# Patient Record
Sex: FEMALE | Race: WHITE | NOT HISPANIC OR LATINO | Employment: UNEMPLOYED | URBAN - METROPOLITAN AREA
[De-identification: names, ages, dates, MRNs, and addresses within clinical notes are randomized per-mention and may not be internally consistent; named-entity substitution may affect disease eponyms.]

---

## 2017-01-03 ENCOUNTER — ONCOLOGY VISIT (OUTPATIENT)
Dept: ONCOLOGY | Facility: CLINIC | Age: 52
End: 2017-01-03
Attending: PHYSICIAN ASSISTANT
Payer: COMMERCIAL

## 2017-01-03 VITALS
HEART RATE: 116 BPM | OXYGEN SATURATION: 100 % | RESPIRATION RATE: 16 BRPM | DIASTOLIC BLOOD PRESSURE: 67 MMHG | SYSTOLIC BLOOD PRESSURE: 110 MMHG | HEIGHT: 64 IN | TEMPERATURE: 99 F | BODY MASS INDEX: 22.65 KG/M2 | WEIGHT: 132.7 LBS

## 2017-01-03 DIAGNOSIS — C50.411 MALIGNANT NEOPLASM OF UPPER-OUTER QUADRANT OF RIGHT FEMALE BREAST (H): Primary | ICD-10-CM

## 2017-01-03 DIAGNOSIS — C50.411 MALIGNANT NEOPLASM OF UPPER-OUTER QUADRANT OF RIGHT FEMALE BREAST (H): ICD-10-CM

## 2017-01-03 LAB
ALBUMIN SERPL-MCNC: 3.9 G/DL (ref 3.4–5)
ALP SERPL-CCNC: 67 U/L (ref 40–150)
ALT SERPL W P-5'-P-CCNC: 30 U/L (ref 0–50)
ANION GAP SERPL CALCULATED.3IONS-SCNC: 5 MMOL/L (ref 3–14)
AST SERPL W P-5'-P-CCNC: 17 U/L (ref 0–45)
BASOPHILS # BLD AUTO: 0 10E9/L (ref 0–0.2)
BASOPHILS NFR BLD AUTO: 0.6 %
BILIRUB SERPL-MCNC: 0.5 MG/DL (ref 0.2–1.3)
BUN SERPL-MCNC: 13 MG/DL (ref 7–30)
CALCIUM SERPL-MCNC: 9.6 MG/DL (ref 8.5–10.1)
CHLORIDE SERPL-SCNC: 105 MMOL/L (ref 94–109)
CO2 SERPL-SCNC: 29 MMOL/L (ref 20–32)
CREAT SERPL-MCNC: 0.94 MG/DL (ref 0.52–1.04)
DIFFERENTIAL METHOD BLD: NORMAL
EOSINOPHIL # BLD AUTO: 0.1 10E9/L (ref 0–0.7)
EOSINOPHIL NFR BLD AUTO: 2 %
ERYTHROCYTE [DISTWIDTH] IN BLOOD BY AUTOMATED COUNT: 14.3 % (ref 10–15)
GFR SERPL CREATININE-BSD FRML MDRD: 63 ML/MIN/1.7M2
GLUCOSE SERPL-MCNC: 116 MG/DL (ref 70–99)
HCT VFR BLD AUTO: 42.6 % (ref 35–47)
HGB BLD-MCNC: 14.1 G/DL (ref 11.7–15.7)
IMM GRANULOCYTES # BLD: 0 10E9/L (ref 0–0.4)
IMM GRANULOCYTES NFR BLD: 0.3 %
LYMPHOCYTES # BLD AUTO: 1.8 10E9/L (ref 0.8–5.3)
LYMPHOCYTES NFR BLD AUTO: 25.6 %
MCH RBC QN AUTO: 28.3 PG (ref 26.5–33)
MCHC RBC AUTO-ENTMCNC: 33.1 G/DL (ref 31.5–36.5)
MCV RBC AUTO: 85 FL (ref 78–100)
MONOCYTES # BLD AUTO: 0.5 10E9/L (ref 0–1.3)
MONOCYTES NFR BLD AUTO: 6.4 %
NEUTROPHILS # BLD AUTO: 4.6 10E9/L (ref 1.6–8.3)
NEUTROPHILS NFR BLD AUTO: 65.1 %
NRBC # BLD AUTO: 0 10*3/UL
NRBC BLD AUTO-RTO: 0 /100
PLATELET # BLD AUTO: 239 10E9/L (ref 150–450)
POTASSIUM SERPL-SCNC: 4.1 MMOL/L (ref 3.4–5.3)
PROT SERPL-MCNC: 6.6 G/DL (ref 6.8–8.8)
RBC # BLD AUTO: 4.99 10E12/L (ref 3.8–5.2)
SODIUM SERPL-SCNC: 138 MMOL/L (ref 133–144)
WBC # BLD AUTO: 7.1 10E9/L (ref 4–11)

## 2017-01-03 PROCEDURE — 99214 OFFICE O/P EST MOD 30 MIN: CPT | Mod: ZP | Performed by: PHYSICIAN ASSISTANT

## 2017-01-03 PROCEDURE — 85025 COMPLETE CBC W/AUTO DIFF WBC: CPT | Performed by: INTERNAL MEDICINE

## 2017-01-03 PROCEDURE — 80053 COMPREHEN METABOLIC PANEL: CPT | Performed by: INTERNAL MEDICINE

## 2017-01-03 PROCEDURE — 99212 OFFICE O/P EST SF 10 MIN: CPT | Mod: ZF

## 2017-01-03 PROCEDURE — 36415 COLL VENOUS BLD VENIPUNCTURE: CPT

## 2017-01-03 ASSESSMENT — PAIN SCALES - GENERAL: PAINLEVEL: NO PAIN (0)

## 2017-01-03 NOTE — Clinical Note
"1/3/2017       RE: Josefina Bennett  [address]  [facility] [address]     Dear Colleague,    Thank you for referring your patient, Josefina Bennett, to the [facility]. Please see a copy of my visit note below.    DIAGNOSIS:   Dictation on: 01/03/2017 12:21 PM by: JAGRUTI GASTELUM [[id number]]     INTERVAL HISTORY:   Dictation on: 01/03/2017 12:21 PM by: JAGRUTI GASTELUM [[id number]]     ROS:  Answers for HPI/ROS submitted by the patient on 1/3/2017   General Symptoms: No  Skin Symptoms: No  HENT Symptoms: No  EYE SYMPTOMS: No  HEART SYMPTOMS: No  LUNG SYMPTOMS: No  INTESTINAL SYMPTOMS: No  URINARY SYMPTOMS: No  GYNECOLOGIC SYMPTOMS: No  BREAST SYMPTOMS: No  SKELETAL SYMPTOMS: No  BLOOD SYMPTOMS: No  NERVOUS SYSTEM SYMPTOMS: No  MENTAL HEALTH SYMPTOMS: No    MEDICATIONS:   Current Outpatient Prescriptions   Medication Sig Dispense Refill     venlafaxine (EFFEXOR-ER) 225 MG TB24 24 hr tablet Take 1 tablet (225 mg) by mouth daily 90 tablet 3     letrozole (FEMARA) 2.5 MG tablet Take 1 tablet (2.5 mg) by mouth daily 90 tablet 1     mupirocin (BACTROBAN) 2 % ointment Apply topically daily 22 g 0     cholecalciferol (VITAMIN D3) 1000 UNIT tablet Take 1 tablet (1,000 Units) by mouth daily 30 tablet 0     calcium carbonate (OS- MG [place]. [place]) 500 MG tablet Take 500 mg by mouth At Bedtime            ALLERGIES:    Allergies   Allergen Reactions     Morphine      Rash     Tylenol [Acetaminophen] Hives     Rash       PHYSICAL EXAMINATION:  Vitals: /67 mmHg  Pulse 116  Temp(Src) 99  F (37.2  C) (Oral)  Resp 16  Ht 1.613 m (5' 3.5\")  Wt 60.192 kg (132 lb 11.2 oz)  BMI 23.14 kg/m2  SpO2 100%  LMP 12/18/2014  GENERAL:  A pleasant person in no acute distress.   HEENT:  Sclerae are nonicteric.    NECK:  Supple.   LYMPH NODES:  No peripheral lymphadenopathy noted in the axillary, supraclavicular, or cervical regions.   LUNGS:  Clear to auscultation bilaterally.   HEART:  Regular rate and rhythm, with no " murmur appreciated.   ABDOMEN:  Bowel sounds are active.  Soft and nontender.  No hepatosplenomegaly or other masses appreciated.  LOWER EXTREMITIES:  Without pitting edema to the knees bilaterally.   NEUROLOGICAL:  Alert/orientated/able to answer all questions.  CN grossly intact.  BREAST:  Right chest wall, well healed mastectomy incision, no masses or nodules.  Left breast, normal to inspection, no masses.     LAB:      1/3/2017 11:10   Sodium 138   Potassium 4.1   Chloride 105   Carbon Dioxide 29   Urea Nitrogen 13   Creatinine 0.94   GFR Estimate 63   GFR Estimate If Black 76   Calcium 9.6   Anion Gap 5   Albumin 3.9   Protein Total 6.6 (L)   Bilirubin Total 0.5   Alkaline Phosphatase 67   ALT 30   AST 17   Glucose 116 (H)   WBC 7.1   Hemoglobin 14.1   Hematocrit 42.6   Platelet Count 239   RBC Count 4.99   MCV 85   MCH 28.3   MCHC 33.1   RDW 14.3   Diff Method Automated Method   % Neutrophils 65.1   % Lymphocytes 25.6   % Monocytes 6.4   % Eosinophils 2.0   % Basophils 0.6   % Immature Granulocytes 0.3   Nucleated RBCs 0   Absolute Neutrophil 4.6   Absolute Lymphocytes 1.8   Absolute Monocytes 0.5   Absolute Eosinophils 0.1   Absolute Basophils 0.0   Abs Immature Granulocytes 0.0   Absolute Nucleated RBC 0.0     IMPRESSION/PLAN:    Dictation on: 01/03/2017 12:24 PM by: MERA GASTELUM [[id number]]     Mera Gastelum PA-C    Again, thank you for allowing me to participate in the care of your patient.      Sincerely,    Mera Gastelum PA-C

## 2017-01-03 NOTE — NURSING NOTE
Chief Complaint   Patient presents with     Blood Draw     Vitals done and labs drawn peripherally from left arm    Concetta Quan LPN

## 2017-01-03 NOTE — NURSING NOTE
"Josefina Bennett is a 51 year old female who presents for:  Chief Complaint   Patient presents with     Blood Draw     Oncology Clinic Visit     Breast Ca        Initial Vitals:  /67 mmHg  Pulse 116  Temp(Src) 99  F (37.2  C) (Oral)  Resp 16  Ht 1.613 m (5' 3.5\")  Wt 60.192 kg (132 lb 11.2 oz)  BMI 23.14 kg/m2  SpO2 100%  LMP 12/18/2014 Estimated body mass index is 23.14 kg/(m^2) as calculated from the following:    Height as of this encounter: 1.613 m (5' 3.5\").    Weight as of this encounter: 60.192 kg (132 lb 11.2 oz).. Body surface area is 1.64 meters squared. BP completed using cuff size: regular  No Pain (0) Patient's last menstrual period was 12/18/2014. Allergies and medications reviewed.     Medications: Medication refills not needed today.  Pharmacy name entered into [employer]:    [facility] - [place], [place] - [address] AT [facility]  [facility] [id number] ([place]) - [place], [place] - [address] AT [facility] [address]  [facility] - [place], [place] - [address] [facility]    Comments:     7 minutes for nursing intake (face to face time)   Clarice Blanco LPN          "

## 2017-01-03 NOTE — PROGRESS NOTES
DIAGNOSIS:  Clinical stage II infiltrating lobular carcinoma of the right breast, ER/UT-positive and HER2/jhony-negative.  The patient noted changes in her right breast in 2015.  Screening mammogram on 01/15/2015 showed an architectural distortion in the right breast.  Diagnostic mammogram and ultrasound on 01/28/2015 confirmed the architectural distortion.  Ultrasound showed a 4 x 1.1 x 1.5 cm abnormal hypoechoic area.  There was also an additional 1.4 hypoechoic nodule.  Breast MRI on 02/16/2015 showed a 5.7 cm area of concern.  PET/CT scan on 02/10/2015 showed no distant disease.  There were small pulmonary nodules.  [facility] in [facility] [place], and was randomized to weekly Taxol x12 weeks with ganetespib.  She received this from 03/03/2015 through 05/19/2015.  She received dose-dense Adriamycin and Cytoxan x2 cycles from 05/26/2015 until 06/09/2015.  After the second cycle of AC, she was hospitalized with Pneumocystis pneumonia, which required intubation.  Further chemotherapy was canceled.  She had a right lumpectomy with sentinel lymph node biopsy on 08/12/2015.  This revealed a residual infiltrating lobular carcinoma 63 mm x 36 mm.  There was associated LCIS.  She had a positive margin.  Two out of 2 lymph nodes were positive.  She had RCB class 3.  She went under re-excision on 09/02/2015, which showed an additional infiltrating lobular carcinoma with a positive margin.  She had right mastectomy on 9/21/2015, which showed an additional carcinoma and 2 out of 2 lymph nodes positive.  She was started on tamoxifen in 10/2015.  She completed 6040 cGy to the right chest wall plus lymphatics on 12/03/2015.  She had a laparoscopic hysterectomy and BSO on 10/16/2015. Treatment was changed to letrozole in 01/2016.     INTERVAL HISTORY:  Ms. Bennett comes to the clinic today for followup of her breast carcinoma.  She is feeling well at this time.  She does exercise at least 150 minutes of cardiovascular exercise per week.   "This includes weightbearing exercises at least 2-3 days a week.  The rest of the review of systems is negative.  See below.  She remains on the letrozole, and denies any side effects.     ROS:  Answers for HPI/ROS submitted by the patient on 1/3/2017   General Symptoms: No  Skin Symptoms: No  HENT Symptoms: No  EYE SYMPTOMS: No  HEART SYMPTOMS: No  LUNG SYMPTOMS: No  INTESTINAL SYMPTOMS: No  URINARY SYMPTOMS: No  GYNECOLOGIC SYMPTOMS: No  BREAST SYMPTOMS: No  SKELETAL SYMPTOMS: No  BLOOD SYMPTOMS: No  NERVOUS SYSTEM SYMPTOMS: No  MENTAL HEALTH SYMPTOMS: No    MEDICATIONS:   Current Outpatient Prescriptions   Medication Sig Dispense Refill     venlafaxine (EFFEXOR-ER) 225 MG TB24 24 hr tablet Take 1 tablet (225 mg) by mouth daily 90 tablet 3     letrozole (FEMARA) 2.5 MG tablet Take 1 tablet (2.5 mg) by mouth daily 90 tablet 1     mupirocin (BACTROBAN) 2 % ointment Apply topically daily 22 g 0     cholecalciferol (VITAMIN D3) 1000 UNIT tablet Take 1 tablet (1,000 Units) by mouth daily 30 tablet 0     calcium carbonate (OS- MG [place]. [place]) 500 MG tablet Take 500 mg by mouth At Bedtime            ALLERGIES:    Allergies   Allergen Reactions     Morphine      Rash     Tylenol [Acetaminophen] Hives     Rash       PHYSICAL EXAMINATION:  Vitals: /67 mmHg  Pulse 116  Temp(Src) 99  F (37.2  C) (Oral)  Resp 16  Ht 1.613 m (5' 3.5\")  Wt 60.192 kg (132 lb 11.2 oz)  BMI 23.14 kg/m2  SpO2 100%  LMP 12/18/2014  GENERAL:  A pleasant person in no acute distress.   HEENT:  Sclerae are nonicteric.    NECK:  Supple.   LYMPH NODES:  No peripheral lymphadenopathy noted in the axillary, supraclavicular, or cervical regions.   LUNGS:  Clear to auscultation bilaterally.   HEART:  Regular rate and rhythm, with no murmur appreciated.   ABDOMEN:  Bowel sounds are active.  Soft and nontender.  No hepatosplenomegaly or other masses appreciated.  LOWER EXTREMITIES:  Without pitting edema to the knees bilaterally.   NEUROLOGICAL: "  Alert/orientated/able to answer all questions.  CN grossly intact.  BREAST:  Right chest wall, well healed mastectomy incision, no masses or nodules.  Left breast, normal to inspection, no masses.     LAB:      1/3/2017 11:10   Sodium 138   Potassium 4.1   Chloride 105   Carbon Dioxide 29   Urea Nitrogen 13   Creatinine 0.94   GFR Estimate 63   GFR Estimate If Black 76   Calcium 9.6   Anion Gap 5   Albumin 3.9   Protein Total 6.6 (L)   Bilirubin Total 0.5   Alkaline Phosphatase 67   ALT 30   AST 17   Glucose 116 (H)   WBC 7.1   Hemoglobin 14.1   Hematocrit 42.6   Platelet Count 239   RBC Count 4.99   MCV 85   MCH 28.3   MCHC 33.1   RDW 14.3   Diff Method Automated Method   % Neutrophils 65.1   % Lymphocytes 25.6   % Monocytes 6.4   % Eosinophils 2.0   % Basophils 0.6   % Immature Granulocytes 0.3   Nucleated RBCs 0   Absolute Neutrophil 4.6   Absolute Lymphocytes 1.8   Absolute Monocytes 0.5   Absolute Eosinophils 0.1   Absolute Basophils 0.0   Abs Immature Granulocytes 0.0   Absolute Nucleated RBC 0.0     IMPRESSION/PLAN:   1.  Locally advanced infiltrating lobular carcinoma, right breast, ER/[place]-positive, HER2/jhony-negative.  Ms. Bennett continues to do well at this time.  She is not having signs or symptoms that would suggest recurrence of her breast carcinoma.  We plan to continue the letrozole daily.  She had a breast MRI in 10/2016 with no concerns.  She is already scheduled for her mammogram in April.  She will continue to exercise with at least 150 minutes of cardiovascular exercise per week.    2.  Bone health.  DEXA scan in 06/2016 showed a T-score of -1.7 with low bone density.  We plan to continue the calcium with vitamin D and weightbearing exercises 2-3 days a week.       If there are no interval concerns, the patient will follow up in April.     Mera Marte PA-C

## 2017-01-03 NOTE — Clinical Note
1/3/2017      RE: Josefina Bennett  [address]  [facility] [address]       DIAGNOSIS:  Clinical stage II infiltrating lobular carcinoma of the right breast, ER/ME-positive and HER2/jhony-negative.  The patient noted changes in her right breast in 2015.  Screening mammogram on 01/15/2015 showed an architectural distortion in the right breast.  Diagnostic mammogram and ultrasound on 01/28/2015 confirmed the architectural distortion.  Ultrasound showed a 4 x 1.1 x 1.5 cm abnormal hypoechoic area.  There was also an additional 1.4 hypoechoic nodule.  Breast MRI on 02/16/2015 showed a 5.7 cm area of concern.  PET/CT scan on 02/10/2015 showed no distant disease.  There were small pulmonary nodules.  She enrolled in [facility] [place], and was randomized to weekly Taxol x12 weeks with ganetespib.  She received this from 03/03/2015 through 05/19/2015.  She received dose-dense Adriamycin and Cytoxan x2 cycles from 05/26/2015 until 06/09/2015.  After the second cycle of AC, she was hospitalized with Pneumocystis pneumonia, which required intubation.  Further chemotherapy was canceled.  She had a right lumpectomy with sentinel lymph node biopsy on 08/12/2015.  This revealed a residual infiltrating lobular carcinoma 63 mm x 36 mm.  There was associated LCIS.  She had a positive margin.  Two out of 2 lymph nodes were positive.  She had RCB class 3.  She went under re-excision on 09/02/2015, which showed an additional infiltrating lobular carcinoma with a positive margin.  She had right mastectomy on 9/21/2015, which showed an additional carcinoma and 2 out of 2 lymph nodes positive.  She was started on tamoxifen in 10/2015.  She completed 6040 cGy to the right chest wall plus lymphatics on 12/03/2015.  She had a laparoscopic hysterectomy and BSO on 10/16/2015. Treatment was changed to letrozole in 01/2016.     INTERVAL HISTORY:  Ms. Bennett comes to the clinic today for followup of her breast carcinoma.  She is feeling well at this time.  She  "does exercise at least 150 minutes of cardiovascular exercise per week.  This includes weightbearing exercises at least 2-3 days a week.  The rest of the review of systems is negative.  See below.  She remains on the letrozole, and denies any side effects.     ROS:  Answers for HPI/ROS submitted by the patient on 1/3/2017   General Symptoms: No  Skin Symptoms: No  HENT Symptoms: No  EYE SYMPTOMS: No  HEART SYMPTOMS: No  LUNG SYMPTOMS: No  INTESTINAL SYMPTOMS: No  URINARY SYMPTOMS: No  GYNECOLOGIC SYMPTOMS: No  BREAST SYMPTOMS: No  SKELETAL SYMPTOMS: No  BLOOD SYMPTOMS: No  NERVOUS SYSTEM SYMPTOMS: No  MENTAL HEALTH SYMPTOMS: No    MEDICATIONS:   Current Outpatient Prescriptions   Medication Sig Dispense Refill     venlafaxine (EFFEXOR-ER) 225 MG TB24 24 hr tablet Take 1 tablet (225 mg) by mouth daily 90 tablet 3     letrozole (FEMARA) 2.5 MG tablet Take 1 tablet (2.5 mg) by mouth daily 90 tablet 1     mupirocin (BACTROBAN) 2 % ointment Apply topically daily 22 g 0     cholecalciferol (VITAMIN D3) 1000 UNIT tablet Take 1 tablet (1,000 Units) by mouth daily 30 tablet 0     calcium carbonate (OS- MG [place]. [place]) 500 MG tablet Take 500 mg by mouth At Bedtime            ALLERGIES:    Allergies   Allergen Reactions     Morphine      Rash     Tylenol [Acetaminophen] Hives     Rash       PHYSICAL EXAMINATION:  Vitals: /67 mmHg  Pulse 116  Temp(Src) 99  F (37.2  C) (Oral)  Resp 16  Ht 1.613 m (5' 3.5\")  Wt 60.192 kg (132 lb 11.2 oz)  BMI 23.14 kg/m2  SpO2 100%  LMP 12/18/2014  GENERAL:  A pleasant person in no acute distress.   HEENT:  Sclerae are nonicteric.    NECK:  Supple.   LYMPH NODES:  No peripheral lymphadenopathy noted in the axillary, supraclavicular, or cervical regions.   LUNGS:  Clear to auscultation bilaterally.   HEART:  Regular rate and rhythm, with no murmur appreciated.   ABDOMEN:  Bowel sounds are active.  Soft and nontender.  No hepatosplenomegaly or other masses appreciated.  LOWER " EXTREMITIES:  Without pitting edema to the knees bilaterally.   NEUROLOGICAL:  Alert/orientated/able to answer all questions.  CN grossly intact.  BREAST:  Right chest wall, well healed mastectomy incision, no masses or nodules.  Left breast, normal to inspection, no masses.     LAB:      1/3/2017 11:10   Sodium 138   Potassium 4.1   Chloride 105   Carbon Dioxide 29   Urea Nitrogen 13   Creatinine 0.94   GFR Estimate 63   GFR Estimate If Black 76   Calcium 9.6   Anion Gap 5   Albumin 3.9   Protein Total 6.6 (L)   Bilirubin Total 0.5   Alkaline Phosphatase 67   ALT 30   AST 17   Glucose 116 (H)   WBC 7.1   Hemoglobin 14.1   Hematocrit 42.6   Platelet Count 239   RBC Count 4.99   MCV 85   MCH 28.3   MCHC 33.1   RDW 14.3   Diff Method Automated Method   % Neutrophils 65.1   % Lymphocytes 25.6   % Monocytes 6.4   % Eosinophils 2.0   % Basophils 0.6   % Immature Granulocytes 0.3   Nucleated RBCs 0   Absolute Neutrophil 4.6   Absolute Lymphocytes 1.8   Absolute Monocytes 0.5   Absolute Eosinophils 0.1   Absolute Basophils 0.0   Abs Immature Granulocytes 0.0   Absolute Nucleated RBC 0.0     IMPRESSION/PLAN:   1.  Locally advanced infiltrating lobular carcinoma, right breast, ER/RI-positive, HER2/jhony-negative.  Ms. Bennett continues to do well at this time.  She is not having signs or symptoms that would suggest recurrence of her breast carcinoma.  We plan to continue the letrozole daily.  She had a breast MRI in 10/2016 with no concerns.  She is already scheduled for her mammogram in April.  She will continue to exercise with at least 150 minutes of cardiovascular exercise per week.    2.  Bone health.  DEXA scan in 06/2016 showed a T-score of -1.7 with low bone density.  We plan to continue the calcium with vitamin D and weightbearing exercises 2-3 days a week.       If there are no interval concerns, the patient will follow up in April.     Mera Marte PA-C

## 2017-01-11 ENCOUNTER — OFFICE VISIT (OUTPATIENT)
Dept: RADIATION ONCOLOGY | Facility: CLINIC | Age: 52
End: 2017-01-11
Attending: RADIOLOGY
Payer: COMMERCIAL

## 2017-01-11 VITALS
BODY MASS INDEX: 23.2 KG/M2 | WEIGHT: 133.1 LBS | DIASTOLIC BLOOD PRESSURE: 84 MMHG | SYSTOLIC BLOOD PRESSURE: 138 MMHG | HEART RATE: 105 BPM

## 2017-01-11 DIAGNOSIS — C50.919 MALIGNANT NEOPLASM OF FEMALE BREAST (H): Primary | ICD-10-CM

## 2017-01-11 PROCEDURE — 99213 OFFICE O/P EST LOW 20 MIN: CPT | Performed by: RADIOLOGY

## 2017-01-11 NOTE — PROGRESS NOTES
"RADIATION ONCOLOGY FOLLOW-UP  DATE OF VISIT: 17  NAME: Josefina Bennett  MRN: [id number]  : 1965    DIAGNOSIS: Infiltrating lobular carcinoma of the right breast, grade 2, ER/[place] positive, Her2 non-amplified s/p neoadjuvant chemotherapy followed by modified radical mastectomy and lymph node dissection, stage [id number]      TYPE OF RADIOTHERAPY DELIVERED: 5,040 cGy in 180 cGy/fraction to the chest wall and regional lymphatics followed by 720 cGy axillary lymph node boost and 1000 cGy mastectomy scar boost.    INTERVAL SINCE RADIOTHERAPY COMPLETION: Approximately 1 year  since completion on 12/3/2015.    HISTORY OF PRESENT ILLNESS: Ms. Bennett is a 51-year-old woman with a diagnosis of locally advanced invasive lobular carcinoma of the right breast. In early 2015, she noted a small dimple of the skin near her right axilla. Screening mammogram showed dense breast tissue, as well as architectural distortion in the superiolateral quadrant of the right breast. Ultimately, breast US showed a \"band-like\" area of abnormal echogenecity between 11 - 12 o'clock, measuring 4 cm x 1.1 x 1.5 cm, as well as a separate hypoechoic suspicious nodule slightly  from the primary lesion. Subsequent biopsy ([id number]) showed grade 2 infiltrating lobular carcinoma of the breast, ER/W[place] positive, Her2 negative. MRI breast showed a total tumor size of 4.8 x 1.7 x 3.2 cm. PET/CT was obtained and showed no evidence of metastatic disease.    In 2015, she was enrolled into the I-SPY-2 clinical trial, on the ganetespib arm. She underwent 12 weekly cycles of paclitaxel/ganetespib from 3/3/15 - 5/19/15, and then received 2 cycles dose-dense adriamycin/cyclophosphomide from 5/26/15 - 6/9/15. Unfortunately, after 2 cycles AC, she developed pneumocystic pneumonia requiring intubation, and did not complete the final 2 cycles of AC. Her followup MRI showed slight decrease in the volume of enhancing tumor. She then underwent " right wire localized lumpectomy with sentinel node biopsy on 8/12/15 by Dr. Franco. Pathology showed invasive lobular carcinoma, grade 2, 6.3 x 3.6 cm, as well as microscopic foci of LCIS; no LVSI was seen. Anterior margin was < 1 mm, and initial inferior and lateral margins were positive. Intra-op re-exicision of the inferior margin showed a further 1.8mm cancerous lesion with again positive margin. 2/2 right axillary sentinel lymph nodes sampled were positive, one measuring 10 mm with a 4 mm STEVEN, and the other measuring 8 mm with 6 mm STEVEN. [facility] residual cancer burden was calculated at 3.452 (class III).    Because of the positive margin, she underwent re-excision on 9/2/15, which showed ([id number]) invasive carcinoma, 3.2 x 1.8 cm, unfortunately again present at the new inferior margin. For this, she and Dr. Franco elected to proceed with completion right simple mastectomy, on 9/21/15. Pathology ([id number]) again demonstrated carcinoma, spanning an estimated 7cm, with the nearest margin being the deep margin at 0.2 cm. Intraoperatively, Dr. Franco felt a mildly enlarged axillary LN, and excised this. Pathology showed 2/2 lymph nodes positive with the largest tumor size 2 cm, and once again, both with extracapsular extension. Final pathologic stage [id number]. She was subsequently referred to our clinic for adjuvant radiotherapy. At the time of CT simulation for treatment planning, we noted an enlarged axillary lymph node on imaging that was not palpable on examination. Lymph node received additional dose for boosting as a result. She experienced experienced grade 1 fatigue and grade 1 radiation dermatitis, as well as grade 1 acute pain reaction to radiation over the treatment field in the axilla.    Since completion of therapy, she has continued to follow with Dr. Meza.  She elected to undergo bilateral oophorectomy, and has been on letrozole in the adjuvant setting. She has been tolerating this well.  Since  completion of RT, her energy level has returned to baseline, she has recovered from her acute radiation-related toxicities of fatigue, pain and dermatitis, and she has been coping well in the post-RT setting.  Most recently she has seen Dr. Meza of medical oncology on 10/9/2016 and Mera Marte PA-C in medical oncology on 1/3/2017.  Most recently she has undergone surveillance with MR of the left breast with and without contrast read as BI-RADS 2 and thus without concerning findings.  Subjectively today, she has no new complaints, and denies discomfort, swelling in the breast or arm, cough, dyspnea, or chest pains.  She reports she is doing quite well and has been 'blessed' to be in such good health.     PHYSICAL EXAMINATION:  GEN: Appears well, alert, oriented, and in NAD  HEENT: EOMI, MMM, no thrush  CV: Good distal perfusion.    RESP:  breathing comfortably on room air  CHEST: Right mastectomy with skin healed. No overlying skin changes or nodularity. No seroma, or axillary / supraclavicular lymphadenopathy. No evidence of recurrent disease. Left breast normal with no palpable masses or lymphadenopathy.    SKIN: Normal color and turgor  NEURO: No focal deficits, CN 3-12 grossly intact, normal and symmetric motor and sensory exam in bilateral upper and lower extremities, normal gait  PSYCH: Appropriate mood and affect    LABS/IMAGING: Reviewed.   Most recently her DEXA scan results from 6/2/16 have been reviewed and results as below  Region   BMD   T - score  Z - score   L1-L4   1.017 g/cm   -1.4   -0.7            Neck Left  0.827 g/cm   -1.5   -0.6   Total Left  0.868 g/cm   -1.1   -0.5 ,           Neck Right  0.808 g/cm   -1.7   -0.7   Total Right  0.877 g/cm   -1.0   -0.4 \    Laboratories from 1/3/17 were reviewed.  These results were grossly unremarkable.  Her estimated GFR was 63 which does stratify her into the CKD 2 risk group.    IMPRESSION:  Ms. Bennett is a 51-year-old female with a history of  locally advanced infiltrating lobular carcinoma of the right breast, grade 2, ER/FL positive, Her2 non-amplified s/p neoadjuvant chemotherapy followed by lumpectomy with residual disease who ultimately required modified radical mastectomy and lymph node biopsy, stage [id number], status post adjuvant radiotherapy. She is now approximately 1 year status post adjuvant radiotherapy. She is doing very well; she has completely recovered from acute toxicities of radiotherapy, and she has no signs of late toxicity at this time; we discussed expectant management     RECOMMENDATION:    1. Follow up with Dr. Chauhan Radiation [facility] in Spring 2018  2. Follow up with medical oncology as scheduled.  3. Continue routine breast surveillance in radiology  4. Skin cares with solar radiation protection especially in treatment field.    I saw and examined the patient with the resident.  I have reviewed and agree with the resident's note and plan of care.      Bill Chauhan MD

## 2017-01-11 NOTE — PROGRESS NOTES
FOLLOW-UP VISIT    Patient Name: Josefina Bennett      : 1965     Age: 51 year old        ______________________________________________________________________________     Chief Complaint   Patient presents with     Cancer     1 year fup for rad therapy to R breast     /84 mmHg  Pulse 105  Wt 60.374 kg (133 lb 1.6 oz)  LMP 2014     Date Radiation Completed: 12/3/15    Pain  Denies    Labs  Other Labs: No    Imaging  None          Other Appointments:     MD Name: Dr. Meza Appointment Date:    MD Name: Appointment Date:   MD Name: Appointment Date:   Other Appointment Notes:     Residual Radiation side effect: none     Additional Instructions:

## 2017-01-11 NOTE — Clinical Note
2017       RE: Josefina Bennett  [address]  [facility] [address]     Dear Colleague,    Thank you for referring your patient, Josefina Bennett, to the RADIATION ONCOLOGY CLINIC. Please see a copy of my visit note below.    FOLLOW-UP VISIT    Patient Name: Josefina Bennett      : 1965     Age: 51 year old        ______________________________________________________________________________     Chief Complaint   Patient presents with     Cancer     1 year fup for rad therapy to R breast     /84 mmHg  Pulse 105  Wt 60.374 kg (133 lb 1.6 oz)  LMP 2014     Date Radiation Completed: 12/3/15    Pain  Denies    Labs  Other Labs: No    Imaging  None          Other Appointments:     MD Name: Dr. Meza Appointment Date:    MD Name: Appointment Date:   MD Name: Appointment Date:   Other Appointment Notes:     Residual Radiation side effect: none     Additional Instructions:                 RADIATION ONCOLOGY FOLLOW-UP  DATE OF VISIT: 17  NAME: Josefina Bennett  MRN: [id number]  : 1965    DIAGNOSIS: Infiltrating lobular carcinoma of the right breast, grade 2, ER/VA positive, Her2 non-amplified s/p neoadjuvant chemotherapy followed by modified radical mastectomy and lymph node dissection, stage [id number]      TYPE OF RADIOTHERAPY DELIVERED: 5,040 cGy in 180 cGy/fraction to the chest wall and regional lymphatics followed by 720 cGy axillary lymph node boost and 1000 cGy mastectomy scar boost.    INTERVAL SINCE RADIOTHERAPY COMPLETION: Approximately 1 year  since completion on 12/3/2015.    HISTORY OF PRESENT ILLNESS: Ms. Bennett is a 51-year-old woman with a diagnosis of locally advanced invasive lobular carcinoma of the right breast. In early 2015, she noted a small dimple of the skin near her right axilla. Screening mammogram showed dense breast tissue, as well as architectural distortion in the superiolateral quadrant of the right breast. Ultimately, breast US showed a  "\"band-like\" area of abnormal echogenecity between 11 - 12 o'clock, measuring 4 cm x 1.1 x 1.5 cm, as well as a separate hypoechoic suspicious nodule slightly  from the primary lesion. Subsequent biopsy ([id number]) showed grade 2 infiltrating lobular carcinoma of the breast, ER/IA positive, Her2 negative. MRI breast showed a total tumor size of 4.8 x 1.7 x 3.2 cm. PET/CT was obtained and showed no evidence of metastatic disease.    In 2/2015, she was enrolled into the I-SPY-2 clinical trial, on the ganetespib arm. She underwent 12 weekly cycles of paclitaxel/ganetespib from 3/3/15 - 5/19/15, and then received 2 cycles dose-dense adriamycin/cyclophosphomide from 5/26/15 - 6/9/15. Unfortunately, after 2 cycles AC, she developed pneumocystic pneumonia requiring intubation, and did not complete the final 2 cycles of AC. Her followup MRI showed slight decrease in the volume of enhancing tumor. She then underwent right wire localized lumpectomy with sentinel node biopsy on 8/12/15 by Dr. Franco. Pathology showed invasive lobular carcinoma, grade 2, 6.3 x 3.6 cm, as well as microscopic foci of LCIS; no LVSI was seen. Anterior margin was < 1 mm, and initial inferior and lateral margins were positive. Intra-op re-exicision of the inferior margin showed a further 1.8mm cancerous lesion with again positive margin. 2/2 right axillary sentinel lymph nodes sampled were positive, one measuring 10 mm with a 4 mm STEVEN, and the other measuring 8 mm with 6 mm STEVEN. [facility] residual cancer burden was calculated at 3.452 (class III).    Because of the positive margin, she underwent re-excision on 9/2/15, which showed ([id number]) invasive carcinoma, 3.2 x 1.8 cm, unfortunately again present at the new inferior margin. For this, she and Dr. Franco elected to proceed with completion right simple mastectomy, on 9/21/15. Pathology ([id number]) again demonstrated carcinoma, spanning an estimated 7cm, with the nearest margin being the deep " margin at 0.2 cm. Intraoperatively, Dr. Franco felt a mildly enlarged axillary LN, and excised this. Pathology showed 2/2 lymph nodes positive with the largest tumor size 2 cm, and once again, both with extracapsular extension. Final pathologic stage [id number]. She was subsequently referred to our clinic for adjuvant radiotherapy. At the time of CT simulation for treatment planning, we noted an enlarged axillary lymph node on imaging that was not palpable on examination. Lymph node received additional dose for boosting as a result. She experienced experienced grade 1 fatigue and grade 1 radiation dermatitis, as well as grade 1 acute pain reaction to radiation over the treatment field in the axilla.    Since completion of therapy, she has continued to follow with Dr. Meza.  She elected to undergo bilateral oophorectomy, and has been on letrozole in the adjuvant setting. She has been tolerating this well.  Since completion of RT, her energy level has returned to baseline, she has recovered from her acute radiation-related toxicities of fatigue, pain and dermatitis, and she has been coping well in the post-RT setting.  Most recently she has seen Dr. Meza of medical oncology on 10/9/2016 and Mera Marte PA-C in medical oncology on 1/3/2017.  Most recently she has undergone surveillance with MR of the left breast with and without contrast read as BI-RADS 2 and thus without concerning findings.  Subjectively today, she has no new complaints, and denies discomfort, swelling in the breast or arm, cough, dyspnea, or chest pains.  She reports she is doing quite well and has been 'blessed' to be in such good health.     PHYSICAL EXAMINATION:  GEN: Appears well, alert, oriented, and in NAD  HEENT: EOMI, MMM, no thrush  CV: Good distal perfusion.    RESP:  breathing comfortably on room air  CHEST: Right mastectomy with skin healed. No overlying skin changes or nodularity. No seroma, or axillary / supraclavicular  lymphadenopathy. No evidence of recurrent disease. Left breast normal with no palpable masses or lymphadenopathy.    SKIN: Normal color and turgor  NEURO: No focal deficits, CN 3-12 grossly intact, normal and symmetric motor and sensory exam in bilateral upper and lower extremities, normal gait  PSYCH: Appropriate mood and affect    LABS/IMAGING: Reviewed.   Most recently her DEXA scan results from 6/2/16 have been reviewed and results as below  Region   BMD   T - score  Z - score   L1-L4   1.017 g/cm   -1.4   -0.7            Neck Left  0.827 g/cm   -1.5   -0.6   Total Left  0.868 g/cm   -1.1   -0.5 ,           Neck Right  0.808 g/cm   -1.7   -0.7   Total Right  0.877 g/cm   -1.0   -0.4 \    Laboratories from 1/3/17 were reviewed.  These results were grossly unremarkable.  Her estimated GFR was 63 which does stratify her into the CKD 2 risk group.    IMPRESSION:  Ms. Bennett is a 51-year-old female with a history of locally advanced infiltrating lobular carcinoma of the right breast, grade 2, ER/VT positive, Her2 non-amplified s/p neoadjuvant chemotherapy followed by lumpectomy with residual disease who ultimately required modified radical mastectomy and lymph node biopsy, stage [id number], status post adjuvant radiotherapy. She is now approximately 1 year status post adjuvant radiotherapy. She is doing very well; she has completely recovered from acute toxicities of radiotherapy, and she has no signs of late toxicity at this time; we discussed expectant management     RECOMMENDATION:    1. Follow up with Dr. Chauhan Radiation Oncology in Spring 2018  2. Follow up with medical oncology as scheduled.  3. Continue routine breast surveillance in radiology  4. Skin cares with solar radiation protection especially in treatment field.    I saw and examined the patient with the resident.  I have reviewed and agree with the resident's note and plan of care.      Bill Chauhan MD

## 2017-03-28 DIAGNOSIS — C50.411 MALIGNANT NEOPLASM OF UPPER-OUTER QUADRANT OF RIGHT FEMALE BREAST (H): ICD-10-CM

## 2017-03-28 RX ORDER — MIRTAZAPINE 7.5 MG/1
7.5 TABLET, FILM COATED ORAL AT BEDTIME
Qty: 30 TABLET | Refills: 0 | Status: SHIPPED | OUTPATIENT
Start: 2017-03-28 | End: 2017-04-26

## 2017-04-09 NOTE — PROGRESS NOTES
Josefina Bennett is self referred to our clinic for clinical trials recommendations for her newly diagnosed stage II breast cancer. She had her last mammogram approximately a year ago. She did notice in early January that she was having some distortion of the right breast, and she went to see her gynecologist and had a mammogram performed. She had a screening mammogram performed on 01/15/2015. Breast tissue was heterogeneously dense, which might compromise the mammography. There was architectural distortion in the right breast approximately 11-12 o'clock position, zone 2, posterior depth, and a CC MLO spot compression was performed and an ultrasound was planned. The diagnostic mammogram from 01/28/2015 showed right breast architectural distortion centered at the 12 o'clock position, zone 2, suspicious for neoplasm. Breast tomosynthesis was used in the interpretation. Ultrasound findings included targeted ultrasound of the right upper breast demonstrating a band-like area of abnormal echogenicity between 11 and 12 o'clock position in the right breast at zone 2. This measures 4 cm transversely x 1.1 cm AP, and there was only a 1.5 cm measurement in the radial direction. There was blood flow suspicious for neoplasm at the 11 o'clock position in zone 2. There is a 1.4 cm hypoechoic nodule slightly  from the larger area of altered echogenicity that is also suspicious. A biopsy was performed. The biopsy showed infiltrating lobular carcinoma, grade 2, and a clip was placed at specimen [id number]. There were a few signet cells present. The tumor was ER-positive, OK low positive, and HER2 was negative by immunohistochemistry. She subsequently underwent an MRI showing a tumor that was 4.8 x 1.7 x 3.2 cm in size. Overall, her clinical stage was T2 NX MX.    She is enrolled in the I-SPY-2 clinical trial, and qualified with high-risk MammaPrint score. She was randomized to ganetespib and paclitaxel, and she was treated with 12  cycles of treatment, and then started on AC on 05/26/2015. She developed intractable nausea and vomiting after the first cycle, which required hospitalization on the second cycle. She then developed Pneumocystis pneumonia, which resulted in intubation and a 2-week hospitalization during the course of AC. She was discharged on 07/02/2015, and decided she did not want to pursue any further chemotherapy. She had a right lumpectomy and sentinel lymph node procedure 08/12/2015. She had a positive margin, underwent a re-excision, and had a positive margin. Ultimately, she underwent a right mastectomy with clear margins. She began radiation treatment with Dr. Bill Chauhan, and completed radiation therapy on 12/04/2015. Pathologic stage was III-A, ypT3 N1a MX. Of concern, she had what could be considered an RCB3 tumor.         INTERVAL HISTORY:  Josefina Verma returns to clinic.  She is feeling entirely well.  She has no pain, no fatigue, no depression and no anxiety.      REVIEW OF SYSTEMS:  A 10-point review of systems is entirely negative.  She has a dog and has been running with her dog.  She exercises roughly more than 150 minutes a week.  She also does dance as well.      She has had some nail problems after chemotherapy and would like a referral to Dermatology.      PHYSICAL EXAMINATION:   VITAL SIGNS:  Blood pressure 136/87, temperature 98.4, pulse 99, respirations 16, O2 sat 100% on room air, weight 63.3 kg.   GENERAL:  Josefina Verma appeared generally well.     HEENT:  She has no alopecia.  Examination of the oropharynx is without lesions.   LYMPH:  There is no palpable cervical, supraclavicular, subclavicular or axillary lymphadenopathy.   BREASTS:  Examination of the right anterior chest wall reveals a well-healed mastectomy incision without erythema or masses.  There are no masses in the anterior chest wall bilaterally.  There is an area of slight erythema which appears to be a scratch located 1 cm below the mastectomy  incision, and which is approximately 1 cm in length.  She says that this is due to a bra which was irritating her chest wall.  There are no palpable masses or nodularity in the area of the scratch.  Left breast is without masses.   LUNGS:  Clear to percussion and auscultation.   HEART:  There is a regular rate and rhythm, S1, S2.   ABDOMEN:  Soft and nontender, without hepatosplenomegaly.   EXTREMITIES:  Without edema.   PSYCHIATRIC:  Mood and affect were normal.      LABORATORY DATA:  The CBC and CMP were within normal limits.      IMAGING:  Mammogram performed of the left breast today shows benign findings.      ASSESSMENT AND PLAN:     1.  Josefina Verma is a 51-year-old woman with a history of a clinical stage II right breast cancer, ER positive, TN positive, HER-2 negative by immunohistochemistry.  She had a lobular histology.  She was on the I-SPY-2 clinical trial and randomized to ganetespib and paclitaxel and then went on to AC.  She developed intractable nausea and vomiting on the first cycle of AC and then the second cycle of AC was complicated by Pneumocystis pneumonia requiring intubation and a 2-week hospitalization.  She did not complete the last 2 cycles of Adriamycin and cyclophosphamide treatment.  She did undergo a right lumpectomy and sentinel node procedure on 08/12/2015.  She had a positive margin and then underwent a re-excision, and still had a positive margin.  She then underwent a right mastectomy with clear margins.  She completed radiation therapy in 12/2015.  Pathologic stage was stage IIIA, ypT3 N1a MX.  Of concern, she was reported as having an RCB3 response of her tumor, meaning significant risk of recurrence in a 5-year period.  There were 2 lymph nodes involved with mammary carcinoma with extracapsular extension.  The largest lymph node mass was 2 cm in size.   2.  Josefina Verma was offered the YASMINE-B trial, but she did not want to go on this trial.  She did have bilateral oophorectomy.   She was on tamoxifen and then transitioned to letrozole.  The oophorectomy was performed on 10/16/2015 and she began on letrozole approximately 1 month following that, so she had a total of 2 months of tamoxifen followed by a year and a half of letrozole to date.  Our plan is to continue the letrozole for a total of 10 years.   3.  Discussion of exercise.  Josefina Verma has been exercising at least 150 minutes a week.  I discussed with her that this is associated with reduction of breast cancer recurrence risk from the Pathways and LACE trials published in Cancer Research in 2016.  This was a prospective study of 6000 patients.   4.  Diet.  Josefina Verma continues to have a healthful diet lower in saturated fat and higher in olive oil and lower in red meat.   5.  Discussion of calcium and vitamin D.  She is taking these supplements.   6.  Bone health.  DEXA scan from 06/02/2016 showed a most valid negative T-score of -1.7.  We will repeat the DEXA scan in 2018.  We will continue with calcium and vitamin D.   7.  Followup imaging will be a left breast MRI to be performed in 6 months.  Mammogram today showed benign findings.   8. Dermatology referral for nails.   9.  Followup.  We will see Josefina Verma in followup in our clinic in 3 months with Mera Marte and in 6 months with me. Dermatology referral with Dr. Kojo Salcedo for nails.  Follow up with Mera Marte July 11 and with me October 10 with breast MRI October 9.  CBC, CMP with follow up visits.      Thank you for allowing us to continue to participate in Josefina Verma's care.     Sincerely,    Evangelista Meza M.D.      Division of Hematology, Oncology, Transplant   Department of Medicine   [facility] [employer]   [phone]       I spent 20 minutes with the patient more than 50% of which was in counseling and coordination of care.

## 2017-04-11 ENCOUNTER — ONCOLOGY VISIT (OUTPATIENT)
Dept: ONCOLOGY | Facility: CLINIC | Age: 52
End: 2017-04-11
Attending: INTERNAL MEDICINE
Payer: COMMERCIAL

## 2017-04-11 VITALS
HEART RATE: 99 BPM | OXYGEN SATURATION: 100 % | SYSTOLIC BLOOD PRESSURE: 136 MMHG | BODY MASS INDEX: 24.34 KG/M2 | RESPIRATION RATE: 16 BRPM | TEMPERATURE: 98.4 F | WEIGHT: 139.6 LBS | DIASTOLIC BLOOD PRESSURE: 87 MMHG

## 2017-04-11 DIAGNOSIS — C50.411 MALIGNANT NEOPLASM OF UPPER-OUTER QUADRANT OF RIGHT FEMALE BREAST (H): ICD-10-CM

## 2017-04-11 DIAGNOSIS — C50.411 MALIGNANT NEOPLASM OF UPPER-OUTER QUADRANT OF RIGHT FEMALE BREAST (H): Primary | ICD-10-CM

## 2017-04-11 LAB
ALBUMIN SERPL-MCNC: 3.7 G/DL (ref 3.4–5)
ALP SERPL-CCNC: 84 U/L (ref 40–150)
ALT SERPL W P-5'-P-CCNC: 22 U/L (ref 0–50)
ANION GAP SERPL CALCULATED.3IONS-SCNC: 6 MMOL/L (ref 3–14)
AST SERPL W P-5'-P-CCNC: 19 U/L (ref 0–45)
BASOPHILS # BLD AUTO: 0 10E9/L (ref 0–0.2)
BASOPHILS NFR BLD AUTO: 0.4 %
BILIRUB SERPL-MCNC: 0.7 MG/DL (ref 0.2–1.3)
BUN SERPL-MCNC: 10 MG/DL (ref 7–30)
CALCIUM SERPL-MCNC: 9.3 MG/DL (ref 8.5–10.1)
CHLORIDE SERPL-SCNC: 104 MMOL/L (ref 94–109)
CO2 SERPL-SCNC: 32 MMOL/L (ref 20–32)
CREAT SERPL-MCNC: 0.93 MG/DL (ref 0.52–1.04)
DIFFERENTIAL METHOD BLD: NORMAL
EOSINOPHIL # BLD AUTO: 0.1 10E9/L (ref 0–0.7)
EOSINOPHIL NFR BLD AUTO: 1.9 %
ERYTHROCYTE [DISTWIDTH] IN BLOOD BY AUTOMATED COUNT: 13.7 % (ref 10–15)
GFR SERPL CREATININE-BSD FRML MDRD: 64 ML/MIN/1.7M2
GLUCOSE SERPL-MCNC: 82 MG/DL (ref 70–99)
HCT VFR BLD AUTO: 42.3 % (ref 35–47)
HGB BLD-MCNC: 13.7 G/DL (ref 11.7–15.7)
IMM GRANULOCYTES # BLD: 0 10E9/L (ref 0–0.4)
IMM GRANULOCYTES NFR BLD: 0.1 %
LYMPHOCYTES # BLD AUTO: 2.3 10E9/L (ref 0.8–5.3)
LYMPHOCYTES NFR BLD AUTO: 33.2 %
MCH RBC QN AUTO: 27.5 PG (ref 26.5–33)
MCHC RBC AUTO-ENTMCNC: 32.4 G/DL (ref 31.5–36.5)
MCV RBC AUTO: 85 FL (ref 78–100)
MONOCYTES # BLD AUTO: 0.5 10E9/L (ref 0–1.3)
MONOCYTES NFR BLD AUTO: 6.9 %
NEUTROPHILS # BLD AUTO: 4 10E9/L (ref 1.6–8.3)
NEUTROPHILS NFR BLD AUTO: 57.5 %
NRBC # BLD AUTO: 0 10*3/UL
NRBC BLD AUTO-RTO: 0 /100
PLATELET # BLD AUTO: 263 10E9/L (ref 150–450)
POTASSIUM SERPL-SCNC: 4.2 MMOL/L (ref 3.4–5.3)
PROT SERPL-MCNC: 6.4 G/DL (ref 6.8–8.8)
RBC # BLD AUTO: 4.99 10E12/L (ref 3.8–5.2)
SODIUM SERPL-SCNC: 142 MMOL/L (ref 133–144)
WBC # BLD AUTO: 6.9 10E9/L (ref 4–11)

## 2017-04-11 PROCEDURE — 99212 OFFICE O/P EST SF 10 MIN: CPT | Mod: 25,ZF

## 2017-04-11 PROCEDURE — 99212 OFFICE O/P EST SF 10 MIN: CPT

## 2017-04-11 PROCEDURE — 80053 COMPREHEN METABOLIC PANEL: CPT | Performed by: INTERNAL MEDICINE

## 2017-04-11 PROCEDURE — 99214 OFFICE O/P EST MOD 30 MIN: CPT | Mod: ZP | Performed by: INTERNAL MEDICINE

## 2017-04-11 PROCEDURE — 36415 COLL VENOUS BLD VENIPUNCTURE: CPT | Performed by: INTERNAL MEDICINE

## 2017-04-11 PROCEDURE — 85025 COMPLETE CBC W/AUTO DIFF WBC: CPT | Performed by: INTERNAL MEDICINE

## 2017-04-11 ASSESSMENT — PAIN SCALES - GENERAL: PAINLEVEL: NO PAIN (0)

## 2017-04-11 NOTE — NURSING NOTE
"Josefina Bennett is a 51 year old female who presents for:  Chief Complaint   Patient presents with     Oncology Clinic Visit     Breast Cancer        Initial Vitals:  /87  Pulse 99  Temp 98.4  F (36.9  C) (Oral)  Resp 16  Wt 63.3 kg (139 lb 9.6 oz)  LMP 12/18/2014  SpO2 100%  BMI 24.34 kg/m2 Estimated body mass index is 24.34 kg/(m^2) as calculated from the following:    Height as of 1/3/17: 1.613 m (5' 3.5\").    Weight as of this encounter: 63.3 kg (139 lb 9.6 oz).. Body surface area is 1.68 meters squared. BP completed using cuff size: regular  No Pain (0) Patient's last menstrual period was 12/18/2014. Allergies and medications reviewed.     Medications: Medication refills not needed today.  Pharmacy name entered into [employer]:    [facility] - [place], [place] - [address] AT [facility]  [facility] [id number] ([place]) - [place], [place] - [address] AT [facility] [address]  [facility] - [place], [place] - [address] [facility]    Comments: Patient is not having any pain today.     6 minutes for nursing intake (face to face time)   Kay Robertson CMA       "

## 2017-04-11 NOTE — MR AVS SNAPSHOT
After Visit Summary   4/11/2017    Josefina Bennett    MRN: [id number]           Patient Information     Date Of Birth          1965        Visit Information        Provider Department      4/11/2017 12:00 PM Evangelista Meza MD [facility]        Today's Diagnoses     Malignant neoplasm of upper-outer quadrant of right female breast (H)    -  1       Follow-ups after your visit        Additional Services     DERMATOLOGY REFERRAL       Dr. Kojo Salcedo.  For nail loss from chemotherapy.                  Follow-up notes from your care team     Return in about 6 months (around 10/10/2017).      Who to contact     If you have questions or need follow up information about today's clinic visit or your schedule please contact [facility] directly at [phone].  Normal or non-critical lab and imaging results will be communicated to you by Photobloghart, letter or phone within 4 business days after the clinic has received the results. If you do not hear from us within 7 days, please contact the clinic through Photobloghart or phone. If you have a critical or abnormal lab result, we will notify you by phone as soon as possible.  Submit refill requests through Whyteboard or call your pharmacy and they will forward the refill request to us. Please allow 3 business days for your refill to be completed.          Additional Information About Your Visit        MyChart Information     Whyteboard gives you secure access to your electronic health record. If you see a primary care provider, you can also send messages to your care team and make appointments. If you have questions, please call your primary care clinic.  If you do not have a primary care provider, please call [phone] and they will assist you.        Care EveryWhere ID     This is your Care EveryWhere ID. This could be used by other organizations to access your [facility] medical records  [id number]        Your Vitals  Were     Pulse Temperature Respirations Last Period Pulse Oximetry BMI (Body Mass Index)    99 98.4  F (36.9  C) (Oral) 16 12/18/2014 100% 24.34 kg/m2       Blood Pressure from Last 3 Encounters:   04/11/17 136/87   01/11/17 138/84   01/03/17 110/67    Weight from Last 3 Encounters:   04/11/17 63.3 kg (139 lb 9.6 oz)   01/11/17 60.4 kg (133 lb 1.6 oz)   01/03/17 60.2 kg (132 lb 11.2 oz)              We Performed the Following     DERMATOLOGY REFERRAL        Primary Care Provider Office Phone # Fax #    Camille Araujo [phone]       [facility] [address]  [facility] [address]        Thank you!     Thank you for choosing [facility]  for your care. Our goal is always to provide you with excellent care. Hearing back from our patients is one way we can continue to improve our services. Please take a few minutes to complete the written survey that you may receive in the mail after your visit with us. Thank you!             Your Updated Medication List - Protect others around you: Learn how to safely use, store and throw away your medicines at www.disposemymeds.org.          This list is accurate as of: 4/11/17 11:59 PM.  Always use your most recent med list.                   Brand Name Dispense Instructions for use    calcium carbonate 500 MG tablet    OS- mg [place]. Ca     Take 500 mg by mouth At Bedtime       cholecalciferol 1000 UNIT tablet    vitamin D    30 tablet    Take 1 tablet (1,000 Units) by mouth daily       letrozole 2.5 MG tablet    FEMARA    90 tablet    Take 1 tablet (2.5 mg) by mouth daily       mirtazapine 7.5 MG Tabs tablet    REMERON    30 tablet    Take 1 tablet (7.5 mg) by mouth At Bedtime       mupirocin 2 % ointment    BACTROBAN    22 g    Apply topically daily       venlafaxine 225 MG Tb24 24 hr tablet    EFFEXOR-ER    90 tablet    Take 1 tablet (225 mg) by mouth daily

## 2017-04-26 DIAGNOSIS — C50.411 MALIGNANT NEOPLASM OF UPPER-OUTER QUADRANT OF RIGHT FEMALE BREAST (H): ICD-10-CM

## 2017-04-27 RX ORDER — MIRTAZAPINE 7.5 MG/1
7.5 TABLET, FILM COATED ORAL AT BEDTIME
Qty: 90 TABLET | Refills: 3 | Status: SHIPPED | OUTPATIENT
Start: 2017-04-27 | End: 2018-05-23

## 2017-06-14 DIAGNOSIS — C50.411 MALIGNANT NEOPLASM OF UPPER-OUTER QUADRANT OF RIGHT FEMALE BREAST (H): ICD-10-CM

## 2017-06-15 RX ORDER — LETROZOLE 2.5 MG/1
2.5 TABLET, FILM COATED ORAL DAILY
Qty: 90 TABLET | Refills: 3 | Status: SHIPPED | OUTPATIENT
Start: 2017-06-15 | End: 2018-06-26

## 2017-07-08 ENCOUNTER — HEALTH MAINTENANCE LETTER (OUTPATIENT)
Age: 52
End: 2017-07-08

## 2017-08-13 NOTE — PROGRESS NOTES
Josefina Bennett is self referred to our clinic for clinical trials recommendations for her newly diagnosed stage II breast cancer. She had her last mammogram approximately a year ago. She did notice in early January that she was having some distortion of the right breast, and she went to see her gynecologist and had a mammogram performed. She had a screening mammogram performed on 01/15/2015. Breast tissue was heterogeneously dense, which might compromise the mammography. There was architectural distortion in the right breast approximately 11-12 o'clock position, zone 2, posterior depth, and a CC MLO spot compression was performed and an ultrasound was planned. The diagnostic mammogram from 01/28/2015 showed right breast architectural distortion centered at the 12 o'clock position, zone 2, suspicious for neoplasm. Breast tomosynthesis was used in the interpretation. Ultrasound findings included targeted ultrasound of the right upper breast demonstrating a band-like area of abnormal echogenicity between 11 and 12 o'clock position in the right breast at zone 2. This measures 4 cm transversely x 1.1 cm AP, and there was only a 1.5 cm measurement in the radial direction. There was blood flow suspicious for neoplasm at the 11 o'clock position in zone 2. There is a 1.4 cm hypoechoic nodule slightly  from the larger area of altered echogenicity that is also suspicious. A biopsy was performed. The biopsy showed infiltrating lobular carcinoma, grade 2, and a clip was placed at specimen [id number]. There were a few signet cells present. The tumor was ER-positive, ND low positive, and HER2 was negative by immunohistochemistry. She subsequently underwent an MRI showing a tumor that was 4.8 x 1.7 x 3.2 cm in size. Overall, her clinical stage was T2 NX MX.    She is enrolled in the I-SPY-2 clinical trial, and qualified with high-risk MammaPrint score. She was randomized to ganetespib and paclitaxel, and she was treated with 12  cycles of treatment, and then started on AC on 05/26/2015. She developed intractable nausea and vomiting after the first cycle, which required hospitalization on the second cycle. She then developed Pneumocystis pneumonia, which resulted in intubation and a 2-week hospitalization during the course of AC. She was discharged on 07/02/2015, and decided she did not want to pursue any further chemotherapy. She had a right lumpectomy and sentinel lymph node procedure 08/12/2015. She had a positive margin, underwent a re-excision, and had a positive margin. Ultimately, she underwent a right mastectomy with clear margins. She began radiation treatment with Dr. Bill Chauhan, and completed radiation therapy on 12/04/2015. Pathologic stage was III-A, ypT3 N1a MX. Of concern, she had what could be considered an RCB3 tumor.             FOLLOWUP NOTE      INTERVAL HISTORY:  Josefina Verma returns to the clinic for routine followup.  She has been doing quite well physically; however, she has been grieving the loss of her father, which happened several months ago.  She is spending a lot of time with her mother, and is gradually recovering.  She has no pain, no fatigue, no depression and no anxiety.      REVIEW OF SYSTEMS:  A 10-point review of systems is entirely negative.      She continues on letrozole.  She has hot flashes about once every 2 weeks.  Both of her ovaries were removed in 08/2016.      PHYSICAL EXAMINATION:   VITAL SIGNS:  Blood pressure 137/89, temperature 98.7, pulse 108, respirations 16, O2 sat 98% on room air, height 1.6 meters and weight 65.5 kg.   GENERAL:  Josefina Verma appeared generally well.  She has no alopecia.   HEENT:  Oropharynx is without lesions.   LYMPH:  There is no palpable cervical, supraclavicular, subclavicular or axillary lymphadenopathy.   BREASTS:  Exam was not performed today.   LUNGS:  Clear to percussion and auscultation.   HEART:  There is a regular rate and rhythm, S1, S2.   ABDOMEN:  Soft and  nontender, without hepatosplenomegaly.   EXTREMITIES:  Without edema.   PSYCHIATRIC:  Mood and affect were normal.      ASSESSMENT AND PLAN:     1.  Josefina Verma is a 51-year-old woman with a history of a clinical stage II right breast cancer, ER-positive, CA-positive, and HER2-negative by immunohistochemistry.  She had a lobular histology.  She was on the I-SPY-2 clinical trial, and randomized to ganetespib and paclitaxel, and went on to AC.  She had intractable nausea and vomiting for the first cycle of AC, and then the second cycle of AC was complicated by Pneumocystis pneumonia requiring intubation and a 2-week hospitalization.  She did not complete the last 2 cycles of AC treatment.  She did undergo a right lumpectomy and sentinel node procedure on 08/12/2015.  She had a positive margin, and underwent a resection.  She still had a positive margin.  She underwent a right mastectomy with clear margins.  She completed radiation in 12/2015.  The pathologic stage was IIIA, ypT3 N1a MX, and of concern she had an RCB3 response of her tumor, meaning significant risk of recurrence in a 5-year.  There were 2 lymph nodes involved with mammary carcinoma with extracapsular extension; the largest lymph node was 2 cm in size.   2.  Josefina Verma will continue on letrozole for adjuvant hormonal therapy for 10 years.   3.  Discussion of exercise.  We have recommended 150 minutes of exercise and Josefina Verma is exercising.   4.  Followup.  We will see Josefina Verma in followup in our clinic in 3 months. Follow up with me 10-19 with breast MRI the day before.with CBC and CMP.  EDIL visit with CBC, CMP 2-13-18.       Thank you for allowing us to continue to participate in Josefina Bennett' care.      Evangelista Meza MD          [facility]         I spent 15 minutes with the patient more than 50% of which was in counseling and coordination of care.

## 2017-08-15 ENCOUNTER — ONCOLOGY VISIT (OUTPATIENT)
Dept: ONCOLOGY | Facility: CLINIC | Age: 52
End: 2017-08-15
Attending: INTERNAL MEDICINE
Payer: COMMERCIAL

## 2017-08-15 ENCOUNTER — APPOINTMENT (OUTPATIENT)
Dept: LAB | Facility: CLINIC | Age: 52
End: 2017-08-15
Attending: INTERNAL MEDICINE
Payer: COMMERCIAL

## 2017-08-15 VITALS
HEART RATE: 108 BPM | WEIGHT: 144.4 LBS | SYSTOLIC BLOOD PRESSURE: 137 MMHG | OXYGEN SATURATION: 98 % | TEMPERATURE: 98.7 F | DIASTOLIC BLOOD PRESSURE: 89 MMHG | HEIGHT: 64 IN | BODY MASS INDEX: 24.65 KG/M2 | RESPIRATION RATE: 16 BRPM

## 2017-08-15 DIAGNOSIS — C50.411 MALIGNANT NEOPLASM OF UPPER-OUTER QUADRANT OF RIGHT FEMALE BREAST (H): ICD-10-CM

## 2017-08-15 LAB
ALBUMIN SERPL-MCNC: 4 G/DL (ref 3.4–5)
ALP SERPL-CCNC: 87 U/L (ref 40–150)
ALT SERPL W P-5'-P-CCNC: 28 U/L (ref 0–50)
ANION GAP SERPL CALCULATED.3IONS-SCNC: 8 MMOL/L (ref 3–14)
AST SERPL W P-5'-P-CCNC: 15 U/L (ref 0–45)
BASOPHILS # BLD AUTO: 0.1 10E9/L (ref 0–0.2)
BASOPHILS NFR BLD AUTO: 1 %
BILIRUB SERPL-MCNC: 0.4 MG/DL (ref 0.2–1.3)
BUN SERPL-MCNC: 12 MG/DL (ref 7–30)
CALCIUM SERPL-MCNC: 9.3 MG/DL (ref 8.5–10.1)
CHLORIDE SERPL-SCNC: 106 MMOL/L (ref 94–109)
CO2 SERPL-SCNC: 24 MMOL/L (ref 20–32)
CREAT SERPL-MCNC: 0.84 MG/DL (ref 0.52–1.04)
DIFFERENTIAL METHOD BLD: NORMAL
EOSINOPHIL # BLD AUTO: 0.2 10E9/L (ref 0–0.7)
EOSINOPHIL NFR BLD AUTO: 2.3 %
ERYTHROCYTE [DISTWIDTH] IN BLOOD BY AUTOMATED COUNT: 14.8 % (ref 10–15)
GFR SERPL CREATININE-BSD FRML MDRD: 71 ML/MIN/1.7M2
GLUCOSE SERPL-MCNC: 98 MG/DL (ref 70–99)
HCT VFR BLD AUTO: 42.4 % (ref 35–47)
HGB BLD-MCNC: 13.6 G/DL (ref 11.7–15.7)
IMM GRANULOCYTES # BLD: 0 10E9/L (ref 0–0.4)
IMM GRANULOCYTES NFR BLD: 0.3 %
LYMPHOCYTES # BLD AUTO: 2.7 10E9/L (ref 0.8–5.3)
LYMPHOCYTES NFR BLD AUTO: 38.8 %
MCH RBC QN AUTO: 27.1 PG (ref 26.5–33)
MCHC RBC AUTO-ENTMCNC: 32.1 G/DL (ref 31.5–36.5)
MCV RBC AUTO: 85 FL (ref 78–100)
MONOCYTES # BLD AUTO: 0.6 10E9/L (ref 0–1.3)
MONOCYTES NFR BLD AUTO: 8.4 %
NEUTROPHILS # BLD AUTO: 3.4 10E9/L (ref 1.6–8.3)
NEUTROPHILS NFR BLD AUTO: 49.2 %
NRBC # BLD AUTO: 0 10*3/UL
NRBC BLD AUTO-RTO: 0 /100
PLATELET # BLD AUTO: 284 10E9/L (ref 150–450)
POTASSIUM SERPL-SCNC: 3.8 MMOL/L (ref 3.4–5.3)
PROT SERPL-MCNC: 6.9 G/DL (ref 6.8–8.8)
RBC # BLD AUTO: 5.01 10E12/L (ref 3.8–5.2)
SODIUM SERPL-SCNC: 139 MMOL/L (ref 133–144)
WBC # BLD AUTO: 7 10E9/L (ref 4–11)

## 2017-08-15 PROCEDURE — 85025 COMPLETE CBC W/AUTO DIFF WBC: CPT | Performed by: INTERNAL MEDICINE

## 2017-08-15 PROCEDURE — 99212 OFFICE O/P EST SF 10 MIN: CPT | Mod: ZF

## 2017-08-15 PROCEDURE — 99214 OFFICE O/P EST MOD 30 MIN: CPT | Mod: ZP | Performed by: INTERNAL MEDICINE

## 2017-08-15 PROCEDURE — 36415 COLL VENOUS BLD VENIPUNCTURE: CPT

## 2017-08-15 PROCEDURE — 80053 COMPREHEN METABOLIC PANEL: CPT | Performed by: INTERNAL MEDICINE

## 2017-08-15 ASSESSMENT — PAIN SCALES - GENERAL: PAINLEVEL: NO PAIN (0)

## 2017-08-15 NOTE — NURSING NOTE
"Oncology Rooming Note    August 15, 2017 4:30 PM   Josefina Bennett is a 51 year old female who presents for:    Chief Complaint   Patient presents with     Blood Draw     labs collected from R arm venipuncutre, vitals taken      Oncology Clinic Visit     Breast Ca- F/U     Initial Vitals: /89  Pulse 108  Temp 98.7  F (37.1  C) (Oral)  Resp 16  Ht 1.613 m (5' 3.5\")  Wt 65.5 kg (144 lb 6.4 oz)  LMP 12/18/2014  SpO2 98%  BMI 25.17 kg/m2 Estimated body mass index is 25.17 kg/(m^2) as calculated from the following:    Height as of this encounter: 1.613 m (5' 3.5\").    Weight as of this encounter: 65.5 kg (144 lb 6.4 oz). Body surface area is 1.71 meters squared.  No Pain (0) Comment: Data Unavailable   Patient's last menstrual period was 12/18/2014.  Allergies reviewed: Yes  Medications reviewed: Yes    Medications: Medication refills not needed today.  Pharmacy name entered into TripTouch:    [facility] - [place], [place] - [address] AT [facility]  [facility] ([place]) - [place], [place] - [address] AT [facility] [address]  [facility] - [place], [place] - [address] [facility]    Clinical concerns: no clinical concerns  provider was notified.    7 minutes for nursing intake (face to face time)     Donna Garcia CMA              "

## 2017-08-15 NOTE — MR AVS SNAPSHOT
After Visit Summary   8/15/2017    Josefina Bennett    MRN: [id number]           Patient Information     Date Of Birth          1965        Visit Information        Provider Department      8/15/2017 4:30 PM Evangelista Meza MD [facility]        Today's Diagnoses     Malignant neoplasm of upper-outer quadrant of right female breast (H)           Follow-ups after your visit        Follow-up notes from your care team     Return in about 6 months (around 2/13/2018).      Your next 10 appointments already scheduled     Oct 19, 2017  1:45 PM CDT   (Arrive by 1:30 PM)   MR BREAST LEFT W CONTRAST with UC1   [facility] ([facility])    [address] [id number] [phone]           Take your medicines as usual, unless your doctor tells you not to. Bring a list of your current medicines to your exam (including vitamins, minerals and over-the-counter drugs).  The timing of your exam may depend on the start of your last period. If you re in menopause, you may have the exam anytime.  Please bring any previous mammograms or breast MRIs from other facilities to the MRI dept. Do not mail these items to us.  You will have contrast for this exam. To prepare:   The day before your exam, drink extra fluids at least six 8-ounce glasses (unless your doctor tells you to restrict your fluids).   Have a blood test (creatinine test) within 30 days of your exam. Go to your clinic or Diagnostic Imaging Department for this test.  The MRI machine uses a strong magnet. Please wear clothes without metal (snaps, zippers). A sweatsuit works well, or we may give you a hospital gown.  Please remove any body piercings and hair extensions before you arrive. You will also remove watches, jewelry, hairpins, wallets, dentures, partial dental plates and hearing aids. You may wear contact lenses, and you may be able to wear your  rings. We have a safe place to keep your personal items, but it is safer to leave them at home.   **IMPORTANT** THE INSTRUCTIONS BELOW ARE ONLY FOR THOSE PATIENTS WHO HAVE BEEN TOLD THEY WILL RECEIVE SEDATION OR GENERAL ANESTHESIA DURING THEIR MRI PROCEDURE:  IF YOU WILL RECEIVE SEDATION (take medicine to help you relax during your exam)   You must get the medicine from your doctor before you arrive. Bring the medicine to the exam. Do not take it at home.   Arrive one hour early. Bring someone who can take you home after the test. Your medicine will make you sleepy. After the exam, you may not drive, take a bus or take a taxi by yourself.   No eating 8 hours before your exam. You may have clear liquids up until 4 hours before your exam. (Clear liquids include water, clear tea, black coffee and fruit juice without pulp.)  IF YOU WILL RECEIVE ANESTHESIA (be asleep for your exam)   Arrive 1 1/2 hours early. Bring someone who can take you home after the test. You may not drive, take a bus or take a taxi by yourself.   No eating 8 hours before your exam. You may have clear liquids up until 4 hours before your exam. (Clear liquids include water, clear tea, black coffee and fruit juice without pulp.)  If you have any questions, please contact your Imaging Department exam site.            Oct 19, 2017  2:30 PM CDT   [place] Lab Draw with  Simple LAB DRAW   [facility] ([facility])    [address] [place] [id number] [phone]            Oct 19, 2017  3:00 PM CDT   (Arrive by 2:45 PM)   Return Visit with Evangelista Meza MD   [facility] ([facility])    [address] [place] [id number] [phone]              Who to contact     If you have questions or need follow up information about today's clinic visit or your schedule please contact [facility]  "directly at [phone].  Normal or non-critical lab and imaging results will be communicated to you by QuickGiftshart, letter or phone within 4 business days after the clinic has received the results. If you do not hear from us within 7 days, please contact the clinic through QuickGiftshart or phone. If you have a critical or abnormal lab result, we will notify you by phone as soon as possible.  Submit refill requests through EntrenaYa or call your pharmacy and they will forward the refill request to us. Please allow 3 business days for your refill to be completed.          Additional Information About Your Visit        QuickGiftshart Information     EntrenaYa gives you secure access to your electronic health record. If you see a primary care provider, you can also send messages to your care team and make appointments. If you have questions, please call your primary care clinic.  If you do not have a primary care provider, please call [phone] and they will assist you.        Care EveryWhere ID     This is your Care EveryWhere ID. This could be used by other organizations to access your [facility] medical records  [id number]        Your Vitals Were     Pulse Temperature Respirations Height Last Period Pulse Oximetry    108 98.7  F (37.1  C) (Oral) 16 1.613 m (5' 3.5\") 12/18/2014 98%    BMI (Body Mass Index)                   25.17 kg/m2            Blood Pressure from Last 3 Encounters:   08/15/17 137/89   04/11/17 136/87   01/11/17 138/84    Weight from Last 3 Encounters:   08/15/17 65.5 kg (144 lb 6.4 oz)   04/11/17 63.3 kg (139 lb 9.6 oz)   01/11/17 60.4 kg (133 lb 1.6 oz)              We Performed the Following     CBC with platelets differential     Comprehensive metabolic panel        Primary Care Provider Office Phone # Fax #    Camille DO Van [phone]       [facility] [address]  [facility] [address]        Equal Access to Services     BRITT DAVILA AH: Talita Gaines, " wamarthada tamekaadaha, qaybta kaalwayne matos, angelica carranzadeisy ah. So [facility] [phone].    ATENCIÓN: Si skip lindsey, tiene a zapata disposición servicios gratuitos de asistencia lingüística. Phoebe al [phone].    We comply with applicable federal civil rights laws and [place] laws. We do not discriminate on the basis of race, color, national origin, age, disability sex, sexual orientation or gender identity.            Thank you!     Thank you for choosing [facility] [place] [facility]  for your care. Our goal is always to provide you with excellent care. Hearing back from our patients is one way we can continue to improve our services. Please take a few minutes to complete the written survey that you may receive in the mail after your visit with us. Thank you!             Your Updated Medication List - Protect others around you: Learn how to safely use, store and throw away your medicines at www.disposemymeds.org.          This list is accurate as of: 8/15/17 11:59 PM.  Always use your most recent med list.                   Brand Name Dispense Instructions for use Diagnosis    calcium carbonate 1250 MG tablet    OS- mg [place]. Ca     Take 500 mg by mouth At Bedtime        cholecalciferol 1000 UNIT tablet    vitamin D    30 tablet    Take 1 tablet (1,000 Units) by mouth daily    Breast cancer (H)       letrozole 2.5 MG tablet    FEMARA    90 tablet    Take 1 tablet (2.5 mg) by mouth daily    Malignant neoplasm of upper-outer quadrant of right female breast (H)       mirtazapine 7.5 MG Tabs tablet    REMERON    90 tablet    Take 1 tablet (7.5 mg) by mouth At Bedtime    Malignant neoplasm of upper-outer quadrant of right female breast (H)       mupirocin 2 % ointment    BACTROBAN    22 g    Apply topically daily    Onychomadesis       venlafaxine 225 MG Tb24 24 hr tablet    EFFEXOR-ER    90 tablet    Take 1 tablet (225 mg) by mouth daily    Adjustment disorder with depressed mood

## 2017-08-15 NOTE — LETTER
8/15/2017       RE: Josefina Bennett  [address]  [facility] [address]     Dear Colleague,    Thank you for referring your patient, Josefina Bennett, to the [facility]. Please see a copy of my visit note below.    Josefina Bennett is self referred to our clinic for clinical trials recommendations for her newly diagnosed stage II breast cancer. She had her last mammogram approximately a year ago. She did notice in early January that she was having some distortion of the right breast, and she went to see her gynecologist and had a mammogram performed. She had a screening mammogram performed on 01/15/2015. Breast tissue was heterogeneously dense, which might compromise the mammography. There was architectural distortion in the right breast approximately 11-12 o'clock position, zone 2, posterior depth, and a CC MLO spot compression was performed and an ultrasound was planned. The diagnostic mammogram from 01/28/2015 showed right breast architectural distortion centered at the 12 o'clock position, zone 2, suspicious for neoplasm. Breast tomosynthesis was used in the interpretation. Ultrasound findings included targeted ultrasound of the right upper breast demonstrating a band-like area of abnormal echogenicity between 11 and 12 o'clock position in the right breast at zone 2. This measures 4 cm transversely x 1.1 cm AP, and there was only a 1.5 cm measurement in the radial direction. There was blood flow suspicious for neoplasm at the 11 o'clock position in zone 2. There is a 1.4 cm hypoechoic nodule slightly  from the larger area of altered echogenicity that is also suspicious. A biopsy was performed. The biopsy showed infiltrating lobular carcinoma, grade 2, and a clip was placed at specimen [id number]. There were a few signet cells present. The tumor was ER-positive, TN low positive, and HER2 was negative by immunohistochemistry. She subsequently underwent an MRI showing a tumor that was 4.8 x 1.7 x  3.2 cm in size. Overall, her clinical stage was T2 NX MX.    She is enrolled in the I-SPY-2 clinical trial, and qualified with high-risk MammaPrint score. She was randomized to ganetespib and paclitaxel, and she was treated with 12 cycles of treatment, and then started on AC on 05/26/2015. She developed intractable nausea and vomiting after the first cycle, which required hospitalization on the second cycle. She then developed Pneumocystis pneumonia, which resulted in intubation and a 2-week hospitalization during the course of AC. She was discharged on 07/02/2015, and decided she did not want to pursue any further chemotherapy. She had a right lumpectomy and sentinel lymph node procedure 08/12/2015. She had a positive margin, underwent a re-excision, and had a positive margin. Ultimately, she underwent a right mastectomy with clear margins. She began radiation treatment with Dr. Bill Chauhan, and completed radiation therapy on 12/04/2015. Pathologic stage was III-A, ypT3 N1a MX. Of concern, she had what could be considered an RCB3 tumor.             FOLLOWUP NOTE      INTERVAL HISTORY:  Josefina Verma returns to the clinic for routine followup.  She has been doing quite well physically; however, she has been grieving the loss of her father, which happened several months ago.  She is spending a lot of time with her mother, and is gradually recovering.  She has no pain, no fatigue, no depression and no anxiety.      REVIEW OF SYSTEMS:  A 10-point review of systems is entirely negative.      She continues on letrozole.  She has hot flashes about once every 2 weeks.  Both of her ovaries were removed in 08/2016.      PHYSICAL EXAMINATION:   VITAL SIGNS:  Blood pressure 137/89, temperature 98.7, pulse 108, respirations 16, O2 sat 98% on room air, height 1.6 meters and weight 65.5 kg.   GENERAL:  Josefina Verma appeared generally well.  She has no alopecia.   HEENT:  Oropharynx is without lesions.   LYMPH:  There is no palpable  cervical, supraclavicular, subclavicular or axillary lymphadenopathy.   BREASTS:  Exam was not performed today.   LUNGS:  Clear to percussion and auscultation.   HEART:  There is a regular rate and rhythm, S1, S2.   ABDOMEN:  Soft and nontender, without hepatosplenomegaly.   EXTREMITIES:  Without edema.   PSYCHIATRIC:  Mood and affect were normal.      ASSESSMENT AND PLAN:     1.  Josefina Verma is a 51-year-old woman with a history of a clinical stage II right breast cancer, ER-positive, MI-positive, and HER2-negative by immunohistochemistry.  She had a lobular histology.  She was on the I-SPY-2 clinical trial, and randomized to ganetespib and paclitaxel, and went on to AC.  She had intractable nausea and vomiting for the first cycle of AC, and then the second cycle of AC was complicated by Pneumocystis pneumonia requiring intubation and a 2-week hospitalization.  She did not complete the last 2 cycles of AC treatment.  She did undergo a right lumpectomy and sentinel node procedure on 08/12/2015.  She had a positive margin, and underwent a resection.  She still had a positive margin.  She underwent a right mastectomy with clear margins.  She completed radiation in 12/2015.  The pathologic stage was IIIA, ypT3 N1a MX, and of concern she had an RCB3 response of her tumor, meaning significant risk of recurrence in a 5-year.  There were 2 lymph nodes involved with mammary carcinoma with extracapsular extension; the largest lymph node was 2 cm in size.   2.  Josefina Verma will continue on letrozole for adjuvant hormonal therapy for 10 years.   3.  Discussion of exercise.  We have recommended 150 minutes of exercise and Josefina Verma is exercising.   4.  Followup.  We will see Josefina Verma in followup in our clinic in 3 months. Follow up with me 10-19 with breast MRI the day before.with CBC and CMP.  EDIL visit with CBC, CMP 2-13-18.       Thank you for allowing us to continue to participate in Josefina Bennett' care.      Evangelista Meza  MD          [facility]         I spent 15 minutes with the patient more than 50% of which was in counseling and coordination of care.     Again, thank you for allowing me to participate in the care of your patient.      Sincerely,    Evangelista Meza MD

## 2017-08-15 NOTE — NURSING NOTE
Chief Complaint   Patient presents with     Blood Draw     labs collected from R arm venipuncutre, vitals taken      Emmanuelle Jules RN

## 2017-10-15 NOTE — PROGRESS NOTES
Josefina Bennett was self referred to our clinic for clinical trials recommendations for her newly diagnosed stage II breast cancer. She had her last mammogram approximately a year ago. She did notice in early January that she was having some distortion of the right breast, and she went to see her gynecologist and had a mammogram performed. She had a screening mammogram performed on 01/15/2015. Breast tissue was heterogeneously dense, which might compromise the mammography. There was architectural distortion in the right breast approximately 11-12 o'clock position, zone 2, posterior depth, and a CC MLO spot compression was performed and an ultrasound was planned. The diagnostic mammogram from 01/28/2015 showed right breast architectural distortion centered at the 12 o'clock position, zone 2, suspicious for neoplasm. Breast tomosynthesis was used in the interpretation. Ultrasound findings included targeted ultrasound of the right upper breast demonstrating a band-like area of abnormal echogenicity between 11 and 12 o'clock position in the right breast at zone 2. This measures 4 cm transversely x 1.1 cm AP, and there was only a 1.5 cm measurement in the radial direction. There was blood flow suspicious for neoplasm at the 11 o'clock position in zone 2. There is a 1.4 cm hypoechoic nodule slightly  from the larger area of altered echogenicity that is also suspicious. A biopsy was performed. The biopsy showed infiltrating lobular carcinoma, grade 2, and a clip was placed at specimen [id number]. There were a few signet cells present. The tumor was ER-positive, NC low positive, and HER2 was negative by immunohistochemistry. She subsequently underwent an MRI showing a tumor that was 4.8 x 1.7 x 3.2 cm in size. Overall, her clinical stage was T2 NX MX.    She is enrolled in the I-SPY-2 clinical trial, and qualified with high-risk MammaPrint score. She was randomized to ganetespib and paclitaxel, and she was treated with  12 cycles of treatment, and then started on AC on 05/26/2015. She developed intractable nausea and vomiting after the first cycle, which required hospitalization on the second cycle. She then developed Pneumocystis pneumonia, which resulted in intubation and a 2-week hospitalization during the course of AC. She was discharged on 07/02/2015, and decided she did not want to pursue any further chemotherapy. She had a right lumpectomy and sentinel lymph node procedure 08/12/2015. She had a positive margin, underwent a re-excision, and had a positive margin. Ultimately, she underwent a right mastectomy with clear margins. She began radiation treatment with Dr. Bill Chauhan, and completed radiation therapy on 12/04/2015. Pathologic stage was III-A, ypT3 N1a MX. Of concern, she had what could be considered an RCB3 tumor.                FOLLOWUP NOTE   INTERVAL HISTORY:  Josefina Verma returns to clinic and has been doing quite well.  She has no pain, no fatigue, no depression, and no anxiety.  She is working full-time at a [employer] company and enjoying her job.        REVIEW OF SYSTEMS: She denies fevers or chills, cough, chest pain, shortness of breath, nausea, vomiting, constipation, diarrhea, bone pain, back pain or headache.  She is exercising 150 minutes a week and eating a healthful Mediterranean style diet.  She is taking calcium and    vitamin D.       PHYSICAL EXAMINATION:   VITAL SIGNS:  Blood pressure 131/81, temperature 98.7, pulse 116, respirations 16, O2 sat 96% on room air, height 1.6 meters, and weight 68.5 kg.   GENERAL:  Josefina Verma appeared generally well.     HEENT:  She has no alopecia.  There are no lesions in the oropharynx.   LYMPH:  There is no palpable cervical, supraclavicular, subclavicular or axillary lymphadenopathy.   BREASTS:  Examination of the right anterior chest wall reveals a well-healed mastectomy incision without erythema or masses.  No masses in the left breast.   LUNGS:  Clear to percussion  and auscultation.   HEART:  There is a regular rate and rhythm, S1, S2.   ABDOMEN:  Soft, nontender, without hepatosplenomegaly.   EXTREMITIES:  Without edema.   PSYCHIATRIC:  Mood and affect were normal.       LABORATORY DATA:  The CBC and CMP were within normal limits.         IMAGING:  The MRI report is pending, but review with Dr. Brown shows benign results.        ASSESSMENT AND PLAN:     1.  Josefina Bennett is a 51-year-old woman with a history of a clinical stage II right breast cancer, ER positive, IA positive, HER2 negative by immunohistochemistry.  She had a lobular histology.  She was on the I-SPY-2 clinical trial and was randomized to ganetespib and paclitaxel and went on to AC.  She had intractable nausea and vomiting with the first cycle of AC and then the second cycle of AC was complicated by a Pneumocystis pneumonia requiring intubation and a 2-week hospitalization.  She did not complete the last 2 cycles of AC treatment.  She did undergo a right lumpectomy and sentinel lymph node procedure.  She had a positive margin and underwent resection.  She still had a positive margin.  She underwent a right mastectomy with clear margins.  She completed radiation in 12/2015.  Pathologic stage was IIIA, ypT3 N1a MX, and of concern she had an RCB3 response to the tumor meaning significant risk of recurrence in a 5-year period after primary therapy.  There were 2 lymph nodes involved with mammary carcinoma with extracapsular extension.  The largest node was 2 cm in size.  She went on to have radiation therapy and began hormonal therapy.    2.  Josefina Verma will continue on letrozole for adjuvant hormonal therapy for 10 years.   3.  Discussion of exercise.  Josefina Verma has been exercising 150 minutes a week.   4.  Discussion of diet.  Josefina Verma has been eating a healthful Mediterranean style diet.  She is continuing with calcium and vitamin D and drinks no alcohol.   5.  Followup.  We will see Josefina Verma in followup in  our clinic in 3 months with an EDIL and me in 6 months with a mammogram. Follow up with EDIL 1-18-18 and with me 4-19-18.  CBC, CMP with follow up visits.  Left mammogram 4-19-18.        Thank you for allowing us to continue to participate in Josefina Bennett' care.   Sincerely,    Evangelsita Meza M.D.      Division of Hematology, Oncology, Transplant   Department of Medicine   [facility]   [phone]       I spent 15 minutes with the patient more than 50% of which was in counseling and coordination of care.

## 2017-10-19 ENCOUNTER — ONCOLOGY VISIT (OUTPATIENT)
Dept: ONCOLOGY | Facility: CLINIC | Age: 52
End: 2017-10-19
Attending: INTERNAL MEDICINE
Payer: COMMERCIAL

## 2017-10-19 VITALS
WEIGHT: 151.1 LBS | TEMPERATURE: 98.7 F | HEART RATE: 116 BPM | BODY MASS INDEX: 25.8 KG/M2 | OXYGEN SATURATION: 96 % | SYSTOLIC BLOOD PRESSURE: 131 MMHG | DIASTOLIC BLOOD PRESSURE: 81 MMHG | RESPIRATION RATE: 16 BRPM | HEIGHT: 64 IN

## 2017-10-19 DIAGNOSIS — C50.411 MALIGNANT NEOPLASM OF UPPER-OUTER QUADRANT OF RIGHT FEMALE BREAST (H): ICD-10-CM

## 2017-10-19 LAB
ALBUMIN SERPL-MCNC: 3.7 G/DL (ref 3.4–5)
ALP SERPL-CCNC: 102 U/L (ref 40–150)
ALT SERPL W P-5'-P-CCNC: 32 U/L (ref 0–50)
ANION GAP SERPL CALCULATED.3IONS-SCNC: 6 MMOL/L (ref 3–14)
AST SERPL W P-5'-P-CCNC: 20 U/L (ref 0–45)
BASOPHILS # BLD AUTO: 0.1 10E9/L (ref 0–0.2)
BASOPHILS NFR BLD AUTO: 0.7 %
BILIRUB SERPL-MCNC: 0.2 MG/DL (ref 0.2–1.3)
BUN SERPL-MCNC: 22 MG/DL (ref 7–30)
CALCIUM SERPL-MCNC: 8 MG/DL (ref 8.5–10.1)
CHLORIDE SERPL-SCNC: 104 MMOL/L (ref 94–109)
CO2 SERPL-SCNC: 29 MMOL/L (ref 20–32)
CREAT SERPL-MCNC: 0.99 MG/DL (ref 0.52–1.04)
DIFFERENTIAL METHOD BLD: NORMAL
EOSINOPHIL # BLD AUTO: 0.2 10E9/L (ref 0–0.7)
EOSINOPHIL NFR BLD AUTO: 2.5 %
ERYTHROCYTE [DISTWIDTH] IN BLOOD BY AUTOMATED COUNT: 14.6 % (ref 10–15)
GFR SERPL CREATININE-BSD FRML MDRD: 59 ML/MIN/1.7M2
GLUCOSE SERPL-MCNC: 111 MG/DL (ref 70–99)
HCT VFR BLD AUTO: 43.2 % (ref 35–47)
HGB BLD-MCNC: 13.8 G/DL (ref 11.7–15.7)
IMM GRANULOCYTES # BLD: 0 10E9/L (ref 0–0.4)
IMM GRANULOCYTES NFR BLD: 0.2 %
LYMPHOCYTES # BLD AUTO: 2.8 10E9/L (ref 0.8–5.3)
LYMPHOCYTES NFR BLD AUTO: 33.5 %
MCH RBC QN AUTO: 27.5 PG (ref 26.5–33)
MCHC RBC AUTO-ENTMCNC: 31.9 G/DL (ref 31.5–36.5)
MCV RBC AUTO: 86 FL (ref 78–100)
MONOCYTES # BLD AUTO: 0.7 10E9/L (ref 0–1.3)
MONOCYTES NFR BLD AUTO: 7.8 %
NEUTROPHILS # BLD AUTO: 4.6 10E9/L (ref 1.6–8.3)
NEUTROPHILS NFR BLD AUTO: 55.3 %
NRBC # BLD AUTO: 0 10*3/UL
NRBC BLD AUTO-RTO: 0 /100
PLATELET # BLD AUTO: 265 10E9/L (ref 150–450)
POTASSIUM SERPL-SCNC: 4.5 MMOL/L (ref 3.4–5.3)
PROT SERPL-MCNC: 6.7 G/DL (ref 6.8–8.8)
RBC # BLD AUTO: 5.02 10E12/L (ref 3.8–5.2)
SODIUM SERPL-SCNC: 138 MMOL/L (ref 133–144)
WBC # BLD AUTO: 8.4 10E9/L (ref 4–11)

## 2017-10-19 PROCEDURE — 99213 OFFICE O/P EST LOW 20 MIN: CPT | Mod: ZP | Performed by: INTERNAL MEDICINE

## 2017-10-19 PROCEDURE — 80053 COMPREHEN METABOLIC PANEL: CPT | Performed by: INTERNAL MEDICINE

## 2017-10-19 PROCEDURE — 85025 COMPLETE CBC W/AUTO DIFF WBC: CPT | Performed by: INTERNAL MEDICINE

## 2017-10-19 PROCEDURE — 36415 COLL VENOUS BLD VENIPUNCTURE: CPT

## 2017-10-19 ASSESSMENT — PAIN SCALES - GENERAL: PAINLEVEL: NO PAIN (0)

## 2017-10-19 NOTE — LETTER
10/19/2017       RE: Josefina Bennett  [address]  [facility] [address]     Dear Colleague,    Thank you for referring your patient, Josefina Bennett, to the [facility]. Please see a copy of my visit note below.      Josefina Bennett was self referred to our clinic for clinical trials recommendations for her newly diagnosed stage II breast cancer. She had her last mammogram approximately a year ago. She did notice in early January that she was having some distortion of the right breast, and she went to see her gynecologist and had a mammogram performed. She had a screening mammogram performed on 01/15/2015. Breast tissue was heterogeneously dense, which might compromise the mammography. There was architectural distortion in the right breast approximately 11-12 o'clock position, zone 2, posterior depth, and a CC MLO spot compression was performed and an ultrasound was planned. The diagnostic mammogram from 01/28/2015 showed right breast architectural distortion centered at the 12 o'clock position, zone 2, suspicious for neoplasm. Breast tomosynthesis was used in the interpretation. Ultrasound findings included targeted ultrasound of the right upper breast demonstrating a band-like area of abnormal echogenicity between 11 and 12 o'clock position in the right breast at zone 2. This measures 4 cm transversely x 1.1 cm AP, and there was only a 1.5 cm measurement in the radial direction. There was blood flow suspicious for neoplasm at the 11 o'clock position in zone 2. There is a 1.4 cm hypoechoic nodule slightly  from the larger area of altered echogenicity that is also suspicious. A biopsy was performed. The biopsy showed infiltrating lobular carcinoma, grade 2, and a clip was placed at specimen [id number]. There were a few signet cells present. The tumor was ER-positive, WV low positive, and HER2 was negative by immunohistochemistry. She subsequently underwent an MRI showing a tumor that was 4.8 x  1.7 x 3.2 cm in size. Overall, her clinical stage was T2 NX MX.    She is enrolled in the I-SPY-2 clinical trial, and qualified with high-risk MammaPrint score. She was randomized to ganetespib and paclitaxel, and she was treated with 12 cycles of treatment, and then started on AC on 05/26/2015. She developed intractable nausea and vomiting after the first cycle, which required hospitalization on the second cycle. She then developed Pneumocystis pneumonia, which resulted in intubation and a 2-week hospitalization during the course of AC. She was discharged on 07/02/2015, and decided she did not want to pursue any further chemotherapy. She had a right lumpectomy and sentinel lymph node procedure 08/12/2015. She had a positive margin, underwent a re-excision, and had a positive margin. Ultimately, she underwent a right mastectomy with clear margins. She began radiation treatment with Dr. Bill Chauhan, and completed radiation therapy on 12/04/2015. Pathologic stage was III-A, ypT3 N1a MX. Of concern, she had what could be considered an RCB3 tumor.                FOLLOWUP NOTE   INTERVAL HISTORY:  Josefina Verma returns to clinic and has been doing quite well.  She has no pain, no fatigue, no depression, and no anxiety.  She is working full-time at a [employer] company and enjoying her job.        REVIEW OF SYSTEMS: She denies fevers or chills, cough, chest pain, shortness of breath, nausea, vomiting, constipation, diarrhea, bone pain, back pain or headache.  She is exercising 150 minutes a week and eating a healthful Mediterranean style diet.  She is taking calcium and    vitamin D.       PHYSICAL EXAMINATION:   VITAL SIGNS:  Blood pressure 131/81, temperature 98.7, pulse 116, respirations 16, O2 sat 96% on room air, height 1.6 meters, and weight 68.5 kg.   GENERAL:  Josefina Verma appeared generally well.     HEENT:  She has no alopecia.  There are no lesions in the oropharynx.   LYMPH:  There is no palpable cervical,  supraclavicular, subclavicular or axillary lymphadenopathy.   BREASTS:  Examination of the right anterior chest wall reveals a well-healed mastectomy incision without erythema or masses.  No masses in the left breast.   LUNGS:  Clear to percussion and auscultation.   HEART:  There is a regular rate and rhythm, S1, S2.   ABDOMEN:  Soft, nontender, without hepatosplenomegaly.   EXTREMITIES:  Without edema.   PSYCHIATRIC:  Mood and affect were normal.       LABORATORY DATA:  The CBC and CMP were within normal limits.         IMAGING:  The MRI report is pending, but review with Dr. Brown shows benign results.        ASSESSMENT AND PLAN:     1.  Josefina Bennett is a 51-year-old woman with a history of a clinical stage II right breast cancer, ER positive, LA positive, HER2 negative by immunohistochemistry.  She had a lobular histology.  She was on the I-SPY-2 clinical trial and was randomized to ganetespib and paclitaxel and went on to AC.  She had intractable nausea and vomiting with the first cycle of AC and then the second cycle of AC was complicated by a Pneumocystis pneumonia requiring intubation and a 2-week hospitalization.  She did not complete the last 2 cycles of AC treatment.  She did undergo a right lumpectomy and sentinel lymph node procedure.  She had a positive margin and underwent resection.  She still had a positive margin.  She underwent a right mastectomy with clear margins.  She completed radiation in 12/2015.  Pathologic stage was IIIA, ypT3 N1a MX, and of concern she had an RCB3 response to the tumor meaning significant risk of recurrence in a 5-year period after primary therapy.  There were 2 lymph nodes involved with mammary carcinoma with extracapsular extension.  The largest node was 2 cm in size.  She went on to have radiation therapy and began hormonal therapy.    2.  Josefina Verma will continue on letrozole for adjuvant hormonal therapy for 10 years.   3.  Discussion of exercise.  Josefina Verma has  been exercising 150 minutes a week.   4.  Discussion of diet.  Josefina Verma has been eating a healthful Mediterranean style diet.  She is continuing with calcium and vitamin D and drinks no alcohol.   5.  Followup.  We will see Josefina Verma in followup in our clinic in 3 months with an EDIL and me in 6 months with a mammogram. Follow up with EDIL 1-18-18 and with me 4-19-18.  CBC, CMP with follow up visits.  Left mammogram 4-19-18.        Thank you for allowing us to continue to participate in Josefina Bennett' care.   Sincerely,    Evangelista Meza M.D.      Division of Hematology, Oncology, Transplant   Department of Medicine   [facility]   [phone]       I spent 15 minutes with the patient more than 50% of which was in counseling and coordination of care.     Again, thank you for allowing me to participate in the care of your patient.      Sincerely,    Evangelista Meza MD

## 2017-10-19 NOTE — MR AVS SNAPSHOT
After Visit Summary   10/19/2017    Josefina Bennett    MRN: [id number]           Patient Information     Date Of Birth          1965        Visit Information        Provider Department      10/19/2017 3:00 PM Evangelista Meza MD [facility]        Today's Diagnoses     Malignant neoplasm of upper-outer quadrant of right female breast (H)           Follow-ups after your visit        Follow-up notes from your care team     Return in about 6 months (around 4/19/2018).      Your next 10 appointments already scheduled     Jan 18, 2018  2:15 PM CST   [facility] Lab Draw with  [facility] LAB DRAW   [facility] Lab Draw ([facility])    [address] [place] [id number]   [phone]            Jan 18, 2018  2:50 PM CST   (Arrive by 2:35 PM)   Return Visit with Yane Bennett PA-C   [facility] ([facility])    [address] [place] [id number]   [phone]            Apr 19, 2018  2:00 PM CDT   [facility] Lab Draw with  [facility] DRAW   [facility])    [address] [place] [id number]   [phone]            Apr 19, 2018  2:30 PM CDT   (Arrive by 2:15 PM)   MA DIAGNOSTIC DIGITAL BILATERAL with [id number]   [facility] ([facility])    [address] [place] [address]   [phone]           Do not use any powder, lotion or deodorant under your arms or on your breast. If you do, we will ask you to remove it before your exam.  Wear comfortable, two-piece clothing.  If you have any allergies, tell your care team.  Bring any previous mammograms from other facilities or have them mailed to the breast center.  Three-dimensional (3D) mammograms are available at [facility] locations in [facility],  "[facility], [facility], [place], [place], and [place]. [facility] locations include [place] and [facility] in [place]. Benefits of 3D mammograms include: - Improved rate of cancer detection - Decreases your chance of having to go back for more tests, which means fewer: - \"False-positive\" results (This means that there is an abnormal area but it isn't cancer.) - Invasive testing procedures, such as a biopsy or surgery - Can provide clearer images of the breast if you have dense breast tissue. 3D mammography is an optional exam that anyone can have with a 2D mammogram. It doesn't replace or take the place of a 2D mammogram. 2D mammograms remain an effective screening test for all women.  Not all insurance companies cover the cost of a 3D mammogram. Check with your insurance.            Apr 19, 2018  3:00 PM CDT   (Arrive by 2:45 PM)   Return Visit with Evangelista Meza MD   [facility] ([facility])    [address] [place] [id number] [phone]              Future tests that were ordered for you today     Open Standing Orders        Priority Remaining Interval Expires Ordered    CBC with platelets differential Routine 52/52  10/19/2018 10/19/2017    Comprehensive metabolic panel Routine 52/52  10/19/2018 10/19/2017          Open Future Orders        Priority Expected Expires Ordered    MA Diagnostic Digital Bilateral Routine  10/19/2018 10/19/2017            Who to contact     If you have questions or need follow up information about today's clinic visit or your schedule please contact [facility] directly at [phone].  Normal or non-critical lab and imaging results will be communicated to you by MyChart, letter or phone within 4 business days after the clinic has received the results. If you do not hear from us within 7 days, please contact the clinic through MyChart or phone. If you have " "a critical or abnormal lab result, we will notify you by phone as soon as possible.  Submit refill requests through [employer] or call your pharmacy and they will forward the refill request to us. Please allow 3 business days for your refill to be completed.          Additional Information About Your Visit        Cuculushart Information     [employer] gives you secure access to your electronic health record. If you see a primary care provider, you can also send messages to your care team and make appointments. If you have questions, please call your primary care clinic.  If you do not have a primary care provider, please call [phone] and they will assist you.        Care EveryWhere ID     This is your Care EveryWhere ID. This could be used by other organizations to access your [facility] medical records  [id number]        Your Vitals Were     Pulse Temperature Respirations Height Last Period Pulse Oximetry    116 98.7  F (37.1  C) (Oral) 16 1.613 m (5' 3.5\") 12/18/2014 96%    BMI (Body Mass Index)                   26.34 kg/m2            Blood Pressure from Last 3 Encounters:   10/19/17 131/81   08/15/17 137/89   04/11/17 136/87    Weight from Last 3 Encounters:   10/19/17 68.5 kg (151 lb 1.6 oz)   08/15/17 65.5 kg (144 lb 6.4 oz)   04/11/17 63.3 kg (139 lb 9.6 oz)              We Performed the Following     CBC with platelets differential     Comprehensive metabolic panel        Primary Care Provider Office Phone # Fax #    Camille Araujo [phone]       [facility] [address]  [facility] [address]        Equal Access to Services     [facility] AH: Hadii aad ku hadasho Soomaali, waaxda luqadaha, qaybta kaalmada angelica matos. So [facility] [phone].    ATENCIÓN: Si habla español, tiene a zapata disposición servicios gratuitos de asistencia lingüística. Llame al [phone].    We comply with applicable federal civil rights laws and [place] " laws. We do not discriminate on the basis of race, color, national origin, age, disability, sex, sexual orientation, or gender identity.            Thank you!     Thank you for choosing [facility]  for your care. Our goal is always to provide you with excellent care. Hearing back from our patients is one way we can continue to improve our services. Please take a few minutes to complete the written survey that you may receive in the mail after your visit with us. Thank you!             Your Updated Medication List - Protect others around you: Learn how to safely use, store and throw away your medicines at www.disposemymeds.org.          This list is accurate as of: 10/19/17  4:14 PM.  Always use your most recent med list.                   Brand Name Dispense Instructions for use Diagnosis    calcium carbonate 1250 MG tablet    OS- mg [place]. Ca     Take 500 mg by mouth At Bedtime        cholecalciferol 1000 UNIT tablet    vitamin D    30 tablet    Take 1 tablet (1,000 Units) by mouth daily    Breast cancer (H)       letrozole 2.5 MG tablet    FEMARA    90 tablet    Take 1 tablet (2.5 mg) by mouth daily    Malignant neoplasm of upper-outer quadrant of right female breast (H)       mirtazapine 7.5 MG Tabs tablet    REMERON    90 tablet    Take 1 tablet (7.5 mg) by mouth At Bedtime    Malignant neoplasm of upper-outer quadrant of right female breast (H)       mupirocin 2 % ointment    BACTROBAN    22 g    Apply topically daily    Onychomadesis       venlafaxine 225 MG Tb24 24 hr tablet    EFFEXOR-ER    90 tablet    Take 1 tablet (225 mg) by mouth daily    Adjustment disorder with depressed mood

## 2017-10-19 NOTE — NURSING NOTE
"Oncology Rooming Note    October 19, 2017 3:11 PM   Josefina Bennett is a 51 year old female who presents for:    Chief Complaint   Patient presents with     Oncology Clinic Visit     Return: Breast CA     Initial Vitals: /81  Pulse 116  Temp 98.7  F (37.1  C) (Oral)  Resp 16  Ht 1.613 m (5' 3.5\")  Wt 68.5 kg (151 lb 1.6 oz)  LMP 12/18/2014  SpO2 96%  BMI 26.34 kg/m2 Estimated body mass index is 26.34 kg/(m^2) as calculated from the following:    Height as of this encounter: 1.613 m (5' 3.5\").    Weight as of this encounter: 68.5 kg (151 lb 1.6 oz). Body surface area is 1.75 meters squared.  No Pain (0) Comment: Data Unavailable   Patient's last menstrual period was 12/18/2014.  Allergies reviewed: Yes  Medications reviewed: Yes    Medications: Medication refills not needed today.  Pharmacy name entered into [facility]:    [email or web address] - [place], [place] - [address] [facility] AT [facility]  [id number] ([place]) - [place], [place] - [address] AT [facility] [address]  [facility] - [place], [place] - [address] [facility]    Clinical concerns: no new concerns     6 minutes for nursing intake (face to face time)     Josephine Washington CMA      Unable to give flu vaccine to patient for research reasons.  Josephine Washington CMA      "

## 2017-10-20 NOTE — PROGRESS NOTES
INTERVAL HISTORY:  Josefina Verma returns to clinic and has been doing quite well.  She has no pain, no fatigue, no depression, and no anxiety.  She is working full-time at a [employer] company and enjoying her job.       REVIEW OF SYSTEMS:  SYSTEMS:  She denies fevers or chills, cough, chest pain, shortness of breath, nausea, vomiting, constipation, diarrhea, bone pain, back pain or headache.  She is exercising    150 minutes a week and eating a healthful Mediterranean style diet.  She is taking calcium and    vitamin D.      PHYSICAL EXAMINATION:   VITAL SIGNS:  Blood pressure 131/81, temperature 98.7, pulse 116, respirations 16, O2 sat 96% on room air, height 1.6 meters, and weight 68.5 kg.   GENERAL:  Josefina Verma appeared generally well.     HEENT:  She has no alopecia.  There are no lesions in the oropharynx.   LYMPH:  There is no palpable cervical, supraclavicular, subclavicular or axillary lymphadenopathy.   BREASTS:  Examination of the right anterior chest wall reveals a well-healed mastectomy incision without erythema or masses.  No masses in the left breast.   LUNGS:  Clear to percussion and auscultation.   HEART:  There is a regular rate and rhythm, S1, S2.   ABDOMEN:  Soft, nontender, without hepatosplenomegaly.   EXTREMITIES:  Without edema.   PSYCHIATRIC:  Mood and affect were normal.      LABORATORY DATA:  The CBC and CMP were within normal limits.        IMAGING:  The MRI report is pending, but review with Dr. Brown shows benign results.       ASSESSMENT AND PLAN:     1.  Josefina Bennett is a 51-year-old woman with a history of a clinical stage II right breast cancer, ER positive, UT positive, HER2 negative by immunohistochemistry.  She had a lobular histology.  She was on the I-SPY-2 clinical trial and was randomized to *** and paclitaxel and went on to AC.  She had intractable nausea and vomiting with the first cycle of AC and then the second cycle of AC was complicated by a Pneumocystis pneumonia requiring  intubation and a 2-week hospitalization.  She did not complete the last 2 cycles of AC treatment.  She did undergo a right lumpectomy and sentinel lymph node procedure.  She had a positive margin and underwent resection.  She still had a positive margin.  She underwent a right mastectomy with clear margins.  She completed radiation in 12/2015.  Pathologic stage was IIIA, ypT3 N1a MX, and of concern she had an RCB3 response to the tumor meaning significant risk of recurrence in a 5-year period after primary therapy.  There were 2 lymph nodes involved with mammary carcinoma with extracapsular extension.  The largest node was 2 cm in size.  She went on to have radiation therapy and began hormonal therapy.    2.  Josefina Verma will continue on letrozole for adjuvant hormonal therapy for 10 years.   3.  Discussion of exercise.  Josefina Verma has been exercising 150 minutes a week.   4.  Discussion of diet.  Josefina Verma has been eating a healthful Mediterranean style diet.  She is continuing with calcium and vitamin D and drinks no alcohol.   5.  Followup.  We will see Josefina Verma in followup in our clinic in 3 months with an EDIL and me in    6 months with a mammogram.      Thank you for allowing us to continue to participate in Josefina Bennett' care.

## 2018-01-18 ENCOUNTER — ONCOLOGY VISIT (OUTPATIENT)
Dept: ONCOLOGY | Facility: CLINIC | Age: 53
End: 2018-01-18
Attending: PHYSICIAN ASSISTANT
Payer: COMMERCIAL

## 2018-01-18 ENCOUNTER — APPOINTMENT (OUTPATIENT)
Dept: LAB | Facility: CLINIC | Age: 53
End: 2018-01-18
Attending: INTERNAL MEDICINE
Payer: COMMERCIAL

## 2018-01-18 VITALS
TEMPERATURE: 99 F | OXYGEN SATURATION: 97 % | RESPIRATION RATE: 16 BRPM | DIASTOLIC BLOOD PRESSURE: 87 MMHG | BODY MASS INDEX: 26.82 KG/M2 | HEART RATE: 110 BPM | SYSTOLIC BLOOD PRESSURE: 132 MMHG | WEIGHT: 157.1 LBS | HEIGHT: 64 IN

## 2018-01-18 DIAGNOSIS — C50.411 MALIGNANT NEOPLASM OF UPPER-OUTER QUADRANT OF RIGHT FEMALE BREAST (H): ICD-10-CM

## 2018-01-18 LAB
ALBUMIN SERPL-MCNC: 3.6 G/DL (ref 3.4–5)
ALP SERPL-CCNC: 80 U/L (ref 40–150)
ALT SERPL W P-5'-P-CCNC: 31 U/L (ref 0–50)
ANION GAP SERPL CALCULATED.3IONS-SCNC: 7 MMOL/L (ref 3–14)
AST SERPL W P-5'-P-CCNC: 24 U/L (ref 0–45)
BASOPHILS # BLD AUTO: 0.1 10E9/L (ref 0–0.2)
BASOPHILS NFR BLD AUTO: 0.6 %
BILIRUB SERPL-MCNC: 0.4 MG/DL (ref 0.2–1.3)
BUN SERPL-MCNC: 20 MG/DL (ref 7–30)
CALCIUM SERPL-MCNC: 9 MG/DL (ref 8.5–10.1)
CHLORIDE SERPL-SCNC: 103 MMOL/L (ref 94–109)
CO2 SERPL-SCNC: 27 MMOL/L (ref 20–32)
CREAT SERPL-MCNC: 0.93 MG/DL (ref 0.52–1.04)
DIFFERENTIAL METHOD BLD: ABNORMAL
EOSINOPHIL # BLD AUTO: 0.2 10E9/L (ref 0–0.7)
EOSINOPHIL NFR BLD AUTO: 2.3 %
ERYTHROCYTE [DISTWIDTH] IN BLOOD BY AUTOMATED COUNT: 15.3 % (ref 10–15)
GFR SERPL CREATININE-BSD FRML MDRD: 64 ML/MIN/1.7M2
GLUCOSE SERPL-MCNC: 146 MG/DL (ref 70–99)
HCT VFR BLD AUTO: 41.6 % (ref 35–47)
HGB BLD-MCNC: 13.5 G/DL (ref 11.7–15.7)
IMM GRANULOCYTES # BLD: 0 10E9/L (ref 0–0.4)
IMM GRANULOCYTES NFR BLD: 0.4 %
LYMPHOCYTES # BLD AUTO: 3.1 10E9/L (ref 0.8–5.3)
LYMPHOCYTES NFR BLD AUTO: 36.9 %
MCH RBC QN AUTO: 27.3 PG (ref 26.5–33)
MCHC RBC AUTO-ENTMCNC: 32.5 G/DL (ref 31.5–36.5)
MCV RBC AUTO: 84 FL (ref 78–100)
MONOCYTES # BLD AUTO: 0.6 10E9/L (ref 0–1.3)
MONOCYTES NFR BLD AUTO: 6.7 %
NEUTROPHILS # BLD AUTO: 4.4 10E9/L (ref 1.6–8.3)
NEUTROPHILS NFR BLD AUTO: 53.1 %
NRBC # BLD AUTO: 0 10*3/UL
NRBC BLD AUTO-RTO: 0 /100
PLATELET # BLD AUTO: 271 10E9/L (ref 150–450)
POTASSIUM SERPL-SCNC: 4.2 MMOL/L (ref 3.4–5.3)
PROT SERPL-MCNC: 6.8 G/DL (ref 6.8–8.8)
RBC # BLD AUTO: 4.94 10E12/L (ref 3.8–5.2)
SODIUM SERPL-SCNC: 137 MMOL/L (ref 133–144)
WBC # BLD AUTO: 8.3 10E9/L (ref 4–11)

## 2018-01-18 PROCEDURE — 99213 OFFICE O/P EST LOW 20 MIN: CPT | Mod: ZP | Performed by: PHYSICIAN ASSISTANT

## 2018-01-18 PROCEDURE — 36415 COLL VENOUS BLD VENIPUNCTURE: CPT

## 2018-01-18 PROCEDURE — 80053 COMPREHEN METABOLIC PANEL: CPT | Performed by: INTERNAL MEDICINE

## 2018-01-18 PROCEDURE — 85025 COMPLETE CBC W/AUTO DIFF WBC: CPT | Performed by: INTERNAL MEDICINE

## 2018-01-18 PROCEDURE — G0463 HOSPITAL OUTPT CLINIC VISIT: HCPCS | Mod: ZF

## 2018-01-18 ASSESSMENT — PAIN SCALES - GENERAL: PAINLEVEL: NO PAIN (0)

## 2018-01-18 NOTE — NURSING NOTE
Chief Complaint   Patient presents with     Blood Draw     labs drawn by vpt by rn.  vs taken.     Labs drawn with venipuncture by RN.  Vital signs taken.  Pt checked in to next appointment.  Josefina Brown RN

## 2018-01-18 NOTE — LETTER
1/18/2018      RE: Josefina Bennett  [address]  [facility] [address]       DIAGNOSIS:  Clinical stage II infiltrating lobular carcinoma of the right breast, ER/NM-positive and HER2/jhony-negative.  The patient noted changes in her right breast in 2015.  Screening mammogram on 01/15/2015 showed an architectural distortion in the right breast.  Diagnostic mammogram and ultrasound on 01/28/2015 confirmed the architectural distortion.  Ultrasound showed a 4 x 1.1 x 1.5 cm abnormal hypoechoic area.  There was also an additional 1.4 hypoechoic nodule.  Breast MRI on 02/16/2015 showed a 5.7 cm area of concern.  PET/CT scan on 02/10/2015 showed no distant disease.  There were small pulmonary nodules.  She enrolled in [facility], and was randomized to weekly Taxol x12 weeks with ganetespib.  She received this from 03/03/2015 through 05/19/2015.  She received dose-dense Adriamycin and Cytoxan x2 cycles from 05/26/2015 until 06/09/2015.  After the second cycle of AC, she was hospitalized with Pneumocystis pneumonia, which required intubation.  Further chemotherapy was canceled.  She had a right lumpectomy with sentinel lymph node biopsy on 08/12/2015.  This revealed a residual infiltrating lobular carcinoma 63 mm x 36 mm.  There was associated LCIS.  She had a positive margin.  Two out of 2 lymph nodes were positive.  She had RCB class 3.  She went under re-excision on 09/02/2015, which showed an additional infiltrating lobular carcinoma with a positive margin.  She had right mastectomy on 9/21/2015, which showed an additional carcinoma and 2 out of 2 lymph nodes positive.  She was started on tamoxifen in 10/2015.  She completed 6040 cGy to the right chest wall plus lymphatics on 12/03/2015.  She had a laparoscopic hysterectomy and BSO on 10/16/2015. Treatment was changed to letrozole in 01/2016.     INTERVAL HISTORY:  Ms. Bennett comes to the clinic today for followup of her breast carcinoma. Nothing new since her last visit, she  "continues to feel well. No issues with the letrozole outside of weight gain. She hasn't had any issues with hot flashes, myalgias, or vaginal dryness. No new pains. No new neurologic symptoms, respiratory symptoms, or GI symptoms. Remainder of 10-pt ROS otherwise is negative.     MEDICATIONS:   Current Outpatient Prescriptions   Medication Sig Dispense Refill     letrozole (FEMARA) 2.5 MG tablet Take 1 tablet (2.5 mg) by mouth daily 90 tablet 3     mirtazapine (REMERON) 7.5 MG TABS tablet Take 1 tablet (7.5 mg) by mouth At Bedtime 90 tablet 3     venlafaxine (EFFEXOR-ER) 225 MG TB24 24 hr tablet Take 1 tablet (225 mg) by mouth daily 90 tablet 3     mupirocin (BACTROBAN) 2 % ointment Apply topically daily 22 g 0     cholecalciferol (VITAMIN D3) 1000 UNIT tablet Take 1 tablet (1,000 Units) by mouth daily 30 tablet 0     calcium carbonate (OS- MG [place]. CA) 500 MG tablet Take 500 mg by mouth At Bedtime            ALLERGIES:    Allergies   Allergen Reactions     Morphine      Rash     Tylenol [Acetaminophen] Hives     Rash       PHYSICAL EXAMINATION:  Vitals: /87 (BP Location: Right arm, Patient Position: Sitting, Cuff Size: Adult Large)  Pulse 110  Temp 99  F (37.2  C) (Oral)  Resp 16  Ht 1.613 m (5' 3.5\")  Wt 71.3 kg (157 lb 1.6 oz)  LMP 12/18/2014  SpO2 97%  BMI 27.39 kg/m2  GENERAL:  A pleasant person in no acute distress.   HEENT:  Sclerae are nonicteric.    NECK:  Supple.   LYMPH NODES:  No peripheral lymphadenopathy noted in the axillary, supraclavicular, or cervical regions.   LUNGS:  Clear to auscultation bilaterally.   HEART:  Regular rate and rhythm, with no murmur appreciated.   ABDOMEN:  Bowel sounds are active.  Soft and nontender.  No hepatosplenomegaly or other masses appreciated.  LOWER EXTREMITIES:  Without pitting edema to the knees bilaterally.   NEUROLOGICAL:  Alert/orientated/able to answer all questions.  CN grossly intact.  BREAST:  Right chest wall, well healed mastectomy " incision, no masses or nodules.  Left breast, normal to inspection, no masses.     LAB:   Results for MOISÉS DELGADO (MRN [id number]) as of 1/18/2018 15:16   Ref. Range 1/18/2018 14:38   WBC Latest Ref Range: 4.0 - 11.0 10e9/L 8.3   Hemoglobin Latest Ref Range: 11.7 - 15.7 g/dL 13.5   Hematocrit Latest Ref Range: 35.0 - 47.0 % 41.6   Platelet Count Latest Ref Range: 150 - 450 10e9/L 271       IMPRESSION/PLAN:   1.  Locally advanced infiltrating lobular carcinoma, right breast, ER/MD-positive, HER2/jhony-negative, status-post neoadjuvant chemotherapy per I-SPY 2 clinical trial using ganetespib and Taxol, and completed 2 cycles of ddAC, complicated by intractable n/v and pneumocystis pneumonia.  Ms. Delgado continues to do well at this time.  She is not having signs or symptoms that would suggest recurrence of her breast carcinoma.  We plan to continue the letrozole daily.   2.  Bone health.  DEXA scan in 06/2016 showed a T-score of -1.7 with low bone density.  We plan to continue the calcium with vitamin D and weightbearing exercises 2-3 days a week.    3. Discussed diet, exercise.    If there are no interval concerns, the patient will follow up in April with annual mammography.     Yane Bennett PA-C

## 2018-01-18 NOTE — PATIENT INSTRUCTIONS
Preventive Care:     Colorectal Cancer Screening: During our visit today, we discussed that it is recommended you receive colorectal cancer screening. Please call or make an appointment with your primary care provider to discuss this. You may also call the "[facility]" scheduling line ([phone]) to set up a colonoscopy appointment.

## 2018-01-18 NOTE — PROGRESS NOTES
DIAGNOSIS:  Clinical stage II infiltrating lobular carcinoma of the right breast, ER/CO-positive and HER2/jhony-negative.  The patient noted changes in her right breast in 2015.  Screening mammogram on 01/15/2015 showed an architectural distortion in the right breast.  Diagnostic mammogram and ultrasound on 01/28/2015 confirmed the architectural distortion.  Ultrasound showed a 4 x 1.1 x 1.5 cm abnormal hypoechoic area.  There was also an additional 1.4 hypoechoic nodule.  Breast MRI on 02/16/2015 showed a 5.7 cm area of concern.  PET/CT scan on 02/10/2015 showed no distant disease.  There were small pulmonary nodules.  She enrolled in I-[facility], and was randomized to weekly Taxol x12 weeks with ganetespib.  She received this from 03/03/2015 through 05/19/2015.  She received dose-dense Adriamycin and Cytoxan x2 cycles from 05/26/2015 until 06/09/2015.  After the second cycle of AC, she was hospitalized with Pneumocystis pneumonia, which required intubation.  Further chemotherapy was canceled.  She had a right lumpectomy with sentinel lymph node biopsy on 08/12/2015.  This revealed a residual infiltrating lobular carcinoma 63 mm x 36 mm.  There was associated LCIS.  She had a positive margin.  Two out of 2 lymph nodes were positive.  She had RCB class 3.  She went under re-excision on 09/02/2015, which showed an additional infiltrating lobular carcinoma with a positive margin.  She had right mastectomy on 9/21/2015, which showed an additional carcinoma and 2 out of 2 lymph nodes positive.  She was started on tamoxifen in 10/2015.  She completed 6040 cGy to the right chest wall plus lymphatics on 12/03/2015.  She had a laparoscopic hysterectomy and BSO on 10/16/2015. Treatment was changed to letrozole in 01/2016.     INTERVAL HISTORY:  Ms. Bennett comes to the clinic today for followup of her breast carcinoma. Nothing new since her last visit, she continues to feel well. No issues with the letrozole outside of weight gain.  "She hasn't had any issues with hot flashes, myalgias, or vaginal dryness. No new pains. No new neurologic symptoms, respiratory symptoms, or GI symptoms. Remainder of 10-pt ROS otherwise is negative.     MEDICATIONS:   Current Outpatient Prescriptions   Medication Sig Dispense Refill     letrozole (FEMARA) 2.5 MG tablet Take 1 tablet (2.5 mg) by mouth daily 90 tablet 3     mirtazapine (REMERON) 7.5 MG TABS tablet Take 1 tablet (7.5 mg) by mouth At Bedtime 90 tablet 3     venlafaxine (EFFEXOR-ER) 225 MG TB24 24 hr tablet Take 1 tablet (225 mg) by mouth daily 90 tablet 3     mupirocin (BACTROBAN) 2 % ointment Apply topically daily 22 g 0     cholecalciferol (VITAMIN D3) 1000 UNIT tablet Take 1 tablet (1,000 Units) by mouth daily 30 tablet 0     calcium carbonate (OS- MG [place]. CA) 500 MG tablet Take 500 mg by mouth At Bedtime            ALLERGIES:    Allergies   Allergen Reactions     Morphine      Rash     Tylenol [Acetaminophen] Hives     Rash       PHYSICAL EXAMINATION:  Vitals: /87 (BP Location: Right arm, Patient Position: Sitting, Cuff Size: Adult Large)  Pulse 110  Temp 99  F (37.2  C) (Oral)  Resp 16  Ht 1.613 m (5' 3.5\")  Wt 71.3 kg (157 lb 1.6 oz)  LMP 12/18/2014  SpO2 97%  BMI 27.39 kg/m2  GENERAL:  A pleasant person in no acute distress.   HEENT:  Sclerae are nonicteric.    NECK:  Supple.   LYMPH NODES:  No peripheral lymphadenopathy noted in the axillary, supraclavicular, or cervical regions.   LUNGS:  Clear to auscultation bilaterally.   HEART:  Regular rate and rhythm, with no murmur appreciated.   ABDOMEN:  Bowel sounds are active.  Soft and nontender.  No hepatosplenomegaly or other masses appreciated.  LOWER EXTREMITIES:  Without pitting edema to the knees bilaterally.   NEUROLOGICAL:  Alert/orientated/able to answer all questions.  CN grossly intact.  BREAST:  Right chest wall, well healed mastectomy incision, no masses or nodules.  Left breast, normal to inspection, no masses.   "   LAB:   Results for MOISÉS DELGADO (MRN [id number]) as of 1/18/2018 15:16   Ref. Range 1/18/2018 14:38   WBC Latest Ref Range: 4.0 - 11.0 10e9/L 8.3   Hemoglobin Latest Ref Range: 11.7 - 15.7 g/dL 13.5   Hematocrit Latest Ref Range: 35.0 - 47.0 % 41.6   Platelet Count Latest Ref Range: 150 - 450 10e9/L 271       IMPRESSION/PLAN:   1.  Locally advanced infiltrating lobular carcinoma, right breast, ER/CA-positive, HER2/jhony-negative, status-post neoadjuvant chemotherapy per I-SPY 2 clinical trial using ganetespib and Taxol, and completed 2 cycles of ddAC, complicated by intractable n/v and pneumocystis pneumonia.  Ms. Delgado continues to do well at this time.  She is not having signs or symptoms that would suggest recurrence of her breast carcinoma.  We plan to continue the letrozole daily.   2.  Bone health.  DEXA scan in 06/2016 showed a T-score of -1.7 with low bone density.  We plan to continue the calcium with vitamin D and weightbearing exercises 2-3 days a week.    3. Discussed diet, exercise.    If there are no interval concerns, the patient will follow up in April with annual mammography.     Yane Bennett PA-C

## 2018-01-18 NOTE — MR AVS SNAPSHOT
After Visit Summary   1/18/2018    Josefina Bennett    MRN: [id number]           Patient Information     Date Of Birth          1965        Visit Information        Provider Department      1/18/2018 2:50 PM Yane Bennett PA-C [facility]        Today's Diagnoses     Malignant neoplasm of upper-outer quadrant of right female breast (H)          Care Instructions    Preventive Care:     Colorectal Cancer Screening: During our visit today, we discussed that it is recommended you receive colorectal cancer screening. Please call or make an appointment with your primary care provider to discuss this. You may also call the [facility] scheduling line ([phone]) to set up a colonoscopy appointment.            Follow-ups after your visit        Your next 10 appointments already scheduled     Apr 19, 2018  2:00 PM CDT   [facility] [employer] Lab Draw with  [facility] LAB DRAW   [facility] ([facility])    [address] [place] [address]  [address]  [facility] [address] [phone]            Apr 19, 2018  2:30 PM CDT   (Arrive by 2:15 PM)   MA DIAGNOSTIC DIGITAL BILATERAL with [id number]   [facility] ([facility])    [address] [place] [address]  2nd Floor  [facility] [id number] [phone]           Do not use any powder, lotion or deodorant under your arms or on your breast. If you do, we will ask you to remove it before your exam.  Wear comfortable, two-piece clothing.  If you have any allergies, tell your care team.  Bring any previous mammograms from other facilities or have them mailed to the breast center.  Three-dimensional (3D) mammograms are available at [facility] locations in [facility], [facility], and [place]. [facility] locations include [place] and [facility] in [place]. Benefits of 3D mammograms include: - Improved rate of cancer  "detection - Decreases your chance of having to go back for more tests, which means fewer: - \"False-positive\" results (This means that there is an abnormal area but it isn't cancer.) - Invasive testing procedures, such as a biopsy or surgery - Can provide clearer images of the breast if you have dense breast tissue. 3D mammography is an optional exam that anyone can have with a 2D mammogram. It doesn't replace or take the place of a 2D mammogram. 2D mammograms remain an effective screening test for all women.  Not all insurance companies cover the cost of a 3D mammogram. Check with your insurance.            Apr 19, 2018  3:00 PM CDT   (Arrive by 2:45 PM)   Return Visit with Evangelista Meza MD   [facility] ([facility])    [address] [facility]  [address]  [facility] [address] [phone]              Who to contact     If you have questions or need follow up information about today's clinic visit or your schedule please contact [facility] directly at [phone].  Normal or non-critical lab and imaging results will be communicated to you by [employer]hart, letter or phone within 4 business days after the clinic has received the results. If you do not hear from us within 7 days, please contact the clinic through [facility] [employer] or phone. If you have a critical or abnormal lab result, we will notify you by phone as soon as possible.  Submit refill requests through [employer] or call your pharmacy and they will forward the refill request to us. Please allow 3 business days for your refill to be completed.          Additional Information About Your Visit        [employer] Information     [employer] gives you secure access to your electronic health record. If you see a primary care provider, you can also send messages to your care team and make appointments. If you have questions, please call your primary care clinic.  If you do not have a primary care provider, " "please call [phone] and they will assist you.        Care EveryWhere ID     This is your Care EveryWhere ID. This could be used by other organizations to access your [facility] medical records  [id number]        Your Vitals Were     Pulse Temperature Respirations Height Last Period Pulse Oximetry    110 99  F (37.2  C) (Oral) 16 1.613 m (5' 3.5\") 12/18/2014 97%    BMI (Body Mass Index)                   27.39 kg/m2            Blood Pressure from Last 3 Encounters:   01/18/18 132/87   10/19/17 131/81   08/15/17 137/89    Weight from Last 3 Encounters:   01/18/18 71.3 kg (157 lb 1.6 oz)   10/19/17 68.5 kg (151 lb 1.6 oz)   08/15/17 65.5 kg (144 lb 6.4 oz)              We Performed the Following     CBC with platelets differential     Comprehensive metabolic panel        Primary Care Provider Office Phone # Fax #    Camille DO Van [phone]       [facility] [address]  [facility] [address]        Equal Access to Services     ARIELLE [facility] CenterDANNY : Hadii paola Gaines, waaxda rogerio, qaybta kaalwayne matos, angelica otero . [facility] [phone].    ATENCIÓN: Si habla español, tiene a zapata disposición servicios gratuitos de asistencia lingüística. Ll[facility] [phone].    We comply with applicable federal civil rights laws and [place] laws. We do not discriminate on the basis of race, color, national origin, age, disability, sex, sexual orientation, or gender identity.            Thank you!     Thank you for choosing [facility]  for your care. Our goal is always to provide you with excellent care. Hearing back from our patients is one way we can continue to improve our services. Please take a few minutes to complete the written survey that you may receive in the mail after your visit with us. Thank you!             Your Updated Medication List - Protect others around you: Learn how to safely use, store and throw away " your medicines at www.disposemymeds.org.          This list is accurate as of: 1/18/18 11:59 PM.  Always use your most recent med list.                   Brand Name Dispense Instructions for use Diagnosis    calcium carbonate 1250 MG tablet    OS- mg [place]. Ca     Take 500 mg by mouth At Bedtime        cholecalciferol 1000 UNIT tablet    vitamin D3    30 tablet    Take 1 tablet (1,000 Units) by mouth daily    Breast cancer (H)       letrozole 2.5 MG tablet    FEMARA    90 tablet    Take 1 tablet (2.5 mg) by mouth daily    Malignant neoplasm of upper-outer quadrant of right female breast (H)       mirtazapine 7.5 MG Tabs tablet    REMERON    90 tablet    Take 1 tablet (7.5 mg) by mouth At Bedtime    Malignant neoplasm of upper-outer quadrant of right female breast (H)       mupirocin 2 % ointment    BACTROBAN    22 g    Apply topically daily    Onychomadesis       venlafaxine 225 MG Tb24 24 hr tablet    EFFEXOR-ER    90 tablet    Take 1 tablet (225 mg) by mouth daily    Adjustment disorder with depressed mood

## 2018-01-18 NOTE — NURSING NOTE
"Oncology Rooming Note    January 18, 2018 2:51 PM   Josefina Bennett is a 52 year old female who presents for:    Chief Complaint   Patient presents with     Blood Draw     labs drawn by vpt by rn.  vs taken.     Oncology Clinic Visit     Return: Breast CA     Initial Vitals: /87 (BP Location: Right arm, Patient Position: Sitting, Cuff Size: Adult Large)  Pulse 110  Temp 99  F (37.2  C) (Oral)  Resp 16  Ht 1.613 m (5' 3.5\")  Wt 71.3 kg (157 lb 1.6 oz)  LMP 12/18/2014  SpO2 97%  BMI 27.39 kg/m2 Estimated body mass index is 27.39 kg/(m^2) as calculated from the following:    Height as of this encounter: 1.613 m (5' 3.5\").    Weight as of this encounter: 71.3 kg (157 lb 1.6 oz). Body surface area is 1.79 meters squared.  No Pain (0) Comment: Data Unavailable   Patient's last menstrual period was 12/18/2014.  Allergies reviewed: Yes  Medications reviewed: Yes    Medications: Medication refills not needed today.  Pharmacy name entered into [employer]:    [facility] - [place], [place] - [address] [facility] AT [facility]  [facility] [address] ([place]) - [place], [place] - [address] AT [facility] [address]  [facility] - [place], [place] - [address]    Clinical concerns: no new concerns    pt did not have colonoscopy done yet, contact information has been added to AVS.    6 minutes for nursing intake (face to face time)     Josephine Washington CMA                "

## 2018-01-20 ENCOUNTER — HEALTH MAINTENANCE LETTER (OUTPATIENT)
Age: 53
End: 2018-01-20

## 2018-02-23 DIAGNOSIS — F43.21 ADJUSTMENT DISORDER WITH DEPRESSED MOOD: ICD-10-CM

## 2018-02-23 RX ORDER — VENLAFAXINE HYDROCHLORIDE 225 MG/1
225 TABLET, EXTENDED RELEASE ORAL DAILY
Qty: 90 TABLET | Refills: 3 | Status: SHIPPED | OUTPATIENT
Start: 2018-02-23 | End: 2021-01-19

## 2018-04-19 ENCOUNTER — ONCOLOGY VISIT (OUTPATIENT)
Dept: ONCOLOGY | Facility: CLINIC | Age: 53
End: 2018-04-19
Attending: INTERNAL MEDICINE
Payer: COMMERCIAL

## 2018-04-19 ENCOUNTER — RADIANT APPOINTMENT (OUTPATIENT)
Dept: MAMMOGRAPHY | Facility: CLINIC | Age: 53
End: 2018-04-19
Payer: COMMERCIAL

## 2018-04-19 VITALS
BODY MASS INDEX: 26.24 KG/M2 | TEMPERATURE: 99.4 F | HEART RATE: 103 BPM | SYSTOLIC BLOOD PRESSURE: 130 MMHG | DIASTOLIC BLOOD PRESSURE: 79 MMHG | RESPIRATION RATE: 18 BRPM | WEIGHT: 150.5 LBS | OXYGEN SATURATION: 100 %

## 2018-04-19 DIAGNOSIS — C50.411 MALIGNANT NEOPLASM OF UPPER-OUTER QUADRANT OF RIGHT BREAST IN FEMALE, ESTROGEN RECEPTOR POSITIVE (H): ICD-10-CM

## 2018-04-19 DIAGNOSIS — Z17.0 MALIGNANT NEOPLASM OF UPPER-OUTER QUADRANT OF RIGHT BREAST IN FEMALE, ESTROGEN RECEPTOR POSITIVE (H): Primary | ICD-10-CM

## 2018-04-19 DIAGNOSIS — C50.411 MALIGNANT NEOPLASM OF UPPER-OUTER QUADRANT OF RIGHT FEMALE BREAST (H): ICD-10-CM

## 2018-04-19 DIAGNOSIS — Z17.0 MALIGNANT NEOPLASM OF UPPER-OUTER QUADRANT OF RIGHT BREAST IN FEMALE, ESTROGEN RECEPTOR POSITIVE (H): ICD-10-CM

## 2018-04-19 DIAGNOSIS — C50.411 MALIGNANT NEOPLASM OF UPPER-OUTER QUADRANT OF RIGHT BREAST IN FEMALE, ESTROGEN RECEPTOR POSITIVE (H): Primary | ICD-10-CM

## 2018-04-19 LAB
ALBUMIN SERPL-MCNC: 3.8 G/DL (ref 3.4–5)
ALP SERPL-CCNC: 82 U/L (ref 40–150)
ALT SERPL W P-5'-P-CCNC: 33 U/L (ref 0–50)
ANION GAP SERPL CALCULATED.3IONS-SCNC: 8 MMOL/L (ref 3–14)
AST SERPL W P-5'-P-CCNC: 22 U/L (ref 0–45)
BASOPHILS # BLD AUTO: 0.1 10E9/L (ref 0–0.2)
BASOPHILS NFR BLD AUTO: 0.8 %
BILIRUB SERPL-MCNC: 0.4 MG/DL (ref 0.2–1.3)
BUN SERPL-MCNC: 11 MG/DL (ref 7–30)
CALCIUM SERPL-MCNC: 9.5 MG/DL (ref 8.5–10.1)
CHLORIDE SERPL-SCNC: 106 MMOL/L (ref 94–109)
CO2 SERPL-SCNC: 27 MMOL/L (ref 20–32)
CREAT SERPL-MCNC: 0.9 MG/DL (ref 0.52–1.04)
DIFFERENTIAL METHOD BLD: ABNORMAL
EOSINOPHIL # BLD AUTO: 0.2 10E9/L (ref 0–0.7)
EOSINOPHIL NFR BLD AUTO: 2.1 %
ERYTHROCYTE [DISTWIDTH] IN BLOOD BY AUTOMATED COUNT: 15.9 % (ref 10–15)
GFR SERPL CREATININE-BSD FRML MDRD: 65 ML/MIN/1.7M2
GLUCOSE SERPL-MCNC: 87 MG/DL (ref 70–99)
HCT VFR BLD AUTO: 40.5 % (ref 35–47)
HGB BLD-MCNC: 13.4 G/DL (ref 11.7–15.7)
IMM GRANULOCYTES # BLD: 0 10E9/L (ref 0–0.4)
IMM GRANULOCYTES NFR BLD: 0.2 %
LYMPHOCYTES # BLD AUTO: 2.9 10E9/L (ref 0.8–5.3)
LYMPHOCYTES NFR BLD AUTO: 32.5 %
MCH RBC QN AUTO: 27.2 PG (ref 26.5–33)
MCHC RBC AUTO-ENTMCNC: 33.1 G/DL (ref 31.5–36.5)
MCV RBC AUTO: 82 FL (ref 78–100)
MONOCYTES # BLD AUTO: 0.6 10E9/L (ref 0–1.3)
MONOCYTES NFR BLD AUTO: 7.2 %
NEUTROPHILS # BLD AUTO: 5.1 10E9/L (ref 1.6–8.3)
NEUTROPHILS NFR BLD AUTO: 57.2 %
NRBC # BLD AUTO: 0 10*3/UL
NRBC BLD AUTO-RTO: 0 /100
PLATELET # BLD AUTO: 264 10E9/L (ref 150–450)
POTASSIUM SERPL-SCNC: 4.3 MMOL/L (ref 3.4–5.3)
PROT SERPL-MCNC: 7 G/DL (ref 6.8–8.8)
RBC # BLD AUTO: 4.93 10E12/L (ref 3.8–5.2)
SODIUM SERPL-SCNC: 141 MMOL/L (ref 133–144)
WBC # BLD AUTO: 8.9 10E9/L (ref 4–11)

## 2018-04-19 PROCEDURE — 36415 COLL VENOUS BLD VENIPUNCTURE: CPT

## 2018-04-19 PROCEDURE — 85025 COMPLETE CBC W/AUTO DIFF WBC: CPT | Performed by: INTERNAL MEDICINE

## 2018-04-19 PROCEDURE — 80053 COMPREHEN METABOLIC PANEL: CPT | Performed by: INTERNAL MEDICINE

## 2018-04-19 ASSESSMENT — PAIN SCALES - GENERAL: PAINLEVEL: NO PAIN (0)

## 2018-04-19 NOTE — LETTER
4/19/2018       RE: Josefina Bennett  [address] [facility] [address]     Dear Colleague,    Thank you for referring your patient, Josefina Bennett, to the [facility]. Please see a copy of my visit note below.    Patient cancelled and will reschedule.    Evangelista Meza MD    Again, thank you for allowing me to participate in the care of your patient.      Sincerely,    Evangelista Meza MD

## 2018-04-19 NOTE — MR AVS SNAPSHOT
After Visit Summary   4/19/2018    Josefina Bennett    MRN: [id number]           Patient Information     Date Of Birth          1965        Visit Information        Provider Department      4/19/2018 3:00 PM Evangelista Meza MD [facility]        Today's Diagnoses     Malignant neoplasm of upper-outer quadrant of right breast in female, estrogen receptor positive (H)    -  1       Follow-ups after your visit        Follow-up notes from your care team     Return in about 6 months (around 10/18/2018).      Your next 10 appointments already scheduled     Jul 19, 2018  2:15 PM CDT   Masonic Lab Draw with  [employer] LAB DRAW   [facility] Masonic Lab Draw ([facility])    [address] [place] [address]  [address]  [facility] [address]   [phone]            Jul 19, 2018  2:50 PM CDT   (Arrive by 2:35 PM)   Return Visit with ANGELA Salinas   [facility] ([facility])    [address] [place] [address]  [address]  [facility] [address]   [phone]            Oct 18, 2018  3:00 PM CDT   Masonic Lab Draw with  [facility] LAB DRAW   [facility] Masonic Lab Draw ([facility])    [address] [facility]  [address]  [facility] [address]   [phone]            Oct 18, 2018  3:30 PM CDT   (Arrive by 3:15 PM)   Return Visit with Evangelista Meza MD   [facility] ([facility])    [address] [place] [address]  [address]  [facility] [address]   [phone]              Future tests that were ordered for you today     Open Standing Orders        Priority Remaining Interval Expires Ordered    CBC with platelets differential Routine 51/52  4/18/2019 4/18/2018    Comprehensive metabolic panel Routine 51/52  4/18/2019 4/18/2018            Who to contact     If you have questions or need follow up information about today's clinic visit or your schedule please  contact [facility] directly at [phone].  Normal or non-critical lab and imaging results will be communicated to you by MyChart, letter or phone within 4 business days after the clinic has received the results. If you do not hear from us within 7 days, please contact the clinic through MyChart or phone. If you have a critical or abnormal lab result, we will notify you by phone as soon as possible.  Submit refill requests through [employer] or call your pharmacy and they will forward the refill request to us. Please allow 3 business days for your refill to be completed.          Additional Information About Your Visit        [employer] Information     [employer] gives you secure access to your electronic health record. If you see a primary care provider, you can also send messages to your care team and make appointments. If you have questions, please call your primary care clinic.  If you do not have a primary care provider, please call [phone] and they will assist you.        Care EveryWhere ID     This is your Care EveryWhere ID. This could be used by other organizations to access your [place] medical records  [id number]        Your Vitals Were     Pulse Temperature Respirations Last Period Pulse Oximetry BMI (Body Mass Index)    103 99.4  F (37.4  C) (Oral) 18 12/18/2014 100% 26.24 kg/m2       Blood Pressure from Last 3 Encounters:   04/19/18 130/79   01/18/18 132/87   10/19/17 131/81    Weight from Last 3 Encounters:   04/19/18 68.3 kg (150 lb 8 oz)   01/18/18 71.3 kg (157 lb 1.6 oz)   10/19/17 68.5 kg (151 lb 1.6 oz)               Primary Care Provider Office Phone # Fax #    Camille Araujo [phone]       [facility] [address]  [facility] [address]        Equal Access to Services     ARIELLE DAVILA : Talita Gaines, wamarthada luqadaha, qaybta kaalmada carlo, angelica raines. So [facility]  [phone].    ATENCIÓN: Si skip lindsey, tiene a zapata disposición servicios gratuitos de asistencia lingüística. Phoebe kimble [phone].    We comply with applicable federal civil rights laws and [place] laws. We do not discriminate on the basis of race, color, national origin, age, disability, sex, sexual orientation, or gender identity.            Thank you!     Thank you for choosing [facility]  for your care. Our goal is always to provide you with excellent care. Hearing back from our patients is one way we can continue to improve our services. Please take a few minutes to complete the written survey that you may receive in the mail after your visit with us. Thank you!             Your Updated Medication List - Protect others around you: Learn how to safely use, store and throw away your medicines at www.disposemymeds.org.          This list is accurate as of 4/19/18 11:59 PM.  Always use your most recent med list.                   Brand Name Dispense Instructions for use Diagnosis    calcium carbonate 1250 MG tablet    OS- mg [place]. Ca     Take 500 mg by mouth At Bedtime        cholecalciferol 1000 UNIT tablet    vitamin D3    30 tablet    Take 1 tablet (1,000 Units) by mouth daily    Breast cancer (H)       letrozole 2.5 MG tablet    FEMARA    90 tablet    Take 1 tablet (2.5 mg) by mouth daily    Malignant neoplasm of upper-outer quadrant of right female breast (H)       mirtazapine 7.5 MG Tabs tablet    REMERON    90 tablet    Take 1 tablet (7.5 mg) by mouth At Bedtime    Malignant neoplasm of upper-outer quadrant of right female breast (H)       mupirocin 2 % ointment    BACTROBAN    22 g    Apply topically daily    Onychomadesis       venlafaxine 225 MG Tb24 24 hr tablet    EFFEXOR-ER    90 tablet    Take 1 tablet (225 mg) by mouth daily    Adjustment disorder with depressed mood

## 2018-04-19 NOTE — NURSING NOTE
Chief Complaint   Patient presents with     Blood Draw     Labs only     Vitals done, labs drawn by venipuncture.  See doc flow sheets for details.  Thelma Arrington CMA

## 2018-05-23 DIAGNOSIS — C50.411 MALIGNANT NEOPLASM OF UPPER-OUTER QUADRANT OF RIGHT FEMALE BREAST (H): ICD-10-CM

## 2018-05-24 RX ORDER — MIRTAZAPINE 7.5 MG/1
7.5 TABLET, FILM COATED ORAL AT BEDTIME
Qty: 90 TABLET | Refills: 3 | Status: SHIPPED | OUTPATIENT
Start: 2018-05-24 | End: 2019-07-23

## 2018-06-26 DIAGNOSIS — C50.411 MALIGNANT NEOPLASM OF UPPER-OUTER QUADRANT OF RIGHT FEMALE BREAST, UNSPECIFIED ESTROGEN RECEPTOR STATUS (H): ICD-10-CM

## 2018-06-26 RX ORDER — LETROZOLE 2.5 MG/1
2.5 TABLET, FILM COATED ORAL DAILY
Qty: 90 TABLET | Refills: 3 | Status: SHIPPED | OUTPATIENT
Start: 2018-06-26 | End: 2019-06-18

## 2018-07-22 ENCOUNTER — HEALTH MAINTENANCE LETTER (OUTPATIENT)
Age: 53
End: 2018-07-22

## 2018-07-25 ENCOUNTER — ONCOLOGY VISIT (OUTPATIENT)
Dept: ONCOLOGY | Facility: CLINIC | Age: 53
End: 2018-07-25
Attending: PHYSICIAN ASSISTANT
Payer: COMMERCIAL

## 2018-07-25 ENCOUNTER — APPOINTMENT (OUTPATIENT)
Dept: LAB | Facility: CLINIC | Age: 53
End: 2018-07-25
Attending: PHYSICIAN ASSISTANT
Payer: COMMERCIAL

## 2018-07-25 ENCOUNTER — TELEPHONE (OUTPATIENT)
Dept: GASTROENTEROLOGY | Facility: CLINIC | Age: 53
End: 2018-07-25

## 2018-07-25 VITALS
RESPIRATION RATE: 16 BRPM | WEIGHT: 147.9 LBS | HEART RATE: 122 BPM | DIASTOLIC BLOOD PRESSURE: 79 MMHG | TEMPERATURE: 98.5 F | BODY MASS INDEX: 25.79 KG/M2 | OXYGEN SATURATION: 98 % | SYSTOLIC BLOOD PRESSURE: 140 MMHG

## 2018-07-25 DIAGNOSIS — Z17.0 MALIGNANT NEOPLASM OF UPPER-OUTER QUADRANT OF RIGHT BREAST IN FEMALE, ESTROGEN RECEPTOR POSITIVE (H): ICD-10-CM

## 2018-07-25 DIAGNOSIS — C50.411 MALIGNANT NEOPLASM OF UPPER-OUTER QUADRANT OF RIGHT BREAST IN FEMALE, ESTROGEN RECEPTOR POSITIVE (H): ICD-10-CM

## 2018-07-25 DIAGNOSIS — L70.0 ACNE VULGARIS: Primary | ICD-10-CM

## 2018-07-25 LAB
ALBUMIN SERPL-MCNC: 3.8 G/DL (ref 3.4–5)
ALP SERPL-CCNC: 88 U/L (ref 40–150)
ALT SERPL W P-5'-P-CCNC: 29 U/L (ref 0–50)
ANION GAP SERPL CALCULATED.3IONS-SCNC: 9 MMOL/L (ref 3–14)
AST SERPL W P-5'-P-CCNC: 20 U/L (ref 0–45)
BASOPHILS # BLD AUTO: 0.1 10E9/L (ref 0–0.2)
BASOPHILS NFR BLD AUTO: 0.6 %
BILIRUB SERPL-MCNC: 0.3 MG/DL (ref 0.2–1.3)
BUN SERPL-MCNC: 14 MG/DL (ref 7–30)
CALCIUM SERPL-MCNC: 9.4 MG/DL (ref 8.5–10.1)
CHLORIDE SERPL-SCNC: 105 MMOL/L (ref 94–109)
CO2 SERPL-SCNC: 27 MMOL/L (ref 20–32)
CREAT SERPL-MCNC: 0.95 MG/DL (ref 0.52–1.04)
DIFFERENTIAL METHOD BLD: NORMAL
EOSINOPHIL # BLD AUTO: 0.2 10E9/L (ref 0–0.7)
EOSINOPHIL NFR BLD AUTO: 2.2 %
ERYTHROCYTE [DISTWIDTH] IN BLOOD BY AUTOMATED COUNT: 14.9 % (ref 10–15)
GFR SERPL CREATININE-BSD FRML MDRD: 61 ML/MIN/1.7M2
GLUCOSE SERPL-MCNC: 147 MG/DL (ref 70–99)
HCT VFR BLD AUTO: 43.4 % (ref 35–47)
HGB BLD-MCNC: 14.1 G/DL (ref 11.7–15.7)
IMM GRANULOCYTES # BLD: 0 10E9/L (ref 0–0.4)
IMM GRANULOCYTES NFR BLD: 0.2 %
LYMPHOCYTES # BLD AUTO: 3.3 10E9/L (ref 0.8–5.3)
LYMPHOCYTES NFR BLD AUTO: 35.1 %
MCH RBC QN AUTO: 27.3 PG (ref 26.5–33)
MCHC RBC AUTO-ENTMCNC: 32.5 G/DL (ref 31.5–36.5)
MCV RBC AUTO: 84 FL (ref 78–100)
MONOCYTES # BLD AUTO: 0.6 10E9/L (ref 0–1.3)
MONOCYTES NFR BLD AUTO: 6.2 %
NEUTROPHILS # BLD AUTO: 5.2 10E9/L (ref 1.6–8.3)
NEUTROPHILS NFR BLD AUTO: 55.7 %
NRBC # BLD AUTO: 0 10*3/UL
NRBC BLD AUTO-RTO: 0 /100
PLATELET # BLD AUTO: 306 10E9/L (ref 150–450)
POTASSIUM SERPL-SCNC: 3.8 MMOL/L (ref 3.4–5.3)
PROT SERPL-MCNC: 7.3 G/DL (ref 6.8–8.8)
RBC # BLD AUTO: 5.17 10E12/L (ref 3.8–5.2)
SODIUM SERPL-SCNC: 140 MMOL/L (ref 133–144)
WBC # BLD AUTO: 9.4 10E9/L (ref 4–11)

## 2018-07-25 PROCEDURE — 80053 COMPREHEN METABOLIC PANEL: CPT | Performed by: INTERNAL MEDICINE

## 2018-07-25 PROCEDURE — G0463 HOSPITAL OUTPT CLINIC VISIT: HCPCS | Mod: ZF

## 2018-07-25 PROCEDURE — 36415 COLL VENOUS BLD VENIPUNCTURE: CPT

## 2018-07-25 PROCEDURE — 85025 COMPLETE CBC W/AUTO DIFF WBC: CPT | Performed by: INTERNAL MEDICINE

## 2018-07-25 PROCEDURE — 99214 OFFICE O/P EST MOD 30 MIN: CPT | Mod: ZP | Performed by: PHYSICIAN ASSISTANT

## 2018-07-25 RX ORDER — CLINDAMYCIN PHOSPHATE 10 MG/G
GEL TOPICAL 2 TIMES DAILY
Qty: 60 G | Refills: 0 | Status: ON HOLD | OUTPATIENT
Start: 2018-07-25 | End: 2022-01-01

## 2018-07-25 ASSESSMENT — PAIN SCALES - GENERAL: PAINLEVEL: NO PAIN (0)

## 2018-07-25 NOTE — LETTER
7/25/2018      RE: Josefina Bennett  [address]  [facility] [address]       DIAGNOSIS:  Clinical stage II infiltrating lobular carcinoma of the right breast, ER/UT-positive and HER2/jhony-negative.  The patient noted changes in her right breast in 2015.  Screening mammogram on 01/15/2015 showed an architectural distortion in the right breast.  Diagnostic mammogram and ultrasound on 01/28/2015 confirmed the architectural distortion.  Ultrasound showed a 4 x 1.1 x 1.5 cm abnormal hypoechoic area.  There was also an additional 1.4 hypoechoic nodule.  Breast MRI on 02/16/2015 showed a 5.7 cm area of concern.  PET/CT scan on 02/10/2015 showed no distant disease.  There were small pulmonary nodules.  She enrolled in [facility], and was randomized to weekly Taxol x12 weeks with ganetespib.  She received this from 03/03/2015 through 05/19/2015.  She received dose-dense Adriamycin and Cytoxan x2 cycles from 05/26/2015 until 06/09/2015.  After the second cycle of AC, she was hospitalized with Pneumocystis pneumonia, which required intubation.  Further chemotherapy was canceled.  She had a right lumpectomy with sentinel lymph node biopsy on 08/12/2015.  This revealed a residual infiltrating lobular carcinoma 63 mm x 36 mm.  There was associated LCIS.  She had a positive margin.  Two out of 2 lymph nodes were positive.  She had RCB class 3.  She went under re-excision on 09/02/2015, which showed an additional infiltrating lobular carcinoma with a positive margin.  She had right mastectomy on 9/21/2015, which showed an additional carcinoma and 2 out of 2 lymph nodes positive.  She was started on tamoxifen in 10/2015.  She completed 6040 cGy to the right chest wall plus lymphatics on 12/03/2015.  She had a laparoscopic hysterectomy and BSO on 10/16/2015. Treatment was changed to letrozole in 01/2016.     INTERVAL HISTORY:  Josefina is doing well. 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 She states she is still being  trained for her new position and has some associated stress. She likes the company however. She continues on letrozole and states the only side effects appear to be weight gain and acne. She would like a prescription for topical clindamycin gel as this has worked well for her in the past. She walks her dog for at least 30 minutes daily. She is eating a mediterranean diet and taking calcium and vitamin D. She is on Remeron and Effexor and is currently having no issues with anxiety or depression. Sleep is good. Denies any new aches/pains. No GI symptoms. She states she has not had a colonoscopy but knows she is due and would like a referral. She had some numbness in right chest wall after surgery but she is now having some return of sensation. Denies any lymphadenopathy. Denies any neuropathy.     Remainder of 10-pt ROS otherwise is negative.     MEDICATIONS:   Current Outpatient Prescriptions   Medication Sig Dispense Refill     calcium carbonate (OS- MG [place]. [place]) 500 MG tablet Take 500 mg by mouth At Bedtime        cholecalciferol (VITAMIN D3) 1000 UNIT tablet Take 1 tablet (1,000 Units) by mouth daily 30 tablet 0     letrozole (FEMARA) 2.5 MG tablet Take 1 tablet (2.5 mg) by mouth daily 90 tablet 3     mirtazapine (REMERON) 7.5 MG TABS tablet Take 1 tablet (7.5 mg) by mouth At Bedtime 90 tablet 3     mupirocin (BACTROBAN) 2 % ointment Apply topically daily 22 g 0     venlafaxine (EFFEXOR-ER) 225 MG TB24 24 hr tablet Take 1 tablet (225 mg) by mouth daily 90 tablet 3         ALLERGIES:    Allergies   Allergen Reactions     Morphine      Rash     Tylenol [Acetaminophen] Hives     Rash       PHYSICAL EXAMINATION:  Vitals: [facility] 12/18/2014  GENERAL:  A pleasant person in no acute distress.   HEENT:  Sclerae are nonicteric.     LYMPH NODES:  No palpable cervical, supraclavicular, subclavicular or axillary lymphadenopathy.  LUNGS:  Clear to auscultation bilaterally.   HEART:  Regular rate and rhythm, with no murmur  appreciated.   ABDOMEN:  Bowel sounds are active.  Soft and nontender.  No hepatosplenomegaly or other masses appreciated.  LOWER EXTREMITIES:  Without pitting edema to the knees bilaterally.   NEUROLOGICAL:  Alert/orientated/able to answer all questions.  CN grossly intact.  BREAST:  Right chest wall, well healed mastectomy incision, no masses or nodules.  Left breast, normal to inspection, no masses.     LAB:   Results for MOISÉS DELGADO (MRN [id number]) as of 7/25/2018 16:32   7/25/2018 15:45   Sodium 140   Potassium 3.8   Chloride 105   Carbon Dioxide 27   Urea Nitrogen 14   Creatinine 0.95   GFR Estimate 61   GFR Estimate If Black 74   Calcium 9.4   Anion Gap 9   Albumin 3.8   Protein Total 7.3   Bilirubin Total 0.3   Alkaline Phosphatase 88   ALT 29   AST 20   Glucose 147 (H)   WBC 9.4   Hemoglobin 14.1   Hematocrit 43.4   Platelet Count 306   RBC Count 5.17   MCV 84   MCH 27.3   MCHC 32.5   RDW 14.9   Diff Method Automated Method   % Neutrophils 55.7   % Lymphocytes 35.1   % Monocytes 6.2   % Eosinophils 2.2   % Basophils 0.6   % Immature Granulocytes 0.2   Nucleated RBCs 0   Absolute Neutrophil 5.2   Absolute Lymphocytes 3.3   Absolute Monocytes 0.6   Absolute Eosinophils 0.2   Absolute Basophils 0.1   Abs Immature Granulocytes 0.0   Absolute Nucleated RBC 0.0       IMPRESSION/PLAN:   1.  Locally advanced infiltrating lobular carcinoma, right breast, ER/[place]-positive, HER2/jhony-negative, status-post neoadjuvant chemotherapy per I-[facility] 2 clinical trial using ganetespib and Taxol, and completed 2 cycles of ddAC, complicated by intractable n/v and pneumocystis pneumonia.  Ms. Delgado continues to do well at this time.  She is not having signs or symptoms that would suggest recurrence of her breast carcinoma.  We plan to continue the letrozole daily. Last mammogram on 4/19/18 was negative. Repeat in 1 year  2.  Bone health.  DEXA scan in 06/2016 showed a T-score of -1.7 with low bone density.  We plan to continue  the calcium with vitamin D and recommend weightbearing exercises 2-3 days a week. She is due for another DEXA scan  3. Discussed diet, exercise. She is doing at least 150 minutes of exercise in the form of walking every week. She is eating a Mediterranean diet.  4. Follow up:  --Scheduled for labs, Dr. Meza on 10/18/18. Will schedule DEXA same day at her request  --Referral for colonoscopy placed  --Will schedule for Mera Marte PA-C in survivorship clinic in mid January 2019    Tesha Small PA-C  [facility]  [address] [place] [address] [phone]

## 2018-07-25 NOTE — MR AVS SNAPSHOT
After Visit Summary   7/25/2018    Josefina Bennett    MRN: [id number]           Patient Information     Date Of Birth          1965        Visit Information        Provider Department      7/25/2018 3:40 PM Tesha Small PA [facility]        Today's Diagnoses     Acne vulgaris    -  1    Malignant neoplasm of upper-outer quadrant of right breast in female, estrogen receptor positive (H)           Follow-ups after your visit        Additional Services     GASTROENTEROLOGY ADULT REF PROCEDURE ONLY       Last Lab Result: Creatinine (mg/dL)       Date                     Value                 04/19/2018               0.90             ----------  Body mass index is 25.79 kg/(m^2).     Needed:  No  Language:  English    Patient will be contacted to schedule procedure.     Please be aware that coverage of these services is subject to the terms and limitations of your health insurance plan.  Call member services at your health plan with any benefit or coverage questions.  Any procedures must be performed at a [facility] facility OR coordinated by your clinic's referral office.    Please bring the following with you to your appointment:    (1) Any X-Rays, CTs or MRIs which have been performed.  Contact the facility where they were done to arrange for  prior to your scheduled appointment.    (2) List of current medications   (3) This referral request   (4) Any documents/labs given to you for this referral                  Your next 10 appointments already scheduled     Oct 18, 2018  3:00 PM CDT   [facility] Lab Draw with  [employer] LAB DRAW   [facility] Draw ([facility])    [address] [place] [address]  [address] [place] [address] [phone]            Oct 18, 2018  3:30 PM CDT   (Arrive by 3:15 PM)   Return Visit with Evangelista Meza MD   [facility] ([facility])    [address] [place] [address]  Street   [address]  [facility] [address] [phone]              Future tests that were ordered for you today     Open Future Orders        Priority Expected Expires Ordered    DX Hip/Pelvis/Spine Routine  2/20/2019 7/25/2018            Who to contact     If you have questions or need follow up information about today's clinic visit or your schedule please contact [facility] directly at [phone].  Normal or non-critical lab and imaging results will be communicated to you by [employer], letter or phone within 4 business days after the clinic has received the results. If you do not hear from us within 7 days, please contact the clinic through [employer] or phone. If you have a critical or abnormal lab result, we will notify you by phone as soon as possible.  Submit refill requests through [employer] or call your pharmacy and they will forward the refill request to us. Please allow 3 business days for your refill to be completed.          Additional Information About Your Visit        [employer] Information     [employer] gives you secure access to your electronic health record. If you see a primary care provider, you can also send messages to your care team and make appointments. If you have questions, please call your primary care clinic.  If you do not have a primary care provider, please call [phone] and they will assist you.        Care EveryWhere ID     This is your Care EveryWhere ID. This could be used by other organizations to access your [place] medical records  [id number]        Your Vitals Were     Pulse Temperature Respirations Last Period Pulse Oximetry BMI (Body Mass Index)    122 98.5  F (36.9  C) (Oral) 16 12/18/2014 98% 25.79 kg/m2       Blood Pressure from Last 3 Encounters:   07/25/18 140/79   04/19/18 130/79   01/18/18 132/87    Weight from Last 3 Encounters:   07/25/18 67.1 kg (147 lb 14.4 oz)   04/19/18 68.3 kg (150 lb 8 oz)   01/18/18 71.3 kg (157 lb 1.6 oz)               We Performed the Following     CBC with platelets differential     Comprehensive metabolic panel     GASTROENTEROLOGY ADULT REF PROCEDURE ONLY          Today's Medication Changes          These changes are accurate as of 7/25/18  4:44 PM.  If you have any questions, ask your nurse or doctor.               Start taking these medicines.        Dose/Directions    clindamycin 1 % topical gel   Commonly known as:  CLINDAMAX   Used for:  Acne vulgaris   Started by:  Tesha Small PA        Apply topically 2 times daily   Quantity:  60 g   Refills:  0            Where to get your medicines      These medications were sent to [facility] ([place]) - [place], [place] - [address] AT [facility] [address]  [address], [facility] [address]     Phone:  [phone]     clindamycin 1 % topical gel                Primary Care Provider Office Phone # Fax #    Camille Araujo [phone]       [facility] [address]  [facility] [address]        Equal Access to Services     ARIELLE DAVILA AH: Hadii aad ku hadasho Soomaali, waaxda luqadaha, qaybta kaalmada adeegyada, waxay idiin hayaan don otero . So [facility] [phone].    ATENCIÓN: Si habla español, tiene a zapata disposición servicios gratuitos de asistencia lingüística. [facility] [phone].    We comply with applicable federal civil rights laws and [place] laws. We do not discriminate on the basis of race, color, national origin, age, disability, sex, sexual orientation, or gender identity.            Thank you!     Thank you for choosing [facility]  for your care. Our goal is always to provide you with excellent care. Hearing back from our patients is one way we can continue to improve our services. Please take a few minutes to complete the written survey that you may receive in the mail after your visit with us. Thank you!             Your Updated Medication  List - Protect others around you: Learn how to safely use, store and throw away your medicines at www.disposemymeds.org.          This list is accurate as of 7/25/18  4:44 PM.  Always use your most recent med list.                   Brand Name Dispense Instructions for use Diagnosis    calcium carbonate 500 MG tablet    OS- mg [place]. Ca     Take 500 mg by mouth At Bedtime        cholecalciferol 1000 UNIT tablet    vitamin D3    30 tablet    Take 1 tablet (1,000 Units) by mouth daily    Breast cancer (H)       clindamycin 1 % topical gel    CLINDAMAX    60 g    Apply topically 2 times daily    Acne vulgaris       letrozole 2.5 MG tablet    FEMARA    90 tablet    Take 1 tablet (2.5 mg) by mouth daily    Malignant neoplasm of upper-outer quadrant of right female breast, unspecified estrogen receptor status (H)       mirtazapine 7.5 MG Tabs tablet    REMERON    90 tablet    Take 1 tablet (7.5 mg) by mouth At Bedtime    Malignant neoplasm of upper-outer quadrant of right female breast (H)       mupirocin 2 % ointment    BACTROBAN    22 g    Apply topically daily    Onychomadesis       venlafaxine 225 MG Tb24 24 hr tablet    EFFEXOR-ER    90 tablet    Take 1 tablet (225 mg) by mouth daily    Adjustment disorder with depressed mood

## 2018-07-25 NOTE — NURSING NOTE
"Oncology Rooming Note    July 25, 2018 3:53 PM   Josefina Bennett is a 52 year old female who presents for:    Chief Complaint   Patient presents with     Oncology Clinic Visit     P Return- BREAST CA     Blood Draw     labs drawn with vpt by rn.  vs taken     Initial Vitals: /79 (BP Location: Right arm, Patient Position: Sitting, Cuff Size: Adult Regular)  Pulse 122  Temp 98.5  F (36.9  C) (Oral)  Resp 16  Wt 67.1 kg (147 lb 14.4 oz)  LMP 12/18/2014  SpO2 98%  BMI 25.79 kg/m2 Estimated body mass index is 25.79 kg/(m^2) as calculated from the following:    Height as of 1/18/18: 1.613 m (5' 3.5\").    Weight as of this encounter: 67.1 kg (147 lb 14.4 oz). Body surface area is 1.73 meters squared.  No Pain (0) Comment: Data Unavailable   Patient's last menstrual period was 12/18/2014.  Allergies reviewed: Yes  Medications reviewed: Yes    Medications: MEDICATION REFILLS NEEDED TODAY. Provider was notified.  Pharmacy name entered into [employer]:    [facility] - [place], [place] - [address] [facility] AT [facility]  [facility] ([place]) - [place], [place] - [address] AT [facility] [address]  [facility] - [place], [place] - [address] [facility]    Clinical concerns: Needs refill clyndamycin, did not see on medication list thought. Tatsumi was notified.    10 minutes for nursing intake (face to face time)     Dung Ortiz LPN            "

## 2018-07-25 NOTE — PROGRESS NOTES
DIAGNOSIS:  Clinical stage II infiltrating lobular carcinoma of the right breast, ER/OH-positive and HER2/jhony-negative.  The patient noted changes in her right breast in 2015.  Screening mammogram on 01/15/2015 showed an architectural distortion in the right breast.  Diagnostic mammogram and ultrasound on 01/28/2015 confirmed the architectural distortion.  Ultrasound showed a 4 x 1.1 x 1.5 cm abnormal hypoechoic area.  There was also an additional 1.4 hypoechoic nodule.  Breast MRI on 02/16/2015 showed a 5.7 cm area of concern.  PET/CT scan on 02/10/2015 showed no distant disease.  There were small pulmonary nodules.  She enrolled in [facility] [place], and was randomized to weekly Taxol x12 weeks with ganetespib.  She received this from 03/03/2015 through 05/19/2015.  She received dose-dense Adriamycin and Cytoxan x2 cycles from 05/26/2015 until 06/09/2015.  After the second cycle of AC, she was hospitalized with Pneumocystis pneumonia, which required intubation.  Further chemotherapy was canceled.  She had a right lumpectomy with sentinel lymph node biopsy on 08/12/2015.  This revealed a residual infiltrating lobular carcinoma 63 mm x 36 mm.  There was associated LCIS.  She had a positive margin.  Two out of 2 lymph nodes were positive.  She had RCB class 3.  She went under re-excision on 09/02/2015, which showed an additional infiltrating lobular carcinoma with a positive margin.  She had right mastectomy on 9/21/2015, which showed an additional carcinoma and 2 out of 2 lymph nodes positive.  She was started on tamoxifen in 10/2015.  She completed 6040 cGy to the right chest wall plus lymphatics on 12/03/2015.  She had a laparoscopic hysterectomy and BSO on 10/16/2015. Treatment was changed to letrozole in 01/2016.     INTERVAL HISTORY:  Josefina is doing well. 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 She states she is still being trained for her new position and has some associated stress. She likes the  company however. She continues on letrozole and states the only side effects appear to be weight gain and acne. She would like a prescription for topical clindamycin gel as this has worked well for her in the past. She walks her dog for at least 30 minutes daily. She is eating a mediterranean diet and taking calcium and vitamin D. She is on Remeron and Effexor and is currently having no issues with anxiety or depression. Sleep is good. Denies any new aches/pains. No GI symptoms. She states she has not had a colonoscopy but knows she is due and would like a referral. She had some numbness in right chest wall after surgery but she is now having some return of sensation. Denies any lymphadenopathy. Denies any neuropathy.     Remainder of 10-pt ROS otherwise is negative.     MEDICATIONS:   Current Outpatient Prescriptions   Medication Sig Dispense Refill     calcium carbonate (OS- MG [place]. CA) 500 MG tablet Take 500 mg by mouth At Bedtime        cholecalciferol (VITAMIN D3) 1000 UNIT tablet Take 1 tablet (1,000 Units) by mouth daily 30 tablet 0     letrozole (FEMARA) 2.5 MG tablet Take 1 tablet (2.5 mg) by mouth daily 90 tablet 3     mirtazapine (REMERON) 7.5 MG TABS tablet Take 1 tablet (7.5 mg) by mouth At Bedtime 90 tablet 3     mupirocin (BACTROBAN) 2 % ointment Apply topically daily 22 g 0     venlafaxine (EFFEXOR-ER) 225 MG TB24 24 hr tablet Take 1 tablet (225 mg) by mouth daily 90 tablet 3         ALLERGIES:    Allergies   Allergen Reactions     Morphine      Rash     Tylenol [Acetaminophen] Hives     Rash       PHYSICAL EXAMINATION:  Vitals: [facility] 12/18/2014  GENERAL:  A pleasant person in no acute distress.   HEENT:  Sclerae are nonicteric.     LYMPH NODES:  No palpable cervical, supraclavicular, subclavicular or axillary lymphadenopathy.  LUNGS:  Clear to auscultation bilaterally.   HEART:  Regular rate and rhythm, with no murmur appreciated.   ABDOMEN:  Bowel sounds are active.  Soft and nontender.  No  hepatosplenomegaly or other masses appreciated.  LOWER EXTREMITIES:  Without pitting edema to the knees bilaterally.   NEUROLOGICAL:  Alert/orientated/able to answer all questions.  CN grossly intact.  BREAST:  Right chest wall, well healed mastectomy incision, no masses or nodules.  Left breast, normal to inspection, no masses.     LAB:   Results for MOISÉS DELGADO (MRN [id number]) as of 7/25/2018 16:32   7/25/2018 15:45   Sodium 140   Potassium 3.8   Chloride 105   Carbon Dioxide 27   Urea Nitrogen 14   Creatinine 0.95   GFR Estimate 61   GFR Estimate If Black 74   Calcium 9.4   Anion Gap 9   Albumin 3.8   Protein Total 7.3   Bilirubin Total 0.3   Alkaline Phosphatase 88   ALT 29   AST 20   Glucose 147 (H)   WBC 9.4   Hemoglobin 14.1   Hematocrit 43.4   Platelet Count 306   RBC Count 5.17   MCV 84   MCH 27.3   MCHC 32.5   RDW 14.9   Diff Method Automated Method   % Neutrophils 55.7   % Lymphocytes 35.1   % Monocytes 6.2   % Eosinophils 2.2   % Basophils 0.6   % Immature Granulocytes 0.2   Nucleated RBCs 0   Absolute Neutrophil 5.2   Absolute Lymphocytes 3.3   Absolute Monocytes 0.6   Absolute Eosinophils 0.2   Absolute Basophils 0.1   Abs Immature Granulocytes 0.0   Absolute Nucleated RBC 0.0       IMPRESSION/PLAN:   1.  Locally advanced infiltrating lobular carcinoma, right breast, ER/VA-positive, HER2/jhony-negative, status-post neoadjuvant chemotherapy per I-SPY 2 clinical trial using ganetespib and Taxol, and completed 2 cycles of ddAC, complicated by intractable n/v and pneumocystis pneumonia.  Ms. Delgado continues to do well at this time.  She is not having signs or symptoms that would suggest recurrence of her breast carcinoma.  We plan to continue the letrozole daily. Last mammogram on 4/19/18 was negative. Repeat in 1 year  2.  Bone health.  DEXA scan in 06/2016 showed a T-score of -1.7 with low bone density.  We plan to continue the calcium with vitamin D and recommend weightbearing exercises 2-3 days a  week. She is due for another DEXA scan  3. Discussed diet, exercise. She is doing at least 150 minutes of exercise in the form of walking every week. She is eating a Mediterranean diet.  4. Follow up:  --Scheduled for labs, Dr. Meza on 10/18/18. Will schedule DEXA same day at her request  --Referral for colonoscopy placed  --Will schedule for Mera Marte PA-C in survivorship clinic in mid January 2019    Tesha Small PA-C  [facility]  [address] [place] [address] [phone]

## 2018-07-25 NOTE — NURSING NOTE
Chief Complaint   Patient presents with     Oncology Clinic Visit     [facility] Return- BREAST CA     Blood Draw     labs drawn with vpt by rn.  vs taken     Labs drawn with vpt by rn.  Pt tolerated well.  VS taken.  Pt checked in for next appt.    Renee Cevallos RN

## 2018-07-26 ENCOUNTER — TELEPHONE (OUTPATIENT)
Dept: GASTROENTEROLOGY | Facility: CLINIC | Age: 53
End: 2018-07-26

## 2018-07-27 ENCOUNTER — TELEPHONE (OUTPATIENT)
Dept: GASTROENTEROLOGY | Facility: CLINIC | Age: 53
End: 2018-07-27

## 2018-08-07 ENCOUNTER — TELEPHONE (OUTPATIENT)
Dept: GASTROENTEROLOGY | Facility: CLINIC | Age: 53
End: 2018-08-07

## 2018-11-23 ENCOUNTER — TELEPHONE (OUTPATIENT)
Dept: ONCOLOGY | Facility: CLINIC | Age: 53
End: 2018-11-23

## 2018-11-23 NOTE — TELEPHONE ENCOUNTER
Called patient to follow up on missed appointment, she states she had cancelled it on MyChart due to a lack of insurance. She is waiting for a call back from the state and will call to schedule after.    Belem Nielsen RN

## 2019-02-15 ENCOUNTER — HEALTH MAINTENANCE LETTER (OUTPATIENT)
Age: 54
End: 2019-02-15

## 2019-06-18 DIAGNOSIS — C50.411 MALIGNANT NEOPLASM OF UPPER-OUTER QUADRANT OF RIGHT FEMALE BREAST, UNSPECIFIED ESTROGEN RECEPTOR STATUS (H): ICD-10-CM

## 2019-06-19 RX ORDER — LETROZOLE 2.5 MG/1
2.5 TABLET, FILM COATED ORAL DAILY
Qty: 90 TABLET | Refills: 0 | Status: SHIPPED | OUTPATIENT
Start: 2019-06-19 | End: 2019-09-20

## 2019-06-19 NOTE — TELEPHONE ENCOUNTER
Pt contacted triage requesting refill of Leterozole. Overdue for appt d/2 insurance issues, but insurance is now active and pt scheduled in July with Dr. Meza. Writer discussed with ANGELA Chapman who approves refill.    Kalyn Ji RN   [facility] Triage

## 2019-07-21 NOTE — PROGRESS NOTES
Josefina Bennett was self referred to our clinic for clinical trials recommendations for her newly diagnosed stage II breast cancer. She had her last mammogram approximately a year ago. She did notice in early January that she was having some distortion of the right breast, and she went to see her gynecologist and had a mammogram performed. She had a screening mammogram performed on 01/15/2015. Breast tissue was heterogeneously dense, which might compromise the mammography. There was architectural distortion in the right breast approximately 11-12 o'clock position, zone 2, posterior depth, and a CC MLO spot compression was performed and an ultrasound was planned. The diagnostic mammogram from 01/28/2015 showed right breast architectural distortion centered at the 12 o'clock position, zone 2, suspicious for neoplasm. Breast tomosynthesis was used in the interpretation. Ultrasound findings included targeted ultrasound of the right upper breast demonstrating a band-like area of abnormal echogenicity between 11 and 12 o'clock position in the right breast at zone 2. This measures 4 cm transversely x 1.1 cm AP, and there was only a 1.5 cm measurement in the radial direction. There was blood flow suspicious for neoplasm at the 11 o'clock position in zone 2. There is a 1.4 cm hypoechoic nodule slightly  from the larger area of altered echogenicity that is also suspicious. A biopsy was performed. The biopsy showed infiltrating lobular carcinoma, grade 2, and a clip was placed at specimen [id number]. There were a few signet cells present. The tumor was ER-positive, CO low positive, and HER2 was negative by immunohistochemistry. She subsequently underwent an MRI showing a tumor that was 4.8 x 1.7 x 3.2 cm in size. Overall, her clinical stage was T2 NX MX.      She is enrolled in the I-SPY-2 clinical trial, and qualified with high-risk MammaPrint score. She was randomized to ganetespib and paclitaxel, and she was treated with  12 cycles of treatment, and then started on AC on 05/26/2015. She developed intractable nausea and vomiting after the first cycle, which required hospitalization on the second cycle. She then developed Pneumocystis pneumonia, which resulted in intubation and a 2-week hospitalization during the course of AC. She was discharged on 07/02/2015, and decided she did not want to pursue any further chemotherapy. She had a right lumpectomy and sentinel lymph node procedure 08/12/2015. She had a positive margin, underwent a re-excision, and had a positive margin. Ultimately, she underwent a right mastectomy with clear margins. She began radiation treatment with Dr. Bill Chauhan, and completed radiation therapy on 12/04/2015. Pathologic stage was III-A, ypT3 N1a MX. Of concern, she had what could be considered an RCB3 tumor.      TREATMENT HISTORY:  A.  Neoadjuvant therapy on I SPY-2 with ganetespib and paclitaxel.  AC x 2 complicated by PCP pneumonia.  B.  Right lumpectomy, followed by right margin resections, followed by rigth mastectomy.   B.  Oophorectomy 10-16-15.   C.  Tamoxifen 10-6-16.   D.  Radiation therapy. 5,040 cGy in 180 cGy/fraction to the chest wall and regional lymphatics followed by 720 cGy axillary lymph node boost and 1000 cGy mastectomy scar boost. Completed on 12/3/2015.  E.   Adjuvant letrozole begun 1-28-16.      Discussion with Dr. Ozuna.  Plan is to continue with MRI and mammogram spaced 6 months apart.        INTERVAL HISTORY:  Josefina Verma returns to the clinic and is feeling well.  She missed her mammogram in and will have a screening mammogram of the left breast performed today.  Overall, Josefina Verma has been feeling quite well.  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  She has no pain, no fatigue, no depression, no anxiety.      On CBC, she has an increased RDW.  She has never had a colonoscopy.  We do recommend a screening colonoscopy.      She  does not have anemia.      REVIEW OF SYSTEMS:  A 10-point review of systems is negative.      PHYSICAL EXAMINATION:   VITAL SIGNS:  Blood pressure 135/75, temperature 98.3, pulse 95, respirations 16, O2 sat 99% on room air, height 1.6 meters and weight 62 kg.   GENERAL:  Josefina Verma appeared generally well.  She has no alopecia.   HEENT:  Oropharynx is without lesions.   LYMPH:  There is no palpable cervical, supraclavicular, subclavicular or axillary lymphadenopathy.   BREASTS:  Examination of the right anterior chest wall reveals no masses.  There is a well-healed mastectomy incision without erythema or masses.  Left breast is without masses.  No masses in the anterior chest wall bilaterally and the port incision in the left upper chest wall is well healed without erythema or masses.   LUNGS:  Clear to percussion and auscultation.   HEART:  Regular rate and rhythm, S1, S2.   ABDOMEN:  Soft and nontender, without hepatosplenomegaly.   EXTREMITIES:  Without edema.   PSYCHIATRIC:  Mood and affect were normal.      LABORATORY DATA:  CBC and CMP were within normal limits, except for an elevated RDW.      IMAGING:  Mammogram was performed today and the results are pending.      ASSESSMENT AND PLAN:   1.  Mary Bennett is a 53-year-old woman with a history of a clinical stage II right breast cancer, ER positive, ME positive, HER2 negative by immunohistochemistry.  She had a lobular histology.  She was on the I-SPY 2 clinical trial and was randomized to ganetespib and paclitaxel, then went on to AC.  She had intractable nausea and vomiting the first cycle of AC, and the second cycle of AC was complicated by Pneumocystis pneumonia requiring intubation and a 2-week hospitalization.  She did not complete the AC treatment.  She did undergo a right lumpectomy and sentinel node procedure.  She had a positive margin and underwent resection.  She still had a positive margin.  She underwent a right lumpectomy with clear margins.   She completed radiation in 12/2015.  Pathologic stage was   IIIA  [id number] and of concern, she had an RCB3 histology meaning significant risk of recurrence of her breast cancer during the 5-year period following primary therapy.  There were 2 lymph nodes involved with mammary carcinoma with extracapsular extension.  She went on to have radiation therapy and then began hormonal therapy.   2.  Mary had oophorectomy.  She continues on letrozole for adjuvant hormonal therapy with the plan of continuing for a total of 10 years.   3.  We discussed exercise and Mary is exercising 150 minutes per week.   4.  Mary has been eating a healthful Mediterranean style diet.  She is continuing with calcium, vitamin D and drinks no alcohol.   5.  Followup.  We will see Josefina Verma in followup in our clinic in 3 months with EDIL and me in 6 months with a mammogram. Left mammogram today. Colonoscopy in 1 to 2 weeks. Follow up with Tesha 1- and with me July 21, 2020 with breast MRI July 20, 2020. CBC, CMP with both visits.     Thank you for allowing us to continue to participate in Josefina Bennett' care.      Evangelista Meza MD      [facility]         I spent 30 minutes with the patient more than 50% of which was in counseling and coordination of care.

## 2019-07-23 ENCOUNTER — APPOINTMENT (OUTPATIENT)
Dept: LAB | Facility: CLINIC | Age: 54
End: 2019-07-23
Attending: INTERNAL MEDICINE
Payer: COMMERCIAL

## 2019-07-23 ENCOUNTER — TELEPHONE (OUTPATIENT)
Dept: GASTROENTEROLOGY | Facility: CLINIC | Age: 54
End: 2019-07-23

## 2019-07-23 ENCOUNTER — ANCILLARY PROCEDURE (OUTPATIENT)
Dept: MAMMOGRAPHY | Facility: CLINIC | Age: 54
End: 2019-07-23
Attending: INTERNAL MEDICINE
Payer: COMMERCIAL

## 2019-07-23 ENCOUNTER — ONCOLOGY VISIT (OUTPATIENT)
Dept: ONCOLOGY | Facility: CLINIC | Age: 54
End: 2019-07-23
Attending: INTERNAL MEDICINE
Payer: COMMERCIAL

## 2019-07-23 VITALS
TEMPERATURE: 98.3 F | BODY MASS INDEX: 23.37 KG/M2 | OXYGEN SATURATION: 99 % | HEIGHT: 64 IN | HEART RATE: 95 BPM | DIASTOLIC BLOOD PRESSURE: 75 MMHG | SYSTOLIC BLOOD PRESSURE: 135 MMHG | RESPIRATION RATE: 16 BRPM | WEIGHT: 136.9 LBS

## 2019-07-23 DIAGNOSIS — Z12.31 VISIT FOR SCREENING MAMMOGRAM: ICD-10-CM

## 2019-07-23 DIAGNOSIS — C50.411 MALIGNANT NEOPLASM OF UPPER-OUTER QUADRANT OF RIGHT BREAST IN FEMALE, ESTROGEN RECEPTOR POSITIVE (H): ICD-10-CM

## 2019-07-23 DIAGNOSIS — C50.411 MALIGNANT NEOPLASM OF UPPER-OUTER QUADRANT OF RIGHT FEMALE BREAST (H): ICD-10-CM

## 2019-07-23 DIAGNOSIS — Z17.0 MALIGNANT NEOPLASM OF UPPER-OUTER QUADRANT OF RIGHT BREAST IN FEMALE, ESTROGEN RECEPTOR POSITIVE (H): ICD-10-CM

## 2019-07-23 DIAGNOSIS — D64.9 ANEMIA, UNSPECIFIED TYPE: ICD-10-CM

## 2019-07-23 DIAGNOSIS — Z13.9 SCREENING FOR CONDITION: Primary | ICD-10-CM

## 2019-07-23 LAB
ALBUMIN SERPL-MCNC: 3.8 G/DL (ref 3.4–5)
ALP SERPL-CCNC: 64 U/L (ref 40–150)
ALT SERPL W P-5'-P-CCNC: 25 U/L (ref 0–50)
ANION GAP SERPL CALCULATED.3IONS-SCNC: 3 MMOL/L (ref 3–14)
AST SERPL W P-5'-P-CCNC: 18 U/L (ref 0–45)
BASOPHILS # BLD AUTO: 0.1 10E9/L (ref 0–0.2)
BASOPHILS NFR BLD AUTO: 0.9 %
BILIRUB SERPL-MCNC: 0.4 MG/DL (ref 0.2–1.3)
BUN SERPL-MCNC: 15 MG/DL (ref 7–30)
CALCIUM SERPL-MCNC: 9.1 MG/DL (ref 8.5–10.1)
CHLORIDE SERPL-SCNC: 106 MMOL/L (ref 94–109)
CO2 SERPL-SCNC: 27 MMOL/L (ref 20–32)
CREAT SERPL-MCNC: 0.96 MG/DL (ref 0.52–1.04)
DIFFERENTIAL METHOD BLD: ABNORMAL
EOSINOPHIL # BLD AUTO: 0.2 10E9/L (ref 0–0.7)
EOSINOPHIL NFR BLD AUTO: 2 %
ERYTHROCYTE [DISTWIDTH] IN BLOOD BY AUTOMATED COUNT: 15.5 % (ref 10–15)
GFR SERPL CREATININE-BSD FRML MDRD: 67 ML/MIN/{1.73_M2}
GLUCOSE SERPL-MCNC: 115 MG/DL (ref 70–99)
HCT VFR BLD AUTO: 43.7 % (ref 35–47)
HGB BLD-MCNC: 14.1 G/DL (ref 11.7–15.7)
IMM GRANULOCYTES # BLD: 0 10E9/L (ref 0–0.4)
IMM GRANULOCYTES NFR BLD: 0.2 %
LYMPHOCYTES # BLD AUTO: 3.3 10E9/L (ref 0.8–5.3)
LYMPHOCYTES NFR BLD AUTO: 34.2 %
MCH RBC QN AUTO: 27.2 PG (ref 26.5–33)
MCHC RBC AUTO-ENTMCNC: 32.3 G/DL (ref 31.5–36.5)
MCV RBC AUTO: 84 FL (ref 78–100)
MONOCYTES # BLD AUTO: 0.6 10E9/L (ref 0–1.3)
MONOCYTES NFR BLD AUTO: 6.3 %
NEUTROPHILS # BLD AUTO: 5.5 10E9/L (ref 1.6–8.3)
NEUTROPHILS NFR BLD AUTO: 56.4 %
NRBC # BLD AUTO: 0 10*3/UL
NRBC BLD AUTO-RTO: 0 /100
PLATELET # BLD AUTO: 251 10E9/L (ref 150–450)
POTASSIUM SERPL-SCNC: 4.2 MMOL/L (ref 3.4–5.3)
PROT SERPL-MCNC: 6.9 G/DL (ref 6.8–8.8)
RBC # BLD AUTO: 5.19 10E12/L (ref 3.8–5.2)
SODIUM SERPL-SCNC: 137 MMOL/L (ref 133–144)
WBC # BLD AUTO: 9.7 10E9/L (ref 4–11)

## 2019-07-23 PROCEDURE — 85025 COMPLETE CBC W/AUTO DIFF WBC: CPT | Performed by: INTERNAL MEDICINE

## 2019-07-23 PROCEDURE — G0463 HOSPITAL OUTPT CLINIC VISIT: HCPCS | Mod: ZF

## 2019-07-23 PROCEDURE — 80053 COMPREHEN METABOLIC PANEL: CPT | Performed by: INTERNAL MEDICINE

## 2019-07-23 PROCEDURE — 36415 COLL VENOUS BLD VENIPUNCTURE: CPT

## 2019-07-23 PROCEDURE — 99214 OFFICE O/P EST MOD 30 MIN: CPT | Mod: ZP | Performed by: INTERNAL MEDICINE

## 2019-07-23 RX ORDER — MIRTAZAPINE 7.5 MG/1
7.5 TABLET, FILM COATED ORAL AT BEDTIME
Qty: 90 TABLET | Refills: 3 | Status: SHIPPED | OUTPATIENT
Start: 2019-07-23 | End: 2021-01-19

## 2019-07-23 ASSESSMENT — MIFFLIN-ST. JEOR: SCORE: 1203.1

## 2019-07-23 ASSESSMENT — PAIN SCALES - GENERAL: PAINLEVEL: NO PAIN (0)

## 2019-07-23 NOTE — LETTER
7/23/2019       RE: Josefina Bennett  [address]  [facility] [address]     Dear Colleague,    Thank you for referring your patient, Josefina Bennett, to the [facility]. Please see a copy of my visit note below.    Josefina Bennett was self referred to our clinic for clinical trials recommendations for her newly diagnosed stage II breast cancer. She had her last mammogram approximately a year ago. She did notice in early January that she was having some distortion of the right breast, and she went to see her gynecologist and had a mammogram performed. She had a screening mammogram performed on 01/15/2015. Breast tissue was heterogeneously dense, which might compromise the mammography. There was architectural distortion in the right breast approximately 11-12 o'clock position, zone 2, posterior depth, and a CC MLO spot compression was performed and an ultrasound was planned. The diagnostic mammogram from 01/28/2015 showed right breast architectural distortion centered at the 12 o'clock position, zone 2, suspicious for neoplasm. Breast tomosynthesis was used in the interpretation. Ultrasound findings included targeted ultrasound of the right upper breast demonstrating a band-like area of abnormal echogenicity between 11 and 12 o'clock position in the right breast at zone 2. This measures 4 cm transversely x 1.1 cm AP, and there was only a 1.5 cm measurement in the radial direction. There was blood flow suspicious for neoplasm at the 11 o'clock position in zone 2. There is a 1.4 cm hypoechoic nodule slightly  from the larger area of altered echogenicity that is also suspicious. A biopsy was performed. The biopsy showed infiltrating lobular carcinoma, grade 2, and a clip was placed at specimen [id number]. There were a few signet cells present. The tumor was ER-positive, ME low positive, and HER2 was negative by immunohistochemistry. She subsequently underwent an MRI showing a tumor that was 4.8 x 1.7  x 3.2 cm in size. Overall, her clinical stage was T2 NX MX.      She is enrolled in the I-SPY-2 clinical trial, and qualified with high-risk MammaPrint score. She was randomized to ganetespib and paclitaxel, and she was treated with 12 cycles of treatment, and then started on AC on 05/26/2015. She developed intractable nausea and vomiting after the first cycle, which required hospitalization on the second cycle. She then developed Pneumocystis pneumonia, which resulted in intubation and a 2-week hospitalization during the course of AC. She was discharged on 07/02/2015, and decided she did not want to pursue any further chemotherapy. She had a right lumpectomy and sentinel lymph node procedure 08/12/2015. She had a positive margin, underwent a re-excision, and had a positive margin. Ultimately, she underwent a right mastectomy with clear margins. She began radiation treatment with Dr. Bill Chauhan, and completed radiation therapy on 12/04/2015. Pathologic stage was III-A, ypT3 N1a MX. Of concern, she had what could be considered an RCB3 tumor.      TREATMENT HISTORY:  A.  Neoadjuvant therapy on I SPY-2 with ganetespib and paclitaxel.  AC x 2 complicated by PCP pneumonia.  B.  Right lumpectomy, followed by right margin resections, followed by rigth mastectomy.   B.  Oophorectomy 10-16-15.   C.  Tamoxifen 10-6-16.   D.  Radiation therapy. 5,040 cGy in 180 cGy/fraction to the chest wall and regional lymphatics followed by 720 cGy axillary lymph node boost and 1000 cGy mastectomy scar boost. Completed on 12/3/2015.  E.   Adjuvant letrozole begun 1-28-16.      Discussion with Dr. Ozuna.  Plan is to continue with MRI and mammogram spaced 6 months apart.        INTERVAL HISTORY:  Josefina Verma returns to the clinic and is feeling well.  She missed her mammogram in and will have a screening mammogram of the left breast performed today.  Overall, Josefina Verma has been feeling quite well.  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  She has no pain, no fatigue, no depression, no anxiety.      On CBC, she has an increased RDW.  She has never had a colonoscopy.  We do recommend a screening colonoscopy.      She does not have anemia.      REVIEW OF SYSTEMS:  A 10-point review of systems is negative.      PHYSICAL EXAMINATION:   VITAL SIGNS:  Blood pressure 135/75, temperature 98.3, pulse 95, respirations 16, O2 sat 99% on room air, height 1.6 meters and weight 62 kg.   GENERAL:  Josefina Verma appeared generally well.  She has no alopecia.   HEENT:  Oropharynx is without lesions.   LYMPH:  There is no palpable cervical, supraclavicular, subclavicular or axillary lymphadenopathy.   BREASTS:  Examination of the right anterior chest wall reveals no masses.  There is a well-healed mastectomy incision without erythema or masses.  Left breast is without masses.  No masses in the anterior chest wall bilaterally and the port incision in the left upper chest wall is well healed without erythema or masses.   LUNGS:  Clear to percussion and auscultation.   HEART:  Regular rate and rhythm, S1, S2.   ABDOMEN:  Soft and nontender, without hepatosplenomegaly.   EXTREMITIES:  Without edema.   PSYCHIATRIC:  Mood and affect were normal.      LABORATORY DATA:  CBC and CMP were within normal limits, except for an elevated RDW.      IMAGING:  Mammogram was performed today and the results are pending.      ASSESSMENT AND PLAN:   1.  Mayr Bennett is a 53-year-old woman with a history of a clinical stage II right breast cancer, ER positive, CO positive, HER2 negative by immunohistochemistry.  She had a lobular histology.  She was on the I-SPY 2 clinical trial and was randomized to ganetespib and paclitaxel, then went on to AC.  She had intractable nausea and vomiting the first cycle of AC, and the second cycle of AC was complicated by Pneumocystis pneumonia requiring intubation and a 2-week hospitalization.  She  did not complete the AC treatment.  She did undergo a right lumpectomy and sentinel node procedure.  She had a positive margin and underwent resection.  She still had a positive margin.  She underwent a right lumpectomy with clear margins.  She completed radiation in 12/2015.  Pathologic stage was   IIIA  [id number] and of concern, she had an RCB3 histology meaning significant risk of recurrence of her breast cancer during the 5-year period following primary therapy.  There were 2 lymph nodes involved with mammary carcinoma with extracapsular extension.  She went on to have radiation therapy and then began hormonal therapy.   2.  Mary had oophorectomy.  She continues on letrozole for adjuvant hormonal therapy with the plan of continuing for a total of 10 years.   3.  We discussed exercise and Mary is exercising 150 minutes per week.   4.  Mary has been eating a healthful Mediterranean style diet.  She is continuing with calcium, vitamin D and drinks no alcohol.   5.  Followup.  We will see Josefina Verma in followup in our clinic in 3 months with EDIL and me in 6 months with a mammogram. Left mammogram today. Colonoscopy in 1 to 2 weeks. Follow up with Tesha 1- and with me July 21, 2020 with breast MRI July 20, 2020. CBC, CMP with both visits.     Thank you for allowing us to continue to participate in Josefina Bennett' care.      Evangelista Meza MD      [facility]         I spent 30 minutes with the patient more than 50% of which was in counseling and coordination of care.

## 2019-07-23 NOTE — NURSING NOTE
Chief Complaint   Patient presents with     Blood Draw     labs drawn via vpt by rn. vs taken      Blood drawn via vpt by RN in lab. VS taken. Pt checked into next appointment.   Myah Clarke RN

## 2019-07-23 NOTE — NURSING NOTE
"Oncology Rooming Note    July 23, 2019 1:02 PM   Josefina Bennett is a 53 year old female who presents for:    Chief Complaint   Patient presents with     Blood Draw     labs drawn via vpt by rn. vs taken      RECHECK     onc breast ca     Initial Vitals: /75 (BP Location: Left arm, Patient Position: Sitting, Cuff Size: Adult Regular)   Pulse 95   Temp 98.3  F (36.8  C) (Oral)   Resp 16   Ht 1.613 m (5' 3.5\")   Wt 62.1 kg (136 lb 14.4 oz)   LMP 12/18/2014   SpO2 99%   BMI 23.87 kg/m   Estimated body mass index is 23.87 kg/m  as calculated from the following:    Height as of this encounter: 1.613 m (5' 3.5\").    Weight as of this encounter: 62.1 kg (136 lb 14.4 oz). Body surface area is 1.67 meters squared.  No Pain (0) Comment: Data Unavailable   Patient's last menstrual period was 12/18/2014.  Allergies reviewed: Yes  Medications reviewed: Yes    Medications: MEDICATION REFILLS NEEDED TODAY. Provider was notified.  Pharmacy name entered into [employer]:    [facility] - [place], [place] - [address] AT [facility]  [id number] ([place]) - [place] - [address] AT [facility] [address]  [facility] - [place], [place] - [address] [id number] - [place], [place] - [address]    Clinical concerns: none        Kellie Braxton, CMA              "

## 2019-07-24 ENCOUNTER — TELEPHONE (OUTPATIENT)
Dept: GASTROENTEROLOGY | Facility: CLINIC | Age: 54
End: 2019-07-24

## 2019-07-25 ENCOUNTER — TELEPHONE (OUTPATIENT)
Dept: GASTROENTEROLOGY | Facility: CLINIC | Age: 54
End: 2019-07-25

## 2019-09-20 DIAGNOSIS — C50.411 MALIGNANT NEOPLASM OF UPPER-OUTER QUADRANT OF RIGHT FEMALE BREAST, UNSPECIFIED ESTROGEN RECEPTOR STATUS (H): ICD-10-CM

## 2019-09-20 RX ORDER — LETROZOLE 2.5 MG/1
2.5 TABLET, FILM COATED ORAL DAILY
Qty: 90 TABLET | Refills: 3 | Status: SHIPPED | OUTPATIENT
Start: 2019-09-20 | End: 2020-09-17

## 2019-10-05 ENCOUNTER — HEALTH MAINTENANCE LETTER (OUTPATIENT)
Age: 54
End: 2019-10-05

## 2020-01-23 ENCOUNTER — APPOINTMENT (OUTPATIENT)
Dept: LAB | Facility: CLINIC | Age: 55
End: 2020-01-23
Attending: INTERNAL MEDICINE
Payer: COMMERCIAL

## 2020-01-23 ENCOUNTER — ONCOLOGY VISIT (OUTPATIENT)
Dept: ONCOLOGY | Facility: CLINIC | Age: 55
End: 2020-01-23
Attending: INTERNAL MEDICINE
Payer: COMMERCIAL

## 2020-01-23 VITALS
TEMPERATURE: 99.2 F | RESPIRATION RATE: 16 BRPM | DIASTOLIC BLOOD PRESSURE: 78 MMHG | OXYGEN SATURATION: 99 % | SYSTOLIC BLOOD PRESSURE: 134 MMHG | WEIGHT: 127.6 LBS | BODY MASS INDEX: 22.25 KG/M2 | HEART RATE: 109 BPM

## 2020-01-23 DIAGNOSIS — Z17.0 MALIGNANT NEOPLASM OF UPPER-OUTER QUADRANT OF RIGHT BREAST IN FEMALE, ESTROGEN RECEPTOR POSITIVE (H): ICD-10-CM

## 2020-01-23 DIAGNOSIS — C50.411 MALIGNANT NEOPLASM OF UPPER-OUTER QUADRANT OF RIGHT BREAST IN FEMALE, ESTROGEN RECEPTOR POSITIVE (H): ICD-10-CM

## 2020-01-23 DIAGNOSIS — D64.9 ANEMIA, UNSPECIFIED TYPE: ICD-10-CM

## 2020-01-23 LAB
ALBUMIN SERPL-MCNC: 3.8 G/DL (ref 3.4–5)
ALP SERPL-CCNC: 56 U/L (ref 40–150)
ALT SERPL W P-5'-P-CCNC: 24 U/L (ref 0–50)
ANION GAP SERPL CALCULATED.3IONS-SCNC: 4 MMOL/L (ref 3–14)
AST SERPL W P-5'-P-CCNC: 16 U/L (ref 0–45)
BASOPHILS # BLD AUTO: 0.1 10E9/L (ref 0–0.2)
BASOPHILS NFR BLD AUTO: 1.2 %
BILIRUB SERPL-MCNC: 0.4 MG/DL (ref 0.2–1.3)
BUN SERPL-MCNC: 20 MG/DL (ref 7–30)
CALCIUM SERPL-MCNC: 9.6 MG/DL (ref 8.5–10.1)
CHLORIDE SERPL-SCNC: 109 MMOL/L (ref 94–109)
CO2 SERPL-SCNC: 29 MMOL/L (ref 20–32)
CREAT SERPL-MCNC: 0.9 MG/DL (ref 0.52–1.04)
DIFFERENTIAL METHOD BLD: ABNORMAL
EOSINOPHIL # BLD AUTO: 0.2 10E9/L (ref 0–0.7)
EOSINOPHIL NFR BLD AUTO: 2.3 %
ERYTHROCYTE [DISTWIDTH] IN BLOOD BY AUTOMATED COUNT: 15.3 % (ref 10–15)
FERRITIN SERPL-MCNC: 42 NG/ML (ref 8–252)
GFR SERPL CREATININE-BSD FRML MDRD: 72 ML/MIN/{1.73_M2}
GLUCOSE SERPL-MCNC: 129 MG/DL (ref 70–99)
HCT VFR BLD AUTO: 42.4 % (ref 35–47)
HGB BLD-MCNC: 13.7 G/DL (ref 11.7–15.7)
IMM GRANULOCYTES # BLD: 0 10E9/L (ref 0–0.4)
IMM GRANULOCYTES NFR BLD: 0.4 %
IRON SATN MFR SERPL: 19 % (ref 15–46)
IRON SERPL-MCNC: 59 UG/DL (ref 35–180)
LYMPHOCYTES # BLD AUTO: 2.7 10E9/L (ref 0.8–5.3)
LYMPHOCYTES NFR BLD AUTO: 34.9 %
MCH RBC QN AUTO: 28.1 PG (ref 26.5–33)
MCHC RBC AUTO-ENTMCNC: 32.3 G/DL (ref 31.5–36.5)
MCV RBC AUTO: 87 FL (ref 78–100)
MONOCYTES # BLD AUTO: 0.6 10E9/L (ref 0–1.3)
MONOCYTES NFR BLD AUTO: 7.1 %
NEUTROPHILS # BLD AUTO: 4.2 10E9/L (ref 1.6–8.3)
NEUTROPHILS NFR BLD AUTO: 54.1 %
NRBC # BLD AUTO: 0 10*3/UL
NRBC BLD AUTO-RTO: 0 /100
PLATELET # BLD AUTO: 283 10E9/L (ref 150–450)
POTASSIUM SERPL-SCNC: 4.4 MMOL/L (ref 3.4–5.3)
PROT SERPL-MCNC: 6.9 G/DL (ref 6.8–8.8)
RBC # BLD AUTO: 4.88 10E12/L (ref 3.8–5.2)
SODIUM SERPL-SCNC: 142 MMOL/L (ref 133–144)
TIBC SERPL-MCNC: 311 UG/DL (ref 240–430)
WBC # BLD AUTO: 7.8 10E9/L (ref 4–11)

## 2020-01-23 PROCEDURE — G0463 HOSPITAL OUTPT CLINIC VISIT: HCPCS | Mod: ZF

## 2020-01-23 PROCEDURE — 83540 ASSAY OF IRON: CPT | Performed by: INTERNAL MEDICINE

## 2020-01-23 PROCEDURE — 85025 COMPLETE CBC W/AUTO DIFF WBC: CPT | Performed by: INTERNAL MEDICINE

## 2020-01-23 PROCEDURE — 99213 OFFICE O/P EST LOW 20 MIN: CPT | Mod: ZP | Performed by: PHYSICIAN ASSISTANT

## 2020-01-23 PROCEDURE — 83550 IRON BINDING TEST: CPT | Performed by: INTERNAL MEDICINE

## 2020-01-23 PROCEDURE — 36415 COLL VENOUS BLD VENIPUNCTURE: CPT

## 2020-01-23 PROCEDURE — 84238 ASSAY NONENDOCRINE RECEPTOR: CPT | Performed by: INTERNAL MEDICINE

## 2020-01-23 PROCEDURE — 82728 ASSAY OF FERRITIN: CPT | Performed by: INTERNAL MEDICINE

## 2020-01-23 PROCEDURE — 80053 COMPREHEN METABOLIC PANEL: CPT | Performed by: INTERNAL MEDICINE

## 2020-01-23 ASSESSMENT — PAIN SCALES - GENERAL: PAINLEVEL: NO PAIN (0)

## 2020-01-23 NOTE — NURSING NOTE
"Oncology Rooming Note    January 23, 2020 3:38 PM   Josefina Bennett is a 54 year old female who presents for:    Chief Complaint   Patient presents with     Oncology Clinic Visit     Return - Malignant neoplasm of upper-outer quadrant of right breast      Blood Draw     labs drawn via vpt by rn. vs taken      Initial Vitals: /78 (BP Location: Left arm, Patient Position: Sitting, Cuff Size: Adult Regular)   Pulse 109   Temp 99.2  F (37.3  C) (Oral)   Resp 16   Wt 57.9 kg (127 lb 9.6 oz)   LMP 12/18/2014   SpO2 99%   BMI 22.25 kg/m   Estimated body mass index is 22.25 kg/m  as calculated from the following:    Height as of 7/23/19: 1.613 m (5' 3.5\").    Weight as of this encounter: 57.9 kg (127 lb 9.6 oz). Body surface area is 1.61 meters squared.  No Pain (0) Comment: Data Unavailable   Patient's last menstrual period was 12/18/2014.  Allergies reviewed: Yes  Medications reviewed: Yes    Medications: Medication refills not needed today.  Pharmacy name entered into [employer]:    [facility] - [place], [place] - [address] AT [facility]  [id number] - [place], [place] - [address] [facility] [address]  [facility] - [place], [place] - [address] [facility] - [place], [place] - [address]    Clinical concerns: Pt here for 6 month return. Ana was notified.      Leandra Lizarraga CMA              "

## 2020-01-23 NOTE — PROGRESS NOTES
[facility] Progress Note  Jan 23, 2020     CC: Breast Cancer    Oncology History:  Josefina Bennett was self referred to our clinic for clinical trials recommendations for her newly diagnosed stage II breast cancer. She had her last mammogram approximately a year ago. She did notice in early January that she was having some distortion of the right breast, and she went to see her gynecologist and had a mammogram performed. She had a screening mammogram performed on 01/15/2015. Breast tissue was heterogeneously dense, which might compromise the mammography. There was architectural distortion in the right breast approximately 11-12 o'clock position, zone 2, posterior depth, and a CC MLO spot compression was performed and an ultrasound was planned. The diagnostic mammogram from 01/28/2015 showed right breast architectural distortion centered at the 12 o'clock position, zone 2, suspicious for neoplasm. Breast tomosynthesis was used in the interpretation. Ultrasound findings included targeted ultrasound of the right upper breast demonstrating a band-like area of abnormal echogenicity between 11 and 12 o'clock position in the right breast at zone 2. This measures 4 cm transversely x 1.1 cm AP, and there was only a 1.5 cm measurement in the radial direction. There was blood flow suspicious for neoplasm at the 11 o'clock position in zone 2. There is a 1.4 cm hypoechoic nodule slightly  from the larger area of altered echogenicity that is also suspicious. A biopsy was performed. The biopsy showed infiltrating lobular carcinoma, grade 2, and a clip was placed at specimen [id number]. There were a few signet cells present. The tumor was ER-positive, NM low positive, and HER2 was negative by immunohistochemistry. She subsequently underwent an MRI showing a tumor that was 4.8 x 1.7 x 3.2 cm in size. Overall, her clinical stage was T2 NX MX.      She is enrolled in the I-SPY-2 clinical trial, and qualified with  high-risk MammaPrint score. She was randomized to ganetespib and paclitaxel, and she was treated with 12 cycles of treatment, and then started on AC on 05/26/2015. She developed intractable nausea and vomiting after the first cycle, which required hospitalization on the second cycle. She then developed Pneumocystis pneumonia, which resulted in intubation and a 2-week hospitalization during the course of AC. She was discharged on 07/02/2015, and decided she did not want to pursue any further chemotherapy. She had a right lumpectomy and sentinel lymph node procedure 08/12/2015. She had a positive margin, underwent a re-excision, and had a positive margin. Ultimately, she underwent a right mastectomy with clear margins. She began radiation treatment with Dr. Bill Chauhan, and completed radiation therapy on 12/04/2015. Pathologic stage was III-A, ypT3 N1a MX. Of concern, she had what could be considered an RCB3 tumor.      TREATMENT HISTORY:  A.  Neoadjuvant therapy on I SPY-2 with ganetespib and paclitaxel.  AC x 2 complicated by PCP pneumonia.  B.  Right lumpectomy, followed by right margin resections, followed by rigth mastectomy.   B.  Oophorectomy 10-16-15.   C.  Tamoxifen 10-6-16.   D.  Radiation therapy. 5,040 cGy in 180 cGy/fraction to the chest wall and regional lymphatics followed by 720 cGy axillary lymph node boost and 1000 cGy mastectomy scar boost. Completed on 12/3/2015.  E.   Adjuvant letrozole begun 1-28-16.      Discussion with Dr. Ozuna.  Plan is to continue with MRI and mammogram spaced 6 months apart.       INTERVAL HISTORY:   Josefina Verma presents today unaccompanied.     Overall she has been feeling great.  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  She has been losing weight because she has been eating healthier and is so active at her work.  She has been eating the Mediterranean diet and has a good appetite.  She denies any new lumps or bumps.  She denies any pain.  She  denies any fevers, chills or night sweats.  She denies any arthralgias and her energy levels have been good.     REVIEW OF SYSTEMS:  A 10-point review of systems is negative.      PHYSICAL EXAMINATION:   VITAL SIGNS:  /78 (BP Location: Left arm, Patient Position: Sitting, Cuff Size: Adult Regular)   Pulse 109   Temp 99.2  F (37.3  C) (Oral)   Resp 16   Wt 57.9 kg (127 lb 9.6 oz)   LMP 12/18/2014   SpO2 99%   BMI 22.25 kg/m    GENERAL:  Josefina Verma appeared generally well.  She has no alopecia.   HEENT:  Oropharynx is without lesions.   LYMPH:  There is no palpable cervical, supraclavicular, subclavicular or axillary lymphadenopathy.   BREASTS:  Examination of the right anterior chest wall reveals no masses.  There is a well-healed mastectomy incision without erythema or masses.  Left breast is without masses.  No masses in the anterior chest wall bilaterally and the port incision in the left upper chest wall is well healed without erythema or masses.   LUNGS:  Clear to percussion and auscultation.   HEART:  Regular rate and rhythm, S1, S2.   ABDOMEN:  Soft and nontender, without hepatosplenomegaly.   EXTREMITIES:  Without edema.   PSYCHIATRIC:  Mood and affect were normal.      LABORATORY DATA:    1/23/2020 15:24   Sodium 142   Potassium 4.4   Chloride 109   Carbon Dioxide 29   Urea Nitrogen 20   Creatinine 0.90   GFR Estimate 72   GFR Estimate If Black 84   Calcium 9.6   Anion Gap 4   Albumin 3.8   Protein Total 6.9   Bilirubin Total 0.4   Alkaline Phosphatase 56   ALT 24   AST 16   Ferritin 42   Iron 59   Iron Binding Cap 311   Iron Saturation Index 19   Soluble Transferrin Receptor 3.3   Glucose 129 (H)   WBC 7.8   Hemoglobin 13.7   Hematocrit 42.4   Platelet Count 283   RBC Count 4.88   MCV 87   MCH 28.1   MCHC 32.3   RDW 15.3 (H)   Diff Method Automated Method   % Neutrophils 54.1   % Lymphocytes 34.9   % Monocytes 7.1   % Eosinophils 2.3   % Basophils 1.2   % Immature Granulocytes 0.4   Nucleated  RBCs 0   Absolute Neutrophil 4.2   Absolute Lymphocytes 2.7   Absolute Monocytes 0.6   Absolute Eosinophils 0.2   Absolute Basophils 0.1   Abs Immature Granulocytes 0.0   Absolute Nucleated RBC 0.0     IMAGING:  No new imaging       ASSESSMENT AND PLAN:   1.  Stage II right breast cancer, ER positive, CT positive, HER2 negative    -was on I-SPY 2 clinical trial and was randomized to ganetespib and paclitaxel, then went on to AC.  Second cycle of AC was complicated by Pneumocystis pneumonia requiring intubation and a 2-week hospitalization.  She did not complete the AC treatment.  She did undergo a right lumpectomy and sentinel node procedure.  She had a positive margin and underwent resection.  She still had a positive margin.  She underwent a right lumpectomy with clear margins.  She completed radiation in 12/2015.  Pathologic stage was IIIA  [id number] and of concern, she had an RCB3 histology meaning significant risk of recurrence of her breast cancer during the 5-year period following primary therapy.  There were 2 lymph nodes involved with mammary carcinoma with extracapsular extension.  She went on to have radiation therapy and then began hormonal therapy.     -she had an oophorectomy. She continues on letrozole, with a plan for adjuvant hormonal therapy continuing for a total of 10 years. Tolerating very well without side effects.   -will follow-up in 6 month with Dr. Meza and [facility]    Health Maintenance   -continue with regular exercise, at least 150 minutes per week. Continue eating a good, well balanced diet; currently on a Mediterranean diet     Iron Deficiency?  -Since his RDW was increased, Dr. Meza felt she was iron deficient, though iron was not tested at this time. She has been trying to increase her iron consumption. RDW is still elevated today though iron labs reveal good iron levels and normal hemoglobin. No changes needed at this time    Follow-up with Dr. Meza in 6 months with MRI of  Breast.      Ana Salcedo PA-C  [facility]  [address] [place], [place] [address] [phone]

## 2020-01-23 NOTE — LETTER
RE: Josefina Bennett  [address] [facility] [address]       [facility] Progress Note  Jan 23, 2020     CC: Breast Cancer    Oncology History:  Josefina Bennett was self referred to our clinic for clinical trials recommendations for her newly diagnosed stage II breast cancer. She had her last mammogram approximately a year ago. She did notice in early January that she was having some distortion of the right breast, and she went to see her gynecologist and had a mammogram performed. She had a screening mammogram performed on 01/15/2015. Breast tissue was heterogeneously dense, which might compromise the mammography. There was architectural distortion in the right breast approximately 11-12 o'clock position, zone 2, posterior depth, and a CC MLO spot compression was performed and an ultrasound was planned. The diagnostic mammogram from 01/28/2015 showed right breast architectural distortion centered at the 12 o'clock position, zone 2, suspicious for neoplasm. Breast tomosynthesis was used in the interpretation. Ultrasound findings included targeted ultrasound of the right upper breast demonstrating a band-like area of abnormal echogenicity between 11 and 12 o'clock position in the right breast at zone 2. This measures 4 cm transversely x 1.1 cm AP, and there was only a 1.5 cm measurement in the radial direction. There was blood flow suspicious for neoplasm at the 11 o'clock position in zone 2. There is a 1.4 cm hypoechoic nodule slightly  from the larger area of altered echogenicity that is also suspicious. A biopsy was performed. The biopsy showed infiltrating lobular carcinoma, grade 2, and a clip was placed at specimen [id number]. There were a few signet cells present. The tumor was ER-positive, FL low positive, and HER2 was negative by immunohistochemistry. She subsequently underwent an MRI showing a tumor that was 4.8 x 1.7 x 3.2 cm in size. Overall, her clinical stage was T2 NX MX.      She is  enrolled in the I-SPY-2 clinical trial, and qualified with high-risk MammaPrint score. She was randomized to ganetespib and paclitaxel, and she was treated with 12 cycles of treatment, and then started on AC on 05/26/2015. She developed intractable nausea and vomiting after the first cycle, which required hospitalization on the second cycle. She then developed Pneumocystis pneumonia, which resulted in intubation and a 2-week hospitalization during the course of AC. She was discharged on 07/02/2015, and decided she did not want to pursue any further chemotherapy. She had a right lumpectomy and sentinel lymph node procedure 08/12/2015. She had a positive margin, underwent a re-excision, and had a positive margin. Ultimately, she underwent a right mastectomy with clear margins. She began radiation treatment with Dr. Bill Chauhan, and completed radiation therapy on 12/04/2015. Pathologic stage was III-A, ypT3 N1a MX. Of concern, she had what could be considered an RCB3 tumor.      TREATMENT HISTORY:  A.  Neoadjuvant therapy on I SPY-2 with ganetespib and paclitaxel.  AC x 2 complicated by PCP pneumonia.  B.  Right lumpectomy, followed by right margin resections, followed by rigth mastectomy.   B.  Oophorectomy 10-16-15.   C.  Tamoxifen 10-6-16.   D.  Radiation therapy. 5,040 cGy in 180 cGy/fraction to the chest wall and regional lymphatics followed by 720 cGy axillary lymph node boost and 1000 cGy mastectomy scar boost. Completed on 12/3/2015.  E.   Adjuvant letrozole begun 1-28-16.      Discussion with Dr. Ozuna.  Plan is to continue with MRI and mammogram spaced 6 months apart.       INTERVAL HISTORY:   Josefina Verma presents today unaccompanied.     Overall she has been feeling great.  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  She has been losing weight because she has been eating healthier and is so active at her work.  She has been eating the Mediterranean diet and has a good appetite.  She  denies any new lumps or bumps.  She denies any pain.  She denies any fevers, chills or night sweats.  She denies any arthralgias and her energy levels have been good.     REVIEW OF SYSTEMS:  A 10-point review of systems is negative.      PHYSICAL EXAMINATION:   VITAL SIGNS:  /78 (BP Location: Left arm, Patient Position: Sitting, Cuff Size: Adult Regular)   Pulse 109   Temp 99.2  F (37.3  C) (Oral)   Resp 16   Wt 57.9 kg (127 lb 9.6 oz)   LMP 12/18/2014   SpO2 99%   BMI 22.25 kg/m     GENERAL:  Josefina Verma appeared generally well.  She has no alopecia.   HEENT:  Oropharynx is without lesions.   LYMPH:  There is no palpable cervical, supraclavicular, subclavicular or axillary lymphadenopathy.   BREASTS:  Examination of the right anterior chest wall reveals no masses.  There is a well-healed mastectomy incision without erythema or masses.  Left breast is without masses.  No masses in the anterior chest wall bilaterally and the port incision in the left upper chest wall is well healed without erythema or masses.   LUNGS:  Clear to percussion and auscultation.   HEART:  Regular rate and rhythm, S1, S2.   ABDOMEN:  Soft and nontender, without hepatosplenomegaly.   EXTREMITIES:  Without edema.   PSYCHIATRIC:  Mood and affect were normal.      LABORATORY DATA:    1/23/2020 15:24   Sodium 142   Potassium 4.4   Chloride 109   Carbon Dioxide 29   Urea Nitrogen 20   Creatinine 0.90   GFR Estimate 72   GFR Estimate If Black 84   Calcium 9.6   Anion Gap 4   Albumin 3.8   Protein Total 6.9   Bilirubin Total 0.4   Alkaline Phosphatase 56   ALT 24   AST 16   Ferritin 42   Iron 59   Iron Binding Cap 311   Iron Saturation Index 19   Soluble Transferrin Receptor 3.3   Glucose 129 (H)   WBC 7.8   Hemoglobin 13.7   Hematocrit 42.4   Platelet Count 283   RBC Count 4.88   MCV 87   MCH 28.1   MCHC 32.3   RDW 15.3 (H)   Diff Method Automated Method   % Neutrophils 54.1   % Lymphocytes 34.9   % Monocytes 7.1   % Eosinophils 2.3   %  Basophils 1.2   % Immature Granulocytes 0.4   Nucleated RBCs 0   Absolute Neutrophil 4.2   Absolute Lymphocytes 2.7   Absolute Monocytes 0.6   Absolute Eosinophils 0.2   Absolute Basophils 0.1   Abs Immature Granulocytes 0.0   Absolute Nucleated RBC 0.0     IMAGING:  No new imaging       ASSESSMENT AND PLAN:   1.  Stage II right breast cancer, ER positive, KY positive, HER2 negative    -was on I-SPY 2 clinical trial and was randomized to ganetespib and paclitaxel, then went on to AC.  Second cycle of AC was complicated by Pneumocystis pneumonia requiring intubation and a 2-week hospitalization.  She did not complete the AC treatment.  She did undergo a right lumpectomy and sentinel node procedure.  She had a positive margin and underwent resection.  She still had a positive margin.  She underwent a right lumpectomy with clear margins.  She completed radiation in 12/2015.  Pathologic stage was IIIA  [id number] and of concern, she had an RCB3 histology meaning significant risk of recurrence of her breast cancer during the 5-year period following primary therapy.  There were 2 lymph nodes involved with mammary carcinoma with extracapsular extension.  She went on to have radiation therapy and then began hormonal therapy.     -she had an oophorectomy. She continues on letrozole, with a plan for adjuvant hormonal therapy continuing for a total of 10 years. Tolerating very well without side effects.   -will follow-up in 6 month with Dr. Meza and [facility]    Health Maintenance   -continue with regular exercise, at least 150 minutes per week. Continue eating a good, well balanced diet; currently on a Mediterranean diet     Iron Deficiency?  -Since his RDW was increased, Dr. Meza felt she was iron deficient, though iron was not tested at this time. She has been trying to increase her iron consumption. RDW is still elevated today though iron labs reveal good iron levels and normal hemoglobin. No changes needed at this  time    Follow-up with Dr. Meza in 6 months with MRI of Breast.      Ana Salcedo PA-C  [facility]  [address] [place], [place] [id number] [phone]

## 2020-01-23 NOTE — NURSING NOTE
Chief Complaint   Patient presents with     Oncology Clinic Visit     Return - Malignant neoplasm of upper-outer quadrant of right breast      Blood Draw     labs drawn via vpt by rn. vs taken      Blood drawn via vpt by RN in lab. VS taken. Pt checked into next appointment.   Myah Clarke RN

## 2020-01-25 LAB — STFR SERPL-SCNC: 3.3 MG/L (ref 1.9–4.4)

## 2020-07-19 NOTE — PROGRESS NOTES
"Josefina Bennett is a 54 year old female who is being evaluated via a billable video visit.      The patient has been notified of following:     \"This video visit will be conducted via a call between you and your physician/provider. We have found that certain health care needs can be provided without the need for an in-person physical exam.  This service lets us provide the care you need with a video conversation.  If a prescription is necessary we can send it directly to your pharmacy.  If lab work is needed we can place an order for that and you can then stop by our lab to have the test done at a later time.    Video visits are billed at different rates depending on your insurance coverage.  Please reach out to your insurance provider with any questions.    If during the course of the call the physician/provider feels a video visit is not appropriate, you will not be charged for this service.\"    Patient has given verbal consent for Video visit? Yes  How would you like to obtain your AVS? MyChart  If you are dropped from the video visit, the video invite should be resent to: Text to cell phone: [phone]   Will anyone else be joining your video visit? No       Vitals - Patient Reported  Weight (Patient Reported): 57.6 kg (127 lb)  Height (Patient Reported): 161.3 cm (5' 3.5\")  BMI (Based on Pt Reported Ht/Wt): 22.14  Pain Score: No Pain (0)    I have reviewed and updated the patient's allergies and medication list.    Concerns: No concerns  Refills: No refills needed.      Lexus Connors CMA      Video-Visit Details    Type of service:  Video Visit    Video Start Time: 2:08 PM  Video End Time: 2:18 PM    Originating Location (pt. Location): Home    Distant Location (provider location):  [facility]     Platform used for Video Visit: [employer]    Charlette Lee MD/PhD        REASON FOR VISIT: Follow-up breast cancer    HISTORY OF PRESENT ILLNESS:  Josefina Bennett was self referred to our " clinic for clinical trial recommendations for newly diagnosed stage II breast cancer. She had her last mammogram approximately a year prior. She did notice in early January 2015 that she was having some distortion of the right breast, and she went to see her gynecologist and had a mammogram performed. She had a screening mammogram performed on 01/15/2015. Breast tissue was heterogeneously dense, which might compromise the mammography. There was architectural distortion in the right breast approximately 11-12 o'clock position, zone 2, posterior depth, and a CC MLO spot compression was performed and an ultrasound was planned. The diagnostic mammogram from 01/28/2015 showed right breast architectural distortion centered at the 12 o'clock position, zone 2, suspicious for neoplasm. Breast tomosynthesis was used in the interpretation. Ultrasound findings included targeted ultrasound of the right upper breast demonstrating a band-like area of abnormal echogenicity between 11 and 12 o'clock position in the right breast at zone 2. This measures 4 cm transversely x 1.1 cm AP, and there was only a 1.5 cm measurement in the radial direction. There was blood flow suspicious for neoplasm at the 11 o'clock position in zone 2. There is a 1.4 cm hypoechoic nodule slightly  from the larger area of altered echogenicity that is also suspicious. A biopsy was performed. The biopsy showed infiltrating lobular carcinoma, grade 2, and a clip was placed at specimen [id number]. There were a few signet cells present. The tumor was ER-positive, RI low positive, and HER2 was negative by immunohistochemistry. She subsequently underwent an MRI showing a tumor that was 4.8 x 1.7 x 3.2 cm in size. Overall, her clinical stage was T2 NX MX.       She was enrolled in the I-SPY-2 clinical trial, and qualified with high-risk MammaPrint score. She was randomized to ganetespib and paclitaxel, and she was treated with 12 cycles of treatment, and then  started on AC on 05/26/2015. She developed intractable nausea and vomiting after the first cycle, which required hospitalization on the second cycle. She then developed Pneumocystis pneumonia, which resulted in intubation and a 2-week hospitalization during the course of AC. She was discharged on 07/02/2015, and decided she did not want to pursue any further chemotherapy. She had a right lumpectomy and sentinel lymph node procedure 08/12/2015. She had a positive margin, underwent a re-excision, and had a positive margin. Ultimately, she underwent a right mastectomy with clear margins. She began radiation treatment with Dr. Bill Chauhan, and completed radiation therapy on 12/04/2015. Pathologic stage was III-A, ypT3 N1a MX. Of concern, she had what could be considered an RCB3 tumor.  She started adjuvant letrozole on 1/28/16.     TREATMENT HISTORY:  A.  Neoadjuvant therapy on I SPY-2 with ganetespib and paclitaxel.  AC x 2 complicated by PCP pneumonia.  B.  Right lumpectomy, followed by right margin resections, followed by rigth mastectomy.   B.  Oophorectomy 10-16-15.   C.  Tamoxifen 10-6-16.   D.  Radiation therapy. 5,040 cGy in 180 cGy/fraction to the chest wall and regional lymphatics followed by 720 cGy axillary lymph node boost and 1000 cGy mastectomy scar boost. Completed on 12/3/2015.  E.   Adjuvant letrozole begun 1-28-16.  Continue through 2026.     Discussion with Dr. Ozuna.  Plan is to continue with MRI and mammogram spaced 6 months apart.       INTERVAL HISTORY:    Mary was evaluated today via scheduled video visit in lieu of an in person visit due to the ongoing COVID-19 pandemic. 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 She is doing her best to wear a mask, wash hands, and stay as safe as possible. She is feeling well overall. No specific concerns today. She continues to take letrozole and tolerates this very well  overall. The only side effect that she's ever noticed is acne, and this continues to be a problem from time to time. No notable hot flashes, mood swings, muscle stiffness or bone pain. No recent fevers or chills. Mood and energy level are good. She stays very active with work.     REVIEW OF SYSTEMS:    The remainder of a comprehensive ROS was performed with the patient and found to be negative or non-contributory with the exception of that noted above.     PHYSICAL EXAMINATION:   Alert and well appearing. Seated in chair in no acute distress.  No conversational dyspnea. Normal respiratory effort.  No rashes or lesions on visualized skin.  No focal neurological deficits.  * The remainder of the physical exam was deferred due to video visit restrictions.     LABORATORY DATA:    - Prior labs show CBC and CMP were within normal limits, except for a consistently elevated RDW without anemia. Iron panel was WNL in January 2020.      IMAGING:    - Last mammogram was one year ago and was BIRADS-1.  She has cancelled the previously scheduled mammogram due to several coworkers with COVID-19 and will reschedule.      ASSESSMENT AND PLAN:   1.  Mary Bennett is a 54-year-old woman with a history of a clinical stage II right breast cancer, ER positive, IN positive, HER2 negative by immunohistochemistry.  She had a lobular histology.  She was on the I-SPY 2 clinical trial and was randomized to ganetespib and paclitaxel, then went on to AC.  She had intractable nausea and vomiting the first cycle of AC, and the second cycle of AC was complicated by Pneumocystis pneumonia requiring intubation and a 2-week hospitalization.  She did not complete the AC treatment.  She did undergo a right lumpectomy and sentinel node procedure.  She had a positive margin and underwent resection.  She still had a positive margin.  She underwent a right lumpectomy with clear margins.  She completed radiation in 12/2015.  Pathologic stage was IIIA   [id number] and of concern, she had an RCB3 histology meaning significant risk of recurrence of her breast cancer during the 5-year period following primary therapy.  There were 2 lymph nodes involved with mammary carcinoma with extracapsular extension.  She went on to have radiation therapy and then began hormonal therapy with tamoxifen in October 2015.   2.  Mary had oophorectomy and started letrozole in January 2016.  She continues on letrozole for adjuvant hormonal therapy with the plan of continuing for a total of 10 years.   3.  Imaging follow up.  Reviewed with radiology.  Plan is yearly mammography.   4.  We discussed exercise and Mary is exercising 150 minutes per week.   5.  Mary has been eating a healthful Mediterranean style diet.  She is continuing with calcium, vitamin D and drinks no alcohol.   6.  Followup.  Due for imaging and labs now, which she will reschedule when she feels safe from a COVID-19 perspective. Follow-up in clinic in 6 months.   Follow up with EDIL visit 1-19-21 with CBC, CMP. Follow up with me 7-20-21 with CBC, CMP.    Patient was seen and plan was discussed with Dr. Evangelista Meza.    Charlette Lee MD/PhD  Heme/Onc Fellow       Thank you for allowing us to participate in this patient's care.  The patient was seen and evaluated by me.  I discussed the patient with the fellow and agree with the findings and plan in the note, which was edited by me.    Sincerely,     Evangelista Meza MD  Professor  [facility]  [phone].             I spent 10 minutes with the patient more than 50% of which was in counseling and coordination of care.

## 2020-07-21 ENCOUNTER — VIRTUAL VISIT (OUTPATIENT)
Dept: ONCOLOGY | Facility: CLINIC | Age: 55
End: 2020-07-21
Attending: INTERNAL MEDICINE
Payer: COMMERCIAL

## 2020-07-21 DIAGNOSIS — Z17.0 MALIGNANT NEOPLASM OF UPPER-OUTER QUADRANT OF RIGHT BREAST IN FEMALE, ESTROGEN RECEPTOR POSITIVE (H): Primary | ICD-10-CM

## 2020-07-21 DIAGNOSIS — C50.411 MALIGNANT NEOPLASM OF UPPER-OUTER QUADRANT OF RIGHT BREAST IN FEMALE, ESTROGEN RECEPTOR POSITIVE (H): Primary | ICD-10-CM

## 2020-07-21 PROCEDURE — 99213 OFFICE O/P EST LOW 20 MIN: CPT | Mod: 95 | Performed by: INTERNAL MEDICINE

## 2020-07-21 PROCEDURE — 40001009 ZZH VIDEO/TELEPHONE VISIT; NO CHARGE

## 2020-07-21 NOTE — LETTER
"    7/21/2020         RE: Josefina Bennett  [address] [facility] [address]        Dear Colleague,    Thank you for referring your patient, Josefina Bennett, to the [facility]. Please see a copy of my visit note below.    Josefina Bennett is a 54 year old female who is being evaluated via a billable video visit.      Vitals - Patient Reported  Weight (Patient Reported): 57.6 kg (127 lb)  Height (Patient Reported): 161.3 cm (5' 3.5\")  BMI (Based on Pt Reported Ht/Wt): 22.14  Pain Score: No Pain (0)    I have reviewed and updated the patient's allergies and medication list.    Concerns: No concerns  Refills: No refills needed.      Lexus Connors CMA      Video-Visit Details    Type of service:  Video Visit    Video Start Time: 2:08 PM  Video End Time: 2:18 PM    Originating Location (pt. Location): Home    Distant Location (provider location):  [facility]     Platform used for Video Visit: [employer]    Charlette Lee MD/PhD        REASON FOR VISIT: Follow-up breast cancer    HISTORY OF PRESENT ILLNESS:  Josefina Bennett was self referred to our clinic for clinical trial recommendations for newly diagnosed stage II breast cancer. She had her last mammogram approximately a year prior. She did notice in early January 2015 that she was having some distortion of the right breast, and she went to see her gynecologist and had a mammogram performed. She had a screening mammogram performed on 01/15/2015. Breast tissue was heterogeneously dense, which might compromise the mammography. There was architectural distortion in the right breast approximately 11-12 o'clock position, zone 2, posterior depth, and a CC MLO spot compression was performed and an ultrasound was planned. The diagnostic mammogram from 01/28/2015 showed right breast architectural distortion centered at the 12 o'clock position, zone 2, suspicious for neoplasm. Breast tomosynthesis was used in the interpretation. " Ultrasound findings included targeted ultrasound of the right upper breast demonstrating a band-like area of abnormal echogenicity between 11 and 12 o'clock position in the right breast at zone 2. This measures 4 cm transversely x 1.1 cm AP, and there was only a 1.5 cm measurement in the radial direction. There was blood flow suspicious for neoplasm at the 11 o'clock position in zone 2. There is a 1.4 cm hypoechoic nodule slightly  from the larger area of altered echogenicity that is also suspicious. A biopsy was performed. The biopsy showed infiltrating lobular carcinoma, grade 2, and a clip was placed at specimen [id number]. There were a few signet cells present. The tumor was ER-positive, UT low positive, and HER2 was negative by immunohistochemistry. She subsequently underwent an MRI showing a tumor that was 4.8 x 1.7 x 3.2 cm in size. Overall, her clinical stage was T2 NX MX.       She was enrolled in the I-SPY-2 clinical trial, and qualified with high-risk MammaPrint score. She was randomized to ganetespib and paclitaxel, and she was treated with 12 cycles of treatment, and then started on AC on 05/26/2015. She developed intractable nausea and vomiting after the first cycle, which required hospitalization on the second cycle. She then developed Pneumocystis pneumonia, which resulted in intubation and a 2-week hospitalization during the course of AC. She was discharged on 07/02/2015, and decided she did not want to pursue any further chemotherapy. She had a right lumpectomy and sentinel lymph node procedure 08/12/2015. She had a positive margin, underwent a re-excision, and had a positive margin. Ultimately, she underwent a right mastectomy with clear margins. She began radiation treatment with Dr. Bill Chauhan, and completed radiation therapy on 12/04/2015. Pathologic stage was III-A, ypT3 N1a MX. Of concern, she had what could be considered an RCB3 tumor.  She started adjuvant letrozole on  1/28/16.     TREATMENT HISTORY:  A.  Neoadjuvant therapy on I SPY-2 with ganetespib and paclitaxel.  AC x 2 complicated by PCP pneumonia.  B.  Right lumpectomy, followed by right margin resections, followed by rigth mastectomy.   B.  Oophorectomy 10-16-15.   C.  Tamoxifen 10-6-16.   D.  Radiation therapy. 5,040 cGy in 180 cGy/fraction to the chest wall and regional lymphatics followed by 720 cGy axillary lymph node boost and 1000 cGy mastectomy scar boost. Completed on 12/3/2015.  E.   Adjuvant letrozole begun 1-28-16.  Continue through 2026.     Discussion with Dr. Ozuna.  Plan is to continue with MRI and mammogram spaced 6 months apart.       INTERVAL HISTORY:    Mary was evaluated today via scheduled video visit in lieu of an in person visit due to the ongoing COVID-19 pandemic. 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 She is doing her best to wear a mask, wash hands, and stay as safe as possible. She is feeling well overall. No specific concerns today. She continues to take letrozole and tolerates this very well overall. The only side effect that she's ever noticed is acne, and this continues to be a problem from time to time. No notable hot flashes, mood swings, muscle stiffness or bone pain. No recent fevers or chills. Mood and energy level are good. She stays very active with work.     REVIEW OF SYSTEMS:    The remainder of a comprehensive ROS was performed with the patient and found to be negative or non-contributory with the exception of that noted above.     PHYSICAL EXAMINATION:   Alert and well appearing. Seated in chair in no acute distress.  No conversational dyspnea. Normal respiratory effort.  No rashes or lesions on visualized skin.  No focal neurological deficits.  * The remainder of the physical exam was deferred due to video visit restrictions.     LABORATORY DATA:    - Prior labs show CBC and CMP were within  normal limits, except for a consistently elevated RDW without anemia. Iron panel was WNL in January 2020.      IMAGING:    - Last mammogram was one year ago and was BIRADS-1.  She has cancelled the previously scheduled mammogram due to several coworkers with COVID-19 and will reschedule.      ASSESSMENT AND PLAN:   1.  Mary Bennett is a 54-year-old woman with a history of a clinical stage II right breast cancer, ER positive, NH positive, HER2 negative by immunohistochemistry.  She had a lobular histology.  She was on the I-SPY 2 clinical trial and was randomized to ganetespib and paclitaxel, then went on to AC.  She had intractable nausea and vomiting the first cycle of AC, and the second cycle of AC was complicated by Pneumocystis pneumonia requiring intubation and a 2-week hospitalization.  She did not complete the AC treatment.  She did undergo a right lumpectomy and sentinel node procedure.  She had a positive margin and underwent resection.  She still had a positive margin.  She underwent a right lumpectomy with clear margins.  She completed radiation in 12/2015.  Pathologic stage was IIIA  [id number] and of concern, she had an RCB3 histology meaning significant risk of recurrence of her breast cancer during the 5-year period following primary therapy.  There were 2 lymph nodes involved with mammary carcinoma with extracapsular extension.  She went on to have radiation therapy and then began hormonal therapy with tamoxifen in October 2015.   2.  Mary had oophorectomy and started letrozole in January 2016.  She continues on letrozole for adjuvant hormonal therapy with the plan of continuing for a total of 10 years.   3.  Imaging follow up.  Reviewed with radiology.  Plan is yearly mammography.   4.  We discussed exercise and Mary is exercising 150 minutes per week.   5.  Mary has been eating a healthful Mediterranean style diet.  She is continuing with calcium, vitamin D and drinks no alcohol.   6.   Followup.  Due for imaging and labs now, which she will reschedule when she feels safe from a COVID-19 perspective. Follow-up in clinic in 6 months.   Follow up with EDIL visit 1-19-21 with CBC, CMP. Follow up with me 7-20-21 with CBC, CMP.    Patient was seen and plan was discussed with Dr. Evangelista Meza.    Charlette Lee MD/PhD  Heme/Onc Fellow       Thank you for allowing us to participate in this patient's care.  The patient was seen and evaluated by me.  I discussed the patient with the fellow and agree with the findings and plan in the note, which was edited by me.    Sincerely,     Evangelista Meza MD  Professor  [facility]  [phone].             I spent 10 minutes with the patient more than 50% of which was in counseling and coordination of care.     Again, thank you for allowing me to participate in the care of your patient.        Sincerely,        Evangelista Meza MD

## 2020-09-16 DIAGNOSIS — C50.411 MALIGNANT NEOPLASM OF UPPER-OUTER QUADRANT OF RIGHT FEMALE BREAST, UNSPECIFIED ESTROGEN RECEPTOR STATUS (H): ICD-10-CM

## 2020-09-17 RX ORDER — LETROZOLE 2.5 MG/1
TABLET, FILM COATED ORAL
Qty: 90 TABLET | Refills: 3 | Status: SHIPPED | OUTPATIENT
Start: 2020-09-17 | End: 2021-09-07

## 2020-11-14 ENCOUNTER — HEALTH MAINTENANCE LETTER (OUTPATIENT)
Age: 55
End: 2020-11-14

## 2021-01-18 NOTE — PROGRESS NOTES
"Mary is a 55 year old who is being evaluated via a billable video visit.      How would you like to obtain your AVS? MyChart  If the video visit is dropped, the invitation should be resent by: Text to cell phone: [phone]  Will anyone else be joining your video visit? No     Vitals - Patient Reported  Weight (Patient Reported): 59 kg (130 lb)  Height (Patient Reported): 162.6 cm (5' 4\")  BMI (Based on Pt Reported Ht/Wt): 22.31  Pain Score: No Pain (0)    Brittani CONTRERAS        Video Start Time: 8:32 AM    Video-Visit Details    Type of service:  Video Visit    Video End Time:8:49 AM    Originating Location (pt. Location): Home    Distant Location (provider location):  [facility] CANCER [facility]     Platform used for Video Visit: Well         [facility] Progress Note  Jan 19, 2021     CC: Breast Cancer    Oncology History:  Josefina Bennett was self referred to our clinic for clinical trials recommendations for her newly diagnosed stage II breast cancer. She had her last mammogram approximately a year ago. She did notice in early January that she was having some distortion of the right breast, and she went to see her gynecologist and had a mammogram performed. She had a screening mammogram performed on 01/15/2015. Breast tissue was heterogeneously dense, which might compromise the mammography. There was architectural distortion in the right breast approximately 11-12 o'clock position, zone 2, posterior depth, and a CC MLO spot compression was performed and an ultrasound was planned. The diagnostic mammogram from 01/28/2015 showed right breast architectural distortion centered at the 12 o'clock position, zone 2, suspicious for neoplasm. Breast tomosynthesis was used in the interpretation. Ultrasound findings included targeted ultrasound of the right upper breast demonstrating a band-like area of abnormal echogenicity between 11 and 12 o'clock position in the right breast at zone 2. This " measures 4 cm transversely x 1.1 cm AP, and there was only a 1.5 cm measurement in the radial direction. There was blood flow suspicious for neoplasm at the 11 o'clock position in zone 2. There is a 1.4 cm hypoechoic nodule slightly  from the larger area of altered echogenicity that is also suspicious. A biopsy was performed. The biopsy showed infiltrating lobular carcinoma, grade 2, and a clip was placed at specimen [id number]. There were a few signet cells present. The tumor was ER-positive, [place] low positive, and HER2 was negative by immunohistochemistry. She subsequently underwent an MRI showing a tumor that was 4.8 x 1.7 x 3.2 cm in size. Overall, her clinical stage was T2 NX MX.      She is enrolled in the I-SPY-2 clinical trial, and qualified with high-risk MammaPrint score. She was randomized to ganetespib and paclitaxel, and she was treated with 12 cycles of treatment, and then started on AC on 05/26/2015. She developed intractable nausea and vomiting after the first cycle, which required hospitalization on the second cycle. She then developed Pneumocystis pneumonia, which resulted in intubation and a 2-week hospitalization during the course of AC. She was discharged on 07/02/2015, and decided she did not want to pursue any further chemotherapy. She had a right lumpectomy and sentinel lymph node procedure 08/12/2015. She had a positive margin, underwent a re-excision, and had a positive margin. Ultimately, she underwent a right mastectomy with clear margins. She began radiation treatment with Dr. Bill Chauhan, and completed radiation therapy on 12/04/2015. Pathologic stage was III-A, ypT3 N1a MX. Of concern, she had what could be considered an RCB3 tumor.      TREATMENT HISTORY:  A.  Neoadjuvant therapy on I SPY-2 with ganetespib and paclitaxel.  AC x 2 complicated by PCP pneumonia.  B.  Right lumpectomy, followed by right margin resections, followed by rigth mastectomy.   B.  Oophorectomy 10-16-15.  "  C.  Tamoxifen 10-6-16.   D.  Radiation therapy. 5,040 cGy in 180 cGy/fraction to the chest wall and regional lymphatics followed by 720 cGy axillary lymph node boost and 1000 cGy mastectomy scar boost. Completed on 12/3/2015.  E.   Adjuvant letrozole begun 1-28-16.          INTERVAL HISTORY: Josefina Verma is feeling okay today. 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 She is able to socially distance and wear a mask and face shield and has not had any concerning symptoms for COVID. She has had difficulty sleeping since a few months into the pandemic from stress. She has tried melatonin, meditation, sleep apps, night time teas, lavender eye mask--all without improvement. She would like to try a sleep aide.     She continues on letrozole and is tolerating this well. No side effects. No breast concerns or lumps/bumps. She is very active at work and is \"running\" the whole day. Stamina is good with this. Eating and drinking well. No new or different pain in the body.      REVIEW OF SYSTEMS:  A 10-point review of systems is negative.      PHYSICAL EXAMINATION:   VITAL SIGNS:  [facility] 12/18/2014   Video physical exam  General: Patient appears well in no acute distress.   Skin: No visualized rash or lesions on visualized skin  Eyes: EOMI, no erythema, sclera icterus or discharge noted  Resp: Appears to be breathing comfortably without accessory muscle usage, speaking in full sentences, no cough  MSK: Appears to have normal range of motion based on visualized movements  Neurologic: No apparent tremors, facial movements symmetric  Psych: affect bright, alert and oriented    The rest of a comprehensive physical examination is deferred due to PHE (public health emergency) video restrictions         LABORATORY DATA: Nothing new. Last year reviewed.     IMAGING:  No new imaging       ASSESSMENT AND PLAN:   1.  Stage II right breast cancer, ER positive, TX positive, HER2 " negative    -was on I-SPY 2 clinical trial and was randomized to ganetespib and paclitaxel, then went on to AC.  Second cycle of AC was complicated by Pneumocystis pneumonia requiring intubation and a 2-week hospitalization.  She did not complete the AC treatment.  She did undergo a right lumpectomy and sentinel node procedure.  She had a positive margin and underwent resection.  She still had a positive margin.  She underwent a right lumpectomy with clear margins.  She completed radiation in 12/2015.  Pathologic stage was IIIA  [id number] and of concern, she had an RCB3 histology meaning significant risk of recurrence of her breast cancer during the 5-year period following primary therapy.  There were 2 lymph nodes involved with mammary carcinoma with extracapsular extension.  She went on to have radiation therapy and then began hormonal therapy.     -she had an oophorectomy. She continues on letrozole, with a plan for adjuvant hormonal therapy continuing for a total of 10 years. Tolerating very well without side effects.   -will follow-up in 6 month with Dr. Meza and mammogram and labs     Health Maintenance   -continue with regular exercise, at least 150 minutes per week. Continue eating a good, well balanced diet; currently on a Mediterranean diet     Insomnia secondary to stress  -She has optimized sleep hygiene without relief. She was on melatonin for over 3 years. Will try this again, 7.5 mg q hs. She will let me know if not effective and then can consider trazodone.     Larisa Ahuja PA-C

## 2021-01-19 ENCOUNTER — VIRTUAL VISIT (OUTPATIENT)
Dept: ONCOLOGY | Facility: CLINIC | Age: 56
End: 2021-01-19
Attending: INTERNAL MEDICINE
Payer: COMMERCIAL

## 2021-01-19 DIAGNOSIS — F43.21 ADJUSTMENT DISORDER WITH DEPRESSED MOOD: ICD-10-CM

## 2021-01-19 DIAGNOSIS — C50.411 MALIGNANT NEOPLASM OF UPPER-OUTER QUADRANT OF RIGHT FEMALE BREAST (H): Primary | ICD-10-CM

## 2021-01-19 DIAGNOSIS — G47.00 INSOMNIA, UNSPECIFIED TYPE: ICD-10-CM

## 2021-01-19 DIAGNOSIS — Z12.31 VISIT FOR SCREENING MAMMOGRAM: ICD-10-CM

## 2021-01-19 PROCEDURE — 999N001193 HC VIDEO/TELEPHONE VISIT; NO CHARGE

## 2021-01-19 PROCEDURE — 99214 OFFICE O/P EST MOD 30 MIN: CPT | Mod: GT | Performed by: PHYSICIAN ASSISTANT

## 2021-01-19 RX ORDER — VENLAFAXINE HYDROCHLORIDE 225 MG/1
225 TABLET, EXTENDED RELEASE ORAL DAILY
Qty: 90 TABLET | Refills: 3 | Status: SHIPPED | OUTPATIENT
Start: 2021-01-19 | End: 2022-01-01 | Stop reason: ALTCHOICE

## 2021-01-19 RX ORDER — MIRTAZAPINE 7.5 MG/1
7.5 TABLET, FILM COATED ORAL AT BEDTIME
Qty: 90 TABLET | Refills: 3 | Status: SHIPPED | OUTPATIENT
Start: 2021-01-19 | End: 2022-02-21

## 2021-01-19 NOTE — LETTER
"    1/19/2021         RE: Josefina Bennett  [address]  [facility] [address]        Dear Colleague,    Thank you for referring your patient, Josefina Bennett, to the [facility] CANCER [facility]. Please see a copy of my visit note below.    Mary is a 55 year old who is being evaluated via a billable video visit.      How would you like to obtain your AVS? MyChart  If the video visit is dropped, the invitation should be resent by: Text to cell phone: [phone]  Will anyone else be joining your video visit? No     Vitals - Patient Reported  Weight (Patient Reported): 59 kg (130 lb)  Height (Patient Reported): 162.6 cm (5' 4\")  BMI (Based on Pt Reported Ht/Wt): 22.31  Pain Score: No Pain (0)    Brittani CONTRERAS        Video Start Time: 8:32 AM    Video-Visit Details    Type of service:  Video Visit    Video End Time:8:49 AM    Originating Location (pt. Location): Home    Distant Location (provider location):  [facility] CANCER [facility]     Platform used for Video Visit: [facility] Progress Note  Jan 19, 2021     CC: Breast Cancer    Oncology History:  Josefina Bennett was self referred to our clinic for clinical trials recommendations for her newly diagnosed stage II breast cancer. She had her last mammogram approximately a year ago. She did notice in early January that she was having some distortion of the right breast, and she went to see her gynecologist and had a mammogram performed. She had a screening mammogram performed on 01/15/2015. Breast tissue was heterogeneously dense, which might compromise the mammography. There was architectural distortion in the right breast approximately 11-12 o'clock position, zone 2, posterior depth, and a CC MLO spot compression was performed and an ultrasound was planned. The diagnostic mammogram from 01/28/2015 showed right breast architectural distortion centered at the 12 o'clock position, zone 2, suspicious for neoplasm. " Breast tomosynthesis was used in the interpretation. Ultrasound findings included targeted ultrasound of the right upper breast demonstrating a band-like area of abnormal echogenicity between 11 and 12 o'clock position in the right breast at zone 2. This measures 4 cm transversely x 1.1 cm AP, and there was only a 1.5 cm measurement in the radial direction. There was blood flow suspicious for neoplasm at the 11 o'clock position in zone 2. There is a 1.4 cm hypoechoic nodule slightly  from the larger area of altered echogenicity that is also suspicious. A biopsy was performed. The biopsy showed infiltrating lobular carcinoma, grade 2, and a clip was placed at specimen [id number]. There were a few signet cells present. The tumor was ER-positive, [place] low positive, and HER2 was negative by immunohistochemistry. She subsequently underwent an MRI showing a tumor that was 4.8 x 1.7 x 3.2 cm in size. Overall, her clinical stage was T2 NX MX.      She is enrolled in the I-SPY-2 clinical trial, and qualified with high-risk MammaPrint score. She was randomized to ganetespib and paclitaxel, and she was treated with 12 cycles of treatment, and then started on AC on 05/26/2015. She developed intractable nausea and vomiting after the first cycle, which required hospitalization on the second cycle. She then developed Pneumocystis pneumonia, which resulted in intubation and a 2-week hospitalization during the course of AC. She was discharged on 07/02/2015, and decided she did not want to pursue any further chemotherapy. She had a right lumpectomy and sentinel lymph node procedure 08/12/2015. She had a positive margin, underwent a re-excision, and had a positive margin. Ultimately, she underwent a right mastectomy with clear margins. She began radiation treatment with Dr. Bill Chauhan, and completed radiation therapy on 12/04/2015. Pathologic stage was III-A, ypT3 N1a MX. Of concern, she had what could be considered an RCB3  "tumor.      TREATMENT HISTORY:  A.  Neoadjuvant therapy on I SPY-2 with ganetespib and paclitaxel.  AC x 2 complicated by PCP pneumonia.  B.  Right lumpectomy, followed by right margin resections, followed by rigth mastectomy.   B.  Oophorectomy 10-16-15.   C.  Tamoxifen 10-6-16.   D.  Radiation therapy. 5,040 cGy in 180 cGy/fraction to the chest wall and regional lymphatics followed by 720 cGy axillary lymph node boost and 1000 cGy mastectomy scar boost. Completed on 12/3/2015.  E.   Adjuvant letrozole begun 1-28-16.          INTERVAL HISTORY: Josefina Verma is feeling okay today. 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 She is able to socially distance and wear a mask and face shield and has not had any concerning symptoms for COVID. She has had difficulty sleeping since a few months into the pandemic from stress. She has tried melatonin, meditation, sleep apps, night time teas, lavender eye mask--all without improvement. She would like to try a sleep aide.     She continues on letrozole and is tolerating this well. No side effects. No breast concerns or lumps/bumps. She is very active at work and is \"running\" the whole day. Stamina is good with this. Eating and drinking well. No new or different pain in the body.      REVIEW OF SYSTEMS:  A 10-point review of systems is negative.      PHYSICAL EXAMINATION:   VITAL SIGNS:  [facility] 12/18/2014   Video physical exam  General: Patient appears well in no acute distress.   Skin: No visualized rash or lesions on visualized skin  Eyes: EOMI, no erythema, sclera icterus or discharge noted  Resp: Appears to be breathing comfortably without accessory muscle usage, speaking in full sentences, no cough  MSK: Appears to have normal range of motion based on visualized movements  Neurologic: No apparent tremors, facial movements symmetric  Psych: affect bright, alert and oriented    The rest of a comprehensive physical " examination is deferred due to PHE (public health emergency) video restrictions         LABORATORY DATA: Nothing new. Last year reviewed.     IMAGING:  No new imaging       ASSESSMENT AND PLAN:   1.  Stage II right breast cancer, ER positive, [place] positive, HER2 negative    -was on I-SPY 2 clinical trial and was randomized to ganetespib and paclitaxel, then went on to AC.  Second cycle of AC was complicated by Pneumocystis pneumonia requiring intubation and a 2-week hospitalization.  She did not complete the AC treatment.  She did undergo a right lumpectomy and sentinel node procedure.  She had a positive margin and underwent resection.  She still had a positive margin.  She underwent a right lumpectomy with clear margins.  She completed radiation in 12/2015.  Pathologic stage was IIIA  [id number] and of concern, she had an RCB3 histology meaning significant risk of recurrence of her breast cancer during the 5-year period following primary therapy.  There were 2 lymph nodes involved with mammary carcinoma with extracapsular extension.  She went on to have radiation therapy and then began hormonal therapy.     -she had an oophorectomy. She continues on letrozole, with a plan for adjuvant hormonal therapy continuing for a total of 10 years. Tolerating very well without side effects.   -will follow-up in 6 month with Dr. Meza and mammogram and labs     Health Maintenance   -continue with regular exercise, at least 150 minutes per week. Continue eating a good, well balanced diet; currently on a Mediterranean diet     Insomnia secondary to stress  -She has optimized sleep hygiene without relief. She was on melatonin for over 3 years. Will try this again, 7.5 mg q hs. She will let me know if not effective and then can consider trazodone.     Larisa Ahuja PA-C           Again, thank you for allowing me to participate in the care of your patient.        Sincerely,        Larisa Ahuja PA-C

## 2021-09-05 DIAGNOSIS — C50.411 MALIGNANT NEOPLASM OF UPPER-OUTER QUADRANT OF RIGHT FEMALE BREAST, UNSPECIFIED ESTROGEN RECEPTOR STATUS (H): ICD-10-CM

## 2021-09-07 RX ORDER — LETROZOLE 2.5 MG/1
TABLET, FILM COATED ORAL
Qty: 90 TABLET | Refills: 0 | Status: SHIPPED | OUTPATIENT
Start: 2021-09-07 | End: 2021-10-05

## 2021-09-07 NOTE — TELEPHONE ENCOUNTER
Last prescribing provider: Susana    Last clinic visit date: 7/21/21    Any missed appointments or no-shows since last clinic visit?: No    Recommendations for requested medication (if none, N/A): N/A    Next clinic visit date: Not Scheduled    Any other pertinent information (if none, N/A): NA

## 2021-09-12 ENCOUNTER — HEALTH MAINTENANCE LETTER (OUTPATIENT)
Age: 56
End: 2021-09-12

## 2021-10-05 ENCOUNTER — ONCOLOGY SURVIVORSHIP VISIT (OUTPATIENT)
Dept: ONCOLOGY | Facility: CLINIC | Age: 56
End: 2021-10-05
Attending: PHYSICIAN ASSISTANT
Payer: COMMERCIAL

## 2021-10-05 VITALS
OXYGEN SATURATION: 99 % | DIASTOLIC BLOOD PRESSURE: 83 MMHG | SYSTOLIC BLOOD PRESSURE: 133 MMHG | HEIGHT: 64 IN | RESPIRATION RATE: 18 BRPM | WEIGHT: 128.5 LBS | BODY MASS INDEX: 21.94 KG/M2 | TEMPERATURE: 99.6 F | HEART RATE: 102 BPM

## 2021-10-05 DIAGNOSIS — Z91.89 AT RISK FOR LOSS OF BONE DENSITY: ICD-10-CM

## 2021-10-05 DIAGNOSIS — C50.411 MALIGNANT NEOPLASM OF UPPER-OUTER QUADRANT OF RIGHT BREAST IN FEMALE, ESTROGEN RECEPTOR POSITIVE (H): Primary | ICD-10-CM

## 2021-10-05 DIAGNOSIS — Z51.11 ENCOUNTER FOR ANTINEOPLASTIC CHEMOTHERAPY: ICD-10-CM

## 2021-10-05 DIAGNOSIS — Z17.0 MALIGNANT NEOPLASM OF UPPER-OUTER QUADRANT OF RIGHT BREAST IN FEMALE, ESTROGEN RECEPTOR POSITIVE (H): Primary | ICD-10-CM

## 2021-10-05 DIAGNOSIS — C50.411 MALIGNANT NEOPLASM OF UPPER-OUTER QUADRANT OF RIGHT FEMALE BREAST, UNSPECIFIED ESTROGEN RECEPTOR STATUS (H): ICD-10-CM

## 2021-10-05 PROCEDURE — G0463 HOSPITAL OUTPT CLINIC VISIT: HCPCS

## 2021-10-05 PROCEDURE — 99214 OFFICE O/P EST MOD 30 MIN: CPT | Performed by: PHYSICIAN ASSISTANT

## 2021-10-05 RX ORDER — LETROZOLE 2.5 MG/1
1 TABLET, FILM COATED ORAL DAILY
Qty: 90 TABLET | Refills: 3 | Status: SHIPPED | OUTPATIENT
Start: 2021-10-05 | End: 2022-01-01

## 2021-10-05 ASSESSMENT — MIFFLIN-ST. JEOR: SCORE: 1162.87

## 2021-10-05 ASSESSMENT — PAIN SCALES - GENERAL: PAINLEVEL: NO PAIN (0)

## 2021-10-05 NOTE — LETTER
10/5/2021         RE: Josefina Bennett  [address]  [place] [facility] [address]        Dear Colleague,    Thank you for referring your patient, Josefina Bennett, to the [facility] CL[facility]IC. Please see a copy of my visit note below.            [facility] Progress Note  Oct 5, 2021     CC: Breast Cancer    Oncology History:  Josefina Bennett had a screening mammogram performed on 01/15/2015. Breast tissue was heterogeneously dense, which might compromise the mammography. There was architectural distortion in the right breast approximately 11-12 o'clock position, zone 2, posterior depth, and a CC MLO spot compression was performed and an ultrasound was planned. The diagnostic mammogram from 01/28/2015 showed right breast architectural distortion centered at the 12 o'clock position, zone 2, suspicious for neoplasm. Breast tomosynthesis was used in the interpretation. Ultrasound findings included targeted ultrasound of the right upper breast demonstrating a band-like area of abnormal echogenicity between 11 and 12 o'clock position in the right breast at zone 2. This measures 4 cm transversely x 1.1 cm AP, and there was only a 1.5 cm measurement in the radial direction. There was blood flow suspicious for neoplasm at the 11 o'clock position in zone 2. There is a 1.4 cm hypoechoic nodule slightly  from the larger area of altered echogenicity that is also suspicious. A biopsy was performed. The biopsy showed infiltrating lobular carcinoma, grade 2, and a clip was placed at specimen [id number]. There were a few signet cells present. The tumor was ER-positive, [place] low positive, and HER2 was negative by immunohistochemistry. She subsequently underwent an MRI showing a tumor that was 4.8 x 1.7 x 3.2 cm in size. Overall, her clinical stage was T2 NX MX.      She is enrolled in the I-SPY-2 clinical trial, and qualified with high-risk MammaPrint score. She was randomized to ganetespib and paclitaxel, and  she was treated with 12 cycles of treatment, and then started on AC on 05/26/2015. She developed intractable nausea and vomiting after the first cycle, which required hospitalization on the second cycle. She then developed Pneumocystis pneumonia, which resulted in intubation and a 2-week hospitalization during the course of AC. She was discharged on 07/02/2015, and decided she did not want to pursue any further chemotherapy. She had a right lumpectomy and sentinel lymph node procedure 08/12/2015. She had a positive margin, underwent a re-excision, and had a positive margin. Ultimately, she underwent a right mastectomy with clear margins. She began radiation treatment with Dr. Bill Chauhan, and completed radiation therapy on 12/04/2015. Pathologic stage was III-A, ypT3 N1a MX. Of concern, she had what could be considered an RCB3 tumor.      TREATMENT HISTORY:  A.  Neoadjuvant therapy on I SPY-2 with ganetespib and paclitaxel.  AC x 2 complicated by PCP pneumonia.  B.  Right lumpectomy, followed by right margin resections, followed by right mastectomy.   B.  Oophorectomy 10-16-15.   C.  Tamoxifen 10-6-16.   D.  Radiation therapy. 5,040 cGy in 180 cGy/fraction to the chest wall and regional lymphatics followed by 720 cGy axillary lymph node boost and 1000 cGy mastectomy scar boost. Completed on 12/3/2015.  E.   Adjuvant letrozole begun 1-28-16.          INTERVAL HISTORY: Josefina Verma is feeling well today. 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 She had a concussion and small volume subarachnoid hemorrhage due to contrecoup injury. She had repeat head imaging last week which showed improvement. No further neuro symptoms or headaches. She accepted a promotion at work and will be working more regular hours, M-F which she is pleased about. She has had some stress at work but mental health is okay. She has been very active at work and lifts  "large animals frequently. She also walks her two dogs daily. She takes calcium and vitamin D.     No breast concerns, lumps/bumps, fevers/chills or recent infections, new or different pain, cough, SOB, CP, or edema      PHYSICAL EXAM:  /83   Pulse 102   Temp 99.6  F (37.6  C) (Oral)   Resp 18   Ht 1.626 m (5' 4\")   Wt 58.3 kg (128 lb 8 oz)   LMP 12/18/2014   SpO2 99%   BMI 22.06 kg/m    Wt Readings from Last 4 Encounters:   10/05/21 58.3 kg (128 lb 8 oz)   01/23/20 57.9 kg (127 lb 9.6 oz)   07/23/19 62.1 kg (136 lb 14.4 oz)   07/25/18 67.1 kg (147 lb 14.4 oz)   General: Alert, oriented, pleasant, NAD  HEENT: Normocephalic, atraumatic, no icterus   Neck: No cervical or supraclavicular LAD.  Axillary: No LAD  Breast: S/p mastectomy RCW no concerning skin changes or nodularity. L breast benign   Lungs: CTA bilaterally, normal work of breathing  Cardiac: RRR, S1, S2  Abdomen: Soft, nontender, nondistended. Normoactive bowel sounds. No hepatosplenomegaly, masses  Neuro: CNII-XII grossly intact  Extremities: No pedal edema         LABORATORY/IMAGING DATA: Nothing new      ASSESSMENT AND PLAN:   1.  Stage II right breast cancer, ER positive, [place] positive, HER2 negative    -was on I-SPY 2 clinical trial and was randomized to ganetespib and paclitaxel, then went on to AC. Second cycle of AC was complicated by Pneumocystis pneumonia requiring intubation and a 2-week hospitalization.  She did not complete the AC treatment.  She did undergo a right lumpectomy and sentinel node procedure.  She had a positive margin and underwent resection.  She still had a positive margin. She underwent a right mastectomy with clear margins.  She completed radiation in 12/2015.  Pathologic stage was IIIA  [id number] and of concern, she had an RCB3 histology meaning significant risk of recurrence of her breast cancer during the 5-year period following primary therapy.  There were 2 lymph nodes involved with mammary carcinoma with " extracapsular extension.  She went on to have radiation therapy and then began hormonal therapy.     -she had an oophorectomy. She continues on letrozole, with a plan for adjuvant hormonal therapy continuing for a total of 10 years. Tolerating very well without side effects. She is about 5 years into treatment now.   -No concerning s/s on H&P today. Reviewed being seen in survivorship clinic annually and Dr. Meza annually so she has breast cancer follow-up every 6 months.   -Mammogram on 10/19     Health Maintenance   -continue with regular exercise, at least 150 minutes per week. Continue eating a good, well balanced diet; currently on a Mediterranean diet. Recommend routine vaccinations, COVID, flu, and shingrix as well.     Bone health:  Reviewed she is at risk for loss of bone density on AI. Her last DEXA was in 2016. Will order another DEXA at the time of her mammogram. She is taking calcium and vitamin D and does weight bearing exercise     At risk for secondary malignancy secondary to AC:   -Recommend annual CBC through year 10. Will schedule this on 10/19 as well.     At risk for cardiomyopathy from adriamycin:   -Low risk from receiving only 2 cycles. No s/s of cardiac dysfunction today so will not order echo.     Larisa Ahuja PA-C         Again, thank you for allowing me to participate in the care of your patient.        Sincerely,        Larisa Ahuja PA-C

## 2021-10-05 NOTE — PROGRESS NOTES
[facility] Progress Note  Oct 5, 2021     CC: Breast Cancer    Oncology History:  Josefina Bennett had a screening mammogram performed on 01/15/2015. Breast tissue was heterogeneously dense, which might compromise the mammography. There was architectural distortion in the right breast approximately 11-12 o'clock position, zone 2, posterior depth, and a CC MLO spot compression was performed and an ultrasound was planned. The diagnostic mammogram from 01/28/2015 showed right breast architectural distortion centered at the 12 o'clock position, zone 2, suspicious for neoplasm. Breast tomosynthesis was used in the interpretation. Ultrasound findings included targeted ultrasound of the right upper breast demonstrating a band-like area of abnormal echogenicity between 11 and 12 o'clock position in the right breast at zone 2. This measures 4 cm transversely x 1.1 cm AP, and there was only a 1.5 cm measurement in the radial direction. There was blood flow suspicious for neoplasm at the 11 o'clock position in zone 2. There is a 1.4 cm hypoechoic nodule slightly  from the larger area of altered echogenicity that is also suspicious. A biopsy was performed. The biopsy showed infiltrating lobular carcinoma, grade 2, and a clip was placed at specimen [id number]. There were a few signet cells present. The tumor was ER-positive, MD low positive, and HER2 was negative by immunohistochemistry. She subsequently underwent an MRI showing a tumor that was 4.8 x 1.7 x 3.2 cm in size. Overall, her clinical stage was T2 NX MX.      She is enrolled in the I-SPY-2 clinical trial, and qualified with high-risk MammaPrint score. She was randomized to ganetespib and paclitaxel, and she was treated with 12 cycles of treatment, and then started on AC on 05/26/2015. She developed intractable nausea and vomiting after the first cycle, which required hospitalization on the second cycle. She then developed Pneumocystis pneumonia,  which resulted in intubation and a 2-week hospitalization during the course of AC. She was discharged on 07/02/2015, and decided she did not want to pursue any further chemotherapy. She had a right lumpectomy and sentinel lymph node procedure 08/12/2015. She had a positive margin, underwent a re-excision, and had a positive margin. Ultimately, she underwent a right mastectomy with clear margins. She began radiation treatment with Dr. Bill Chauhan, and completed radiation therapy on 12/04/2015. Pathologic stage was III-A, ypT3 N1a MX. Of concern, she had what could be considered an RCB3 tumor.      TREATMENT HISTORY:  A.  Neoadjuvant therapy on I SPY-2 with ganetespib and paclitaxel.  AC x 2 complicated by PCP pneumonia.  B.  Right lumpectomy, followed by right margin resections, followed by right mastectomy.   B.  Oophorectomy 10-16-15.   C.  Tamoxifen 10-6-16.   D.  Radiation therapy. 5,040 cGy in 180 cGy/fraction to the chest wall and regional lymphatics followed by 720 cGy axillary lymph node boost and 1000 cGy mastectomy scar boost. Completed on 12/3/2015.  E.   Adjuvant letrozole begun 1-28-16.          INTERVAL HISTORY: Josefina Verma is feeling well today. 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 She had a concussion and small volume subarachnoid hemorrhage due to contrecoup injury. She had repeat head imaging last week which showed improvement. No further neuro symptoms or headaches. She accepted a promotion at work and will be working more regular hours, M-F which she is pleased about. She has had some stress at work but mental health is okay. She has been very active at work and lifts large animals frequently. She also walks her two dogs daily. She takes calcium and vitamin D.     No breast concerns, lumps/bumps, fevers/chills or recent infections, new or different pain, cough, SOB, CP, or edema      PHYSICAL EXAM:  /83    "Pulse 102   Temp 99.6  F (37.6  C) (Oral)   Resp 18   Ht 1.626 m (5' 4\")   Wt 58.3 kg (128 lb 8 oz)   LMP 12/18/2014   SpO2 99%   BMI 22.06 kg/m    Wt Readings from Last 4 Encounters:   10/05/21 58.3 kg (128 lb 8 oz)   01/23/20 57.9 kg (127 lb 9.6 oz)   07/23/19 62.1 kg (136 lb 14.4 oz)   07/25/18 67.1 kg (147 lb 14.4 oz)   General: Alert, oriented, pleasant, NAD  HEENT: Normocephalic, atraumatic, no icterus   Neck: No cervical or supraclavicular LAD.  Axillary: No LAD  Breast: S/p mastectomy RCW no concerning skin changes or nodularity. L breast benign   Lungs: CTA bilaterally, normal work of breathing  Cardiac: RRR, S1, S2  Abdomen: Soft, nontender, nondistended. Normoactive bowel sounds. No hepatosplenomegaly, masses  Neuro: CNII-XII grossly intact  Extremities: No pedal edema         LABORATORY/IMAGING DATA: Nothing new      ASSESSMENT AND PLAN:   1.  Stage II right breast cancer, ER positive, PA positive, HER2 negative    -was on I-SPY 2 clinical trial and was randomized to ganetespib and paclitaxel, then went on to AC. Second cycle of AC was complicated by Pneumocystis pneumonia requiring intubation and a 2-week hospitalization.  She did not complete the AC treatment.  She did undergo a right lumpectomy and sentinel node procedure.  She had a positive margin and underwent resection.  She still had a positive margin. She underwent a right mastectomy with clear margins.  She completed radiation in 12/2015.  Pathologic stage was IIIA  [id number] and of concern, she had an RCB3 histology meaning significant risk of recurrence of her breast cancer during the 5-year period following primary therapy.  There were 2 lymph nodes involved with mammary carcinoma with extracapsular extension.  She went on to have radiation therapy and then began hormonal therapy.     -she had an oophorectomy. She continues on letrozole, with a plan for adjuvant hormonal therapy continuing for a total of 10 years. Tolerating very well " without side effects. She is about 5 years into treatment now.   -No concerning s/s on H&P today. Reviewed being seen in survivorship clinic annually and Dr. Meza annually so she has breast cancer follow-up every 6 months.   -Mammogram on 10/19     Health Maintenance   -continue with regular exercise, at least 150 minutes per week. Continue eating a good, well balanced diet; currently on a Mediterranean diet. Recommend routine vaccinations, COVID, flu, and shingrix as well.     Bone health:  Reviewed she is at risk for loss of bone density on AI. Her last DEXA was in 2016. Will order another DEXA at the time of her mammogram. She is taking calcium and vitamin D and does weight bearing exercise     At risk for secondary malignancy secondary to AC:   -Recommend annual CBC through year 10. Will schedule this on 10/19 as well.     At risk for cardiomyopathy from adriamycin:   -Low risk from receiving only 2 cycles. No s/s of cardiac dysfunction today so will not order echo.     Larisa Ahuja PA-C

## 2021-10-05 NOTE — NURSING NOTE
"Oncology Rooming Note    October 5, 2021 4:31 PM   Josefina Bennett is a 55 year old female who presents for:    Chief Complaint   Patient presents with     Oncology Clinic Visit     Breast cancer- Survivorship visit      Initial Vitals: /83   Pulse 102   Temp 99.6  F (37.6  C) (Oral)   Resp 18   Ht 1.626 m (5' 4\")   Wt 58.3 kg (128 lb 8 oz)   LMP 12/18/2014   SpO2 99%   BMI 22.06 kg/m   Estimated body mass index is 22.06 kg/m  as calculated from the following:    Height as of this encounter: 1.626 m (5' 4\").    Weight as of this encounter: 58.3 kg (128 lb 8 oz). Body surface area is 1.62 meters squared.  No Pain (0) Comment: Data Unavailable   Patient's last menstrual period was 12/18/2014.  Allergies reviewed: Yes  Medications reviewed: Yes    Medications: Medication refills not needed today.  Pharmacy name entered into [employer]:    [facility] - [place], [place] - [id number] [place] [address] [id number] - [place], [place] - [address] [facility] [address]  [facility] - [place], [place] - [address] [id number] - [place], [place] - [address]    Clinical concerns: None       Hue Palacios LPN            "

## 2021-10-19 ENCOUNTER — ANCILLARY PROCEDURE (OUTPATIENT)
Dept: BONE DENSITY | Facility: CLINIC | Age: 56
End: 2021-10-19
Attending: PHYSICIAN ASSISTANT
Payer: COMMERCIAL

## 2021-10-19 ENCOUNTER — ANCILLARY PROCEDURE (OUTPATIENT)
Dept: MAMMOGRAPHY | Facility: CLINIC | Age: 56
End: 2021-10-19
Attending: PHYSICIAN ASSISTANT
Payer: COMMERCIAL

## 2021-10-19 ENCOUNTER — LAB (OUTPATIENT)
Dept: LAB | Facility: CLINIC | Age: 56
End: 2021-10-19
Attending: INTERNAL MEDICINE
Payer: COMMERCIAL

## 2021-10-19 DIAGNOSIS — C50.411 MALIGNANT NEOPLASM OF UPPER-OUTER QUADRANT OF RIGHT BREAST IN FEMALE, ESTROGEN RECEPTOR POSITIVE (H): ICD-10-CM

## 2021-10-19 DIAGNOSIS — Z12.31 VISIT FOR SCREENING MAMMOGRAM: ICD-10-CM

## 2021-10-19 DIAGNOSIS — Z51.11 ENCOUNTER FOR ANTINEOPLASTIC CHEMOTHERAPY: ICD-10-CM

## 2021-10-19 DIAGNOSIS — Z17.0 MALIGNANT NEOPLASM OF UPPER-OUTER QUADRANT OF RIGHT BREAST IN FEMALE, ESTROGEN RECEPTOR POSITIVE (H): ICD-10-CM

## 2021-10-19 DIAGNOSIS — C50.411 MALIGNANT NEOPLASM OF UPPER-OUTER QUADRANT OF RIGHT FEMALE BREAST (H): ICD-10-CM

## 2021-10-19 DIAGNOSIS — Z91.89 AT RISK FOR LOSS OF BONE DENSITY: ICD-10-CM

## 2021-10-19 LAB
BASOPHILS # BLD AUTO: 0.1 10E3/UL (ref 0–0.2)
BASOPHILS NFR BLD AUTO: 1 %
EOSINOPHIL # BLD AUTO: 0.1 10E3/UL (ref 0–0.7)
EOSINOPHIL NFR BLD AUTO: 1 %
ERYTHROCYTE [DISTWIDTH] IN BLOOD BY AUTOMATED COUNT: 14.7 % (ref 10–15)
HCT VFR BLD AUTO: 38.3 % (ref 35–47)
HGB BLD-MCNC: 12.2 G/DL (ref 11.7–15.7)
IMM GRANULOCYTES # BLD: 0 10E3/UL
IMM GRANULOCYTES NFR BLD: 0 %
LYMPHOCYTES # BLD AUTO: 3.4 10E3/UL (ref 0.8–5.3)
LYMPHOCYTES NFR BLD AUTO: 34 %
MCH RBC QN AUTO: 27.4 PG (ref 26.5–33)
MCHC RBC AUTO-ENTMCNC: 31.9 G/DL (ref 31.5–36.5)
MCV RBC AUTO: 86 FL (ref 78–100)
MONOCYTES # BLD AUTO: 0.6 10E3/UL (ref 0–1.3)
MONOCYTES NFR BLD AUTO: 6 %
NEUTROPHILS # BLD AUTO: 5.7 10E3/UL (ref 1.6–8.3)
NEUTROPHILS NFR BLD AUTO: 58 %
NRBC # BLD AUTO: 0 10E3/UL
NRBC BLD AUTO-RTO: 0 /100
PLATELET # BLD AUTO: 215 10E3/UL (ref 150–450)
RBC # BLD AUTO: 4.46 10E6/UL (ref 3.8–5.2)
WBC # BLD AUTO: 10 10E3/UL (ref 4–11)

## 2021-10-19 PROCEDURE — 77080 DXA BONE DENSITY AXIAL: CPT | Performed by: INTERNAL MEDICINE

## 2021-10-19 PROCEDURE — 77063 BREAST TOMOSYNTHESIS BI: CPT | Mod: GC

## 2021-10-19 PROCEDURE — 85025 COMPLETE CBC W/AUTO DIFF WBC: CPT

## 2021-10-19 PROCEDURE — 36415 COLL VENOUS BLD VENIPUNCTURE: CPT

## 2021-10-19 PROCEDURE — 77067 SCR MAMMO BI INCL CAD: CPT | Mod: GC

## 2021-10-19 NOTE — NURSING NOTE
Chief Complaint   Patient presents with     Labs Only     Labs drawn via  by RN in lab     Labs collected from venipuncture by RN.     Deana Jarrell RN

## 2022-01-01 ENCOUNTER — TUMOR CONFERENCE (OUTPATIENT)
Dept: ONCOLOGY | Facility: CLINIC | Age: 57
End: 2022-01-01
Payer: COMMERCIAL

## 2022-01-01 ENCOUNTER — APPOINTMENT (OUTPATIENT)
Dept: NEUROSURGERY | Facility: CLINIC | Age: 57
End: 2022-01-01
Payer: COMMERCIAL

## 2022-01-01 ENCOUNTER — TELEPHONE (OUTPATIENT)
Dept: INFECTIOUS DISEASES | Facility: CLINIC | Age: 57
End: 2022-01-01

## 2022-01-01 ENCOUNTER — ONCOLOGY VISIT (OUTPATIENT)
Dept: ONCOLOGY | Facility: CLINIC | Age: 57
End: 2022-01-01
Attending: PHYSICIAN ASSISTANT
Payer: COMMERCIAL

## 2022-01-01 ENCOUNTER — TELEPHONE (OUTPATIENT)
Dept: ENDOCRINOLOGY | Facility: CLINIC | Age: 57
End: 2022-01-01

## 2022-01-01 ENCOUNTER — INFUSION THERAPY VISIT (OUTPATIENT)
Dept: ONCOLOGY | Facility: CLINIC | Age: 57
End: 2022-01-01
Attending: INTERNAL MEDICINE
Payer: COMMERCIAL

## 2022-01-01 ENCOUNTER — PATIENT OUTREACH (OUTPATIENT)
Dept: CARE COORDINATION | Facility: CLINIC | Age: 57
End: 2022-01-01

## 2022-01-01 ENCOUNTER — APPOINTMENT (OUTPATIENT)
Dept: LAB | Facility: CLINIC | Age: 57
End: 2022-01-01
Attending: INTERNAL MEDICINE
Payer: COMMERCIAL

## 2022-01-01 ENCOUNTER — TELEPHONE (OUTPATIENT)
Dept: ONCOLOGY | Facility: CLINIC | Age: 57
End: 2022-01-01

## 2022-01-01 ENCOUNTER — APPOINTMENT (OUTPATIENT)
Dept: LAB | Facility: CLINIC | Age: 57
End: 2022-01-01
Attending: PHYSICIAN ASSISTANT
Payer: COMMERCIAL

## 2022-01-01 ENCOUNTER — NURSE TRIAGE (OUTPATIENT)
Dept: ONCOLOGY | Facility: CLINIC | Age: 57
End: 2022-01-01

## 2022-01-01 ENCOUNTER — HOSPITAL ENCOUNTER (INPATIENT)
Facility: CLINIC | Age: 57
LOS: 3 days | Discharge: HOME | DRG: 194 | End: 2022-10-07
Attending: EMERGENCY MEDICINE | Admitting: INTERNAL MEDICINE
Payer: COMMERCIAL

## 2022-01-01 ENCOUNTER — APPOINTMENT (OUTPATIENT)
Dept: CARDIOLOGY | Facility: CLINIC | Age: 57
End: 2022-01-01
Payer: COMMERCIAL

## 2022-01-01 ENCOUNTER — HOSPITAL ENCOUNTER (INPATIENT)
Facility: CLINIC | Age: 57
LOS: 2 days | Discharge: HOME | DRG: 543 | End: 2022-04-08
Attending: EMERGENCY MEDICINE | Admitting: INTERNAL MEDICINE
Payer: COMMERCIAL

## 2022-01-01 ENCOUNTER — APPOINTMENT (OUTPATIENT)
Dept: MRI IMAGING | Facility: CLINIC | Age: 57
DRG: 031 | End: 2022-01-01
Attending: PEDIATRICS
Payer: COMMERCIAL

## 2022-01-01 ENCOUNTER — ONCOLOGY VISIT (OUTPATIENT)
Dept: ONCOLOGY | Facility: CLINIC | Age: 57
End: 2022-01-01
Payer: COMMERCIAL

## 2022-01-01 ENCOUNTER — TELEPHONE (OUTPATIENT)
Dept: GENERAL RADIOLOGY | Facility: CLINIC | Age: 57
End: 2022-01-01

## 2022-01-01 ENCOUNTER — LAB (OUTPATIENT)
Dept: LAB | Facility: CLINIC | Age: 57
End: 2022-01-01
Payer: COMMERCIAL

## 2022-01-01 ENCOUNTER — APPOINTMENT (OUTPATIENT)
Dept: MRI IMAGING | Facility: CLINIC | Age: 57
DRG: 543 | End: 2022-01-01
Attending: EMERGENCY MEDICINE
Payer: COMMERCIAL

## 2022-01-01 ENCOUNTER — INFUSION THERAPY VISIT (OUTPATIENT)
Dept: ONCOLOGY | Facility: CLINIC | Age: 57
End: 2022-01-01
Payer: COMMERCIAL

## 2022-01-01 ENCOUNTER — HOSPITAL ENCOUNTER (OUTPATIENT)
Facility: CLINIC | Age: 57
Discharge: HOME | End: 2022-04-12
Attending: INTERNAL MEDICINE | Admitting: INTERNAL MEDICINE
Payer: COMMERCIAL

## 2022-01-01 ENCOUNTER — TELEPHONE (OUTPATIENT)
Dept: ONCOLOGY | Facility: CLINIC | Age: 57
End: 2022-01-01
Payer: COMMERCIAL

## 2022-01-01 ENCOUNTER — TELEPHONE (OUTPATIENT)
Dept: NEUROSURGERY | Facility: CLINIC | Age: 57
End: 2022-01-01

## 2022-01-01 ENCOUNTER — PATIENT OUTREACH (OUTPATIENT)
Dept: ONCOLOGY | Facility: CLINIC | Age: 57
End: 2022-01-01

## 2022-01-01 ENCOUNTER — NURSE TRIAGE (OUTPATIENT)
Dept: NURSING | Facility: CLINIC | Age: 57
End: 2022-01-01

## 2022-01-01 ENCOUNTER — APPOINTMENT (OUTPATIENT)
Dept: MEDSURG UNIT | Facility: CLINIC | Age: 57
End: 2022-01-01
Attending: INTERNAL MEDICINE
Payer: COMMERCIAL

## 2022-01-01 ENCOUNTER — OFFICE VISIT (OUTPATIENT)
Dept: NEUROSURGERY | Facility: CLINIC | Age: 57
End: 2022-01-01
Payer: COMMERCIAL

## 2022-01-01 ENCOUNTER — HOSPITAL ENCOUNTER (OUTPATIENT)
Facility: AMBULATORY SURGERY CENTER | Age: 57
Discharge: HOME | End: 2022-05-04
Attending: RADIOLOGY
Payer: COMMERCIAL

## 2022-01-01 ENCOUNTER — APPOINTMENT (OUTPATIENT)
Dept: GENERAL RADIOLOGY | Facility: CLINIC | Age: 57
DRG: 194 | End: 2022-01-01
Attending: EMERGENCY MEDICINE
Payer: COMMERCIAL

## 2022-01-01 ENCOUNTER — APPOINTMENT (OUTPATIENT)
Dept: CT IMAGING | Facility: CLINIC | Age: 57
DRG: 543 | End: 2022-01-01
Attending: EMERGENCY MEDICINE
Payer: COMMERCIAL

## 2022-01-01 ENCOUNTER — PRE VISIT (OUTPATIENT)
Dept: INFECTIOUS DISEASES | Facility: CLINIC | Age: 57
End: 2022-01-01

## 2022-01-01 ENCOUNTER — APPOINTMENT (OUTPATIENT)
Dept: CT IMAGING | Facility: CLINIC | Age: 57
DRG: 031 | End: 2022-01-01
Attending: STUDENT IN AN ORGANIZED HEALTH CARE EDUCATION/TRAINING PROGRAM
Payer: COMMERCIAL

## 2022-01-01 ENCOUNTER — APPOINTMENT (OUTPATIENT)
Dept: GENERAL RADIOLOGY | Facility: CLINIC | Age: 57
DRG: 031 | End: 2022-01-01
Attending: STUDENT IN AN ORGANIZED HEALTH CARE EDUCATION/TRAINING PROGRAM
Payer: COMMERCIAL

## 2022-01-01 ENCOUNTER — VIRTUAL VISIT (OUTPATIENT)
Dept: ENDOCRINOLOGY | Facility: CLINIC | Age: 57
End: 2022-01-01
Attending: PHYSICIAN ASSISTANT
Payer: COMMERCIAL

## 2022-01-01 ENCOUNTER — INFUSION THERAPY VISIT (OUTPATIENT)
Dept: INFUSION THERAPY | Facility: CLINIC | Age: 57
End: 2022-01-01
Attending: PHYSICIAN ASSISTANT
Payer: COMMERCIAL

## 2022-01-01 ENCOUNTER — TELEPHONE (OUTPATIENT)
Dept: PALLIATIVE CARE | Facility: CLINIC | Age: 57
End: 2022-01-01

## 2022-01-01 ENCOUNTER — HOSPITAL ENCOUNTER (OUTPATIENT)
Facility: CLINIC | Age: 57
Setting detail: OBSERVATION
Discharge: HOME | End: 2022-11-03
Attending: EMERGENCY MEDICINE | Admitting: EMERGENCY MEDICINE
Payer: COMMERCIAL

## 2022-01-01 ENCOUNTER — PATIENT OUTREACH (OUTPATIENT)
Dept: ONCOLOGY | Facility: CLINIC | Age: 57
End: 2022-01-01
Payer: COMMERCIAL

## 2022-01-01 ENCOUNTER — ANCILLARY PROCEDURE (OUTPATIENT)
Dept: MAMMOGRAPHY | Facility: CLINIC | Age: 57
End: 2022-01-01
Attending: INTERNAL MEDICINE
Payer: COMMERCIAL

## 2022-01-01 ENCOUNTER — HOSPITAL ENCOUNTER (INPATIENT)
Facility: CLINIC | Age: 57
LOS: 6 days | Discharge: HOME | DRG: 031 | End: 2022-12-13
Attending: EMERGENCY MEDICINE | Admitting: PEDIATRICS
Payer: COMMERCIAL

## 2022-01-01 ENCOUNTER — ANCILLARY PROCEDURE (OUTPATIENT)
Dept: CARDIOLOGY | Facility: CLINIC | Age: 57
End: 2022-01-01
Attending: PHYSICIAN ASSISTANT
Payer: COMMERCIAL

## 2022-01-01 ENCOUNTER — APPOINTMENT (OUTPATIENT)
Dept: ULTRASOUND IMAGING | Facility: CLINIC | Age: 57
DRG: 031 | End: 2022-01-01
Attending: PHYSICIAN ASSISTANT
Payer: COMMERCIAL

## 2022-01-01 ENCOUNTER — HOSPITAL ENCOUNTER (OUTPATIENT)
Dept: PET IMAGING | Facility: CLINIC | Age: 57
Discharge: HOME | End: 2022-11-21
Attending: INTERNAL MEDICINE | Admitting: INTERNAL MEDICINE
Payer: COMMERCIAL

## 2022-01-01 ENCOUNTER — APPOINTMENT (OUTPATIENT)
Dept: LAB | Facility: CLINIC | Age: 57
End: 2022-01-01
Payer: COMMERCIAL

## 2022-01-01 ENCOUNTER — APPOINTMENT (OUTPATIENT)
Dept: GENERAL RADIOLOGY | Facility: CLINIC | Age: 57
DRG: 543 | End: 2022-01-01
Attending: EMERGENCY MEDICINE
Payer: COMMERCIAL

## 2022-01-01 ENCOUNTER — ANESTHESIA (OUTPATIENT)
Dept: SURGERY | Facility: CLINIC | Age: 57
DRG: 031 | End: 2022-01-01
Payer: COMMERCIAL

## 2022-01-01 ENCOUNTER — HOSPITAL ENCOUNTER (EMERGENCY)
Facility: CLINIC | Age: 57
Discharge: LEFT BEFORE DISCHARGE | End: 2022-12-05
Payer: COMMERCIAL

## 2022-01-01 ENCOUNTER — HOSPITAL ENCOUNTER (OUTPATIENT)
Dept: CT IMAGING | Facility: CLINIC | Age: 57
Discharge: HOME | End: 2022-11-01
Attending: PHYSICIAN ASSISTANT
Payer: COMMERCIAL

## 2022-01-01 ENCOUNTER — HOSPITAL ENCOUNTER (OUTPATIENT)
Dept: PET IMAGING | Facility: CLINIC | Age: 57
Discharge: HOME | End: 2022-08-29
Attending: INTERNAL MEDICINE | Admitting: INTERNAL MEDICINE
Payer: COMMERCIAL

## 2022-01-01 ENCOUNTER — PRE VISIT (OUTPATIENT)
Dept: ENDOCRINOLOGY | Facility: CLINIC | Age: 57
End: 2022-01-01

## 2022-01-01 ENCOUNTER — APPOINTMENT (OUTPATIENT)
Dept: GENERAL RADIOLOGY | Facility: CLINIC | Age: 57
DRG: 031 | End: 2022-01-01
Payer: COMMERCIAL

## 2022-01-01 ENCOUNTER — MYC MEDICAL ADVICE (OUTPATIENT)
Dept: ONCOLOGY | Facility: CLINIC | Age: 57
End: 2022-01-01

## 2022-01-01 ENCOUNTER — HOSPITAL ENCOUNTER (OUTPATIENT)
Dept: MRI IMAGING | Facility: CLINIC | Age: 57
Discharge: HOME | End: 2022-12-07
Attending: PHYSICIAN ASSISTANT
Payer: COMMERCIAL

## 2022-01-01 ENCOUNTER — HOSPITAL ENCOUNTER (OUTPATIENT)
Dept: PET IMAGING | Facility: HOSPITAL | Age: 57
Discharge: HOME | End: 2022-04-27
Attending: INTERNAL MEDICINE | Admitting: INTERNAL MEDICINE
Payer: COMMERCIAL

## 2022-01-01 ENCOUNTER — APPOINTMENT (OUTPATIENT)
Dept: INTERVENTIONAL RADIOLOGY/VASCULAR | Facility: CLINIC | Age: 57
End: 2022-01-01
Attending: PHYSICIAN ASSISTANT
Payer: COMMERCIAL

## 2022-01-01 ENCOUNTER — ANCILLARY PROCEDURE (OUTPATIENT)
Dept: CT IMAGING | Facility: CLINIC | Age: 57
End: 2022-01-01
Payer: COMMERCIAL

## 2022-01-01 ENCOUNTER — APPOINTMENT (OUTPATIENT)
Dept: GENERAL RADIOLOGY | Facility: CLINIC | Age: 57
DRG: 194 | End: 2022-01-01
Payer: COMMERCIAL

## 2022-01-01 ENCOUNTER — APPOINTMENT (OUTPATIENT)
Dept: GENERAL RADIOLOGY | Facility: CLINIC | Age: 57
End: 2022-01-01
Payer: COMMERCIAL

## 2022-01-01 ENCOUNTER — MYC MEDICAL ADVICE (OUTPATIENT)
Dept: ENDOCRINOLOGY | Facility: CLINIC | Age: 57
End: 2022-01-01

## 2022-01-01 ENCOUNTER — ANESTHESIA (OUTPATIENT)
Dept: SURGERY | Facility: AMBULATORY SURGERY CENTER | Age: 57
End: 2022-01-01
Payer: COMMERCIAL

## 2022-01-01 ENCOUNTER — APPOINTMENT (OUTPATIENT)
Dept: CARDIOLOGY | Facility: CLINIC | Age: 57
DRG: 543 | End: 2022-01-01
Attending: PHYSICIAN ASSISTANT
Payer: COMMERCIAL

## 2022-01-01 ENCOUNTER — ANESTHESIA EVENT (OUTPATIENT)
Dept: SURGERY | Facility: AMBULATORY SURGERY CENTER | Age: 57
End: 2022-01-01
Payer: COMMERCIAL

## 2022-01-01 ENCOUNTER — ANESTHESIA EVENT (OUTPATIENT)
Dept: SURGERY | Facility: CLINIC | Age: 57
DRG: 031 | End: 2022-01-01
Payer: COMMERCIAL

## 2022-01-01 ENCOUNTER — HEALTH MAINTENANCE LETTER (OUTPATIENT)
Age: 57
End: 2022-01-01

## 2022-01-01 ENCOUNTER — VIRTUAL VISIT (OUTPATIENT)
Dept: INFECTIOUS DISEASES | Facility: CLINIC | Age: 57
End: 2022-01-01
Attending: STUDENT IN AN ORGANIZED HEALTH CARE EDUCATION/TRAINING PROGRAM
Payer: COMMERCIAL

## 2022-01-01 ENCOUNTER — LAB (OUTPATIENT)
Dept: LAB | Facility: CLINIC | Age: 57
End: 2022-01-01
Attending: NURSE PRACTITIONER
Payer: COMMERCIAL

## 2022-01-01 ENCOUNTER — DOCUMENTATION ONLY (OUTPATIENT)
Dept: TRANSPLANT | Facility: CLINIC | Age: 57
End: 2022-01-01

## 2022-01-01 ENCOUNTER — HOSPITAL ENCOUNTER (OUTPATIENT)
Dept: PET IMAGING | Facility: CLINIC | Age: 57
Discharge: HOME | End: 2022-06-27
Attending: INTERNAL MEDICINE | Admitting: INTERNAL MEDICINE
Payer: COMMERCIAL

## 2022-01-01 VITALS
SYSTOLIC BLOOD PRESSURE: 155 MMHG | DIASTOLIC BLOOD PRESSURE: 84 MMHG | TEMPERATURE: 99 F | WEIGHT: 132.7 LBS | RESPIRATION RATE: 16 BRPM | HEART RATE: 110 BPM | BODY MASS INDEX: 22.77 KG/M2 | OXYGEN SATURATION: 99 %

## 2022-01-01 VITALS
DIASTOLIC BLOOD PRESSURE: 70 MMHG | TEMPERATURE: 98.9 F | HEART RATE: 98 BPM | OXYGEN SATURATION: 98 % | SYSTOLIC BLOOD PRESSURE: 138 MMHG | RESPIRATION RATE: 16 BRPM

## 2022-01-01 VITALS
HEART RATE: 72 BPM | RESPIRATION RATE: 18 BRPM | SYSTOLIC BLOOD PRESSURE: 122 MMHG | TEMPERATURE: 97 F | DIASTOLIC BLOOD PRESSURE: 62 MMHG | OXYGEN SATURATION: 99 %

## 2022-01-01 VITALS
DIASTOLIC BLOOD PRESSURE: 92 MMHG | WEIGHT: 135 LBS | TEMPERATURE: 98 F | HEART RATE: 100 BPM | SYSTOLIC BLOOD PRESSURE: 162 MMHG | BODY MASS INDEX: 23.16 KG/M2 | OXYGEN SATURATION: 100 %

## 2022-01-01 VITALS
OXYGEN SATURATION: 97 % | HEART RATE: 104 BPM | TEMPERATURE: 98.5 F | SYSTOLIC BLOOD PRESSURE: 126 MMHG | DIASTOLIC BLOOD PRESSURE: 64 MMHG | RESPIRATION RATE: 18 BRPM

## 2022-01-01 VITALS
SYSTOLIC BLOOD PRESSURE: 145 MMHG | DIASTOLIC BLOOD PRESSURE: 85 MMHG | TEMPERATURE: 98 F | OXYGEN SATURATION: 97 % | HEART RATE: 111 BPM | RESPIRATION RATE: 16 BRPM

## 2022-01-01 VITALS
BODY MASS INDEX: 23 KG/M2 | TEMPERATURE: 98.8 F | HEIGHT: 64 IN | RESPIRATION RATE: 20 BRPM | DIASTOLIC BLOOD PRESSURE: 53 MMHG | WEIGHT: 134.7 LBS | OXYGEN SATURATION: 97 % | SYSTOLIC BLOOD PRESSURE: 113 MMHG | HEART RATE: 100 BPM

## 2022-01-01 VITALS
RESPIRATION RATE: 16 BRPM | OXYGEN SATURATION: 100 % | BODY MASS INDEX: 23.35 KG/M2 | HEART RATE: 104 BPM | OXYGEN SATURATION: 100 % | TEMPERATURE: 98.2 F | RESPIRATION RATE: 14 BRPM | TEMPERATURE: 97.7 F | DIASTOLIC BLOOD PRESSURE: 77 MMHG | DIASTOLIC BLOOD PRESSURE: 74 MMHG | BODY MASS INDEX: 22.66 KG/M2 | HEART RATE: 99 BPM | WEIGHT: 136.1 LBS | SYSTOLIC BLOOD PRESSURE: 135 MMHG | WEIGHT: 132.1 LBS | SYSTOLIC BLOOD PRESSURE: 155 MMHG

## 2022-01-01 VITALS
RESPIRATION RATE: 16 BRPM | TEMPERATURE: 98.2 F | HEART RATE: 104 BPM | SYSTOLIC BLOOD PRESSURE: 162 MMHG | DIASTOLIC BLOOD PRESSURE: 79 MMHG | WEIGHT: 134.1 LBS | BODY MASS INDEX: 23.01 KG/M2 | OXYGEN SATURATION: 99 %

## 2022-01-01 VITALS
BODY MASS INDEX: 23.05 KG/M2 | RESPIRATION RATE: 14 BRPM | WEIGHT: 135 LBS | HEIGHT: 64 IN | OXYGEN SATURATION: 96 % | DIASTOLIC BLOOD PRESSURE: 68 MMHG | TEMPERATURE: 97.6 F | SYSTOLIC BLOOD PRESSURE: 122 MMHG | HEART RATE: 88 BPM

## 2022-01-01 VITALS
HEART RATE: 99 BPM | SYSTOLIC BLOOD PRESSURE: 132 MMHG | TEMPERATURE: 98 F | BODY MASS INDEX: 23.49 KG/M2 | OXYGEN SATURATION: 99 % | DIASTOLIC BLOOD PRESSURE: 69 MMHG | WEIGHT: 136.91 LBS | RESPIRATION RATE: 16 BRPM

## 2022-01-01 VITALS
OXYGEN SATURATION: 96 % | WEIGHT: 140.6 LBS | HEART RATE: 108 BPM | RESPIRATION RATE: 16 BRPM | HEART RATE: 100 BPM | TEMPERATURE: 98.9 F | BODY MASS INDEX: 24.12 KG/M2 | SYSTOLIC BLOOD PRESSURE: 126 MMHG | DIASTOLIC BLOOD PRESSURE: 65 MMHG | OXYGEN SATURATION: 100 %

## 2022-01-01 VITALS
SYSTOLIC BLOOD PRESSURE: 119 MMHG | DIASTOLIC BLOOD PRESSURE: 71 MMHG | WEIGHT: 132 LBS | RESPIRATION RATE: 16 BRPM | BODY MASS INDEX: 22.65 KG/M2 | OXYGEN SATURATION: 99 % | TEMPERATURE: 97.8 F | HEART RATE: 97 BPM

## 2022-01-01 VITALS
DIASTOLIC BLOOD PRESSURE: 68 MMHG | OXYGEN SATURATION: 98 % | RESPIRATION RATE: 18 BRPM | BODY MASS INDEX: 23.04 KG/M2 | WEIGHT: 134.3 LBS | TEMPERATURE: 98.7 F | HEART RATE: 113 BPM | SYSTOLIC BLOOD PRESSURE: 155 MMHG

## 2022-01-01 VITALS
TEMPERATURE: 99.8 F | WEIGHT: 136.8 LBS | BODY MASS INDEX: 23.48 KG/M2 | RESPIRATION RATE: 16 BRPM | HEART RATE: 121 BPM | OXYGEN SATURATION: 98 % | DIASTOLIC BLOOD PRESSURE: 77 MMHG | SYSTOLIC BLOOD PRESSURE: 143 MMHG

## 2022-01-01 VITALS
HEIGHT: 64 IN | HEART RATE: 107 BPM | DIASTOLIC BLOOD PRESSURE: 73 MMHG | TEMPERATURE: 98.8 F | RESPIRATION RATE: 18 BRPM | OXYGEN SATURATION: 96 % | SYSTOLIC BLOOD PRESSURE: 134 MMHG | BODY MASS INDEX: 22.52 KG/M2 | WEIGHT: 131.9 LBS

## 2022-01-01 VITALS
BODY MASS INDEX: 23.52 KG/M2 | TEMPERATURE: 98.4 F | DIASTOLIC BLOOD PRESSURE: 75 MMHG | HEART RATE: 103 BPM | RESPIRATION RATE: 16 BRPM | SYSTOLIC BLOOD PRESSURE: 133 MMHG | OXYGEN SATURATION: 99 % | WEIGHT: 137.1 LBS

## 2022-01-01 VITALS
RESPIRATION RATE: 16 BRPM | DIASTOLIC BLOOD PRESSURE: 62 MMHG | OXYGEN SATURATION: 95 % | WEIGHT: 129.4 LBS | TEMPERATURE: 98.1 F | HEART RATE: 108 BPM | SYSTOLIC BLOOD PRESSURE: 112 MMHG | BODY MASS INDEX: 22.2 KG/M2

## 2022-01-01 VITALS
BODY MASS INDEX: 23.62 KG/M2 | OXYGEN SATURATION: 97 % | HEART RATE: 104 BPM | DIASTOLIC BLOOD PRESSURE: 80 MMHG | TEMPERATURE: 98.8 F | WEIGHT: 133.6 LBS | RESPIRATION RATE: 18 BRPM | WEIGHT: 137.6 LBS | OXYGEN SATURATION: 98 % | HEART RATE: 115 BPM | TEMPERATURE: 99.3 F | SYSTOLIC BLOOD PRESSURE: 134 MMHG | DIASTOLIC BLOOD PRESSURE: 82 MMHG | BODY MASS INDEX: 22.92 KG/M2 | SYSTOLIC BLOOD PRESSURE: 158 MMHG | RESPIRATION RATE: 14 BRPM

## 2022-01-01 VITALS
HEART RATE: 113 BPM | SYSTOLIC BLOOD PRESSURE: 156 MMHG | BODY MASS INDEX: 23.32 KG/M2 | DIASTOLIC BLOOD PRESSURE: 80 MMHG | WEIGHT: 131.5 LBS | OXYGEN SATURATION: 97 % | BODY MASS INDEX: 22.56 KG/M2 | RESPIRATION RATE: 16 BRPM | HEART RATE: 109 BPM | SYSTOLIC BLOOD PRESSURE: 113 MMHG | OXYGEN SATURATION: 100 % | RESPIRATION RATE: 18 BRPM | WEIGHT: 135.9 LBS | TEMPERATURE: 97.9 F | DIASTOLIC BLOOD PRESSURE: 60 MMHG | TEMPERATURE: 98.1 F

## 2022-01-01 VITALS
SYSTOLIC BLOOD PRESSURE: 137 MMHG | TEMPERATURE: 99.2 F | BODY MASS INDEX: 23.14 KG/M2 | WEIGHT: 134.9 LBS | DIASTOLIC BLOOD PRESSURE: 79 MMHG | RESPIRATION RATE: 14 BRPM | OXYGEN SATURATION: 98 % | HEART RATE: 98 BPM

## 2022-01-01 VITALS
HEART RATE: 111 BPM | OXYGEN SATURATION: 100 % | BODY MASS INDEX: 23.3 KG/M2 | WEIGHT: 135.8 LBS | DIASTOLIC BLOOD PRESSURE: 91 MMHG | TEMPERATURE: 98.1 F | RESPIRATION RATE: 16 BRPM | SYSTOLIC BLOOD PRESSURE: 156 MMHG

## 2022-01-01 VITALS
HEART RATE: 98 BPM | SYSTOLIC BLOOD PRESSURE: 117 MMHG | DIASTOLIC BLOOD PRESSURE: 68 MMHG | BODY MASS INDEX: 22.66 KG/M2 | TEMPERATURE: 97.5 F | OXYGEN SATURATION: 98 % | RESPIRATION RATE: 20 BRPM | WEIGHT: 132.1 LBS

## 2022-01-01 VITALS
BODY MASS INDEX: 23.35 KG/M2 | DIASTOLIC BLOOD PRESSURE: 75 MMHG | HEART RATE: 102 BPM | WEIGHT: 136.1 LBS | SYSTOLIC BLOOD PRESSURE: 144 MMHG | TEMPERATURE: 98.7 F | RESPIRATION RATE: 16 BRPM | OXYGEN SATURATION: 97 %

## 2022-01-01 VITALS
BODY MASS INDEX: 23.59 KG/M2 | WEIGHT: 137.5 LBS | DIASTOLIC BLOOD PRESSURE: 77 MMHG | SYSTOLIC BLOOD PRESSURE: 155 MMHG | HEART RATE: 113 BPM | OXYGEN SATURATION: 99 % | TEMPERATURE: 98.1 F | RESPIRATION RATE: 16 BRPM

## 2022-01-01 VITALS
HEART RATE: 110 BPM | RESPIRATION RATE: 16 BRPM | BODY MASS INDEX: 22.94 KG/M2 | DIASTOLIC BLOOD PRESSURE: 65 MMHG | OXYGEN SATURATION: 99 % | TEMPERATURE: 98.6 F | WEIGHT: 133.7 LBS | SYSTOLIC BLOOD PRESSURE: 136 MMHG

## 2022-01-01 VITALS
DIASTOLIC BLOOD PRESSURE: 74 MMHG | TEMPERATURE: 99.1 F | SYSTOLIC BLOOD PRESSURE: 141 MMHG | WEIGHT: 134.2 LBS | BODY MASS INDEX: 23.02 KG/M2 | RESPIRATION RATE: 16 BRPM | HEART RATE: 120 BPM | OXYGEN SATURATION: 100 %

## 2022-01-01 VITALS
WEIGHT: 136.2 LBS | DIASTOLIC BLOOD PRESSURE: 83 MMHG | TEMPERATURE: 98 F | HEART RATE: 99 BPM | BODY MASS INDEX: 23.37 KG/M2 | SYSTOLIC BLOOD PRESSURE: 144 MMHG | RESPIRATION RATE: 18 BRPM | OXYGEN SATURATION: 100 %

## 2022-01-01 VITALS
TEMPERATURE: 97.2 F | HEART RATE: 103 BPM | BODY MASS INDEX: 22.1 KG/M2 | WEIGHT: 128.8 LBS | RESPIRATION RATE: 16 BRPM | DIASTOLIC BLOOD PRESSURE: 72 MMHG | SYSTOLIC BLOOD PRESSURE: 160 MMHG | OXYGEN SATURATION: 97 %

## 2022-01-01 VITALS
BODY MASS INDEX: 22.87 KG/M2 | OXYGEN SATURATION: 100 % | HEART RATE: 110 BPM | DIASTOLIC BLOOD PRESSURE: 82 MMHG | WEIGHT: 133.3 LBS | TEMPERATURE: 97 F | RESPIRATION RATE: 18 BRPM | SYSTOLIC BLOOD PRESSURE: 156 MMHG

## 2022-01-01 VITALS
BODY MASS INDEX: 23.54 KG/M2 | DIASTOLIC BLOOD PRESSURE: 80 MMHG | SYSTOLIC BLOOD PRESSURE: 161 MMHG | TEMPERATURE: 97.9 F | RESPIRATION RATE: 16 BRPM | WEIGHT: 137.2 LBS | OXYGEN SATURATION: 99 % | HEART RATE: 104 BPM

## 2022-01-01 VITALS
SYSTOLIC BLOOD PRESSURE: 138 MMHG | BODY MASS INDEX: 22.99 KG/M2 | HEART RATE: 91 BPM | WEIGHT: 134 LBS | TEMPERATURE: 98.6 F | RESPIRATION RATE: 16 BRPM | OXYGEN SATURATION: 98 % | DIASTOLIC BLOOD PRESSURE: 67 MMHG

## 2022-01-01 VITALS
OXYGEN SATURATION: 98 % | BODY MASS INDEX: 22.61 KG/M2 | HEART RATE: 107 BPM | SYSTOLIC BLOOD PRESSURE: 116 MMHG | DIASTOLIC BLOOD PRESSURE: 78 MMHG | WEIGHT: 131.8 LBS | RESPIRATION RATE: 18 BRPM | TEMPERATURE: 97.8 F

## 2022-01-01 VITALS
OXYGEN SATURATION: 99 % | SYSTOLIC BLOOD PRESSURE: 153 MMHG | DIASTOLIC BLOOD PRESSURE: 85 MMHG | TEMPERATURE: 98.2 F | WEIGHT: 135 LBS | BODY MASS INDEX: 23.17 KG/M2 | HEART RATE: 102 BPM | RESPIRATION RATE: 16 BRPM

## 2022-01-01 VITALS
BODY MASS INDEX: 22.83 KG/M2 | TEMPERATURE: 99.1 F | WEIGHT: 133.7 LBS | DIASTOLIC BLOOD PRESSURE: 73 MMHG | RESPIRATION RATE: 18 BRPM | OXYGEN SATURATION: 99 % | SYSTOLIC BLOOD PRESSURE: 142 MMHG | HEIGHT: 64 IN | HEART RATE: 101 BPM

## 2022-01-01 VITALS
RESPIRATION RATE: 16 BRPM | HEART RATE: 101 BPM | SYSTOLIC BLOOD PRESSURE: 147 MMHG | BODY MASS INDEX: 22.89 KG/M2 | WEIGHT: 133.4 LBS | DIASTOLIC BLOOD PRESSURE: 85 MMHG | TEMPERATURE: 98.2 F | OXYGEN SATURATION: 98 %

## 2022-01-01 VITALS
WEIGHT: 134.4 LBS | OXYGEN SATURATION: 98 % | DIASTOLIC BLOOD PRESSURE: 81 MMHG | RESPIRATION RATE: 18 BRPM | HEART RATE: 98 BPM | BODY MASS INDEX: 23.06 KG/M2 | SYSTOLIC BLOOD PRESSURE: 144 MMHG | TEMPERATURE: 98.4 F

## 2022-01-01 VITALS
TEMPERATURE: 98.1 F | DIASTOLIC BLOOD PRESSURE: 85 MMHG | OXYGEN SATURATION: 97 % | HEART RATE: 111 BPM | SYSTOLIC BLOOD PRESSURE: 145 MMHG

## 2022-01-01 VITALS
WEIGHT: 135.9 LBS | DIASTOLIC BLOOD PRESSURE: 80 MMHG | OXYGEN SATURATION: 99 % | RESPIRATION RATE: 16 BRPM | TEMPERATURE: 99.2 F | SYSTOLIC BLOOD PRESSURE: 142 MMHG | BODY MASS INDEX: 23.32 KG/M2 | HEART RATE: 100 BPM

## 2022-01-01 VITALS
RESPIRATION RATE: 16 BRPM | BODY MASS INDEX: 22.94 KG/M2 | DIASTOLIC BLOOD PRESSURE: 64 MMHG | HEART RATE: 103 BPM | OXYGEN SATURATION: 98 % | TEMPERATURE: 98.6 F | WEIGHT: 133.7 LBS | SYSTOLIC BLOOD PRESSURE: 97 MMHG

## 2022-01-01 VITALS
BODY MASS INDEX: 23.09 KG/M2 | SYSTOLIC BLOOD PRESSURE: 153 MMHG | TEMPERATURE: 98.8 F | WEIGHT: 134.6 LBS | DIASTOLIC BLOOD PRESSURE: 79 MMHG | HEART RATE: 100 BPM

## 2022-01-01 VITALS
WEIGHT: 136.3 LBS | SYSTOLIC BLOOD PRESSURE: 141 MMHG | TEMPERATURE: 98.4 F | HEART RATE: 115 BPM | BODY MASS INDEX: 23.38 KG/M2 | OXYGEN SATURATION: 99 % | RESPIRATION RATE: 16 BRPM | DIASTOLIC BLOOD PRESSURE: 81 MMHG

## 2022-01-01 VITALS
WEIGHT: 134.2 LBS | OXYGEN SATURATION: 99 % | RESPIRATION RATE: 18 BRPM | DIASTOLIC BLOOD PRESSURE: 86 MMHG | SYSTOLIC BLOOD PRESSURE: 133 MMHG | HEART RATE: 108 BPM | BODY MASS INDEX: 23.02 KG/M2 | TEMPERATURE: 98.8 F

## 2022-01-01 VITALS
RESPIRATION RATE: 14 BRPM | SYSTOLIC BLOOD PRESSURE: 150 MMHG | TEMPERATURE: 98.7 F | WEIGHT: 134.6 LBS | BODY MASS INDEX: 23.09 KG/M2 | OXYGEN SATURATION: 100 % | DIASTOLIC BLOOD PRESSURE: 79 MMHG | HEART RATE: 105 BPM

## 2022-01-01 VITALS
BODY MASS INDEX: 22.99 KG/M2 | HEART RATE: 102 BPM | WEIGHT: 134 LBS | SYSTOLIC BLOOD PRESSURE: 152 MMHG | DIASTOLIC BLOOD PRESSURE: 84 MMHG | OXYGEN SATURATION: 99 % | TEMPERATURE: 99.1 F

## 2022-01-01 DIAGNOSIS — Z17.0 MALIGNANT NEOPLASM OF UPPER-OUTER QUADRANT OF RIGHT BREAST IN FEMALE, ESTROGEN RECEPTOR POSITIVE (H): Primary | ICD-10-CM

## 2022-01-01 DIAGNOSIS — C79.49 LEPTOMENINGITIS, CARCINOMATOUS (H): ICD-10-CM

## 2022-01-01 DIAGNOSIS — Z17.0 BILATERAL MALIGNANT NEOPLASM OF BREAST IN FEMALE, ESTROGEN RECEPTOR POSITIVE, UNSPECIFIED SITE OF BREAST (H): ICD-10-CM

## 2022-01-01 DIAGNOSIS — F43.21 ADJUSTMENT DISORDER WITH DEPRESSED MOOD: ICD-10-CM

## 2022-01-01 DIAGNOSIS — Z17.0 MALIGNANT NEOPLASM OF NIPPLE OF RIGHT BREAST IN FEMALE, ESTROGEN RECEPTOR POSITIVE (H): ICD-10-CM

## 2022-01-01 DIAGNOSIS — C50.911 BILATERAL MALIGNANT NEOPLASM OF BREAST IN FEMALE, ESTROGEN RECEPTOR POSITIVE, UNSPECIFIED SITE OF BREAST (H): ICD-10-CM

## 2022-01-01 DIAGNOSIS — C50.912 BILATERAL MALIGNANT NEOPLASM OF BREAST IN FEMALE, ESTROGEN RECEPTOR POSITIVE, UNSPECIFIED SITE OF BREAST (H): ICD-10-CM

## 2022-01-01 DIAGNOSIS — Z11.52 ENCOUNTER FOR PREPROCEDURE SCREENING LABORATORY TESTING FOR COVID-19: ICD-10-CM

## 2022-01-01 DIAGNOSIS — J01.90 ACUTE SINUSITIS, RECURRENCE NOT SPECIFIED, UNSPECIFIED LOCATION: Primary | ICD-10-CM

## 2022-01-01 DIAGNOSIS — Z17.0 MALIGNANT NEOPLASM OF UPPER-OUTER QUADRANT OF RIGHT BREAST IN FEMALE, ESTROGEN RECEPTOR POSITIVE (H): ICD-10-CM

## 2022-01-01 DIAGNOSIS — Z17.0 MALIGNANT NEOPLASM OF NIPPLE OF RIGHT BREAST IN FEMALE, ESTROGEN RECEPTOR POSITIVE (H): Primary | ICD-10-CM

## 2022-01-01 DIAGNOSIS — C50.011 MALIGNANT NEOPLASM OF AREOLA OF RIGHT BREAST IN FEMALE, UNSPECIFIED ESTROGEN RECEPTOR STATUS (H): Primary | ICD-10-CM

## 2022-01-01 DIAGNOSIS — R73.9 HYPERGLYCEMIA: ICD-10-CM

## 2022-01-01 DIAGNOSIS — C50.011 MALIGNANT NEOPLASM OF NIPPLE OF RIGHT BREAST IN FEMALE, ESTROGEN RECEPTOR POSITIVE (H): Primary | ICD-10-CM

## 2022-01-01 DIAGNOSIS — B37.0 THRUSH: ICD-10-CM

## 2022-01-01 DIAGNOSIS — C80.1 LEPTOMENINGITIS, CARCINOMATOUS (H): ICD-10-CM

## 2022-01-01 DIAGNOSIS — C50.411 MALIGNANT NEOPLASM OF UPPER-OUTER QUADRANT OF RIGHT BREAST IN FEMALE, ESTROGEN RECEPTOR NEGATIVE (H): ICD-10-CM

## 2022-01-01 DIAGNOSIS — Z17.1 MALIGNANT NEOPLASM OF UPPER-OUTER QUADRANT OF RIGHT BREAST IN FEMALE, ESTROGEN RECEPTOR NEGATIVE (H): ICD-10-CM

## 2022-01-01 DIAGNOSIS — R11.10 VOMITING AND DIARRHEA: ICD-10-CM

## 2022-01-01 DIAGNOSIS — T38.0X5S: ICD-10-CM

## 2022-01-01 DIAGNOSIS — R05.9 COUGH: ICD-10-CM

## 2022-01-01 DIAGNOSIS — C50.411 MALIGNANT NEOPLASM OF UPPER-OUTER QUADRANT OF RIGHT FEMALE BREAST (H): ICD-10-CM

## 2022-01-01 DIAGNOSIS — C50.919 METASTATIC BREAST CANCER: ICD-10-CM

## 2022-01-01 DIAGNOSIS — J18.9 PNEUMONIA OF RIGHT LUNG DUE TO INFECTIOUS ORGANISM, UNSPECIFIED PART OF LUNG: ICD-10-CM

## 2022-01-01 DIAGNOSIS — R05.9 COUGH: Primary | ICD-10-CM

## 2022-01-01 DIAGNOSIS — C50.011 MALIGNANT NEOPLASM OF NIPPLE OF RIGHT BREAST IN FEMALE, ESTROGEN RECEPTOR POSITIVE (H): ICD-10-CM

## 2022-01-01 DIAGNOSIS — E86.0 DEHYDRATION: ICD-10-CM

## 2022-01-01 DIAGNOSIS — Z98.2 VP (VENTRICULOPERITONEAL) SHUNT STATUS: Primary | ICD-10-CM

## 2022-01-01 DIAGNOSIS — C50.011 MALIGNANT NEOPLASM OF AREOLA OF RIGHT BREAST IN FEMALE, UNSPECIFIED ESTROGEN RECEPTOR STATUS (H): ICD-10-CM

## 2022-01-01 DIAGNOSIS — Z17.1 MALIGNANT NEOPLASM OF UPPER-OUTER QUADRANT OF RIGHT BREAST IN FEMALE, ESTROGEN RECEPTOR NEGATIVE (H): Primary | ICD-10-CM

## 2022-01-01 DIAGNOSIS — C50.411 MALIGNANT NEOPLASM OF UPPER-OUTER QUADRANT OF RIGHT BREAST IN FEMALE, ESTROGEN RECEPTOR POSITIVE (H): Primary | ICD-10-CM

## 2022-01-01 DIAGNOSIS — R19.7 VOMITING AND DIARRHEA: ICD-10-CM

## 2022-01-01 DIAGNOSIS — E11.65 TYPE 2 DIABETES MELLITUS WITH HYPERGLYCEMIA, WITH LONG-TERM CURRENT USE OF INSULIN (H): ICD-10-CM

## 2022-01-01 DIAGNOSIS — Z17.0 MALIGNANT NEOPLASM OF RIGHT BREAST IN FEMALE, ESTROGEN RECEPTOR POSITIVE, UNSPECIFIED SITE OF BREAST (H): ICD-10-CM

## 2022-01-01 DIAGNOSIS — C79.31 BRAIN METASTASIS: ICD-10-CM

## 2022-01-01 DIAGNOSIS — E86.0 DEHYDRATION: Primary | ICD-10-CM

## 2022-01-01 DIAGNOSIS — M81.8 OTHER OSTEOPOROSIS WITHOUT CURRENT PATHOLOGICAL FRACTURE: Primary | ICD-10-CM

## 2022-01-01 DIAGNOSIS — C50.921 MALIGNANT NEOPLASM OF RIGHT MALE BREAST, UNSPECIFIED ESTROGEN RECEPTOR STATUS, UNSPECIFIED SITE OF BREAST (H): ICD-10-CM

## 2022-01-01 DIAGNOSIS — C50.911 MALIGNANT NEOPLASM OF RIGHT BREAST IN FEMALE, ESTROGEN RECEPTOR POSITIVE, UNSPECIFIED SITE OF BREAST (H): Primary | ICD-10-CM

## 2022-01-01 DIAGNOSIS — I26.99 PULMONARY EMBOLISM, UNSPECIFIED CHRONICITY, UNSPECIFIED PULMONARY EMBOLISM TYPE, UNSPECIFIED WHETHER ACUTE COR PULMONALE PRESENT (H): ICD-10-CM

## 2022-01-01 DIAGNOSIS — Z20.822 LAB TEST NEGATIVE FOR COVID-19 VIRUS: ICD-10-CM

## 2022-01-01 DIAGNOSIS — C50.411 MALIGNANT NEOPLASM OF UPPER-OUTER QUADRANT OF RIGHT BREAST IN FEMALE, ESTROGEN RECEPTOR NEGATIVE (H): Primary | ICD-10-CM

## 2022-01-01 DIAGNOSIS — Z51.11 ENCOUNTER FOR ANTINEOPLASTIC CHEMOTHERAPY: Primary | ICD-10-CM

## 2022-01-01 DIAGNOSIS — C79.51 BONE METASTASIS: Primary | ICD-10-CM

## 2022-01-01 DIAGNOSIS — C79.31 SECONDARY MALIGNANT NEOPLASM OF BRAIN (H): ICD-10-CM

## 2022-01-01 DIAGNOSIS — C50.911 BILATERAL MALIGNANT NEOPLASM OF BREAST IN FEMALE, ESTROGEN RECEPTOR POSITIVE, UNSPECIFIED SITE OF BREAST (H): Primary | ICD-10-CM

## 2022-01-01 DIAGNOSIS — K08.89 TOOTHACHE: Primary | ICD-10-CM

## 2022-01-01 DIAGNOSIS — K08.89 TOOTHACHE: ICD-10-CM

## 2022-01-01 DIAGNOSIS — Z11.59 ENCOUNTER FOR SCREENING FOR OTHER VIRAL DISEASES: ICD-10-CM

## 2022-01-01 DIAGNOSIS — J01.90 ACUTE SINUSITIS, RECURRENCE NOT SPECIFIED, UNSPECIFIED LOCATION: ICD-10-CM

## 2022-01-01 DIAGNOSIS — K59.00 CONSTIPATION, UNSPECIFIED CONSTIPATION TYPE: ICD-10-CM

## 2022-01-01 DIAGNOSIS — Z20.822 CONTACT WITH AND (SUSPECTED) EXPOSURE TO COVID-19: ICD-10-CM

## 2022-01-01 DIAGNOSIS — G91.0 COMMUNICATING HYDROCEPHALUS (H): ICD-10-CM

## 2022-01-01 DIAGNOSIS — J18.9 PNEUMONIA OF RIGHT UPPER LOBE DUE TO INFECTIOUS ORGANISM: Primary | ICD-10-CM

## 2022-01-01 DIAGNOSIS — M81.8 OTHER OSTEOPOROSIS WITHOUT CURRENT PATHOLOGICAL FRACTURE: ICD-10-CM

## 2022-01-01 DIAGNOSIS — R00.0 TACHYCARDIA, UNSPECIFIED: ICD-10-CM

## 2022-01-01 DIAGNOSIS — B44.9 ASPERGILLOSIS (H): ICD-10-CM

## 2022-01-01 DIAGNOSIS — Z17.0 BILATERAL MALIGNANT NEOPLASM OF BREAST IN FEMALE, ESTROGEN RECEPTOR POSITIVE, UNSPECIFIED SITE OF BREAST (H): Primary | ICD-10-CM

## 2022-01-01 DIAGNOSIS — E11.65 TYPE 2 DIABETES MELLITUS WITH HYPERGLYCEMIA, WITH LONG-TERM CURRENT USE OF INSULIN (H): Primary | ICD-10-CM

## 2022-01-01 DIAGNOSIS — C50.411 MALIGNANT NEOPLASM OF UPPER-OUTER QUADRANT OF RIGHT BREAST IN FEMALE, ESTROGEN RECEPTOR POSITIVE (H): ICD-10-CM

## 2022-01-01 DIAGNOSIS — Z51.11 ENCOUNTER FOR ANTINEOPLASTIC CHEMOTHERAPY: ICD-10-CM

## 2022-01-01 DIAGNOSIS — R50.9 FEVER OF UNKNOWN ORIGIN: Primary | ICD-10-CM

## 2022-01-01 DIAGNOSIS — F43.21 ADJUSTMENT DISORDER WITH DEPRESSED MOOD: Primary | ICD-10-CM

## 2022-01-01 DIAGNOSIS — J18.9 PNEUMONIA OF RIGHT UPPER LOBE DUE TO INFECTIOUS ORGANISM: ICD-10-CM

## 2022-01-01 DIAGNOSIS — C79.51 BONE METASTASIS: ICD-10-CM

## 2022-01-01 DIAGNOSIS — Z85.3 PERSONAL HISTORY OF MALIGNANT NEOPLASM OF BREAST: ICD-10-CM

## 2022-01-01 DIAGNOSIS — G62.9 NEUROPATHY: ICD-10-CM

## 2022-01-01 DIAGNOSIS — Z01.812 ENCOUNTER FOR PREPROCEDURE SCREENING LABORATORY TESTING FOR COVID-19: ICD-10-CM

## 2022-01-01 DIAGNOSIS — Z11.52 ENCOUNTER FOR PREPROCEDURE SCREENING LABORATORY TESTING FOR COVID-19: Primary | ICD-10-CM

## 2022-01-01 DIAGNOSIS — Z11.59 ENCOUNTER FOR SCREENING FOR OTHER VIRAL DISEASES: Primary | ICD-10-CM

## 2022-01-01 DIAGNOSIS — C50.911 MALIGNANT NEOPLASM OF RIGHT BREAST IN FEMALE, ESTROGEN RECEPTOR POSITIVE, UNSPECIFIED SITE OF BREAST (H): ICD-10-CM

## 2022-01-01 DIAGNOSIS — Z79.4 TYPE 2 DIABETES MELLITUS WITH HYPERGLYCEMIA, WITH LONG-TERM CURRENT USE OF INSULIN (H): Primary | ICD-10-CM

## 2022-01-01 DIAGNOSIS — C50.912 BILATERAL MALIGNANT NEOPLASM OF BREAST IN FEMALE, ESTROGEN RECEPTOR POSITIVE, UNSPECIFIED SITE OF BREAST (H): Primary | ICD-10-CM

## 2022-01-01 DIAGNOSIS — J06.9 UPPER RESPIRATORY TRACT INFECTION, UNSPECIFIED TYPE: ICD-10-CM

## 2022-01-01 DIAGNOSIS — Z87.891 HISTORY OF TOBACCO USE: ICD-10-CM

## 2022-01-01 DIAGNOSIS — G47.00 INSOMNIA, UNSPECIFIED TYPE: ICD-10-CM

## 2022-01-01 DIAGNOSIS — I26.99 PULMONARY EMBOLISM, UNSPECIFIED CHRONICITY, UNSPECIFIED PULMONARY EMBOLISM TYPE, UNSPECIFIED WHETHER ACUTE COR PULMONALE PRESENT (H): Primary | ICD-10-CM

## 2022-01-01 DIAGNOSIS — G91.0 COMMUNICATING HYDROCEPHALUS (H): Primary | ICD-10-CM

## 2022-01-01 DIAGNOSIS — C50.411 MALIGNANT NEOPLASM OF UPPER-OUTER QUADRANT OF RIGHT FEMALE BREAST, UNSPECIFIED ESTROGEN RECEPTOR STATUS (H): ICD-10-CM

## 2022-01-01 DIAGNOSIS — Z79.4 TYPE 2 DIABETES MELLITUS WITH HYPERGLYCEMIA, WITH LONG-TERM CURRENT USE OF INSULIN (H): ICD-10-CM

## 2022-01-01 DIAGNOSIS — R11.0 NAUSEA: Primary | ICD-10-CM

## 2022-01-01 DIAGNOSIS — R50.9 FEVER, UNSPECIFIED FEVER CAUSE: ICD-10-CM

## 2022-01-01 DIAGNOSIS — M53.3 SACRAL MASS: ICD-10-CM

## 2022-01-01 DIAGNOSIS — L65.9 ALOPECIA: ICD-10-CM

## 2022-01-01 DIAGNOSIS — Z11.52 ENCOUNTER FOR SCREENING LABORATORY TESTING FOR SEVERE ACUTE RESPIRATORY SYNDROME CORONAVIRUS 2 (SARS-COV-2): ICD-10-CM

## 2022-01-01 DIAGNOSIS — E88.819 INSULIN RESISTANCE: ICD-10-CM

## 2022-01-01 DIAGNOSIS — R00.0 TACHYCARDIA: ICD-10-CM

## 2022-01-01 DIAGNOSIS — Z79.4 LONG TERM (CURRENT) USE OF INSULIN (H): ICD-10-CM

## 2022-01-01 DIAGNOSIS — B44.9 ASPERGILLUS PNEUMONIA (H): Primary | ICD-10-CM

## 2022-01-01 DIAGNOSIS — Z53.9 ERRONEOUS ENCOUNTER--DISREGARD: Primary | ICD-10-CM

## 2022-01-01 DIAGNOSIS — R50.9 FEVER OF UNKNOWN ORIGIN: ICD-10-CM

## 2022-01-01 DIAGNOSIS — I26.93 SINGLE SUBSEGMENTAL PULMONARY EMBOLISM WITHOUT ACUTE COR PULMONALE (H): ICD-10-CM

## 2022-01-01 DIAGNOSIS — Z17.0 MALIGNANT NEOPLASM OF RIGHT BREAST IN FEMALE, ESTROGEN RECEPTOR POSITIVE, UNSPECIFIED SITE OF BREAST (H): Primary | ICD-10-CM

## 2022-01-01 DIAGNOSIS — Z01.812 ENCOUNTER FOR PREPROCEDURE SCREENING LABORATORY TESTING FOR COVID-19: Primary | ICD-10-CM

## 2022-01-01 DIAGNOSIS — B44.9 ASPERGILLOSIS (H): Primary | ICD-10-CM

## 2022-01-01 LAB
1,3 BETA GLUCAN SER-MCNC: <31 PG/ML
ABO/RH(D): NORMAL
ALBUMIN SERPL BCG-MCNC: 3.2 G/DL (ref 3.5–5.2)
ALBUMIN SERPL BCG-MCNC: 3.3 G/DL (ref 3.5–5.2)
ALBUMIN SERPL BCG-MCNC: 3.5 G/DL (ref 3.5–5.2)
ALBUMIN SERPL BCG-MCNC: 3.6 G/DL (ref 3.5–5.2)
ALBUMIN SERPL BCG-MCNC: 3.7 G/DL (ref 3.5–5.2)
ALBUMIN SERPL BCG-MCNC: 3.8 G/DL (ref 3.5–5.2)
ALBUMIN SERPL BCG-MCNC: 3.9 G/DL (ref 3.5–5.2)
ALBUMIN SERPL BCG-MCNC: 4 G/DL (ref 3.5–5.2)
ALBUMIN SERPL BCG-MCNC: 4.1 G/DL (ref 3.5–5.2)
ALBUMIN SERPL-MCNC: 2.9 G/DL (ref 3.4–5)
ALBUMIN SERPL-MCNC: 3.1 G/DL (ref 3.4–5)
ALBUMIN SERPL-MCNC: 3.2 G/DL (ref 3.4–5)
ALBUMIN SERPL-MCNC: 3.2 G/DL (ref 3.4–5)
ALBUMIN SERPL-MCNC: 3.3 G/DL (ref 3.4–5)
ALBUMIN SERPL-MCNC: 3.4 G/DL (ref 3.4–5)
ALBUMIN SERPL-MCNC: 3.5 G/DL (ref 3.4–5)
ALBUMIN SERPL-MCNC: 3.6 G/DL (ref 3.4–5)
ALBUMIN UR-MCNC: 10 MG/DL
ALBUMIN UR-MCNC: NEGATIVE MG/DL
ALBUMIN UR-MCNC: NEGATIVE MG/DL
ALP SERPL-CCNC: 101 U/L (ref 40–150)
ALP SERPL-CCNC: 102 U/L (ref 40–150)
ALP SERPL-CCNC: 104 U/L (ref 40–150)
ALP SERPL-CCNC: 106 U/L (ref 35–104)
ALP SERPL-CCNC: 108 U/L (ref 40–150)
ALP SERPL-CCNC: 109 U/L (ref 35–104)
ALP SERPL-CCNC: 113 U/L (ref 35–104)
ALP SERPL-CCNC: 114 U/L (ref 35–104)
ALP SERPL-CCNC: 117 U/L (ref 35–104)
ALP SERPL-CCNC: 117 U/L (ref 35–104)
ALP SERPL-CCNC: 130 U/L (ref 35–104)
ALP SERPL-CCNC: 135 U/L (ref 35–104)
ALP SERPL-CCNC: 136 U/L (ref 35–104)
ALP SERPL-CCNC: 177 U/L (ref 35–104)
ALP SERPL-CCNC: 211 U/L (ref 35–104)
ALP SERPL-CCNC: 219 U/L (ref 35–104)
ALP SERPL-CCNC: 235 U/L (ref 35–104)
ALP SERPL-CCNC: 235 U/L (ref 35–104)
ALP SERPL-CCNC: 70 U/L (ref 35–104)
ALP SERPL-CCNC: 71 U/L (ref 40–150)
ALP SERPL-CCNC: 73 U/L (ref 40–150)
ALP SERPL-CCNC: 75 U/L (ref 35–104)
ALP SERPL-CCNC: 76 U/L (ref 40–150)
ALP SERPL-CCNC: 77 U/L (ref 35–104)
ALP SERPL-CCNC: 80 U/L (ref 40–150)
ALP SERPL-CCNC: 81 U/L (ref 40–150)
ALP SERPL-CCNC: 82 U/L (ref 35–104)
ALP SERPL-CCNC: 82 U/L (ref 40–150)
ALP SERPL-CCNC: 89 U/L (ref 40–150)
ALP SERPL-CCNC: 91 U/L (ref 40–150)
ALP SERPL-CCNC: 92 U/L (ref 40–150)
ALP SERPL-CCNC: 96 U/L (ref 40–150)
ALP SERPL-CCNC: 98 U/L (ref 35–104)
ALP SERPL-CCNC: 98 U/L (ref 40–150)
ALP SERPL-CCNC: 98 U/L (ref 40–150)
ALT SERPL W P-5'-P-CCNC: 118 U/L (ref 10–35)
ALT SERPL W P-5'-P-CCNC: 119 U/L (ref 10–35)
ALT SERPL W P-5'-P-CCNC: 124 U/L (ref 10–35)
ALT SERPL W P-5'-P-CCNC: 157 U/L (ref 10–35)
ALT SERPL W P-5'-P-CCNC: 164 U/L (ref 10–35)
ALT SERPL W P-5'-P-CCNC: 22 U/L (ref 10–35)
ALT SERPL W P-5'-P-CCNC: 25 U/L (ref 0–50)
ALT SERPL W P-5'-P-CCNC: 25 U/L (ref 0–50)
ALT SERPL W P-5'-P-CCNC: 27 U/L (ref 0–50)
ALT SERPL W P-5'-P-CCNC: 27 U/L (ref 10–35)
ALT SERPL W P-5'-P-CCNC: 28 U/L (ref 0–50)
ALT SERPL W P-5'-P-CCNC: 28 U/L (ref 0–50)
ALT SERPL W P-5'-P-CCNC: 29 U/L (ref 0–50)
ALT SERPL W P-5'-P-CCNC: 29 U/L (ref 0–50)
ALT SERPL W P-5'-P-CCNC: 30 U/L (ref 0–50)
ALT SERPL W P-5'-P-CCNC: 30 U/L (ref 10–35)
ALT SERPL W P-5'-P-CCNC: 31 U/L (ref 0–50)
ALT SERPL W P-5'-P-CCNC: 31 U/L (ref 10–35)
ALT SERPL W P-5'-P-CCNC: 32 U/L (ref 0–50)
ALT SERPL W P-5'-P-CCNC: 33 U/L (ref 0–50)
ALT SERPL W P-5'-P-CCNC: 34 U/L (ref 0–50)
ALT SERPL W P-5'-P-CCNC: 37 U/L (ref 0–50)
ALT SERPL W P-5'-P-CCNC: 37 U/L (ref 10–35)
ALT SERPL W P-5'-P-CCNC: 40 U/L (ref 0–50)
ALT SERPL W P-5'-P-CCNC: 41 U/L (ref 10–35)
ALT SERPL W P-5'-P-CCNC: 42 U/L (ref 10–35)
ALT SERPL W P-5'-P-CCNC: 49 U/L (ref 10–35)
ALT SERPL W P-5'-P-CCNC: 49 U/L (ref 10–35)
ALT SERPL W P-5'-P-CCNC: 51 U/L (ref 10–35)
ALT SERPL W P-5'-P-CCNC: 59 U/L (ref 10–35)
ALT SERPL W P-5'-P-CCNC: 62 U/L (ref 10–35)
AMPHETAMINES UR QL SCN: ABNORMAL
ANION GAP SERPL CALCULATED.3IONS-SCNC: 10 MMOL/L (ref 3–14)
ANION GAP SERPL CALCULATED.3IONS-SCNC: 10 MMOL/L (ref 7–15)
ANION GAP SERPL CALCULATED.3IONS-SCNC: 11 MMOL/L (ref 7–15)
ANION GAP SERPL CALCULATED.3IONS-SCNC: 12 MMOL/L (ref 7–15)
ANION GAP SERPL CALCULATED.3IONS-SCNC: 5 MMOL/L (ref 3–14)
ANION GAP SERPL CALCULATED.3IONS-SCNC: 5 MMOL/L (ref 3–14)
ANION GAP SERPL CALCULATED.3IONS-SCNC: 5 MMOL/L (ref 7–15)
ANION GAP SERPL CALCULATED.3IONS-SCNC: 6 MMOL/L (ref 3–14)
ANION GAP SERPL CALCULATED.3IONS-SCNC: 7 MMOL/L (ref 3–14)
ANION GAP SERPL CALCULATED.3IONS-SCNC: 8 MMOL/L (ref 3–14)
ANION GAP SERPL CALCULATED.3IONS-SCNC: 8 MMOL/L (ref 7–15)
ANION GAP SERPL CALCULATED.3IONS-SCNC: 9 MMOL/L (ref 3–14)
ANION GAP SERPL CALCULATED.3IONS-SCNC: 9 MMOL/L (ref 7–15)
ANTIBODY SCREEN: NEGATIVE
APPEARANCE CSF: ABNORMAL
APPEARANCE CSF: ABNORMAL
APPEARANCE CSF: CLEAR
APPEARANCE UR: CLEAR
APTT PPP: 76 SECONDS (ref 22–38)
AST SERPL W P-5'-P-CCNC: 19 U/L (ref 0–45)
AST SERPL W P-5'-P-CCNC: 21 U/L (ref 0–45)
AST SERPL W P-5'-P-CCNC: 22 U/L (ref 0–45)
AST SERPL W P-5'-P-CCNC: 22 U/L (ref 0–45)
AST SERPL W P-5'-P-CCNC: 23 U/L (ref 0–45)
AST SERPL W P-5'-P-CCNC: 24 U/L (ref 0–45)
AST SERPL W P-5'-P-CCNC: 25 U/L (ref 0–45)
AST SERPL W P-5'-P-CCNC: 25 U/L (ref 0–45)
AST SERPL W P-5'-P-CCNC: 27 U/L (ref 0–45)
AST SERPL W P-5'-P-CCNC: 28 U/L (ref 0–45)
AST SERPL W P-5'-P-CCNC: 29 U/L (ref 0–45)
AST SERPL W P-5'-P-CCNC: 29 U/L (ref 10–35)
AST SERPL W P-5'-P-CCNC: 29 U/L (ref 10–35)
AST SERPL W P-5'-P-CCNC: 32 U/L (ref 0–45)
AST SERPL W P-5'-P-CCNC: 32 U/L (ref 10–35)
AST SERPL W P-5'-P-CCNC: 33 U/L (ref 0–45)
AST SERPL W P-5'-P-CCNC: 38 U/L (ref 10–35)
AST SERPL W P-5'-P-CCNC: 40 U/L (ref 0–45)
AST SERPL W P-5'-P-CCNC: 43 U/L (ref 0–45)
AST SERPL W P-5'-P-CCNC: 44 U/L (ref 10–35)
AST SERPL W P-5'-P-CCNC: 46 U/L (ref 10–35)
AST SERPL W P-5'-P-CCNC: 47 U/L (ref 10–35)
AST SERPL W P-5'-P-CCNC: 47 U/L (ref 10–35)
AST SERPL W P-5'-P-CCNC: 48 U/L (ref 10–35)
AST SERPL W P-5'-P-CCNC: 49 U/L (ref 10–35)
AST SERPL W P-5'-P-CCNC: 49 U/L (ref 10–35)
AST SERPL W P-5'-P-CCNC: 51 U/L (ref 10–35)
AST SERPL W P-5'-P-CCNC: 59 U/L (ref 10–35)
AST SERPL W P-5'-P-CCNC: 61 U/L (ref 10–35)
AST SERPL W P-5'-P-CCNC: 61 U/L (ref 10–35)
AST SERPL W P-5'-P-CCNC: 62 U/L (ref 10–35)
AST SERPL W P-5'-P-CCNC: 95 U/L (ref 10–35)
ATRIAL RATE - MUSE: 117 BPM
ATRIAL RATE - MUSE: 74 BPM
ATRIAL RATE - MUSE: 83 BPM
ATRIAL RATE - MUSE: 88 BPM
ATRIAL RATE - MUSE: 95 BPM
BACTERIA BLD CULT: NO GROWTH
BARBITURATES UR QL SCN: ABNORMAL
BASOPHILS # BLD AUTO: 0 10E3/UL (ref 0–0.2)
BASOPHILS # BLD AUTO: 0.1 10E3/UL (ref 0–0.2)
BASOPHILS # BLD MANUAL: 0.1 10E3/UL (ref 0–0.2)
BASOPHILS NFR BLD AUTO: 0 %
BASOPHILS NFR BLD AUTO: 1 %
BASOPHILS NFR BLD MANUAL: 1 %
BENZODIAZ UR QL SCN: ABNORMAL
BILIRUB SERPL-MCNC: 0.2 MG/DL
BILIRUB SERPL-MCNC: 0.2 MG/DL (ref 0.2–1.3)
BILIRUB SERPL-MCNC: 0.3 MG/DL
BILIRUB SERPL-MCNC: 0.3 MG/DL
BILIRUB SERPL-MCNC: 0.3 MG/DL (ref 0.2–1.3)
BILIRUB SERPL-MCNC: 0.4 MG/DL
BILIRUB SERPL-MCNC: 0.4 MG/DL (ref 0.2–1.3)
BILIRUB SERPL-MCNC: 0.4 MG/DL (ref 0.2–1.3)
BILIRUB SERPL-MCNC: 0.5 MG/DL
BILIRUB SERPL-MCNC: 0.5 MG/DL (ref 0.2–1.3)
BILIRUB SERPL-MCNC: 0.6 MG/DL
BILIRUB SERPL-MCNC: 0.6 MG/DL
BILIRUB SERPL-MCNC: <0.2 MG/DL
BILIRUB UR QL STRIP: NEGATIVE
BUN SERPL-MCNC: 10 MG/DL (ref 7–30)
BUN SERPL-MCNC: 11 MG/DL (ref 7–30)
BUN SERPL-MCNC: 11.4 MG/DL (ref 6–20)
BUN SERPL-MCNC: 11.7 MG/DL (ref 6–20)
BUN SERPL-MCNC: 13 MG/DL (ref 6–20)
BUN SERPL-MCNC: 13 MG/DL (ref 7–30)
BUN SERPL-MCNC: 13 MG/DL (ref 7–30)
BUN SERPL-MCNC: 13.9 MG/DL (ref 6–20)
BUN SERPL-MCNC: 14 MG/DL (ref 7–30)
BUN SERPL-MCNC: 14.4 MG/DL (ref 6–20)
BUN SERPL-MCNC: 14.9 MG/DL (ref 6–20)
BUN SERPL-MCNC: 15 MG/DL (ref 6–20)
BUN SERPL-MCNC: 15 MG/DL (ref 7–30)
BUN SERPL-MCNC: 15 MG/DL (ref 7–30)
BUN SERPL-MCNC: 16 MG/DL (ref 7–30)
BUN SERPL-MCNC: 16.3 MG/DL (ref 6–20)
BUN SERPL-MCNC: 16.4 MG/DL (ref 6–20)
BUN SERPL-MCNC: 16.4 MG/DL (ref 6–20)
BUN SERPL-MCNC: 16.8 MG/DL (ref 6–20)
BUN SERPL-MCNC: 17 MG/DL (ref 7–30)
BUN SERPL-MCNC: 17 MG/DL (ref 7–30)
BUN SERPL-MCNC: 17.4 MG/DL (ref 6–20)
BUN SERPL-MCNC: 17.7 MG/DL (ref 6–20)
BUN SERPL-MCNC: 17.8 MG/DL (ref 6–20)
BUN SERPL-MCNC: 18 MG/DL (ref 7–30)
BUN SERPL-MCNC: 18.5 MG/DL (ref 6–20)
BUN SERPL-MCNC: 18.5 MG/DL (ref 6–20)
BUN SERPL-MCNC: 18.9 MG/DL (ref 6–20)
BUN SERPL-MCNC: 19 MG/DL (ref 7–30)
BUN SERPL-MCNC: 19 MG/DL (ref 7–30)
BUN SERPL-MCNC: 21.2 MG/DL (ref 6–20)
BUN SERPL-MCNC: 22 MG/DL (ref 7–30)
BUN SERPL-MCNC: 22 MG/DL (ref 7–30)
BUN SERPL-MCNC: 22.2 MG/DL (ref 6–20)
BUN SERPL-MCNC: 24 MG/DL (ref 6–20)
BUN SERPL-MCNC: 25.1 MG/DL (ref 6–20)
BUN SERPL-MCNC: 8 MG/DL (ref 7–30)
BUN SERPL-MCNC: 8.8 MG/DL (ref 6–20)
BZE UR QL SCN: ABNORMAL
C DIFF TOX B STL QL: POSITIVE
C PNEUM DNA SPEC QL NAA+PROBE: NOT DETECTED
C PNEUM DNA SPEC QL NAA+PROBE: NOT DETECTED
CALCIUM SERPL-MCNC: 7.6 MG/DL (ref 8.5–10.1)
CALCIUM SERPL-MCNC: 7.8 MG/DL (ref 8.6–10)
CALCIUM SERPL-MCNC: 8 MG/DL (ref 8.5–10.1)
CALCIUM SERPL-MCNC: 8.2 MG/DL (ref 8.5–10.1)
CALCIUM SERPL-MCNC: 8.2 MG/DL (ref 8.6–10)
CALCIUM SERPL-MCNC: 8.3 MG/DL (ref 8.6–10)
CALCIUM SERPL-MCNC: 8.4 MG/DL (ref 8.6–10)
CALCIUM SERPL-MCNC: 8.4 MG/DL (ref 8.6–10)
CALCIUM SERPL-MCNC: 8.5 MG/DL (ref 8.5–10.1)
CALCIUM SERPL-MCNC: 8.5 MG/DL (ref 8.5–10.1)
CALCIUM SERPL-MCNC: 8.5 MG/DL (ref 8.6–10)
CALCIUM SERPL-MCNC: 8.6 MG/DL (ref 8.5–10.1)
CALCIUM SERPL-MCNC: 8.6 MG/DL (ref 8.6–10)
CALCIUM SERPL-MCNC: 8.7 MG/DL (ref 8.5–10.1)
CALCIUM SERPL-MCNC: 8.7 MG/DL (ref 8.6–10)
CALCIUM SERPL-MCNC: 8.7 MG/DL (ref 8.6–10)
CALCIUM SERPL-MCNC: 8.8 MG/DL (ref 8.5–10.1)
CALCIUM SERPL-MCNC: 8.8 MG/DL (ref 8.5–10.1)
CALCIUM SERPL-MCNC: 8.9 MG/DL (ref 8.5–10.1)
CALCIUM SERPL-MCNC: 8.9 MG/DL (ref 8.6–10)
CALCIUM SERPL-MCNC: 9 MG/DL (ref 8.5–10.1)
CALCIUM SERPL-MCNC: 9 MG/DL (ref 8.5–10.1)
CALCIUM SERPL-MCNC: 9 MG/DL (ref 8.6–10)
CALCIUM SERPL-MCNC: 9.1 MG/DL (ref 8.6–10)
CALCIUM SERPL-MCNC: 9.1 MG/DL (ref 8.6–10)
CALCIUM SERPL-MCNC: 9.2 MG/DL (ref 8.5–10.1)
CALCIUM SERPL-MCNC: 9.2 MG/DL (ref 8.6–10)
CALCIUM SERPL-MCNC: 9.3 MG/DL (ref 8.6–10)
CALCIUM SERPL-MCNC: 9.3 MG/DL (ref 8.6–10)
CALCIUM SERPL-MCNC: 9.4 MG/DL (ref 8.5–10.1)
CALCIUM SERPL-MCNC: 9.4 MG/DL (ref 8.5–10.1)
CALCIUM SERPL-MCNC: 9.4 MG/DL (ref 8.6–10)
CALCIUM SERPL-MCNC: 9.5 MG/DL (ref 8.5–10.1)
CALCIUM SERPL-MCNC: 9.5 MG/DL (ref 8.6–10)
CALCIUM SERPL-MCNC: 9.6 MG/DL (ref 8.6–10)
CALCIUM SERPL-MCNC: 9.6 MG/DL (ref 8.6–10)
CANCER AG15-3 SERPL-ACNC: 230 U/ML
CANCER AG15-3 SERPL-ACNC: 237 U/ML
CANCER AG15-3 SERPL-ACNC: 238 U/ML
CANCER AG15-3 SERPL-ACNC: 296 U/ML
CANCER AG15-3 SERPL-ACNC: 428 U/ML
CANCER AG15-3 SERPL-ACNC: 457 U/ML
CANCER AG15-3 SERPL-ACNC: 552 U/ML
CANCER AG27-29 SERPL-ACNC: 388 U/ML (ref 0–39)
CANCER AG27-29 SERPL-ACNC: 428 U/ML (ref 0–39)
CANCER AG27-29 SERPL-ACNC: 506 U/ML (ref 0–39)
CANCER AG27-29 SERPL-ACNC: 606 U/ML (ref 0–39)
CANCER AG27-29 SERPL-ACNC: 687 U/ML (ref 0–39)
CANCER AG27-29 SERPL-ACNC: 823 U/ML (ref 0–39)
CANNABINOIDS UR QL SCN: ABNORMAL
CEA SERPL-MCNC: 1613 NG/ML
CEA SERPL-MCNC: 1981 NG/ML
CEA SERPL-MCNC: 2192 NG/ML
CEA SERPL-MCNC: 234 NG/ML
CEA SERPL-MCNC: 241 NG/ML
CEA SERPL-MCNC: 244 NG/ML
CEA SERPL-MCNC: 256 NG/ML
CEA SERPL-MCNC: 294 NG/ML
CEA SERPL-MCNC: 354 UG/L (ref 0–2.5)
CEA SERPL-MCNC: 374.2 UG/L (ref 0–2.5)
CEA SERPL-MCNC: 413 NG/ML
CEA SERPL-MCNC: 440 NG/ML
CEA SERPL-MCNC: 457.3 UG/L (ref 0–2.5)
CEA SERPL-MCNC: 469 NG/ML
CEA SERPL-MCNC: 582 UG/L (ref 0–2.5)
CEA SERPL-MCNC: 656.6 UG/L (ref 0–2.5)
CEA SERPL-MCNC: 817 UG/L (ref 0–2.5)
CEA SERPL-MCNC: 947 NG/ML
CHLORIDE BLD-SCNC: 105 MMOL/L (ref 94–109)
CHLORIDE BLD-SCNC: 106 MMOL/L (ref 94–109)
CHLORIDE BLD-SCNC: 107 MMOL/L (ref 94–109)
CHLORIDE BLD-SCNC: 107 MMOL/L (ref 94–109)
CHLORIDE BLD-SCNC: 108 MMOL/L (ref 94–109)
CHLORIDE BLD-SCNC: 109 MMOL/L (ref 94–109)
CHLORIDE BLD-SCNC: 109 MMOL/L (ref 94–109)
CHLORIDE BLD-SCNC: 114 MMOL/L (ref 94–109)
CHLORIDE SERPL-SCNC: 101 MMOL/L (ref 98–107)
CHLORIDE SERPL-SCNC: 102 MMOL/L (ref 98–107)
CHLORIDE SERPL-SCNC: 103 MMOL/L (ref 98–107)
CHLORIDE SERPL-SCNC: 104 MMOL/L (ref 98–107)
CHLORIDE SERPL-SCNC: 105 MMOL/L (ref 98–107)
CHLORIDE SERPL-SCNC: 106 MMOL/L (ref 98–107)
CHLORIDE SERPL-SCNC: 109 MMOL/L (ref 98–107)
CHLORIDE SERPL-SCNC: 109 MMOL/L (ref 98–107)
CHLORIDE SERPL-SCNC: 96 MMOL/L (ref 98–107)
CHLORIDE SERPL-SCNC: 98 MMOL/L (ref 98–107)
CHLORIDE SERPL-SCNC: 98 MMOL/L (ref 98–107)
CHLORIDE SERPL-SCNC: 99 MMOL/L (ref 98–107)
CHOLEST SERPL-MCNC: 242 MG/DL
CO2 SERPL-SCNC: 23 MMOL/L (ref 20–32)
CO2 SERPL-SCNC: 24 MMOL/L (ref 20–32)
CO2 SERPL-SCNC: 24 MMOL/L (ref 20–32)
CO2 SERPL-SCNC: 26 MMOL/L (ref 20–32)
CO2 SERPL-SCNC: 27 MMOL/L (ref 20–32)
CO2 SERPL-SCNC: 28 MMOL/L (ref 20–32)
CO2 SERPL-SCNC: 29 MMOL/L (ref 20–32)
COLOR CSF: ABNORMAL
COLOR CSF: ABNORMAL
COLOR CSF: COLORLESS
COLOR UR AUTO: ABNORMAL
COLOR UR AUTO: NORMAL
COLOR UR AUTO: YELLOW
CREAT SERPL-MCNC: 0.68 MG/DL (ref 0.51–0.95)
CREAT SERPL-MCNC: 0.69 MG/DL (ref 0.51–0.95)
CREAT SERPL-MCNC: 0.7 MG/DL (ref 0.51–0.95)
CREAT SERPL-MCNC: 0.74 MG/DL (ref 0.51–0.95)
CREAT SERPL-MCNC: 0.77 MG/DL (ref 0.52–1.04)
CREAT SERPL-MCNC: 0.78 MG/DL (ref 0.51–0.95)
CREAT SERPL-MCNC: 0.79 MG/DL (ref 0.51–0.95)
CREAT SERPL-MCNC: 0.8 MG/DL (ref 0.51–0.95)
CREAT SERPL-MCNC: 0.8 MG/DL (ref 0.52–1.04)
CREAT SERPL-MCNC: 0.82 MG/DL (ref 0.51–0.95)
CREAT SERPL-MCNC: 0.83 MG/DL (ref 0.52–1.04)
CREAT SERPL-MCNC: 0.84 MG/DL (ref 0.52–1.04)
CREAT SERPL-MCNC: 0.85 MG/DL (ref 0.51–0.95)
CREAT SERPL-MCNC: 0.85 MG/DL (ref 0.51–0.95)
CREAT SERPL-MCNC: 0.85 MG/DL (ref 0.52–1.04)
CREAT SERPL-MCNC: 0.85 MG/DL (ref 0.52–1.04)
CREAT SERPL-MCNC: 0.87 MG/DL (ref 0.52–1.04)
CREAT SERPL-MCNC: 0.87 MG/DL (ref 0.52–1.04)
CREAT SERPL-MCNC: 0.88 MG/DL (ref 0.52–1.04)
CREAT SERPL-MCNC: 0.89 MG/DL (ref 0.51–0.95)
CREAT SERPL-MCNC: 0.89 MG/DL (ref 0.51–0.95)
CREAT SERPL-MCNC: 0.89 MG/DL (ref 0.52–1.04)
CREAT SERPL-MCNC: 0.9 MG/DL (ref 0.51–0.95)
CREAT SERPL-MCNC: 0.9 MG/DL (ref 0.52–1.04)
CREAT SERPL-MCNC: 0.93 MG/DL (ref 0.51–0.95)
CREAT SERPL-MCNC: 0.93 MG/DL (ref 0.52–1.04)
CREAT SERPL-MCNC: 0.94 MG/DL (ref 0.51–0.95)
CREAT SERPL-MCNC: 0.95 MG/DL (ref 0.51–0.95)
CREAT SERPL-MCNC: 0.95 MG/DL (ref 0.51–0.95)
CREAT SERPL-MCNC: 0.96 MG/DL (ref 0.51–0.95)
CREAT SERPL-MCNC: 0.99 MG/DL (ref 0.51–0.95)
CREAT SERPL-MCNC: 1.01 MG/DL (ref 0.51–0.95)
CREAT SERPL-MCNC: 1.01 MG/DL (ref 0.52–1.04)
CREAT SERPL-MCNC: 1.01 MG/DL (ref 0.52–1.04)
CREAT SERPL-MCNC: 1.02 MG/DL (ref 0.51–0.95)
CREAT SERPL-MCNC: 1.05 MG/DL (ref 0.52–1.04)
CREAT SERPL-MCNC: 1.07 MG/DL (ref 0.51–0.95)
CREAT SERPL-MCNC: 1.26 MG/DL (ref 0.51–0.95)
CREAT SERPL-MCNC: 1.29 MG/DL (ref 0.51–0.95)
CRP SERPL-MCNC: 11.4 MG/L
CRP SERPL-MCNC: 21 MG/L (ref 0–8)
CRP SERPL-MCNC: 61 MG/L (ref 0–8)
CRP SERPL-MCNC: 7.05 MG/L
CRP SERPL-MCNC: 9.09 MG/L
CRP SERPL-MCNC: 9.94 MG/L
CYTOGENETICS ADDENDUM: NORMAL
D DIMER PPP FEU-MCNC: 2.54 UG/ML FEU (ref 0–0.5)
DEPRECATED HCO3 PLAS-SCNC: 21 MMOL/L (ref 22–29)
DEPRECATED HCO3 PLAS-SCNC: 22 MMOL/L (ref 22–29)
DEPRECATED HCO3 PLAS-SCNC: 22 MMOL/L (ref 22–29)
DEPRECATED HCO3 PLAS-SCNC: 23 MMOL/L (ref 22–29)
DEPRECATED HCO3 PLAS-SCNC: 24 MMOL/L (ref 22–29)
DEPRECATED HCO3 PLAS-SCNC: 25 MMOL/L (ref 22–29)
DEPRECATED HCO3 PLAS-SCNC: 26 MMOL/L (ref 22–29)
DEPRECATED HCO3 PLAS-SCNC: 27 MMOL/L (ref 22–29)
DEPRECATED HCO3 PLAS-SCNC: 27 MMOL/L (ref 22–29)
DEPRECATED HCO3 PLAS-SCNC: 28 MMOL/L (ref 22–29)
DEPRECATED HCO3 PLAS-SCNC: 28 MMOL/L (ref 22–29)
DIASTOLIC BLOOD PRESSURE - MUSE: NORMAL MMHG
EOSINOPHIL # BLD AUTO: 0 10E3/UL (ref 0–0.7)
EOSINOPHIL # BLD AUTO: 0.1 10E3/UL (ref 0–0.7)
EOSINOPHIL # BLD AUTO: 0.2 10E3/UL (ref 0–0.7)
EOSINOPHIL # BLD AUTO: 0.3 10E3/UL (ref 0–0.7)
EOSINOPHIL # BLD AUTO: 0.4 10E3/UL (ref 0–0.7)
EOSINOPHIL # BLD AUTO: 0.4 10E3/UL (ref 0–0.7)
EOSINOPHIL # BLD MANUAL: 0.3 10E3/UL (ref 0–0.7)
EOSINOPHIL NFR BLD AUTO: 0 %
EOSINOPHIL NFR BLD AUTO: 1 %
EOSINOPHIL NFR BLD AUTO: 2 %
EOSINOPHIL NFR BLD AUTO: 3 %
EOSINOPHIL NFR BLD AUTO: 4 %
EOSINOPHIL NFR BLD MANUAL: 3 %
ERYTHROCYTE [DISTWIDTH] IN BLOOD BY AUTOMATED COUNT: 16.4 % (ref 10–15)
ERYTHROCYTE [DISTWIDTH] IN BLOOD BY AUTOMATED COUNT: 16.5 % (ref 10–15)
ERYTHROCYTE [DISTWIDTH] IN BLOOD BY AUTOMATED COUNT: 16.6 % (ref 10–15)
ERYTHROCYTE [DISTWIDTH] IN BLOOD BY AUTOMATED COUNT: 16.7 % (ref 10–15)
ERYTHROCYTE [DISTWIDTH] IN BLOOD BY AUTOMATED COUNT: 16.8 % (ref 10–15)
ERYTHROCYTE [DISTWIDTH] IN BLOOD BY AUTOMATED COUNT: 17.1 % (ref 10–15)
ERYTHROCYTE [DISTWIDTH] IN BLOOD BY AUTOMATED COUNT: 17.4 % (ref 10–15)
ERYTHROCYTE [DISTWIDTH] IN BLOOD BY AUTOMATED COUNT: 17.5 % (ref 10–15)
ERYTHROCYTE [DISTWIDTH] IN BLOOD BY AUTOMATED COUNT: 17.7 % (ref 10–15)
ERYTHROCYTE [DISTWIDTH] IN BLOOD BY AUTOMATED COUNT: 18 % (ref 10–15)
ERYTHROCYTE [DISTWIDTH] IN BLOOD BY AUTOMATED COUNT: 18.1 % (ref 10–15)
ERYTHROCYTE [DISTWIDTH] IN BLOOD BY AUTOMATED COUNT: 18.2 % (ref 10–15)
ERYTHROCYTE [DISTWIDTH] IN BLOOD BY AUTOMATED COUNT: 18.2 % (ref 10–15)
ERYTHROCYTE [DISTWIDTH] IN BLOOD BY AUTOMATED COUNT: 18.3 % (ref 10–15)
ERYTHROCYTE [DISTWIDTH] IN BLOOD BY AUTOMATED COUNT: 18.4 % (ref 10–15)
ERYTHROCYTE [DISTWIDTH] IN BLOOD BY AUTOMATED COUNT: 18.5 % (ref 10–15)
ERYTHROCYTE [DISTWIDTH] IN BLOOD BY AUTOMATED COUNT: 18.6 % (ref 10–15)
ERYTHROCYTE [DISTWIDTH] IN BLOOD BY AUTOMATED COUNT: 18.7 % (ref 10–15)
ERYTHROCYTE [DISTWIDTH] IN BLOOD BY AUTOMATED COUNT: 18.8 % (ref 10–15)
ERYTHROCYTE [DISTWIDTH] IN BLOOD BY AUTOMATED COUNT: 18.8 % (ref 10–15)
ERYTHROCYTE [DISTWIDTH] IN BLOOD BY AUTOMATED COUNT: 19 % (ref 10–15)
ERYTHROCYTE [DISTWIDTH] IN BLOOD BY AUTOMATED COUNT: 19 % (ref 10–15)
ERYTHROCYTE [DISTWIDTH] IN BLOOD BY AUTOMATED COUNT: 19.2 % (ref 10–15)
ERYTHROCYTE [DISTWIDTH] IN BLOOD BY AUTOMATED COUNT: 19.9 % (ref 10–15)
ERYTHROCYTE [DISTWIDTH] IN BLOOD BY AUTOMATED COUNT: 19.9 % (ref 10–15)
ERYTHROCYTE [DISTWIDTH] IN BLOOD BY AUTOMATED COUNT: 20.1 % (ref 10–15)
ERYTHROCYTE [DISTWIDTH] IN BLOOD BY AUTOMATED COUNT: 20.2 % (ref 10–15)
ERYTHROCYTE [DISTWIDTH] IN BLOOD BY AUTOMATED COUNT: 20.4 % (ref 10–15)
ERYTHROCYTE [DISTWIDTH] IN BLOOD BY AUTOMATED COUNT: 20.6 % (ref 10–15)
ERYTHROCYTE [DISTWIDTH] IN BLOOD BY AUTOMATED COUNT: 20.8 % (ref 10–15)
ERYTHROCYTE [DISTWIDTH] IN BLOOD BY AUTOMATED COUNT: 21.2 % (ref 10–15)
ERYTHROCYTE [SEDIMENTATION RATE] IN BLOOD BY WESTERGREN METHOD: 29 MM/HR (ref 0–30)
ERYTHROCYTE [SEDIMENTATION RATE] IN BLOOD BY WESTERGREN METHOD: 30 MM/HR (ref 0–30)
FERRITIN SERPL-MCNC: 370 NG/ML (ref 8–252)
FLUAV H1 2009 PAND RNA SPEC QL NAA+PROBE: NOT DETECTED
FLUAV H1 2009 PAND RNA SPEC QL NAA+PROBE: NOT DETECTED
FLUAV H1 RNA SPEC QL NAA+PROBE: NOT DETECTED
FLUAV H1 RNA SPEC QL NAA+PROBE: NOT DETECTED
FLUAV H3 RNA SPEC QL NAA+PROBE: NOT DETECTED
FLUAV H3 RNA SPEC QL NAA+PROBE: NOT DETECTED
FLUAV RNA SPEC QL NAA+PROBE: NEGATIVE
FLUAV RNA SPEC QL NAA+PROBE: NOT DETECTED
FLUAV RNA SPEC QL NAA+PROBE: NOT DETECTED
FLUBV RNA RESP QL NAA+PROBE: NEGATIVE
FLUBV RNA SPEC QL NAA+PROBE: NOT DETECTED
FLUBV RNA SPEC QL NAA+PROBE: NOT DETECTED
FOLATE SERPL-MCNC: 35.3 NG/ML
GALACTOMANNAN AG SERPL QL IA: NEGATIVE
GALACTOMANNAN AG SERPL QL IA: POSITIVE
GALACTOMANNAN AG SPEC IA-ACNC: 0.1
GALACTOMANNAN AG SPEC IA-ACNC: 1.72
GFR SERPL CREATININE-BSD FRML MDRD: 48 ML/MIN/1.73M2
GFR SERPL CREATININE-BSD FRML MDRD: 50 ML/MIN/1.73M2
GFR SERPL CREATININE-BSD FRML MDRD: 61 ML/MIN/1.73M2
GFR SERPL CREATININE-BSD FRML MDRD: 62 ML/MIN/1.73M2
GFR SERPL CREATININE-BSD FRML MDRD: 64 ML/MIN/1.73M2
GFR SERPL CREATININE-BSD FRML MDRD: 65 ML/MIN/1.73M2
GFR SERPL CREATININE-BSD FRML MDRD: 66 ML/MIN/1.73M2
GFR SERPL CREATININE-BSD FRML MDRD: 69 ML/MIN/1.73M2
GFR SERPL CREATININE-BSD FRML MDRD: 70 ML/MIN/1.73M2
GFR SERPL CREATININE-BSD FRML MDRD: 70 ML/MIN/1.73M2
GFR SERPL CREATININE-BSD FRML MDRD: 71 ML/MIN/1.73M2
GFR SERPL CREATININE-BSD FRML MDRD: 72 ML/MIN/1.73M2
GFR SERPL CREATININE-BSD FRML MDRD: 72 ML/MIN/1.73M2
GFR SERPL CREATININE-BSD FRML MDRD: 75 ML/MIN/1.73M2
GFR SERPL CREATININE-BSD FRML MDRD: 76 ML/MIN/1.73M2
GFR SERPL CREATININE-BSD FRML MDRD: 77 ML/MIN/1.73M2
GFR SERPL CREATININE-BSD FRML MDRD: 78 ML/MIN/1.73M2
GFR SERPL CREATININE-BSD FRML MDRD: 78 ML/MIN/1.73M2
GFR SERPL CREATININE-BSD FRML MDRD: 80 ML/MIN/1.73M2
GFR SERPL CREATININE-BSD FRML MDRD: 81 ML/MIN/1.73M2
GFR SERPL CREATININE-BSD FRML MDRD: 82 ML/MIN/1.73M2
GFR SERPL CREATININE-BSD FRML MDRD: 83 ML/MIN/1.73M2
GFR SERPL CREATININE-BSD FRML MDRD: 86 ML/MIN/1.73M2
GFR SERPL CREATININE-BSD FRML MDRD: 86 ML/MIN/1.73M2
GFR SERPL CREATININE-BSD FRML MDRD: 87 ML/MIN/1.73M2
GFR SERPL CREATININE-BSD FRML MDRD: 89 ML/MIN/1.73M2
GFR SERPL CREATININE-BSD FRML MDRD: 90 ML/MIN/1.73M2
GFR SERPL CREATININE-BSD FRML MDRD: >90 ML/MIN/1.73M2
GLUCOSE BLD-MCNC: 106 MG/DL (ref 70–99)
GLUCOSE BLD-MCNC: 109 MG/DL (ref 70–99)
GLUCOSE BLD-MCNC: 113 MG/DL (ref 70–99)
GLUCOSE BLD-MCNC: 119 MG/DL (ref 70–99)
GLUCOSE BLD-MCNC: 124 MG/DL (ref 70–99)
GLUCOSE BLD-MCNC: 128 MG/DL (ref 70–99)
GLUCOSE BLD-MCNC: 129 MG/DL (ref 70–99)
GLUCOSE BLD-MCNC: 130 MG/DL (ref 70–99)
GLUCOSE BLD-MCNC: 132 MG/DL (ref 70–99)
GLUCOSE BLD-MCNC: 133 MG/DL (ref 70–99)
GLUCOSE BLD-MCNC: 133 MG/DL (ref 70–99)
GLUCOSE BLD-MCNC: 138 MG/DL (ref 70–99)
GLUCOSE BLD-MCNC: 139 MG/DL (ref 70–99)
GLUCOSE BLD-MCNC: 140 MG/DL (ref 70–99)
GLUCOSE BLD-MCNC: 140 MG/DL (ref 70–99)
GLUCOSE BLD-MCNC: 148 MG/DL (ref 70–99)
GLUCOSE BLD-MCNC: 157 MG/DL (ref 70–99)
GLUCOSE BLD-MCNC: 159 MG/DL (ref 70–99)
GLUCOSE BLD-MCNC: 174 MG/DL (ref 70–99)
GLUCOSE BLD-MCNC: 191 MG/DL (ref 70–99)
GLUCOSE BLDC GLUCOMTR-MCNC: 162 MG/DL (ref 70–99)
GLUCOSE BLDC GLUCOMTR-MCNC: 168 MG/DL (ref 70–99)
GLUCOSE BLDC GLUCOMTR-MCNC: 227 MG/DL (ref 70–99)
GLUCOSE CSF-MCNC: 140 MG/DL (ref 40–70)
GLUCOSE CSF-MCNC: 143 MG/DL (ref 40–70)
GLUCOSE CSF-MCNC: 168 MG/DL (ref 40–70)
GLUCOSE CSF-MCNC: 173 MG/DL (ref 40–70)
GLUCOSE SERPL-MCNC: 115 MG/DL (ref 70–99)
GLUCOSE SERPL-MCNC: 116 MG/DL (ref 70–99)
GLUCOSE SERPL-MCNC: 117 MG/DL (ref 70–99)
GLUCOSE SERPL-MCNC: 120 MG/DL (ref 70–99)
GLUCOSE SERPL-MCNC: 127 MG/DL (ref 70–99)
GLUCOSE SERPL-MCNC: 128 MG/DL (ref 70–99)
GLUCOSE SERPL-MCNC: 129 MG/DL (ref 70–99)
GLUCOSE SERPL-MCNC: 133 MG/DL (ref 70–99)
GLUCOSE SERPL-MCNC: 135 MG/DL (ref 70–99)
GLUCOSE SERPL-MCNC: 135 MG/DL (ref 70–99)
GLUCOSE SERPL-MCNC: 144 MG/DL (ref 70–99)
GLUCOSE SERPL-MCNC: 147 MG/DL (ref 70–99)
GLUCOSE SERPL-MCNC: 158 MG/DL (ref 70–99)
GLUCOSE SERPL-MCNC: 173 MG/DL (ref 70–99)
GLUCOSE SERPL-MCNC: 174 MG/DL (ref 70–99)
GLUCOSE SERPL-MCNC: 174 MG/DL (ref 70–99)
GLUCOSE SERPL-MCNC: 175 MG/DL (ref 70–99)
GLUCOSE SERPL-MCNC: 180 MG/DL (ref 70–99)
GLUCOSE SERPL-MCNC: 198 MG/DL (ref 70–99)
GLUCOSE SERPL-MCNC: 306 MG/DL (ref 70–99)
GLUCOSE SERPL-MCNC: 361 MG/DL (ref 70–99)
GLUCOSE SERPL-MCNC: 422 MG/DL (ref 70–99)
GLUCOSE UR STRIP-MCNC: NEGATIVE MG/DL
HADV DNA SPEC QL NAA+PROBE: NOT DETECTED
HADV DNA SPEC QL NAA+PROBE: NOT DETECTED
HBA1C MFR BLD: 7 %
HCOV PNL SPEC NAA+PROBE: NOT DETECTED
HCOV PNL SPEC NAA+PROBE: NOT DETECTED
HCT VFR BLD AUTO: 26 % (ref 35–47)
HCT VFR BLD AUTO: 29.7 % (ref 35–47)
HCT VFR BLD AUTO: 30.5 % (ref 35–47)
HCT VFR BLD AUTO: 31.1 % (ref 35–47)
HCT VFR BLD AUTO: 32 % (ref 35–47)
HCT VFR BLD AUTO: 32.1 % (ref 35–47)
HCT VFR BLD AUTO: 32.2 % (ref 35–47)
HCT VFR BLD AUTO: 32.2 % (ref 35–47)
HCT VFR BLD AUTO: 32.5 % (ref 35–47)
HCT VFR BLD AUTO: 32.6 % (ref 35–47)
HCT VFR BLD AUTO: 32.7 % (ref 35–47)
HCT VFR BLD AUTO: 32.9 % (ref 35–47)
HCT VFR BLD AUTO: 33 % (ref 35–47)
HCT VFR BLD AUTO: 33 % (ref 35–47)
HCT VFR BLD AUTO: 33.5 % (ref 35–47)
HCT VFR BLD AUTO: 33.7 % (ref 35–47)
HCT VFR BLD AUTO: 33.8 % (ref 35–47)
HCT VFR BLD AUTO: 33.9 % (ref 35–47)
HCT VFR BLD AUTO: 34 % (ref 35–47)
HCT VFR BLD AUTO: 34.1 % (ref 35–47)
HCT VFR BLD AUTO: 34.1 % (ref 35–47)
HCT VFR BLD AUTO: 34.2 % (ref 35–47)
HCT VFR BLD AUTO: 34.3 % (ref 35–47)
HCT VFR BLD AUTO: 34.3 % (ref 35–47)
HCT VFR BLD AUTO: 35 % (ref 35–47)
HCT VFR BLD AUTO: 35.6 % (ref 35–47)
HCT VFR BLD AUTO: 35.7 % (ref 35–47)
HCT VFR BLD AUTO: 35.8 % (ref 35–47)
HCT VFR BLD AUTO: 36 % (ref 35–47)
HCT VFR BLD AUTO: 36.2 % (ref 35–47)
HCT VFR BLD AUTO: 36.4 % (ref 35–47)
HCT VFR BLD AUTO: 36.5 % (ref 35–47)
HCT VFR BLD AUTO: 36.9 % (ref 35–47)
HCT VFR BLD AUTO: 37.1 % (ref 35–47)
HCT VFR BLD AUTO: 37.6 % (ref 35–47)
HCT VFR BLD AUTO: 37.6 % (ref 35–47)
HCT VFR BLD AUTO: 38 % (ref 35–47)
HCT VFR BLD AUTO: 38.2 % (ref 35–47)
HCT VFR BLD AUTO: 38.3 % (ref 35–47)
HCT VFR BLD AUTO: 38.3 % (ref 35–47)
HCT VFR BLD AUTO: 38.6 % (ref 35–47)
HCT VFR BLD AUTO: 39.1 % (ref 35–47)
HCT VFR BLD AUTO: 39.9 % (ref 35–47)
HCT VFR BLD AUTO: 40.1 % (ref 35–47)
HCT VFR BLD AUTO: 40.4 % (ref 35–47)
HCT VFR BLD AUTO: 40.8 % (ref 35–47)
HDLC SERPL-MCNC: 45 MG/DL
HGB BLD-MCNC: 10 G/DL (ref 11.7–15.7)
HGB BLD-MCNC: 10 G/DL (ref 11.7–15.7)
HGB BLD-MCNC: 10.1 G/DL (ref 11.7–15.7)
HGB BLD-MCNC: 10.4 G/DL (ref 11.7–15.7)
HGB BLD-MCNC: 10.5 G/DL (ref 11.7–15.7)
HGB BLD-MCNC: 10.6 G/DL (ref 11.7–15.7)
HGB BLD-MCNC: 10.7 G/DL (ref 11.7–15.7)
HGB BLD-MCNC: 10.8 G/DL (ref 11.7–15.7)
HGB BLD-MCNC: 10.9 G/DL (ref 11.7–15.7)
HGB BLD-MCNC: 11 G/DL (ref 11.7–15.7)
HGB BLD-MCNC: 11.2 G/DL (ref 11.7–15.7)
HGB BLD-MCNC: 11.3 G/DL (ref 11.7–15.7)
HGB BLD-MCNC: 11.4 G/DL (ref 11.7–15.7)
HGB BLD-MCNC: 11.4 G/DL (ref 11.7–15.7)
HGB BLD-MCNC: 11.5 G/DL (ref 11.7–15.7)
HGB BLD-MCNC: 11.6 G/DL (ref 11.7–15.7)
HGB BLD-MCNC: 11.9 G/DL (ref 11.7–15.7)
HGB BLD-MCNC: 11.9 G/DL (ref 11.7–15.7)
HGB BLD-MCNC: 12 G/DL (ref 11.7–15.7)
HGB BLD-MCNC: 12.2 G/DL (ref 11.7–15.7)
HGB BLD-MCNC: 12.3 G/DL (ref 11.7–15.7)
HGB BLD-MCNC: 12.3 G/DL (ref 11.7–15.7)
HGB BLD-MCNC: 12.4 G/DL (ref 11.7–15.7)
HGB BLD-MCNC: 12.6 G/DL (ref 11.7–15.7)
HGB BLD-MCNC: 12.7 G/DL (ref 11.7–15.7)
HGB BLD-MCNC: 12.7 G/DL (ref 11.7–15.7)
HGB BLD-MCNC: 12.8 G/DL (ref 11.7–15.7)
HGB BLD-MCNC: 12.9 G/DL (ref 11.7–15.7)
HGB BLD-MCNC: 8.2 G/DL (ref 11.7–15.7)
HGB BLD-MCNC: 9.5 G/DL (ref 11.7–15.7)
HGB BLD-MCNC: 9.5 G/DL (ref 11.7–15.7)
HGB BLD-MCNC: 9.9 G/DL (ref 11.7–15.7)
HGB UR QL STRIP: NEGATIVE
HMPV RNA SPEC QL NAA+PROBE: NOT DETECTED
HMPV RNA SPEC QL NAA+PROBE: NOT DETECTED
HOLD SPECIMEN: NORMAL
HOLD SPECIMEN: NORMAL
HPIV1 RNA SPEC QL NAA+PROBE: NOT DETECTED
HPIV1 RNA SPEC QL NAA+PROBE: NOT DETECTED
HPIV2 RNA SPEC QL NAA+PROBE: NOT DETECTED
HPIV2 RNA SPEC QL NAA+PROBE: NOT DETECTED
HPIV3 RNA SPEC QL NAA+PROBE: NOT DETECTED
HPIV3 RNA SPEC QL NAA+PROBE: NOT DETECTED
HPIV4 RNA SPEC QL NAA+PROBE: NOT DETECTED
HPIV4 RNA SPEC QL NAA+PROBE: NOT DETECTED
IMM GRANULOCYTES # BLD: 0 10E3/UL
IMM GRANULOCYTES # BLD: 0.1 10E3/UL
IMM GRANULOCYTES # BLD: 0.2 10E3/UL
IMM GRANULOCYTES # BLD: 0.3 10E3/UL
IMM GRANULOCYTES NFR BLD: 0 %
IMM GRANULOCYTES NFR BLD: 1 %
IMM GRANULOCYTES NFR BLD: 2 %
IMM GRANULOCYTES NFR BLD: 2 %
IMM GRANULOCYTES NFR BLD: 3 %
INR PPP: 0.98 (ref 0.85–1.15)
INR PPP: 1.07 (ref 0.85–1.15)
INR PPP: 1.1 (ref 0.85–1.15)
INTERPRETATION ECG - MUSE: NORMAL
INTERPRETATION: NORMAL
IRON SATN MFR SERPL: 10 % (ref 15–46)
IRON SERPL-MCNC: 23 UG/DL (ref 35–180)
KETONES UR STRIP-MCNC: NEGATIVE MG/DL
LACTATE SERPL-SCNC: 0.6 MMOL/L (ref 0.7–2)
LACTATE SERPL-SCNC: 1.2 MMOL/L (ref 0.7–2)
LACTATE SERPL-SCNC: 1.9 MMOL/L (ref 0.7–2)
LDLC SERPL CALC-MCNC: 142 MG/DL
LEUKOCYTE ESTERASE UR QL STRIP: NEGATIVE
LVEF ECHO: NORMAL
LYMPH ABN NFR CSF MANUAL: 11 %
LYMPH ABN NFR CSF MANUAL: 12 %
LYMPH ABN NFR CSF MANUAL: 7 %
LYMPHOCYTES # BLD AUTO: 0.7 10E3/UL (ref 0.8–5.3)
LYMPHOCYTES # BLD AUTO: 0.9 10E3/UL (ref 0.8–5.3)
LYMPHOCYTES # BLD AUTO: 1.4 10E3/UL (ref 0.8–5.3)
LYMPHOCYTES # BLD AUTO: 1.7 10E3/UL (ref 0.8–5.3)
LYMPHOCYTES # BLD AUTO: 1.8 10E3/UL (ref 0.8–5.3)
LYMPHOCYTES # BLD AUTO: 1.8 10E3/UL (ref 0.8–5.3)
LYMPHOCYTES # BLD AUTO: 2 10E3/UL (ref 0.8–5.3)
LYMPHOCYTES # BLD AUTO: 2 10E3/UL (ref 0.8–5.3)
LYMPHOCYTES # BLD AUTO: 2.2 10E3/UL (ref 0.8–5.3)
LYMPHOCYTES # BLD AUTO: 2.2 10E3/UL (ref 0.8–5.3)
LYMPHOCYTES # BLD AUTO: 2.3 10E3/UL (ref 0.8–5.3)
LYMPHOCYTES # BLD AUTO: 2.4 10E3/UL (ref 0.8–5.3)
LYMPHOCYTES # BLD AUTO: 2.4 10E3/UL (ref 0.8–5.3)
LYMPHOCYTES # BLD AUTO: 2.5 10E3/UL (ref 0.8–5.3)
LYMPHOCYTES # BLD AUTO: 2.5 10E3/UL (ref 0.8–5.3)
LYMPHOCYTES # BLD AUTO: 2.6 10E3/UL (ref 0.8–5.3)
LYMPHOCYTES # BLD AUTO: 2.6 10E3/UL (ref 0.8–5.3)
LYMPHOCYTES # BLD AUTO: 2.8 10E3/UL (ref 0.8–5.3)
LYMPHOCYTES # BLD AUTO: 2.9 10E3/UL (ref 0.8–5.3)
LYMPHOCYTES # BLD AUTO: 3.1 10E3/UL (ref 0.8–5.3)
LYMPHOCYTES # BLD AUTO: 3.2 10E3/UL (ref 0.8–5.3)
LYMPHOCYTES # BLD AUTO: 3.3 10E3/UL (ref 0.8–5.3)
LYMPHOCYTES # BLD AUTO: 3.4 10E3/UL (ref 0.8–5.3)
LYMPHOCYTES # BLD AUTO: 3.5 10E3/UL (ref 0.8–5.3)
LYMPHOCYTES # BLD AUTO: 3.6 10E3/UL (ref 0.8–5.3)
LYMPHOCYTES # BLD AUTO: 3.8 10E3/UL (ref 0.8–5.3)
LYMPHOCYTES # BLD AUTO: 3.8 10E3/UL (ref 0.8–5.3)
LYMPHOCYTES # BLD AUTO: 3.9 10E3/UL (ref 0.8–5.3)
LYMPHOCYTES # BLD AUTO: 4 10E3/UL (ref 0.8–5.3)
LYMPHOCYTES # BLD AUTO: 4.1 10E3/UL (ref 0.8–5.3)
LYMPHOCYTES # BLD AUTO: 5.5 10E3/UL (ref 0.8–5.3)
LYMPHOCYTES # BLD MANUAL: 2.2 10E3/UL (ref 0.8–5.3)
LYMPHOCYTES NFR BLD AUTO: 13 %
LYMPHOCYTES NFR BLD AUTO: 14 %
LYMPHOCYTES NFR BLD AUTO: 16 %
LYMPHOCYTES NFR BLD AUTO: 19 %
LYMPHOCYTES NFR BLD AUTO: 20 %
LYMPHOCYTES NFR BLD AUTO: 22 %
LYMPHOCYTES NFR BLD AUTO: 23 %
LYMPHOCYTES NFR BLD AUTO: 27 %
LYMPHOCYTES NFR BLD AUTO: 29 %
LYMPHOCYTES NFR BLD AUTO: 31 %
LYMPHOCYTES NFR BLD AUTO: 32 %
LYMPHOCYTES NFR BLD AUTO: 33 %
LYMPHOCYTES NFR BLD AUTO: 34 %
LYMPHOCYTES NFR BLD AUTO: 37 %
LYMPHOCYTES NFR BLD AUTO: 39 %
LYMPHOCYTES NFR BLD AUTO: 39 %
LYMPHOCYTES NFR BLD AUTO: 40 %
LYMPHOCYTES NFR BLD AUTO: 41 %
LYMPHOCYTES NFR BLD AUTO: 42 %
LYMPHOCYTES NFR BLD AUTO: 43 %
LYMPHOCYTES NFR BLD AUTO: 45 %
LYMPHOCYTES NFR BLD AUTO: 5 %
LYMPHOCYTES NFR BLD AUTO: 9 %
LYMPHOCYTES NFR BLD MANUAL: 22 %
Lab: NORMAL
M PNEUMO DNA SPEC QL NAA+PROBE: NOT DETECTED
M PNEUMO DNA SPEC QL NAA+PROBE: NOT DETECTED
MAGNESIUM SERPL-MCNC: 1.9 MG/DL (ref 1.7–2.3)
MAGNESIUM SERPL-MCNC: 1.9 MG/DL (ref 1.7–2.3)
MAGNESIUM SERPL-MCNC: 2.1 MG/DL (ref 1.7–2.3)
MAGNESIUM SERPL-MCNC: 2.3 MG/DL (ref 1.7–2.3)
MAYO MISC RESULT: ABNORMAL
MCH RBC QN AUTO: 26.2 PG (ref 26.5–33)
MCH RBC QN AUTO: 26.5 PG (ref 26.5–33)
MCH RBC QN AUTO: 26.6 PG (ref 26.5–33)
MCH RBC QN AUTO: 26.8 PG (ref 26.5–33)
MCH RBC QN AUTO: 26.9 PG (ref 26.5–33)
MCH RBC QN AUTO: 27 PG (ref 26.5–33)
MCH RBC QN AUTO: 27.1 PG (ref 26.5–33)
MCH RBC QN AUTO: 27.2 PG (ref 26.5–33)
MCH RBC QN AUTO: 27.3 PG (ref 26.5–33)
MCH RBC QN AUTO: 27.4 PG (ref 26.5–33)
MCH RBC QN AUTO: 27.5 PG (ref 26.5–33)
MCH RBC QN AUTO: 27.6 PG (ref 26.5–33)
MCH RBC QN AUTO: 27.7 PG (ref 26.5–33)
MCH RBC QN AUTO: 27.7 PG (ref 26.5–33)
MCH RBC QN AUTO: 27.8 PG (ref 26.5–33)
MCH RBC QN AUTO: 27.9 PG (ref 26.5–33)
MCH RBC QN AUTO: 28 PG (ref 26.5–33)
MCH RBC QN AUTO: 28.2 PG (ref 26.5–33)
MCH RBC QN AUTO: 28.3 PG (ref 26.5–33)
MCHC RBC AUTO-ENTMCNC: 30.6 G/DL (ref 31.5–36.5)
MCHC RBC AUTO-ENTMCNC: 30.7 G/DL (ref 31.5–36.5)
MCHC RBC AUTO-ENTMCNC: 31.1 G/DL (ref 31.5–36.5)
MCHC RBC AUTO-ENTMCNC: 31.2 G/DL (ref 31.5–36.5)
MCHC RBC AUTO-ENTMCNC: 31.2 G/DL (ref 31.5–36.5)
MCHC RBC AUTO-ENTMCNC: 31.3 G/DL (ref 31.5–36.5)
MCHC RBC AUTO-ENTMCNC: 31.4 G/DL (ref 31.5–36.5)
MCHC RBC AUTO-ENTMCNC: 31.5 G/DL (ref 31.5–36.5)
MCHC RBC AUTO-ENTMCNC: 31.5 G/DL (ref 31.5–36.5)
MCHC RBC AUTO-ENTMCNC: 31.6 G/DL (ref 31.5–36.5)
MCHC RBC AUTO-ENTMCNC: 31.6 G/DL (ref 31.5–36.5)
MCHC RBC AUTO-ENTMCNC: 31.7 G/DL (ref 31.5–36.5)
MCHC RBC AUTO-ENTMCNC: 31.7 G/DL (ref 31.5–36.5)
MCHC RBC AUTO-ENTMCNC: 31.8 G/DL (ref 31.5–36.5)
MCHC RBC AUTO-ENTMCNC: 31.9 G/DL (ref 31.5–36.5)
MCHC RBC AUTO-ENTMCNC: 32 G/DL (ref 31.5–36.5)
MCHC RBC AUTO-ENTMCNC: 32.1 G/DL (ref 31.5–36.5)
MCHC RBC AUTO-ENTMCNC: 32.2 G/DL (ref 31.5–36.5)
MCHC RBC AUTO-ENTMCNC: 32.3 G/DL (ref 31.5–36.5)
MCHC RBC AUTO-ENTMCNC: 32.3 G/DL (ref 31.5–36.5)
MCHC RBC AUTO-ENTMCNC: 32.4 G/DL (ref 31.5–36.5)
MCHC RBC AUTO-ENTMCNC: 32.5 G/DL (ref 31.5–36.5)
MCHC RBC AUTO-ENTMCNC: 32.5 G/DL (ref 31.5–36.5)
MCHC RBC AUTO-ENTMCNC: 32.7 G/DL (ref 31.5–36.5)
MCHC RBC AUTO-ENTMCNC: 32.7 G/DL (ref 31.5–36.5)
MCV RBC AUTO: 84 FL (ref 78–100)
MCV RBC AUTO: 85 FL (ref 78–100)
MCV RBC AUTO: 86 FL (ref 78–100)
MCV RBC AUTO: 87 FL (ref 78–100)
MCV RBC AUTO: 88 FL (ref 78–100)
MCV RBC AUTO: 89 FL (ref 78–100)
MONOCYTES # BLD AUTO: 0.3 10E3/UL (ref 0–1.3)
MONOCYTES # BLD AUTO: 0.4 10E3/UL (ref 0–1.3)
MONOCYTES # BLD AUTO: 0.4 10E3/UL (ref 0–1.3)
MONOCYTES # BLD AUTO: 0.5 10E3/UL (ref 0–1.3)
MONOCYTES # BLD AUTO: 0.6 10E3/UL (ref 0–1.3)
MONOCYTES # BLD AUTO: 0.7 10E3/UL (ref 0–1.3)
MONOCYTES # BLD AUTO: 0.8 10E3/UL (ref 0–1.3)
MONOCYTES # BLD AUTO: 0.9 10E3/UL (ref 0–1.3)
MONOCYTES # BLD AUTO: 0.9 10E3/UL (ref 0–1.3)
MONOCYTES # BLD AUTO: 1.1 10E3/UL (ref 0–1.3)
MONOCYTES # BLD AUTO: 1.2 10E3/UL (ref 0–1.3)
MONOCYTES # BLD AUTO: 1.2 10E3/UL (ref 0–1.3)
MONOCYTES # BLD AUTO: 1.7 10E3/UL (ref 0–1.3)
MONOCYTES # BLD MANUAL: 0.1 10E3/UL (ref 0–1.3)
MONOCYTES NFR BLD AUTO: 10 %
MONOCYTES NFR BLD AUTO: 10 %
MONOCYTES NFR BLD AUTO: 12 %
MONOCYTES NFR BLD AUTO: 3 %
MONOCYTES NFR BLD AUTO: 4 %
MONOCYTES NFR BLD AUTO: 5 %
MONOCYTES NFR BLD AUTO: 6 %
MONOCYTES NFR BLD AUTO: 7 %
MONOCYTES NFR BLD AUTO: 8 %
MONOCYTES NFR BLD AUTO: 9 %
MONOCYTES NFR BLD MANUAL: 1 %
MONOS+MACROS NFR CSF MANUAL: 31 %
MONOS+MACROS NFR CSF MANUAL: 62 %
MONOS+MACROS NFR CSF MANUAL: 9 %
MRSA DNA SPEC QL NAA+PROBE: NEGATIVE
MRSA DNA SPEC QL NAA+PROBE: NEGATIVE
MUCOUS THREADS #/AREA URNS LPF: PRESENT /LPF
NEUTROPHILS # BLD AUTO: 10.7 10E3/UL (ref 1.6–8.3)
NEUTROPHILS # BLD AUTO: 11.2 10E3/UL (ref 1.6–8.3)
NEUTROPHILS # BLD AUTO: 11.6 10E3/UL (ref 1.6–8.3)
NEUTROPHILS # BLD AUTO: 12.1 10E3/UL (ref 1.6–8.3)
NEUTROPHILS # BLD AUTO: 12.4 10E3/UL (ref 1.6–8.3)
NEUTROPHILS # BLD AUTO: 12.6 10E3/UL (ref 1.6–8.3)
NEUTROPHILS # BLD AUTO: 3 10E3/UL (ref 1.6–8.3)
NEUTROPHILS # BLD AUTO: 3.3 10E3/UL (ref 1.6–8.3)
NEUTROPHILS # BLD AUTO: 3.6 10E3/UL (ref 1.6–8.3)
NEUTROPHILS # BLD AUTO: 3.7 10E3/UL (ref 1.6–8.3)
NEUTROPHILS # BLD AUTO: 3.9 10E3/UL (ref 1.6–8.3)
NEUTROPHILS # BLD AUTO: 4.1 10E3/UL (ref 1.6–8.3)
NEUTROPHILS # BLD AUTO: 4.3 10E3/UL (ref 1.6–8.3)
NEUTROPHILS # BLD AUTO: 4.4 10E3/UL (ref 1.6–8.3)
NEUTROPHILS # BLD AUTO: 4.6 10E3/UL (ref 1.6–8.3)
NEUTROPHILS # BLD AUTO: 4.7 10E3/UL (ref 1.6–8.3)
NEUTROPHILS # BLD AUTO: 5 10E3/UL (ref 1.6–8.3)
NEUTROPHILS # BLD AUTO: 5.1 10E3/UL (ref 1.6–8.3)
NEUTROPHILS # BLD AUTO: 5.2 10E3/UL (ref 1.6–8.3)
NEUTROPHILS # BLD AUTO: 5.3 10E3/UL (ref 1.6–8.3)
NEUTROPHILS # BLD AUTO: 5.4 10E3/UL (ref 1.6–8.3)
NEUTROPHILS # BLD AUTO: 5.7 10E3/UL (ref 1.6–8.3)
NEUTROPHILS # BLD AUTO: 6.2 10E3/UL (ref 1.6–8.3)
NEUTROPHILS # BLD AUTO: 6.2 10E3/UL (ref 1.6–8.3)
NEUTROPHILS # BLD AUTO: 6.5 10E3/UL (ref 1.6–8.3)
NEUTROPHILS # BLD AUTO: 6.5 10E3/UL (ref 1.6–8.3)
NEUTROPHILS # BLD AUTO: 7 10E3/UL (ref 1.6–8.3)
NEUTROPHILS # BLD AUTO: 7.6 10E3/UL (ref 1.6–8.3)
NEUTROPHILS # BLD AUTO: 8.4 10E3/UL (ref 1.6–8.3)
NEUTROPHILS # BLD AUTO: 8.5 10E3/UL (ref 1.6–8.3)
NEUTROPHILS # BLD AUTO: 8.8 10E3/UL (ref 1.6–8.3)
NEUTROPHILS # BLD AUTO: 8.8 10E3/UL (ref 1.6–8.3)
NEUTROPHILS # BLD MANUAL: 7.3 10E3/UL (ref 1.6–8.3)
NEUTROPHILS NFR BLD AUTO: 41 %
NEUTROPHILS NFR BLD AUTO: 48 %
NEUTROPHILS NFR BLD AUTO: 49 %
NEUTROPHILS NFR BLD AUTO: 50 %
NEUTROPHILS NFR BLD AUTO: 50 %
NEUTROPHILS NFR BLD AUTO: 51 %
NEUTROPHILS NFR BLD AUTO: 51 %
NEUTROPHILS NFR BLD AUTO: 52 %
NEUTROPHILS NFR BLD AUTO: 56 %
NEUTROPHILS NFR BLD AUTO: 57 %
NEUTROPHILS NFR BLD AUTO: 58 %
NEUTROPHILS NFR BLD AUTO: 59 %
NEUTROPHILS NFR BLD AUTO: 59 %
NEUTROPHILS NFR BLD AUTO: 60 %
NEUTROPHILS NFR BLD AUTO: 61 %
NEUTROPHILS NFR BLD AUTO: 61 %
NEUTROPHILS NFR BLD AUTO: 62 %
NEUTROPHILS NFR BLD AUTO: 65 %
NEUTROPHILS NFR BLD AUTO: 66 %
NEUTROPHILS NFR BLD AUTO: 67 %
NEUTROPHILS NFR BLD AUTO: 67 %
NEUTROPHILS NFR BLD AUTO: 68 %
NEUTROPHILS NFR BLD AUTO: 68 %
NEUTROPHILS NFR BLD AUTO: 70 %
NEUTROPHILS NFR BLD AUTO: 71 %
NEUTROPHILS NFR BLD AUTO: 74 %
NEUTROPHILS NFR BLD AUTO: 76 %
NEUTROPHILS NFR BLD AUTO: 78 %
NEUTROPHILS NFR BLD AUTO: 81 %
NEUTROPHILS NFR BLD AUTO: 84 %
NEUTROPHILS NFR BLD AUTO: 91 %
NEUTROPHILS NFR BLD MANUAL: 73 %
NEUTROPHILS NFR CSF MANUAL: 1 %
NEUTROPHILS NFR CSF MANUAL: 36 %
NEUTROPHILS NFR CSF MANUAL: 43 %
NEUTS HYPERSEG BLD QL SMEAR: PRESENT
NITRATE UR QL: NEGATIVE
NONHDLC SERPL-MCNC: 197 MG/DL
NRBC # BLD AUTO: 0 10E3/UL
NRBC # BLD AUTO: 0.1 10E3/UL
NRBC BLD AUTO-RTO: 0 /100
NRBC BLD AUTO-RTO: 1 /100
NT-PROBNP SERPL-MCNC: 308 PG/ML (ref 0–900)
NT-PROBNP SERPL-MCNC: 47 PG/ML (ref 0–900)
OBSERVATION IMP: NEGATIVE
OPIATES UR QL SCN: ABNORMAL
OTHER CELLS CSF: 16 %
OTHER CELLS CSF: 27 %
OTHER CELLS CSF: 49 %
P AXIS - MUSE: 35 DEGREES
P AXIS - MUSE: 57 DEGREES
P AXIS - MUSE: 58 DEGREES
P AXIS - MUSE: 68 DEGREES
P AXIS - MUSE: 69 DEGREES
PATH REPORT.ADDENDUM SPEC: ABNORMAL
PATH REPORT.COMMENTS IMP SPEC: ABNORMAL
PATH REPORT.COMMENTS IMP SPEC: NORMAL
PATH REPORT.COMMENTS IMP SPEC: YES
PATH REPORT.FINAL DX SPEC: ABNORMAL
PATH REPORT.FINAL DX SPEC: NORMAL
PATH REPORT.FINAL DX SPEC: NORMAL
PATH REPORT.GROSS SPEC: ABNORMAL
PATH REPORT.GROSS SPEC: NORMAL
PATH REPORT.MICROSCOPIC SPEC OTHER STN: ABNORMAL
PATH REPORT.MICROSCOPIC SPEC OTHER STN: NORMAL
PATH REPORT.MICROSCOPIC SPEC OTHER STN: NORMAL
PATH REPORT.RELEVANT HX SPEC: ABNORMAL
PATH REPORT.RELEVANT HX SPEC: NORMAL
PATH REPORT.RELEVANT HX SPEC: NORMAL
PATHOLOGY SYNOPTIC REPORT: ABNORMAL
PERFORMING LABORATORY: NORMAL
PH UR STRIP: 6 [PH] (ref 5–7)
PH UR STRIP: 6.5 [PH] (ref 5–7)
PH UR STRIP: 8 [PH] (ref 5–7)
PHOSPHATE SERPL-MCNC: 2 MG/DL (ref 2.5–4.5)
PHOSPHATE SERPL-MCNC: 2.3 MG/DL (ref 2.5–4.5)
PHOSPHATE SERPL-MCNC: 2.3 MG/DL (ref 2.5–4.5)
PHOSPHATE SERPL-MCNC: 2.9 MG/DL (ref 2.5–4.5)
PHOSPHATE SERPL-MCNC: 2.9 MG/DL (ref 2.5–4.5)
PHOSPHATE SERPL-MCNC: 3.3 MG/DL (ref 2.5–4.5)
PHOTO IMAGE: ABNORMAL
PHOTO IMAGE: NORMAL
PLAT MORPH BLD: ABNORMAL
PLAT MORPH BLD: NORMAL
PLAT MORPH BLD: NORMAL
PLATELET # BLD AUTO: 136 10E3/UL (ref 150–450)
PLATELET # BLD AUTO: 165 10E3/UL (ref 150–450)
PLATELET # BLD AUTO: 196 10E3/UL (ref 150–450)
PLATELET # BLD AUTO: 203 10E3/UL (ref 150–450)
PLATELET # BLD AUTO: 205 10E3/UL (ref 150–450)
PLATELET # BLD AUTO: 234 10E3/UL (ref 150–450)
PLATELET # BLD AUTO: 252 10E3/UL (ref 150–450)
PLATELET # BLD AUTO: 300 10E3/UL (ref 150–450)
PLATELET # BLD AUTO: 301 10E3/UL (ref 150–450)
PLATELET # BLD AUTO: 321 10E3/UL (ref 150–450)
PLATELET # BLD AUTO: 352 10E3/UL (ref 150–450)
PLATELET # BLD AUTO: 353 10E3/UL (ref 150–450)
PLATELET # BLD AUTO: 356 10E3/UL (ref 150–450)
PLATELET # BLD AUTO: 360 10E3/UL (ref 150–450)
PLATELET # BLD AUTO: 362 10E3/UL (ref 150–450)
PLATELET # BLD AUTO: 370 10E3/UL (ref 150–450)
PLATELET # BLD AUTO: 374 10E3/UL (ref 150–450)
PLATELET # BLD AUTO: 376 10E3/UL (ref 150–450)
PLATELET # BLD AUTO: 377 10E3/UL (ref 150–450)
PLATELET # BLD AUTO: 386 10E3/UL (ref 150–450)
PLATELET # BLD AUTO: 387 10E3/UL (ref 150–450)
PLATELET # BLD AUTO: 393 10E3/UL (ref 150–450)
PLATELET # BLD AUTO: 395 10E3/UL (ref 150–450)
PLATELET # BLD AUTO: 397 10E3/UL (ref 150–450)
PLATELET # BLD AUTO: 398 10E3/UL (ref 150–450)
PLATELET # BLD AUTO: 402 10E3/UL (ref 150–450)
PLATELET # BLD AUTO: 410 10E3/UL (ref 150–450)
PLATELET # BLD AUTO: 415 10E3/UL (ref 150–450)
PLATELET # BLD AUTO: 419 10E3/UL (ref 150–450)
PLATELET # BLD AUTO: 419 10E3/UL (ref 150–450)
PLATELET # BLD AUTO: 420 10E3/UL (ref 150–450)
PLATELET # BLD AUTO: 421 10E3/UL (ref 150–450)
PLATELET # BLD AUTO: 422 10E3/UL (ref 150–450)
PLATELET # BLD AUTO: 424 10E3/UL (ref 150–450)
PLATELET # BLD AUTO: 426 10E3/UL (ref 150–450)
PLATELET # BLD AUTO: 431 10E3/UL (ref 150–450)
PLATELET # BLD AUTO: 437 10E3/UL (ref 150–450)
PLATELET # BLD AUTO: 438 10E3/UL (ref 150–450)
PLATELET # BLD AUTO: 439 10E3/UL (ref 150–450)
PLATELET # BLD AUTO: 440 10E3/UL (ref 150–450)
PLATELET # BLD AUTO: 444 10E3/UL (ref 150–450)
PLATELET # BLD AUTO: 450 10E3/UL (ref 150–450)
PLATELET # BLD AUTO: 455 10E3/UL (ref 150–450)
PLATELET # BLD AUTO: 455 10E3/UL (ref 150–450)
PLATELET # BLD AUTO: 460 10E3/UL (ref 150–450)
PLATELET # BLD AUTO: 463 10E3/UL (ref 150–450)
PLATELET # BLD AUTO: 469 10E3/UL (ref 150–450)
PLATELET # BLD AUTO: 509 10E3/UL (ref 150–450)
PLATELET # BLD AUTO: 581 10E3/UL (ref 150–450)
POTASSIUM BLD-SCNC: 3.6 MMOL/L (ref 3.4–5.3)
POTASSIUM BLD-SCNC: 3.7 MMOL/L (ref 3.4–5.3)
POTASSIUM BLD-SCNC: 3.8 MMOL/L (ref 3.4–5.3)
POTASSIUM BLD-SCNC: 3.9 MMOL/L (ref 3.4–5.3)
POTASSIUM BLD-SCNC: 3.9 MMOL/L (ref 3.4–5.3)
POTASSIUM BLD-SCNC: 4 MMOL/L (ref 3.4–5.3)
POTASSIUM BLD-SCNC: 4.1 MMOL/L (ref 3.4–5.3)
POTASSIUM BLD-SCNC: 4.1 MMOL/L (ref 3.4–5.3)
POTASSIUM BLD-SCNC: 4.2 MMOL/L (ref 3.4–5.3)
POTASSIUM BLD-SCNC: 4.3 MMOL/L (ref 3.4–5.3)
POTASSIUM BLD-SCNC: 4.4 MMOL/L (ref 3.4–5.3)
POTASSIUM BLD-SCNC: 4.4 MMOL/L (ref 3.4–5.3)
POTASSIUM BLD-SCNC: 4.6 MMOL/L (ref 3.4–5.3)
POTASSIUM SERPL-SCNC: 3.5 MMOL/L (ref 3.4–5.3)
POTASSIUM SERPL-SCNC: 3.8 MMOL/L (ref 3.4–5.3)
POTASSIUM SERPL-SCNC: 3.9 MMOL/L (ref 3.4–5.3)
POTASSIUM SERPL-SCNC: 3.9 MMOL/L (ref 3.4–5.3)
POTASSIUM SERPL-SCNC: 4 MMOL/L (ref 3.4–5.3)
POTASSIUM SERPL-SCNC: 4.1 MMOL/L (ref 3.4–5.3)
POTASSIUM SERPL-SCNC: 4.1 MMOL/L (ref 3.4–5.3)
POTASSIUM SERPL-SCNC: 4.2 MMOL/L (ref 3.4–5.3)
POTASSIUM SERPL-SCNC: 4.3 MMOL/L (ref 3.4–5.3)
POTASSIUM SERPL-SCNC: 4.4 MMOL/L (ref 3.4–5.3)
POTASSIUM SERPL-SCNC: 4.5 MMOL/L (ref 3.4–5.3)
POTASSIUM SERPL-SCNC: 4.6 MMOL/L (ref 3.4–5.3)
POTASSIUM SERPL-SCNC: 4.6 MMOL/L (ref 3.4–5.3)
POTASSIUM SERPL-SCNC: 4.7 MMOL/L (ref 3.4–5.3)
POTASSIUM SERPL-SCNC: 4.7 MMOL/L (ref 3.4–5.3)
PR INTERVAL - MUSE: 132 MS
PR INTERVAL - MUSE: 142 MS
PR INTERVAL - MUSE: 146 MS
PR INTERVAL - MUSE: 146 MS
PR INTERVAL - MUSE: 154 MS
PROCALCITONIN SERPL IA-MCNC: 0.1 NG/ML
PROCALCITONIN SERPL IA-MCNC: 0.1 NG/ML
PROCALCITONIN SERPL IA-MCNC: 0.12 NG/ML
PROCALCITONIN SERPL-MCNC: 0.5 NG/ML
PROT CSF-MCNC: 36.5 MG/DL (ref 15–45)
PROT CSF-MCNC: 39.4 MG/DL (ref 15–45)
PROT CSF-MCNC: 69.3 MG/DL (ref 15–45)
PROT CSF-MCNC: 69.5 MG/DL (ref 15–45)
PROT SERPL-MCNC: 5.6 G/DL (ref 6.4–8.3)
PROT SERPL-MCNC: 5.7 G/DL (ref 6.4–8.3)
PROT SERPL-MCNC: 5.7 G/DL (ref 6.4–8.3)
PROT SERPL-MCNC: 5.8 G/DL (ref 6.4–8.3)
PROT SERPL-MCNC: 5.9 G/DL (ref 6.4–8.3)
PROT SERPL-MCNC: 6 G/DL (ref 6.4–8.3)
PROT SERPL-MCNC: 6.1 G/DL (ref 6.4–8.3)
PROT SERPL-MCNC: 6.2 G/DL (ref 6.4–8.3)
PROT SERPL-MCNC: 6.3 G/DL (ref 6.4–8.3)
PROT SERPL-MCNC: 6.4 G/DL (ref 6.4–8.3)
PROT SERPL-MCNC: 6.4 G/DL (ref 6.8–8.8)
PROT SERPL-MCNC: 6.5 G/DL (ref 6.4–8.3)
PROT SERPL-MCNC: 6.5 G/DL (ref 6.4–8.3)
PROT SERPL-MCNC: 6.5 G/DL (ref 6.8–8.8)
PROT SERPL-MCNC: 6.5 G/DL (ref 6.8–8.8)
PROT SERPL-MCNC: 6.6 G/DL (ref 6.8–8.8)
PROT SERPL-MCNC: 6.7 G/DL (ref 6.4–8.3)
PROT SERPL-MCNC: 6.7 G/DL (ref 6.8–8.8)
PROT SERPL-MCNC: 6.8 G/DL (ref 6.4–8.3)
PROT SERPL-MCNC: 6.8 G/DL (ref 6.4–8.3)
PROT SERPL-MCNC: 6.8 G/DL (ref 6.8–8.8)
PROT SERPL-MCNC: 6.8 G/DL (ref 6.8–8.8)
PROT SERPL-MCNC: 6.9 G/DL (ref 6.8–8.8)
PROT SERPL-MCNC: 6.9 G/DL (ref 6.8–8.8)
PROT SERPL-MCNC: 7 G/DL (ref 6.8–8.8)
PROT SERPL-MCNC: 7 G/DL (ref 6.8–8.8)
PROT SERPL-MCNC: 7.1 G/DL (ref 6.8–8.8)
PROT SERPL-MCNC: 7.1 G/DL (ref 6.8–8.8)
PROT SERPL-MCNC: 7.3 G/DL (ref 6.8–8.8)
PROT SERPL-MCNC: 7.4 G/DL (ref 6.8–8.8)
PROT SERPL-MCNC: 7.6 G/DL (ref 6.8–8.8)
QRS DURATION - MUSE: 64 MS
QRS DURATION - MUSE: 66 MS
QRS DURATION - MUSE: 72 MS
QT - MUSE: 298 MS
QT - MUSE: 342 MS
QT - MUSE: 352 MS
QT - MUSE: 378 MS
QT - MUSE: 390 MS
QTC - MUSE: 415 MS
QTC - MUSE: 419 MS
QTC - MUSE: 425 MS
QTC - MUSE: 429 MS
QTC - MUSE: 458 MS
R AXIS - MUSE: 43 DEGREES
R AXIS - MUSE: 56 DEGREES
R AXIS - MUSE: 63 DEGREES
R AXIS - MUSE: 64 DEGREES
R AXIS - MUSE: 79 DEGREES
RADIOLOGIST FLAGS: ABNORMAL
RBC # BLD AUTO: 3 10E6/UL (ref 3.8–5.2)
RBC # BLD AUTO: 3.48 10E6/UL (ref 3.8–5.2)
RBC # BLD AUTO: 3.55 10E6/UL (ref 3.8–5.2)
RBC # BLD AUTO: 3.59 10E6/UL (ref 3.8–5.2)
RBC # BLD AUTO: 3.71 10E6/UL (ref 3.8–5.2)
RBC # BLD AUTO: 3.74 10E6/UL (ref 3.8–5.2)
RBC # BLD AUTO: 3.76 10E6/UL (ref 3.8–5.2)
RBC # BLD AUTO: 3.77 10E6/UL (ref 3.8–5.2)
RBC # BLD AUTO: 3.78 10E6/UL (ref 3.8–5.2)
RBC # BLD AUTO: 3.8 10E6/UL (ref 3.8–5.2)
RBC # BLD AUTO: 3.81 10E6/UL (ref 3.8–5.2)
RBC # BLD AUTO: 3.81 10E6/UL (ref 3.8–5.2)
RBC # BLD AUTO: 3.82 10E6/UL (ref 3.8–5.2)
RBC # BLD AUTO: 3.84 10E6/UL (ref 3.8–5.2)
RBC # BLD AUTO: 3.87 10E6/UL (ref 3.8–5.2)
RBC # BLD AUTO: 3.87 10E6/UL (ref 3.8–5.2)
RBC # BLD AUTO: 3.9 10E6/UL (ref 3.8–5.2)
RBC # BLD AUTO: 3.9 10E6/UL (ref 3.8–5.2)
RBC # BLD AUTO: 3.92 10E6/UL (ref 3.8–5.2)
RBC # BLD AUTO: 3.95 10E6/UL (ref 3.8–5.2)
RBC # BLD AUTO: 3.95 10E6/UL (ref 3.8–5.2)
RBC # BLD AUTO: 3.97 10E6/UL (ref 3.8–5.2)
RBC # BLD AUTO: 3.97 10E6/UL (ref 3.8–5.2)
RBC # BLD AUTO: 3.99 10E6/UL (ref 3.8–5.2)
RBC # BLD AUTO: 4.01 10E6/UL (ref 3.8–5.2)
RBC # BLD AUTO: 4.02 10E6/UL (ref 3.8–5.2)
RBC # BLD AUTO: 4.02 10E6/UL (ref 3.8–5.2)
RBC # BLD AUTO: 4.11 10E6/UL (ref 3.8–5.2)
RBC # BLD AUTO: 4.12 10E6/UL (ref 3.8–5.2)
RBC # BLD AUTO: 4.14 10E6/UL (ref 3.8–5.2)
RBC # BLD AUTO: 4.16 10E6/UL (ref 3.8–5.2)
RBC # BLD AUTO: 4.17 10E6/UL (ref 3.8–5.2)
RBC # BLD AUTO: 4.18 10E6/UL (ref 3.8–5.2)
RBC # BLD AUTO: 4.18 10E6/UL (ref 3.8–5.2)
RBC # BLD AUTO: 4.19 10E6/UL (ref 3.8–5.2)
RBC # BLD AUTO: 4.2 10E6/UL (ref 3.8–5.2)
RBC # BLD AUTO: 4.23 10E6/UL (ref 3.8–5.2)
RBC # BLD AUTO: 4.35 10E6/UL (ref 3.8–5.2)
RBC # BLD AUTO: 4.39 10E6/UL (ref 3.8–5.2)
RBC # BLD AUTO: 4.4 10E6/UL (ref 3.8–5.2)
RBC # BLD AUTO: 4.44 10E6/UL (ref 3.8–5.2)
RBC # BLD AUTO: 4.46 10E6/UL (ref 3.8–5.2)
RBC # BLD AUTO: 4.46 10E6/UL (ref 3.8–5.2)
RBC # BLD AUTO: 4.48 10E6/UL (ref 3.8–5.2)
RBC # BLD AUTO: 4.51 10E6/UL (ref 3.8–5.2)
RBC # BLD AUTO: 4.62 10E6/UL (ref 3.8–5.2)
RBC # BLD AUTO: 4.66 10E6/UL (ref 3.8–5.2)
RBC # BLD AUTO: 4.66 10E6/UL (ref 3.8–5.2)
RBC # BLD AUTO: 4.7 10E6/UL (ref 3.8–5.2)
RBC # CSF MANUAL: 1090 /UL (ref 0–2)
RBC # CSF MANUAL: 800 /UL (ref 0–2)
RBC # CSF MANUAL: ABNORMAL /UL (ref 0–2)
RBC MORPH BLD: ABNORMAL
RBC MORPH BLD: NORMAL
RBC MORPH BLD: NORMAL
RBC URINE: 1 /HPF
RBC URINE: <1 /HPF
RBC URINE: <2 /HPF
RSV RNA SPEC NAA+PROBE: NEGATIVE
RSV RNA SPEC QL NAA+PROBE: NOT DETECTED
RV+EV RNA SPEC QL NAA+PROBE: NOT DETECTED
RV+EV RNA SPEC QL NAA+PROBE: NOT DETECTED
SA TARGET DNA: NEGATIVE
SA TARGET DNA: NEGATIVE
SARS-COV-2 RNA RESP QL NAA+PROBE: NEGATIVE
SCANNED LAB RESULT: NORMAL
SODIUM SERPL-SCNC: 131 MMOL/L (ref 136–145)
SODIUM SERPL-SCNC: 133 MMOL/L (ref 136–145)
SODIUM SERPL-SCNC: 134 MMOL/L (ref 136–145)
SODIUM SERPL-SCNC: 134 MMOL/L (ref 136–145)
SODIUM SERPL-SCNC: 135 MMOL/L (ref 136–145)
SODIUM SERPL-SCNC: 136 MMOL/L (ref 136–145)
SODIUM SERPL-SCNC: 137 MMOL/L (ref 133–144)
SODIUM SERPL-SCNC: 138 MMOL/L (ref 133–144)
SODIUM SERPL-SCNC: 138 MMOL/L (ref 136–145)
SODIUM SERPL-SCNC: 139 MMOL/L (ref 133–144)
SODIUM SERPL-SCNC: 139 MMOL/L (ref 133–144)
SODIUM SERPL-SCNC: 139 MMOL/L (ref 136–145)
SODIUM SERPL-SCNC: 140 MMOL/L (ref 133–144)
SODIUM SERPL-SCNC: 140 MMOL/L (ref 136–145)
SODIUM SERPL-SCNC: 140 MMOL/L (ref 136–145)
SODIUM SERPL-SCNC: 141 MMOL/L (ref 133–144)
SODIUM SERPL-SCNC: 141 MMOL/L (ref 133–144)
SODIUM SERPL-SCNC: 141 MMOL/L (ref 136–145)
SODIUM SERPL-SCNC: 141 MMOL/L (ref 136–145)
SODIUM SERPL-SCNC: 142 MMOL/L (ref 133–144)
SODIUM SERPL-SCNC: 143 MMOL/L (ref 133–144)
SODIUM SERPL-SCNC: 143 MMOL/L (ref 136–145)
SODIUM SERPL-SCNC: 144 MMOL/L (ref 133–144)
SODIUM SERPL-SCNC: 145 MMOL/L (ref 136–145)
SP GR UR STRIP: 1.01 (ref 1–1.03)
SP GR UR STRIP: 1.02 (ref 1–1.03)
SP GR UR STRIP: 1.02 (ref 1–1.03)
SPECIMEN EXPIRATION DATE: NORMAL
SPECIMEN STATUS: NORMAL
SQUAMOUS EPITHELIAL: <1 /HPF
SYSTOLIC BLOOD PRESSURE - MUSE: NORMAL MMHG
T AXIS - MUSE: 68 DEGREES
T AXIS - MUSE: 69 DEGREES
T AXIS - MUSE: 70 DEGREES
T AXIS - MUSE: 72 DEGREES
T AXIS - MUSE: 75 DEGREES
TEST NAME: NORMAL
TIBC SERPL-MCNC: 225 UG/DL (ref 240–430)
TRIGL SERPL-MCNC: 275 MG/DL
TROPONIN T BLD-MCNC: 0 UG/L
TROPONIN T SERPL HS-MCNC: <6 NG/L
TUBE # CSF: 2
UROBILINOGEN UR STRIP-MCNC: NORMAL MG/DL
VENTRICULAR RATE- MUSE: 117 BPM
VENTRICULAR RATE- MUSE: 74 BPM
VENTRICULAR RATE- MUSE: 83 BPM
VENTRICULAR RATE- MUSE: 88 BPM
VENTRICULAR RATE- MUSE: 95 BPM
VIT B12 SERPL-MCNC: 1142 PG/ML (ref 193–986)
VORICONAZOLE SERPL-MCNC: 4.1 UG/ML (ref 1–5.5)
WBC # BLD AUTO: 10 10E3/UL (ref 4–11)
WBC # BLD AUTO: 10.2 10E3/UL (ref 4–11)
WBC # BLD AUTO: 10.2 10E3/UL (ref 4–11)
WBC # BLD AUTO: 10.3 10E3/UL (ref 4–11)
WBC # BLD AUTO: 10.4 10E3/UL (ref 4–11)
WBC # BLD AUTO: 11.4 10E3/UL (ref 4–11)
WBC # BLD AUTO: 11.6 10E3/UL (ref 4–11)
WBC # BLD AUTO: 11.6 10E3/UL (ref 4–11)
WBC # BLD AUTO: 12.4 10E3/UL (ref 4–11)
WBC # BLD AUTO: 12.6 10E3/UL (ref 4–11)
WBC # BLD AUTO: 13.3 10E3/UL (ref 4–11)
WBC # BLD AUTO: 13.4 10E3/UL (ref 4–11)
WBC # BLD AUTO: 13.8 10E3/UL (ref 4–11)
WBC # BLD AUTO: 14.4 10E3/UL (ref 4–11)
WBC # BLD AUTO: 14.4 10E3/UL (ref 4–11)
WBC # BLD AUTO: 15 10E3/UL (ref 4–11)
WBC # BLD AUTO: 16.3 10E3/UL (ref 4–11)
WBC # BLD AUTO: 16.3 10E3/UL (ref 4–11)
WBC # BLD AUTO: 16.5 10E3/UL (ref 4–11)
WBC # BLD AUTO: 17 10E3/UL (ref 4–11)
WBC # BLD AUTO: 17.6 10E3/UL (ref 4–11)
WBC # BLD AUTO: 6.1 10E3/UL (ref 4–11)
WBC # BLD AUTO: 6.7 10E3/UL (ref 4–11)
WBC # BLD AUTO: 6.9 10E3/UL (ref 4–11)
WBC # BLD AUTO: 7 10E3/UL (ref 4–11)
WBC # BLD AUTO: 7.2 10E3/UL (ref 4–11)
WBC # BLD AUTO: 7.4 10E3/UL (ref 4–11)
WBC # BLD AUTO: 7.6 10E3/UL (ref 4–11)
WBC # BLD AUTO: 8 10E3/UL (ref 4–11)
WBC # BLD AUTO: 8 10E3/UL (ref 4–11)
WBC # BLD AUTO: 8.1 10E3/UL (ref 4–11)
WBC # BLD AUTO: 8.1 10E3/UL (ref 4–11)
WBC # BLD AUTO: 8.3 10E3/UL (ref 4–11)
WBC # BLD AUTO: 8.3 10E3/UL (ref 4–11)
WBC # BLD AUTO: 8.4 10E3/UL (ref 4–11)
WBC # BLD AUTO: 8.5 10E3/UL (ref 4–11)
WBC # BLD AUTO: 8.7 10E3/UL (ref 4–11)
WBC # BLD AUTO: 8.8 10E3/UL (ref 4–11)
WBC # BLD AUTO: 8.9 10E3/UL (ref 4–11)
WBC # BLD AUTO: 9 10E3/UL (ref 4–11)
WBC # BLD AUTO: 9 10E3/UL (ref 4–11)
WBC # BLD AUTO: 9.2 10E3/UL (ref 4–11)
WBC # BLD AUTO: 9.2 10E3/UL (ref 4–11)
WBC # BLD AUTO: 9.3 10E3/UL (ref 4–11)
WBC # BLD AUTO: 9.3 10E3/UL (ref 4–11)
WBC # BLD AUTO: 9.7 10E3/UL (ref 4–11)
WBC # BLD AUTO: 9.9 10E3/UL (ref 4–11)
WBC # CSF MANUAL: 246 /UL (ref 0–5)
WBC # CSF MANUAL: 36 /UL (ref 0–5)
WBC # CSF MANUAL: 39 /UL (ref 0–5)
WBC URINE: 1 /HPF
WBC URINE: 1 /HPF
WBC URINE: <5 /HPF

## 2022-01-01 PROCEDURE — 70250 X-RAY EXAM OF SKULL: CPT | Mod: 26 | Performed by: RADIOLOGY

## 2022-01-01 PROCEDURE — 84145 PROCALCITONIN (PCT): CPT

## 2022-01-01 PROCEDURE — G0463 HOSPITAL OUTPT CLINIC VISIT: HCPCS | Mod: 25 | Performed by: PHYSICIAN ASSISTANT

## 2022-01-01 PROCEDURE — 96376 TX/PRO/DX INJ SAME DRUG ADON: CPT

## 2022-01-01 PROCEDURE — 93308 TTE F-UP OR LMTD: CPT | Mod: 26 | Performed by: INTERNAL MEDICINE

## 2022-01-01 PROCEDURE — 96365 THER/PROPH/DIAG IV INF INIT: CPT | Performed by: EMERGENCY MEDICINE

## 2022-01-01 PROCEDURE — 96361 HYDRATE IV INFUSION ADD-ON: CPT

## 2022-01-01 PROCEDURE — 99213 OFFICE O/P EST LOW 20 MIN: CPT | Performed by: INTERNAL MEDICINE

## 2022-01-01 PROCEDURE — 36592 COLLECT BLOOD FROM PICC: CPT | Performed by: STUDENT IN AN ORGANIZED HEALTH CARE EDUCATION/TRAINING PROGRAM

## 2022-01-01 PROCEDURE — 99222 1ST HOSP IP/OBS MODERATE 55: CPT | Mod: GC | Performed by: STUDENT IN AN ORGANIZED HEALTH CARE EDUCATION/TRAINING PROGRAM

## 2022-01-01 PROCEDURE — 80053 COMPREHEN METABOLIC PANEL: CPT | Performed by: INTERNAL MEDICINE

## 2022-01-01 PROCEDURE — 85025 COMPLETE CBC W/AUTO DIFF WBC: CPT | Performed by: EMERGENCY MEDICINE

## 2022-01-01 PROCEDURE — 82378 CARCINOEMBRYONIC ANTIGEN: CPT | Performed by: INTERNAL MEDICINE

## 2022-01-01 PROCEDURE — 96413 CHEMO IV INFUSION 1 HR: CPT

## 2022-01-01 PROCEDURE — 258N000003 HC RX IP 258 OP 636: Performed by: STUDENT IN AN ORGANIZED HEALTH CARE EDUCATION/TRAINING PROGRAM

## 2022-01-01 PROCEDURE — 99152 MOD SED SAME PHYS/QHP 5/>YRS: CPT | Mod: GC | Performed by: RADIOLOGY

## 2022-01-01 PROCEDURE — 93970 EXTREMITY STUDY: CPT | Mod: 26 | Performed by: RADIOLOGY

## 2022-01-01 PROCEDURE — 99233 SBSQ HOSP IP/OBS HIGH 50: CPT | Performed by: INTERNAL MEDICINE

## 2022-01-01 PROCEDURE — 88341 IMHCHEM/IMCYTCHM EA ADD ANTB: CPT | Mod: 26 | Performed by: PATHOLOGY

## 2022-01-01 PROCEDURE — U0005 INFEC AGEN DETEC AMPLI PROBE: HCPCS

## 2022-01-01 PROCEDURE — 93010 ELECTROCARDIOGRAM REPORT: CPT | Mod: 59 | Performed by: EMERGENCY MEDICINE

## 2022-01-01 PROCEDURE — 250N000013 HC RX MED GY IP 250 OP 250 PS 637: Performed by: PHYSICIAN ASSISTANT

## 2022-01-01 PROCEDURE — 250N000011 HC RX IP 250 OP 636: Performed by: INTERNAL MEDICINE

## 2022-01-01 PROCEDURE — 85027 COMPLETE CBC AUTOMATED: CPT | Performed by: PHYSICIAN ASSISTANT

## 2022-01-01 PROCEDURE — 88108 CYTOPATH CONCENTRATE TECH: CPT | Mod: TC | Performed by: PHYSICIAN ASSISTANT

## 2022-01-01 PROCEDURE — 250N000011 HC RX IP 250 OP 636: Performed by: PHYSICIAN ASSISTANT

## 2022-01-01 PROCEDURE — 99215 OFFICE O/P EST HI 40 MIN: CPT | Mod: CS

## 2022-01-01 PROCEDURE — 78816 PET IMAGE W/CT FULL BODY: CPT | Mod: PS

## 2022-01-01 PROCEDURE — 258N000003 HC RX IP 258 OP 636: Performed by: INTERNAL MEDICINE

## 2022-01-01 PROCEDURE — 250N000013 HC RX MED GY IP 250 OP 250 PS 637

## 2022-01-01 PROCEDURE — 99285 EMERGENCY DEPT VISIT HI MDM: CPT | Mod: CS | Performed by: EMERGENCY MEDICINE

## 2022-01-01 PROCEDURE — 278N000051 HC OR IMPLANT GENERAL: Performed by: NEUROLOGICAL SURGERY

## 2022-01-01 PROCEDURE — 80053 COMPREHEN METABOLIC PANEL: CPT

## 2022-01-01 PROCEDURE — 72156 MRI NECK SPINE W/O & W/DYE: CPT

## 2022-01-01 PROCEDURE — 96415 CHEMO IV INFUSION ADDL HR: CPT

## 2022-01-01 PROCEDURE — 85652 RBC SED RATE AUTOMATED: CPT | Performed by: PHYSICIAN ASSISTANT

## 2022-01-01 PROCEDURE — 78816 PET IMAGE W/CT FULL BODY: CPT | Mod: 26 | Performed by: RADIOLOGY

## 2022-01-01 PROCEDURE — 82040 ASSAY OF SERUM ALBUMIN: CPT

## 2022-01-01 PROCEDURE — 84999 UNLISTED CHEMISTRY PROCEDURE: CPT

## 2022-01-01 PROCEDURE — 99285 EMERGENCY DEPT VISIT HI MDM: CPT | Mod: CS,25 | Performed by: EMERGENCY MEDICINE

## 2022-01-01 PROCEDURE — 250N000011 HC RX IP 250 OP 636: Mod: JW | Performed by: PHYSICIAN ASSISTANT

## 2022-01-01 PROCEDURE — 38505 NEEDLE BIOPSY LYMPH NODES: CPT | Mod: LT | Performed by: RADIOLOGY

## 2022-01-01 PROCEDURE — 88307 TISSUE EXAM BY PATHOLOGIST: CPT | Mod: 26 | Performed by: PATHOLOGY

## 2022-01-01 PROCEDURE — 36415 COLL VENOUS BLD VENIPUNCTURE: CPT | Performed by: INTERNAL MEDICINE

## 2022-01-01 PROCEDURE — 86300 IMMUNOASSAY TUMOR CA 15-3: CPT

## 2022-01-01 PROCEDURE — 88360 TUMOR IMMUNOHISTOCHEM/MANUAL: CPT | Mod: 26 | Performed by: PATHOLOGY

## 2022-01-01 PROCEDURE — 250N000011 HC RX IP 250 OP 636

## 2022-01-01 PROCEDURE — 82378 CARCINOEMBRYONIC ANTIGEN: CPT

## 2022-01-01 PROCEDURE — 96417 CHEMO IV INFUS EACH ADDL SEQ: CPT

## 2022-01-01 PROCEDURE — 70450 CT HEAD/BRAIN W/O DYE: CPT | Mod: 26 | Performed by: RADIOLOGY

## 2022-01-01 PROCEDURE — C9803 HOPD COVID-19 SPEC COLLECT: HCPCS | Performed by: EMERGENCY MEDICINE

## 2022-01-01 PROCEDURE — 99214 OFFICE O/P EST MOD 30 MIN: CPT | Performed by: PHYSICIAN ASSISTANT

## 2022-01-01 PROCEDURE — 71046 X-RAY EXAM CHEST 2 VIEWS: CPT | Mod: 26 | Performed by: RADIOLOGY

## 2022-01-01 PROCEDURE — 85025 COMPLETE CBC W/AUTO DIFF WBC: CPT

## 2022-01-01 PROCEDURE — 85049 AUTOMATED PLATELET COUNT: CPT

## 2022-01-01 PROCEDURE — 85004 AUTOMATED DIFF WBC COUNT: CPT

## 2022-01-01 PROCEDURE — 87040 BLOOD CULTURE FOR BACTERIA: CPT | Performed by: EMERGENCY MEDICINE

## 2022-01-01 PROCEDURE — 62252 CSF SHUNT REPROGRAM: CPT | Performed by: NEUROLOGICAL SURGERY

## 2022-01-01 PROCEDURE — C1894 INTRO/SHEATH, NON-LASER: HCPCS | Performed by: NEUROLOGICAL SURGERY

## 2022-01-01 PROCEDURE — 258N000003 HC RX IP 258 OP 636: Performed by: EMERGENCY MEDICINE

## 2022-01-01 PROCEDURE — 96366 THER/PROPH/DIAG IV INF ADDON: CPT | Performed by: EMERGENCY MEDICINE

## 2022-01-01 PROCEDURE — 85018 HEMOGLOBIN: CPT | Performed by: STUDENT IN AN ORGANIZED HEALTH CARE EDUCATION/TRAINING PROGRAM

## 2022-01-01 PROCEDURE — 87040 BLOOD CULTURE FOR BACTERIA: CPT | Performed by: INTERNAL MEDICINE

## 2022-01-01 PROCEDURE — 250N000013 HC RX MED GY IP 250 OP 250 PS 637: Performed by: INTERNAL MEDICINE

## 2022-01-01 PROCEDURE — 87305 ASPERGILLUS AG IA: CPT | Performed by: PHYSICIAN ASSISTANT

## 2022-01-01 PROCEDURE — G0463 HOSPITAL OUTPT CLINIC VISIT: HCPCS | Mod: 25

## 2022-01-01 PROCEDURE — G0463 HOSPITAL OUTPT CLINIC VISIT: HCPCS

## 2022-01-01 PROCEDURE — 250N000013 HC RX MED GY IP 250 OP 250 PS 637: Performed by: EMERGENCY MEDICINE

## 2022-01-01 PROCEDURE — 88108 CYTOPATH CONCENTRATE TECH: CPT | Mod: 26 | Performed by: PATHOLOGY

## 2022-01-01 PROCEDURE — 258N000003 HC RX IP 258 OP 636: Performed by: PHYSICIAN ASSISTANT

## 2022-01-01 PROCEDURE — 272N000480 CT HEAD W/O CONTRAST

## 2022-01-01 PROCEDURE — 36591 DRAW BLOOD OFF VENOUS DEVICE: CPT

## 2022-01-01 PROCEDURE — U0005 INFEC AGEN DETEC AMPLI PROBE: HCPCS | Mod: 90 | Performed by: PATHOLOGY

## 2022-01-01 PROCEDURE — 85025 COMPLETE CBC W/AUTO DIFF WBC: CPT | Performed by: PHYSICIAN ASSISTANT

## 2022-01-01 PROCEDURE — 74177 CT ABD & PELVIS W/CONTRAST: CPT

## 2022-01-01 PROCEDURE — 80048 BASIC METABOLIC PNL TOTAL CA: CPT

## 2022-01-01 PROCEDURE — 250N000011 HC RX IP 250 OP 636: Performed by: STUDENT IN AN ORGANIZED HEALTH CARE EDUCATION/TRAINING PROGRAM

## 2022-01-01 PROCEDURE — 999N000248 HC STATISTIC IV INSERT WITH US BY RN

## 2022-01-01 PROCEDURE — 87305 ASPERGILLUS AG IA: CPT

## 2022-01-01 PROCEDURE — 76942 ECHO GUIDE FOR BIOPSY: CPT | Mod: LT | Performed by: RADIOLOGY

## 2022-01-01 PROCEDURE — 84157 ASSAY OF PROTEIN OTHER: CPT

## 2022-01-01 PROCEDURE — 96365 THER/PROPH/DIAG IV INF INIT: CPT

## 2022-01-01 PROCEDURE — 99207 PR APP CREDIT; MD BILLING SHARED VISIT: CPT | Performed by: PHYSICIAN ASSISTANT

## 2022-01-01 PROCEDURE — 96375 TX/PRO/DX INJ NEW DRUG ADDON: CPT

## 2022-01-01 PROCEDURE — 36591 DRAW BLOOD OFF VENOUS DEVICE: CPT | Performed by: INTERNAL MEDICINE

## 2022-01-01 PROCEDURE — 99222 1ST HOSP IP/OBS MODERATE 55: CPT | Performed by: RADIOLOGY

## 2022-01-01 PROCEDURE — 82746 ASSAY OF FOLIC ACID SERUM: CPT | Performed by: PHYSICIAN ASSISTANT

## 2022-01-01 PROCEDURE — 85610 PROTHROMBIN TIME: CPT | Performed by: EMERGENCY MEDICINE

## 2022-01-01 PROCEDURE — 82310 ASSAY OF CALCIUM: CPT

## 2022-01-01 PROCEDURE — 99000 SPECIMEN HANDLING OFFICE-LAB: CPT | Performed by: PATHOLOGY

## 2022-01-01 PROCEDURE — 99232 SBSQ HOSP IP/OBS MODERATE 35: CPT | Mod: GC | Performed by: INTERNAL MEDICINE

## 2022-01-01 PROCEDURE — 72157 MRI CHEST SPINE W/O & W/DYE: CPT | Mod: 26 | Performed by: RADIOLOGY

## 2022-01-01 PROCEDURE — 93325 DOPPLER ECHO COLOR FLOW MAPG: CPT | Mod: 26 | Performed by: INTERNAL MEDICINE

## 2022-01-01 PROCEDURE — 99223 1ST HOSP IP/OBS HIGH 75: CPT | Mod: GC | Performed by: INTERNAL MEDICINE

## 2022-01-01 PROCEDURE — 36415 COLL VENOUS BLD VENIPUNCTURE: CPT

## 2022-01-01 PROCEDURE — 00160J6 BYPASS CEREBRAL VENTRICLE TO PERITONEAL CAVITY WITH SYNTHETIC SUBSTITUTE, OPEN APPROACH: ICD-10-PCS | Performed by: NEUROLOGICAL SURGERY

## 2022-01-01 PROCEDURE — 120N000002 HC R&B MED SURG/OB UMMC

## 2022-01-01 PROCEDURE — 250N000013 HC RX MED GY IP 250 OP 250 PS 637: Performed by: STUDENT IN AN ORGANIZED HEALTH CARE EDUCATION/TRAINING PROGRAM

## 2022-01-01 PROCEDURE — 38222 DX BONE MARROW BX & ASPIR: CPT | Performed by: PHYSICIAN ASSISTANT

## 2022-01-01 PROCEDURE — 83735 ASSAY OF MAGNESIUM: CPT | Performed by: INTERNAL MEDICINE

## 2022-01-01 PROCEDURE — 250N000009 HC RX 250: Performed by: PHYSICIAN ASSISTANT

## 2022-01-01 PROCEDURE — 36415 COLL VENOUS BLD VENIPUNCTURE: CPT | Performed by: PHYSICIAN ASSISTANT

## 2022-01-01 PROCEDURE — 80053 COMPREHEN METABOLIC PANEL: CPT | Performed by: PHYSICIAN ASSISTANT

## 2022-01-01 PROCEDURE — 88307 TISSUE EXAM BY PATHOLOGIST: CPT | Mod: TC | Performed by: PHYSICIAN ASSISTANT

## 2022-01-01 PROCEDURE — 86850 RBC ANTIBODY SCREEN: CPT | Performed by: EMERGENCY MEDICINE

## 2022-01-01 PROCEDURE — 85379 FIBRIN DEGRADATION QUANT: CPT | Performed by: EMERGENCY MEDICINE

## 2022-01-01 PROCEDURE — 87581 M.PNEUMON DNA AMP PROBE: CPT | Performed by: INTERNAL MEDICINE

## 2022-01-01 PROCEDURE — 80048 BASIC METABOLIC PNL TOTAL CA: CPT | Performed by: PHYSICIAN ASSISTANT

## 2022-01-01 PROCEDURE — 99233 SBSQ HOSP IP/OBS HIGH 50: CPT | Mod: FS | Performed by: STUDENT IN AN ORGANIZED HEALTH CARE EDUCATION/TRAINING PROGRAM

## 2022-01-01 PROCEDURE — G0378 HOSPITAL OBSERVATION PER HR: HCPCS

## 2022-01-01 PROCEDURE — 96542 CHEMOTHERAPY INJECTION: CPT | Performed by: PHYSICIAN ASSISTANT

## 2022-01-01 PROCEDURE — 83036 HEMOGLOBIN GLYCOSYLATED A1C: CPT

## 2022-01-01 PROCEDURE — 78816 PET IMAGE W/CT FULL BODY: CPT | Mod: 26 | Performed by: STUDENT IN AN ORGANIZED HEALTH CARE EDUCATION/TRAINING PROGRAM

## 2022-01-01 PROCEDURE — 99207 PR SC NO CHARGE VISIT: CPT | Performed by: PHYSICIAN ASSISTANT

## 2022-01-01 PROCEDURE — 96372 THER/PROPH/DIAG INJ SC/IM: CPT | Performed by: INTERNAL MEDICINE

## 2022-01-01 PROCEDURE — 80053 COMPREHEN METABOLIC PANEL: CPT | Performed by: STUDENT IN AN ORGANIZED HEALTH CARE EDUCATION/TRAINING PROGRAM

## 2022-01-01 PROCEDURE — 71046 X-RAY EXAM CHEST 2 VIEWS: CPT

## 2022-01-01 PROCEDURE — 343N000001 HC RX 343: Performed by: INTERNAL MEDICINE

## 2022-01-01 PROCEDURE — 96360 HYDRATION IV INFUSION INIT: CPT

## 2022-01-01 PROCEDURE — 93010 ELECTROCARDIOGRAM REPORT: CPT | Performed by: INTERNAL MEDICINE

## 2022-01-01 PROCEDURE — 84100 ASSAY OF PHOSPHORUS: CPT | Performed by: PHYSICIAN ASSISTANT

## 2022-01-01 PROCEDURE — 84484 ASSAY OF TROPONIN QUANT: CPT | Performed by: EMERGENCY MEDICINE

## 2022-01-01 PROCEDURE — U0003 INFECTIOUS AGENT DETECTION BY NUCLEIC ACID (DNA OR RNA); SEVERE ACUTE RESPIRATORY SYNDROME CORONAVIRUS 2 (SARS-COV-2) (CORONAVIRUS DISEASE [COVID-19]), AMPLIFIED PROBE TECHNIQUE, MAKING USE OF HIGH THROUGHPUT TECHNOLOGIES AS DESCRIBED BY CMS-2020-01-R: HCPCS

## 2022-01-01 PROCEDURE — 99214 OFFICE O/P EST MOD 30 MIN: CPT | Performed by: INTERNAL MEDICINE

## 2022-01-01 PROCEDURE — 85014 HEMATOCRIT: CPT | Performed by: PHYSICIAN ASSISTANT

## 2022-01-01 PROCEDURE — 85027 COMPLETE CBC AUTOMATED: CPT

## 2022-01-01 PROCEDURE — 99223 1ST HOSP IP/OBS HIGH 75: CPT | Mod: AI | Performed by: INTERNAL MEDICINE

## 2022-01-01 PROCEDURE — 99215 OFFICE O/P EST HI 40 MIN: CPT | Performed by: INTERNAL MEDICINE

## 2022-01-01 PROCEDURE — 82040 ASSAY OF SERUM ALBUMIN: CPT | Performed by: INTERNAL MEDICINE

## 2022-01-01 PROCEDURE — 36415 COLL VENOUS BLD VENIPUNCTURE: CPT | Performed by: STUDENT IN AN ORGANIZED HEALTH CARE EDUCATION/TRAINING PROGRAM

## 2022-01-01 PROCEDURE — 99204 OFFICE O/P NEW MOD 45 MIN: CPT | Mod: 95 | Performed by: STUDENT IN AN ORGANIZED HEALTH CARE EDUCATION/TRAINING PROGRAM

## 2022-01-01 PROCEDURE — 88184 FLOWCYTOMETRY/ TC 1 MARKER: CPT | Performed by: STUDENT IN AN ORGANIZED HEALTH CARE EDUCATION/TRAINING PROGRAM

## 2022-01-01 PROCEDURE — 250N000009 HC RX 250

## 2022-01-01 PROCEDURE — 96360 HYDRATION IV INFUSION INIT: CPT | Performed by: EMERGENCY MEDICINE

## 2022-01-01 PROCEDURE — 88377 M/PHMTRC ALYS ISHQUANT/SEMIQ: CPT | Performed by: PHYSICIAN ASSISTANT

## 2022-01-01 PROCEDURE — 76882 US LMTD JT/FCL EVL NVASC XTR: CPT | Mod: XU | Performed by: RADIOLOGY

## 2022-01-01 PROCEDURE — 81001 URINALYSIS AUTO W/SCOPE: CPT | Performed by: EMERGENCY MEDICINE

## 2022-01-01 PROCEDURE — 85025 COMPLETE CBC W/AUTO DIFF WBC: CPT | Performed by: INTERNAL MEDICINE

## 2022-01-01 PROCEDURE — 86140 C-REACTIVE PROTEIN: CPT | Performed by: PHYSICIAN ASSISTANT

## 2022-01-01 PROCEDURE — 83880 ASSAY OF NATRIURETIC PEPTIDE: CPT | Performed by: EMERGENCY MEDICINE

## 2022-01-01 PROCEDURE — 36415 COLL VENOUS BLD VENIPUNCTURE: CPT | Performed by: EMERGENCY MEDICINE

## 2022-01-01 PROCEDURE — 99214 OFFICE O/P EST MOD 30 MIN: CPT | Mod: 25 | Performed by: PHYSICIAN ASSISTANT

## 2022-01-01 PROCEDURE — 72158 MRI LUMBAR SPINE W/O & W/DYE: CPT

## 2022-01-01 PROCEDURE — 72157 MRI CHEST SPINE W/O & W/DYE: CPT | Mod: 26 | Performed by: STUDENT IN AN ORGANIZED HEALTH CARE EDUCATION/TRAINING PROGRAM

## 2022-01-01 PROCEDURE — 82607 VITAMIN B-12: CPT | Performed by: PHYSICIAN ASSISTANT

## 2022-01-01 PROCEDURE — 88342 IMHCHEM/IMCYTCHM 1ST ANTB: CPT | Mod: 26 | Performed by: PATHOLOGY

## 2022-01-01 PROCEDURE — 99233 SBSQ HOSP IP/OBS HIGH 50: CPT | Performed by: PHYSICIAN ASSISTANT

## 2022-01-01 PROCEDURE — 93306 TTE W/DOPPLER COMPLETE: CPT | Performed by: INTERNAL MEDICINE

## 2022-01-01 PROCEDURE — A9552 F18 FDG: HCPCS | Performed by: INTERNAL MEDICINE

## 2022-01-01 PROCEDURE — 88377 M/PHMTRC ALYS ISHQUANT/SEMIQ: CPT | Mod: 26 | Performed by: MEDICAL GENETICS

## 2022-01-01 PROCEDURE — 99207 PR SC NO CHARGE VISIT: CPT | Performed by: INTERNAL MEDICINE

## 2022-01-01 PROCEDURE — 82040 ASSAY OF SERUM ALBUMIN: CPT | Performed by: EMERGENCY MEDICINE

## 2022-01-01 PROCEDURE — 91305 HC RX IP 250 OP 636: CPT | Performed by: PHYSICIAN ASSISTANT

## 2022-01-01 PROCEDURE — 86140 C-REACTIVE PROTEIN: CPT

## 2022-01-01 PROCEDURE — 88185 FLOWCYTOMETRY/TC ADD-ON: CPT | Performed by: NURSE PRACTITIONER

## 2022-01-01 PROCEDURE — 88184 FLOWCYTOMETRY/ TC 1 MARKER: CPT | Performed by: NURSE PRACTITIONER

## 2022-01-01 PROCEDURE — 62252 CSF SHUNT REPROGRAM: CPT | Performed by: NURSE PRACTITIONER

## 2022-01-01 PROCEDURE — 80307 DRUG TEST PRSMV CHEM ANLYZR: CPT | Performed by: PHYSICIAN ASSISTANT

## 2022-01-01 PROCEDURE — 85730 THROMBOPLASTIN TIME PARTIAL: CPT | Performed by: EMERGENCY MEDICINE

## 2022-01-01 PROCEDURE — 250N000011 HC RX IP 250 OP 636: Mod: JW | Performed by: INTERNAL MEDICINE

## 2022-01-01 PROCEDURE — 85014 HEMATOCRIT: CPT | Performed by: EMERGENCY MEDICINE

## 2022-01-01 PROCEDURE — 71045 X-RAY EXAM CHEST 1 VIEW: CPT | Mod: 26 | Performed by: RADIOLOGY

## 2022-01-01 PROCEDURE — 99214 OFFICE O/P EST MOD 30 MIN: CPT | Mod: 95 | Performed by: PHYSICIAN ASSISTANT

## 2022-01-01 PROCEDURE — 99215 OFFICE O/P EST HI 40 MIN: CPT

## 2022-01-01 PROCEDURE — 96367 TX/PROPH/DG ADDL SEQ IV INF: CPT

## 2022-01-01 PROCEDURE — 87637 SARSCOV2&INF A&B&RSV AMP PRB: CPT | Performed by: EMERGENCY MEDICINE

## 2022-01-01 PROCEDURE — 74018 RADEX ABDOMEN 1 VIEW: CPT | Mod: 26 | Performed by: RADIOLOGY

## 2022-01-01 PROCEDURE — 255N000002 HC RX 255 OP 636: Performed by: PHYSICIAN ASSISTANT

## 2022-01-01 PROCEDURE — 87486 CHLMYD PNEUM DNA AMP PROBE: CPT

## 2022-01-01 PROCEDURE — 70553 MRI BRAIN STEM W/O & W/DYE: CPT | Mod: 26 | Performed by: RADIOLOGY

## 2022-01-01 PROCEDURE — 258N000003 HC RX IP 258 OP 636: Performed by: HOSPITALIST

## 2022-01-01 PROCEDURE — 88305 TISSUE EXAM BY PATHOLOGIST: CPT | Mod: TC | Performed by: INTERNAL MEDICINE

## 2022-01-01 PROCEDURE — 86300 IMMUNOASSAY TUMOR CA 15-3: CPT | Performed by: PHYSICIAN ASSISTANT

## 2022-01-01 PROCEDURE — G0463 HOSPITAL OUTPT CLINIC VISIT: HCPCS | Mod: PN,RTG | Performed by: PHYSICIAN ASSISTANT

## 2022-01-01 PROCEDURE — 258N000003 HC RX IP 258 OP 636

## 2022-01-01 PROCEDURE — 82378 CARCINOEMBRYONIC ANTIGEN: CPT | Performed by: PHYSICIAN ASSISTANT

## 2022-01-01 PROCEDURE — 88188 FLOWCYTOMETRY/READ 9-15: CPT | Mod: GC | Performed by: STUDENT IN AN ORGANIZED HEALTH CARE EDUCATION/TRAINING PROGRAM

## 2022-01-01 PROCEDURE — 250N000025 HC SEVOFLURANE, PER MIN: Performed by: NEUROLOGICAL SURGERY

## 2022-01-01 PROCEDURE — 250N000011 HC RX IP 250 OP 636: Performed by: EMERGENCY MEDICINE

## 2022-01-01 PROCEDURE — 82310 ASSAY OF CALCIUM: CPT | Performed by: INTERNAL MEDICINE

## 2022-01-01 PROCEDURE — 82945 GLUCOSE OTHER FLUID: CPT | Performed by: PHYSICIAN ASSISTANT

## 2022-01-01 PROCEDURE — 87637 SARSCOV2&INF A&B&RSV AMP PRB: CPT

## 2022-01-01 PROCEDURE — 85007 BL SMEAR W/DIFF WBC COUNT: CPT | Performed by: EMERGENCY MEDICINE

## 2022-01-01 PROCEDURE — 96360 HYDRATION IV INFUSION INIT: CPT | Mod: XU | Performed by: EMERGENCY MEDICINE

## 2022-01-01 PROCEDURE — 88305 TISSUE EXAM BY PATHOLOGIST: CPT | Mod: TC | Performed by: PHYSICIAN ASSISTANT

## 2022-01-01 PROCEDURE — 99223 1ST HOSP IP/OBS HIGH 75: CPT | Performed by: INTERNAL MEDICINE

## 2022-01-01 PROCEDURE — 99215 OFFICE O/P EST HI 40 MIN: CPT | Performed by: PHYSICIAN ASSISTANT

## 2022-01-01 PROCEDURE — 84100 ASSAY OF PHOSPHORUS: CPT | Performed by: INTERNAL MEDICINE

## 2022-01-01 PROCEDURE — 99152 MOD SED SAME PHYS/QHP 5/>YRS: CPT

## 2022-01-01 PROCEDURE — 80285 DRUG ASSAY VORICONAZOLE: CPT | Performed by: PHYSICIAN ASSISTANT

## 2022-01-01 PROCEDURE — 96367 TX/PROPH/DG ADDL SEQ IV INF: CPT | Performed by: EMERGENCY MEDICINE

## 2022-01-01 PROCEDURE — U0003 INFECTIOUS AGENT DETECTION BY NUCLEIC ACID (DNA OR RNA); SEVERE ACUTE RESPIRATORY SYNDROME CORONAVIRUS 2 (SARS-COV-2) (CORONAVIRUS DISEASE [COVID-19]), AMPLIFIED PROBE TECHNIQUE, MAKING USE OF HIGH THROUGHPUT TECHNOLOGIES AS DESCRIBED BY CMS-2020-01-R: HCPCS | Mod: 90 | Performed by: PATHOLOGY

## 2022-01-01 PROCEDURE — 72157 MRI CHEST SPINE W/O & W/DYE: CPT

## 2022-01-01 PROCEDURE — 99207 PR NO BILLABLE SERVICE THIS VISIT: CPT

## 2022-01-01 PROCEDURE — 36591 DRAW BLOOD OFF VENOUS DEVICE: CPT | Performed by: PHYSICIAN ASSISTANT

## 2022-01-01 PROCEDURE — 96361 HYDRATE IV INFUSION ADD-ON: CPT | Performed by: EMERGENCY MEDICINE

## 2022-01-01 PROCEDURE — 93005 ELECTROCARDIOGRAM TRACING: CPT

## 2022-01-01 PROCEDURE — 88305 TISSUE EXAM BY PATHOLOGIST: CPT | Mod: 26 | Performed by: PATHOLOGY

## 2022-01-01 PROCEDURE — 96374 THER/PROPH/DIAG INJ IV PUSH: CPT

## 2022-01-01 PROCEDURE — 36415 COLL VENOUS BLD VENIPUNCTURE: CPT | Performed by: NURSE PRACTITIONER

## 2022-01-01 PROCEDURE — 272N000505 HC NEEDLE CR5

## 2022-01-01 PROCEDURE — G0463 HOSPITAL OUTPT CLINIC VISIT: HCPCS | Performed by: PHYSICIAN ASSISTANT

## 2022-01-01 PROCEDURE — 99207 PR NO CHARGE LOS: CPT | Performed by: PHYSICIAN ASSISTANT

## 2022-01-01 PROCEDURE — 85014 HEMATOCRIT: CPT | Performed by: STUDENT IN AN ORGANIZED HEALTH CARE EDUCATION/TRAINING PROGRAM

## 2022-01-01 PROCEDURE — 71045 X-RAY EXAM CHEST 1 VIEW: CPT

## 2022-01-01 PROCEDURE — 83550 IRON BINDING TEST: CPT | Performed by: PHYSICIAN ASSISTANT

## 2022-01-01 PROCEDURE — 250N000009 HC RX 250: Performed by: INTERNAL MEDICINE

## 2022-01-01 PROCEDURE — 36561 INSERT TUNNELED CV CATH: CPT | Mod: LT | Performed by: RADIOLOGY

## 2022-01-01 PROCEDURE — 96372 THER/PROPH/DIAG INJ SC/IM: CPT | Performed by: PHYSICIAN ASSISTANT

## 2022-01-01 PROCEDURE — 86300 IMMUNOASSAY TUMOR CA 15-3: CPT | Performed by: INTERNAL MEDICINE

## 2022-01-01 PROCEDURE — 84132 ASSAY OF SERUM POTASSIUM: CPT | Performed by: INTERNAL MEDICINE

## 2022-01-01 PROCEDURE — 96372 THER/PROPH/DIAG INJ SC/IM: CPT | Mod: 59 | Performed by: INTERNAL MEDICINE

## 2022-01-01 PROCEDURE — 99223 1ST HOSP IP/OBS HIGH 75: CPT | Mod: FS | Performed by: PEDIATRICS

## 2022-01-01 PROCEDURE — 999N000132 HC STATISTIC PP CARE STAGE 1

## 2022-01-01 PROCEDURE — 99285 EMERGENCY DEPT VISIT HI MDM: CPT | Mod: 25 | Performed by: EMERGENCY MEDICINE

## 2022-01-01 PROCEDURE — 93970 EXTREMITY STUDY: CPT

## 2022-01-01 PROCEDURE — 93325 DOPPLER ECHO COLOR FLOW MAPG: CPT

## 2022-01-01 PROCEDURE — 89051 BODY FLUID CELL COUNT: CPT | Performed by: PHYSICIAN ASSISTANT

## 2022-01-01 PROCEDURE — 71275 CT ANGIOGRAPHY CHEST: CPT | Mod: 26 | Performed by: RADIOLOGY

## 2022-01-01 PROCEDURE — 72156 MRI NECK SPINE W/O & W/DYE: CPT | Mod: 26 | Performed by: RADIOLOGY

## 2022-01-01 PROCEDURE — 82728 ASSAY OF FERRITIN: CPT | Performed by: PHYSICIAN ASSISTANT

## 2022-01-01 PROCEDURE — 8C01X6J COLLECTION OF CEREBROSPINAL FLUID FROM INDWELLING DEVICE IN NERVOUS SYSTEM: ICD-10-PCS | Performed by: NURSE PRACTITIONER

## 2022-01-01 PROCEDURE — 99238 HOSP IP/OBS DSCHRG MGMT 30/<: CPT | Mod: GC | Performed by: INTERNAL MEDICINE

## 2022-01-01 PROCEDURE — 62252 CSF SHUNT REPROGRAM: CPT | Performed by: PHYSICIAN ASSISTANT

## 2022-01-01 PROCEDURE — 999N000142 HC STATISTIC PROCEDURE PREP ONLY

## 2022-01-01 PROCEDURE — 255N000002 HC RX 255 OP 636: Performed by: STUDENT IN AN ORGANIZED HEALTH CARE EDUCATION/TRAINING PROGRAM

## 2022-01-01 PROCEDURE — 83735 ASSAY OF MAGNESIUM: CPT | Performed by: PHYSICIAN ASSISTANT

## 2022-01-01 PROCEDURE — 99213 OFFICE O/P EST LOW 20 MIN: CPT | Mod: TEL | Performed by: STUDENT IN AN ORGANIZED HEALTH CARE EDUCATION/TRAINING PROGRAM

## 2022-01-01 PROCEDURE — G0463 HOSPITAL OUTPT CLINIC VISIT: HCPCS | Mod: 25,27

## 2022-01-01 PROCEDURE — A9585 GADOBUTROL INJECTION: HCPCS | Performed by: PHYSICIAN ASSISTANT

## 2022-01-01 PROCEDURE — 250N000011 HC RX IP 250 OP 636: Performed by: HOSPITALIST

## 2022-01-01 PROCEDURE — 99238 HOSP IP/OBS DSCHRG MGMT 30/<: CPT | Mod: FS | Performed by: INTERNAL MEDICINE

## 2022-01-01 PROCEDURE — 999N000141 HC STATISTIC PRE-PROCEDURE NURSING ASSESSMENT: Performed by: NEUROLOGICAL SURGERY

## 2022-01-01 PROCEDURE — 80053 COMPREHEN METABOLIC PANEL: CPT | Performed by: EMERGENCY MEDICINE

## 2022-01-01 PROCEDURE — 80051 ELECTROLYTE PANEL: CPT | Performed by: STUDENT IN AN ORGANIZED HEALTH CARE EDUCATION/TRAINING PROGRAM

## 2022-01-01 PROCEDURE — 72158 MRI LUMBAR SPINE W/O & W/DYE: CPT | Mod: 26 | Performed by: STUDENT IN AN ORGANIZED HEALTH CARE EDUCATION/TRAINING PROGRAM

## 2022-01-01 PROCEDURE — 88108 CYTOPATH CONCENTRATE TECH: CPT | Mod: TC | Performed by: NURSE PRACTITIONER

## 2022-01-01 PROCEDURE — 96375 TX/PRO/DX INJ NEW DRUG ADDON: CPT | Performed by: EMERGENCY MEDICINE

## 2022-01-01 PROCEDURE — A9585 GADOBUTROL INJECTION: HCPCS | Performed by: STUDENT IN AN ORGANIZED HEALTH CARE EDUCATION/TRAINING PROGRAM

## 2022-01-01 PROCEDURE — 80048 BASIC METABOLIC PNL TOTAL CA: CPT | Performed by: EMERGENCY MEDICINE

## 2022-01-01 PROCEDURE — 89050 BODY FLUID CELL COUNT: CPT | Performed by: PHYSICIAN ASSISTANT

## 2022-01-01 PROCEDURE — 77012 CT SCAN FOR NEEDLE BIOPSY: CPT | Mod: 26 | Performed by: RADIOLOGY

## 2022-01-01 PROCEDURE — 370N000017 HC ANESTHESIA TECHNICAL FEE, PER MIN: Performed by: NEUROLOGICAL SURGERY

## 2022-01-01 PROCEDURE — 70486 CT MAXILLOFACIAL W/O DYE: CPT | Mod: GC | Performed by: RADIOLOGY

## 2022-01-01 PROCEDURE — 99220 PR INITIAL OBSERVATION CARE,LEVEL III: CPT | Mod: GC | Performed by: INTERNAL MEDICINE

## 2022-01-01 PROCEDURE — 999N000010 HC STATISTIC ANES STAT CODE-CRNA PER MINUTE: Performed by: NEUROLOGICAL SURGERY

## 2022-01-01 PROCEDURE — 360N000085 HC SURGERY LEVEL 5 W/ FLUORO, PER MIN: Performed by: NEUROLOGICAL SURGERY

## 2022-01-01 PROCEDURE — 0052A HC ADMIN COVID VAC PFIZER TRS-SUCR, 2ND DOSE: CPT | Performed by: PHYSICIAN ASSISTANT

## 2022-01-01 PROCEDURE — 85014 HEMATOCRIT: CPT

## 2022-01-01 PROCEDURE — 99217 PR OBSERVATION CARE DISCHARGE: CPT | Mod: GC | Performed by: INTERNAL MEDICINE

## 2022-01-01 PROCEDURE — 99218 PR INITIAL OBSERVATION CARE,LEVEL I: CPT | Mod: GC | Performed by: INTERNAL MEDICINE

## 2022-01-01 PROCEDURE — 81003 URINALYSIS AUTO W/O SCOPE: CPT | Performed by: PHYSICIAN ASSISTANT

## 2022-01-01 PROCEDURE — 85004 AUTOMATED DIFF WBC COUNT: CPT | Performed by: INTERNAL MEDICINE

## 2022-01-01 PROCEDURE — 93005 ELECTROCARDIOGRAM TRACING: CPT | Performed by: EMERGENCY MEDICINE

## 2022-01-01 PROCEDURE — 70450 CT HEAD/BRAIN W/O DYE: CPT | Mod: 77

## 2022-01-01 PROCEDURE — 83735 ASSAY OF MAGNESIUM: CPT

## 2022-01-01 PROCEDURE — 83605 ASSAY OF LACTIC ACID: CPT | Performed by: EMERGENCY MEDICINE

## 2022-01-01 PROCEDURE — 85610 PROTHROMBIN TIME: CPT | Performed by: NURSE PRACTITIONER

## 2022-01-01 PROCEDURE — 99232 SBSQ HOSP IP/OBS MODERATE 35: CPT | Performed by: INTERNAL MEDICINE

## 2022-01-01 PROCEDURE — 120N000001 HC R&B MED SURG/OB

## 2022-01-01 PROCEDURE — 89051 BODY FLUID CELL COUNT: CPT

## 2022-01-01 PROCEDURE — 96372 THER/PROPH/DIAG INJ SC/IM: CPT | Mod: 59 | Performed by: PHYSICIAN ASSISTANT

## 2022-01-01 PROCEDURE — 999N000065 XR SHUNT MALFUNCTION SURVEY

## 2022-01-01 PROCEDURE — 999N000128 HC STATISTIC PERIPHERAL IV START W/O US GUIDANCE

## 2022-01-01 PROCEDURE — 87493 C DIFF AMPLIFIED PROBE: CPT

## 2022-01-01 PROCEDURE — 61781 SCAN PROC CRANIAL INTRA: CPT | Performed by: NEUROLOGICAL SURGERY

## 2022-01-01 PROCEDURE — 36593 DECLOT VASCULAR DEVICE: CPT

## 2022-01-01 PROCEDURE — 70553 MRI BRAIN STEM W/O & W/DYE: CPT

## 2022-01-01 PROCEDURE — 99215 OFFICE O/P EST HI 40 MIN: CPT | Mod: 95 | Performed by: INTERNAL MEDICINE

## 2022-01-01 PROCEDURE — 84145 PROCALCITONIN (PCT): CPT | Performed by: PHYSICIAN ASSISTANT

## 2022-01-01 PROCEDURE — 88311 DECALCIFY TISSUE: CPT | Mod: 26 | Performed by: PATHOLOGY

## 2022-01-01 PROCEDURE — 8E09XBG COMPUTER ASSISTED PROCEDURE OF HEAD AND NECK REGION, WITH COMPUTERIZED TOMOGRAPHY: ICD-10-PCS | Performed by: NEUROLOGICAL SURGERY

## 2022-01-01 PROCEDURE — 84157 ASSAY OF PROTEIN OTHER: CPT | Performed by: PHYSICIAN ASSISTANT

## 2022-01-01 PROCEDURE — 99207 PR APP CREDIT; MD BILLING SHARED VISIT: CPT

## 2022-01-01 PROCEDURE — 74018 RADEX ABDOMEN 1 VIEW: CPT

## 2022-01-01 PROCEDURE — 87633 RESP VIRUS 12-25 TARGETS: CPT

## 2022-01-01 PROCEDURE — 272N000001 HC OR GENERAL SUPPLY STERILE: Performed by: NEUROLOGICAL SURGERY

## 2022-01-01 PROCEDURE — 85027 COMPLETE CBC AUTOMATED: CPT | Performed by: STUDENT IN AN ORGANIZED HEALTH CARE EDUCATION/TRAINING PROGRAM

## 2022-01-01 PROCEDURE — 99024 POSTOP FOLLOW-UP VISIT: CPT | Performed by: NURSE PRACTITIONER

## 2022-01-01 PROCEDURE — 99207 PR NO CHARGE LOS: CPT | Performed by: STUDENT IN AN ORGANIZED HEALTH CARE EDUCATION/TRAINING PROGRAM

## 2022-01-01 PROCEDURE — 99285 EMERGENCY DEPT VISIT HI MDM: CPT | Performed by: EMERGENCY MEDICINE

## 2022-01-01 PROCEDURE — 62223 ESTABLISH BRAIN CAVITY SHUNT: CPT | Mod: GC | Performed by: NEUROLOGICAL SURGERY

## 2022-01-01 PROCEDURE — 258N000003 HC RX IP 258 OP 636: Performed by: NURSE PRACTITIONER

## 2022-01-01 PROCEDURE — 99231 SBSQ HOSP IP/OBS SF/LOW 25: CPT | Performed by: INTERNAL MEDICINE

## 2022-01-01 PROCEDURE — 99207 PR CDG-CUT & PASTE-POTENTIAL IMPACT ON LEVEL: CPT | Performed by: PHYSICIAN ASSISTANT

## 2022-01-01 PROCEDURE — 96374 THER/PROPH/DIAG INJ IV PUSH: CPT | Mod: 59 | Performed by: EMERGENCY MEDICINE

## 2022-01-01 PROCEDURE — 20225 BONE BIOPSY TROCAR/NDL DEEP: CPT | Mod: GC | Performed by: RADIOLOGY

## 2022-01-01 PROCEDURE — 87641 MR-STAPH DNA AMP PROBE: CPT | Performed by: INTERNAL MEDICINE

## 2022-01-01 PROCEDURE — 83605 ASSAY OF LACTIC ACID: CPT | Performed by: INTERNAL MEDICINE

## 2022-01-01 PROCEDURE — 93321 DOPPLER ECHO F-UP/LMTD STD: CPT | Mod: 26 | Performed by: INTERNAL MEDICINE

## 2022-01-01 PROCEDURE — 96376 TX/PRO/DX INJ SAME DRUG ADON: CPT | Performed by: EMERGENCY MEDICINE

## 2022-01-01 PROCEDURE — 93306 TTE W/DOPPLER COMPLETE: CPT

## 2022-01-01 PROCEDURE — 93306 TTE W/DOPPLER COMPLETE: CPT | Mod: 26 | Performed by: INTERNAL MEDICINE

## 2022-01-01 PROCEDURE — 84484 ASSAY OF TROPONIN QUANT: CPT

## 2022-01-01 PROCEDURE — 84100 ASSAY OF PHOSPHORUS: CPT

## 2022-01-01 PROCEDURE — 99239 HOSP IP/OBS DSCHRG MGMT >30: CPT | Performed by: INTERNAL MEDICINE

## 2022-01-01 PROCEDURE — 74177 CT ABD & PELVIS W/CONTRAST: CPT | Mod: 26 | Performed by: RADIOLOGY

## 2022-01-01 PROCEDURE — 87449 NOS EACH ORGANISM AG IA: CPT

## 2022-01-01 PROCEDURE — 86901 BLOOD TYPING SEROLOGIC RH(D): CPT | Performed by: EMERGENCY MEDICINE

## 2022-01-01 PROCEDURE — 72156 MRI NECK SPINE W/O & W/DYE: CPT | Mod: 26 | Performed by: STUDENT IN AN ORGANIZED HEALTH CARE EDUCATION/TRAINING PROGRAM

## 2022-01-01 PROCEDURE — 82040 ASSAY OF SERUM ALBUMIN: CPT | Performed by: PHYSICIAN ASSISTANT

## 2022-01-01 PROCEDURE — 255N000002 HC RX 255 OP 636: Performed by: INTERNAL MEDICINE

## 2022-01-01 PROCEDURE — 87641 MR-STAPH DNA AMP PROBE: CPT

## 2022-01-01 PROCEDURE — 82962 GLUCOSE BLOOD TEST: CPT

## 2022-01-01 PROCEDURE — 99204 OFFICE O/P NEW MOD 45 MIN: CPT | Mod: 95 | Performed by: INTERNAL MEDICINE

## 2022-01-01 PROCEDURE — 80061 LIPID PANEL: CPT

## 2022-01-01 PROCEDURE — 99232 SBSQ HOSP IP/OBS MODERATE 35: CPT | Mod: 57 | Performed by: NURSE PRACTITIONER

## 2022-01-01 PROCEDURE — 710N000009 HC RECOVERY PHASE 1, LEVEL 1, PER MIN: Performed by: NEUROLOGICAL SURGERY

## 2022-01-01 PROCEDURE — 71275 CT ANGIOGRAPHY CHEST: CPT

## 2022-01-01 PROCEDURE — 20225 BONE BIOPSY TROCAR/NDL DEEP: CPT

## 2022-01-01 PROCEDURE — U0005 INFEC AGEN DETEC AMPLI PROBE: HCPCS | Performed by: EMERGENCY MEDICINE

## 2022-01-01 PROCEDURE — 99233 SBSQ HOSP IP/OBS HIGH 50: CPT | Mod: GC | Performed by: INTERNAL MEDICINE

## 2022-01-01 PROCEDURE — 82945 GLUCOSE OTHER FLUID: CPT

## 2022-01-01 PROCEDURE — 72158 MRI LUMBAR SPINE W/O & W/DYE: CPT | Mod: 26 | Performed by: RADIOLOGY

## 2022-01-01 PROCEDURE — 99213 OFFICE O/P EST LOW 20 MIN: CPT | Performed by: PHYSICIAN ASSISTANT

## 2022-01-01 PROCEDURE — G0463 HOSPITAL OUTPT CLINIC VISIT: HCPCS | Mod: PN,RTG | Performed by: STUDENT IN AN ORGANIZED HEALTH CARE EDUCATION/TRAINING PROGRAM

## 2022-01-01 PROCEDURE — 61070 BRAIN CANAL SHUNT PROCEDURE: CPT | Mod: 79 | Performed by: NURSE PRACTITIONER

## 2022-01-01 PROCEDURE — A9585 GADOBUTROL INJECTION: HCPCS | Performed by: INTERNAL MEDICINE

## 2022-01-01 PROCEDURE — 93308 TTE F-UP OR LMTD: CPT

## 2022-01-01 DEVICE — SHUNT CATH PERITONEAL BACTISEAL 82-3074: Type: IMPLANTABLE DEVICE | Site: ABDOMEN | Status: FUNCTIONAL

## 2022-01-01 DEVICE — SHUNT VALVE CERTAS PLUS REG WITHOUT SIPHONGUARD  828800PL: Type: IMPLANTABLE DEVICE | Site: CRANIAL | Status: FUNCTIONAL

## 2022-01-01 DEVICE — CATHETER 91101 VENTRICULAR ANTIMICROBIAL
Type: IMPLANTABLE DEVICE | Site: CRANIAL | Status: FUNCTIONAL
Brand: ARES™

## 2022-01-01 DEVICE — CATH PORT POWERPORT CLEARVUE SLIM 8FR 5618000: Type: IMPLANTABLE DEVICE | Site: CHEST | Status: FUNCTIONAL

## 2022-01-01 RX ORDER — HEPARIN SODIUM,PORCINE 10 UNIT/ML
5 VIAL (ML) INTRAVENOUS
Status: DISCONTINUED | OUTPATIENT
Start: 2022-01-01 | End: 2022-01-01 | Stop reason: HOSPADM

## 2022-01-01 RX ORDER — ALBUTEROL SULFATE 90 UG/1
1-2 AEROSOL, METERED RESPIRATORY (INHALATION)
Status: CANCELLED
Start: 2022-01-01

## 2022-01-01 RX ORDER — EPINEPHRINE 1 MG/ML
0.3 INJECTION, SOLUTION INTRAMUSCULAR; SUBCUTANEOUS EVERY 5 MIN PRN
Status: CANCELLED | OUTPATIENT
Start: 2022-01-01

## 2022-01-01 RX ORDER — ONDANSETRON 2 MG/ML
8 INJECTION INTRAMUSCULAR; INTRAVENOUS ONCE
Status: COMPLETED | OUTPATIENT
Start: 2022-01-01 | End: 2022-01-01

## 2022-01-01 RX ORDER — ALBUTEROL SULFATE 0.83 MG/ML
2.5 SOLUTION RESPIRATORY (INHALATION)
Status: CANCELLED | OUTPATIENT
Start: 2022-01-01

## 2022-01-01 RX ORDER — OLANZAPINE 5 MG/1
5 TABLET ORAL AT BEDTIME
Qty: 15 TABLET | Refills: 0 | Status: SHIPPED | OUTPATIENT
Start: 2022-01-01 | End: 2022-01-01

## 2022-01-01 RX ORDER — HEPARIN SODIUM (PORCINE) LOCK FLUSH IV SOLN 100 UNIT/ML 100 UNIT/ML
5 SOLUTION INTRAVENOUS
Status: CANCELLED | OUTPATIENT
Start: 2022-01-01

## 2022-01-01 RX ORDER — ACETAMINOPHEN 325 MG/1
650 TABLET ORAL ONCE
Status: COMPLETED | OUTPATIENT
Start: 2022-01-01 | End: 2022-01-01

## 2022-01-01 RX ORDER — HEPARIN SODIUM (PORCINE) LOCK FLUSH IV SOLN 100 UNIT/ML 100 UNIT/ML
5 SOLUTION INTRAVENOUS
Status: DISCONTINUED | OUTPATIENT
Start: 2022-01-01 | End: 2022-01-01 | Stop reason: HOSPADM

## 2022-01-01 RX ORDER — DIPHENHYDRAMINE HYDROCHLORIDE 50 MG/ML
50 INJECTION INTRAMUSCULAR; INTRAVENOUS
Status: CANCELLED
Start: 2022-01-01

## 2022-01-01 RX ORDER — AMOXICILLIN 250 MG
1 CAPSULE ORAL 2 TIMES DAILY PRN
Status: DISCONTINUED | OUTPATIENT
Start: 2022-01-01 | End: 2022-01-01

## 2022-01-01 RX ORDER — HYDROMORPHONE HYDROCHLORIDE 1 MG/ML
0.2 INJECTION, SOLUTION INTRAMUSCULAR; INTRAVENOUS; SUBCUTANEOUS EVERY 5 MIN PRN
Status: DISCONTINUED | OUTPATIENT
Start: 2022-01-01 | End: 2022-01-01 | Stop reason: HOSPADM

## 2022-01-01 RX ORDER — MEPERIDINE HYDROCHLORIDE 25 MG/ML
25 INJECTION INTRAMUSCULAR; INTRAVENOUS; SUBCUTANEOUS EVERY 30 MIN PRN
Status: DISCONTINUED | OUTPATIENT
Start: 2022-01-01 | End: 2022-01-01 | Stop reason: HOSPADM

## 2022-01-01 RX ORDER — AMOXICILLIN 250 MG
2 CAPSULE ORAL 2 TIMES DAILY PRN
Status: DISCONTINUED | OUTPATIENT
Start: 2022-01-01 | End: 2022-01-01 | Stop reason: HOSPADM

## 2022-01-01 RX ORDER — LORAZEPAM 2 MG/ML
0.5 INJECTION INTRAMUSCULAR EVERY 4 HOURS PRN
Status: DISCONTINUED | OUTPATIENT
Start: 2022-01-01 | End: 2022-01-01 | Stop reason: HOSPADM

## 2022-01-01 RX ORDER — ALBUTEROL SULFATE 90 UG/1
2 AEROSOL, METERED RESPIRATORY (INHALATION) EVERY 6 HOURS PRN
Qty: 18 G | Refills: 0 | Status: SHIPPED | OUTPATIENT
Start: 2022-01-01 | End: 2022-01-01

## 2022-01-01 RX ORDER — ONDANSETRON 2 MG/ML
8 INJECTION INTRAMUSCULAR; INTRAVENOUS EVERY 6 HOURS PRN
Status: CANCELLED
Start: 2022-01-01

## 2022-01-01 RX ORDER — BENZONATATE 100 MG/1
100 CAPSULE ORAL 3 TIMES DAILY PRN
Status: DISCONTINUED | OUTPATIENT
Start: 2022-01-01 | End: 2022-01-01 | Stop reason: HOSPADM

## 2022-01-01 RX ORDER — HEPARIN SODIUM,PORCINE 10 UNIT/ML
5 VIAL (ML) INTRAVENOUS
Status: CANCELLED | OUTPATIENT
Start: 2022-01-01

## 2022-01-01 RX ORDER — DIPHENHYDRAMINE HCL 25 MG
50 CAPSULE ORAL ONCE
Status: CANCELLED
Start: 2022-01-01

## 2022-01-01 RX ORDER — NALOXONE HYDROCHLORIDE 0.4 MG/ML
0.2 INJECTION, SOLUTION INTRAMUSCULAR; INTRAVENOUS; SUBCUTANEOUS
Status: CANCELLED | OUTPATIENT
Start: 2022-01-01

## 2022-01-01 RX ORDER — NYSTATIN 100000/ML
SUSPENSION, ORAL (FINAL DOSE FORM) ORAL
Qty: 200 ML | Refills: 0 | Status: ON HOLD | OUTPATIENT
Start: 2022-01-01 | End: 2023-01-01

## 2022-01-01 RX ORDER — HEPARIN SODIUM (PORCINE) LOCK FLUSH IV SOLN 100 UNIT/ML 100 UNIT/ML
5 SOLUTION INTRAVENOUS
Status: COMPLETED | OUTPATIENT
Start: 2022-01-01 | End: 2022-01-01

## 2022-01-01 RX ORDER — LABETALOL 20 MG/4 ML (5 MG/ML) INTRAVENOUS SYRINGE
PRN
Status: DISCONTINUED | OUTPATIENT
Start: 2022-01-01 | End: 2022-01-01

## 2022-01-01 RX ORDER — METOCLOPRAMIDE 10 MG/1
10 TABLET ORAL EVERY 6 HOURS PRN
Status: CANCELLED | OUTPATIENT
Start: 2022-01-01

## 2022-01-01 RX ORDER — METHYLPREDNISOLONE SODIUM SUCCINATE 125 MG/2ML
125 INJECTION, POWDER, LYOPHILIZED, FOR SOLUTION INTRAMUSCULAR; INTRAVENOUS
Status: CANCELLED
Start: 2022-01-01

## 2022-01-01 RX ORDER — HEPARIN SODIUM (PORCINE) LOCK FLUSH IV SOLN 100 UNIT/ML 100 UNIT/ML
5 SOLUTION INTRAVENOUS ONCE
Status: COMPLETED | OUTPATIENT
Start: 2022-01-01 | End: 2022-01-01

## 2022-01-01 RX ORDER — TRAZODONE HYDROCHLORIDE 50 MG/1
50 TABLET, FILM COATED ORAL
Qty: 14 TABLET | Refills: 0 | Status: SHIPPED | OUTPATIENT
Start: 2022-01-01 | End: 2022-01-01

## 2022-01-01 RX ORDER — ONDANSETRON 2 MG/ML
8 INJECTION INTRAMUSCULAR; INTRAVENOUS EVERY 6 HOURS PRN
Status: DISCONTINUED | OUTPATIENT
Start: 2022-01-01 | End: 2022-01-01 | Stop reason: HOSPADM

## 2022-01-01 RX ORDER — MIRTAZAPINE 7.5 MG/1
7.5 TABLET, FILM COATED ORAL AT BEDTIME
Status: DISCONTINUED | OUTPATIENT
Start: 2022-01-01 | End: 2022-01-01 | Stop reason: HOSPADM

## 2022-01-01 RX ORDER — LORAZEPAM 2 MG/ML
0.5 INJECTION INTRAMUSCULAR EVERY 4 HOURS PRN
Status: CANCELLED | OUTPATIENT
Start: 2022-01-01

## 2022-01-01 RX ORDER — ACETAMINOPHEN 325 MG/1
650 TABLET ORAL EVERY 6 HOURS PRN
Status: DISCONTINUED | OUTPATIENT
Start: 2022-01-01 | End: 2022-01-01

## 2022-01-01 RX ORDER — OXYCODONE HYDROCHLORIDE 5 MG/1
5-10 TABLET ORAL EVERY 4 HOURS PRN
Status: DISCONTINUED | OUTPATIENT
Start: 2022-01-01 | End: 2022-01-01

## 2022-01-01 RX ORDER — DIPHENHYDRAMINE HCL 25 MG
50 CAPSULE ORAL
Status: CANCELLED
Start: 2022-01-01

## 2022-01-01 RX ORDER — LORAZEPAM 2 MG/ML
0.5 INJECTION INTRAMUSCULAR DAILY PRN
Status: DISCONTINUED | OUTPATIENT
Start: 2022-01-01 | End: 2022-01-01 | Stop reason: HOSPADM

## 2022-01-01 RX ORDER — PROPOFOL 10 MG/ML
INJECTION, EMULSION INTRAVENOUS PRN
Status: DISCONTINUED | OUTPATIENT
Start: 2022-01-01 | End: 2022-01-01

## 2022-01-01 RX ORDER — ONDANSETRON 4 MG/1
4 TABLET, ORALLY DISINTEGRATING ORAL EVERY 6 HOURS PRN
Status: DISCONTINUED | OUTPATIENT
Start: 2022-01-01 | End: 2022-01-01

## 2022-01-01 RX ORDER — HEPARIN SODIUM (PORCINE) LOCK FLUSH IV SOLN 100 UNIT/ML 100 UNIT/ML
5-10 SOLUTION INTRAVENOUS
Status: DISCONTINUED | OUTPATIENT
Start: 2022-01-01 | End: 2022-01-01 | Stop reason: HOSPADM

## 2022-01-01 RX ORDER — LEVOFLOXACIN 750 MG/1
750 TABLET, FILM COATED ORAL DAILY
Qty: 7 TABLET | Refills: 0 | Status: ON HOLD | OUTPATIENT
Start: 2022-01-01 | End: 2022-01-01

## 2022-01-01 RX ORDER — ALBUTEROL SULFATE 0.83 MG/ML
2.5 SOLUTION RESPIRATORY (INHALATION) EVERY 4 HOURS PRN
Status: DISCONTINUED | OUTPATIENT
Start: 2022-01-01 | End: 2022-01-01 | Stop reason: HOSPADM

## 2022-01-01 RX ORDER — HYDROMORPHONE HCL IN WATER/PF 6 MG/30 ML
0.2 PATIENT CONTROLLED ANALGESIA SYRINGE INTRAVENOUS
Status: DISCONTINUED | OUTPATIENT
Start: 2022-01-01 | End: 2022-01-01

## 2022-01-01 RX ORDER — VORICONAZOLE 200 MG/1
200 TABLET, FILM COATED ORAL 2 TIMES DAILY
Qty: 28 TABLET | Refills: 0 | Status: SHIPPED | OUTPATIENT
Start: 2022-01-01 | End: 2022-01-01

## 2022-01-01 RX ORDER — DIPHENHYDRAMINE HYDROCHLORIDE AND LIDOCAINE HYDROCHLORIDE AND ALUMINUM HYDROXIDE AND MAGNESIUM HYDRO
10 KIT
Status: DISCONTINUED | OUTPATIENT
Start: 2022-01-01 | End: 2022-01-01

## 2022-01-01 RX ORDER — VENLAFAXINE 75 MG/1
75 TABLET ORAL DAILY
Qty: 30 TABLET | Refills: 3 | Status: SHIPPED | OUTPATIENT
Start: 2022-01-01 | End: 2022-01-01

## 2022-01-01 RX ORDER — ZOLEDRONIC ACID 5 MG/100ML
5 INJECTION, SOLUTION INTRAVENOUS ONCE
Status: COMPLETED | OUTPATIENT
Start: 2022-01-01 | End: 2022-01-01

## 2022-01-01 RX ORDER — LIDOCAINE/PRILOCAINE 2.5 %-2.5%
CREAM (GRAM) TOPICAL PRN
Qty: 30 G | Refills: 3 | Status: SHIPPED | OUTPATIENT
Start: 2022-01-01 | End: 2022-01-01

## 2022-01-01 RX ORDER — GINGER ROOT 550 MG
550 CAPSULE ORAL DAILY
COMMUNITY

## 2022-01-01 RX ORDER — LIDOCAINE HYDROCHLORIDE 20 MG/ML
INJECTION, SOLUTION INFILTRATION; PERINEURAL PRN
Status: DISCONTINUED | OUTPATIENT
Start: 2022-01-01 | End: 2022-01-01

## 2022-01-01 RX ORDER — MULTIPLE VITAMINS W/ MINERALS TAB 9MG-400MCG
1 TAB ORAL DAILY
Status: DISCONTINUED | OUTPATIENT
Start: 2022-01-01 | End: 2022-01-01 | Stop reason: HOSPADM

## 2022-01-01 RX ORDER — OLANZAPINE 2.5 MG/1
2.5 TABLET, FILM COATED ORAL AT BEDTIME
Qty: 30 TABLET | Refills: 0 | Status: SHIPPED | OUTPATIENT
Start: 2022-01-01 | End: 2022-01-01

## 2022-01-01 RX ORDER — CODEINE PHOSPHATE AND GUAIFENESIN 10; 100 MG/5ML; MG/5ML
5-10 SOLUTION ORAL EVERY 4 HOURS PRN
Status: DISCONTINUED | OUTPATIENT
Start: 2022-01-01 | End: 2022-01-01 | Stop reason: HOSPADM

## 2022-01-01 RX ORDER — FENTANYL CITRATE 50 UG/ML
25 INJECTION, SOLUTION INTRAMUSCULAR; INTRAVENOUS EVERY 5 MIN PRN
Status: DISCONTINUED | OUTPATIENT
Start: 2022-01-01 | End: 2022-01-01 | Stop reason: HOSPADM

## 2022-01-01 RX ORDER — LIDOCAINE/PRILOCAINE 2.5 %-2.5%
CREAM (GRAM) TOPICAL
Qty: 30 G | Refills: 1 | Status: ON HOLD | OUTPATIENT
Start: 2022-01-01 | End: 2022-01-01

## 2022-01-01 RX ORDER — MEPERIDINE HYDROCHLORIDE 25 MG/ML
25 INJECTION INTRAMUSCULAR; INTRAVENOUS; SUBCUTANEOUS EVERY 30 MIN PRN
Status: CANCELLED | OUTPATIENT
Start: 2022-01-01

## 2022-01-01 RX ORDER — ONDANSETRON 2 MG/ML
4 INJECTION INTRAMUSCULAR; INTRAVENOUS EVERY 6 HOURS PRN
Status: DISCONTINUED | OUTPATIENT
Start: 2022-01-01 | End: 2022-01-01

## 2022-01-01 RX ORDER — VITAMIN B COMPLEX
1000 TABLET ORAL DAILY
Status: DISCONTINUED | OUTPATIENT
Start: 2022-01-01 | End: 2022-01-01 | Stop reason: HOSPADM

## 2022-01-01 RX ORDER — ZOLEDRONIC ACID 5 MG/100ML
5 INJECTION, SOLUTION INTRAVENOUS ONCE
Status: CANCELLED
Start: 2022-01-01 | End: 2022-01-01

## 2022-01-01 RX ORDER — IOPAMIDOL 755 MG/ML
90 INJECTION, SOLUTION INTRAVASCULAR ONCE
Status: COMPLETED | OUTPATIENT
Start: 2022-01-01 | End: 2022-01-01

## 2022-01-01 RX ORDER — VANCOMYCIN HYDROCHLORIDE 125 MG/1
125 CAPSULE ORAL 4 TIMES DAILY
Qty: 32 CAPSULE | Refills: 0 | Status: SHIPPED | OUTPATIENT
Start: 2022-01-01 | End: 2022-01-01

## 2022-01-01 RX ORDER — ACETAMINOPHEN 325 MG/1
650 TABLET ORAL ONCE
Status: CANCELLED
Start: 2022-01-01 | End: 2022-01-01

## 2022-01-01 RX ORDER — LIDOCAINE HYDROCHLORIDE AND EPINEPHRINE 10; 10 MG/ML; UG/ML
5 INJECTION, SOLUTION INFILTRATION; PERINEURAL ONCE
Status: COMPLETED | OUTPATIENT
Start: 2022-01-01 | End: 2022-01-01

## 2022-01-01 RX ORDER — OXYCODONE HYDROCHLORIDE 5 MG/1
5 TABLET ORAL EVERY 4 HOURS PRN
Qty: 30 TABLET | Refills: 0 | Status: SHIPPED | OUTPATIENT
Start: 2022-01-01 | End: 2023-01-01

## 2022-01-01 RX ORDER — ONDANSETRON 2 MG/ML
4 INJECTION INTRAMUSCULAR; INTRAVENOUS EVERY 30 MIN PRN
Status: DISCONTINUED | OUTPATIENT
Start: 2022-01-01 | End: 2022-01-01 | Stop reason: HOSPADM

## 2022-01-01 RX ORDER — DEXAMETHASONE SODIUM PHOSPHATE 4 MG/ML
4 INJECTION, SOLUTION INTRA-ARTICULAR; INTRALESIONAL; INTRAMUSCULAR; INTRAVENOUS; SOFT TISSUE ONCE
Status: DISCONTINUED | OUTPATIENT
Start: 2022-01-01 | End: 2022-01-01 | Stop reason: CLARIF

## 2022-01-01 RX ORDER — PROCHLORPERAZINE MALEATE 10 MG
10 TABLET ORAL EVERY 6 HOURS PRN
Qty: 30 TABLET | Refills: 2 | Status: SHIPPED | OUTPATIENT
Start: 2022-01-01 | End: 2022-01-01

## 2022-01-01 RX ORDER — HYDROMORPHONE HYDROCHLORIDE 1 MG/ML
0.5 INJECTION, SOLUTION INTRAMUSCULAR; INTRAVENOUS; SUBCUTANEOUS ONCE
Status: COMPLETED | OUTPATIENT
Start: 2022-01-01 | End: 2022-01-01

## 2022-01-01 RX ORDER — LEVOFLOXACIN 750 MG/1
750 TABLET, FILM COATED ORAL DAILY
Status: CANCELLED | OUTPATIENT
Start: 2022-01-01

## 2022-01-01 RX ORDER — NALOXONE HYDROCHLORIDE 0.4 MG/ML
0.2 INJECTION, SOLUTION INTRAMUSCULAR; INTRAVENOUS; SUBCUTANEOUS
Status: DISCONTINUED | OUTPATIENT
Start: 2022-01-01 | End: 2022-01-01 | Stop reason: HOSPADM

## 2022-01-01 RX ORDER — ONDANSETRON 2 MG/ML
8 INJECTION INTRAMUSCULAR; INTRAVENOUS EVERY 6 HOURS PRN
Status: DISCONTINUED | OUTPATIENT
Start: 2022-01-01 | End: 2022-01-01

## 2022-01-01 RX ORDER — PIPERACILLIN SODIUM, TAZOBACTAM SODIUM 3; .375 G/15ML; G/15ML
3.38 INJECTION, POWDER, LYOPHILIZED, FOR SOLUTION INTRAVENOUS EVERY 6 HOURS
Status: DISCONTINUED | OUTPATIENT
Start: 2022-01-01 | End: 2022-01-01 | Stop reason: HOSPADM

## 2022-01-01 RX ORDER — HEPARIN SODIUM (PORCINE) LOCK FLUSH IV SOLN 100 UNIT/ML 100 UNIT/ML
5 SOLUTION INTRAVENOUS EVERY 8 HOURS
Status: DISCONTINUED | OUTPATIENT
Start: 2022-01-01 | End: 2022-01-01 | Stop reason: HOSPADM

## 2022-01-01 RX ORDER — LIDOCAINE 40 MG/G
CREAM TOPICAL
Status: DISCONTINUED | OUTPATIENT
Start: 2022-01-01 | End: 2022-01-01

## 2022-01-01 RX ORDER — DEXAMETHASONE 4 MG/1
4 TABLET ORAL
Qty: 30 TABLET | Refills: 0 | Status: SHIPPED | OUTPATIENT
Start: 2022-01-01 | End: 2023-01-01

## 2022-01-01 RX ORDER — HEPARIN SODIUM (PORCINE) LOCK FLUSH IV SOLN 100 UNIT/ML 100 UNIT/ML
500 SOLUTION INTRAVENOUS ONCE
Status: COMPLETED | OUTPATIENT
Start: 2022-01-01 | End: 2022-01-01

## 2022-01-01 RX ORDER — PALONOSETRON 0.05 MG/ML
0.25 INJECTION, SOLUTION INTRAVENOUS ONCE
Status: CANCELLED
Start: 2022-01-01 | End: 2022-01-01

## 2022-01-01 RX ORDER — CEFAZOLIN SODIUM 2 G/50ML
2 SOLUTION INTRAVENOUS
Status: COMPLETED | OUTPATIENT
Start: 2022-01-01 | End: 2022-01-01

## 2022-01-01 RX ORDER — ACETAMINOPHEN 325 MG/1
975 TABLET ORAL ONCE
Status: COMPLETED | OUTPATIENT
Start: 2022-01-01 | End: 2022-01-01

## 2022-01-01 RX ORDER — CEFAZOLIN SODIUM 2 G/100ML
2 INJECTION, SOLUTION INTRAVENOUS SEE ADMIN INSTRUCTIONS
Status: DISCONTINUED | OUTPATIENT
Start: 2022-01-01 | End: 2022-01-01 | Stop reason: HOSPADM

## 2022-01-01 RX ORDER — PALONOSETRON 0.05 MG/ML
0.25 INJECTION, SOLUTION INTRAVENOUS ONCE
Status: COMPLETED | OUTPATIENT
Start: 2022-01-01 | End: 2022-01-01

## 2022-01-01 RX ORDER — MIRTAZAPINE 7.5 MG/1
TABLET, FILM COATED ORAL
Qty: 30 TABLET | Refills: 0 | Status: SHIPPED | OUTPATIENT
Start: 2022-01-01 | End: 2022-01-01

## 2022-01-01 RX ORDER — APIXABAN 5 MG/1
TABLET, FILM COATED ORAL
Qty: 80 TABLET | Refills: 0 | OUTPATIENT
Start: 2022-01-01

## 2022-01-01 RX ORDER — LABETALOL HYDROCHLORIDE 5 MG/ML
10 INJECTION, SOLUTION INTRAVENOUS
Status: DISCONTINUED | OUTPATIENT
Start: 2022-01-01 | End: 2022-01-01 | Stop reason: HOSPADM

## 2022-01-01 RX ORDER — FENTANYL CITRATE 50 UG/ML
50 INJECTION, SOLUTION INTRAMUSCULAR; INTRAVENOUS EVERY 5 MIN PRN
Status: DISCONTINUED | OUTPATIENT
Start: 2022-01-01 | End: 2022-01-01 | Stop reason: HOSPADM

## 2022-01-01 RX ORDER — HEPARIN SODIUM (PORCINE) LOCK FLUSH IV SOLN 100 UNIT/ML 100 UNIT/ML
5 SOLUTION INTRAVENOUS DAILY PRN
Status: DISCONTINUED | OUTPATIENT
Start: 2022-01-01 | End: 2022-01-01 | Stop reason: HOSPADM

## 2022-01-01 RX ORDER — LIDOCAINE 40 MG/G
CREAM TOPICAL
Status: DISCONTINUED | OUTPATIENT
Start: 2022-01-01 | End: 2022-01-01 | Stop reason: HOSPADM

## 2022-01-01 RX ORDER — ONDANSETRON 4 MG/1
4 TABLET, ORALLY DISINTEGRATING ORAL EVERY 6 HOURS PRN
Status: DISCONTINUED | OUTPATIENT
Start: 2022-01-01 | End: 2022-01-01 | Stop reason: HOSPADM

## 2022-01-01 RX ORDER — ONDANSETRON 4 MG/1
8 TABLET, FILM COATED ORAL EVERY 8 HOURS PRN
Status: DISCONTINUED | OUTPATIENT
Start: 2022-01-01 | End: 2022-01-01 | Stop reason: HOSPADM

## 2022-01-01 RX ORDER — METOCLOPRAMIDE 10 MG/1
10 TABLET ORAL EVERY 6 HOURS PRN
Status: DISCONTINUED | OUTPATIENT
Start: 2022-01-01 | End: 2022-01-01 | Stop reason: HOSPADM

## 2022-01-01 RX ORDER — FENTANYL CITRATE 50 UG/ML
INJECTION, SOLUTION INTRAMUSCULAR; INTRAVENOUS PRN
Status: DISCONTINUED | OUTPATIENT
Start: 2022-01-01 | End: 2022-01-01

## 2022-01-01 RX ORDER — BENZONATATE 100 MG/1
100 CAPSULE ORAL 3 TIMES DAILY PRN
Qty: 30 CAPSULE | Refills: 0 | Status: SHIPPED | OUTPATIENT
Start: 2022-01-01 | End: 2022-01-01

## 2022-01-01 RX ORDER — DEXAMETHASONE SODIUM PHOSPHATE 10 MG/ML
10 INJECTION, SOLUTION INTRAMUSCULAR; INTRAVENOUS ONCE
Status: CANCELLED
Start: 2022-01-01 | End: 2022-01-01

## 2022-01-01 RX ORDER — CEFAZOLIN SODIUM 2 G/100ML
2 INJECTION, SOLUTION INTRAVENOUS SEE ADMIN INSTRUCTIONS
Status: DISCONTINUED | OUTPATIENT
Start: 2022-01-01 | End: 2022-01-01

## 2022-01-01 RX ORDER — FENTANYL CITRATE 50 UG/ML
25-50 INJECTION, SOLUTION INTRAMUSCULAR; INTRAVENOUS EVERY 5 MIN PRN
Status: DISCONTINUED | OUTPATIENT
Start: 2022-01-01 | End: 2022-01-01 | Stop reason: HOSPADM

## 2022-01-01 RX ORDER — AMOXICILLIN 250 MG
2 CAPSULE ORAL 2 TIMES DAILY
Status: DISCONTINUED | OUTPATIENT
Start: 2022-01-01 | End: 2022-01-01 | Stop reason: HOSPADM

## 2022-01-01 RX ORDER — PALONOSETRON 0.05 MG/ML
0.25 INJECTION, SOLUTION INTRAVENOUS ONCE
Status: CANCELLED
Start: 2022-01-01

## 2022-01-01 RX ORDER — POLYETHYLENE GLYCOL 3350 17 G/17G
17 POWDER, FOR SOLUTION ORAL DAILY PRN
Status: DISCONTINUED | OUTPATIENT
Start: 2022-01-01 | End: 2022-01-01 | Stop reason: HOSPADM

## 2022-01-01 RX ORDER — PIPERACILLIN SODIUM, TAZOBACTAM SODIUM 4; .5 G/20ML; G/20ML
4.5 INJECTION, POWDER, LYOPHILIZED, FOR SOLUTION INTRAVENOUS EVERY 6 HOURS
Status: DISCONTINUED | OUTPATIENT
Start: 2022-01-01 | End: 2022-01-01

## 2022-01-01 RX ORDER — SODIUM CHLORIDE 9 MG/ML
INJECTION, SOLUTION INTRAVENOUS ONCE
Status: COMPLETED | OUTPATIENT
Start: 2022-01-01 | End: 2022-01-01

## 2022-01-01 RX ORDER — METOCLOPRAMIDE 5 MG/1
10 TABLET ORAL EVERY 6 HOURS PRN
Status: DISCONTINUED | OUTPATIENT
Start: 2022-01-01 | End: 2022-01-01 | Stop reason: HOSPADM

## 2022-01-01 RX ORDER — LIDOCAINE HYDROCHLORIDE AND EPINEPHRINE 10; 10 MG/ML; UG/ML
10 INJECTION, SOLUTION INFILTRATION; PERINEURAL ONCE
Status: DISCONTINUED | OUTPATIENT
Start: 2022-01-01 | End: 2022-01-01

## 2022-01-01 RX ORDER — ACETAMINOPHEN 650 MG/1
650 SUPPOSITORY RECTAL EVERY 6 HOURS PRN
Status: DISCONTINUED | OUTPATIENT
Start: 2022-01-01 | End: 2022-01-01

## 2022-01-01 RX ORDER — SODIUM CHLORIDE, SODIUM LACTATE, POTASSIUM CHLORIDE, CALCIUM CHLORIDE 600; 310; 30; 20 MG/100ML; MG/100ML; MG/100ML; MG/100ML
INJECTION, SOLUTION INTRAVENOUS CONTINUOUS
Status: DISCONTINUED | OUTPATIENT
Start: 2022-01-01 | End: 2022-01-01 | Stop reason: HOSPADM

## 2022-01-01 RX ORDER — POLYETHYLENE GLYCOL 3350 17 G/17G
17 POWDER, FOR SOLUTION ORAL DAILY
Status: DISCONTINUED | OUTPATIENT
Start: 2022-01-01 | End: 2022-01-01

## 2022-01-01 RX ORDER — HEPARIN SODIUM,PORCINE 10 UNIT/ML
5-10 VIAL (ML) INTRAVENOUS EVERY 24 HOURS
Status: DISCONTINUED | OUTPATIENT
Start: 2022-01-01 | End: 2022-01-01 | Stop reason: HOSPADM

## 2022-01-01 RX ORDER — OXYCODONE HYDROCHLORIDE 10 MG/1
10 TABLET ORAL EVERY 4 HOURS PRN
Status: DISCONTINUED | OUTPATIENT
Start: 2022-01-01 | End: 2022-01-01 | Stop reason: HOSPADM

## 2022-01-01 RX ORDER — IOPAMIDOL 755 MG/ML
60 INJECTION, SOLUTION INTRAVASCULAR ONCE
Status: CANCELLED | OUTPATIENT
Start: 2022-01-01 | End: 2022-01-01

## 2022-01-01 RX ORDER — PROCHLORPERAZINE 25 MG
25 SUPPOSITORY, RECTAL RECTAL EVERY 12 HOURS PRN
Status: DISCONTINUED | OUTPATIENT
Start: 2022-01-01 | End: 2022-01-01 | Stop reason: HOSPADM

## 2022-01-01 RX ORDER — DIPHENHYDRAMINE HCL 25 MG
50 CAPSULE ORAL ONCE
Status: COMPLETED | OUTPATIENT
Start: 2022-01-01 | End: 2022-01-01

## 2022-01-01 RX ORDER — DIPHENHYDRAMINE HYDROCHLORIDE AND LIDOCAINE HYDROCHLORIDE AND ALUMINUM HYDROXIDE AND MAGNESIUM HYDRO
10 KIT EVERY 8 HOURS PRN
Status: DISCONTINUED | OUTPATIENT
Start: 2022-01-01 | End: 2022-01-01 | Stop reason: HOSPADM

## 2022-01-01 RX ORDER — OXYCODONE HYDROCHLORIDE 5 MG/1
5 TABLET ORAL EVERY 4 HOURS PRN
Status: DISCONTINUED | OUTPATIENT
Start: 2022-01-01 | End: 2022-01-01 | Stop reason: HOSPADM

## 2022-01-01 RX ORDER — PROCHLORPERAZINE MALEATE 10 MG
10 TABLET ORAL EVERY 6 HOURS PRN
Status: CANCELLED | OUTPATIENT
Start: 2022-01-01

## 2022-01-01 RX ORDER — EPINEPHRINE 1 MG/ML
0.3 INJECTION, SOLUTION INTRAMUSCULAR; SUBCUTANEOUS EVERY 5 MIN PRN
Status: DISCONTINUED | OUTPATIENT
Start: 2022-01-01 | End: 2022-01-01 | Stop reason: HOSPADM

## 2022-01-01 RX ORDER — LABETALOL HYDROCHLORIDE 5 MG/ML
10-40 INJECTION, SOLUTION INTRAVENOUS EVERY 10 MIN PRN
Status: DISCONTINUED | OUTPATIENT
Start: 2022-01-01 | End: 2022-01-01

## 2022-01-01 RX ORDER — LIDOCAINE HYDROCHLORIDE 10 MG/ML
10 INJECTION, SOLUTION EPIDURAL; INFILTRATION; INTRACAUDAL; PERINEURAL ONCE
Status: COMPLETED | OUTPATIENT
Start: 2022-01-01 | End: 2022-01-01

## 2022-01-01 RX ORDER — HEPARIN SODIUM,PORCINE 10 UNIT/ML
5-10 VIAL (ML) INTRAVENOUS
Status: DISCONTINUED | OUTPATIENT
Start: 2022-01-01 | End: 2022-01-01 | Stop reason: HOSPADM

## 2022-01-01 RX ORDER — VENLAFAXINE 75 MG/1
75 TABLET ORAL DAILY
Status: DISCONTINUED | OUTPATIENT
Start: 2022-01-01 | End: 2022-01-01 | Stop reason: HOSPADM

## 2022-01-01 RX ORDER — VORICONAZOLE 200 MG/1
200 TABLET, FILM COATED ORAL 2 TIMES DAILY
Status: DISCONTINUED | OUTPATIENT
Start: 2022-01-01 | End: 2022-01-01 | Stop reason: HOSPADM

## 2022-01-01 RX ORDER — IBUPROFEN 200 MG
400 TABLET ORAL EVERY 6 HOURS PRN
Status: DISCONTINUED | OUTPATIENT
Start: 2022-01-01 | End: 2022-01-01 | Stop reason: HOSPADM

## 2022-01-01 RX ORDER — SODIUM CHLORIDE, SODIUM LACTATE, POTASSIUM CHLORIDE, CALCIUM CHLORIDE 600; 310; 30; 20 MG/100ML; MG/100ML; MG/100ML; MG/100ML
INJECTION, SOLUTION INTRAVENOUS CONTINUOUS
Status: ACTIVE | OUTPATIENT
Start: 2022-01-01 | End: 2022-01-01

## 2022-01-01 RX ORDER — AMOXICILLIN 250 MG
2 CAPSULE ORAL 2 TIMES DAILY PRN
Status: DISCONTINUED | OUTPATIENT
Start: 2022-01-01 | End: 2022-01-01

## 2022-01-01 RX ORDER — PROCHLORPERAZINE MALEATE 5 MG
5 TABLET ORAL EVERY 6 HOURS PRN
Status: DISCONTINUED | OUTPATIENT
Start: 2022-01-01 | End: 2022-01-01 | Stop reason: HOSPADM

## 2022-01-01 RX ORDER — POLYETHYLENE GLYCOL 3350 17 G/17G
17 POWDER, FOR SOLUTION ORAL 2 TIMES DAILY
Status: DISCONTINUED | OUTPATIENT
Start: 2022-01-01 | End: 2022-01-01 | Stop reason: HOSPADM

## 2022-01-01 RX ORDER — METOCLOPRAMIDE HYDROCHLORIDE 5 MG/ML
10 INJECTION INTRAMUSCULAR; INTRAVENOUS EVERY 4 HOURS PRN
Status: DISCONTINUED | OUTPATIENT
Start: 2022-01-01 | End: 2022-01-01

## 2022-01-01 RX ORDER — VENLAFAXINE 75 MG/1
75 TABLET ORAL DAILY
Qty: 30 TABLET | Refills: 3 | Status: SHIPPED | OUTPATIENT
Start: 2022-01-01 | End: 2023-01-01

## 2022-01-01 RX ORDER — NALOXONE HYDROCHLORIDE 0.4 MG/ML
0.4 INJECTION, SOLUTION INTRAMUSCULAR; INTRAVENOUS; SUBCUTANEOUS
Status: DISCONTINUED | OUTPATIENT
Start: 2022-01-01 | End: 2022-01-01 | Stop reason: HOSPADM

## 2022-01-01 RX ORDER — ONDANSETRON 4 MG/1
4 TABLET, ORALLY DISINTEGRATING ORAL EVERY 6 HOURS PRN
Qty: 60 TABLET | Refills: 0 | Status: SHIPPED | OUTPATIENT
Start: 2022-01-01 | End: 2022-01-01

## 2022-01-01 RX ORDER — SODIUM CHLORIDE 9 MG/ML
INJECTION, SOLUTION INTRAVENOUS CONTINUOUS
Status: DISCONTINUED | OUTPATIENT
Start: 2022-01-01 | End: 2022-01-01 | Stop reason: HOSPADM

## 2022-01-01 RX ORDER — TRAZODONE HYDROCHLORIDE 50 MG/1
50 TABLET, FILM COATED ORAL AT BEDTIME
Status: DISCONTINUED | OUTPATIENT
Start: 2022-01-01 | End: 2022-01-01 | Stop reason: HOSPADM

## 2022-01-01 RX ORDER — OLANZAPINE 2.5 MG/1
2.5 TABLET, FILM COATED ORAL AT BEDTIME
Status: DISCONTINUED | OUTPATIENT
Start: 2022-01-01 | End: 2022-01-01 | Stop reason: HOSPADM

## 2022-01-01 RX ORDER — PROCHLORPERAZINE MALEATE 10 MG
10 TABLET ORAL EVERY 6 HOURS PRN
Qty: 30 TABLET | Refills: 3 | Status: ON HOLD | OUTPATIENT
Start: 2022-01-01 | End: 2022-01-01

## 2022-01-01 RX ORDER — DEXAMETHASONE SODIUM PHOSPHATE 10 MG/ML
10 INJECTION, SOLUTION INTRAMUSCULAR; INTRAVENOUS ONCE
Status: DISCONTINUED | OUTPATIENT
Start: 2022-01-01 | End: 2022-01-01

## 2022-01-01 RX ORDER — ACETAMINOPHEN 325 MG/1
650 TABLET ORAL ONCE
Status: CANCELLED | OUTPATIENT
Start: 2022-01-01 | End: 2022-01-01

## 2022-01-01 RX ORDER — OXYCODONE HYDROCHLORIDE 5 MG/1
5 TABLET ORAL EVERY 4 HOURS PRN
Qty: 6 TABLET | Refills: 0 | Status: SHIPPED | OUTPATIENT
Start: 2022-01-01 | End: 2022-01-01

## 2022-01-01 RX ORDER — BLOOD-GLUCOSE SENSOR
1 EACH MISCELLANEOUS
Qty: 8 EACH | Refills: 11 | Status: SHIPPED | OUTPATIENT
Start: 2022-01-01

## 2022-01-01 RX ORDER — DEXAMETHASONE SODIUM PHOSPHATE 4 MG/ML
4 INJECTION, SOLUTION INTRA-ARTICULAR; INTRALESIONAL; INTRAMUSCULAR; INTRAVENOUS; SOFT TISSUE ONCE
Status: CANCELLED
Start: 2022-01-01 | End: 2022-01-01

## 2022-01-01 RX ORDER — ZOLEDRONIC ACID 5 MG/100ML
5 INJECTION, SOLUTION INTRAVENOUS ONCE
Status: CANCELLED
Start: 2023-04-05 | End: 2023-04-05

## 2022-01-01 RX ORDER — HYDRALAZINE HYDROCHLORIDE 20 MG/ML
10-20 INJECTION INTRAMUSCULAR; INTRAVENOUS EVERY 30 MIN PRN
Status: DISCONTINUED | OUTPATIENT
Start: 2022-01-01 | End: 2022-01-01

## 2022-01-01 RX ORDER — ONDANSETRON 4 MG/1
4 TABLET, ORALLY DISINTEGRATING ORAL EVERY 30 MIN PRN
Status: DISCONTINUED | OUTPATIENT
Start: 2022-01-01 | End: 2022-01-01 | Stop reason: HOSPADM

## 2022-01-01 RX ORDER — SODIUM CHLORIDE 9 MG/ML
INJECTION, SOLUTION INTRAVENOUS CONTINUOUS
Status: ACTIVE | OUTPATIENT
Start: 2022-01-01 | End: 2022-01-01

## 2022-01-01 RX ORDER — HEPARIN SODIUM (PORCINE) LOCK FLUSH IV SOLN 100 UNIT/ML 100 UNIT/ML
5 SOLUTION INTRAVENOUS
Status: CANCELLED | OUTPATIENT
Start: 2023-04-05

## 2022-01-01 RX ORDER — LIDOCAINE 4 G/G
1 PATCH TOPICAL
Status: DISCONTINUED | OUTPATIENT
Start: 2022-01-01 | End: 2022-01-01 | Stop reason: HOSPADM

## 2022-01-01 RX ORDER — CEFAZOLIN SODIUM 1 G/3ML
1 INJECTION, POWDER, FOR SOLUTION INTRAMUSCULAR; INTRAVENOUS EVERY 8 HOURS
Status: COMPLETED | OUTPATIENT
Start: 2022-01-01 | End: 2022-01-01

## 2022-01-01 RX ORDER — ONDANSETRON 2 MG/ML
4 INJECTION INTRAMUSCULAR; INTRAVENOUS ONCE
Status: COMPLETED | OUTPATIENT
Start: 2022-01-01 | End: 2022-01-01

## 2022-01-01 RX ORDER — CEFAZOLIN SODIUM 2 G/100ML
2 INJECTION, SOLUTION INTRAVENOUS
Status: DISCONTINUED | OUTPATIENT
Start: 2022-01-01 | End: 2022-01-01

## 2022-01-01 RX ORDER — ONDANSETRON 2 MG/ML
4 INJECTION INTRAMUSCULAR; INTRAVENOUS EVERY 6 HOURS PRN
Status: DISCONTINUED | OUTPATIENT
Start: 2022-01-01 | End: 2022-01-01 | Stop reason: HOSPADM

## 2022-01-01 RX ORDER — ENOXAPARIN SODIUM 100 MG/ML
40 INJECTION SUBCUTANEOUS EVERY 24 HOURS
Status: DISCONTINUED | OUTPATIENT
Start: 2022-01-01 | End: 2022-01-01 | Stop reason: HOSPADM

## 2022-01-01 RX ORDER — LORAZEPAM 2 MG/ML
0.5 INJECTION INTRAMUSCULAR EVERY 6 HOURS
Status: DISCONTINUED | OUTPATIENT
Start: 2022-01-01 | End: 2022-01-01

## 2022-01-01 RX ORDER — IOPAMIDOL 755 MG/ML
75 INJECTION, SOLUTION INTRAVASCULAR ONCE
Status: COMPLETED | OUTPATIENT
Start: 2022-01-01 | End: 2022-01-01

## 2022-01-01 RX ORDER — DOCUSATE SODIUM 100 MG/1
100 CAPSULE, LIQUID FILLED ORAL 2 TIMES DAILY
Status: DISCONTINUED | OUTPATIENT
Start: 2022-01-01 | End: 2022-01-01

## 2022-01-01 RX ORDER — PIPERACILLIN SODIUM, TAZOBACTAM SODIUM 4; .5 G/20ML; G/20ML
4.5 INJECTION, POWDER, LYOPHILIZED, FOR SOLUTION INTRAVENOUS ONCE
Status: COMPLETED | OUTPATIENT
Start: 2022-01-01 | End: 2022-01-01

## 2022-01-01 RX ORDER — AMOXICILLIN 250 MG
3 CAPSULE ORAL 2 TIMES DAILY
Status: DISCONTINUED | OUTPATIENT
Start: 2022-01-01 | End: 2022-01-01 | Stop reason: HOSPADM

## 2022-01-01 RX ORDER — LORAZEPAM 2 MG/ML
0.5 INJECTION INTRAMUSCULAR DAILY PRN
Status: CANCELLED
Start: 2022-01-01

## 2022-01-01 RX ORDER — HYDRALAZINE HYDROCHLORIDE 25 MG/1
25 TABLET, FILM COATED ORAL EVERY 4 HOURS PRN
Status: DISCONTINUED | OUTPATIENT
Start: 2022-01-01 | End: 2022-01-01 | Stop reason: HOSPADM

## 2022-01-01 RX ORDER — VENLAFAXINE 75 MG/1
75 TABLET ORAL DAILY
Qty: 30 TABLET | Refills: 3 | Status: CANCELLED | OUTPATIENT
Start: 2022-01-01

## 2022-01-01 RX ORDER — ONDANSETRON 2 MG/ML
8 INJECTION INTRAMUSCULAR; INTRAVENOUS EVERY 8 HOURS PRN
Status: CANCELLED | OUTPATIENT
Start: 2022-01-01

## 2022-01-01 RX ORDER — DEXAMETHASONE 4 MG/1
4 TABLET ORAL 2 TIMES DAILY WITH MEALS
Qty: 14 TABLET | Refills: 0 | Status: SHIPPED | OUTPATIENT
Start: 2022-01-01 | End: 2022-01-01

## 2022-01-01 RX ORDER — HEPARIN SODIUM,PORCINE 10 UNIT/ML
5 VIAL (ML) INTRAVENOUS
Status: CANCELLED | OUTPATIENT
Start: 2023-04-05

## 2022-01-01 RX ORDER — PROCHLORPERAZINE MALEATE 5 MG
10 TABLET ORAL EVERY 6 HOURS PRN
Status: DISCONTINUED | OUTPATIENT
Start: 2022-01-01 | End: 2022-01-01

## 2022-01-01 RX ORDER — NICOTINE POLACRILEX 4 MG
15-30 LOZENGE BUCCAL
Status: DISCONTINUED | OUTPATIENT
Start: 2022-01-01 | End: 2022-01-01 | Stop reason: HOSPADM

## 2022-01-01 RX ORDER — AMOXICILLIN 250 MG
1 CAPSULE ORAL 2 TIMES DAILY
Status: DISCONTINUED | OUTPATIENT
Start: 2022-01-01 | End: 2022-01-01

## 2022-01-01 RX ORDER — OXYCODONE HYDROCHLORIDE 5 MG/1
5 TABLET ORAL ONCE
Status: COMPLETED | OUTPATIENT
Start: 2022-01-01 | End: 2022-01-01

## 2022-01-01 RX ORDER — ACETAMINOPHEN 325 MG/1
650 TABLET ORAL ONCE
Status: CANCELLED | OUTPATIENT
Start: 2022-01-01

## 2022-01-01 RX ORDER — GADOBUTROL 604.72 MG/ML
7.5 INJECTION INTRAVENOUS ONCE
Status: COMPLETED | OUTPATIENT
Start: 2022-01-01 | End: 2022-01-01

## 2022-01-01 RX ORDER — GLUCOSAMINE HCL/CHONDROITIN SU 500-400 MG
CAPSULE ORAL
Qty: 100 EACH | Refills: 3 | Status: SHIPPED | OUTPATIENT
Start: 2022-01-01

## 2022-01-01 RX ORDER — METOCLOPRAMIDE 10 MG/1
10 TABLET ORAL EVERY 6 HOURS PRN
Qty: 60 TABLET | Refills: 0 | Status: ON HOLD | OUTPATIENT
Start: 2022-01-01 | End: 2023-01-01

## 2022-01-01 RX ORDER — SODIUM CHLORIDE, SODIUM LACTATE, POTASSIUM CHLORIDE, CALCIUM CHLORIDE 600; 310; 30; 20 MG/100ML; MG/100ML; MG/100ML; MG/100ML
INJECTION, SOLUTION INTRAVENOUS CONTINUOUS
Status: DISCONTINUED | OUTPATIENT
Start: 2022-01-01 | End: 2022-01-01

## 2022-01-01 RX ORDER — PROCHLORPERAZINE MALEATE 5 MG
5 TABLET ORAL EVERY 6 HOURS PRN
Qty: 60 TABLET | Refills: 0 | Status: SHIPPED | OUTPATIENT
Start: 2022-01-01 | End: 2022-01-01

## 2022-01-01 RX ORDER — VORICONAZOLE 200 MG/1
200 TABLET, FILM COATED ORAL 2 TIMES DAILY
Qty: 60 TABLET | Refills: 1 | Status: SHIPPED | OUTPATIENT
Start: 2022-01-01 | End: 2022-01-01

## 2022-01-01 RX ORDER — METHYLPREDNISOLONE SODIUM SUCCINATE 125 MG/2ML
125 INJECTION, POWDER, LYOPHILIZED, FOR SOLUTION INTRAMUSCULAR; INTRAVENOUS
Status: DISCONTINUED | OUTPATIENT
Start: 2022-01-01 | End: 2022-01-01 | Stop reason: HOSPADM

## 2022-01-01 RX ORDER — HYDROMORPHONE HYDROCHLORIDE 1 MG/ML
0.5 INJECTION, SOLUTION INTRAMUSCULAR; INTRAVENOUS; SUBCUTANEOUS
Status: DISCONTINUED | OUTPATIENT
Start: 2022-01-01 | End: 2022-01-01

## 2022-01-01 RX ORDER — ACETAMINOPHEN 325 MG/1
650 TABLET ORAL EVERY 4 HOURS PRN
Status: DISCONTINUED | OUTPATIENT
Start: 2022-01-01 | End: 2022-01-01 | Stop reason: HOSPADM

## 2022-01-01 RX ORDER — TRAZODONE HYDROCHLORIDE 50 MG/1
25 TABLET, FILM COATED ORAL
Qty: 15 TABLET | Refills: 3 | Status: SHIPPED | OUTPATIENT
Start: 2022-01-01 | End: 2023-01-01

## 2022-01-01 RX ORDER — POLYETHYLENE GLYCOL 3350 17 G/17G
17 POWDER, FOR SOLUTION ORAL DAILY PRN
Status: DISCONTINUED | OUTPATIENT
Start: 2022-01-01 | End: 2022-01-01

## 2022-01-01 RX ORDER — CALCIUM CARBONATE 500(1250)
500 TABLET ORAL AT BEDTIME
Status: DISCONTINUED | OUTPATIENT
Start: 2022-01-01 | End: 2022-01-01 | Stop reason: HOSPADM

## 2022-01-01 RX ORDER — NYSTATIN 100000/ML
500000 SUSPENSION, ORAL (FINAL DOSE FORM) ORAL 4 TIMES DAILY
Status: DISCONTINUED | OUTPATIENT
Start: 2022-01-01 | End: 2022-01-01 | Stop reason: HOSPADM

## 2022-01-01 RX ORDER — DIPHENHYDRAMINE HYDROCHLORIDE 50 MG/ML
50 INJECTION INTRAMUSCULAR; INTRAVENOUS
Status: COMPLETED | OUTPATIENT
Start: 2022-01-01 | End: 2022-01-01

## 2022-01-01 RX ORDER — NYSTATIN 100000/ML
500000 SUSPENSION, ORAL (FINAL DOSE FORM) ORAL 4 TIMES DAILY
Status: DISCONTINUED | OUTPATIENT
Start: 2022-01-01 | End: 2022-01-01

## 2022-01-01 RX ORDER — PROCHLORPERAZINE MALEATE 10 MG
10 TABLET ORAL EVERY 6 HOURS PRN
Qty: 30 TABLET | Refills: 2 | Status: ON HOLD | OUTPATIENT
Start: 2022-01-01 | End: 2022-01-01

## 2022-01-01 RX ORDER — LANOLIN ALCOHOL/MO/W.PET/CERES
6 CREAM (GRAM) TOPICAL EVERY EVENING
Status: DISCONTINUED | OUTPATIENT
Start: 2022-01-01 | End: 2022-01-01 | Stop reason: HOSPADM

## 2022-01-01 RX ORDER — LOPERAMIDE HCL 2 MG
CAPSULE ORAL
Qty: 30 CAPSULE | Refills: 2 | Status: ON HOLD | OUTPATIENT
Start: 2022-01-01 | End: 2023-01-01

## 2022-01-01 RX ORDER — POLYETHYLENE GLYCOL 3350 17 G/17G
17 POWDER, FOR SOLUTION ORAL DAILY PRN
Qty: 510 G | COMMUNITY
Start: 2022-01-01 | End: 2022-01-01

## 2022-01-01 RX ORDER — LIDOCAINE 4 G/G
PATCH TOPICAL
COMMUNITY
Start: 2021-09-03 | End: 2022-01-01

## 2022-01-01 RX ORDER — DEXAMETHASONE SODIUM PHOSPHATE 4 MG/ML
INJECTION, SOLUTION INTRA-ARTICULAR; INTRALESIONAL; INTRAMUSCULAR; INTRAVENOUS; SOFT TISSUE PRN
Status: DISCONTINUED | OUTPATIENT
Start: 2022-01-01 | End: 2022-01-01

## 2022-01-01 RX ORDER — PROPOFOL 10 MG/ML
INJECTION, EMULSION INTRAVENOUS CONTINUOUS PRN
Status: DISCONTINUED | OUTPATIENT
Start: 2022-01-01 | End: 2022-01-01

## 2022-01-01 RX ORDER — FLUMAZENIL 0.1 MG/ML
0.2 INJECTION, SOLUTION INTRAVENOUS
Status: DISCONTINUED | OUTPATIENT
Start: 2022-01-01 | End: 2022-01-01 | Stop reason: HOSPADM

## 2022-01-01 RX ORDER — ONDANSETRON 2 MG/ML
4 INJECTION INTRAMUSCULAR; INTRAVENOUS EVERY 30 MIN PRN
Status: DISCONTINUED | OUTPATIENT
Start: 2022-01-01 | End: 2022-01-01

## 2022-01-01 RX ORDER — CAPECITABINE 500 MG/1
1000 TABLET, FILM COATED ORAL 2 TIMES DAILY
Qty: 84 TABLET | Refills: 0 | Status: SHIPPED | OUTPATIENT
Start: 2022-01-01 | End: 2022-01-01

## 2022-01-01 RX ORDER — OLANZAPINE 5 MG/1
5 TABLET ORAL AT BEDTIME
Qty: 30 TABLET | Refills: 1 | Status: SHIPPED | OUTPATIENT
Start: 2022-01-01 | End: 2023-01-01

## 2022-01-01 RX ORDER — ZOLEDRONIC ACID 0.04 MG/ML
4 INJECTION, SOLUTION INTRAVENOUS ONCE
Status: CANCELLED
Start: 2022-01-01 | End: 2022-01-01

## 2022-01-01 RX ORDER — METOCLOPRAMIDE HYDROCHLORIDE 5 MG/ML
10 INJECTION INTRAMUSCULAR; INTRAVENOUS EVERY 6 HOURS PRN
Status: DISCONTINUED | OUTPATIENT
Start: 2022-01-01 | End: 2022-01-01

## 2022-01-01 RX ORDER — ACETAMINOPHEN 325 MG/1
650 TABLET ORAL EVERY 6 HOURS PRN
Status: DISCONTINUED | OUTPATIENT
Start: 2022-01-01 | End: 2022-01-01 | Stop reason: HOSPADM

## 2022-01-01 RX ORDER — AMOXICILLIN 250 MG
1 CAPSULE ORAL 2 TIMES DAILY PRN
Status: DISCONTINUED | OUTPATIENT
Start: 2022-01-01 | End: 2022-01-01 | Stop reason: HOSPADM

## 2022-01-01 RX ORDER — ONDANSETRON 2 MG/ML
INJECTION INTRAMUSCULAR; INTRAVENOUS PRN
Status: DISCONTINUED | OUTPATIENT
Start: 2022-01-01 | End: 2022-01-01

## 2022-01-01 RX ORDER — MIRTAZAPINE 7.5 MG/1
TABLET, FILM COATED ORAL
Qty: 90 TABLET | Refills: 3 | Status: SHIPPED | OUTPATIENT
Start: 2022-01-01 | End: 2023-01-01

## 2022-01-01 RX ORDER — OLANZAPINE 5 MG/1
5 TABLET ORAL AT BEDTIME
Status: DISCONTINUED | OUTPATIENT
Start: 2022-01-01 | End: 2022-01-01 | Stop reason: HOSPADM

## 2022-01-01 RX ORDER — IOPAMIDOL 755 MG/ML
82 INJECTION, SOLUTION INTRAVASCULAR ONCE
Status: COMPLETED | OUTPATIENT
Start: 2022-01-01 | End: 2022-01-01

## 2022-01-01 RX ORDER — PROCHLORPERAZINE MALEATE 10 MG
10 TABLET ORAL EVERY 6 HOURS PRN
Status: DISCONTINUED | OUTPATIENT
Start: 2022-01-01 | End: 2022-01-01 | Stop reason: HOSPADM

## 2022-01-01 RX ORDER — HYDRALAZINE HYDROCHLORIDE 20 MG/ML
2.5-5 INJECTION INTRAMUSCULAR; INTRAVENOUS EVERY 10 MIN PRN
Status: DISCONTINUED | OUTPATIENT
Start: 2022-01-01 | End: 2022-01-01 | Stop reason: HOSPADM

## 2022-01-01 RX ORDER — ONDANSETRON 2 MG/ML
8 INJECTION INTRAMUSCULAR; INTRAVENOUS EVERY 8 HOURS PRN
Status: DISCONTINUED | OUTPATIENT
Start: 2022-01-01 | End: 2022-01-01

## 2022-01-01 RX ORDER — PROCHLORPERAZINE MALEATE 10 MG
10 TABLET ORAL EVERY 6 HOURS PRN
Qty: 30 TABLET | Refills: 2 | Status: ON HOLD | OUTPATIENT
Start: 2022-01-01 | End: 2023-01-01

## 2022-01-01 RX ORDER — ACETAMINOPHEN 500 MG
1000 TABLET ORAL ONCE
Status: COMPLETED | OUTPATIENT
Start: 2022-01-01 | End: 2022-01-01

## 2022-01-01 RX ORDER — VANCOMYCIN HYDROCHLORIDE 1 G/200ML
1000 INJECTION, SOLUTION INTRAVENOUS
Status: DISCONTINUED | OUTPATIENT
Start: 2022-01-01 | End: 2022-01-01

## 2022-01-01 RX ORDER — GADOBUTROL 604.72 MG/ML
6.5 INJECTION INTRAVENOUS ONCE
Status: COMPLETED | OUTPATIENT
Start: 2022-01-01 | End: 2022-01-01

## 2022-01-01 RX ORDER — BISACODYL 10 MG
10 SUPPOSITORY, RECTAL RECTAL DAILY PRN
Status: DISCONTINUED | OUTPATIENT
Start: 2022-01-01 | End: 2022-01-01 | Stop reason: HOSPADM

## 2022-01-01 RX ORDER — VANCOMYCIN HYDROCHLORIDE 125 MG/1
125 CAPSULE ORAL 4 TIMES DAILY
Status: DISCONTINUED | OUTPATIENT
Start: 2022-01-01 | End: 2022-01-01 | Stop reason: HOSPADM

## 2022-01-01 RX ORDER — HYDROMORPHONE HCL IN WATER/PF 6 MG/30 ML
0.4 PATIENT CONTROLLED ANALGESIA SYRINGE INTRAVENOUS
Status: DISCONTINUED | OUTPATIENT
Start: 2022-01-01 | End: 2022-01-01

## 2022-01-01 RX ORDER — DIPHENHYDRAMINE HCL 25 MG
50 CAPSULE ORAL ONCE
Status: CANCELLED | OUTPATIENT
Start: 2022-01-01

## 2022-01-01 RX ORDER — MEPERIDINE HYDROCHLORIDE 25 MG/ML
12.5 INJECTION INTRAMUSCULAR; INTRAVENOUS; SUBCUTANEOUS
Status: DISCONTINUED | OUTPATIENT
Start: 2022-01-01 | End: 2022-01-01 | Stop reason: HOSPADM

## 2022-01-01 RX ORDER — HYDROMORPHONE HCL IN WATER/PF 6 MG/30 ML
0.2 PATIENT CONTROLLED ANALGESIA SYRINGE INTRAVENOUS
Status: DISCONTINUED | OUTPATIENT
Start: 2022-01-01 | End: 2022-01-01 | Stop reason: HOSPADM

## 2022-01-01 RX ORDER — LANOLIN ALCOHOL/MO/W.PET/CERES
6 CREAM (GRAM) TOPICAL EVERY EVENING
Qty: 60 TABLET | Refills: 0 | Status: SHIPPED | OUTPATIENT
Start: 2022-01-01 | End: 2023-01-01

## 2022-01-01 RX ORDER — ACETAMINOPHEN 325 MG/1
650 TABLET ORAL EVERY 4 HOURS PRN
Status: ON HOLD | COMMUNITY
Start: 2022-01-01 | End: 2023-01-01

## 2022-01-01 RX ORDER — ONDANSETRON 8 MG/1
8 TABLET, FILM COATED ORAL EVERY 8 HOURS PRN
Qty: 30 TABLET | Refills: 3 | Status: SHIPPED | OUTPATIENT
Start: 2022-01-01 | End: 2023-01-01

## 2022-01-01 RX ORDER — ACETAMINOPHEN 325 MG/1
975 TABLET ORAL EVERY 8 HOURS
Status: COMPLETED | OUTPATIENT
Start: 2022-01-01 | End: 2022-01-01

## 2022-01-01 RX ORDER — SODIUM CHLORIDE, SODIUM LACTATE, POTASSIUM CHLORIDE, CALCIUM CHLORIDE 600; 310; 30; 20 MG/100ML; MG/100ML; MG/100ML; MG/100ML
INJECTION, SOLUTION INTRAVENOUS CONTINUOUS PRN
Status: DISCONTINUED | OUTPATIENT
Start: 2022-01-01 | End: 2022-01-01

## 2022-01-01 RX ORDER — KETOROLAC TROMETHAMINE 30 MG/ML
15 INJECTION, SOLUTION INTRAMUSCULAR; INTRAVENOUS EVERY 6 HOURS PRN
Status: DISCONTINUED | OUTPATIENT
Start: 2022-01-01 | End: 2022-01-01 | Stop reason: HOSPADM

## 2022-01-01 RX ORDER — DEXTROSE MONOHYDRATE 25 G/50ML
25-50 INJECTION, SOLUTION INTRAVENOUS
Status: DISCONTINUED | OUTPATIENT
Start: 2022-01-01 | End: 2022-01-01 | Stop reason: HOSPADM

## 2022-01-01 RX ORDER — LORAZEPAM 2 MG/ML
0.5 INJECTION INTRAMUSCULAR EVERY 6 HOURS PRN
Status: DISCONTINUED | OUTPATIENT
Start: 2022-01-01 | End: 2022-01-01

## 2022-01-01 RX ORDER — ONDANSETRON 4 MG/1
8 TABLET, FILM COATED ORAL EVERY 8 HOURS PRN
Status: DISCONTINUED | OUTPATIENT
Start: 2022-01-01 | End: 2022-01-01

## 2022-01-01 RX ORDER — CAPECITABINE 500 MG/1
1000 TABLET, FILM COATED ORAL 2 TIMES DAILY
Qty: 84 TABLET | Refills: 0 | Status: ON HOLD | OUTPATIENT
Start: 2022-01-01 | End: 2023-01-01

## 2022-01-01 RX ORDER — INSULIN GLARGINE 100 [IU]/ML
10 INJECTION, SOLUTION SUBCUTANEOUS AT BEDTIME
Qty: 3 ML | Refills: 0 | Status: SHIPPED | OUTPATIENT
Start: 2022-01-01 | End: 2022-01-01

## 2022-01-01 RX ORDER — CEFAZOLIN SODIUM 2 G/100ML
2 INJECTION, SOLUTION INTRAVENOUS
Status: COMPLETED | OUTPATIENT
Start: 2022-01-01 | End: 2022-01-01

## 2022-01-01 RX ORDER — FENTANYL CITRATE 50 UG/ML
25 INJECTION, SOLUTION INTRAMUSCULAR; INTRAVENOUS
Status: DISCONTINUED | OUTPATIENT
Start: 2022-01-01 | End: 2022-01-01 | Stop reason: HOSPADM

## 2022-01-01 RX ORDER — LETROZOLE 2.5 MG/1
2.5 TABLET, FILM COATED ORAL DAILY
Status: DISCONTINUED | OUTPATIENT
Start: 2022-01-01 | End: 2022-01-01 | Stop reason: HOSPADM

## 2022-01-01 RX ORDER — VENLAFAXINE HYDROCHLORIDE 225 MG/1
225 TABLET, EXTENDED RELEASE ORAL DAILY
Status: DISCONTINUED | OUTPATIENT
Start: 2022-01-01 | End: 2022-01-01 | Stop reason: HOSPADM

## 2022-01-01 RX ORDER — LORAZEPAM 2 MG/ML
.5-1 INJECTION INTRAMUSCULAR
Status: DISCONTINUED | OUTPATIENT
Start: 2022-01-01 | End: 2022-01-01 | Stop reason: HOSPADM

## 2022-01-01 RX ORDER — SODIUM CHLORIDE 9 MG/ML
INJECTION, SOLUTION INTRAVENOUS CONTINUOUS
Status: DISCONTINUED | OUTPATIENT
Start: 2022-01-01 | End: 2022-01-01

## 2022-01-01 RX ORDER — AMOXICILLIN 250 MG
2 CAPSULE ORAL 2 TIMES DAILY
Status: DISCONTINUED | OUTPATIENT
Start: 2022-01-01 | End: 2022-01-01

## 2022-01-01 RX ORDER — DEXAMETHASONE 4 MG/1
4 TABLET ORAL EVERY 6 HOURS SCHEDULED
Status: DISCONTINUED | OUTPATIENT
Start: 2022-01-01 | End: 2022-01-01 | Stop reason: HOSPADM

## 2022-01-01 RX ORDER — CODEINE PHOSPHATE AND GUAIFENESIN 10; 100 MG/5ML; MG/5ML
1-2 SOLUTION ORAL EVERY 4 HOURS PRN
Qty: 180 ML | Refills: 0 | Status: SHIPPED | OUTPATIENT
Start: 2022-01-01 | End: 2022-01-01

## 2022-01-01 RX ORDER — HYDRALAZINE HYDROCHLORIDE 20 MG/ML
10 INJECTION INTRAMUSCULAR; INTRAVENOUS ONCE
Status: COMPLETED | OUTPATIENT
Start: 2022-01-01 | End: 2022-01-01

## 2022-01-01 RX ORDER — HYDROMORPHONE HYDROCHLORIDE 1 MG/ML
0.4 INJECTION, SOLUTION INTRAMUSCULAR; INTRAVENOUS; SUBCUTANEOUS EVERY 5 MIN PRN
Status: DISCONTINUED | OUTPATIENT
Start: 2022-01-01 | End: 2022-01-01 | Stop reason: HOSPADM

## 2022-01-01 RX ORDER — LOPERAMIDE HCL 2 MG
2 CAPSULE ORAL 3 TIMES DAILY PRN
Status: DISCONTINUED | OUTPATIENT
Start: 2022-01-01 | End: 2022-01-01

## 2022-01-01 RX ADMIN — DEXAMETHASONE SODIUM PHOSPHATE: 10 INJECTION, SOLUTION INTRAMUSCULAR; INTRAVENOUS at 12:02

## 2022-01-01 RX ADMIN — Medication 1 TABLET: at 08:14

## 2022-01-01 RX ADMIN — PHENYLEPHRINE HYDROCHLORIDE 100 MCG: 10 INJECTION INTRAVENOUS at 20:22

## 2022-01-01 RX ADMIN — Medication 5 ML: at 15:50

## 2022-01-01 RX ADMIN — DEXAMETHASONE 4 MG: 2 TABLET ORAL at 18:08

## 2022-01-01 RX ADMIN — Medication 5 ML: at 11:12

## 2022-01-01 RX ADMIN — PACLITAXEL 134 MG: 6 INJECTION, SOLUTION INTRAVENOUS at 15:41

## 2022-01-01 RX ADMIN — ACETAMINOPHEN 650 MG: 325 TABLET ORAL at 07:56

## 2022-01-01 RX ADMIN — DEXAMETHASONE SODIUM PHOSPHATE 20 MG: 10 INJECTION, SOLUTION INTRAMUSCULAR; INTRAVENOUS at 13:49

## 2022-01-01 RX ADMIN — ONDANSETRON 8 MG: 2 INJECTION INTRAMUSCULAR; INTRAVENOUS at 12:50

## 2022-01-01 RX ADMIN — LIDOCAINE PATCH 4% 1 PATCH: 40 PATCH TOPICAL at 08:15

## 2022-01-01 RX ADMIN — SODIUM CHLORIDE 1000 ML: 9 INJECTION, SOLUTION INTRAVENOUS at 11:05

## 2022-01-01 RX ADMIN — SUGAMMADEX 200 MG: 100 INJECTION, SOLUTION INTRAVENOUS at 20:51

## 2022-01-01 RX ADMIN — ACETAMINOPHEN 650 MG: 325 TABLET ORAL at 06:00

## 2022-01-01 RX ADMIN — MIDAZOLAM 2 MG: 1 INJECTION INTRAMUSCULAR; INTRAVENOUS at 11:35

## 2022-01-01 RX ADMIN — ACETAMINOPHEN 650 MG: 325 TABLET ORAL at 11:52

## 2022-01-01 RX ADMIN — Medication 5 ML: at 13:08

## 2022-01-01 RX ADMIN — METOCLOPRAMIDE HYDROCHLORIDE 10 MG: 5 INJECTION INTRAMUSCULAR; INTRAVENOUS at 18:05

## 2022-01-01 RX ADMIN — NYSTATIN 500000 UNITS: 100000 SUSPENSION ORAL at 08:19

## 2022-01-01 RX ADMIN — SODIUM CHLORIDE 370 MG: 9 INJECTION, SOLUTION INTRAVENOUS at 13:01

## 2022-01-01 RX ADMIN — CALCIUM 500 MG: 500 TABLET ORAL at 23:20

## 2022-01-01 RX ADMIN — ONDANSETRON 8 MG: 2 INJECTION INTRAMUSCULAR; INTRAVENOUS at 13:46

## 2022-01-01 RX ADMIN — MIRTAZAPINE 7.5 MG: 7.5 TABLET, FILM COATED ORAL at 23:20

## 2022-01-01 RX ADMIN — Medication 5 ML: at 14:37

## 2022-01-01 RX ADMIN — SENNOSIDES AND DOCUSATE SODIUM 2 TABLET: 8.6; 5 TABLET ORAL at 20:44

## 2022-01-01 RX ADMIN — FAMOTIDINE 20 MG: 10 INJECTION INTRAVENOUS at 11:53

## 2022-01-01 RX ADMIN — HYDROMORPHONE HYDROCHLORIDE 0.5 MG: 1 INJECTION, SOLUTION INTRAMUSCULAR; INTRAVENOUS; SUBCUTANEOUS at 09:38

## 2022-01-01 RX ADMIN — CEFAZOLIN 1 G: 1 INJECTION, POWDER, FOR SOLUTION INTRAMUSCULAR; INTRAVENOUS at 04:08

## 2022-01-01 RX ADMIN — SODIUM CHLORIDE 250 ML: 9 INJECTION, SOLUTION INTRAVENOUS at 08:05

## 2022-01-01 RX ADMIN — ACETAMINOPHEN 650 MG: 325 TABLET ORAL at 13:40

## 2022-01-01 RX ADMIN — POTASSIUM & SODIUM PHOSPHATES POWDER PACK 280-160-250 MG 1 PACKET: 280-160-250 PACK at 02:00

## 2022-01-01 RX ADMIN — Medication 5 ML: at 12:50

## 2022-01-01 RX ADMIN — ONDANSETRON 4 MG: 2 INJECTION INTRAMUSCULAR; INTRAVENOUS at 08:38

## 2022-01-01 RX ADMIN — PERTUZUMAB 420 MG: 30 INJECTION, SOLUTION, CONCENTRATE INTRAVENOUS at 12:29

## 2022-01-01 RX ADMIN — VANCOMYCIN HYDROCHLORIDE 125 MG: 125 CAPSULE ORAL at 20:25

## 2022-01-01 RX ADMIN — SODIUM CHLORIDE 1000 ML: 9 INJECTION, SOLUTION INTRAVENOUS at 11:56

## 2022-01-01 RX ADMIN — SODIUM CHLORIDE 370 MG: 9 INJECTION, SOLUTION INTRAVENOUS at 08:59

## 2022-01-01 RX ADMIN — Medication 1000 UNITS: at 08:01

## 2022-01-01 RX ADMIN — LETROZOLE 2.5 MG: 2.5 TABLET ORAL at 09:11

## 2022-01-01 RX ADMIN — LABETALOL 20 MG/4 ML (5 MG/ML) INTRAVENOUS SYRINGE 5 MG: at 20:46

## 2022-01-01 RX ADMIN — MIRTAZAPINE 7.5 MG: 7.5 TABLET, FILM COATED ORAL at 21:49

## 2022-01-01 RX ADMIN — VANCOMYCIN HYDROCHLORIDE 125 MG: 125 CAPSULE ORAL at 08:19

## 2022-01-01 RX ADMIN — OLANZAPINE 5 MG: 5 TABLET, FILM COATED ORAL at 21:48

## 2022-01-01 RX ADMIN — DEXAMETHASONE SODIUM PHOSPHATE: 10 INJECTION, SOLUTION INTRAMUSCULAR; INTRAVENOUS at 15:33

## 2022-01-01 RX ADMIN — DENOSUMAB 120 MG: 120 INJECTION SUBCUTANEOUS at 08:29

## 2022-01-01 RX ADMIN — ONDANSETRON 4 MG: 2 INJECTION INTRAMUSCULAR; INTRAVENOUS at 14:59

## 2022-01-01 RX ADMIN — SODIUM CHLORIDE 370 MG: 9 INJECTION, SOLUTION INTRAVENOUS at 13:57

## 2022-01-01 RX ADMIN — PACLITAXEL 134 MG: 6 INJECTION, SOLUTION INTRAVENOUS at 09:17

## 2022-01-01 RX ADMIN — MIRTAZAPINE 7.5 MG: 7.5 TABLET, FILM COATED ORAL at 20:53

## 2022-01-01 RX ADMIN — ALTEPLASE 2 MG: 2.2 INJECTION, POWDER, LYOPHILIZED, FOR SOLUTION INTRAVENOUS at 10:09

## 2022-01-01 RX ADMIN — ONDANSETRON 4 MG: 4 TABLET, ORALLY DISINTEGRATING ORAL at 14:11

## 2022-01-01 RX ADMIN — POTASSIUM & SODIUM PHOSPHATES POWDER PACK 280-160-250 MG 1 PACKET: 280-160-250 PACK at 05:44

## 2022-01-01 RX ADMIN — DIPHENHYDRAMINE HYDROCHLORIDE 50 MG: 25 CAPSULE ORAL at 11:53

## 2022-01-01 RX ADMIN — DEXAMETHASONE 4 MG: 2 TABLET ORAL at 04:00

## 2022-01-01 RX ADMIN — PACLITAXEL 134 MG: 6 INJECTION, SOLUTION INTRAVENOUS at 09:32

## 2022-01-01 RX ADMIN — FAM-TRASTUZUMAB DERUXTECAN-NXKI 266 MG: 100 INJECTION, POWDER, LYOPHILIZED, FOR SOLUTION INTRAVENOUS at 15:46

## 2022-01-01 RX ADMIN — HYDROMORPHONE HYDROCHLORIDE 0.5 MG: 1 INJECTION, SOLUTION INTRAMUSCULAR; INTRAVENOUS; SUBCUTANEOUS at 23:46

## 2022-01-01 RX ADMIN — APIXABAN 10 MG: 5 TABLET, FILM COATED ORAL at 05:14

## 2022-01-01 RX ADMIN — LETROZOLE 2.5 MG: 2.5 TABLET ORAL at 08:58

## 2022-01-01 RX ADMIN — PROPOFOL 100 MG: 10 INJECTION, EMULSION INTRAVENOUS at 19:04

## 2022-01-01 RX ADMIN — CEFAZOLIN 1 G: 1 INJECTION, POWDER, FOR SOLUTION INTRAMUSCULAR; INTRAVENOUS at 19:04

## 2022-01-01 RX ADMIN — POTASSIUM & SODIUM PHOSPHATES POWDER PACK 280-160-250 MG 1 PACKET: 280-160-250 PACK at 12:08

## 2022-01-01 RX ADMIN — SODIUM CHLORIDE, POTASSIUM CHLORIDE, SODIUM LACTATE AND CALCIUM CHLORIDE: 600; 310; 30; 20 INJECTION, SOLUTION INTRAVENOUS at 18:40

## 2022-01-01 RX ADMIN — POLYETHYLENE GLYCOL 3350 17 G: 17 POWDER, FOR SOLUTION ORAL at 13:41

## 2022-01-01 RX ADMIN — ENOXAPARIN SODIUM 40 MG: 40 INJECTION SUBCUTANEOUS at 08:40

## 2022-01-01 RX ADMIN — FAMOTIDINE 20 MG: 10 INJECTION INTRAVENOUS at 07:55

## 2022-01-01 RX ADMIN — Medication 5 ML: at 11:06

## 2022-01-01 RX ADMIN — FAMOTIDINE 20 MG: 10 INJECTION INTRAVENOUS at 15:19

## 2022-01-01 RX ADMIN — DEXTROSE MONOHYDRATE 250 ML: 50 INJECTION, SOLUTION INTRAVENOUS at 11:54

## 2022-01-01 RX ADMIN — ONDANSETRON 4 MG: 2 INJECTION INTRAMUSCULAR; INTRAVENOUS at 03:09

## 2022-01-01 RX ADMIN — DEXTROSE MONOHYDRATE 250 ML: 50 INJECTION, SOLUTION INTRAVENOUS at 14:12

## 2022-01-01 RX ADMIN — PIPERACILLIN SODIUM AND TAZOBACTAM SODIUM 4.5 G: 4; .5 INJECTION, POWDER, LYOPHILIZED, FOR SOLUTION INTRAVENOUS at 15:00

## 2022-01-01 RX ADMIN — CALCIUM 500 MG: 500 TABLET ORAL at 22:32

## 2022-01-01 RX ADMIN — PROCHLORPERAZINE EDISYLATE 5 MG: 5 INJECTION INTRAMUSCULAR; INTRAVENOUS at 11:57

## 2022-01-01 RX ADMIN — ONDANSETRON 4 MG: 2 INJECTION INTRAMUSCULAR; INTRAVENOUS at 21:36

## 2022-01-01 RX ADMIN — ACETAMINOPHEN 975 MG: 325 TABLET, FILM COATED ORAL at 08:13

## 2022-01-01 RX ADMIN — DEXAMETHASONE 4 MG: 2 TABLET ORAL at 17:43

## 2022-01-01 RX ADMIN — FENTANYL CITRATE 50 MCG: 50 INJECTION, SOLUTION INTRAMUSCULAR; INTRAVENOUS at 19:47

## 2022-01-01 RX ADMIN — PIPERACILLIN AND TAZOBACTAM 3.38 G: 3; .375 INJECTION, POWDER, LYOPHILIZED, FOR SOLUTION INTRAVENOUS at 21:05

## 2022-01-01 RX ADMIN — NYSTATIN 500000 UNITS: 100000 SUSPENSION ORAL at 17:25

## 2022-01-01 RX ADMIN — DIPHENHYDRAMINE HYDROCHLORIDE 50 MG: 25 CAPSULE ORAL at 15:18

## 2022-01-01 RX ADMIN — VANCOMYCIN HYDROCHLORIDE 125 MG: 125 CAPSULE ORAL at 12:03

## 2022-01-01 RX ADMIN — PACLITAXEL 134 MG: 6 INJECTION, SOLUTION INTRAVENOUS at 15:00

## 2022-01-01 RX ADMIN — Medication 1 TABLET: at 08:43

## 2022-01-01 RX ADMIN — Medication 5 ML: at 09:40

## 2022-01-01 RX ADMIN — CALCIUM 500 MG: 500 TABLET ORAL at 22:30

## 2022-01-01 RX ADMIN — ACETAMINOPHEN 650 MG: 325 TABLET ORAL at 10:06

## 2022-01-01 RX ADMIN — HEPARIN SODIUM (PORCINE) LOCK FLUSH IV SOLN 100 UNIT/ML 500 UNITS: 100 SOLUTION at 12:00

## 2022-01-01 RX ADMIN — ACETAMINOPHEN 650 MG: 325 TABLET, FILM COATED ORAL at 08:18

## 2022-01-01 RX ADMIN — VANCOMYCIN HYDROCHLORIDE 1500 MG: 10 INJECTION, POWDER, LYOPHILIZED, FOR SOLUTION INTRAVENOUS at 03:41

## 2022-01-01 RX ADMIN — METHOTREXATE: 25 INJECTION INTRA-ARTERIAL; INTRAMUSCULAR; INTRATHECAL; INTRAVENOUS at 09:07

## 2022-01-01 RX ADMIN — MIRTAZAPINE 7.5 MG: 7.5 TABLET, FILM COATED ORAL at 21:02

## 2022-01-01 RX ADMIN — Medication 1000 UNITS: at 08:57

## 2022-01-01 RX ADMIN — ONDANSETRON 4 MG: 2 INJECTION INTRAMUSCULAR; INTRAVENOUS at 13:35

## 2022-01-01 RX ADMIN — Medication 5 ML: at 06:52

## 2022-01-01 RX ADMIN — PROCHLORPERAZINE MALEATE 5 MG: 5 TABLET ORAL at 06:06

## 2022-01-01 RX ADMIN — POTASSIUM & SODIUM PHOSPHATES POWDER PACK 280-160-250 MG 1 PACKET: 280-160-250 PACK at 04:28

## 2022-01-01 RX ADMIN — Medication 1 TABLET: at 08:19

## 2022-01-01 RX ADMIN — FLUDEOXYGLUCOSE F-18 10 MCI.: 500 INJECTION, SOLUTION INTRAVENOUS at 07:56

## 2022-01-01 RX ADMIN — VENLAFAXINE 75 MG: 75 TABLET ORAL at 08:59

## 2022-01-01 RX ADMIN — SODIUM CHLORIDE, PRESERVATIVE FREE 5 ML: 5 INJECTION INTRAVENOUS at 12:30

## 2022-01-01 RX ADMIN — DEXAMETHASONE 4 MG: 2 TABLET ORAL at 19:04

## 2022-01-01 RX ADMIN — LIDOCAINE PATCH 4% 1 PATCH: 40 PATCH TOPICAL at 09:13

## 2022-01-01 RX ADMIN — BNT162B2 30 MCG: 0.23 INJECTION, SUSPENSION INTRAMUSCULAR at 12:08

## 2022-01-01 RX ADMIN — Medication 5 ML: at 09:22

## 2022-01-01 RX ADMIN — SODIUM CHLORIDE 250 ML: 9 INJECTION, SOLUTION INTRAVENOUS at 15:00

## 2022-01-01 RX ADMIN — PERTUZUMAB 420 MG: 30 INJECTION, SOLUTION, CONCENTRATE INTRAVENOUS at 14:10

## 2022-01-01 RX ADMIN — PROPOFOL 50 MG: 10 INJECTION, EMULSION INTRAVENOUS at 20:04

## 2022-01-01 RX ADMIN — SODIUM CHLORIDE, POTASSIUM CHLORIDE, SODIUM LACTATE AND CALCIUM CHLORIDE: 600; 310; 30; 20 INJECTION, SOLUTION INTRAVENOUS at 11:42

## 2022-01-01 RX ADMIN — FLUDEOXYGLUCOSE F-18 10.12 MCI.: 500 INJECTION, SOLUTION INTRAVENOUS at 09:27

## 2022-01-01 RX ADMIN — CAFFEINE AND SODIUM BENZOATE 500 MG: 125 INJECTION, SOLUTION INTRAMUSCULAR; INTRAVENOUS at 10:34

## 2022-01-01 RX ADMIN — Medication 5 ML: at 05:55

## 2022-01-01 RX ADMIN — TRAZODONE HYDROCHLORIDE 50 MG: 50 TABLET ORAL at 22:31

## 2022-01-01 RX ADMIN — Medication 5 ML: at 12:39

## 2022-01-01 RX ADMIN — Medication 5 ML: at 14:32

## 2022-01-01 RX ADMIN — METOCLOPRAMIDE 10 MG: 5 INJECTION, SOLUTION INTRAMUSCULAR; INTRAVENOUS at 09:24

## 2022-01-01 RX ADMIN — ACETAMINOPHEN 975 MG: 325 TABLET, FILM COATED ORAL at 17:45

## 2022-01-01 RX ADMIN — DIPHENHYDRAMINE HYDROCHLORIDE AND LIDOCAINE HYDROCHLORIDE AND ALUMINUM HYDROXIDE AND MAGNESIUM HYDRO 10 ML: KIT at 21:40

## 2022-01-01 RX ADMIN — POTASSIUM & SODIUM PHOSPHATES POWDER PACK 280-160-250 MG 1 PACKET: 280-160-250 PACK at 22:32

## 2022-01-01 RX ADMIN — SODIUM CHLORIDE 250 ML: 9 INJECTION, SOLUTION INTRAVENOUS at 13:49

## 2022-01-01 RX ADMIN — DIPHENHYDRAMINE HYDROCHLORIDE 50 MG: 25 CAPSULE ORAL at 13:40

## 2022-01-01 RX ADMIN — DEXTROSE MONOHYDRATE 250 ML: 50 INJECTION, SOLUTION INTRAVENOUS at 08:39

## 2022-01-01 RX ADMIN — IOPAMIDOL 75 ML: 755 INJECTION, SOLUTION INTRAVENOUS at 15:08

## 2022-01-01 RX ADMIN — HEPARIN 500 UNITS: 100 SYRINGE at 13:37

## 2022-01-01 RX ADMIN — DIPHENHYDRAMINE HYDROCHLORIDE 50 MG: 25 CAPSULE ORAL at 10:06

## 2022-01-01 RX ADMIN — SODIUM CHLORIDE: 9 INJECTION, SOLUTION INTRAVENOUS at 00:28

## 2022-01-01 RX ADMIN — HYDRALAZINE HYDROCHLORIDE 25 MG: 25 TABLET, FILM COATED ORAL at 17:43

## 2022-01-01 RX ADMIN — VENLAFAXINE 75 MG: 75 TABLET ORAL at 09:45

## 2022-01-01 RX ADMIN — Medication 5 ML: at 06:49

## 2022-01-01 RX ADMIN — ENOXAPARIN SODIUM 40 MG: 40 INJECTION SUBCUTANEOUS at 16:25

## 2022-01-01 RX ADMIN — Medication 5 ML: at 07:25

## 2022-01-01 RX ADMIN — DEXAMETHASONE SODIUM PHOSPHATE 20 MG: 10 INJECTION INTRAMUSCULAR; INTRAVENOUS at 14:25

## 2022-01-01 RX ADMIN — ONDANSETRON 4 MG: 2 INJECTION INTRAMUSCULAR; INTRAVENOUS at 10:35

## 2022-01-01 RX ADMIN — Medication 5 ML: at 14:03

## 2022-01-01 RX ADMIN — IOPAMIDOL 90 ML: 755 INJECTION, SOLUTION INTRAVENOUS at 11:49

## 2022-01-01 RX ADMIN — PERTUZUMAB 840 MG: 30 INJECTION, SOLUTION, CONCENTRATE INTRAVENOUS at 09:36

## 2022-01-01 RX ADMIN — METOCLOPRAMIDE 10 MG: 5 INJECTION, SOLUTION INTRAMUSCULAR; INTRAVENOUS at 16:46

## 2022-01-01 RX ADMIN — SENNOSIDES AND DOCUSATE SODIUM 2 TABLET: 8.6; 5 TABLET ORAL at 08:00

## 2022-01-01 RX ADMIN — FAMOTIDINE 20 MG: 10 INJECTION INTRAVENOUS at 10:33

## 2022-01-01 RX ADMIN — DIPHENHYDRAMINE HYDROCHLORIDE 50 MG: 50 INJECTION INTRAMUSCULAR; INTRAVENOUS at 10:30

## 2022-01-01 RX ADMIN — PROCHLORPERAZINE EDISYLATE 10 MG: 5 INJECTION INTRAMUSCULAR; INTRAVENOUS at 09:15

## 2022-01-01 RX ADMIN — HYDROMORPHONE HYDROCHLORIDE 0.5 MG: 1 INJECTION, SOLUTION INTRAMUSCULAR; INTRAVENOUS; SUBCUTANEOUS at 20:44

## 2022-01-01 RX ADMIN — VANCOMYCIN HYDROCHLORIDE 750 MG: 10 INJECTION, POWDER, LYOPHILIZED, FOR SOLUTION INTRAVENOUS at 02:18

## 2022-01-01 RX ADMIN — Medication 500 UNITS: at 09:23

## 2022-01-01 RX ADMIN — NYSTATIN 500000 UNITS: 100000 SUSPENSION ORAL at 21:39

## 2022-01-01 RX ADMIN — FLUDEOXYGLUCOSE F-18 10.12 MCI.: 500 INJECTION, SOLUTION INTRAVENOUS at 07:16

## 2022-01-01 RX ADMIN — SODIUM CHLORIDE 250 ML: 9 INJECTION, SOLUTION INTRAVENOUS at 10:23

## 2022-01-01 RX ADMIN — ACETAMINOPHEN 650 MG: 325 TABLET ORAL at 11:36

## 2022-01-01 RX ADMIN — HYDROMORPHONE HYDROCHLORIDE 0.5 MG: 1 INJECTION, SOLUTION INTRAMUSCULAR; INTRAVENOUS; SUBCUTANEOUS at 04:47

## 2022-01-01 RX ADMIN — LIDOCAINE HYDROCHLORIDE 7 ML: 10 INJECTION, SOLUTION EPIDURAL; INFILTRATION; INTRACAUDAL; PERINEURAL at 11:34

## 2022-01-01 RX ADMIN — DENOSUMAB 120 MG: 120 INJECTION SUBCUTANEOUS at 15:28

## 2022-01-01 RX ADMIN — ACETAMINOPHEN 650 MG: 325 TABLET ORAL at 11:14

## 2022-01-01 RX ADMIN — SODIUM CHLORIDE 250 ML: 9 INJECTION, SOLUTION INTRAVENOUS at 11:36

## 2022-01-01 RX ADMIN — ACETAMINOPHEN 975 MG: 325 TABLET, FILM COATED ORAL at 16:20

## 2022-01-01 RX ADMIN — SODIUM CHLORIDE, POTASSIUM CHLORIDE, SODIUM LACTATE AND CALCIUM CHLORIDE 1000 ML: 600; 310; 30; 20 INJECTION, SOLUTION INTRAVENOUS at 04:35

## 2022-01-01 RX ADMIN — CAFFEINE AND SODIUM BENZOATE 500 MG: 125 INJECTION, SOLUTION INTRAMUSCULAR; INTRAVENOUS at 11:19

## 2022-01-01 RX ADMIN — OXYCODONE HYDROCHLORIDE 10 MG: 10 TABLET ORAL at 05:22

## 2022-01-01 RX ADMIN — POTASSIUM & SODIUM PHOSPHATES POWDER PACK 280-160-250 MG 1 PACKET: 280-160-250 PACK at 16:42

## 2022-01-01 RX ADMIN — VORICONAZOLE 200 MG: 200 TABLET ORAL at 07:34

## 2022-01-01 RX ADMIN — SENNOSIDES AND DOCUSATE SODIUM 2 TABLET: 50; 8.6 TABLET ORAL at 09:11

## 2022-01-01 RX ADMIN — DEXAMETHASONE SODIUM PHOSPHATE 4 MG: 4 INJECTION, SOLUTION INTRA-ARTICULAR; INTRALESIONAL; INTRAMUSCULAR; INTRAVENOUS; SOFT TISSUE at 19:12

## 2022-01-01 RX ADMIN — SODIUM CHLORIDE 250 ML: 9 INJECTION, SOLUTION INTRAVENOUS at 13:41

## 2022-01-01 RX ADMIN — MIRTAZAPINE 7.5 MG: 7.5 TABLET, FILM COATED ORAL at 22:30

## 2022-01-01 RX ADMIN — SENNOSIDES AND DOCUSATE SODIUM 3 TABLET: 8.6; 5 TABLET ORAL at 22:29

## 2022-01-01 RX ADMIN — DEXAMETHASONE 4 MG: 2 TABLET ORAL at 08:14

## 2022-01-01 RX ADMIN — Medication 5 ML: at 15:31

## 2022-01-01 RX ADMIN — PACLITAXEL 134 MG: 6 INJECTION, SOLUTION INTRAVENOUS at 11:32

## 2022-01-01 RX ADMIN — SODIUM CHLORIDE 250 ML: 9 INJECTION, SOLUTION INTRAVENOUS at 12:42

## 2022-01-01 RX ADMIN — HYDROMORPHONE HYDROCHLORIDE 0.5 MG: 1 INJECTION, SOLUTION INTRAMUSCULAR; INTRAVENOUS; SUBCUTANEOUS at 12:08

## 2022-01-01 RX ADMIN — Medication 600 MG: at 22:05

## 2022-01-01 RX ADMIN — Medication 5 ML: at 15:28

## 2022-01-01 RX ADMIN — Medication 1000 UNITS: at 09:45

## 2022-01-01 RX ADMIN — HYDROMORPHONE HYDROCHLORIDE 0.5 MG: 1 INJECTION, SOLUTION INTRAMUSCULAR; INTRAVENOUS; SUBCUTANEOUS at 03:44

## 2022-01-01 RX ADMIN — PROPOFOL 125 MCG/KG/MIN: 10 INJECTION, EMULSION INTRAVENOUS at 09:06

## 2022-01-01 RX ADMIN — SODIUM CHLORIDE 250 ML: 9 INJECTION, SOLUTION INTRAVENOUS at 11:55

## 2022-01-01 RX ADMIN — FAM-TRASTUZUMAB DERUXTECAN-NXKI 300 MG: 100 INJECTION, POWDER, LYOPHILIZED, FOR SOLUTION INTRAVENOUS at 09:30

## 2022-01-01 RX ADMIN — PERTUZUMAB 420 MG: 30 INJECTION, SOLUTION, CONCENTRATE INTRAVENOUS at 08:25

## 2022-01-01 RX ADMIN — Medication 5 ML: at 14:01

## 2022-01-01 RX ADMIN — VENLAFAXINE 75 MG: 75 TABLET ORAL at 08:15

## 2022-01-01 RX ADMIN — HYDROMORPHONE HYDROCHLORIDE 0.2 MG: 0.2 INJECTION, SOLUTION INTRAMUSCULAR; INTRAVENOUS; SUBCUTANEOUS at 15:05

## 2022-01-01 RX ADMIN — DEXAMETHASONE SODIUM PHOSPHATE 20 MG: 10 INJECTION, SOLUTION INTRAMUSCULAR; INTRAVENOUS at 12:47

## 2022-01-01 RX ADMIN — Medication 5 ML: at 10:35

## 2022-01-01 RX ADMIN — ONDANSETRON 8 MG: 2 INJECTION INTRAMUSCULAR; INTRAVENOUS at 07:57

## 2022-01-01 RX ADMIN — SODIUM CHLORIDE 1000 ML: 9 INJECTION, SOLUTION INTRAVENOUS at 21:36

## 2022-01-01 RX ADMIN — SODIUM CHLORIDE 1000 ML: 9 INJECTION, SOLUTION INTRAVENOUS at 10:21

## 2022-01-01 RX ADMIN — VENLAFAXINE 75 MG: 75 TABLET ORAL at 08:01

## 2022-01-01 RX ADMIN — DEXAMETHASONE 4 MG: 2 TABLET ORAL at 23:46

## 2022-01-01 RX ADMIN — Medication 5 ML: at 23:46

## 2022-01-01 RX ADMIN — MIRTAZAPINE 7.5 MG: 7.5 TABLET, FILM COATED ORAL at 22:04

## 2022-01-01 RX ADMIN — ZOLEDRONIC ACID 5 MG: 0.05 INJECTION, SOLUTION INTRAVENOUS at 13:13

## 2022-01-01 RX ADMIN — SODIUM CHLORIDE 250 ML: 9 INJECTION, SOLUTION INTRAVENOUS at 07:53

## 2022-01-01 RX ADMIN — PIPERACILLIN SODIUM AND TAZOBACTAM SODIUM 4.5 G: 4; .5 INJECTION, POWDER, LYOPHILIZED, FOR SOLUTION INTRAVENOUS at 21:38

## 2022-01-01 RX ADMIN — DEXAMETHASONE SODIUM PHOSPHATE: 10 INJECTION, SOLUTION INTRAMUSCULAR; INTRAVENOUS at 08:39

## 2022-01-01 RX ADMIN — OXYCODONE HYDROCHLORIDE 5 MG: 5 TABLET ORAL at 13:35

## 2022-01-01 RX ADMIN — ONDANSETRON 8 MG: 2 INJECTION INTRAMUSCULAR; INTRAVENOUS at 18:03

## 2022-01-01 RX ADMIN — SODIUM CHLORIDE, POTASSIUM CHLORIDE, SODIUM LACTATE AND CALCIUM CHLORIDE: 600; 310; 30; 20 INJECTION, SOLUTION INTRAVENOUS at 21:46

## 2022-01-01 RX ADMIN — PACLITAXEL 134 MG: 6 INJECTION, SOLUTION INTRAVENOUS at 15:20

## 2022-01-01 RX ADMIN — PACLITAXEL 134 MG: 6 INJECTION, SOLUTION INTRAVENOUS at 08:07

## 2022-01-01 RX ADMIN — POTASSIUM & SODIUM PHOSPHATES POWDER PACK 280-160-250 MG 1 PACKET: 280-160-250 PACK at 14:11

## 2022-01-01 RX ADMIN — Medication 1 TABLET: at 09:02

## 2022-01-01 RX ADMIN — OXYCODONE HYDROCHLORIDE 10 MG: 10 TABLET ORAL at 16:01

## 2022-01-01 RX ADMIN — APIXABAN 10 MG: 5 TABLET, FILM COATED ORAL at 19:51

## 2022-01-01 RX ADMIN — NYSTATIN 500000 UNITS: 100000 SUSPENSION ORAL at 12:36

## 2022-01-01 RX ADMIN — HYDRALAZINE HYDROCHLORIDE 10 MG: 20 INJECTION INTRAMUSCULAR; INTRAVENOUS at 21:34

## 2022-01-01 RX ADMIN — Medication 5 ML: at 16:26

## 2022-01-01 RX ADMIN — LIDOCAINE HYDROCHLORIDE 10 ML: 10 INJECTION, SOLUTION EPIDURAL; INFILTRATION; INTRACAUDAL; PERINEURAL at 13:46

## 2022-01-01 RX ADMIN — ACETAMINOPHEN 650 MG: 325 TABLET ORAL at 12:38

## 2022-01-01 RX ADMIN — Medication 5 ML: at 00:35

## 2022-01-01 RX ADMIN — DEXAMETHASONE 4 MG: 2 TABLET ORAL at 11:30

## 2022-01-01 RX ADMIN — DIPHENHYDRAMINE HYDROCHLORIDE 50 MG: 25 CAPSULE ORAL at 07:56

## 2022-01-01 RX ADMIN — FENTANYL CITRATE 25 MCG: 50 INJECTION, SOLUTION INTRAMUSCULAR; INTRAVENOUS at 20:04

## 2022-01-01 RX ADMIN — LIDOCAINE HYDROCHLORIDE AND EPINEPHRINE 5 ML: 10; 10 INJECTION, SOLUTION INFILTRATION; PERINEURAL at 13:57

## 2022-01-01 RX ADMIN — METOCLOPRAMIDE 10 MG: 5 INJECTION, SOLUTION INTRAMUSCULAR; INTRAVENOUS at 22:45

## 2022-01-01 RX ADMIN — PROPOFOL 30 MG: 10 INJECTION, EMULSION INTRAVENOUS at 20:08

## 2022-01-01 RX ADMIN — Medication 1000 UNITS: at 08:19

## 2022-01-01 RX ADMIN — OXYCODONE HYDROCHLORIDE 5 MG: 5 TABLET ORAL at 00:27

## 2022-01-01 RX ADMIN — OLANZAPINE 5 MG: 5 TABLET, FILM COATED ORAL at 22:32

## 2022-01-01 RX ADMIN — PACLITAXEL 134 MG: 6 INJECTION, SOLUTION INTRAVENOUS at 15:33

## 2022-01-01 RX ADMIN — VENLAFAXINE 75 MG: 75 TABLET ORAL at 10:10

## 2022-01-01 RX ADMIN — ACETAMINOPHEN 650 MG: 325 TABLET ORAL at 18:40

## 2022-01-01 RX ADMIN — LABETALOL 20 MG/4 ML (5 MG/ML) INTRAVENOUS SYRINGE 5 MG: at 21:13

## 2022-01-01 RX ADMIN — VANCOMYCIN HYDROCHLORIDE 125 MG: 125 CAPSULE ORAL at 08:07

## 2022-01-01 RX ADMIN — PROCHLORPERAZINE EDISYLATE 10 MG: 5 INJECTION INTRAMUSCULAR; INTRAVENOUS at 15:57

## 2022-01-01 RX ADMIN — PERTUZUMAB 420 MG: 30 INJECTION, SOLUTION, CONCENTRATE INTRAVENOUS at 12:22

## 2022-01-01 RX ADMIN — LORAZEPAM 0.5 MG: 2 INJECTION INTRAMUSCULAR; INTRAVENOUS at 09:53

## 2022-01-01 RX ADMIN — HYDROMORPHONE HYDROCHLORIDE 0.5 MG: 1 INJECTION, SOLUTION INTRAMUSCULAR; INTRAVENOUS; SUBCUTANEOUS at 21:03

## 2022-01-01 RX ADMIN — HYDRALAZINE HYDROCHLORIDE 25 MG: 25 TABLET, FILM COATED ORAL at 06:27

## 2022-01-01 RX ADMIN — ONDANSETRON 4 MG: 4 TABLET, ORALLY DISINTEGRATING ORAL at 08:26

## 2022-01-01 RX ADMIN — Medication 1 TABLET: at 08:07

## 2022-01-01 RX ADMIN — HYDROMORPHONE HYDROCHLORIDE 0.5 MG: 1 INJECTION, SOLUTION INTRAMUSCULAR; INTRAVENOUS; SUBCUTANEOUS at 17:43

## 2022-01-01 RX ADMIN — SODIUM CHLORIDE 250 ML: 9 INJECTION, SOLUTION INTRAVENOUS at 15:18

## 2022-01-01 RX ADMIN — Medication 5 ML: at 09:03

## 2022-01-01 RX ADMIN — SODIUM CHLORIDE 250 ML: 9 INJECTION, SOLUTION INTRAVENOUS at 07:59

## 2022-01-01 RX ADMIN — Medication 5 ML: at 16:43

## 2022-01-01 RX ADMIN — ACETAMINOPHEN 975 MG: 325 TABLET, FILM COATED ORAL at 08:00

## 2022-01-01 RX ADMIN — DEXAMETHASONE 4 MG: 2 TABLET ORAL at 15:34

## 2022-01-01 RX ADMIN — NYSTATIN 500000 UNITS: 100000 SUSPENSION ORAL at 20:25

## 2022-01-01 RX ADMIN — PERTUZUMAB 420 MG: 30 INJECTION, SOLUTION, CONCENTRATE INTRAVENOUS at 14:29

## 2022-01-01 RX ADMIN — METOCLOPRAMIDE 10 MG: 5 INJECTION, SOLUTION INTRAMUSCULAR; INTRAVENOUS at 05:06

## 2022-01-01 RX ADMIN — Medication 5 ML: at 07:19

## 2022-01-01 RX ADMIN — PROCHLORPERAZINE MALEATE 5 MG: 5 TABLET ORAL at 08:07

## 2022-01-01 RX ADMIN — CEFAZOLIN SODIUM 2 G: 2 SOLUTION INTRAVENOUS at 09:07

## 2022-01-01 RX ADMIN — ACETAMINOPHEN 975 MG: 325 TABLET, FILM COATED ORAL at 22:32

## 2022-01-01 RX ADMIN — DEXAMETHASONE 4 MG: 2 TABLET ORAL at 05:21

## 2022-01-01 RX ADMIN — VANCOMYCIN HYDROCHLORIDE 125 MG: 125 CAPSULE ORAL at 12:36

## 2022-01-01 RX ADMIN — SODIUM CHLORIDE: 9 INJECTION, SOLUTION INTRAVENOUS at 10:29

## 2022-01-01 RX ADMIN — ACETAMINOPHEN 650 MG: 325 TABLET, FILM COATED ORAL at 06:11

## 2022-01-01 RX ADMIN — VANCOMYCIN HYDROCHLORIDE 125 MG: 125 CAPSULE ORAL at 22:58

## 2022-01-01 RX ADMIN — HYDROMORPHONE HYDROCHLORIDE 0.5 MG: 1 INJECTION, SOLUTION INTRAMUSCULAR; INTRAVENOUS; SUBCUTANEOUS at 00:25

## 2022-01-01 RX ADMIN — PIPERACILLIN SODIUM,TAZOBACTAM SODIUM 4.5 G: 4; .5 INJECTION, POWDER, FOR SOLUTION INTRAVENOUS at 02:37

## 2022-01-01 RX ADMIN — OXYCODONE HYDROCHLORIDE 5 MG: 5 TABLET ORAL at 08:59

## 2022-01-01 RX ADMIN — Medication 5 ML: at 14:55

## 2022-01-01 RX ADMIN — VENLAFAXINE 75 MG: 75 TABLET ORAL at 08:52

## 2022-01-01 RX ADMIN — Medication 1 TABLET: at 08:59

## 2022-01-01 RX ADMIN — HYDROMORPHONE HYDROCHLORIDE 0.5 MG: 1 INJECTION, SOLUTION INTRAMUSCULAR; INTRAVENOUS; SUBCUTANEOUS at 10:20

## 2022-01-01 RX ADMIN — Medication 5 ML: at 11:03

## 2022-01-01 RX ADMIN — MIRTAZAPINE 7.5 MG: 7.5 TABLET, FILM COATED ORAL at 21:40

## 2022-01-01 RX ADMIN — CALCIUM 500 MG: 500 TABLET ORAL at 22:16

## 2022-01-01 RX ADMIN — Medication 5 ML: at 09:13

## 2022-01-01 RX ADMIN — SODIUM CHLORIDE 250 ML: 9 INJECTION, SOLUTION INTRAVENOUS at 15:19

## 2022-01-01 RX ADMIN — ACETAMINOPHEN 650 MG: 325 TABLET ORAL at 13:47

## 2022-01-01 RX ADMIN — ACETAMINOPHEN 650 MG: 325 TABLET ORAL at 02:18

## 2022-01-01 RX ADMIN — APIXABAN 10 MG: 5 TABLET, FILM COATED ORAL at 07:33

## 2022-01-01 RX ADMIN — MIRTAZAPINE 7.5 MG: 7.5 TABLET, FILM COATED ORAL at 22:16

## 2022-01-01 RX ADMIN — PROPOFOL 40 MG: 10 INJECTION, EMULSION INTRAVENOUS at 09:09

## 2022-01-01 RX ADMIN — VENLAFAXINE 75 MG: 75 TABLET ORAL at 08:07

## 2022-01-01 RX ADMIN — PIPERACILLIN SODIUM AND TAZOBACTAM SODIUM 4.5 G: 4; .5 INJECTION, POWDER, LYOPHILIZED, FOR SOLUTION INTRAVENOUS at 08:42

## 2022-01-01 RX ADMIN — SODIUM CHLORIDE, POTASSIUM CHLORIDE, SODIUM LACTATE AND CALCIUM CHLORIDE: 600; 310; 30; 20 INJECTION, SOLUTION INTRAVENOUS at 18:58

## 2022-01-01 RX ADMIN — SODIUM CHLORIDE, PRESERVATIVE FREE 5 ML: 5 INJECTION INTRAVENOUS at 14:11

## 2022-01-01 RX ADMIN — DIPHENHYDRAMINE HYDROCHLORIDE 50 MG: 25 CAPSULE ORAL at 13:47

## 2022-01-01 RX ADMIN — VENLAFAXINE HYDROCHLORIDE 225 MG: 225 TABLET, EXTENDED RELEASE ORAL at 08:59

## 2022-01-01 RX ADMIN — ONDANSETRON 4 MG: 2 INJECTION INTRAMUSCULAR; INTRAVENOUS at 20:41

## 2022-01-01 RX ADMIN — FLUDEOXYGLUCOSE F-18 10.08 MCI.: 500 INJECTION, SOLUTION INTRAVENOUS at 13:55

## 2022-01-01 RX ADMIN — SODIUM CHLORIDE: 9 INJECTION, SOLUTION INTRAVENOUS at 02:27

## 2022-01-01 RX ADMIN — Medication 5 ML: at 20:25

## 2022-01-01 RX ADMIN — ACETAMINOPHEN 975 MG: 325 TABLET, FILM COATED ORAL at 23:45

## 2022-01-01 RX ADMIN — DEXAMETHASONE 4 MG: 2 TABLET ORAL at 06:26

## 2022-01-01 RX ADMIN — SODIUM CHLORIDE 480 MG: 9 INJECTION, SOLUTION INTRAVENOUS at 10:23

## 2022-01-01 RX ADMIN — POTASSIUM & SODIUM PHOSPHATES POWDER PACK 280-160-250 MG 1 PACKET: 280-160-250 PACK at 21:49

## 2022-01-01 RX ADMIN — ONDANSETRON 4 MG: 2 INJECTION INTRAMUSCULAR; INTRAVENOUS at 09:32

## 2022-01-01 RX ADMIN — DEXAMETHASONE 4 MG: 2 TABLET ORAL at 10:24

## 2022-01-01 RX ADMIN — POLYETHYLENE GLYCOL 3350 17 G: 17 POWDER, FOR SOLUTION ORAL at 22:29

## 2022-01-01 RX ADMIN — Medication 2 G: at 19:15

## 2022-01-01 RX ADMIN — Medication 40 MG: at 19:04

## 2022-01-01 RX ADMIN — PALONOSETRON HYDROCHLORIDE 0.25 MG: 0.25 INJECTION INTRAVENOUS at 14:42

## 2022-01-01 RX ADMIN — PERTUZUMAB 420 MG: 30 INJECTION, SOLUTION, CONCENTRATE INTRAVENOUS at 13:17

## 2022-01-01 RX ADMIN — ACETAMINOPHEN 975 MG: 325 TABLET, FILM COATED ORAL at 08:57

## 2022-01-01 RX ADMIN — LABETALOL HYDROCHLORIDE 10 MG: 5 INJECTION, SOLUTION INTRAVENOUS at 00:31

## 2022-01-01 RX ADMIN — SODIUM CHLORIDE, PRESERVATIVE FREE 5 ML: 5 INJECTION INTRAVENOUS at 10:23

## 2022-01-01 RX ADMIN — DIPHENHYDRAMINE HYDROCHLORIDE 50 MG: 25 CAPSULE ORAL at 12:38

## 2022-01-01 RX ADMIN — ALTEPLASE 2 MG: 2.2 INJECTION, POWDER, LYOPHILIZED, FOR SOLUTION INTRAVENOUS at 11:07

## 2022-01-01 RX ADMIN — Medication 5 ML: at 08:45

## 2022-01-01 RX ADMIN — DIPHENHYDRAMINE HYDROCHLORIDE AND LIDOCAINE HYDROCHLORIDE AND ALUMINUM HYDROXIDE AND MAGNESIUM HYDRO 10 ML: KIT at 17:25

## 2022-01-01 RX ADMIN — DENOSUMAB 120 MG: 120 INJECTION SUBCUTANEOUS at 13:16

## 2022-01-01 RX ADMIN — Medication 5 ML: at 12:07

## 2022-01-01 RX ADMIN — SODIUM CHLORIDE 250 ML: 9 INJECTION, SOLUTION INTRAVENOUS at 14:20

## 2022-01-01 RX ADMIN — SODIUM CHLORIDE, POTASSIUM CHLORIDE, SODIUM LACTATE AND CALCIUM CHLORIDE: 600; 310; 30; 20 INJECTION, SOLUTION INTRAVENOUS at 05:13

## 2022-01-01 RX ADMIN — HYDROMORPHONE HYDROCHLORIDE 0.5 MG: 1 INJECTION, SOLUTION INTRAMUSCULAR; INTRAVENOUS; SUBCUTANEOUS at 16:05

## 2022-01-01 RX ADMIN — Medication 5 ML: at 07:13

## 2022-01-01 RX ADMIN — PROCHLORPERAZINE EDISYLATE 10 MG: 5 INJECTION INTRAMUSCULAR; INTRAVENOUS at 08:29

## 2022-01-01 RX ADMIN — METHOTREXATE: 25 INJECTION INTRA-ARTERIAL; INTRAMUSCULAR; INTRATHECAL; INTRAVENOUS at 09:50

## 2022-01-01 RX ADMIN — Medication 1000 UNITS: at 08:52

## 2022-01-01 RX ADMIN — Medication 1000 UNITS: at 08:43

## 2022-01-01 RX ADMIN — HYDROMORPHONE HYDROCHLORIDE 0.5 MG: 1 INJECTION, SOLUTION INTRAMUSCULAR; INTRAVENOUS; SUBCUTANEOUS at 06:28

## 2022-01-01 RX ADMIN — DEXAMETHASONE SODIUM PHOSPHATE 20 MG: 10 INJECTION, SOLUTION INTRAMUSCULAR; INTRAVENOUS at 11:56

## 2022-01-01 RX ADMIN — METHOTREXATE: 25 INJECTION INTRA-ARTERIAL; INTRAMUSCULAR; INTRATHECAL; INTRAVENOUS at 10:38

## 2022-01-01 RX ADMIN — ENOXAPARIN SODIUM 40 MG: 40 INJECTION SUBCUTANEOUS at 08:19

## 2022-01-01 RX ADMIN — LIDOCAINE HYDROCHLORIDE 60 MG: 20 INJECTION, SOLUTION INFILTRATION; PERINEURAL at 09:06

## 2022-01-01 RX ADMIN — SODIUM CHLORIDE 250 ML: 9 INJECTION, SOLUTION INTRAVENOUS at 08:14

## 2022-01-01 RX ADMIN — Medication 30 MG: at 19:18

## 2022-01-01 RX ADMIN — PACLITAXEL 134 MG: 6 INJECTION, SOLUTION INTRAVENOUS at 08:00

## 2022-01-01 RX ADMIN — PIPERACILLIN AND TAZOBACTAM 3.38 G: 3; .375 INJECTION, POWDER, LYOPHILIZED, FOR SOLUTION INTRAVENOUS at 09:15

## 2022-01-01 RX ADMIN — PACLITAXEL 134 MG: 6 INJECTION, SOLUTION INTRAVENOUS at 13:31

## 2022-01-01 RX ADMIN — ACETAMINOPHEN 650 MG: 325 TABLET, FILM COATED ORAL at 09:38

## 2022-01-01 RX ADMIN — NYSTATIN 500000 UNITS: 100000 SUSPENSION ORAL at 08:08

## 2022-01-01 RX ADMIN — SODIUM CHLORIDE, POTASSIUM CHLORIDE, SODIUM LACTATE AND CALCIUM CHLORIDE: 600; 310; 30; 20 INJECTION, SOLUTION INTRAVENOUS at 12:32

## 2022-01-01 RX ADMIN — FENTANYL CITRATE 100 MCG: 50 INJECTION, SOLUTION INTRAMUSCULAR; INTRAVENOUS at 11:35

## 2022-01-01 RX ADMIN — SENNOSIDES AND DOCUSATE SODIUM 2 TABLET: 8.6; 5 TABLET ORAL at 08:58

## 2022-01-01 RX ADMIN — Medication 500 UNITS: at 13:15

## 2022-01-01 RX ADMIN — SODIUM CHLORIDE: 9 INJECTION, SOLUTION INTRAVENOUS at 16:21

## 2022-01-01 RX ADMIN — SODIUM CHLORIDE, POTASSIUM CHLORIDE, SODIUM LACTATE AND CALCIUM CHLORIDE: 600; 310; 30; 20 INJECTION, SOLUTION INTRAVENOUS at 16:46

## 2022-01-01 RX ADMIN — CALCIUM 500 MG: 500 TABLET ORAL at 21:49

## 2022-01-01 RX ADMIN — Medication 5 ML: at 14:39

## 2022-01-01 RX ADMIN — Medication 1000 UNITS: at 07:33

## 2022-01-01 RX ADMIN — GADOBUTROL 7.5 ML: 604.72 INJECTION INTRAVENOUS at 20:38

## 2022-01-01 RX ADMIN — DIPHENHYDRAMINE HYDROCHLORIDE 25 MG: 50 INJECTION, SOLUTION INTRAMUSCULAR; INTRAVENOUS at 14:46

## 2022-01-01 RX ADMIN — SODIUM CHLORIDE 250 ML: 9 INJECTION, SOLUTION INTRAVENOUS at 11:14

## 2022-01-01 RX ADMIN — Medication 5 ML: at 09:35

## 2022-01-01 RX ADMIN — ONDANSETRON 8 MG: 2 INJECTION INTRAMUSCULAR; INTRAVENOUS at 11:41

## 2022-01-01 RX ADMIN — CALCIUM 500 MG: 500 TABLET ORAL at 22:45

## 2022-01-01 RX ADMIN — FENTANYL CITRATE 150 MCG: 50 INJECTION, SOLUTION INTRAMUSCULAR; INTRAVENOUS at 19:04

## 2022-01-01 RX ADMIN — PROCHLORPERAZINE MALEATE 5 MG: 5 TABLET ORAL at 16:42

## 2022-01-01 RX ADMIN — FAM-TRASTUZUMAB DERUXTECAN-NXKI 266 MG: 100 INJECTION, POWDER, LYOPHILIZED, FOR SOLUTION INTRAVENOUS at 12:37

## 2022-01-01 RX ADMIN — ACETAMINOPHEN 975 MG: 325 TABLET, FILM COATED ORAL at 16:05

## 2022-01-01 RX ADMIN — VORICONAZOLE 200 MG: 200 TABLET ORAL at 19:51

## 2022-01-01 RX ADMIN — DIPHENHYDRAMINE HYDROCHLORIDE 50 MG: 25 CAPSULE ORAL at 11:37

## 2022-01-01 RX ADMIN — METOCLOPRAMIDE HYDROCHLORIDE 10 MG: 5 INJECTION INTRAMUSCULAR; INTRAVENOUS at 19:42

## 2022-01-01 RX ADMIN — Medication 1 TABLET: at 08:52

## 2022-01-01 RX ADMIN — ACETAMINOPHEN 650 MG: 325 TABLET ORAL at 09:10

## 2022-01-01 RX ADMIN — Medication 1000 UNITS: at 09:02

## 2022-01-01 RX ADMIN — PROCHLORPERAZINE EDISYLATE 10 MG: 5 INJECTION INTRAMUSCULAR; INTRAVENOUS at 20:03

## 2022-01-01 RX ADMIN — PALONOSETRON HYDROCHLORIDE 0.25 MG: 0.25 INJECTION INTRAVENOUS at 11:58

## 2022-01-01 RX ADMIN — SODIUM CHLORIDE 134 MG: 9 INJECTION, SOLUTION INTRAVENOUS at 14:51

## 2022-01-01 RX ADMIN — DIPHENHYDRAMINE HYDROCHLORIDE AND LIDOCAINE HYDROCHLORIDE AND ALUMINUM HYDROXIDE AND MAGNESIUM HYDRO 10 ML: KIT at 08:53

## 2022-01-01 RX ADMIN — ACETAMINOPHEN 975 MG: 325 TABLET, FILM COATED ORAL at 00:27

## 2022-01-01 RX ADMIN — Medication 5 ML: at 16:07

## 2022-01-01 RX ADMIN — Medication 500 UNITS: at 07:37

## 2022-01-01 RX ADMIN — OXYCODONE HYDROCHLORIDE 10 MG: 10 TABLET ORAL at 09:15

## 2022-01-01 RX ADMIN — PACLITAXEL 134 MG: 6 INJECTION, SOLUTION INTRAVENOUS at 08:37

## 2022-01-01 RX ADMIN — FAMOTIDINE 20 MG: 10 INJECTION, SOLUTION INTRAVENOUS at 11:39

## 2022-01-01 RX ADMIN — HYDROMORPHONE HYDROCHLORIDE 0.5 MG: 1 INJECTION, SOLUTION INTRAMUSCULAR; INTRAVENOUS; SUBCUTANEOUS at 08:19

## 2022-01-01 RX ADMIN — Medication 1 TABLET: at 09:45

## 2022-01-01 RX ADMIN — VANCOMYCIN HYDROCHLORIDE 125 MG: 125 CAPSULE ORAL at 16:42

## 2022-01-01 RX ADMIN — DEXAMETHASONE SODIUM PHOSPHATE: 10 INJECTION, SOLUTION INTRAMUSCULAR; INTRAVENOUS at 14:25

## 2022-01-01 RX ADMIN — Medication 5 ML: at 06:41

## 2022-01-01 RX ADMIN — VENLAFAXINE 75 MG: 75 TABLET ORAL at 09:12

## 2022-01-01 RX ADMIN — SODIUM CHLORIDE 1000 ML: 9 INJECTION, SOLUTION INTRAVENOUS at 07:44

## 2022-01-01 RX ADMIN — FAMOTIDINE 20 MG: 10 INJECTION INTRAVENOUS at 12:44

## 2022-01-01 RX ADMIN — Medication 5 ML: at 16:31

## 2022-01-01 RX ADMIN — LORAZEPAM 0.5 MG: 2 INJECTION, SOLUTION INTRAMUSCULAR; INTRAVENOUS at 11:44

## 2022-01-01 RX ADMIN — SODIUM CHLORIDE 250 ML: 9 INJECTION, SOLUTION INTRAVENOUS at 07:25

## 2022-01-01 RX ADMIN — VANCOMYCIN HYDROCHLORIDE 750 MG: 10 INJECTION, POWDER, LYOPHILIZED, FOR SOLUTION INTRAVENOUS at 16:23

## 2022-01-01 RX ADMIN — VANCOMYCIN HYDROCHLORIDE 1000 MG: 1 INJECTION, SOLUTION INTRAVENOUS at 22:45

## 2022-01-01 RX ADMIN — CEFAZOLIN 1 G: 1 INJECTION, POWDER, FOR SOLUTION INTRAMUSCULAR; INTRAVENOUS at 10:11

## 2022-01-01 RX ADMIN — LORAZEPAM 0.5 MG: 2 INJECTION INTRAMUSCULAR; INTRAVENOUS at 11:35

## 2022-01-01 RX ADMIN — VORICONAZOLE 200 MG: 200 TABLET ORAL at 09:12

## 2022-01-01 RX ADMIN — PIPERACILLIN SODIUM AND TAZOBACTAM SODIUM 4.5 G: 4; .5 INJECTION, POWDER, LYOPHILIZED, FOR SOLUTION INTRAVENOUS at 03:20

## 2022-01-01 RX ADMIN — SODIUM CHLORIDE 370 MG: 9 INJECTION, SOLUTION INTRAVENOUS at 14:45

## 2022-01-01 RX ADMIN — CALCIUM 500 MG: 500 TABLET ORAL at 21:02

## 2022-01-01 RX ADMIN — ACETAMINOPHEN 650 MG: 325 TABLET, FILM COATED ORAL at 02:16

## 2022-01-01 RX ADMIN — Medication 5 ML: at 08:54

## 2022-01-01 RX ADMIN — POTASSIUM & SODIUM PHOSPHATES POWDER PACK 280-160-250 MG 1 PACKET: 280-160-250 PACK at 09:45

## 2022-01-01 RX ADMIN — OLANZAPINE 5 MG: 5 TABLET, FILM COATED ORAL at 23:20

## 2022-01-01 RX ADMIN — Medication 5 ML: at 00:38

## 2022-01-01 RX ADMIN — Medication 1 TABLET: at 08:01

## 2022-01-01 RX ADMIN — Medication 5 ML: at 07:21

## 2022-01-01 RX ADMIN — PACLITAXEL 134 MG: 6 INJECTION, SOLUTION INTRAVENOUS at 14:29

## 2022-01-01 RX ADMIN — VENLAFAXINE 75 MG: 75 TABLET ORAL at 07:34

## 2022-01-01 RX ADMIN — SODIUM CHLORIDE, POTASSIUM CHLORIDE, SODIUM LACTATE AND CALCIUM CHLORIDE: 600; 310; 30; 20 INJECTION, SOLUTION INTRAVENOUS at 21:35

## 2022-01-01 RX ADMIN — SODIUM CHLORIDE 1000 ML: 9 INJECTION, SOLUTION INTRAVENOUS at 12:24

## 2022-01-01 RX ADMIN — SODIUM CHLORIDE, POTASSIUM CHLORIDE, SODIUM LACTATE AND CALCIUM CHLORIDE: 600; 310; 30; 20 INJECTION, SOLUTION INTRAVENOUS at 19:42

## 2022-01-01 RX ADMIN — SODIUM CHLORIDE 1000 ML: 9 INJECTION, SOLUTION INTRAVENOUS at 10:36

## 2022-01-01 RX ADMIN — PHENYLEPHRINE HYDROCHLORIDE 100 MCG: 10 INJECTION INTRAVENOUS at 19:12

## 2022-01-01 RX ADMIN — OLANZAPINE 2.5 MG: 2.5 TABLET, FILM COATED ORAL at 05:15

## 2022-01-01 RX ADMIN — SODIUM CHLORIDE, SODIUM LACTATE, POTASSIUM CHLORIDE, CALCIUM CHLORIDE: 600; 310; 30; 20 INJECTION, SOLUTION INTRAVENOUS at 08:26

## 2022-01-01 RX ADMIN — ONDANSETRON 4 MG: 2 INJECTION INTRAMUSCULAR; INTRAVENOUS at 03:16

## 2022-01-01 RX ADMIN — ONDANSETRON 4 MG: 2 INJECTION INTRAMUSCULAR; INTRAVENOUS at 18:34

## 2022-01-01 RX ADMIN — PACLITAXEL 134 MG: 6 INJECTION, SOLUTION INTRAVENOUS at 13:36

## 2022-01-01 RX ADMIN — Medication 5 ML: at 16:21

## 2022-01-01 RX ADMIN — HYDROMORPHONE HYDROCHLORIDE 0.5 MG: 1 INJECTION, SOLUTION INTRAMUSCULAR; INTRAVENOUS; SUBCUTANEOUS at 15:35

## 2022-01-01 RX ADMIN — MIRTAZAPINE 7.5 MG: 7.5 TABLET, FILM COATED ORAL at 21:05

## 2022-01-01 RX ADMIN — OXYCODONE HYDROCHLORIDE 5 MG: 5 TABLET ORAL at 05:20

## 2022-01-01 RX ADMIN — SODIUM CHLORIDE 250 ML: 9 INJECTION, SOLUTION INTRAVENOUS at 07:32

## 2022-01-01 RX ADMIN — SODIUM CHLORIDE 250 ML: 9 INJECTION, SOLUTION INTRAVENOUS at 15:33

## 2022-01-01 RX ADMIN — NYSTATIN 500000 UNITS: 100000 SUSPENSION ORAL at 08:52

## 2022-01-01 RX ADMIN — SODIUM CHLORIDE 250 ML: 9 INJECTION, SOLUTION INTRAVENOUS at 09:14

## 2022-01-01 RX ADMIN — SODIUM CHLORIDE 1000 ML: 9 INJECTION, SOLUTION INTRAVENOUS at 10:40

## 2022-01-01 RX ADMIN — Medication 5 ML: at 07:29

## 2022-01-01 RX ADMIN — DEXAMETHASONE 4 MG: 2 TABLET ORAL at 05:43

## 2022-01-01 RX ADMIN — DEXAMETHASONE 4 MG: 2 TABLET ORAL at 11:43

## 2022-01-01 RX ADMIN — ONDANSETRON 4 MG: 4 TABLET, ORALLY DISINTEGRATING ORAL at 20:25

## 2022-01-01 RX ADMIN — DEXAMETHASONE 4 MG: 2 TABLET ORAL at 22:31

## 2022-01-01 RX ADMIN — SENNOSIDES AND DOCUSATE SODIUM 2 TABLET: 8.6; 5 TABLET ORAL at 10:10

## 2022-01-01 RX ADMIN — SODIUM CHLORIDE, POTASSIUM CHLORIDE, SODIUM LACTATE AND CALCIUM CHLORIDE: 600; 310; 30; 20 INJECTION, SOLUTION INTRAVENOUS at 00:17

## 2022-01-01 RX ADMIN — Medication 5 ML: at 12:32

## 2022-01-01 RX ADMIN — CAFFEINE AND SODIUM BENZOATE 500 MG: 125 INJECTION, SOLUTION INTRAMUSCULAR; INTRAVENOUS at 09:30

## 2022-01-01 RX ADMIN — NYSTATIN 500000 UNITS: 100000 SUSPENSION ORAL at 12:03

## 2022-01-01 RX ADMIN — CAFFEINE AND SODIUM BENZOATE 500 MG: 125 INJECTION, SOLUTION INTRAMUSCULAR; INTRAVENOUS at 13:38

## 2022-01-01 RX ADMIN — ENOXAPARIN SODIUM 40 MG: 40 INJECTION SUBCUTANEOUS at 09:08

## 2022-01-01 RX ADMIN — OXYCODONE HYDROCHLORIDE 5 MG: 5 TABLET ORAL at 13:59

## 2022-01-01 RX ADMIN — Medication 5 ML: at 13:14

## 2022-01-01 RX ADMIN — OXYCODONE HYDROCHLORIDE 5 MG: 5 TABLET ORAL at 18:34

## 2022-01-01 RX ADMIN — ACETAMINOPHEN 650 MG: 325 TABLET, FILM COATED ORAL at 00:25

## 2022-01-01 RX ADMIN — Medication 1000 UNITS: at 08:36

## 2022-01-01 RX ADMIN — SODIUM CHLORIDE, PRESERVATIVE FREE 5 ML: 5 INJECTION INTRAVENOUS at 13:34

## 2022-01-01 RX ADMIN — SODIUM CHLORIDE 500 MG: 9 INJECTION, SOLUTION INTRAVENOUS at 11:48

## 2022-01-01 RX ADMIN — METHYLPREDNISOLONE SODIUM SUCCINATE 125 MG: 125 INJECTION, POWDER, FOR SOLUTION INTRAMUSCULAR; INTRAVENOUS at 10:31

## 2022-01-01 RX ADMIN — Medication 2 MG: at 20:53

## 2022-01-01 RX ADMIN — PIPERACILLIN AND TAZOBACTAM 3.38 G: 3; .375 INJECTION, POWDER, LYOPHILIZED, FOR SOLUTION INTRAVENOUS at 03:40

## 2022-01-01 RX ADMIN — DOCUSATE SODIUM 100 MG: 100 CAPSULE, LIQUID FILLED ORAL at 08:14

## 2022-01-01 RX ADMIN — LABETALOL HYDROCHLORIDE 10 MG: 5 INJECTION, SOLUTION INTRAVENOUS at 16:34

## 2022-01-01 RX ADMIN — SODIUM CHLORIDE 370 MG: 9 INJECTION, SOLUTION INTRAVENOUS at 12:56

## 2022-01-01 RX ADMIN — PACLITAXEL 134 MG: 6 INJECTION, SOLUTION INTRAVENOUS at 14:25

## 2022-01-01 RX ADMIN — DEXAMETHASONE 4 MG: 2 TABLET ORAL at 13:42

## 2022-01-01 RX ADMIN — PACLITAXEL 134 MG: 6 INJECTION, SOLUTION INTRAVENOUS at 07:52

## 2022-01-01 RX ADMIN — PACLITAXEL 134 MG: 6 INJECTION, SOLUTION INTRAVENOUS at 15:54

## 2022-01-01 RX ADMIN — HYDROMORPHONE HYDROCHLORIDE 0.5 MG: 1 INJECTION, SOLUTION INTRAMUSCULAR; INTRAVENOUS; SUBCUTANEOUS at 18:24

## 2022-01-01 RX ADMIN — VENLAFAXINE 75 MG: 75 TABLET ORAL at 08:19

## 2022-01-01 RX ADMIN — MEPERIDINE HYDROCHLORIDE 25 MG: 25 INJECTION INTRAMUSCULAR; INTRAVENOUS; SUBCUTANEOUS at 10:37

## 2022-01-01 RX ADMIN — Medication 5 ML: at 14:07

## 2022-01-01 RX ADMIN — ACETAMINOPHEN 1000 MG: 500 TABLET, FILM COATED ORAL at 10:21

## 2022-01-01 RX ADMIN — METOCLOPRAMIDE HYDROCHLORIDE 10 MG: 5 INJECTION INTRAMUSCULAR; INTRAVENOUS at 11:35

## 2022-01-01 RX ADMIN — Medication 5 ML: at 11:05

## 2022-01-01 RX ADMIN — PROCHLORPERAZINE MALEATE 5 MG: 5 TABLET ORAL at 17:09

## 2022-01-01 RX ADMIN — PACLITAXEL 134 MG: 6 INJECTION, SOLUTION INTRAVENOUS at 15:21

## 2022-01-01 RX ADMIN — GADOBUTROL 6.5 ML: 604.72 INJECTION INTRAVENOUS at 14:36

## 2022-01-01 RX ADMIN — ONDANSETRON 4 MG: 2 INJECTION INTRAMUSCULAR; INTRAVENOUS at 18:58

## 2022-01-01 RX ADMIN — Medication 5 ML: at 16:59

## 2022-01-01 RX ADMIN — OLANZAPINE 5 MG: 5 TABLET, FILM COATED ORAL at 21:02

## 2022-01-01 RX ADMIN — LIDOCAINE HYDROCHLORIDE 100 MG: 20 INJECTION, SOLUTION INFILTRATION; PERINEURAL at 19:04

## 2022-01-01 RX ADMIN — DOCUSATE SODIUM 100 MG: 100 CAPSULE, LIQUID FILLED ORAL at 20:03

## 2022-01-01 RX ADMIN — Medication 5 ML: at 08:19

## 2022-01-01 RX ADMIN — HYDROMORPHONE HYDROCHLORIDE 0.5 MG: 1 INJECTION, SOLUTION INTRAMUSCULAR; INTRAVENOUS; SUBCUTANEOUS at 11:30

## 2022-01-01 RX ADMIN — LABETALOL 20 MG/4 ML (5 MG/ML) INTRAVENOUS SYRINGE 15 MG: at 20:36

## 2022-01-01 RX ADMIN — OXYCODONE HYDROCHLORIDE 2.5 MG: 5 TABLET ORAL at 06:37

## 2022-01-01 RX ADMIN — SODIUM CHLORIDE, POTASSIUM CHLORIDE, SODIUM LACTATE AND CALCIUM CHLORIDE: 600; 310; 30; 20 INJECTION, SOLUTION INTRAVENOUS at 03:06

## 2022-01-01 RX ADMIN — PIPERACILLIN AND TAZOBACTAM 3.38 G: 3; .375 INJECTION, POWDER, LYOPHILIZED, FOR SOLUTION INTRAVENOUS at 15:02

## 2022-01-01 RX ADMIN — OLANZAPINE 5 MG: 5 TABLET, FILM COATED ORAL at 22:16

## 2022-01-01 RX ADMIN — PHENYLEPHRINE HYDROCHLORIDE 50 MCG: 10 INJECTION INTRAVENOUS at 19:42

## 2022-01-01 RX ADMIN — PACLITAXEL 134 MG: 6 INJECTION, SOLUTION INTRAVENOUS at 08:19

## 2022-01-01 RX ADMIN — ACETAMINOPHEN 975 MG: 325 TABLET, FILM COATED ORAL at 16:01

## 2022-01-01 RX ADMIN — DEXAMETHASONE 4 MG: 2 TABLET ORAL at 00:38

## 2022-01-01 RX ADMIN — Medication 6 MG: at 22:32

## 2022-01-01 RX ADMIN — ENOXAPARIN SODIUM 40 MG: 40 INJECTION SUBCUTANEOUS at 08:53

## 2022-01-01 RX ADMIN — Medication 5 ML: at 10:37

## 2022-01-01 RX ADMIN — Medication 1000 UNITS: at 08:08

## 2022-01-01 RX ADMIN — Medication 1000 UNITS: at 08:14

## 2022-01-01 RX ADMIN — ACETAMINOPHEN 975 MG: 325 TABLET ORAL at 13:00

## 2022-01-01 RX ADMIN — PHENYLEPHRINE HYDROCHLORIDE 100 MCG: 10 INJECTION INTRAVENOUS at 19:23

## 2022-01-01 RX ADMIN — Medication 5 ML: at 15:04

## 2022-01-01 RX ADMIN — Medication 5 ML: at 11:10

## 2022-01-01 RX ADMIN — DEXAMETHASONE SODIUM PHOSPHATE 4 MG: 10 INJECTION, SOLUTION INTRAMUSCULAR; INTRAVENOUS at 12:00

## 2022-01-01 RX ADMIN — Medication 5 ML: at 10:30

## 2022-01-01 RX ADMIN — SODIUM CHLORIDE 1000 ML: 9 INJECTION, SOLUTION INTRAVENOUS at 09:59

## 2022-01-01 RX ADMIN — ACETAMINOPHEN 650 MG: 325 TABLET, FILM COATED ORAL at 08:59

## 2022-01-01 RX ADMIN — HYDRALAZINE HYDROCHLORIDE 25 MG: 25 TABLET, FILM COATED ORAL at 03:32

## 2022-01-01 RX ADMIN — SODIUM CHLORIDE 250 ML: 9 INJECTION, SOLUTION INTRAVENOUS at 13:13

## 2022-01-01 RX ADMIN — DEXAMETHASONE 4 MG: 2 TABLET ORAL at 00:10

## 2022-01-01 RX ADMIN — CALCIUM 500 MG: 500 TABLET ORAL at 21:04

## 2022-01-01 RX ADMIN — NYSTATIN 500000 UNITS: 100000 SUSPENSION ORAL at 16:42

## 2022-01-01 RX ADMIN — PROPOFOL 30 MG: 10 INJECTION, EMULSION INTRAVENOUS at 09:32

## 2022-01-01 RX ADMIN — PACLITAXEL 134 MG: 6 INJECTION, SOLUTION INTRAVENOUS at 15:23

## 2022-01-01 RX ADMIN — ACETAMINOPHEN 975 MG: 325 TABLET ORAL at 08:10

## 2022-01-01 RX ADMIN — SODIUM CHLORIDE 1000 ML: 9 INJECTION, SOLUTION INTRAVENOUS at 15:05

## 2022-01-01 RX ADMIN — PROCHLORPERAZINE MALEATE 5 MG: 5 TABLET ORAL at 22:58

## 2022-01-01 RX ADMIN — DEXAMETHASONE SODIUM PHOSPHATE 20 MG: 10 INJECTION, SOLUTION INTRAMUSCULAR; INTRAVENOUS at 08:00

## 2022-01-01 RX ADMIN — IOPAMIDOL 82 ML: 755 INJECTION, SOLUTION INTRAVENOUS at 17:02

## 2022-01-01 RX ADMIN — DEXAMETHASONE 4 MG: 2 TABLET ORAL at 00:28

## 2022-01-01 RX ADMIN — DOCUSATE SODIUM 100 MG: 100 CAPSULE, LIQUID FILLED ORAL at 09:45

## 2022-01-01 RX ADMIN — HYDRALAZINE HYDROCHLORIDE 25 MG: 25 TABLET, FILM COATED ORAL at 11:43

## 2022-01-01 RX ADMIN — GADOBUTROL 5.5 ML: 604.72 INJECTION INTRAVENOUS at 12:07

## 2022-01-01 RX ADMIN — DOCUSATE SODIUM 100 MG: 100 CAPSULE, LIQUID FILLED ORAL at 23:20

## 2022-01-01 RX ADMIN — METHOTREXATE: 25 INJECTION INTRA-ARTERIAL; INTRAMUSCULAR; INTRATHECAL; INTRAVENOUS at 12:03

## 2022-01-01 RX ADMIN — SODIUM CHLORIDE, POTASSIUM CHLORIDE, SODIUM LACTATE AND CALCIUM CHLORIDE: 600; 310; 30; 20 INJECTION, SOLUTION INTRAVENOUS at 09:17

## 2022-01-01 RX ADMIN — SODIUM CHLORIDE 250 ML: 9 INJECTION, SOLUTION INTRAVENOUS at 14:51

## 2022-01-01 RX ADMIN — FAMOTIDINE 20 MG: 10 INJECTION, SOLUTION INTRAVENOUS at 13:47

## 2022-01-01 RX ADMIN — Medication 500 UNITS: at 07:38

## 2022-01-01 RX ADMIN — ONDANSETRON 4 MG: 2 INJECTION INTRAMUSCULAR; INTRAVENOUS at 20:39

## 2022-01-01 RX ADMIN — Medication 1 MG: at 20:05

## 2022-01-01 RX ADMIN — OLANZAPINE 5 MG: 5 TABLET, FILM COATED ORAL at 21:04

## 2022-01-01 RX ADMIN — VENLAFAXINE HYDROCHLORIDE 225 MG: 225 TABLET, EXTENDED RELEASE ORAL at 09:11

## 2022-01-01 RX ADMIN — SODIUM CHLORIDE 250 ML: 9 INJECTION, SOLUTION INTRAVENOUS at 09:34

## 2022-01-01 RX ADMIN — VENLAFAXINE 75 MG: 75 TABLET ORAL at 08:43

## 2022-01-01 RX ADMIN — METOCLOPRAMIDE HYDROCHLORIDE 10 MG: 5 INJECTION INTRAMUSCULAR; INTRAVENOUS at 11:09

## 2022-01-01 RX ADMIN — MIRTAZAPINE 7.5 MG: 7.5 TABLET, FILM COATED ORAL at 22:32

## 2022-01-01 RX ADMIN — OLANZAPINE 2.5 MG: 2.5 TABLET, FILM COATED ORAL at 22:05

## 2022-01-01 RX ADMIN — PIPERACILLIN SODIUM AND TAZOBACTAM SODIUM 4.5 G: 4; .5 INJECTION, POWDER, LYOPHILIZED, FOR SOLUTION INTRAVENOUS at 08:52

## 2022-01-01 RX ADMIN — FAMOTIDINE 20 MG: 10 INJECTION INTRAVENOUS at 12:23

## 2022-01-01 RX ADMIN — Medication 5 ML: at 12:05

## 2022-01-01 RX ADMIN — DEXAMETHASONE 4 MG: 2 TABLET ORAL at 12:08

## 2022-01-01 RX ADMIN — SODIUM CHLORIDE 370 MG: 9 INJECTION, SOLUTION INTRAVENOUS at 15:06

## 2022-01-01 RX ADMIN — DIPHENHYDRAMINE HYDROCHLORIDE AND LIDOCAINE HYDROCHLORIDE AND ALUMINUM HYDROXIDE AND MAGNESIUM HYDRO 10 ML: KIT at 17:09

## 2022-01-01 RX ADMIN — Medication 5 ML: at 16:01

## 2022-01-01 RX ADMIN — LORAZEPAM 0.5 MG: 2 INJECTION INTRAMUSCULAR; INTRAVENOUS at 12:20

## 2022-01-01 RX ADMIN — ACETAMINOPHEN 650 MG: 325 TABLET ORAL at 12:14

## 2022-01-01 ASSESSMENT — ACTIVITIES OF DAILY LIVING (ADL)
ADLS_ACUITY_SCORE: 43
ADLS_ACUITY_SCORE: 20
ADLS_ACUITY_SCORE: 5
ADLS_ACUITY_SCORE: 5
ADLS_ACUITY_SCORE: 35
ADLS_ACUITY_SCORE: 35
ADLS_ACUITY_SCORE: 22
ADLS_ACUITY_SCORE: 5
ADLS_ACUITY_SCORE: 5
ADLS_ACUITY_SCORE: 35
ADLS_ACUITY_SCORE: 20
ADLS_ACUITY_SCORE: 20
ADLS_ACUITY_SCORE: 5
ADLS_ACUITY_SCORE: 20
ADLS_ACUITY_SCORE: 5
TOILETING_ISSUES: NO
ADLS_ACUITY_SCORE: 25
ADLS_ACUITY_SCORE: 22
ADLS_ACUITY_SCORE: 23
ADLS_ACUITY_SCORE: 20
ADLS_ACUITY_SCORE: 26
ADLS_ACUITY_SCORE: 35
ADLS_ACUITY_SCORE: 26
FALL_HISTORY_WITHIN_LAST_SIX_MONTHS: YES
ADLS_ACUITY_SCORE: 39
ADLS_ACUITY_SCORE: 5
ADLS_ACUITY_SCORE: 39
ADLS_ACUITY_SCORE: 43
ADLS_ACUITY_SCORE: 20
ADLS_ACUITY_SCORE: 5
ADLS_ACUITY_SCORE: 20
ADLS_ACUITY_SCORE: 20
ADLS_ACUITY_SCORE: 26
WALKING_OR_CLIMBING_STAIRS_DIFFICULTY: NO
ADLS_ACUITY_SCORE: 43
ADLS_ACUITY_SCORE: 26
ADLS_ACUITY_SCORE: 20
ADLS_ACUITY_SCORE: 35
ADLS_ACUITY_SCORE: 5
ADLS_ACUITY_SCORE: 20
ADLS_ACUITY_SCORE: 35
ADLS_ACUITY_SCORE: 5
ADLS_ACUITY_SCORE: 26
ADLS_ACUITY_SCORE: 35
ADLS_ACUITY_SCORE: 5
ADLS_ACUITY_SCORE: 22
ADLS_ACUITY_SCORE: 39
DIFFICULTY_EATING/SWALLOWING: NO
ADLS_ACUITY_SCORE: 26
ADLS_ACUITY_SCORE: 5
ADLS_ACUITY_SCORE: 35
ADLS_ACUITY_SCORE: 35
ADLS_ACUITY_SCORE: 23
ADLS_ACUITY_SCORE: 5
ADLS_ACUITY_SCORE: 5
ADLS_ACUITY_SCORE: 22
ADLS_ACUITY_SCORE: 20
ADLS_ACUITY_SCORE: 5
CONCENTRATING,_REMEMBERING_OR_MAKING_DECISIONS_DIFFICULTY: NO
WEAR_GLASSES_OR_BLIND: NO
ADLS_ACUITY_SCORE: 20
ADLS_ACUITY_SCORE: 43
ADLS_ACUITY_SCORE: 26
ADLS_ACUITY_SCORE: 20
CHANGE_IN_FUNCTIONAL_STATUS_SINCE_ONSET_OF_CURRENT_ILLNESS/INJURY: NO
ADLS_ACUITY_SCORE: 26
ADLS_ACUITY_SCORE: 20
ADLS_ACUITY_SCORE: 5
NUMBER_OF_TIMES_PATIENT_HAS_FALLEN_WITHIN_LAST_SIX_MONTHS: 1
ADLS_ACUITY_SCORE: 20
ADLS_ACUITY_SCORE: 20
ADLS_ACUITY_SCORE: 35
ADLS_ACUITY_SCORE: 35
WEAR_GLASSES_OR_BLIND: NO
ADLS_ACUITY_SCORE: 35
ADLS_ACUITY_SCORE: 5
ADLS_ACUITY_SCORE: 22
ADLS_ACUITY_SCORE: 39
ADLS_ACUITY_SCORE: 5
FALL_HISTORY_WITHIN_LAST_SIX_MONTHS: YES
ADLS_ACUITY_SCORE: 35
DIFFICULTY_COMMUNICATING: NO
ADLS_ACUITY_SCORE: 5
FALL_HISTORY_WITHIN_LAST_SIX_MONTHS: NO
ADLS_ACUITY_SCORE: 21
ADLS_ACUITY_SCORE: 39
ADLS_ACUITY_SCORE: 21
ADLS_ACUITY_SCORE: 5
ADLS_ACUITY_SCORE: 35
ADLS_ACUITY_SCORE: 35
CHANGE_IN_FUNCTIONAL_STATUS_SINCE_ONSET_OF_CURRENT_ILLNESS/INJURY: NO
WALKING_OR_CLIMBING_STAIRS_DIFFICULTY: NO
ADLS_ACUITY_SCORE: 35
ADLS_ACUITY_SCORE: 20
ADLS_ACUITY_SCORE: 43
ADLS_ACUITY_SCORE: 5
ADLS_ACUITY_SCORE: 35
ADLS_ACUITY_SCORE: 39
ADLS_ACUITY_SCORE: 20
ADLS_ACUITY_SCORE: 21
ADLS_ACUITY_SCORE: 22
ADLS_ACUITY_SCORE: 22
ADLS_ACUITY_SCORE: 5
ADLS_ACUITY_SCORE: 20
ADLS_ACUITY_SCORE: 5
ADLS_ACUITY_SCORE: 5
ADLS_ACUITY_SCORE: 43
ADLS_ACUITY_SCORE: 35
ADLS_ACUITY_SCORE: 22
ADLS_ACUITY_SCORE: 43
ADLS_ACUITY_SCORE: 35
ADLS_ACUITY_SCORE: 20
ADLS_ACUITY_SCORE: 35
ADLS_ACUITY_SCORE: 39
ADLS_ACUITY_SCORE: 22
ADLS_ACUITY_SCORE: 20
ADLS_ACUITY_SCORE: 35
ADLS_ACUITY_SCORE: 5
ADLS_ACUITY_SCORE: 20
ADLS_ACUITY_SCORE: 20
DRESSING/BATHING_DIFFICULTY: NO
ADLS_ACUITY_SCORE: 35
ADLS_ACUITY_SCORE: 20
ADLS_ACUITY_SCORE: 35
ADLS_ACUITY_SCORE: 22
ADLS_ACUITY_SCORE: 35
ADLS_ACUITY_SCORE: 5
ADLS_ACUITY_SCORE: 26
ADLS_ACUITY_SCORE: 20
ADLS_ACUITY_SCORE: 5
ADLS_ACUITY_SCORE: 5
ADLS_ACUITY_SCORE: 22
ADLS_ACUITY_SCORE: 5
ADLS_ACUITY_SCORE: 22
ADLS_ACUITY_SCORE: 35
DOING_ERRANDS_INDEPENDENTLY_DIFFICULTY: NO
ADLS_ACUITY_SCORE: 35
DRESSING/BATHING_DIFFICULTY: NO
ADLS_ACUITY_SCORE: 5
ADLS_ACUITY_SCORE: 5
HEARING_DIFFICULTY_OR_DEAF: NO
DOING_ERRANDS_INDEPENDENTLY_DIFFICULTY: NO
ADLS_ACUITY_SCORE: 22
ADLS_ACUITY_SCORE: 26
CONCENTRATING,_REMEMBERING_OR_MAKING_DECISIONS_DIFFICULTY: NO
ADLS_ACUITY_SCORE: 35
DIFFICULTY_EATING/SWALLOWING: NO
ADLS_ACUITY_SCORE: 20
ADLS_ACUITY_SCORE: 5
ADLS_ACUITY_SCORE: 43
ADLS_ACUITY_SCORE: 23
ADLS_ACUITY_SCORE: 22
WALKING_OR_CLIMBING_STAIRS_DIFFICULTY: NO
DOING_ERRANDS_INDEPENDENTLY_DIFFICULTY: NO
ADLS_ACUITY_SCORE: 22
ADLS_ACUITY_SCORE: 25
ADLS_ACUITY_SCORE: 20
ADLS_ACUITY_SCORE: 5
ADLS_ACUITY_SCORE: 39
ADLS_ACUITY_SCORE: 22
ADLS_ACUITY_SCORE: 5
ADLS_ACUITY_SCORE: 25
ADLS_ACUITY_SCORE: 5
ADLS_ACUITY_SCORE: 20
ADLS_ACUITY_SCORE: 35
ADLS_ACUITY_SCORE: 22
ADLS_ACUITY_SCORE: 20
TOILETING_ISSUES: NO
DIFFICULTY_EATING/SWALLOWING: NO
ADLS_ACUITY_SCORE: 43
ADLS_ACUITY_SCORE: 26
ADLS_ACUITY_SCORE: 5
ADLS_ACUITY_SCORE: 20
DIFFICULTY_COMMUNICATING: NO
ADLS_ACUITY_SCORE: 20
ADLS_ACUITY_SCORE: 26
ADLS_ACUITY_SCORE: 35
ADLS_ACUITY_SCORE: 33
CONCENTRATING,_REMEMBERING_OR_MAKING_DECISIONS_DIFFICULTY: NO
ADLS_ACUITY_SCORE: 26
WEAR_GLASSES_OR_BLIND: NO
ADLS_ACUITY_SCORE: 20
ADLS_ACUITY_SCORE: 20
ADLS_ACUITY_SCORE: 35
ADLS_ACUITY_SCORE: 26
ADLS_ACUITY_SCORE: 21
ADLS_ACUITY_SCORE: 5
ADLS_ACUITY_SCORE: 20
ADLS_ACUITY_SCORE: 35
ADLS_ACUITY_SCORE: 35
ADLS_ACUITY_SCORE: 5
ADLS_ACUITY_SCORE: 25
ADLS_ACUITY_SCORE: 5
ADLS_ACUITY_SCORE: 35
ADLS_ACUITY_SCORE: 39
ADLS_ACUITY_SCORE: 39
ADLS_ACUITY_SCORE: 20
ADLS_ACUITY_SCORE: 35
ADLS_ACUITY_SCORE: 20
ADLS_ACUITY_SCORE: 26
ADLS_ACUITY_SCORE: 5
TOILETING_ISSUES: NO
DRESSING/BATHING_DIFFICULTY: NO
ADLS_ACUITY_SCORE: 35
ADLS_ACUITY_SCORE: 22

## 2022-01-01 ASSESSMENT — PAIN SCALES - GENERAL
PAINLEVEL: NO PAIN (0)
PAINLEVEL: MODERATE PAIN (5)
PAINLEVEL: NO PAIN (0)
PAINLEVEL: WORST PAIN (10)
PAINLEVEL: NO PAIN (0)
PAINLEVEL: EXTREME PAIN (9)
PAINLEVEL: NO PAIN (0)
PAINLEVEL: SEVERE PAIN (7)
PAINLEVEL: NO PAIN (0)
PAINLEVEL: SEVERE PAIN (7)
PAINLEVEL: EXTREME PAIN (9)
PAINLEVEL: NO PAIN (0)
PAINLEVEL: EXTREME PAIN (8)

## 2022-01-01 ASSESSMENT — ENCOUNTER SYMPTOMS
EYES NEGATIVE: 1
FATIGUE: 1
COUGH: 1
NECK PAIN: 0
SHORTNESS OF BREATH: 1
HEMATOLOGIC/LYMPHATIC NEGATIVE: 1
ALLERGIC/IMMUNOLOGIC NEGATIVE: 1
ENDOCRINE NEGATIVE: 1
DIARRHEA: 0
GASTROINTESTINAL NEGATIVE: 1
WEAKNESS: 1
NAUSEA: 0
CONSTIPATION: 0
DIFFICULTY URINATING: 0
ABDOMINAL DISTENTION: 0
COLOR CHANGE: 0
CONSTITUTIONAL NEGATIVE: 1
CONFUSION: 0
PSYCHIATRIC NEGATIVE: 1
EYE PAIN: 0
HEADACHES: 0
CHEST TIGHTNESS: 0
SORE THROAT: 0
FEVER: 1
MUSCULOSKELETAL NEGATIVE: 1
SHORTNESS OF BREATH: 1
MYALGIAS: 0
COUGH: 1
CARDIOVASCULAR NEGATIVE: 1
PALPITATIONS: 0
VOMITING: 0
ARTHRALGIAS: 0
FREQUENCY: 0
ABDOMINAL PAIN: 0
CHILLS: 1
BACK PAIN: 0
NEUROLOGICAL NEGATIVE: 1
DYSURIA: 0
DIZZINESS: 0

## 2022-01-01 ASSESSMENT — LIFESTYLE VARIABLES: TOBACCO_USE: 1

## 2022-01-02 ENCOUNTER — HEALTH MAINTENANCE LETTER (OUTPATIENT)
Age: 57
End: 2022-01-02

## 2022-02-19 DIAGNOSIS — C50.411 MALIGNANT NEOPLASM OF UPPER-OUTER QUADRANT OF RIGHT FEMALE BREAST (H): ICD-10-CM

## 2022-02-20 NOTE — CONFIDENTIAL NOTE
Remeron Refill   Last prescribing provider: Larisa Ahuja 1/19/21    Last clinic visit date: 10/5/21 Oncology survivorship Larisa Ahuja     Any missed appointments or no-shows since last clinic visit?: No     Recommendations for requested medication (if none, N/A): N/A    Next clinic visit date: 4/5/22 Evangelista Meza     Any other pertinent information (if none, N/A): N/A

## 2022-02-21 RX ORDER — MIRTAZAPINE 7.5 MG/1
TABLET, FILM COATED ORAL
Qty: 30 TABLET | Refills: 3 | Status: SHIPPED | OUTPATIENT
Start: 2022-02-21 | End: 2022-01-01

## 2022-04-03 NOTE — PROGRESS NOTES
REASON FOR VISIT: Follow-up breast cancer     HISTORY OF PRESENT ILLNESS:  Josefina Bennett was self referred to our clinic for clinical trial recommendations for newly diagnosed stage II breast cancer. She had her last mammogram approximately a year prior. She did notice in early January 2015 that she was having some distortion of the right breast, and she went to see her gynecologist and had a mammogram performed. She had a screening mammogram performed on 01/15/2015. Breast tissue was heterogeneously dense, which might compromise the mammography. There was architectural distortion in the right breast approximately 11-12 o'clock position, zone 2, posterior depth, and a CC MLO spot compression was performed and an ultrasound was planned. The diagnostic mammogram from 01/28/2015 showed right breast architectural distortion centered at the 12 o'clock position, zone 2, suspicious for neoplasm. Breast tomosynthesis was used in the interpretation. Ultrasound findings included targeted ultrasound of the right upper breast demonstrating a band-like area of abnormal echogenicity between 11 and 12 o'clock position in the right breast at zone 2. This measures 4 cm transversely x 1.1 cm AP, and there was only a 1.5 cm measurement in the radial direction. There was blood flow suspicious for neoplasm at the 11 o'clock position in zone 2. There is a 1.4 cm hypoechoic nodule slightly  from the larger area of altered echogenicity that is also suspicious. A biopsy was performed. The biopsy showed infiltrating lobular carcinoma, grade 2, and a clip was placed at specimen [id number]. There were a few signet cells present. The tumor was ER-positive, NM low positive, and HER2 was negative by immunohistochemistry. She subsequently underwent an MRI showing a tumor that was 4.8 x 1.7 x 3.2 cm in size. Overall, her clinical stage was T2 NX MX.       She was enrolled in the I-SPY-2 clinical trial, and qualified with high-risk MammaPrint  score. She was randomized to ganetespib and paclitaxel, and she was treated with 12 cycles of treatment, and then started on AC on 05/26/2015. She developed intractable nausea and vomiting after the first cycle, which required hospitalization on the second cycle. She then developed Pneumocystis pneumonia, which resulted in intubation and a 2-week hospitalization during the course of AC. She was discharged on 07/02/2015, and decided she did not want to pursue any further chemotherapy. She had a right lumpectomy and sentinel lymph node procedure 08/12/2015. She had a positive margin, underwent a re-excision, and had a positive margin. Ultimately, she underwent a right mastectomy with clear margins. She began radiation treatment with Dr. Bill Chauhan, and completed radiation therapy on 12/04/2015. Pathologic stage was III-A, ypT3 N1a MX. Of concern, she had what could be considered an RCB3 tumor.  She started adjuvant letrozole on 1/28/16.     TREATMENT HISTORY:  A.  Neoadjuvant therapy on I SPY-2 with ganetespib and paclitaxel.  AC x 2 complicated by PCP pneumonia.    B.  Right lumpectomy, followed by right margin resections, followed by rigth mastectomy.   B.  Oophorectomy 10-16-15.   C.  Tamoxifen 10-6-16.   D.  Radiation therapy. 5,040 cGy in 180 cGy/fraction to the chest wall and regional lymphatics followed by 720 cGy axillary lymph node boost and 1000 cGy mastectomy scar boost. Completed on 12/3/2015.  E.   Adjuvant letrozole begun 1-28-16.  Continue through 2026.     Discussion with Dr. Ozuna.  Plan is to continue with MRI and mammogram spaced 6 months apart.       INTERVAL HISTORY:    Josefina returns to clinic and has been doing quite well.  She has no pain, fatigue, depression or anxiety.  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  She has never had COVID.  She has had COVID vaccinations x2.  She did have a sinus infection earlier this month which lasted  about 2 weeks and she has now recovered.  She did have a left mammogram 10/19/2021 which was BI-RADS 1.  She has had a hysterectomy and oophorectomy.  No pain, fatigue, depression or anxiety.     Josefina continues to exercise 150 minutes a week.  She is eating a healthful diet.  She does not drink alcohol.  She takes vitamin D and calcium.  Her most recent DEXA showed a score of -2.4, consistent with osteoporosis and we will initiate Reclast.    REVIEW OF SYSTEMS:  A 10-point review of systems is negative.    PHYSICAL EXAMINATION:  GENERAL:  Josefina generally well.  VITAL SIGNS:  BP (!) 158/82   Pulse 115   Temp 98.8  F (37.1  C) (Oral)   Resp 14   Wt 62.4 kg (137 lb 9.6 oz)   LMP 12/18/2014   SpO2 97%   BMI 23.62 kg/m    GENERAL:  She has no alopecia.  HEENT:  Oropharynx is without lesions.  LYMPH:  There is no palpable cervical, supraclavicular, subclavicular or axillary lymphadenopathy.  BREASTS:  Examination of the right anterior chest wall reveals a well-healed mastectomy incision without erythema or masses. In the right axilla, there is an incision that is well healed without erythema or masses.  She did have a scratch in the middle of her chest, which she says was from an animal scratch.  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 Left breast is without masses.  LUNGS:  Clear to percussion and auscultation.  HEART:  Regular rate and rhythm.  S1, S2.  ABDOMEN:  Soft, nontender, without hepatosplenomegaly.  EXTREMITIES:  Without edema.  PSYCH:  Mood and affect were normal.     LABORATORY DATA:    CMP remarkable for Cr of 1.05.  ALT, AST normal. CBC showed Hb of 11.2 RDW 16.4.      IMAGING:  none today       ASSESSMENT AND PLAN:   1.  Mary Bennett is a 54-year-old woman with a history of a clinical stage II right breast cancer, ER positive, VA positive, HER2 negative by immunohistochemistry.  She had a lobular histology.  She was on the I-SPY 2 clinical trial and was randomized to ganetespib and paclitaxel, then  went on to AC.  She had intractable nausea and vomiting the first cycle of AC, and the second cycle of AC was complicated by Pneumocystis pneumonia requiring intubation and a 2-week hospitalization.  She did not complete the AC treatment.  She did undergo a right lumpectomy and sentinel node procedure.  She had a positive margin and underwent resection.  She still had a positive margin.  She underwent a right lumpectomy with clear margins.  She completed radiation in 12/2015.  Pathologic stage was IIIA  [id number] and of concern, she had an RCB3 histology meaning significant risk of recurrence of her breast cancer during the 5-year period following primary therapy.  There were 2 lymph nodes involved with mammary carcinoma with extracapsular extension.  She went on to have radiation therapy and then began hormonal therapy with tamoxifen in October 2015.   2.  Mary had oophorectomy and started letrozole in January 2016.  She continues on letrozole for adjuvant hormonal therapy with the plan of continuing for a total of 10 years.   3.  Imaging follow up.  Reviewed with radiology.  Plan is yearly mammography.   4.  We discussed exercise and Mary is exercising 150 minutes per week.   5.  Mary has been eating a healthful Mediterranean style diet.  She is continuing with calcium, vitamin D and drinks no alcohol.   6.  Bone health.  Calcium and vitamin D3 recommended.  She will start Zometa today.  Teaching was done.  All her questions were answered.  7.  Overall assessment. Josefina Bennett has been doing very well.  She has no evidence of recurrence of her breast cancer.  She returns to clinic today for an every 6-month evaluation.    8. One concern is the anemia.  We need to repeat CBC and follow up with a colonoscopy.   9.  Followup.   Follow up with Sandra Coughlin October 19 with CBC, CMP and left mammogram. Follow up with me April 4, 2023 with CBC, CMP, Reclast.     Thank you for allowing us to participate  in this patient's care.       Sincerely,      Evangelista Meza MD  Professor  [facility]  [phone]    ADDENDUM:  Josefina Verma developed a fever during Reclast.  She was feeling unwell and went to the ED.  CT CAP showed new involvement of abdominal lymph nodes and multiple bone metastases.  She was admitted and I saw her in the hospital 4-7-22 to discuss the plan for biopsy.  I discussed that the most likely diagnosis is recurrence with metastatic breast cancer.  Biopsy of a metastatic site is recommended as is a colonoscopy given lobular histology, anemia and risk of GI involvement. Radiation oncology consult recommended.           I spent 10 minutes with the patient more than 50% of which was in counseling and coordination of care.

## 2022-04-05 PROBLEM — M81.0 OSTEOPOROSIS: Status: ACTIVE | Noted: 2022-01-01

## 2022-04-05 NOTE — PATIENT INSTRUCTIONS
Contact Numbers  [facility]: [phone] (for symptom and scheduling needs)    Please call the [facility] Triage line if you experience a temperature greater than or equal to 100.4, shaking chills, have uncontrolled nausea, vomiting and/or diarrhea, dizziness, shortness of breath, chest pain, bleeding, unexplained bruising, or if you have any other new/concerning symptoms, questions or concerns.     If you are having any concerning symptoms or wish to speak to a provider before your next infusion visit, please call your care coordinator or triage to notify them so we can adequately serve you.     If you need a refill on a narcotic prescription or other medication, please call triage before your infusion appointment.          April 2022 Sunday Monday Tuesday Wednesday Thursday Friday Saturday                            1     2       3     4     5    LAB PERIPHERAL  11:30 AM   (15 min.)   [facility] LAB DRAW   [facility]    RETURN  11:45 AM   (30 min.)   Evangelista Meza MD   [facility]    ONC INFUSION 0.5 HR (30 MIN)  12:30 PM   (30 min.)    ONC INFUSION NURSE   [facility] 6     7     8     9       10     11     12     13     14     15     16       17     18     19     20     21     22     23       24     25     26     27     28     29     30                 May 2022      [place] Monday Tuesday Wednesday Thursday Friday Saturday   1     2     3     4     5     6     7       8     9     10     11     12     13     14       15     16     17     18     19     20     21       22     23     24     25     26     27     28       29     30     31                                         Lab Results:  Recent Results (from the past 12 hour(s))   Comprehensive metabolic panel    Collection Time: 04/05/22 11:53 AM   Result Value Ref Range    Sodium 142 133 - 144 mmol/L    Potassium 4.0 3.4 - 5.3 mmol/L    Chloride 108 94 - 109  mmol/L    Carbon Dioxide (CO2) 27 20 - 32 mmol/L    Anion Gap 7 3 - 14 mmol/L    Urea Nitrogen 14 7 - 30 mg/dL    Creatinine 1.05 (H) 0.52 - 1.04 mg/dL    Calcium 9.4 8.5 - 10.1 mg/dL    Glucose 132 (H) 70 - 99 mg/dL    Alkaline Phosphatase 89 40 - 150 U/L    AST 40 0 - 45 U/L    ALT 37 0 - 50 U/L    Protein Total 7.3 6.8 - 8.8 g/dL    Albumin 3.4 3.4 - 5.0 g/dL    Bilirubin Total 0.4 0.2 - 1.3 mg/dL    GFR Estimate 62 >60 mL/min/1.73m2   CBC with platelets and differential    Collection Time: 04/05/22 11:53 AM   Result Value Ref Range    WBC Count 9.7 4.0 - 11.0 10e3/uL    RBC Count 4.12 3.80 - 5.20 10e6/uL    Hemoglobin 11.2 (L) 11.7 - 15.7 g/dL    Hematocrit 35.0 35.0 - 47.0 %    MCV 85 78 - 100 fL    MCH 27.2 26.5 - 33.0 pg    MCHC 32.0 31.5 - 36.5 g/dL    RDW 16.4 (H) 10.0 - 15.0 %    Platelet Count 196 150 - 450 10e3/uL    % Neutrophils 48 %    % Lymphocytes 41 %    % Monocytes 8 %    % Eosinophils 2 %    % Basophils 1 %    % Immature Granulocytes 0 %    NRBCs per 100 WBC 0 <1 /100    Absolute Neutrophils 4.7 1.6 - 8.3 10e3/uL    Absolute Lymphocytes 4.0 0.8 - 5.3 10e3/uL    Absolute Monocytes 0.7 0.0 - 1.3 10e3/uL    Absolute Eosinophils 0.2 0.0 - 0.7 10e3/uL    Absolute Basophils 0.1 0.0 - 0.2 10e3/uL    Absolute Immature Granulocytes 0.0 <=0.4 10e3/uL    Absolute NRBCs 0.0 10e3/uL

## 2022-04-05 NOTE — LETTER
4/5/2022     RE: Josefina Bennett  [address]  [facility] [address]    Dear Colleague,    Thank you for referring your patient, Josefina Bennett, to the [facility]. Please see a copy of my visit note below.    REASON FOR VISIT: Follow-up breast cancer     HISTORY OF PRESENT ILLNESS:  Josefina Bennett was self referred to our clinic for clinical trial recommendations for newly diagnosed stage II breast cancer. She had her last mammogram approximately a year prior. She did notice in early January 2015 that she was having some distortion of the right breast, and she went to see her gynecologist and had a mammogram performed. She had a screening mammogram performed on 01/15/2015. Breast tissue was heterogeneously dense, which might compromise the mammography. There was architectural distortion in the right breast approximately 11-12 o'clock position, zone 2, posterior depth, and a CC MLO spot compression was performed and an ultrasound was planned. The diagnostic mammogram from 01/28/2015 showed right breast architectural distortion centered at the 12 o'clock position, zone 2, suspicious for neoplasm. Breast tomosynthesis was used in the interpretation. Ultrasound findings included targeted ultrasound of the right upper breast demonstrating a band-like area of abnormal echogenicity between 11 and 12 o'clock position in the right breast at zone 2. This measures 4 cm transversely x 1.1 cm AP, and there was only a 1.5 cm measurement in the radial direction. There was blood flow suspicious for neoplasm at the 11 o'clock position in zone 2. There is a 1.4 cm hypoechoic nodule slightly  from the larger area of altered echogenicity that is also suspicious. A biopsy was performed. The biopsy showed infiltrating lobular carcinoma, grade 2, and a clip was placed at specimen [id number]. There were a few signet cells present. The tumor was ER-positive, DC low positive, and HER2 was negative by  immunohistochemistry. She subsequently underwent an MRI showing a tumor that was 4.8 x 1.7 x 3.2 cm in size. Overall, her clinical stage was T2 NX MX.       She was enrolled in the I-SPY-2 clinical trial, and qualified with high-risk MammaPrint score. She was randomized to ganetespib and paclitaxel, and she was treated with 12 cycles of treatment, and then started on AC on 05/26/2015. She developed intractable nausea and vomiting after the first cycle, which required hospitalization on the second cycle. She then developed Pneumocystis pneumonia, which resulted in intubation and a 2-week hospitalization during the course of AC. She was discharged on 07/02/2015, and decided she did not want to pursue any further chemotherapy. She had a right lumpectomy and sentinel lymph node procedure 08/12/2015. She had a positive margin, underwent a re-excision, and had a positive margin. Ultimately, she underwent a right mastectomy with clear margins. She began radiation treatment with Dr. Bill Chauhan, and completed radiation therapy on 12/04/2015. Pathologic stage was III-A, ypT3 N1a MX. Of concern, she had what could be considered an RCB3 tumor.  She started adjuvant letrozole on 1/28/16.     TREATMENT HISTORY:  A.  Neoadjuvant therapy on I SPY-2 with ganetespib and paclitaxel.  AC x 2 complicated by PCP pneumonia.    B.  Right lumpectomy, followed by right margin resections, followed by rigth mastectomy.   B.  Oophorectomy 10-16-15.   C.  Tamoxifen 10-6-16.   D.  Radiation therapy. 5,040 cGy in 180 cGy/fraction to the chest wall and regional lymphatics followed by 720 cGy axillary lymph node boost and 1000 cGy mastectomy scar boost. Completed on 12/3/2015.  E.   Adjuvant letrozole begun 1-28-16.  Continue through 2026.     Discussion with Dr. Ozuna.  Plan is to continue with MRI and mammogram spaced 6 months apart.       INTERVAL HISTORY:    Josefina returns to clinic and has been doing quite well.  She has no pain, fatigue,  depression or anxiety.  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  She has never had COVID.  She has had COVID vaccinations x2.  She did have a sinus infection earlier this month which lasted about 2 weeks and she has now recovered.  She did have a left mammogram 10/19/2021 which was BI-RADS 1.  She has had a hysterectomy and oophorectomy.  No pain, fatigue, depression or anxiety.     Josefina continues to exercise 150 minutes a week.  She is eating a healthful diet.  She does not drink alcohol.  She takes vitamin D and calcium.  Her most recent DEXA showed a score of -2.4, consistent with osteoporosis and we will initiate Reclast.    REVIEW OF SYSTEMS:  A 10-point review of systems is negative.    PHYSICAL EXAMINATION:  GENERAL:  Josefina generally well.  VITAL SIGNS:  BP (!) 158/82   Pulse 115   Temp 98.8  F (37.1  C) (Oral)   Resp 14   Wt 62.4 kg (137 lb 9.6 oz)   LMP 12/18/2014   SpO2 97%   BMI 23.62 kg/m    GENERAL:  She has no alopecia.  HEENT:  Oropharynx is without lesions.  LYMPH:  There is no palpable cervical, supraclavicular, subclavicular or axillary lymphadenopathy.  BREASTS:  Examination of the right anterior chest wall reveals a well-healed mastectomy incision without erythema or masses. In the right axilla, there is an incision that is well healed without erythema or masses.  She did have a scratch in the middle of her chest, which she says was from an animal scratch.  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 Left breast is without masses.  LUNGS:  Clear to percussion and auscultation.  HEART:  Regular rate and rhythm.  S1, S2.  ABDOMEN:  Soft, nontender, without hepatosplenomegaly.  EXTREMITIES:  Without edema.  PSYCH:  Mood and affect were normal.     LABORATORY DATA:    CMP remarkable for Cr of 1.05.  ALT, AST normal. CBC showed Hb of 11.2 RDW 16.4.      IMAGING:  none today     ASSESSMENT AND PLAN:   1.  Mary Bennett is a 54-year-old woman  with a history of a clinical stage II right breast cancer, ER positive, OH positive, HER2 negative by immunohistochemistry.  She had a lobular histology.  She was on the I-SPY 2 clinical trial and was randomized to ganetespib and paclitaxel, then went on to AC.  She had intractable nausea and vomiting the first cycle of AC, and the second cycle of AC was complicated by Pneumocystis pneumonia requiring intubation and a 2-week hospitalization.  She did not complete the AC treatment.  She did undergo a right lumpectomy and sentinel node procedure.  She had a positive margin and underwent resection.  She still had a positive margin.  She underwent a right lumpectomy with clear margins.  She completed radiation in 12/2015.  Pathologic stage was IIIA  [id number] and of concern, she had an RCB3 histology meaning significant risk of recurrence of her breast cancer during the 5-year period following primary therapy.  There were 2 lymph nodes involved with mammary carcinoma with extracapsular extension.  She went on to have radiation therapy and then began hormonal therapy with tamoxifen in October 2015.   2.  Mary had oophorectomy and started letrozole in January 2016.  She continues on letrozole for adjuvant hormonal therapy with the plan of continuing for a total of 10 years.   3.  Imaging follow up.  Reviewed with radiology.  Plan is yearly mammography.   4.  We discussed exercise and Mary is exercising 150 minutes per week.   5.  Mary has been eating a healthful Mediterranean style diet.  She is continuing with calcium, vitamin D and drinks no alcohol.   6.  Bone health.  Calcium and vitamin D3 recommended.  She will start Zometa today.  Teaching was done.  All her questions were answered.  7.  Overall assessment. Josefina Bennett has been doing very well.  She has no evidence of recurrence of her breast cancer.  She returns to clinic today for an every 6-month evaluation.    8. One concern is the anemia.  We  need to repeat CBC and follow up with a colonoscopy.   9.  Followup.   Follow up with Sandra Katinasureshnatalio October 19 with CBC, CMP and left mammogram. Follow up with me April 4, 2023 with CBC, CMP, Reclast.     Thank you for allowing us to participate in this patient's care.       Sincerely,      Evangelista Meza MD  Professor  [facility]  [phone]    ADDENDUM:  Josefina Verma developed a fever during Reclast.  She was feeling unwell and went to the ED.  CT CAP showed new involvement of abdominal lymph nodes and multiple bone metastases.  She was admitted and I saw her in the hospital 4-7-22 to discuss the plan for biopsy.  I discussed that the most likely diagnosis is recurrence with metastatic breast cancer.  Biopsy of a metastatic site is recommended as is a colonoscopy given lobular histology, anemia and risk of GI involvement. Radiation oncology consult recommended.         I spent 10 minutes with the patient more than 50% of which was in counseling and coordination of care.

## 2022-04-05 NOTE — PROGRESS NOTES
Infusion Nursing Note:  Josefina Bennett presents today for Cycle 1 Day 1 Reclast.    Patient seen by provider today: Yes: Dr. Meza   present during visit today: Not Applicable.    Note: Patient presents to infusion today doing well. Denies any new questions or concerns following her visit with Dr. Meza.     Patient is receiving Reclast for the first time today. Denies any medication questions at this time. Confirmed that she is taking a calcium and Vitamin D supplement at home.    Intravenous Access:  Peripheral IV placed.    Treatment Conditions:  Lab Results   Component Value Date    HGB 11.2 (L) 04/05/2022    WBC 9.7 04/05/2022    ANEU 4.2 01/23/2020    ANEUTAUTO 4.7 04/05/2022     04/05/2022      Lab Results   Component Value Date     04/05/2022    POTASSIUM 4.0 04/05/2022    MAG 1.8 06/27/2015    CR 1.05 (H) 04/05/2022    VEE 9.4 04/05/2022    BILITOTAL 0.4 04/05/2022    ALBUMIN 3.4 04/05/2022    ALT 37 04/05/2022    AST 40 04/05/2022     Results reviewed, labs MET treatment parameters, ok to proceed with treatment.    Post Infusion Assessment:  Patient tolerated infusion without incident.  Blood return noted pre and post infusion.  Site patent and intact, free from redness, edema or discomfort.  No evidence of extravasations.  Access discontinued per protocol.     Discharge Plan:   Patient declined prescription refills.  Discharge instructions reviewed with: Patient.  Patient and/or family verbalized understanding of discharge instructions and all questions answered.  AVS to patient via [employer]. Next appointment not yet made at time of discharge. Patient aware to watch [employer].   Patient discharged in stable condition accompanied by: self.  Departure Mode: Ambulatory.      Tierra Gardner RN

## 2022-04-05 NOTE — NURSING NOTE
Chief Complaint   Patient presents with     Blood Draw     Labs drawn via PIV by RN in lab. VS taken.      Labs drawn via peripheral IV. Vital signs taken. Checked into next appointment.   Madelin Julian RN

## 2022-04-05 NOTE — NURSING NOTE
"Oncology Rooming Note    April 5, 2022 12:10 PM   Josefina Bennett is a 56 year old female who presents for:    Chief Complaint   Patient presents with     Blood Draw     Labs drawn via PIV by RN in lab. VS taken.      Oncology Clinic Visit     Breast cancer     Initial Vitals: BP (!) 158/82   Pulse 115   Temp 98.8  F (37.1  C) (Oral)   Resp 14   Wt 62.4 kg (137 lb 9.6 oz)   LMP 12/18/2014   SpO2 97%   BMI 23.62 kg/m   Estimated body mass index is 23.62 kg/m  as calculated from the following:    Height as of 10/5/21: 1.626 m (5' 4\").    Weight as of this encounter: 62.4 kg (137 lb 9.6 oz). Body surface area is 1.68 meters squared.  No Pain (0) Comment: Data Unavailable   Patient's last menstrual period was 12/18/2014.  Allergies reviewed: Yes  Medications reviewed: Yes    Medications: Medication refills not needed today.  Pharmacy name entered into Clinical Data:    [facility] - [place], [place] - [address] [id number] - [place], [place] - [address] AT [facility] [address]  [facility] - [place], [place] - [address] [id number] - [place], [place] - [address]    Clinical concerns: none       Sabi Ventura"

## 2022-04-06 PROBLEM — R50.9 FEVER, UNSPECIFIED FEVER CAUSE: Status: ACTIVE | Noted: 2022-01-01

## 2022-04-06 PROBLEM — R00.0 TACHYCARDIA: Status: ACTIVE | Noted: 2022-01-01

## 2022-04-06 NOTE — H&P
[facility]    History and Physical - Hospitalist Service, GOLD TEAM        Date of Admission:  4/6/2022    Assessment & Plan      Josefina Bennett is a very pleasant 56 year old female with a past medical history significant for breast cancer stage 2015 in remission, PJP pneumonia 2015 related to chemotherapy immunosuppression, PTSD, depression, insomnia, traumatic small subarachnoid hemorrhage who presented to outpatient infusion center for her first dose of Reclast day prior to admission. Presented to [facility] ED with high fever 102.5F, tachycardia and body aches; unfortunately found to have innumerable mixed lytic and sclerotic foci throughout the axial skeleton concerning for metastatic disease on CT.    Fever  SIRS  Temp 102.5F better after tylenol. HR 120s (sinus tachycardia per EKG). RR 20. Unclear etiology at this time. Chest CT negative for PE and infection, though with evidence of lytic and sclerotic foci in the axial skeleton concerning for metastatic disease. CT A/P prelim without evidence of infection. UA negative for infection. WBC 10.3 with mild left shift. Lactate 1.9. COVID/Flu negative. Blood cultures drawn and pending.   - Fever seems most consistent with reaction to Reclast as symptomology started after infusion.   - Will hold off on antibiotics for now with low threshold to start. Await blood culture results. If persistent fever, would cover with broad spectrum abx and consider CAPs consult for possible diagnostic paracentesis.    Lytic and Sclerotic Foci Axillary Skeleton  Ascites  Asymmetric Thickening Bladder Wall  Superior Medial Spleen Hypodensity  Incidentally found during infectious work up related to fever and elevated D-DIMER. High suspicion for metastatic disease, vs less likely systemic inflammatory process. CT A/P W revealed:   1. Prominent abdominopelvic lymph nodes, small volume ascites, and   extensive retroperitoneal, mesenteric, and  omental fat   stranding/haziness with suggestion of soft tissue thickening about the   IVC and aorta and along the serosal aspect of the ascending/hepatic   flexure colon, findings which may reflect a systemic inflammatory   process, though disseminated malignancy remains a concern. Consider   diagnostic paracentesis, though the small volume of ascites may not be   amenable to sampling.   2.  Innumerable mixed lytic and sclerotic lesions in the skeleton,   compatible with metastatic disease. Associated soft tissue mass in the   posterior sacrum   3.  Asymmetric thickening of the superior bladder wall, concerning for   malignancy. No intraluminal bladder mass.   4.  Crescentic enhancing tissue under the left hemidiaphragm with   adjacent ill-defined hypodensity in the superior medial spleen,   indeterminate but also raising concern for metastatic disease.   Recommend attention on follow-up.   5.  Interlobular septal thickening in the lung bases suggestive of   pulmonary edema.   - Oncology aware: MR Cervical, Lumbar, Thoracic W. MR Brain w/o and w. Will ask them to formally consult in the am.   - Add CRP and ESR. If elevated, would discuss with rheum possibility of steroids and consideration of aortitis.    - Pain control    H/o Breast Cancer- pathologic stage III-A, ypT3 N1a MX  She was enrolled in the I-SPY-2 clinical trial, and qualified with high-risk MammaPrint score. She was randomized to ganetespib and paclitaxel, and was treated with 12 cycles of treatment; then started on AC on 05/26/2015. She developed intractable nausea and vomiting after the first cycle, which required hospitalization on the second cycle. She then developed Pneumocystis pneumonia, which resulted in intubation and a 2-week hospitalization during the course of AC. She was discharged on 07/02/2015, and decided she did not want to pursue any further chemotherapy. She had a right lumpectomy and sentinel lymph node procedure 08/12/2015. She had  a positive margin, underwent a re-excision, and had a positive margin. Ultimately, she underwent a right mastectomy with clear margins. She began radiation treatment with Dr. Bill Chauhan, and completed radiation therapy on 12/04/2015. Pathologic stage was III-A, ypT3 N1a MX. Of concern, she had what could be considered an RCB3 tumor meaning significant risk of recurrence of her breast cancer during the 5-year period following primary therapy.    1.  Neoadjuvant therapy on I SPY-2 with ganetespib and paclitaxel.  AC x 2 complicated by PCP pneumonia.   2.  Right lumpectomy, followed by right margin resections, followed by rigth mastectomy.    3.  Oophorectomy 10-16-15.    4.  Tamoxifen 10-6-16.    5.  Radiation therapy. 5,040 cGy in 180 cGy/fraction to the chest wall and regional lymphatics followed by 720 cGy axillary lymph node boost and 1000 cGy mastectomy scar boost. Completed on 12/3/2015.   6.   Adjuvant letrozole begun 1-28-16.   - As above, CT suspicious for metastatic spread. Oncology consult placed.   - Continue Letrozole    Depression  Insomnia  PTSD   - Continue PTA Effexor and Remeron    Anemia  Hemoglobin 10.7. No evidence of active bleeding.   - Check iron studies, B12, Folate    Murmur   - TTE ordered       Diet: Advance Diet as Tolerated: Clear Liquid Diet  DVT Prophylaxis: Pneumatic Compression Devices, please start Lovenox vs heparin if any prolonged immobilization   Berger Catheter: Not present  Central Lines: None  Cardiac Monitoring: None  Code Status: Full Code    Disposition Plan   Expected Discharge: TBD  Anticipated discharge location:  Awaiting care coordination huddle  Delays: Fever, possible metastatic disease work-up       The patient's care was discussed with the Attending Physician, Dr. Valdes.    Diaz Lucio PA-C  Hospitalist Service, [facility]  Securely message with the Vocera Web Console (learn more here)  Text page via  [facility] Paging/Directory   Please see signed in provider for up to date coverage information      ______________________________________________________________________    Chief Complaint   Fever, Body Aches    History is obtained from the patient and EMR    History of Present Illness   Josefina Bennett is a very pleasant 56 year old female with a past medical history significant for breast cancer stage 2015 in remission, PJP pneumonia 2015 related to chemotherapy immunosuppression, PTSD, depression, insomnia, traumatic small subarachnoid hemorrhage who presented to outpatient infusion center for her first dose of Reclast day prior to admission. Presented to [facility] ED with high fever 102.5F, tachycardia and body aches; unfortunately found to have innumerable mixed lytic and sclerotic foci throughout the axial skeleton concerning for metastatic disease on CT.    Patient reports feeling fine initially after her reclast infusion. However, today developed high fever, chills and body aches, mild headache. No chest pain/pressure, shortness of breath, nausea, vomiting, abdominal pain, urinary complaints. She reports prior to her reclast infusion, she was in her normal state of health. No bone pain, fatigue, vision changes, unintentional weight loss, night sweats.    Review of Systems    The 10 point Review of Systems is negative other than noted in the HPI or here.    Past Medical History    I have reviewed this patient's medical history and updated it with pertinent information if needed.   Past Medical History:   Diagnosis Date     Breast cancer (H) 1/2015     depression      Foot fracture      History of blood transfusion 6/2015     Insomnia      Malignant neoplasm of breast (female), unspecified site 2/5/2015     Papanicolaou smear of cervix with low grade squamous intraepithelial lesion (LGSIL) 6/2010    8/10-colp-benign     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) 6/2015     PTSD (post-traumatic stress disorder)         Past Surgical History   I have reviewed this patient's surgical history and updated it with pertinent information if needed.  Past Surgical History:   Procedure Laterality Date     APPENDECTOMY       CYSTOSCOPY N/A 10/16/2015    Procedure: CYSTOSCOPY;  Surgeon: Aleksandr Palafox MD;  Location:  OR     HC REMOVAL OF OVARIAN CYST(S)      x 3     LAPAROSCOPIC HYSTERECTOMY TOTAL, BILATERAL SALPINGO-OOPHORECTOMY, COMBINED N/A 10/16/2015    Procedure: COMBINED LAPAROSCOPIC HYSTERECTOMY TOTAL, SALPINGO-OOPHORECTOMY;  Surgeon: Aleksandr Palafox MD;  [facility]: [facility] OR     LUMPECTOMY BREAST Right 2015    Procedure: LUMPECTOMY BREAST;  Surgeon: Philip Frnaco MD;  [facility]     LUMPECTOMY BREAST WITH SENTINEL NODE, COMBINED Right 2015    Procedure: COMBINED LUMPECTOMY BREAST WITH SENTINEL NODE;  Surgeon: Philip Franco MD;  Location:  OR     MASTECTOMY SIMPLE Right 2015    Procedure: MASTECTOMY SIMPLE;  Surgeon: Philip Franco MD;  Location:  OR     REMOVE PORT VASCULAR ACCESS N/A 2015    Procedure: REMOVE PORT VASCULAR ACCESS;  Surgeon: Philip Franco MD;  Location:  OR     SURGICAL HISTORY OF -       wisdom teeth     TONSILLECTOMY      at age 30 for tonsillitis       Social History   I have reviewed this patient's social history and updated it with pertinent information if needed.  Social History     Tobacco Use     Smoking status: Former Smoker     Packs/day: 0.50     Types: Cigarettes     Quit date: 2015     Years since quittin.1     Smokeless tobacco: Former User     Tobacco comment: 5 a day   Substance Use Topics     Alcohol use: No     Alcohol/week: 0.0 standard drinks     Comment: hx of etoh abuse in teens and 20's     Drug use: No       Family History   I have reviewed this patient's family history and updated it with pertinent information if needed.  Family History   Problem Relation Age of Onset     Breast Cancer Other         paternal cousin      Diabetes Mother         Borderline diabetic     Alcohol/Drug Mother         alcoholic     Depression Mother      Alcohol/Drug Father         alcoholic     Depression Father      Diabetes Maternal Grandfather      Depression Maternal Grandfather      Depression Paternal Grandfather      Breast Cancer Paternal Aunt         dad's 1/2 sister     Respiratory Sister         Cystic Fibrosis,  age 39     Depression Sister      Cancer Other         maternal grandmothers sisters daughter.       Prior to Admission Medications   Prior to Admission Medications   Prescriptions Last Dose Informant Patient Reported? Taking?   Multiple Vitamins-Minerals (MULTIVITAMIN ADULTS PO)   Yes No   Sig: Take 1 tablet by mouth   calcium carbonate (OS- MG [place]. [place]) 500 MG tablet  Self Yes No   Sig: Take 500 mg by mouth At Bedtime    cholecalciferol (VITAMIN D3) 1000 UNIT tablet   No No   Sig: Take 1 tablet (1,000 Units) by mouth daily   clindamycin (CLINDAMAX) 1 % topical gel   No No   Sig: Apply topically 2 times daily   letrozole (FEMARA) 2.5 MG tablet   No No   Sig: Take 1 tablet (2.5 mg) by mouth daily   mirtazapine (REMERON) 7.5 MG tablet   No No   Sig: TAKE 1 TABLET BY MOUTH ONCE DAILY AT BEDTIME   mupirocin (BACTROBAN) 2 % ointment   No No   Sig: Apply topically daily   venlafaxine (EFFEXOR-ER) 225 MG 24 hr tablet   No No   Sig: Take 1 tablet (225 mg) by mouth daily      Facility-Administered Medications: None     Allergies   Allergies   Allergen Reactions     Morphine      Rash       Physical Exam   Vital Signs: Temp: 100.3  F (37.9  C) Temp src: Oral BP: (!) 169/82 Pulse: (!) 121   Resp: 20 SpO2: 99 % O2 Device: None (Room air)    Weight: 0 lbs 0 oz    GENERAL: Alert and oriented x 3. Well nourished, well developed.  No acute distress.    HEENT: Normocephalic, atraumatic. Anicteric sclera. Mucous membranes moist.   CV: RRR. S1, S2. 2/6 murmur audible.  RESPIRATORY: Effort normal on room air. Lungs CTAB with no wheezing,  rales, or rhonchi.   GI: Abdomen soft and non distended, bowel sounds present x all 4 quadrants. No tenderness, rebound, or guarding.   NEUROLOGICAL: No focal deficits. Follows commands.  MUSCULOSKELETAL: No joint swelling or tenderness. Moves all extremities.   EXTREMITIES: No gross deformities. No peripheral edema.   SKIN: Grossly warm, dry, and intact. No jaundice. No rashes.     Data   Data reviewed today: I reviewed all medications, new labs and imaging results over the last 24 hours.    Recent Labs   Lab 04/06/22  1015 04/05/22  1153   WBC 10.3 9.7   HGB 10.7* 11.2*   MCV 85 85    196    142   POTASSIUM 4.1 4.0   CHLORIDE 105 108   CO2 24 27   BUN 10 14   CR 1.01 1.05*   ANIONGAP 8 7   VEE 8.9 9.4   * 132*   ALBUMIN 3.5 3.4   PROTTOTAL 7.4 7.3   BILITOTAL 0.5 0.4   ALKPHOS 96 89   ALT 40 37   AST 43 40     10.3    \    10.7 (L)    /    165   N 81    L N/A    137    105    10 /   ------------------------------------ 128 (H)   ALT 40   AST 43   AP 96   ALB 3.5   Ca 8.9  4.1    24    1.01 \    % RETIC N/A    LDH N/A  Troponin N/A    BNP N/A    CK N/A  INR N/A   PTT N/A    D-dimer 2.54 (H)    Fibrinogen N/A    Antithrombin N/A  Ferritin N/A  CRP N/A    IL-6 N/A  Recent Results (from the past 24 hour(s))   XR Chest 2 Views    Narrative    Chest 2 views    INDICATION: Fever, shortness of breath    COMPARISON: 10/14/2015    FINDINGS: Mild apical scarring unchanged. No definite infiltrate or  consolidation. Heart size normal. Osteopenia.      Impression    IMPRESSION: Mild apical scarring. No acute findings radiographically.    HASMUKH CAMILO MD         SYSTEM ID:  [id number]   CT Chest Pulmonary Embolism w Contrast    Narrative    Examination:  CT CHEST PULMONARY EMBOLISM W CONTRAST 4/6/2022 3:28 PM     Comparison: CT chest 6/22/2015    History: PE suspected, low/intermediate prob, positive D-dimer; chest  pain    TECHNIQUE: Volumetric helical acquisition of CT images of the chest  from the  lung apices to the kidneys were acquired in arterial phase  after the administration of IV contrast. Three-dimensional (3D)  post-processed angiographic images were reconstructed, archived to  PACS and used in interpretation of this study.     Contrast dose: 75 mL Isovue-370    FINDINGS:  Chest:  There is adequate opacification of the main and lobar pulmonary  arteries. No filling defect in the lobar and main segmental pulmonary  arteries to suggest pulmonary embolism.  There is no right-sided heart  strain.The heart size is normal. No pericardial effusion. No  significant coronary artery calcifications. Normal caliber main  pulmonary artery and thoracic aorta. Conventional 3 vessel branching  aortic arch. No axillary lymphadenopathy. Stable borderline enlarged  right hilar lymph node measuring up to 11 mm in short axis. Normal  caliber esophagus. Right mastectomy.    The central tracheobronchial tree is patent. Biapical scarring,  greater on the right. Postradiation changes on the anterior right  upper and right middle lobes. Bibasilar dependent atelectasis. No  pneumothorax, pleural effusion, or focal airspace consolidation.  Scattered areas of peripheral mucous plugging. No suspicious pulmonary  nodules.    Upper abdomen:  No focal abnormalities in the visualized upper abdomen. Evaluation is  somewhat limited by contrast bolus timing.    Bones:  Innumerable mixed lytic and sclerotic foci seen throughout the spine,  sternum, and ribs. Multilevel degenerative changes of the spine. Mild  wedging of the midthoracic spine, likely chronic. No acute osseous  abnormality.      Impression    IMPRESSION:   1. No evidence of pulmonary embolism.  2. No acute airspace disease.  3. Post radiation changes seen throughout the anterior right lung.  4. Innumerable mixed lytic and sclerotic foci throughout the axial  skeleton, concerning for metastatic disease.    I have personally reviewed the examination and initial  interpretation  and I agree with the findings.    HASMUKH CAMILO MD         SYSTEM ID:  [id number]   CT Abdomen Pelvis w Contrast    Narrative    EXAMINATION: CT ABDOMEN PELVIS W CONTRAST, 4/6/2022 5:14 PM    TECHNIQUE:  Helical CT images from the lung bases through the  symphysis pubis were obtained with contrast.  Coronal reformatted  images were generated at a workstation for further assessment.    CONTRAST:  82 cc Isovue 370 IV.    COMPARISON: Same-day chest CT, 2/10/2015 PET/CT    HISTORY: breast ca with bony mets, eval for intraabdominal mets    FINDINGS:    LOWER THORAX:  Intralobular septal thickening. No pleural effusions.     ABDOMEN AND PELVIS:     Liver: No suspicious liver lesions. Patent portal veins.    Gallbladder: No gallstones. No evidence of acute cholecystitis.    Spleen: Borderline enlarged, measuring 13.0 cm in craniocaudal  dimension. Ill-defined hypodensity in the superior medial spleen  (series 3 image 47).    Pancreas: No suspicious pancreatic lesions. The pancreatic duct is not  dilated.    Adrenal glands: No adrenal nodules.    Kidneys: No kidney masses. No hydronephrosis or obstructing renal  stones.    Bladder / Pelvic organs: Asymmetric thickening of the superior bladder  wall, measuring up to 1.0 cm. Bladder is filled with contrast without  filling defect. Surgically absent uterus and ovaries.    Bowel: No small or large bowel dilatation. Appendix is surgically  absent.    Lymph nodes: Multiple prominent para-aortic, iliac chain and  mesenteric lymph nodes. For example 8 mm central mesenteric (series 3  image 169).    Peritoneum/retroperitoneum: Small volume ascites. Fat stranding and  haziness throughout the retroperitoneum, mesentery, and greater  omentum without clear soft tissue nodularity, though there is  suggestion of soft tissue thickening about the IVC and aorta and along  the serosal aspect of the ascending and hepatic flexure colon.  Crescentic enhancing tissue along the  medial aspect of the superior  spleen underneath the left hemidiaphragm (series 3 image 34).    Vessels: No infrarenal aortic aneurysm. Atherosclerotic calcifications  of the abdominal aorta and its major branches.    Bones and soft tissues: Innumerable mixed lytic and sclerotic lesions  in axial and visualized appendicular skeleton. Associated soft tissue  mass measuring up to 3.6 cm in craniocaudal dimension in the posterior  sacrum.      Impression    IMPRESSION:   1.  Prominent abdominopelvic lymph nodes, small volume ascites, and  extensive retroperitoneal, mesenteric, and omental fat  stranding/haziness with suggestion of soft tissue thickening about the  IVC and aorta and along the serosal aspect of the ascending/hepatic  flexure colon, findings which may reflect a systemic inflammatory  process, though disseminated malignancy remains a concern. Consider  diagnostic paracentesis, though the small volume of ascites may not be  amenable to sampling.  2.  Innumerable mixed lytic and sclerotic lesions in the skeleton,  compatible with metastatic disease. Associated soft tissue mass in the  posterior sacrum  3.  Asymmetric thickening of the superior bladder wall, concerning for  malignancy. No intraluminal bladder mass.  4.  Crescentic enhancing tissue under the left hemidiaphragm with  adjacent ill-defined hypodensity in the superior medial spleen,  indeterminate but also raising concern for metastatic disease.  Recommend attention on follow-up.  5.  Interlobular septal thickening in the lung bases suggestive of  pulmonary edema.    I have personally reviewed the examination and initial interpretation  and I agree with the findings.    DEVORA DHILLON,          SYSTEM ID:  [id number]

## 2022-04-06 NOTE — ED PROVIDER NOTES
"    [place] EMERGENCY DEPARTMENT ([facility])  4/06/22  History     Chief Complaint   Patient presents with     Fever     Shortness of Breath     The history is provided by the patient and medical records.     Josefina Bennett is a 56 year old female with a past medical history significant for breast cancer and osteoporosis who presents to the Emergency Department for evaluation of fever, shortness of breath, arthralgias, and myalgias.  Patient reports that she recently got cycle 1, day 1 of a Reclast infusion yesterday (4/5).  She reports that she initially felt fairly well after the infusion and went back to work until she began to develop symptoms at 7 PM that night.  Patient reports that she began to feel short of breath, lightheaded, and like her \"bones were getting stretched\".  She states that the \"bone stretching\" sensation is located in the head, arms, and legs.  She reports her bones feel very \"achy\".  Patient reports this is the first time she has had this particular infusion.  She is not currently on any chemotherapy or radiation treatment for breast cancer.  She is not currently immunosuppressed.  Patient did report feeling feverish this morning as well and does have a fever here in the ED at 102.5. She reports associated chills as well.  Patient denies any abdominal pain but does endorse a achiness feeling in her chest.  She reports that this pain is worsened with taking a deep breath.  Of note, patient reports that she did have a sinus infection 3 weeks ago and was treated with oral antibiotics (doxycycline).  She feels that the infection did lead to some mucus draining down into her lungs causing subsequent cough that has also since resolved.  Patient denies any nausea or vomiting.  She states that she has been urinating without difficulty.  She denies history of heart disease or lung disease.  She has not noted any new skin rash/changes.  She reports she last took Tylenol last night. She " does not an allergy to morphine.     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/2015     PTSD (post-traumatic stress disorder)      Past Surgical History:   Procedure Laterality Date     APPENDECTOMY  1997     CYSTOSCOPY N/A 10/16/2015    Procedure: CYSTOSCOPY;  Surgeon: Aleksandr Palafox MD;  Location: [facility] OR     HC REMOVAL OF OVARIAN CYST(S)      x 3     LAPAROSCOPIC HYSTERECTOMY TOTAL, BILATERAL SALPINGO-OOPHORECTOMY, COMBINED N/A 10/16/2015    Procedure: COMBINED LAPAROSCOPIC HYSTERECTOMY TOTAL, SALPINGO-OOPHORECTOMY;  Surgeon: Aleksandr Palafox MD;  Location: [facility] OR     LUMPECTOMY BREAST Right 9/2/2015    Procedure: LUMPECTOMY BREAST;  Surgeon: Philip Franco MD;  Location: [facility] OR     LUMPECTOMY BREAST WITH SENTINEL NODE, COMBINED Right 8/12/2015    Procedure: COMBINED LUMPECTOMY BREAST WITH SENTINEL NODE;  Surgeon: Philip Franco MD;  Location: [facility] OR     MASTECTOMY SIMPLE Right 9/21/2015    Procedure: MASTECTOMY SIMPLE;  Surgeon: Philip Franco MD;  Location: [facility] OR     REMOVE PORT VASCULAR ACCESS N/A 8/12/2015    Procedure: REMOVE PORT VASCULAR ACCESS;  Surgeon: Philip Franco MD;  Location: [facility] OR     SURGICAL HISTORY OF -       wisdom teeth     TONSILLECTOMY      at age 30 for tonsillitis     calcium carbonate (OS- MG [place]. [place]) 500 MG tablet  cholecalciferol (VITAMIN D3) 1000 UNIT tablet  clindamycin (CLINDAMAX) 1 % topical gel  letrozole (FEMARA) 2.5 MG tablet  mirtazapine (REMERON) 7.5 MG tablet  Multiple Vitamins-Minerals (MULTIVITAMIN ADULTS PO)  mupirocin (BACTROBAN) 2 % ointment  venlafaxine (EFFEXOR-ER) 225 MG 24 hr  tablet      Allergies   Allergen Reactions     Morphine      Rash     Family History  Family History   Problem Relation Age of Onset     Breast Cancer Other         paternal cousin     Diabetes Mother         Borderline diabetic     Alcohol/Drug Mother         alcoholic     Depression Mother      Alcohol/Drug Father         alcoholic     Depression Father      Diabetes Maternal Grandfather      Depression Maternal Grandfather      Depression Paternal Grandfather      Breast Cancer Paternal Aunt         dad's 1/2 sister     Respiratory Sister         Cystic Fibrosis,  age 39     Depression Sister      Cancer Other         maternal grandmothers sisters daughter.     Social History   Social History     Tobacco Use     Smoking status: Former Smoker     Packs/day: 0.50     Types: Cigarettes     Quit date: 2015     Years since quittin.1     Smokeless tobacco: Former User     Tobacco comment: 5 a day   Substance Use Topics     Alcohol use: No     Alcohol/week: 0.0 standard drinks     Comment: hx of etoh abuse in teens and 20's     Drug use: No      Past medical history, past surgical history, medications, allergies, family history, and social history were reviewed with the patient. No additional pertinent items.     I have reviewed the Medications, Allergies, Past Medical and Surgical History, and Social History in the Epic system.    Review of Systems  A complete review of systems was performed with pertinent positives and negatives noted in the HPI, and all other systems negative.    Physical Exam   BP: (!) 169/82  Pulse: (!) 124  Temp: (!) 102.5  F (39.2  C)  Resp: 20  SpO2: 99 %      Physical Exam  Vitals and nursing note reviewed.       GEN:  Well developed, no acute distress  HEENT:  EOMI, Mucous membranes are moist.   Cardio:  Regular tachycardia, no murmur, radial pulses equal bilaterally  PULM:  Lungs clear, good air movement, no wheezes, rales  Abd:  Soft, normal bowel sounds, no focal  tenderness  Musculoskeletal:  normal range of motion of the extremities, no lower extremity swelling or calf tenderness  Neuro:  Alert and oriented X3, Follows commands, moving all extremities spontaneously   Skin:  Warm, dry  ED Course     At 10:01 AM the patient was seen and examined by Margi Denson MD in Room ED10.     Procedures      Labs were reviewed by me and results are shown below.  Patient was given Tylenol for her fever  Patient was given IV Dilaudid for pain.  She was given normal saline IV for tachycardia with fever, possible sepsis.    I reviewed the common adverse reactions to Reclast and common reactions to include fever, myalgias, arthralgias and headache.    Patient is D-dimer is elevated, so CT scan of the chest was done, PE protocol.  CT of the chest is showing numerous bony lesions, likely metastatic.  Patient was discussed with oncology who recommended admission and CT of the abdomen and pelvis with and without contrast as well as MRI of the brain and MRI of the entire spine with contrast.  They asked for medicine to obtain an oncology consult.  Patient may also need radiation oncology consult.    Results for orders placed or performed during the hospital encounter of 04/06/22 (from the past 24 hour(s))   [place] Draw *Canceled*    Narrative    The following orders were created for panel order [place] Draw.  Procedure                               Abnormality         Status                     ---------                               -----------         ------                       Please view results for these tests on the individual orders.   Lactic acid whole blood   Result Value Ref Range    Lactic Acid 1.9 0.7 - 2.0 mmol/L   Comprehensive metabolic panel   Result Value Ref Range    Sodium 137 133 - 144 mmol/L    Potassium 4.1 3.4 - 5.3 mmol/L    Chloride 105 94 - 109 mmol/L    Carbon Dioxide (CO2) 24 20 - 32 mmol/L    Anion Gap 8 3 - 14 mmol/L    Urea Nitrogen 10 7 - 30 mg/dL     Creatinine 1.01 0.52 - 1.04 mg/dL    Calcium 8.9 8.5 - 10.1 mg/dL    Glucose 128 (H) 70 - 99 mg/dL    Alkaline Phosphatase 96 40 - 150 U/L    AST 43 0 - 45 U/L    ALT 40 0 - 50 U/L    Protein Total 7.4 6.8 - 8.8 g/dL    Albumin 3.5 3.4 - 5.0 g/dL    Bilirubin Total 0.5 0.2 - 1.3 mg/dL    GFR Estimate 65 >60 mL/min/1.73m2   CBC with platelets differential    Narrative    The following orders were created for panel order CBC with platelets differential.  Procedure                               Abnormality         Status                     ---------                               -----------         ------                     CBC with platelets and d...[[id number]]  Abnormal            Final result                 Please view results for these tests on the individual orders.   D dimer quantitative   Result Value Ref Range    D-Dimer Quantitative 2.54 (H) 0.00 - 0.50 ug/mL FEU    Narrative    This D-dimer assay is intended for use in conjunction with a clinical pretest probability assessment model to exclude pulmonary embolism (PE) and deep venous thrombosis (DVT) in outpatients suspected of PE or DVT. The cut-off value is 0.50 ug/mL FEU.   BNP   Result Value Ref Range    N terminal Pro BNP Inpatient 308 0 - 900 pg/mL   CBC with platelets and differential   Result Value Ref Range    WBC Count 10.3 4.0 - 11.0 10e3/uL    RBC Count 3.87 3.80 - 5.20 10e6/uL    Hemoglobin 10.7 (L) 11.7 - 15.7 g/dL    Hematocrit 32.7 (L) 35.0 - 47.0 %    MCV 85 78 - 100 fL    MCH 27.6 26.5 - 33.0 pg    MCHC 32.7 31.5 - 36.5 g/dL    RDW 16.6 (H) 10.0 - 15.0 %    Platelet Count 165 150 - 450 10e3/uL    % Neutrophils 81 %    % Lymphocytes 13 %    % Monocytes 4 %    % Eosinophils 1 %    % Basophils 1 %    % Immature Granulocytes 0 %    NRBCs per 100 WBC 0 <1 /100    Absolute Neutrophils 8.4 (H) 1.6 - 8.3 10e3/uL    Absolute Lymphocytes 1.4 0.8 - 5.3 10e3/uL    Absolute Monocytes 0.4 0.0 - 1.3 10e3/uL    Absolute Eosinophils 0.1 0.0 - 0.7 10e3/uL     Absolute Basophils 0.1 0.0 - 0.2 10e3/uL    Absolute Immature Granulocytes 0.0 <=0.4 10e3/uL    Absolute NRBCs 0.0 10e3/uL   UA with Microscopic reflex to Culture    Specimen: Urine, Midstream   Result Value Ref Range    Color Urine Light Yellow Colorless, Straw, Light Yellow, Yellow    Appearance Urine Clear Clear    Glucose Urine Negative Negative mg/dL    Bilirubin Urine Negative Negative    Ketones Urine Negative Negative mg/dL    Specific Gravity Urine 1.017 1.003 - 1.035    Blood Urine Negative Negative    pH Urine 8.0 (H) 5.0 - 7.0    Protein Albumin Urine 10  (A) Negative mg/dL    Urobilinogen Urine Normal Normal, 2.0 mg/dL    Nitrite Urine Negative Negative    Leukocyte Esterase Urine Negative Negative    RBC Urine <1 <=2 /HPF    WBC Urine 1 <=5 /HPF    Narrative    Urine Culture not indicated   XR Chest 2 Views    Narrative    Chest 2 views    INDICATION: Fever, shortness of breath    COMPARISON: 10/14/2015    FINDINGS: Mild apical scarring unchanged. No definite infiltrate or  consolidation. Heart size normal. Osteopenia.      Impression    IMPRESSION: Mild apical scarring. No acute findings radiographically.    HASMUKH CAMILO MD         SYSTEM ID:  [id number]   Symptomatic; Yes Influenza A/B & SARS-CoV2 (COVID-19) Virus PCR Multiplex Nasopharyngeal    Specimen: Nasopharyngeal; Swab   Result Value Ref Range    Influenza A PCR Negative Negative    Influenza B PCR Negative Negative    RSV PCR Negative Negative    SARS CoV2 PCR Negative Negative, Testing sent to reference lab. Results will be returned via unsolicited result    Narrative    Testing was performed using the Xpert Xpress CoV2/Flu/RSV Assay on the [employer] GeneXpert Instrument. This test should be ordered for the detection of SARS-CoV-2 and influenza viruses in individuals who meet clinical and/or epidemiological criteria. Test performance is unknown in asymptomatic patients. This test is for in vitro diagnostic use under the FDA EUA for  laboratories certified under CLIA to perform high or moderate complexity testing. This test has not been FDA cleared or approved. A negative result does not rule out the presence of PCR inhibitors in the specimen or target RNA in concentration below the limit of detection for the assay. If only one viral target is positive but coinfection with multiple targets is suspected, the sample should be re-tested with another FDA cleared, approved, or authorized test, if coinfection would change clinical management. This test was validated by the [facility] [facility]. These laboratories are certified under the Clinical  Laboratory Improvement Amendments of 1988 (CLIA-88) as qualified to perform high complexity laboratory testing.   CT Chest Pulmonary Embolism w Contrast    Narrative    Examination:  CT CHEST PULMONARY EMBOLISM W CONTRAST 4/6/2022 3:28 PM     Comparison: CT chest 6/22/2015    History: PE suspected, low/intermediate prob, positive D-dimer; chest  pain    TECHNIQUE: Volumetric helical acquisition of CT images of the chest  from the lung apices to the kidneys were acquired in arterial phase  after the administration of IV contrast. Three-dimensional (3D)  post-processed angiographic images were reconstructed, archived to  PACS and used in interpretation of this study.     Contrast dose: 75 mL Isovue-370    FINDINGS:  Chest:  There is adequate opacification of the main and lobar pulmonary  arteries. No filling defect in the lobar and main segmental pulmonary  arteries to suggest pulmonary embolism.  There is no right-sided heart  strain.The heart size is normal. No pericardial effusion. No  significant coronary artery calcifications. Normal caliber main  pulmonary artery and thoracic aorta. Conventional 3 vessel branching  aortic arch. No axillary lymphadenopathy. Stable borderline enlarged  right hilar lymph node measuring up to 11 mm in short axis. Normal  caliber esophagus. Right mastectomy.    The  central tracheobronchial tree is patent. Biapical scarring,  greater on the right. Postradiation changes on the anterior right  upper and right middle lobes. Bibasilar dependent atelectasis. No  pneumothorax, pleural effusion, or focal airspace consolidation.  Scattered areas of peripheral mucous plugging. No suspicious pulmonary  nodules.    Upper abdomen:  No focal abnormalities in the visualized upper abdomen. Evaluation is  somewhat limited by contrast bolus timing.    Bones:  Innumerable mixed lytic and sclerotic foci seen throughout the spine,  sternum, and ribs. Multilevel degenerative changes of the spine. Mild  wedging of the midthoracic spine, likely chronic. No acute osseous  abnormality.      Impression    IMPRESSION:   1. No evidence of pulmonary embolism.  2. No acute airspace disease.  3. Post radiation changes seen throughout the anterior right lung.  4. Innumerable mixed lytic and sclerotic foci throughout the axial  skeleton, concerning for metastatic disease.    I have personally reviewed the examination and initial interpretation  and I agree with the findings.    HASMUKH CAMILO MD         SYSTEM ID:  [id number]     Medications   HYDROmorphone (DILAUDID) injection 0.2 mg (0.2 mg Intravenous Given 4/6/22 1505)   0.9% sodium chloride BOLUS (0 mLs Intravenous Stopped 4/6/22 1216)   acetaminophen (TYLENOL) tablet 1,000 mg (1,000 mg Oral Given 4/6/22 1021)   HYDROmorphone (PF) (DILAUDID) injection 0.5 mg (0.5 mg Intravenous Given 4/6/22 1020)   iopamidol (ISOVUE-370) solution 75 mL (75 mLs Intravenous Given 4/6/22 1508)   sodium chloride (PF) 0.9% PF flush 90 mL (90 mLs Intravenous Given 4/6/22 1508)   sodium chloride 0.9% infusion ( Intravenous New Bag 4/6/22 1621)          Assessments & Plan (with Medical Decision Making)   Patient presents after Reclast infusion yesterday no has fever, tachycardia, shortness of breath.  Unfortunately, patient's CT scan of the chest is showing numerous lytic and  sclerotic foci throughout the axial skeleton concerning for metastatic disease.  Patient had thought she was in remission.  Given the fever with tachycardia and acuity of symptoms, will plan admission to the medicine service for further work-up and oncology consult.  At this time, no specific source of infection has been found.  Blood cultures are pending.  Patient will be admitted to the medicine service for further work-up and oncology consult.    I have reviewed the nursing notes.    I have reviewed the findings, diagnosis, plan and need for follow up with the patient.    New Prescriptions    No medications on file       Final diagnoses:   Fever, unspecified fever cause   Tachycardia   Bone metastasis (H)       I, Migue Villagomez am serving as a trained medical scribe to document services personally performed by Margi Denson MD, based on the provider's statements to me.      I, Margi Denson MD, was physically present and have reviewed and verified the accuracy of this note documented by Migue Villagomez.     Margi Denson MD  4/6/2022   [facility] EMERGENCY DEPARTMENT     Margi Denson MD  04/06/22 8101

## 2022-04-06 NOTE — TELEPHONE ENCOUNTER
Nurse Triage SBAR    Situation: Sx after Reclast Infusion on 4/5/22    Background:  DX: Breast Cancer; Infusion of Reclast yesterday    Assessment: Pt states she felt fine after infusion yesterday; went to work; started feeling ill on the drive home from work; sx worsened in the evening; began having fever of 101. Took tylenol.   Reporting the following sx this a.m.:  Lots of pain--feels like bone being pulled apart  SOB  102.9  chills--can't stop shaking.  Dizziness  No sleep since yesterday   Headache  No appetite  Able to keep small amount of orange juice down.  No diarrhea or constipation.    Recommendation:   Advised pt go to ED. Pt has friend who can come and pick her up. Will take about an hour before she can get to ED.  Prefers [facility] ED.    Spoke with Infusion Pharmacist, Octaviano, who agreed that ED evaluation is appropriate. Reclast can cause flu-like sx but the sob and high temp are concerning.    Report called in to Charge Nurse at [facility].     Routed to Dr. Meza and Teresa Ashton, [facility]

## 2022-04-06 NOTE — ED NOTES
Bed: ED10  Expected date: 4/6/22  Expected time:   Means of arrival:   Comments:  Josefina Butlere ([id number])  [facility].  She is experiencing a fever/chills (Tmax 102.9), SOB, dizziness Received a reclast infusion yesterday.  Hx:  breast cancer

## 2022-04-06 NOTE — ED TRIAGE NOTES
Being referred by [facility] for evaluation of feeling ill.  She is  experiencing a fever/chills (Tmax 102.9), SOB,  dizziness Received a reclast infusion yesterday.   Hx:  breast cancer   In triage states it feels like her bones are being ripped apart, overall feeling weak and fatigued

## 2022-04-07 NOTE — CONSULTS
Oncology  Consult Note   Date of Service: 04/07/2022    Patient: Josefina Bennett  MRN: [id number]  Admission Date: 4/6/2022  Hospital Day # 1  Cancer Diagnosis: H/o ER+ ID+ HER2 neg Breast Cancer (T2NxMx)  Primary Outpatient Oncologist: Dr. Meza   Current Treatment Plan: TBD; pending biopsy     Reason for Consult: Likely metastatic spread to axillary skeleton, h/o breast cancer     Assessment & Plan:   Josefina Bennett is a 56 year old female with past medical history of ER+ ID+ HER2 neg breast cancer (2015), PJP pneumonia (2015), PTSD, depression, insomnia, traumatic small subarachnoid hemorrhage who is currently admitted for fever (102.5), tachycardia and body aches. Imaging found innumerable mixed lytic and sclerotic foci throughout axial skeleton concerning for metastatic disease.    Recommendations:   -  IR consult for biopsy of soft tissue sacral mass (ordered for you)   - Agree with infectious work up and IV antibiotics per primary team      # H/o Breast Cancer (ER+ ID+ HER2 neg; T2NxMx)  # Innumerable mixed lytic and sclerotic foci concerning for metastatic disease   Per chart review. In brief, she noticed some distortion of her right breast in Jan 2015 and had a mammogram. Diagnostic mammogram (1/28/15) showed right breast architectural distoration raising concern for neoplasm. Biopsy shows infiltrating lobular carcinoma, grade 2 and a clip was placed. Tumor was ER +, ID + and HER2 neg by IHC. Underwent MRI showing a tumor that was 4.8 x 1.7 x 3.2 cm in size. Overall, her clinical stage was T2 NX MX. She enrolled in the I-SPY-2 clinical trial and qualified with high-risk MammaPrint score. Randomized to ganetespib and paclitaxel x 12 cycles and then started AC (5/26/15) which was complicated by hospitalizations for intractable nausea/vomiting and PJP pneumonia. She opted to not pursue further chemotherapy as of July 2015. Underwent right lumpectomy and sentinel LN procedure in Aug 2015. There was a  positive margin and underwent re-excision and had a positive margin. Ultimately, underwent a right mastectomy with clear margins. She completed radiation Dec 2015. Pathologic stage was III-A, ypT3 N1a MX. Of concern, she had what could be considered an RCB3 tumor.  She started adjuvant letrozole on 1/28/16.   - Continue Letrozole 2.5 mg daily   - CT CAP 4/6 shows innumerable mixed lytic and sclerotic foci throughout the axial skeleton concerning for metastatic disease prominent abdominopelvic LN. Soft tissue mass in posterior sacrum. Asymmetrical thickening of superior bladder wall concerning for malignancy.   - MRI brain 4/6 w/o evidence of metastasis. MRI spine 4/6 shows innumerable likely metastatic skeletal lesion, S2-3 lesion that effaces the spinal canal and possible anterior epidural extension of tumor T7-10.  - Given history of breast cancer now with concern of recurrence recommend biopsy of sacral mass to confirm suspicion of breast cancer recurrence.   - Dr Meza updated    # Non-neutropenic Fever   # Concern for sepsis   Per chart review febrile to 102.5 and tachycardic to 120s. Infectious work up per primary team has been largely negative.   - Agree with following blood cultures   - UA is bland, however on imaging there is bladder wall thickening which may be cancerous inflammatory changes or infection  - Antibiotics per primary team; currently on IV Zosyn and Vanco   - On CT chest there is noted to be bilaterally apical scarring (R>L), could consider pulmonology curbside to evaluate imaging if this may be a source of infection    # Anemia   Transfuse to keep Hgb> 7 and platelets > 10k     We will continue to follow this patient. Please do not hesitate to page with any questions or concerns.    Patient was seen and plan of care was discussed with attending physician Dr. Persaud.    Kallie Garrido (RO[facility]  Hematology/Oncology   Pager: [phone]    History of Present Illness:    Josefina Bennett is a 56  "year old female with past medical history of ER+ NV+ HER2 neg breast cancer (2015), PJP pneumonia (2015), PTSD, depression, insomnia, traumatic small subarachnoid hemorrhage who is currently admitted for fever (102.5), tachycardia and body aches. Imaging found innumerable mixed lytic and sclerotic foci throughout axial skeleton concerning for metastatic disease.    Mary is feeling well today. States that once her fever broke she felt \"great\" again. Had some difficulty taking a deep breath when she had the chills from the fever but after her fever resolved she had no symptoms. No dysuria or hematuria. No pain.   Appetite has been intact. She has been feeling great otherwise.     Oncologic History:  Treatment History:  A.  Neoadjuvant therapy on I SPY-2 with ganetespib and paclitaxel.  AC x 2 complicated by PCP pneumonia.    B.  Right lumpectomy, followed by right margin resections, followed by rigth mastectomy.   B.  Oophorectomy 10-16-15.   C.  Tamoxifen 10-6-16.   D.  Radiation therapy. 5,040 cGy in 180 cGy/fraction to the chest wall and regional lymphatics followed by 720 cGy axillary lymph node boost and 1000 cGy mastectomy scar boost. Completed on 12/3/2015.  E.   Adjuvant letrozole begun 1-28-16.  Continue through 2026.      I spent >80 minutes face-to-face and/or coordinating or discussing care plan. Over 50% of our time on the unit was spent counseling the patient and/or coordinating care      Review of Systems:  A comprehensive ROS was performed and found to be negative or non-contributory with the exception of that noted in the HPI above.    Past Medical History:  Past Medical History:   Diagnosis Date     Breast cancer (H) 1/2015     depression      Foot fracture      History of blood transfusion 6/2015     Insomnia      Malignant neoplasm of breast (female), unspecified site 2/5/2015     Papanicolaou smear of cervix with low grade squamous intraepithelial lesion (LGSIL) 6/2010    8/10-colp-benign     " 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) 2015     PTSD (post-traumatic stress disorder)        Past Surgical History:  Past Surgical History:   Procedure Laterality Date     APPENDECTOMY  1997     CYSTOSCOPY N/A 10/16/2015    Procedure: CYSTOSCOPY;  Surgeon: Aleksandr Palafox MD;  Location: [facility] OR     HC REMOVAL OF OVARIAN CYST(S)      x 3     LAPAROSCOPIC HYSTERECTOMY TOTAL, BILATERAL SALPINGO-OOPHORECTOMY, COMBINED N/A 10/16/2015    Procedure: COMBINED LAPAROSCOPIC HYSTERECTOMY TOTAL, SALPINGO-OOPHORECTOMY;  Surgeon: Aleksandr Plaafox MD;  Location: [facility]     LUMPECTOMY BREAST Right 2015    Procedure: LUMPECTOMY BREAST;  Surgeon: Philip Franco MD;  Location: [facility]     LUMPECTOMY BREAST WITH SENTINEL NODE, COMBINED Right 2015    Procedure: COMBINED LUMPECTOMY BREAST WITH SENTINEL NODE;  Surgeon: Philip Franco MD;  Location: [facility]     MASTECTOMY SIMPLE Right 2015    Procedure: MASTECTOMY SIMPLE;  Surgeon: Philip Franco MD;  Location: [facility]     REMOVE PORT VASCULAR ACCESS N/A 2015    Procedure: REMOVE PORT VASCULAR ACCESS;  Surgeon: Philip Franco MD;  Location: [facility]     SURGICAL HISTORY OF -       wisdom teeth     TONSILLECTOMY      at age 30 for tonsillitis       Social History:  Social History     Socioeconomic History     Marital status:      Spouse name: Not on file     Number of children: Not on file     Years of education: Not on file     Highest education level: Not on file   Occupational History     Employer: OTHER     Comment: training for    Tobacco Use     Smoking status: Former Smoker     Packs/day: 0.50     Types: Cigarettes     Quit date: 2015     Years since quittin.1     Smokeless tobacco: Former User     Tobacco comment: 5 a day   Substance and Sexual Activity     Alcohol use: No     Alcohol/week: 0.0 standard drinks     Comment: hx of etoh abuse in teens and 20's     Drug use: No     Sexual activity: Yes     Partners: Male      Birth control/protection: Condom     Comment: condoms   Other Topics Concern     Parent/sibling w/ CABG, MI or angioplasty before 65F 55M? No   Social History Narrative    Josefina currently rents a house in [place], [place].  She  from her  2 years ago and  him 1 year ago.  He was having an affair with a 21-year-old that was living in their house. Apparently, the 21-year-old is still living there.  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  She applied for unemployment and was denied, but recently got a call from a supervisor and is getting unemployment, including back pay.  She has a boyfriend, Nathaniel who she is seeing.  She is active in AA.  She has suffered numerous losses in the last few years including her sister and cousin and a friend.     Social Determinants of Health     Financial Resource Strain: Not on file   Food Insecurity: Not on file   Transportation Needs: Not on file   Physical Activity: Not on file   Stress: Not on file   Social Connections: Not on file   Intimate Partner Violence: Not At Risk     Fear of Current or Ex-Partner: No     Emotionally Abused: No     Physically Abused: No     Sexually Abused: No   Housing Stability: Not on file        Family History  Family History   Problem Relation Age of Onset     Breast Cancer Other         paternal cousin     Diabetes Mother         Borderline diabetic     Alcohol/Drug Mother         alcoholic     Depression Mother      Alcohol/Drug Father         alcoholic     Depression Father      Diabetes Maternal Grandfather      Depression Maternal Grandfather      Depression Paternal Grandfather      Breast Cancer Paternal Aunt         dad's 1/2 sister     Respiratory Sister         Cystic Fibrosis,  age 39     Depression Sister      Cancer Other         maternal grandmothers sisters daughter.       Outpatient  "Medications:  No current facility-administered medications on file prior to encounter.  calcium carbonate (OS- MG [place]. [place]) 500 MG tablet, Take 500 mg by mouth At Bedtime   cholecalciferol (VITAMIN D3) 1000 UNIT tablet, Take 1 tablet (1,000 Units) by mouth daily  clindamycin (CLINDAMAX) 1 % topical gel, Apply topically 2 times daily  letrozole (FEMARA) 2.5 MG tablet, Take 1 tablet (2.5 mg) by mouth daily  mirtazapine (REMERON) 7.5 MG tablet, TAKE 1 TABLET BY MOUTH ONCE DAILY AT BEDTIME  Multiple Vitamins-Minerals (MULTIVITAMIN ADULTS PO), Take 1 tablet by mouth  mupirocin (BACTROBAN) 2 % ointment, Apply topically daily  venlafaxine (EFFEXOR-ER) 225 MG 24 hr tablet, Take 1 tablet (225 mg) by mouth daily         Physical Exam:    Blood pressure 130/53, pulse 100, temperature (!) 100.9  F (38.3  C), temperature source Oral, resp. rate 16, height 1.626 m (5' 4\"), weight 61 kg (134 lb 7.7 oz), last menstrual period 12/18/2014, SpO2 97 %, not currently breastfeeding.    Constitutional: Pleasant female sitting up in bed. Awake and conversational. Non- toxic appearing. No acute distress.   HEENT: Normocephalic, atraumatic. Sclerae anicteric. PERRLA. EOM intact. Moist mucus membranes without lesions, thrush, or exudates appreciated  Lymph: Neck supple. No significant adenopathy noted.   Respiratory: Breathing comfortable with no increased work on room air. Good air exchange. No signs of respiratory distress or accessory muscle use. Lungs clear to auscultation bilaterally, no crackles or wheezing noted.  Cardiovascular: Regular rate and rhythm. Normal S1 and S2. No murmurs, rubs, or gallops. No peripheral edema.    GI: Abdomen is soft, non-distended, non-tender and without distension. Bowel sounds present and normoactive. No masses or hepatosplenomegaly appreciated.  Skin: Skin is clean, dry, intact. No jaundice appreciated.   Musculoskeletal/ Extremities: No redness, warmth, or swelling of the joints appreciated. " Distal pulses palpable. Nailbeds pink and without cyanosis or clubbing.   Neurologic: Alert and oriented. Speech normal. Grossly nonfocal. Memory and thought process preserved. Motor function normal. Sensation intact bilaterally.  Neuropsychiatric: Calm, affect congruent to situation. Appropriate mood and affect. Good judgment and insight. No visual/auditory hallucinations.       Labs & Studies: I personally reviewed the following studies:  ROUTINE LABS (Last four results):  CMP  Recent Labs   Lab 04/07/22  0650 04/06/22  1015 04/05/22  1153    137 142   POTASSIUM 3.6 4.1 4.0   CHLORIDE 109 105 108   CO2 24 24 27   ANIONGAP 6 8 7   * 128* 132*   BUN 8 10 14   CR 0.77 1.01 1.05*   GFRESTIMATED 90 65 62   VEE 8.0* 8.9 9.4   PROTTOTAL 6.4* 7.4 7.3   ALBUMIN 2.9* 3.5 3.4   BILITOTAL 0.3 0.5 0.4   ALKPHOS 81 96 89   AST 40 43 40   ALT 33 40 37     CBC  Recent Labs   Lab 04/06/22  1015 04/05/22  1153   WBC 10.3 9.7   RBC 3.87 4.12   HGB 10.7* 11.2*   HCT 32.7* 35.0   MCV 85 85   MCH 27.6 27.2   MCHC 32.7 32.0   RDW 16.6* 16.4*    196     INRNo lab results found in last 7 days.

## 2022-04-07 NOTE — UTILIZATION REVIEW
Admission Status; Secondary Review Determination    Under the authority of the Utilization Management Committee, the utilization review process indicated a secondary review on the above patient. The review outcome is based on review of the medical records, discussions with staff, and applying clinical experience noted on the date of the review.    (x) Inpatient Status Appropriate - This patient's medical care is consistent with medical management for inpatient care and reasonable inpatient medical practice.    RATIONALE FOR DETERMINATION:  56-year-old female with history of breast cancer in 2015 in remission who underwent outpatient infusion of Reclast the day prior to admission.  Patient now presents with significant high fever 102.5 degrees, myalgias and persistent tachycardia.  Incidentally noted on CT imaging of the chest was mixed lytic and sclerotic foci throughout the axial skeleton concerning for metastatic disease.  With patient's significant recurrent/persistent tachycardia and fevers up to 101 degrees on hospital day 2, patient will require ongoing evaluation and management appropriate for inpatient care.    At the time of admission with the information available to the attending physician more than 2 nights Hospital complex care was anticipated, based on patient risk of adverse outcome if treated as outpatient and complex care required. Inpatient admission is appropriate based on the Medicare guidelines.    This document was produced using voice recognition software    The information on this document is developed by the utilization review team in order for the business office to ensure compliance. This only denotes the appropriateness of proper admission status and does not reflect the quality of care rendered.    The definitions of Inpatient Status and Observation Status used in making the determination above are those provided in the CMS Coverage Manual, Chapter 1 and Chapter 6, section  70.4.    Sincerely,    Semaj Cherry MD  Utilization Review  Physician Advisor  [facility].

## 2022-04-07 NOTE — CONSULTS
I came by to see Josefina Verma and discussed with her that the most likely diagnosis is metastatic breast cancer.  This was quite unexpected because Josefina Verma was feeling quite well in clinic before her Reclast infusion.  We had initiated Reclast because of low bone density and unexpectedly this appears to have elicited systemic symptoms and fever.  She was feeling unwell and came to the emergency department.  Notably she did have anemia found on her clinic labs and increased RDW.  She does have multiple bone metastases which are widely distributed by were not causing symptoms.  She may have some epidural involvement.  We will need to consult with radiation oncology after we obtain a biopsy to confirm the diagnosis of her metastatic disease as lobular breast cancer.  Notably lobular breast cancer tends to involve serosal surfaces and may involve the bowel.  This could explain the unexpected anemia.  I do recommend a colonoscopy because of the risk of luminal involvement with metastatic breast cancer.  I also think that biopsy of an easily accessible bone lesion may be helpful.  Notably the bone lesion should be sent for estrogen receptor progesterone receptor and HER-2 by immunohistochemistry.  It is not feasible to perform FISH on a bone biopsy specimen.    I discussed with Josefina Verma and her boyfriend that I am optimistic that we can help her and that they are excellent treatments available which do not involve chemotherapy.  A reasonable approach would be to consider the Charla 3 clinical trial approach of palbociclib and fulvestrant.  I think it is prudent to choose an alternative hormonal therapy because ESR 1 mutation often occurs when metastatic breast cancer has been treated with an aromatase inhibitor alone.  Next generation sequencing may be useful and it may be reasonable to send a bone biopsy for that as well.    I would like to see Josefina Verma in follow-up in my clinic in 1 to 2weeks to go over the plan.   All of her questions and all of her boyfriend's questions were answered.    Evangelista Meza MD

## 2022-04-07 NOTE — PLAN OF CARE
Goal Outcome Evaluation:       Nursing Focus: Admission    D: Arrived at 1216 from the emergency department via bed. Patient accompanied by s/o. Admitted for fevers and cancer workup. Denies any complaints.    I: Admission process began.  Patient oriented to room, enviroment, call light.  Md. notified of patients arrival on unit.     A: Vital signs stable, oral temperature of 100.9.  Patient stable at this time.      P: Implement plan of care when available. Continue to monitor patient. Nursing interventions as appropriate. Notify md with changes in pt status.

## 2022-04-07 NOTE — CONSULTS
Care Management Follow Up    Length of Stay (days): 1    Expected Discharge Date:  TBD     Concerns to be Addressed:     HCD  Patient plan of care discussed at interdisciplinary rounds: No    Anticipated Discharge Disposition:  TBD     Anticipated Discharge Services:  TBD  Anticipated Discharge DME:  TBD    Patient/family educated on Medicare website which has current facility and service quality ratings:  N/A  Education Provided on the Discharge Plan:  HCD  Patient/Family in Agreement with the Plan:  N/A    Referrals Placed by CM/SW:  N/A  Private pay costs discussed: Not applicable    Additional Information:  Chart reviewed. Consulted for a HCD.    Writer met with pt to discuss completion of HCD. Writer reviewed pt's previously made HCD with pt, which pt forgot she created in 2015. Pt reports that the 2015 HCD contains all the information she feels needs to be in there and has the appropriately identified individuals to be her HCAs. Pt declines needing a new HCD to be made at this time.  ________________    EVENS Horton, [facility]  ED/Observation   [facility] [place]  Phone: [phone]  Pager: [phone]  Fax: [phone]    On-call pager, [phone], 4:00pm to midnight

## 2022-04-07 NOTE — CONSULTS
Interventional Radiology Inpatient Consult Service Note      ===  CONSULT DETAILS    Reason for Consult 55 yo F with history of breast cancer now with concern for new metastatic disease. Will need a biopsy to confirm diagnosis. Consider sacral mass for biopsy? or other site?     Interventional Radiology Adult/Peds IP Consult: Patient to be seen: Routine within 24 hours; [facility] Call back #: *[phone]; 55 yo F with history of breast cancer now with concern for new metastatic disease. Will need a biopsy to confirm diagnosis.... [[id number]]    Electronically signed by: Kallie Garrido PA-C on 04/07/22 1421  [facility] Call back # *[phone]     Is the patient on an anticoagulant ? No  Is the patient currently NPO ? No    ===  INR - ?  Plts - 165  COVID - neg  AC - none    Hx - [  Josefina Bennett is a very pleasant 56 year old female with a past medical history significant for breast cancer stage 2015 in remission, PJP pneumonia 2015 related to chemotherapy immunosuppression, PTSD, depression, insomnia, traumatic small subarachnoid hemorrhage who presented to outpatient infusion center for her first dose of Reclast day prior to admission. Presented to [facility] ED with high fever 102.5F, tachycardia and body aches; unfortunately found to have innumerable mixed lytic and sclerotic foci throughout the axial skeleton concerning for metastatic disease on CT. ]    ===  IMAGING  CT ABDOMEN PELVIS W CONTRAST, 4/6/2022    HISTORY: breast ca with bony mets, eval for intraabdominal mets    IMPRESSION:   1.  Prominent abdominopelvic lymph nodes, small volume ascites, and  extensive retroperitoneal, mesenteric, and omental fat  stranding/haziness with suggestion of soft tissue thickening about the  IVC and aorta and along the serosal aspect of the ascending/hepatic  flexure colon, findings which may reflect a systemic inflammatory  process, though disseminated malignancy remains a concern. Consider  diagnostic paracentesis, though the small  volume of ascites may not be  amenable to sampling.  2.  Innumerable mixed lytic and sclerotic lesions in the skeleton,  compatible with metastatic disease. Associated soft tissue mass in the  posterior sacrum  3.  Asymmetric thickening of the superior bladder wall, concerning for  malignancy. No intraluminal bladder mass.  4.  Crescentic enhancing tissue under the left hemidiaphragm with  adjacent ill-defined hypodensity in the superior medial spleen,  indeterminate but also raising concern for metastatic disease.  Recommend attention on follow-up.  5.  Interlobular septal thickening in the lung bases suggestive of  pulmonary edema.        ===  PLAN    Case request and imaging reviewed by IR Dr. Slim Roberson. Approved for outpatient CT guided sacral lesion biopsy.    At the time of patient hospital discharge, provider can enter an orders only encounter and order CT bone biopsy, and contact outpatient IR schedulers at [phone].    Reference inpatient review and IR approval to schedulers.      KAILA Horton, [facility]  Interventional Radiology  IR pager [phone]

## 2022-04-07 NOTE — PROGRESS NOTES
[facility]    Medicine Progress Note - Hospitalist Service, GOLD TEAM 7    Date of Admission:  4/6/2022    Assessment & Plan       Josefina Bennett is a very pleasant 56 year old female with a past medical history significant for breast cancer stage 2015 in remission, PJP pneumonia 2015 related to chemotherapy immunosuppression, PTSD, depression, insomnia, traumatic small subarachnoid hemorrhage who presented to outpatient infusion center for her first dose of Reclast day prior to admission. Presented to [facility] ED with high fever 102.5F, tachycardia and body aches; unfortunately found to have innumerable mixed lytic and sclerotic foci throughout the axial skeleton concerning for metastatic disease on CT.    Sepsis secondary to unclear source  Temp 102.5F better after tylenol. HR 120s (sinus tachycardia per EKG). RR 20. Unclear etiology at this time. Chest CT negative for PE and infection, though with evidence of lytic and sclerotic foci in the axial skeleton concerning for metastatic disease. CT A/P prelim without evidence of infection. UA negative for infection. WBC 10.3 with mild left shift. Lactate 1.9. COVID/Flu negative. Blood cultures drawn and pending.  -Initially picture more consistent with reclast injection but then spiked temp again so will start antimicrobials (pip/tazo and vanc) and follow her cultures while on antibiotics.   -Small ascites amt- don't think there'd be enough for culture- will treat for possible SBP as well  -Starting pharmacologic VTE prophylaxis given her suspected malignancy, no plan for imminent biopsy    Bladder wall thickening, sacral mass  Lytic and Sclerotic Foci Axillary Skeleton  Prominent abdominopelvic lymph nose  Ascites  Superior Medial Spleen Hypodensity  Incidentally found during infectious work up related to fever and elevated D-DIMER. High suspicion for metastatic disease (e.g. from prior breast cancer vs new primary vs new  hematologic malignancy), vs less likely systemic inflammatory process (e.g. aortitis)  -oncology following, appreciate assistance  --will consult IR for consideration of biopsy of soft tissue mass-- may be able to be done as outpatient    H/o Breast Cancer- pathologic stage III-A, ypT3 N1a MX  She was enrolled in the I-SPY-2 clinical trial, and qualified with high-risk MammaPrint score. She was randomized to ganetespib and paclitaxel, and was treated with 12 cycles of treatment; then started on AC on 05/26/2015. She developed intractable nausea and vomiting after the first cycle, which required hospitalization on the second cycle. She then developed Pneumocystis pneumonia, which resulted in intubation and a 2-week hospitalization during the course of AC. She was discharged on 07/02/2015, and decided she did not want to pursue any further chemotherapy. She had a right lumpectomy and sentinel lymph node procedure 08/12/2015. She had a positive margin, underwent a re-excision, and had a positive margin. Ultimately, she underwent a right mastectomy with clear margins. She began radiation treatment with Dr. Bill Chauhan, and completed radiation therapy on 12/04/2015. Pathologic stage was III-A, ypT3 N1a MX. Of concern, she had what could be considered an RCB3 tumor meaning significant risk of recurrence of her breast cancer during the 5-year period following primary therapy.    1.  Neoadjuvant therapy on I SPY-2 with ganetespib and paclitaxel.  AC x 2 complicated by PCP pneumonia.   2.  Right lumpectomy, followed by right margin resections, followed by rigth mastectomy.    3.  Oophorectomy 10-16-15.    4.  Tamoxifen 10-6-16.    5.  Radiation therapy. 5,040 cGy in 180 cGy/fraction to the chest wall and regional lymphatics followed by 720 cGy axillary lymph node boost and 1000 cGy mastectomy scar boost. Completed on 12/3/2015.   6.   Adjuvant letrozole begun 1-28-16.   - As above, CT suspicious for metastatic spread.  "Oncology consult placed.   - Continue Letrozole    Depression  Insomnia  PTSD   - Continue PTA Effexor and Remeron    Anemia  Hemoglobin 10.7.  No evidence of active bleeding. Looks most consistent with anemia of chronic disease  -monitor for bleeding    Murmur- suspect flow murmur since normal echo   - TTE ordered       Diet: Advance Diet as Tolerated: Clear Liquid Diet    DVT Prophylaxis: Enoxaparin (Lovenox) SQ  Berger Catheter: Not present  Central Lines: None  Cardiac Monitoring: None  Code Status: Full Code      Disposition Plan   Expected Discharge: suspect 4/9   Anticipated discharge location:  home  Delays: fever, work-up          The patient's care was discussed with the Bedside Nurse, Patient, Patient's Family and Heme/onc Consultant.    Cyndi Bennett MD  Hospitalist Service, [address]  Securely message with the Vocera Web Console (learn more here)  Text page via [facility] Paging/Directory   Please see signed in provider for up to date coverage information      Clinically Significant Risk Factors Present on Admission     ______________________________________________________________________    Interval History   Feels a lot better after her \"fever broke.\" Denies any further pain in the bones like before. She had a rising temp but thought it was related to moving around/extra activity. No nausea, vomiting, cough, shortness of breath, diarrhea. Her boyfriend is at bedside.    Data reviewed today: I reviewed all medications, new labs and imaging results over the last 24 hours. I personally reviewed the multiple CT and MRI image(s) showing multiple lytic foci in her spine, asymmetric bladder thickening.    Physical Exam   Vital Signs: Temp: 98.7  F (37.1  C) Temp src: Oral BP: (!) 145/89 Pulse: 101   Resp: 18 SpO2: 95 % O2 Device: None (Room air)    Weight: 135 lbs 3.2 oz  Constitutional: Awake, alert and in no distress. Sitting comfortably in " bed.  Head: Normocephalic. Atraumatic.   Cardiovascular: Mildly elevated heart rate and regular rhythm. No murmurs, clicks, gallops or rubs. No edema   Respiratory: Clear to auscultation without wheezes or crackles. Normal respiratory rate and effort.   Gastrointestinal: Abdomen soft, non-tender. BS normal.   Musculoskeletal: extremities normal- no gross deformities noted and normal muscle tone  Skin: no suspicious lesions, rashes, jaundice, or cyanosis   Psychiatric: mentation appears normal and affect normal/bright    Data   Recent Labs   Lab 04/07/22  0650 04/06/22  1015 04/05/22  1153   WBC  --  10.3 9.7   HGB  --  10.7* 11.2*   MCV  --  85 85   PLT  --  165 196    137 142   POTASSIUM 3.6 4.1 4.0   CHLORIDE 109 105 108   CO2 24 24 27   BUN 8 10 14   CR 0.77 1.01 1.05*   ANIONGAP 6 8 7   VEE 8.0* 8.9 9.4   * 128* 132*   ALBUMIN 2.9* 3.5 3.4   PROTTOTAL 6.4* 7.4 7.3   BILITOTAL 0.3 0.5 0.4   ALKPHOS 81 96 89   ALT 33 40 37   AST 40 43 40     Recent Results (from the past 24 hour(s))   CT Chest Pulmonary Embolism w Contrast    Narrative    Examination:  CT CHEST PULMONARY EMBOLISM W CONTRAST 4/6/2022 3:28 PM     Comparison: CT chest 6/22/2015    History: PE suspected, low/intermediate prob, positive D-dimer; chest  pain    TECHNIQUE: Volumetric helical acquisition of CT images of the chest  from the lung apices to the kidneys were acquired in arterial phase  after the administration of IV contrast. Three-dimensional (3D)  post-processed angiographic images were reconstructed, archived to  PACS and used in interpretation of this study.     Contrast dose: 75 mL Isovue-370    FINDINGS:  Chest:  There is adequate opacification of the main and lobar pulmonary  arteries. No filling defect in the lobar and main segmental pulmonary  arteries to suggest pulmonary embolism.  There is no right-sided heart  strain.The heart size is normal. No pericardial effusion. No  significant coronary artery calcifications.  Normal caliber main  pulmonary artery and thoracic aorta. Conventional 3 vessel branching  aortic arch. No axillary lymphadenopathy. Stable borderline enlarged  right hilar lymph node measuring up to 11 mm in short axis. Normal  caliber esophagus. Right mastectomy.    The central tracheobronchial tree is patent. Biapical scarring,  greater on the right. Postradiation changes on the anterior right  upper and right middle lobes. Bibasilar dependent atelectasis. No  pneumothorax, pleural effusion, or focal airspace consolidation.  Scattered areas of peripheral mucous plugging. No suspicious pulmonary  nodules.    Upper abdomen:  No focal abnormalities in the visualized upper abdomen. Evaluation is  somewhat limited by contrast bolus timing.    Bones:  Innumerable mixed lytic and sclerotic foci seen throughout the spine,  sternum, and ribs. Multilevel degenerative changes of the spine. Mild  wedging of the midthoracic spine, likely chronic. No acute osseous  abnormality.      Impression    IMPRESSION:   1. No evidence of pulmonary embolism.  2. No acute airspace disease.  3. Post radiation changes seen throughout the anterior right lung.  4. Innumerable mixed lytic and sclerotic foci throughout the axial  skeleton, concerning for metastatic disease.    I have personally reviewed the examination and initial interpretation  and I agree with the findings.    HASMUKH CAMILO MD         SYSTEM ID:  [id number]   CT Abdomen Pelvis w Contrast    Narrative    EXAMINATION: CT ABDOMEN PELVIS W CONTRAST, 4/6/2022 5:14 PM    TECHNIQUE:  Helical CT images from the lung bases through the  symphysis pubis were obtained with contrast.  Coronal reformatted  images were generated at a workstation for further assessment.    CONTRAST:  82 cc Isovue 370 IV.    COMPARISON: Same-day chest CT, 2/10/2015 PET/CT    HISTORY: breast ca with bony mets, eval for intraabdominal mets    FINDINGS:    LOWER THORAX:  Intralobular septal thickening. No  pleural effusions.     ABDOMEN AND PELVIS:     Liver: No suspicious liver lesions. Patent portal veins.    Gallbladder: No gallstones. No evidence of acute cholecystitis.    Spleen: Borderline enlarged, measuring 13.0 cm in craniocaudal  dimension. Ill-defined hypodensity in the superior medial spleen  (series 3 image 47).    Pancreas: No suspicious pancreatic lesions. The pancreatic duct is not  dilated.    Adrenal glands: No adrenal nodules.    Kidneys: No kidney masses. No hydronephrosis or obstructing renal  stones.    Bladder / Pelvic organs: Asymmetric thickening of the superior bladder  wall, measuring up to 1.0 cm. Bladder is filled with contrast without  filling defect. Surgically absent uterus and ovaries.    Bowel: No small or large bowel dilatation. Appendix is surgically  absent.    Lymph nodes: Multiple prominent para-aortic, iliac chain and  mesenteric lymph nodes. For example 8 mm central mesenteric (series 3  image 169).    Peritoneum/retroperitoneum: Small volume ascites. Fat stranding and  haziness throughout the retroperitoneum, mesentery, and greater  omentum without clear soft tissue nodularity, though there is  suggestion of soft tissue thickening about the IVC and aorta and along  the serosal aspect of the ascending and hepatic flexure colon.  Crescentic enhancing tissue along the medial aspect of the superior  spleen underneath the left hemidiaphragm (series 3 image 34).    Vessels: No infrarenal aortic aneurysm. Atherosclerotic calcifications  of the abdominal aorta and its major branches.    Bones and soft tissues: Innumerable mixed lytic and sclerotic lesions  in axial and visualized appendicular skeleton. Associated soft tissue  mass measuring up to 3.6 cm in craniocaudal dimension in the posterior  sacrum.      Impression    IMPRESSION:   1.  Prominent abdominopelvic lymph nodes, small volume ascites, and  extensive retroperitoneal, mesenteric, and omental fat  stranding/haziness with  suggestion of soft tissue thickening about the  IVC and aorta and along the serosal aspect of the ascending/hepatic  flexure colon, findings which may reflect a systemic inflammatory  process, though disseminated malignancy remains a concern. Consider  diagnostic paracentesis, though the small volume of ascites may not be  amenable to sampling.  2.  Innumerable mixed lytic and sclerotic lesions in the skeleton,  compatible with metastatic disease. Associated soft tissue mass in the  posterior sacrum  3.  Asymmetric thickening of the superior bladder wall, concerning for  malignancy. No intraluminal bladder mass.  4.  Crescentic enhancing tissue under the left hemidiaphragm with  adjacent ill-defined hypodensity in the superior medial spleen,  indeterminate but also raising concern for metastatic disease.  Recommend attention on follow-up.  5.  Interlobular septal thickening in the lung bases suggestive of  pulmonary edema.    I have personally reviewed the examination and initial interpretation  and I agree with the findings.    DEVORA DHILLON          SYSTEM ID:  [id number]   MR Brain w/o & w Contrast    Narrative     MR BRAIN W/O & W CONTRAST 4/6/2022 8:47 PM    Provided History: Bony metastases, fever. Breast primary.    Comparison: 6/30/2015.    Technique: Multiplanar T1-weighted, axial FLAIR, and susceptibility  images were obtained without intravenous contrast. Following  intravenous gadolinium-based contrast administration, axial  T2-weighted, diffusion, and T1-weighted images (in multiple planes)  were obtained.    Contrast: 7.5 cc intravenous Gadavist    Findings:  There is no mass effect, midline shift, or evidence of intracranial  hemorrhage. The ventricles are proportionate to the cerebral sulci.  Normal major vascular intracranial flow-voids.    Postcontrast images demonstrate no abnormal intracranial enhancement.  No areas of restricted diffusion. A few punctate foci of T2  hyperintense signal in the  subcortical white matter, nonspecific.  Patchy T2 hyperintensity is also seen in the lentiform nuclei  bilaterally and in the head/body junction of the right caudate  nucleus. Unchanged susceptibility in the globi pallidi and in the  right caudate nucleus. No new areas of significant susceptibility  artifact to suggest acute intracranial hemorrhage.     No abnormality of the skull marrow signal. The visualized portions of  paranasal sinuses  are relatively clear. Bilateral mastoid air cell  effusions. The orbits are grossly unremarkable. Mildly heterogeneous  marrow signal.      Impression    Impression:   1. No brain metastases, abscess, or meningitis identified.   2. Little change in nonspecific white matter and basal ganglia signal  changes, likely at least in part related to chronic small vessel  ischemic disease.  3. Continued bilateral mastoid effusions.    I have personally reviewed the examination and initial interpretation  and I agree with the findings.    ELIF THOMAS MD         SYSTEM ID:  [id number]   MR Cervical Spine w/o & w Contrast    Narrative    MR LUMBAR SPINE W/O & W CONTRAST, MR THORACIC SPINE W/O   W CONTRAST, MR CERVICAL SPINE W/O & W CONTRAST 4/6/2022 8:49 PM    History: Bone neoplasm, L/S-spine, recurrence suspected  ICD-10:    Comparison: None available    Technique:    Cervical spine: Sagittal T1-weighted, sagittal T2-weighted, sagittal  STIR, sagittal diffusion-weighted, axial T1-weighted, and axial  T2-weighted images of the cervical spine were obtained without the  administration of intravenous contrast. After the administration of  intravenous contrast, fat saturated axial, coronal, and sagittal  T1-weighted images of the cervical spine were obtained.    Thoracic spine: Sagittal T1-weighted, sagittal T2-weighted, sagittal  STIR, sagittal diffusion weighted, axial T1-weighted, and axial  T2-weighted images of the thoracic spine were obtained without the  administration of intravenous  contrast. After the administration of  intravenous contrast, fat saturated axial, coronal, and sagittal  T1-weighted images of the thoracic spine were obtained.    Lumbar spine: Sagittal T1-weighted, sagittal T2-weighted, sagittal  STIR, axial T1-weighted, and axial T2-weighted images of the lumbar  spine were obtained without the administration of intravenous  contrast. After the administration of intravenous contrast, axial and  sagittal fat-saturated T1-weighted images of the lumbar spine were  obtained.    Findings:    Cervical:  The cervical vertebrae appear normally aligned.  Segmentation anomaly C6-7. Chronic depression deformities of C4-C6.  Diffuse heterogeneous bone marrow signal appearance of the cervical  spine.  There is a larger T2 hyperintense subtly enhancing lesion in  left lateral vertebral body of C6. There is no abnormal signal within  the cervical spinal cord.  Small hemangioma in the inferior endplate  of C7. On a level by level basis:    C2-3: No spinal canal or neural foraminal stenosis.  C3-4: Mild left neural foraminal stenosis. No significant right or  spinal canal stenosis.  C4-5: Mild left neural foraminal stenosis. No significant right or  spinal canal stenosis.  C5-6: No spinal canal or neural foraminal stenosis.  C6-7: No spinal canal or neural foraminal stenosis.  C7-T1:No spinal canal or neural foraminal stenosis.    No abnormal enhancement of the paraspinous tissues, vertebral column,  or within the spinal canal.    Thoracic: The thoracic vertebrae are in normal alignment. Diffuse  heterogeneous bone marrow signal on the thoracic spine.  There is no  abnormal signal within the thoracic spinal cord. Small hemangioma in  the vertebral body of T12. No spinal canal or neural foraminal  stenosis. No abnormal enhancement of the paraspinous tissues,  vertebral column, or within the spinal canal. Abnormal signal in the  apex of the right lung appears to represent largely fibrosis on the  CT  earlier today. Question anterior epidural extension of tumor  immediately posterior to the vertebral bodies T7-10.    Lumbar: There are 5 lumbar-type vertebrae assumed for the purposes of  this dictation. Diffuse heterogeneous bone marrow signal on the lumbar  spine. The tip of the conus medullaris is at L2.  The lumbar vertebral  column appears normally aligned. There is no disc height narrowing .     On a level by level basis:    L1-2: No significant spinal canal or neural foraminal stenosis.    L2-3: No significant spinal canal or neural foraminal stenosis.    L3-4: No significant spinal canal or neural foraminal stenosis.    L4-5: No significant spinal canal or neural foraminal stenosis.    L5-S1: No significant spinal canal or neural foraminal stenosis.    Enhancing partially visualized mass at S2-3 centered in the posterior  elements but effacing the spinal canal measuring 2.2 cm in AP  dimension, and at least 3.4 cm in superior to inferior dimension..  Normal paraspinous soft tissues..     Enhancing lesions within the sacrum and visualized iliac bones. No  abnormal enhancement of the paraspinous tissues, vertebral column, or  within the spinal canal.      Impression    Impression:  Innumerable likely metastatic skeletal lesions within the  cervical, thoracic and lumbar spine. No similar lesions are entirely  intraosseous, but there is a lesion at S2-3 that effaces the spinal  canal and possible anterior epidural extension of tumor T7-T10.    I have personally reviewed the examination and initial interpretation  and I agree with the findings.    ELIF THOMAS MD         SYSTEM ID:  [id number]   MR Lumbar Spine w/o & w Contrast    Narrative    MR LUMBAR SPINE W/O & W CONTRAST, MR THORACIC SPINE W/O   W CONTRAST, MR CERVICAL SPINE W/O & W CONTRAST 4/6/2022 8:49 PM    History: Bone neoplasm, L/S-spine, recurrence suspected  ICD-10:    Comparison: None available    Technique:    Cervical spine: Sagittal  T1-weighted, sagittal T2-weighted, sagittal  STIR, sagittal diffusion-weighted, axial T1-weighted, and axial  T2-weighted images of the cervical spine were obtained without the  administration of intravenous contrast. After the administration of  intravenous contrast, fat saturated axial, coronal, and sagittal  T1-weighted images of the cervical spine were obtained.    Thoracic spine: Sagittal T1-weighted, sagittal T2-weighted, sagittal  STIR, sagittal diffusion weighted, axial T1-weighted, and axial  T2-weighted images of the thoracic spine were obtained without the  administration of intravenous contrast. After the administration of  intravenous contrast, fat saturated axial, coronal, and sagittal  T1-weighted images of the thoracic spine were obtained.    Lumbar spine: Sagittal T1-weighted, sagittal T2-weighted, sagittal  STIR, axial T1-weighted, and axial T2-weighted images of the lumbar  spine were obtained without the administration of intravenous  contrast. After the administration of intravenous contrast, axial and  sagittal fat-saturated T1-weighted images of the lumbar spine were  obtained.    Findings:    Cervical:  The cervical vertebrae appear normally aligned.  Segmentation anomaly C6-7. Chronic depression deformities of C4-C6.  Diffuse heterogeneous bone marrow signal appearance of the cervical  spine.  There is a larger T2 hyperintense subtly enhancing lesion in  left lateral vertebral body of C6. There is no abnormal signal within  the cervical spinal cord.  Small hemangioma in the inferior endplate  of C7. On a level by level basis:    C2-3: No spinal canal or neural foraminal stenosis.  C3-4: Mild left neural foraminal stenosis. No significant right or  spinal canal stenosis.  C4-5: Mild left neural foraminal stenosis. No significant right or  spinal canal stenosis.  C5-6: No spinal canal or neural foraminal stenosis.  C6-7: No spinal canal or neural foraminal stenosis.  C7-T1:No spinal canal or  neural foraminal stenosis.    No abnormal enhancement of the paraspinous tissues, vertebral column,  or within the spinal canal.    Thoracic: The thoracic vertebrae are in normal alignment. Diffuse  heterogeneous bone marrow signal on the thoracic spine.  There is no  abnormal signal within the thoracic spinal cord. Small hemangioma in  the vertebral body of T12. No spinal canal or neural foraminal  stenosis. No abnormal enhancement of the paraspinous tissues,  vertebral column, or within the spinal canal. Abnormal signal in the  apex of the right lung appears to represent largely fibrosis on the CT  earlier today. Question anterior epidural extension of tumor  immediately posterior to the vertebral bodies T7-10.    Lumbar: There are 5 lumbar-type vertebrae assumed for the purposes of  this dictation. Diffuse heterogeneous bone marrow signal on the lumbar  spine. The tip of the conus medullaris is at L2.  The lumbar vertebral  column appears normally aligned. There is no disc height narrowing .     On a level by level basis:    L1-2: No significant spinal canal or neural foraminal stenosis.    L2-3: No significant spinal canal or neural foraminal stenosis.    L3-4: No significant spinal canal or neural foraminal stenosis.    L4-5: No significant spinal canal or neural foraminal stenosis.    L5-S1: No significant spinal canal or neural foraminal stenosis.    Enhancing partially visualized mass at S2-3 centered in the posterior  elements but effacing the spinal canal measuring 2.2 cm in AP  dimension, and at least 3.4 cm in superior to inferior dimension..  Normal paraspinous soft tissues..     Enhancing lesions within the sacrum and visualized iliac bones. No  abnormal enhancement of the paraspinous tissues, vertebral column, or  within the spinal canal.      Impression    Impression:  Innumerable likely metastatic skeletal lesions within the  cervical, thoracic and lumbar spine. No similar lesions are  entirely  intraosseous, but there is a lesion at S2-3 that effaces the spinal  canal and possible anterior epidural extension of tumor T7-T10.    I have personally reviewed the examination and initial interpretation  and I agree with the findings.    ELIF THOMAS MD         SYSTEM ID:  [id number]   MR Thoracic Spine w/o & w Contrast    Narrative    MR LUMBAR SPINE W/O & W CONTRAST, MR THORACIC SPINE W/O   W CONTRAST, MR CERVICAL SPINE W/O & W CONTRAST 4/6/2022 8:49 PM    History: Bone neoplasm, L/S-spine, recurrence suspected  ICD-10:    Comparison: None available    Technique:    Cervical spine: Sagittal T1-weighted, sagittal T2-weighted, sagittal  STIR, sagittal diffusion-weighted, axial T1-weighted, and axial  T2-weighted images of the cervical spine were obtained without the  administration of intravenous contrast. After the administration of  intravenous contrast, fat saturated axial, coronal, and sagittal  T1-weighted images of the cervical spine were obtained.    Thoracic spine: Sagittal T1-weighted, sagittal T2-weighted, sagittal  STIR, sagittal diffusion weighted, axial T1-weighted, and axial  T2-weighted images of the thoracic spine were obtained without the  administration of intravenous contrast. After the administration of  intravenous contrast, fat saturated axial, coronal, and sagittal  T1-weighted images of the thoracic spine were obtained.    Lumbar spine: Sagittal T1-weighted, sagittal T2-weighted, sagittal  STIR, axial T1-weighted, and axial T2-weighted images of the lumbar  spine were obtained without the administration of intravenous  contrast. After the administration of intravenous contrast, axial and  sagittal fat-saturated T1-weighted images of the lumbar spine were  obtained.    Findings:    Cervical:  The cervical vertebrae appear normally aligned.  Segmentation anomaly C6-7. Chronic depression deformities of C4-C6.  Diffuse heterogeneous bone marrow signal appearance of the  cervical  spine.  There is a larger T2 hyperintense subtly enhancing lesion in  left lateral vertebral body of C6. There is no abnormal signal within  the cervical spinal cord.  Small hemangioma in the inferior endplate  of C7. On a level by level basis:    C2-3: No spinal canal or neural foraminal stenosis.  C3-4: Mild left neural foraminal stenosis. No significant right or  spinal canal stenosis.  C4-5: Mild left neural foraminal stenosis. No significant right or  spinal canal stenosis.  C5-6: No spinal canal or neural foraminal stenosis.  C6-7: No spinal canal or neural foraminal stenosis.  C7-T1:No spinal canal or neural foraminal stenosis.    No abnormal enhancement of the paraspinous tissues, vertebral column,  or within the spinal canal.    Thoracic: The thoracic vertebrae are in normal alignment. Diffuse  heterogeneous bone marrow signal on the thoracic spine.  There is no  abnormal signal within the thoracic spinal cord. Small hemangioma in  the vertebral body of T12. No spinal canal or neural foraminal  stenosis. No abnormal enhancement of the paraspinous tissues,  vertebral column, or within the spinal canal. Abnormal signal in the  apex of the right lung appears to represent largely fibrosis on the CT  earlier today. Question anterior epidural extension of tumor  immediately posterior to the vertebral bodies T7-10.    Lumbar: There are 5 lumbar-type vertebrae assumed for the purposes of  this dictation. Diffuse heterogeneous bone marrow signal on the lumbar  spine. The tip of the conus medullaris is at L2.  The lumbar vertebral  column appears normally aligned. There is no disc height narrowing .     On a level by level basis:    L1-2: No significant spinal canal or neural foraminal stenosis.    L2-3: No significant spinal canal or neural foraminal stenosis.    L3-4: No significant spinal canal or neural foraminal stenosis.    L4-5: No significant spinal canal or neural foraminal stenosis.    L5-S1: No  significant spinal canal or neural foraminal stenosis.    Enhancing partially visualized mass at S2-3 centered in the posterior  elements but effacing the spinal canal measuring 2.2 cm in AP  dimension, and at least 3.4 cm in superior to inferior dimension..  Normal paraspinous soft tissues..     Enhancing lesions within the sacrum and visualized iliac bones. No  abnormal enhancement of the paraspinous tissues, vertebral column, or  within the spinal canal.      Impression    Impression:  Innumerable likely metastatic skeletal lesions within the  cervical, thoracic and lumbar spine. No similar lesions are entirely  intraosseous, but there is a lesion at S2-3 that effaces the spinal  canal and possible anterior epidural extension of tumor T7-T10.    I have personally reviewed the examination and initial interpretation  and I agree with the findings.    ELIF THOMAS MD         SYSTEM ID:  [id number]   Echo Complete   Result Value    LVEF  60-65%    Narrative    [id number]  [id number]  [id number]  [id number]^SUDHAKAR^[place]     [facility]  Echocardiography Laboratory  [address] [place] [id number]     Name: MOISÉS DELGADO  MRN: [id number]  : 1965  Study Date: 2022 08:18 AM  Age: 56 yrs  Gender: Female  Patient Location: [facility]  Reason For Study: Murmur  Ordering Physician: NEVILLE DE LA FUENTE  Performed By: Dominic Hardy RDCS     BSA: 1.7 m2  Height: 64 in  Weight: 138 lb  HR: 93  BP: 169/82 mmHg  ______________________________________________________________________________  Procedure  Complete Portable Echo Adult.  ______________________________________________________________________________  Interpretation Summary  Left ventricular size, wall motion and function are normal. The ejection  fraction is 60-65%.     Right ventricular function, chamber size, wall motion, and thickness are  normal.     No pericardial effusion is present.     The inferior vena cava is  normal.     Compared to imaging on 2015 There are no significant changes.  ______________________________________________________________________________  Left Ventricle  Left ventricular size, wall motion and function are normal. The ejection  fraction is 60-65%. Left ventricular wall thickness is normal. Left  ventricular diastolic function is not assessable.     Right Ventricle  Right ventricular function, chamber size, wall motion, and thickness are  normal.     Atria  Both atria appear normal. The atrial septum is intact as assessed by color  Doppler .     Mitral Valve  The mitral valve is normal. Mild mitral annular calcification is present.     Aortic Valve  Aortic valve is normal in structure and function. The aortic valve is  tricuspid.     Tricuspid Valve  The peak velocity of the tricuspid regurgitant jet is not obtainable.  Pulmonary artery systolic pressure cannot be assessed.     Pulmonic Valve  The valve leaflets are not well visualized.     Vessels  The inferior vena cava is normal. Sinuses of Valsalva 2.6 cm. IVC diameter  <2.1 cm collapsing >50% with sniff suggests a normal RA pressure of 3 mmHg.     Pericardium  No pericardial effusion is present.     Compared to Previous Study  Compared to imaging on 2015 There are no significant changes.  ______________________________________________________________________________  Doppler Measurements & Calculations  MV E max landry: 75.2 cm/sec  E/E' av.0  Lateral E/e': 8.1  Medial E/e': 9.9     ______________________________________________________________________________  Report approved by: Juan Tinoco 2022 10:32 AM           Medications       letrozole  2.5 mg Oral Daily     mirtazapine  7.5 mg Oral At Bedtime     piperacillin-tazobactam  3.375 g Intravenous Q6H     sodium chloride (PF)  3 mL Intracatheter Q8H     vancomycin  750 mg Intravenous Q12H     venlafaxine  225 mg Oral Daily

## 2022-04-07 NOTE — PHARMACY-VANCOMYCIN DOSING SERVICE
"Pharmacy Vancomycin Initial Note  Date of Service 2022  Patient's  1965  56 year old, female  Actual Body Weight: 61 kg    Indication: Sepsis    Current estimated CrCl = Estimated Creatinine Clearance: 78.6 mL/min (based on SCr of 0.77 mg/dL).    Creatinine for last 3 days  2022: 11:53 AM Creatinine 1.05 mg/dL  2022: 10:15 AM Creatinine 1.01 mg/dL  2022:  6:50 AM Creatinine 0.77 mg/dL    Recent Vancomycin Level(s) for last 3 days  No results found for requested labs within last 72 hours.      Vancomycin IV Administrations (past 72 hours)      No vancomycin orders with administrations in past 72 hours.                Nephrotoxins and other renal medications (From now, onward)    Start     Dose/Rate Route Frequency Ordered Stop    22 1500  piperacillin-tazobactam (ZOSYN) 3.375 g vial to attach to  mL bag        Note to Pharmacy: For [facility], [facility] and [facility]: For Zosyn-naive patients, use the \"Zosyn initial dose + extended infusion\" order panel.    3.375 g  over 30 Minutes Intravenous EVERY 6 HOURS 22 1431      22 1500  vancomycin (VANCOCIN) 750 mg in sodium chloride 0.9 % 250 mL intermittent infusion         750 mg  over 60-90 Minutes Intravenous EVERY 12 HOURS 22 1441            Contrast Orders - past 72 hours (72h ago, onward)            None          Insight[employer] Prediction of Planned Initial Vancomycin Regimen    Loading dose: N/A  Regimen: 750 mg IV every 12 hours.  Start time: 14:40 on 2022  Exposure target: AUC24 (range) 400-600 mg/L.hr   AUC24,ss: 450 mg/L.hr  Probability of AUC24 > 400: 63 %  Ctrough,ss: 14.1 mg/L  Probability of Ctrough,ss > 20: 20 %  Probability of nephrotoxicity (Lodise FRANCESCA ): 9 %        Plan:  1. Start vancomycin 750 mg IV q12h.   2. Vancomycin monitoring method: AUC  3. Vancomycin therapeutic monitoring goal: 400-600 mg*h/L  4. Pharmacy will check vancomycin levels as appropriate in 1-3 Days.    5. Serum creatinine levels will be " ordered daily for the first week of therapy and at least twice weekly for subsequent weeks.      Kristan Boyd, [facility]

## 2022-04-07 NOTE — PROVIDER NOTIFICATION
DAVON  #7521-01    Febrile Tmax -101.2    Would you like new cultures?  Last was from 4/6 @10.15 am.    Thanks  Brandi

## 2022-04-08 NOTE — PROVIDER NOTIFICATION
"Dr. Bennett paged at [phone]    \"7D  7521-1: José Miguel ABDULLAHI /39, recheck 113/53. OVSS on RA. Denies lightheadedness/dizziness.\"  "

## 2022-04-08 NOTE — PLAN OF CARE
NEURO: Pt is A&Ox4  VITALS: Temp: 98.8  F (37.1  C) Temp src: Oral BP: (!) 124/39 Pulse: 99   Resp: 20 SpO2: 97 % O2 Device: None (Room air)    ELECTROLYTES: Na and K WDL. Calcium at 7.6  PAIN: Pt denies pain  CARDIAC: Tachycardia (101 bpm)  RESPIRATORY: WDL.Pt denies shortness of breath.  GI: WDL. Active bowel sounds in all four quadrants  : WDL. Pt ambulates to the bathroom independently. Urine collection device placed in patient's toilet.  IV: Peripheral IV; anterior; distal; left lower forearm. PIV running TKO.  ACTIVITY: Up ad hermelindo. Independent.  GOALS: Pt would like to meet with the MD to discuss plans for future plan of care.    Major Shift Events: Pt febrile 100.5F. Tylenol x1 administered at 0910. Fever reduced to 99.8F at 1000.  Plan: Continue with POC. Pt last BM 4/5/22 so senna docusate administered.

## 2022-04-08 NOTE — PROGRESS NOTES
Hematology / Oncology  Daily Progress Note   Date of Service: 04/08/2022  Patient: Josefina Bennett  MRN: [id number]  Admission Date: 4/6/2022  Hospital Day # 2  Cancer Diagnosis: H/o ER+ NJ+ HER2 neg Breast Cancer (T2NxMx)  Primary Outpatient Oncologist: Dr. Meza   Current Treatment Plan: TBD; pending biopsy     Assessment & Plan:   Josefina Bennett is a 56 year old female with past medical history of ER+ NJ+ HER2 neg breast cancer (2015), PJP pneumonia (2015), PTSD, depression, insomnia, traumatic small subarachnoid hemorrhage who is currently admitted for fever (102.5), tachycardia and body aches. Imaging found innumerable mixed lytic and sclerotic foci throughout axial skeleton concerning for metastatic disease.     Recommendations:   -  IR consult for biopsy of soft tissue sacral mass (ordered for you) - Messaged outpatient IR team for scheduling. IN PROCESS.   - please see Dr Meza's note from 4/7 for details of requested testing on this biopsy  - Agree with infectious work up and IV antibiotics per primary team   - Ok to discharge per oncologic perspective     # H/o Breast Cancer (ER+ NJ+ HER2 neg; T2NxMx)  # Innumerable mixed lytic and sclerotic foci concerning for metastatic disease   Per chart review. In brief, she noticed some distortion of her right breast in Jan 2015 and had a mammogram. Diagnostic mammogram (1/28/15) showed right breast architectural distoration raising concern for neoplasm. Biopsy shows infiltrating lobular carcinoma, grade 2 and a clip was placed. Tumor was ER +, NJ + and HER2 neg by IHC. Underwent MRI showing a tumor that was 4.8 x 1.7 x 3.2 cm in size. Overall, her clinical stage was T2 NX MX. She enrolled in the I-SPY-2 clinical trial and qualified with high-risk MammaPrint score. Randomized to ganetespib and paclitaxel x 12 cycles and then started AC (5/26/15) which was complicated by hospitalizations for intractable nausea/vomiting and PJP pneumonia. She opted to not  pursue further chemotherapy as of July 2015. Underwent right lumpectomy and sentinel LN procedure in Aug 2015. There was a positive margin and underwent re-excision and had a positive margin. Ultimately, underwent a right mastectomy with clear margins. She completed radiation Dec 2015. Pathologic stage was III-A, ypT3 N1a MX. Of concern, she had what could be considered an RCB3 tumor.  She started adjuvant letrozole on 1/28/16.   - Continue Letrozole 2.5 mg daily   - CT CAP 4/6 shows innumerable mixed lytic and sclerotic foci throughout the axial skeleton concerning for metastatic disease prominent abdominopelvic LN. Soft tissue mass in posterior sacrum. Asymmetrical thickening of superior bladder wall concerning for malignancy.   - MRI brain 4/6 w/o evidence of metastasis. MRI spine 4/6 shows innumerable likely metastatic skeletal lesion, S2-3 lesion that effaces the spinal canal and possible anterior epidural extension of tumor T7-10.  - Given history of breast cancer now with concern of recurrence recommend biopsy of sacral mass to confirm suspicion of breast cancer recurrence.   - Dr Meza updated    # Non-neutropenic Fever   # Concern for sepsis   Per chart review febrile to 102.5 and tachycardic to 120s. Infectious work up per primary team has been largely negative.   - Agree with following blood cultures   - UA is bland, however on imaging there is bladder wall thickening which may be cancerous inflammatory changes or infection  - Antibiotics per primary team; currently on IV Zosyn and Vanco   - On CT chest there is noted to be bilaterally apical scarring (R>L), could consider pulmonology curbside to evaluate imaging if this may be a source of infection     # Anemia   Transfuse to keep Hgb> 7 and platelets > 10k      We will continue to follow this patient. Please do not hesitate to page with any questions or concerns.     Patient was seen and plan of care was discussed with attending physician   "Cori.     Kallie Garrido PA-C ([place])  Hematology/Oncology   Pager: [phone]    I spent >30 minutes face-to-face and/or coordinating or discussing care plan. Over 50% of our time on the unit was spent counseling the patient and/or coordinating care    ___________________________________________________________________    Subjective & Interval History:    No acute events noted overnight.  Mary is overally feeling well today. Would really like to discharge if possible. No new symptoms or concerns. Had some chills yesterday with the fever but no focal signs or symptoms of infection.   Appetite good. Energy level good. Feeling well.       Complete and Comprehensive review of systems review and negative other than noted here or in the HPI.     Physical Exam:    Blood pressure 113/53, pulse 100, temperature 98.8  F (37.1  C), temperature source Oral, resp. rate 20, height 1.626 m (5' 4\"), weight 61.1 kg (134 lb 11.2 oz), last menstrual period 12/18/2014, SpO2 97 %, not currently breastfeeding.    Constitutional: Pleasant female sitting up in bed. Awake and conversational. Non- toxic appearing. No acute distress.   HEENT: Normocephalic, atraumatic. Sclerae anicteric. EOM intact. Moist mucus membranes  Respiratory: Breathing comfortable with no increased work on room air. No signs of respiratory distress or accessory muscle use.   GI: Abdomen is soft, non-distended  Skin: Skin is clean, dry, intact. No jaundice appreciated.   Musculoskeletal/ Extremities: No redness, warmth, or swelling of the joints appreciated. Distal pulses palpable. Nailbeds pink and without cyanosis or clubbing.   Neurologic: Alert and oriented. Speech normal. Grossly nonfocal. Memory and thought process preserved. Motor function normal.   Neuropsychiatric: Calm, affect congruent to situation. Appropriate mood and affect. Good judgment and insight. No visual/auditory hallucinations.       Labs & Studies: I personally reviewed the following " studies:  ROUTINE LABS (Last four results):  CMP  Recent Labs   Lab 04/08/22  0547 04/07/22  0650 04/06/22  1015 04/05/22  1153    139 137 142   POTASSIUM 3.7 3.6 4.1 4.0   CHLORIDE 114* 109 105 108   CO2 23 24 24 27   ANIONGAP 5 6 8 7   * 140* 128* 132*   BUN 11 8 10 14   CR 0.93 0.77 1.01 1.05*   GFRESTIMATED 72 90 65 62   VEE 7.6* 8.0* 8.9 9.4   PROTTOTAL  --  6.4* 7.4 7.3   ALBUMIN  --  2.9* 3.5 3.4   BILITOTAL  --  0.3 0.5 0.4   ALKPHOS  --  81 96 89   AST  --  40 43 40   ALT  --  33 40 37     CBC  Recent Labs   Lab 04/08/22  0547 04/06/22  1015 04/05/22  1153   WBC 7.6 10.3 9.7   RBC 3.55* 3.87 4.12   HGB 9.5* 10.7* 11.2*   HCT 29.7* 32.7* 35.0   MCV 84 85 85   MCH 26.8 27.6 27.2   MCHC 32.0 32.7 32.0   RDW 16.6* 16.6* 16.4*   * 165 196     INRNo lab results found in last 7 days.    Medications list for reference:  Current Facility-Administered Medications   Medication     acetaminophen (TYLENOL) tablet 650 mg     enoxaparin ANTICOAGULANT (LOVENOX) injection 40 mg     HYDROmorphone (DILAUDID) half-tab 1-2 mg     HYDROmorphone (DILAUDID) injection 0.2 mg     letrozole (FEMARA) tablet 2.5 mg     lidocaine (LMX4) cream     lidocaine 1 % 0.1-1 mL     melatonin tablet 1 mg     mirtazapine (REMERON) tablet TABS 7.5 mg     ondansetron (ZOFRAN-ODT) ODT tab 4 mg    Or     ondansetron (ZOFRAN) injection 4 mg     piperacillin-tazobactam (ZOSYN) 3.375 g vial to attach to  mL bag     senna-docusate (SENOKOT-S/PERICOLACE) 8.6-50 MG per tablet 1 tablet    Or     senna-docusate (SENOKOT-S/PERICOLACE) 8.6-50 MG per tablet 2 tablet     sodium chloride (PF) 0.9% PF flush 3 mL     sodium chloride (PF) 0.9% PF flush 3 mL     vancomycin (VANCOCIN) 750 mg in sodium chloride 0.9 % 250 mL intermittent infusion     venlafaxine (EFFEXOR-ER) 24 hr tablet 225 mg     Current Outpatient Medications   Medication     calcium carbonate (OS- MG [place]. CA) 500 MG tablet     cholecalciferol (VITAMIN D3) 1000 UNIT  tablet     clindamycin (CLINDAMAX) 1 % topical gel     letrozole (FEMARA) 2.5 MG tablet     mirtazapine (REMERON) 7.5 MG tablet     Multiple Vitamins-Minerals (MULTIVITAMIN ADULTS PO)     venlafaxine (EFFEXOR-ER) 225 MG 24 hr tablet

## 2022-04-08 NOTE — PLAN OF CARE
"/55 (BP Location: Right arm)   Pulse 107   Temp 100.3  F (37.9  C) (Oral)   Resp 20   Ht 1.626 m (5' 4\")   Wt 61 kg (134 lb 7.7 oz)   LMP 12/18/2014   SpO2 98%   BMI 23.08 kg/m      Pt A&Ox4, up independently to the bathroom and hallways.Denies SOB/N/V and endorsed headache pain.Tylenol x1 given, pt stated Dilaudid gives her \" a bad headache when it wears out.\" Febrile intermittently throughout the day, Tmax 101.2, non-neutropenic.Triggered sepsis, lactic 0.6. MRSA swab negative. On vanco and zosyn abxs. Cultures done, results pending. Last BM 4/5. Continue with POC.                      "

## 2022-04-08 NOTE — PLAN OF CARE
Goal Outcome Evaluation:    [id number]    A/Ox4. Tmax 100.5 - provider notified and Tylenol given PRN x1 with full effect. Tachycardic. OVSS on RA. Denies pain, SOB and N/V. Good appetite. Pt voiding spontaneously with adequate UOP. LBM 4/5- senna PRN. PIV removed prior to discharge.     Discharge  D: Orders for discharge and outpatient medications written.  I: Home medications and return to clinic schedule reviewed with patient. Discharge instructions and parameters for calling Health Care Provider reviewed. Patient left at 1430 accompanied by significant other.   A: Patient/family verbalized understanding and was ready for discharge.   P: Follow up as scheduled.

## 2022-04-08 NOTE — PROVIDER NOTIFICATION
"Dr. Bennett paged at [phone]:    \"7D  7521-1: [facility] temp 100.5, last BCX 4/7 at 1900. On Zosyn. Will give Tylenol. Let me know if you would like any other interventions.\"  "

## 2022-04-08 NOTE — DISCHARGE INSTRUCTIONS
Your blood cultures were negative for 48 hours. We suspect these fevers are either from the reclast infusion or from cancer.     Reasons to contact us and who to contact:  [facility] [place] [facility] clinic triage line at [phone] for temp > or = 100.4, uncontrolled nausea/vomiting/diarrhea/constipation, unrelieved pain, bleeding not relieved with pressure, dizziness, chest pain, shortness of breath, loss of consciousness, and any new or concerning symptoms.

## 2022-04-08 NOTE — PROGRESS NOTES
Febrile to 100.8 F. Some mild tachycardia and subjective dyspnea. Blood cultures drawn mid evening of 4/7/2022. Will monitor for now. If worsening hemodynamics would bolus fluids.    Sergo Araujo D.O.  Internal Medicine, Hospitalist  [facility]  Pager: [phone]

## 2022-04-08 NOTE — PROVIDER NOTIFICATION
"Notified [id number]:    \"Pt had a temp of 100.8. She is complaining of chills and shortness of breath of which she says she has been having the latter for a few days. She is also slightly tachy. Looks like she is already on antibiotics. Any other interventions?\"  "

## 2022-04-08 NOTE — PLAN OF CARE
[id number]: T-max of 100.8, tylenol given x1. Last blood cultures were drawn yesterday evening and pt was already on Vanco and Zosyn, so no new interventions were initiated. Denies pain and nausea. Continues to be on vanco and Zosyn. PIV running TKO. Continue with POC.

## 2022-04-09 NOTE — DISCHARGE SUMMARY
[facility]  Hospitalist Discharge Summary      Date of Admission:  4/6/2022  Date of Discharge:  4/8/2022  2:31 PM  Discharging Provider: Cyndi Bennett MD  Discharge Service: Hospitalist Service, GOLD TEAM 7    Discharge Diagnoses   Fever with concerns for sepsis (ruled out)  Innumerable mixed lytic and sclerotic foci concerning for metastatic breast cancer       Follow-ups Needed After Discharge   Follow-up Appointments     Adult [facility] Follow-up and recommended labs and tests      Follow up with Dr. Meza in 1-2 weeks    Appointments on [place] and/or [facility] (with [facility] or [facility]   provider or service). Call [phone] if you haven't heard regarding   these appointments within 7 days of discharge.             Unresulted Labs Ordered in the Past 30 Days of this Admission     Date and Time Order Name Status Description    4/7/2022  5:34 PM Blood Culture Hand, Left Preliminary     4/7/2022  5:34 PM Blood Culture Hand, Left Preliminary     4/6/2022 10:10 AM Blood Culture Hand, Left Preliminary     4/6/2022 10:10 AM Blood Culture Peripheral Blood Preliminary       These results will be followed up by hospitalist pool    Discharge Disposition   Discharged to home  Condition at discharge: Stable    Hospital Course         Josefina Bennett is a very pleasant 56 year old female with a past medical history significant for breast cancer stage 2015 in remission, PJP pneumonia 2015 related to chemotherapy immunosuppression, PTSD, depression, insomnia, traumatic small subarachnoid hemorrhage who presented to outpatient infusion center for her first dose of Reclast day prior to admission. Presented to [facility] ED with high fever 102.5F, tachycardia and body aches; unfortunately found to have innumerable mixed lytic and sclerotic foci throughout the axial skeleton concerning for metastatic disease on CT.    Fever of unclear source--   Met sepsis criteria with temp,  tachycardia but no obvious source (negative UA, CTA w/o infiltrate or PE). Etiology thought to either be reclast infusion so antibiotics were initially deferred. After she spiked further fevers antibiotics were initiated (vanc and pip/tazo). Cultures were negative x 48 hours and her fever curve was improving so decision made with hematology to discharge to home. Fevers also could be due to malignancy.    Bladder wall thickening, sacral mass  Lytic and Sclerotic Foci Axillary Skeleton  Prominent abdominopelvic lymph nose  Ascites  Superior Medial Spleen Hypodensity  Incidentally found during infectious work up related to fever and elevated D-DIMER. High suspicion for metastatic disease (e.g. from prior breast cancer per oncology team  -oncology following, appreciate assistance  --they'd like to get tissue- IR consulted for planning of biopsy to be done as outpatient    H/o Breast Cancer- pathologic stage III-A, ypT3 N1a MX  She was enrolled in the I-SPY-2 clinical trial, and qualified with high-risk MammaPrint score. She was randomized to ganetespib and paclitaxel, and was treated with 12 cycles of treatment; then started on AC on 05/26/2015. She developed intractable nausea and vomiting after the first cycle, which required hospitalization on the second cycle. She then developed Pneumocystis pneumonia, which resulted in intubation and a 2-week hospitalization during the course of AC. She was discharged on 07/02/2015, and decided she did not want to pursue any further chemotherapy. She had a right lumpectomy and sentinel lymph node procedure 08/12/2015. She had a positive margin, underwent a re-excision, and had a positive margin. Ultimately, she underwent a right mastectomy with clear margins. She began radiation treatment with Dr. Bill Chauhan, and completed radiation therapy on 12/04/2015. Pathologic stage was III-A, ypT3 N1a MX. Of concern, she had what could be considered an RCB3 tumor meaning significant risk  of recurrence of her breast cancer during the 5-year period following primary therapy.    1.  Neoadjuvant therapy on I SPY-2 with ganetespib and paclitaxel.  AC x 2 complicated by PCP pneumonia.   2.  Right lumpectomy, followed by right margin resections, followed by rigth mastectomy.    3.  Oophorectomy 10-16-15.    4.  Tamoxifen 10-6-16.    5.  Radiation therapy. 5,040 cGy in 180 cGy/fraction to the chest wall and regional lymphatics followed by 720 cGy axillary lymph node boost and 1000 cGy mastectomy scar boost. Completed on 12/3/2015.   6.   Adjuvant letrozole begun 1-28-16.   - As above, CT suspicious for metastatic spread. Oncology consult placed.   - Continue Letrozole    Depression  Insomnia  PTSD   - Continue PTA Effexor and Remeron    Anemia  Hemoglobin 10.7.  No evidence of active bleeding. Looks most consistent with anemia of chronic disease  -monitor for bleeding    Murmur- suspect flow murmur since normal echo    Consultations This Hospital Stay   VASCULAR ACCESS CARE ADULT IP CONSULT  CARE MANAGEMENT / SOCIAL WORK IP CONSULT  ONCOLOGY ADULT IP CONSULT  INTERVENTIONAL RADIOLOGY ADULT/PEDS IP CONSULT  PHARMACY TO DOSE VANCO    Code Status   Prior    Time Spent on this Encounter   I, Cyndi Bennett MD, personally saw the patient today and spent greater than 30 minutes discharging this patient.       Cyndi Bennett MD  [facility] [address] [place] [id number]  Phone: [phone]  ______________________________________________________________________    Physical Exam   Vital Signs: Temp: 98.8  F (37.1  C) Temp src: Oral BP: 113/53 Pulse: 100   Resp: 20 SpO2: 97 % O2 Device: None (Room air)    Weight: 134 lbs 11.2 oz  Constitutional: Awake, alert and in no distress. Sitting  comfortably in bed. Skin mildly clammy  Head: Normocephalic. Atraumatic.   Cardiovascular: Regular rate and rhythm. No murmurs, clicks, gallops or rubs. No edema   Respiratory: Clear to  auscultation without wheezes or crackles. Normal respiratory rate and effort.   Gastrointestinal: Abdomen soft, non-tender. BS normal.   Musculoskeletal: extremities normal- no gross deformities noted and normal muscle tone  Skin: no suspicious lesions, rashes, jaundice, or cyanosis   Psychiatric: mentation appears normal and affect normal/bright         Primary Care Physician   Camille Palafoxia    Discharge Orders      Reason for your hospital stay    You were hospitalized for fever- work-up didn't show a bacterial infection.     Activity    Your activity upon discharge: activity as tolerated     Adult [facility] Follow-up and recommended labs and tests    Follow up with Dr. Meza in 1-2 weeks    Appointments on [place] and/or [facility] (with [facility] or [facility] provider or service). Call [phone] if you haven't heard regarding these appointments within 7 days of discharge.     Diet    Follow this diet upon discharge: Orders Placed This Encounter      Regular Diet Adult       Significant Results and Procedures   Most Recent 3 CBC's:Recent Labs   Lab Test 04/08/22  0547 04/06/22  1015 04/05/22  1153   WBC 7.6 10.3 9.7   HGB 9.5* 10.7* 11.2*   MCV 84 85 85   * 165 196     Most Recent 3 BMP's:Recent Labs   Lab Test 04/08/22  0547 04/07/22  0650 04/06/22  1015    139 137   POTASSIUM 3.7 3.6 4.1   CHLORIDE 114* 109 105   CO2 23 24 24   BUN 11 8 10   CR 0.93 0.77 1.01   ANIONGAP 5 6 8   VEE 7.6* 8.0* 8.9   * 140* 128*     Most Recent 2 LFT's:Recent Labs   Lab Test 04/07/22  0650 04/06/22  1015   AST 40 43   ALT 33 40   ALKPHOS 81 96   BILITOTAL 0.3 0.5   ,   Results for orders placed or performed during the hospital encounter of 04/06/22   XR Chest 2 Views    Narrative    Chest 2 views    INDICATION: Fever, shortness of breath    COMPARISON: 10/14/2015    FINDINGS: Mild apical scarring unchanged. No definite infiltrate or  consolidation. Heart size normal. Osteopenia.      Impression     IMPRESSION: Mild apical scarring. No acute findings radiographically.    HASMUKH CAMILO MD         SYSTEM ID:  [id number]   CT Chest Pulmonary Embolism w Contrast    Narrative    Examination:  CT CHEST PULMONARY EMBOLISM W CONTRAST 4/6/2022 3:28 PM     Comparison: CT chest 6/22/2015    History: PE suspected, low/intermediate prob, positive D-dimer; chest  pain    TECHNIQUE: Volumetric helical acquisition of CT images of the chest  from the lung apices to the kidneys were acquired in arterial phase  after the administration of IV contrast. Three-dimensional (3D)  post-processed angiographic images were reconstructed, archived to  PACS and used in interpretation of this study.     Contrast dose: 75 mL Isovue-370    FINDINGS:  Chest:  There is adequate opacification of the main and lobar pulmonary  arteries. No filling defect in the lobar and main segmental pulmonary  arteries to suggest pulmonary embolism.  There is no right-sided heart  strain.The heart size is normal. No pericardial effusion. No  significant coronary artery calcifications. Normal caliber main  pulmonary artery and thoracic aorta. Conventional 3 vessel branching  aortic arch. No axillary lymphadenopathy. Stable borderline enlarged  right hilar lymph node measuring up to 11 mm in short axis. Normal  caliber esophagus. Right mastectomy.    The central tracheobronchial tree is patent. Biapical scarring,  greater on the right. Postradiation changes on the anterior right  upper and right middle lobes. Bibasilar dependent atelectasis. No  pneumothorax, pleural effusion, or focal airspace consolidation.  Scattered areas of peripheral mucous plugging. No suspicious pulmonary  nodules.    Upper abdomen:  No focal abnormalities in the visualized upper abdomen. Evaluation is  somewhat limited by contrast bolus timing.    Bones:  Innumerable mixed lytic and sclerotic foci seen throughout the spine,  sternum, and ribs. Multilevel degenerative changes of the  spine. Mild  wedging of the midthoracic spine, likely chronic. No acute osseous  abnormality.      Impression    IMPRESSION:   1. No evidence of pulmonary embolism.  2. No acute airspace disease.  3. Post radiation changes seen throughout the anterior right lung.  4. Innumerable mixed lytic and sclerotic foci throughout the axial  skeleton, concerning for metastatic disease.    I have personally reviewed the examination and initial interpretation  and I agree with the findings.    HASMUKH CAMILO MD         SYSTEM ID:  [id number]   MR Brain w/o & w Contrast    Narrative     MR BRAIN W/O & W CONTRAST 4/6/2022 8:47 PM    Provided History: Bony metastases, fever. Breast primary.    Comparison: 6/30/2015.    Technique: Multiplanar T1-weighted, axial FLAIR, and susceptibility  images were obtained without intravenous contrast. Following  intravenous gadolinium-based contrast administration, axial  T2-weighted, diffusion, and T1-weighted images (in multiple planes)  were obtained.    Contrast: 7.5 cc intravenous Gadavist    Findings:  There is no mass effect, midline shift, or evidence of intracranial  hemorrhage. The ventricles are proportionate to the cerebral sulci.  Normal major vascular intracranial flow-voids.    Postcontrast images demonstrate no abnormal intracranial enhancement.  No areas of restricted diffusion. A few punctate foci of T2  hyperintense signal in the subcortical white matter, nonspecific.  Patchy T2 hyperintensity is also seen in the lentiform nuclei  bilaterally and in the head/body junction of the right caudate  nucleus. Unchanged susceptibility in the globi pallidi and in the  right caudate nucleus. No new areas of significant susceptibility  artifact to suggest acute intracranial hemorrhage.     No abnormality of the skull marrow signal. The visualized portions of  paranasal sinuses  are relatively clear. Bilateral mastoid air cell  effusions. The orbits are grossly unremarkable. Mildly  heterogeneous  marrow signal.      Impression    Impression:   1. No brain metastases, abscess, or meningitis identified.   2. Little change in nonspecific white matter and basal ganglia signal  changes, likely at least in part related to chronic small vessel  ischemic disease.  3. Continued bilateral mastoid effusions.    I have personally reviewed the examination and initial interpretation  and I agree with the findings.    ELIF THOMAS MD         SYSTEM ID:  [id number]   CT Abdomen Pelvis w Contrast    Narrative    EXAMINATION: CT ABDOMEN PELVIS W CONTRAST, 4/6/2022 5:14 PM    TECHNIQUE:  Helical CT images from the lung bases through the  symphysis pubis were obtained with contrast.  Coronal reformatted  images were generated at a workstation for further assessment.    CONTRAST:  82 cc Isovue 370 IV.    COMPARISON: Same-day chest CT, 2/10/2015 PET/CT    HISTORY: breast ca with bony mets, eval for intraabdominal mets    FINDINGS:    LOWER THORAX:  Intralobular septal thickening. No pleural effusions.     ABDOMEN AND PELVIS:     Liver: No suspicious liver lesions. Patent portal veins.    Gallbladder: No gallstones. No evidence of acute cholecystitis.    Spleen: Borderline enlarged, measuring 13.0 cm in craniocaudal  dimension. Ill-defined hypodensity in the superior medial spleen  (series 3 image 47).    Pancreas: No suspicious pancreatic lesions. The pancreatic duct is not  dilated.    Adrenal glands: No adrenal nodules.    Kidneys: No kidney masses. No hydronephrosis or obstructing renal  stones.    Bladder / Pelvic organs: Asymmetric thickening of the superior bladder  wall, measuring up to 1.0 cm. Bladder is filled with contrast without  filling defect. Surgically absent uterus and ovaries.    Bowel: No small or large bowel dilatation. Appendix is surgically  absent.    Lymph nodes: Multiple prominent para-aortic, iliac chain and  mesenteric lymph nodes. For example 8 mm central mesenteric (series 3  image  169).    Peritoneum/retroperitoneum: Small volume ascites. Fat stranding and  haziness throughout the retroperitoneum, mesentery, and greater  omentum without clear soft tissue nodularity, though there is  suggestion of soft tissue thickening about the IVC and aorta and along  the serosal aspect of the ascending and hepatic flexure colon.  Crescentic enhancing tissue along the medial aspect of the superior  spleen underneath the left hemidiaphragm (series 3 image 34).    Vessels: No infrarenal aortic aneurysm. Atherosclerotic calcifications  of the abdominal aorta and its major branches.    Bones and soft tissues: Innumerable mixed lytic and sclerotic lesions  in axial and visualized appendicular skeleton. Associated soft tissue  mass measuring up to 3.6 cm in craniocaudal dimension in the posterior  sacrum.      Impression    IMPRESSION:   1.  Prominent abdominopelvic lymph nodes, small volume ascites, and  extensive retroperitoneal, mesenteric, and omental fat  stranding/haziness with suggestion of soft tissue thickening about the  IVC and aorta and along the serosal aspect of the ascending/hepatic  flexure colon, findings which may reflect a systemic inflammatory  process, though disseminated malignancy remains a concern. Consider  diagnostic paracentesis, though the small volume of ascites may not be  amenable to sampling.  2.  Innumerable mixed lytic and sclerotic lesions in the skeleton,  compatible with metastatic disease. Associated soft tissue mass in the  posterior sacrum  3.  Asymmetric thickening of the superior bladder wall, concerning for  malignancy. No intraluminal bladder mass.  4.  Crescentic enhancing tissue under the left hemidiaphragm with  adjacent ill-defined hypodensity in the superior medial spleen,  indeterminate but also raising concern for metastatic disease.  Recommend attention on follow-up.  5.  Interlobular septal thickening in the lung bases suggestive of  pulmonary edema.    I have  personally reviewed the examination and initial interpretation  and I agree with the findings.    DEVORA DHILLON DO         SYSTEM ID:  [id number]   MR Cervical Spine w/o & w Contrast    Narrative    MR LUMBAR SPINE W/O & W CONTRAST, MR THORACIC SPINE W/O   W CONTRAST, MR CERVICAL SPINE W/O & W CONTRAST 4/6/2022 8:49 PM    History: Bone neoplasm, L/S-spine, recurrence suspected  ICD-10:    Comparison: None available    Technique:    Cervical spine: Sagittal T1-weighted, sagittal T2-weighted, sagittal  STIR, sagittal diffusion-weighted, axial T1-weighted, and axial  T2-weighted images of the cervical spine were obtained without the  administration of intravenous contrast. After the administration of  intravenous contrast, fat saturated axial, coronal, and sagittal  T1-weighted images of the cervical spine were obtained.    Thoracic spine: Sagittal T1-weighted, sagittal T2-weighted, sagittal  STIR, sagittal diffusion weighted, axial T1-weighted, and axial  T2-weighted images of the thoracic spine were obtained without the  administration of intravenous contrast. After the administration of  intravenous contrast, fat saturated axial, coronal, and sagittal  T1-weighted images of the thoracic spine were obtained.    Lumbar spine: Sagittal T1-weighted, sagittal T2-weighted, sagittal  STIR, axial T1-weighted, and axial T2-weighted images of the lumbar  spine were obtained without the administration of intravenous  contrast. After the administration of intravenous contrast, axial and  sagittal fat-saturated T1-weighted images of the lumbar spine were  obtained.    Findings:    Cervical:  The cervical vertebrae appear normally aligned.  Segmentation anomaly C6-7. Chronic depression deformities of C4-C6.  Diffuse heterogeneous bone marrow signal appearance of the cervical  spine.  There is a larger T2 hyperintense subtly enhancing lesion in  left lateral vertebral body of C6. There is no abnormal signal within  the cervical  spinal cord.  Small hemangioma in the inferior endplate  of C7. On a level by level basis:    C2-3: No spinal canal or neural foraminal stenosis.  C3-4: Mild left neural foraminal stenosis. No significant right or  spinal canal stenosis.  C4-5: Mild left neural foraminal stenosis. No significant right or  spinal canal stenosis.  C5-6: No spinal canal or neural foraminal stenosis.  C6-7: No spinal canal or neural foraminal stenosis.  C7-T1:No spinal canal or neural foraminal stenosis.    No abnormal enhancement of the paraspinous tissues, vertebral column,  or within the spinal canal.    Thoracic: The thoracic vertebrae are in normal alignment. Diffuse  heterogeneous bone marrow signal on the thoracic spine.  There is no  abnormal signal within the thoracic spinal cord. Small hemangioma in  the vertebral body of T12. No spinal canal or neural foraminal  stenosis. No abnormal enhancement of the paraspinous tissues,  vertebral column, or within the spinal canal. Abnormal signal in the  apex of the right lung appears to represent largely fibrosis on the CT  earlier today. Question anterior epidural extension of tumor  immediately posterior to the vertebral bodies T7-10.    Lumbar: There are 5 lumbar-type vertebrae assumed for the purposes of  this dictation. Diffuse heterogeneous bone marrow signal on the lumbar  spine. The tip of the conus medullaris is at L2.  The lumbar vertebral  column appears normally aligned. There is no disc height narrowing .     On a level by level basis:    L1-2: No significant spinal canal or neural foraminal stenosis.    L2-3: No significant spinal canal or neural foraminal stenosis.    L3-4: No significant spinal canal or neural foraminal stenosis.    L4-5: No significant spinal canal or neural foraminal stenosis.    L5-S1: No significant spinal canal or neural foraminal stenosis.    Enhancing partially visualized mass at S2-3 centered in the posterior  elements but effacing the spinal  canal measuring 2.2 cm in AP  dimension, and at least 3.4 cm in superior to inferior dimension..  Normal paraspinous soft tissues..     Enhancing lesions within the sacrum and visualized iliac bones. No  abnormal enhancement of the paraspinous tissues, vertebral column, or  within the spinal canal.      Impression    Impression:  Innumerable likely metastatic skeletal lesions within the  cervical, thoracic and lumbar spine. No similar lesions are entirely  intraosseous, but there is a lesion at S2-3 that effaces the spinal  canal and possible anterior epidural extension of tumor T7-T10.    I have personally reviewed the examination and initial interpretation  and I agree with the findings.    ELIF THOMAS MD         SYSTEM ID:  [id number]   MR Lumbar Spine w/o & w Contrast    Narrative    MR LUMBAR SPINE W/O & W CONTRAST, MR THORACIC SPINE W/O   W CONTRAST, MR CERVICAL SPINE W/O & W CONTRAST 4/6/2022 8:49 PM    History: Bone neoplasm, L/S-spine, recurrence suspected  ICD-10:    Comparison: None available    Technique:    Cervical spine: Sagittal T1-weighted, sagittal T2-weighted, sagittal  STIR, sagittal diffusion-weighted, axial T1-weighted, and axial  T2-weighted images of the cervical spine were obtained without the  administration of intravenous contrast. After the administration of  intravenous contrast, fat saturated axial, coronal, and sagittal  T1-weighted images of the cervical spine were obtained.    Thoracic spine: Sagittal T1-weighted, sagittal T2-weighted, sagittal  STIR, sagittal diffusion weighted, axial T1-weighted, and axial  T2-weighted images of the thoracic spine were obtained without the  administration of intravenous contrast. After the administration of  intravenous contrast, fat saturated axial, coronal, and sagittal  T1-weighted images of the thoracic spine were obtained.    Lumbar spine: Sagittal T1-weighted, sagittal T2-weighted, sagittal  STIR, axial T1-weighted, and axial T2-weighted  images of the lumbar  spine were obtained without the administration of intravenous  contrast. After the administration of intravenous contrast, axial and  sagittal fat-saturated T1-weighted images of the lumbar spine were  obtained.    Findings:    Cervical:  The cervical vertebrae appear normally aligned.  Segmentation anomaly C6-7. Chronic depression deformities of C4-C6.  Diffuse heterogeneous bone marrow signal appearance of the cervical  spine.  There is a larger T2 hyperintense subtly enhancing lesion in  left lateral vertebral body of C6. There is no abnormal signal within  the cervical spinal cord.  Small hemangioma in the inferior endplate  of C7. On a level by level basis:    C2-3: No spinal canal or neural foraminal stenosis.  C3-4: Mild left neural foraminal stenosis. No significant right or  spinal canal stenosis.  C4-5: Mild left neural foraminal stenosis. No significant right or  spinal canal stenosis.  C5-6: No spinal canal or neural foraminal stenosis.  C6-7: No spinal canal or neural foraminal stenosis.  C7-T1:No spinal canal or neural foraminal stenosis.    No abnormal enhancement of the paraspinous tissues, vertebral column,  or within the spinal canal.    Thoracic: The thoracic vertebrae are in normal alignment. Diffuse  heterogeneous bone marrow signal on the thoracic spine.  There is no  abnormal signal within the thoracic spinal cord. Small hemangioma in  the vertebral body of T12. No spinal canal or neural foraminal  stenosis. No abnormal enhancement of the paraspinous tissues,  vertebral column, or within the spinal canal. Abnormal signal in the  apex of the right lung appears to represent largely fibrosis on the CT  earlier today. Question anterior epidural extension of tumor  immediately posterior to the vertebral bodies T7-10.    Lumbar: There are 5 lumbar-type vertebrae assumed for the purposes of  this dictation. Diffuse heterogeneous bone marrow signal on the lumbar  spine. The tip  of the conus medullaris is at L2.  The lumbar vertebral  column appears normally aligned. There is no disc height narrowing .     On a level by level basis:    L1-2: No significant spinal canal or neural foraminal stenosis.    L2-3: No significant spinal canal or neural foraminal stenosis.    L3-4: No significant spinal canal or neural foraminal stenosis.    L4-5: No significant spinal canal or neural foraminal stenosis.    L5-S1: No significant spinal canal or neural foraminal stenosis.    Enhancing partially visualized mass at S2-3 centered in the posterior  elements but effacing the spinal canal measuring 2.2 cm in AP  dimension, and at least 3.4 cm in superior to inferior dimension..  Normal paraspinous soft tissues..     Enhancing lesions within the sacrum and visualized iliac bones. No  abnormal enhancement of the paraspinous tissues, vertebral column, or  within the spinal canal.      Impression    Impression:  Innumerable likely metastatic skeletal lesions within the  cervical, thoracic and lumbar spine. No similar lesions are entirely  intraosseous, but there is a lesion at S2-3 that effaces the spinal  canal and possible anterior epidural extension of tumor T7-T10.    I have personally reviewed the examination and initial interpretation  and I agree with the findings.    ELIF THOMAS MD         SYSTEM ID:  [id number]   MR Thoracic Spine w/o & w Contrast    Narrative    MR LUMBAR SPINE W/O & W CONTRAST, MR THORACIC SPINE W/O   W CONTRAST, MR CERVICAL SPINE W/O & W CONTRAST 4/6/2022 8:49 PM    History: Bone neoplasm, L/S-spine, recurrence suspected  ICD-10:    Comparison: None available    Technique:    Cervical spine: Sagittal T1-weighted, sagittal T2-weighted, sagittal  STIR, sagittal diffusion-weighted, axial T1-weighted, and axial  T2-weighted images of the cervical spine were obtained without the  administration of intravenous contrast. After the administration of  intravenous contrast, fat saturated  axial, coronal, and sagittal  T1-weighted images of the cervical spine were obtained.    Thoracic spine: Sagittal T1-weighted, sagittal T2-weighted, sagittal  STIR, sagittal diffusion weighted, axial T1-weighted, and axial  T2-weighted images of the thoracic spine were obtained without the  administration of intravenous contrast. After the administration of  intravenous contrast, fat saturated axial, coronal, and sagittal  T1-weighted images of the thoracic spine were obtained.    Lumbar spine: Sagittal T1-weighted, sagittal T2-weighted, sagittal  STIR, axial T1-weighted, and axial T2-weighted images of the lumbar  spine were obtained without the administration of intravenous  contrast. After the administration of intravenous contrast, axial and  sagittal fat-saturated T1-weighted images of the lumbar spine were  obtained.    Findings:    Cervical:  The cervical vertebrae appear normally aligned.  Segmentation anomaly C6-7. Chronic depression deformities of C4-C6.  Diffuse heterogeneous bone marrow signal appearance of the cervical  spine.  There is a larger T2 hyperintense subtly enhancing lesion in  left lateral vertebral body of C6. There is no abnormal signal within  the cervical spinal cord.  Small hemangioma in the inferior endplate  of C7. On a level by level basis:    C2-3: No spinal canal or neural foraminal stenosis.  C3-4: Mild left neural foraminal stenosis. No significant right or  spinal canal stenosis.  C4-5: Mild left neural foraminal stenosis. No significant right or  spinal canal stenosis.  C5-6: No spinal canal or neural foraminal stenosis.  C6-7: No spinal canal or neural foraminal stenosis.  C7-T1:No spinal canal or neural foraminal stenosis.    No abnormal enhancement of the paraspinous tissues, vertebral column,  or within the spinal canal.    Thoracic: The thoracic vertebrae are in normal alignment. Diffuse  heterogeneous bone marrow signal on the thoracic spine.  There is no  abnormal signal  within the thoracic spinal cord. Small hemangioma in  the vertebral body of T12. No spinal canal or neural foraminal  stenosis. No abnormal enhancement of the paraspinous tissues,  vertebral column, or within the spinal canal. Abnormal signal in the  apex of the right lung appears to represent largely fibrosis on the CT  earlier today. Question anterior epidural extension of tumor  immediately posterior to the vertebral bodies T7-10.    Lumbar: There are 5 lumbar-type vertebrae assumed for the purposes of  this dictation. Diffuse heterogeneous bone marrow signal on the lumbar  spine. The tip of the conus medullaris is at L2.  The lumbar vertebral  column appears normally aligned. There is no disc height narrowing .     On a level by level basis:    L1-2: No significant spinal canal or neural foraminal stenosis.    L2-3: No significant spinal canal or neural foraminal stenosis.    L3-4: No significant spinal canal or neural foraminal stenosis.    L4-5: No significant spinal canal or neural foraminal stenosis.    L5-S1: No significant spinal canal or neural foraminal stenosis.    Enhancing partially visualized mass at S2-3 centered in the posterior  elements but effacing the spinal canal measuring 2.2 cm in AP  dimension, and at least 3.4 cm in superior to inferior dimension..  Normal paraspinous soft tissues..     Enhancing lesions within the sacrum and visualized iliac bones. No  abnormal enhancement of the paraspinous tissues, vertebral column, or  within the spinal canal.      Impression    Impression:  Innumerable likely metastatic skeletal lesions within the  cervical, thoracic and lumbar spine. No similar lesions are entirely  intraosseous, but there is a lesion at S2-3 that effaces the spinal  canal and possible anterior epidural extension of tumor T7-T10.    I have personally reviewed the examination and initial interpretation  and I agree with the findings.    ELIF THOMAS MD         SYSTEM ID:  [id number]    Echo Complete     Value    LVEF  60-65%    [facility]    [id number]  [id number]  [id number]  [id number]^SUDHAKAR^[place]     [facility]  Echocardiography Laboratory  [address] [place] [address]     Name: MOISÉS DELGADO  MRN: [id number]  : 1965  Study Date: 2022 08:18 AM  Age: 56 yrs  Gender: Female  Patient Location: [facility]  Reason For Study: Murmur  Ordering Physician: NEVILLE DE LA FUENTE  Performed By: Dominic Hardy RDCS     BSA: 1.7 m2  Height: 64 in  Weight: 138 lb  HR: 93  BP: 169/82 mmHg  ______________________________________________________________________________  Procedure  Complete Portable Echo Adult.  ______________________________________________________________________________  Interpretation Summary  Left ventricular size, wall motion and function are normal. The ejection  fraction is 60-65%.     Right ventricular function, chamber size, wall motion, and thickness are  normal.     No pericardial effusion is present.     The inferior vena cava is normal.     Compared to imaging on 2015 There are no significant changes.  ______________________________________________________________________________  Left Ventricle  Left ventricular size, wall motion and function are normal. The ejection  fraction is 60-65%. Left ventricular wall thickness is normal. Left  ventricular diastolic function is not assessable.     Right Ventricle  Right ventricular function, chamber size, wall motion, and thickness are  normal.     Atria  Both atria appear normal. The atrial septum is intact as assessed by color  Doppler .     Mitral Valve  The mitral valve is normal. Mild mitral annular calcification is present.     Aortic Valve  Aortic valve is normal in structure and function. The aortic valve is  tricuspid.     Tricuspid Valve  The peak velocity of the tricuspid regurgitant jet is not obtainable.  Pulmonary artery systolic pressure cannot be assessed.     Pulmonic  Valve  The valve leaflets are not well visualized.     Vessels  The inferior vena cava is normal. Sinuses of Valsalva 2.6 cm. IVC diameter  <2.1 cm collapsing >50% with sniff suggests a normal RA pressure of 3 mmHg.     Pericardium  No pericardial effusion is present.     Compared to Previous Study  Compared to imaging on 2015 There are no significant changes.  ______________________________________________________________________________  Doppler Measurements & Calculations  MV E max landry: 75.2 cm/sec  E/E' av.0  Lateral E/e': 8.1  Medial E/e': 9.9     ______________________________________________________________________________  Report approved by: Juan Tinoco 2022 10:32 AM               Discharge Medications   Discharge Medication List as of 2022  1:04 PM      CONTINUE these medications which have NOT CHANGED    Details   calcium carbonate (OS- MG [place]. CA) 500 MG tablet Take 500 mg by mouth At Bedtime , Historical      cholecalciferol (VITAMIN D3) 1000 UNIT tablet Take 1 tablet (1,000 Units) by mouth daily, Disp-30 tablet, R-0, E-Prescribe      clindamycin (CLINDAMAX) 1 % topical gel Apply topically 2 times dailyDisp-60 g, [id number]      letrozole (FEMARA) 2.5 MG tablet Take 1 tablet (2.5 mg) by mouth daily, Disp-90 tablet, R-3, E-Prescribe      mirtazapine (REMERON) 7.5 MG tablet TAKE 1 TABLET BY MOUTH ONCE DAILY AT BEDTIME, Disp-30 tablet, R-3, E-Prescribe      Multiple Vitamins-Minerals (MULTIVITAMIN ADULTS PO) Take 1 tablet by mouth, Historical      venlafaxine (EFFEXOR-ER) 225 MG 24 hr tablet Take 1 tablet (225 mg) by mouth daily, Disp-90 tablet, R-3, E-Prescribe         STOP taking these medications       mupirocin (BACTROBAN) 2 % ointment Comments:   Reason for Stopping:             Allergies   Allergies   Allergen Reactions     Morphine      Rash

## 2022-04-11 NOTE — PROGRESS NOTES
Jamar Ms Josefina Verma Sabrina,  I am your nurse care coordinator working for the [facility] and  with Dr. Evangelista Meza.  I am following up on you  recent hospitalization and we wanted to check in with you. Do you have time for a few questions? Yes    How are you feeling?    Notes: Left VM to return call post discharge.  Patient returned call and is feeling great and no pain or difficulty eating or drinking.  Can you get the hospital discharge form so I can go over it with you?  Yes   If no: Other Has discharge sheets    Were you able to fill all your prescriptions medications after discharge? Yes   Notes: No issues.    Can you read the medications listed on your discharge instructions? (If patient does not have discharge instruction form then ask-  Can you read  your medication bottles  and tell me how you are taking each medication? ) Compare to EPIC list/discharge list   Notes: Aware of all medications.    Are you having any side effects from your medications? No   Notes: None noted  Do you have any questions or concerns about your medications?                                  No   Notes: None noted  Are you expecting any visiting nurse/physical therapy or equipment? No   Notes: None noted    Do you have a follow up appointment? Yes  With who? IR biopsy 4/12/2022 arrange a follow up with Dr Meza to review biopsy results.  When and where is that follow up appointment?  Notes: Will be in person with Dr Meza    Are you planning/able to come to that appointment? Do you have transportation to this appointment? Yes   Notes: Determining who will be with her at the appointment. Arranging an appointment with Sandra Coughlin on 4/20/2022 at 4:30. Lab work to be drawn before the appointment.     Do you have any questions for me? Multiple questions answered.    Do you know who to call if you have questions/concerns/or symptoms prior to that  Appointment?             Yes   Notes: Will discuss when returns call.    Thank  you for taking the time to talk to me. We want to make sure you are doing well following hospitalization. We want  you to  know we are here to help you if needed. If you have any questions, please call me or call our clinic at [phone]. I look forward to meeting you in person.  Again, please call if you have any concerns.

## 2022-04-12 NOTE — PROGRESS NOTES
Pt tolerated oral intake. Discharge instructions reviewed, copy given to pt. Pt tolerated ambulation. Voided. PIV dc'd. Pt will discharge home accompanied by boyfriend.

## 2022-04-12 NOTE — PROGRESS NOTES
Patient Name: Josefina Bennett  Medical Record Number: [id number]  Today's Date: 4/12/2022    Procedure: CT bone biopsy  Proceduralist: Dr. Yates and Dr. Cantu  Pathology present: n/a    Procedure Start: 1122  Procedure end: 1134  Sedation medications administered: 100 mcg fentanyl and 2 mg versed     Report given to: 2A RN  : n/a    Other Notes: Pt arrived to IR room 1 from . Consent reviewed. Pt denies any questions or concerns regarding procedure. Pt positioned prone and monitored per protocol. Pt tolerated procedure without any noted complications. 4 cores obtained and sent to pathology. Pt transferred back to .

## 2022-04-12 NOTE — DISCHARGE INSTRUCTIONS
[facility]    Interventional Radiology  Patient Instructions Following Mass Biopsy    AFTER YOU GO HOME  If you were given sedation DO NOT drive or operate machinery at home or at work for at least 24 hours  DO relax and take it easy for 48 hours, no strenuous activity for 24 hours  DO drink plenty of fluids  DO resume your regular diet, unless otherwise instructed by your Primary Physician  Keep the dressing dry and in place for 24 hours.  DO NOT SMOKE FOR AT LEAST 24 HOURS, if you have been given any medications that were to help you relax or sedate you during your procedure  DO NOT drink alcoholic beverages the day of your procedure  DO NOT do any strenuous exercise or lifting (> 10 lbs) for at least 3 days following your procedure  DO NOT take a bath or shower for at least 12 hours following your procedure  Remove dressing after shower the next day. Replace with Band aid for 2 days. Never leave a wet dressing in place.  DO NOT make any important or legal decisions for 24 hours following your procedure  There should be minimum drainage from the biopsy site    CALL THE PHYSICIAN IF:  You start bleeding from the procedure site.  If you do start to bleed from that site,  hold pressure on the site for a minimum of 10 minutes.  Your physician will tell you if you need to return to the hospital  You develop nausea or vomiting  You have excessive swelling, redness, or tenderness at the site  You have drainage that looks like it is infected.  You experience severe pain  You develop hives or a rash or unexplained itching  You develop a temperature of 101 degrees F or greater      ADDITIONAL INSTRUCTIONS: None    [facility] INTERVENTIONAL RADIOLOGY DEPARTMENT  Procedure Physician:         Dr. Cantu/Dr. Yates   Date of procedure: April 12, 2022  Telephone Numbers: [phone] Monday-Friday 8:00 am to 4:30 pm  [phone] After 4:30 pm Monday-Friday, Weekends & Holidays.   Ask for the Interventional  Radiologist on call.  Someone is on call 24 hrs/day  [facility] toll free number: [phone] Monday-Friday 8:00 am to 4:30 pm  [facility] Emergency Dept: [phone]

## 2022-04-12 NOTE — PROGRESS NOTES
Patient returned to 2A post bone biopsy.  VSS.  Denies pain.  Right sacrum site C/D/I, no hematoma.  PO food and fluids at bedside.  1 hr bedrest, patient aware.

## 2022-04-12 NOTE — PRE-PROCEDURE
GENERAL PRE-PROCEDURE:     Written consent obtained?: Yes    Risks and benefits: Risks, benefits and alternatives were discussed    Consent given by:  Patient  Patient states understanding of procedure being performed: Yes    Patient's understanding of procedure matches consent: Yes    Procedure consent matches procedure scheduled: Yes    Expected level of sedation:  Moderate  Appropriately NPO:  Yes  ASA Class:  2  Mallampati  :  Grade 2- soft palate, base of uvula, tonsillar pillars, and portion of posterior pharyngeal wall visible  Lungs:  Lungs clear with good breath sounds bilaterally  Heart:  Normal heart sounds and rate  History & Physical reviewed:  History and physical reviewed and no updates needed  Statement of review:  I have reviewed the lab findings, diagnostic data, medications, and the plan for sedation

## 2022-04-12 NOTE — PROCEDURES
[facility]    Procedure: Sacral mass biopsy    Date/Time: 4/12/2022 11:37 AM  Performed by: Nahun Cantu  Authorized by: Nahun Cantu Fellow Physician: Pepe  Other(s) attending procedure: Talaie - staff      UNIVERSAL PROTOCOL   Site Marked: NA  Prior Images Obtained and Reviewed:  Yes  Required items: Required blood products, implants, devices and special equipment available    Patient identity confirmed:  Verbally with patient, arm band, provided demographic data and hospital-assigned identification number  Patient was reevaluated immediately before administering moderate or deep sedation or anesthesia  Confirmation Checklist:  Patient's identity using two indicators, relevant allergies, procedure was appropriate and matched the consent or emergent situation and correct equipment/implants were available  Time out: Immediately prior to the procedure a time out was called    Universal Protocol: the Joint Commission Universal Protocol was followed    Preparation: Patient was prepped and draped in usual sterile fashion    ESBL (mL):  0.5     ANESTHESIA    Anesthesia: Local infiltration  Local Anesthetic:  Lidocaine 1% without epinephrine  Anesthetic Total (mL):  10      SEDATION  Patient Sedated: Yes    Sedation Type:  Moderate (conscious) sedation  Sedation:  Fentanyl and midazolam  Vital signs: Vital signs monitored during sedation    See dictated procedure note for full details.  Findings: Sacral mass.     Specimens: core needle biopsy specimens sent for pathological analysis    Complications: None    Condition: Stable    Plan: Pathology pending.       PROCEDURE  Describe Procedure: Successful CT guided sacral mass biopsy. 4 core samples obtained and submitted in formalin.   Patient Tolerance:  Patient tolerated the procedure well with no immediate complications  Length of time physician/provider present for 1:1 monitoring during sedation: 20

## 2022-04-19 NOTE — NURSING NOTE
Chief Complaint   Patient presents with     Blood Draw     Labs drawn via  by RN. Vitals taken.     Labs collected from venipuncture by RN. Vitals taken. Checked in for appointment(s).    Valeri Montague RN

## 2022-04-19 NOTE — TELEPHONE ENCOUNTER
I called Mary and left a message to call us to follow up on tests.     Evangelista Meza MD  
EOMI; PERRL; no drainage or redness

## 2022-04-19 NOTE — NURSING NOTE
"Oncology Rooming Note    April 19, 2022 12:27 PM   Josefina Bennett is a 56 year old female who presents for:    Chief Complaint   Patient presents with     Blood Draw     Labs drawn via  by RN. Vitals taken.     Oncology Clinic Visit     Breast Cancer     Initial Vitals: BP (!) 143/77 (BP Location: Right arm)   Pulse (!) 121   Temp 99.8  F (37.7  C) (Oral)   Resp 16   Wt 62.1 kg (136 lb 12.8 oz)   LMP 12/18/2014   SpO2 98%   BMI 23.48 kg/m   Estimated body mass index is 23.48 kg/m  as calculated from the following:    Height as of 4/7/22: 1.626 m (5' 4\").    Weight as of this encounter: 62.1 kg (136 lb 12.8 oz). Body surface area is 1.67 meters squared.  No Pain (0) Comment: Data Unavailable   Patient's last menstrual period was 12/18/2014.  Allergies reviewed: Yes  Medications reviewed: Yes    Medications: Medication refills not needed today.  Pharmacy name entered into [facility]:    [facility] - [place], [place] - [address] [id number] - [place], [place] - [address] [facility] [address]  [facility] - [place], [place] - [address] [facility] - [place], [place] - [address]    Clinical concerns: None       Hue Palacios LPN            "

## 2022-04-19 NOTE — LETTER
4/19/2022         RE: Josefina Bennett  [address]  [facility] [address]        Dear Colleague,    Thank you for referring your patient, Josefina Bennett, to the [facility]. Please see a copy of my visit note below.    REASON FOR VISIT: Follow-up breast cancer     HISTORY OF PRESENT ILLNESS:  Josefina Bennett was self referred to our clinic for clinical trial recommendations for newly diagnosed stage II breast cancer. She had her last mammogram approximately a year prior. She did notice in early January 2015 that she was having some distortion of the right breast, and she went to see her gynecologist and had a mammogram performed. She had a screening mammogram performed on 01/15/2015. Breast tissue was heterogeneously dense, which might compromise the mammography. There was architectural distortion in the right breast approximately 11-12 o'clock position, zone 2, posterior depth, and a CC MLO spot compression was performed and an ultrasound was planned. The diagnostic mammogram from 01/28/2015 showed right breast architectural distortion centered at the 12 o'clock position, zone 2, suspicious for neoplasm. Breast tomosynthesis was used in the interpretation. Ultrasound findings included targeted ultrasound of the right upper breast demonstrating a band-like area of abnormal echogenicity between 11 and 12 o'clock position in the right breast at zone 2. This measures 4 cm transversely x 1.1 cm AP, and there was only a 1.5 cm measurement in the radial direction. There was blood flow suspicious for neoplasm at the 11 o'clock position in zone 2. There is a 1.4 cm hypoechoic nodule slightly  from the larger area of altered echogenicity that is also suspicious. A biopsy was performed. The biopsy showed infiltrating lobular carcinoma, grade 2, and a clip was placed at specimen [id number]. There were a few signet cells present. The tumor was ER-positive, WV low positive, and HER2 was negative  by immunohistochemistry. She subsequently underwent an MRI showing a tumor that was 4.8 x 1.7 x 3.2 cm in size. Overall, her clinical stage was T2 NX MX.       She was enrolled in the I-SPY-2 clinical trial, and qualified with high-risk MammaPrint score. She was randomized to ganetespib and paclitaxel, and she was treated with 12 cycles of treatment, and then started on AC on 05/26/2015. She developed intractable nausea and vomiting after the first cycle, which required hospitalization on the second cycle. She then developed Pneumocystis pneumonia, which resulted in intubation and a 2-week hospitalization during the course of AC. She was discharged on 07/02/2015, and decided she did not want to pursue any further chemotherapy. She had a right lumpectomy and sentinel lymph node procedure 08/12/2015. She had a positive margin, underwent a re-excision, and had a positive margin. Ultimately, she underwent a right mastectomy with clear margins. She began radiation treatment with Dr. Bill Chauhan, and completed radiation therapy on 12/04/2015. Pathologic stage was III-A, ypT3 N1a MX. Of concern, she had what could be considered an RCB3 tumor.  She started adjuvant letrozole on 1/28/16.     TREATMENT HISTORY:  A.  Neoadjuvant therapy on I SPY-2 with ganetespib and paclitaxel.  AC x 2 complicated by PCP pneumonia.    B.  Right lumpectomy, followed by right margin resections, followed by rigth mastectomy.   B.  Oophorectomy 10-16-15.   C.  Tamoxifen after 10-6-15.   D.  Radiation therapy. 5,040 cGy in 180 cGy/fraction to the chest wall and regional lymphatics followed by 720 cGy axillary lymph node boost and 1000 cGy mastectomy scar boost. Completed on 12/3/2015.  E.   Adjuvant letrozole begun 1-28-16.  Plan was to continue through 2026.  F.  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  A mix of TNBC and ER- HER2+.  Clinic conference consensus was for the modified Vandana with paclitaxel weekly to treat both clones.      Discussion with Dr. Ozuna.  Plan is to continue with MRI and mammogram spaced 6 months apart.       INTERVAL HISTORY:    Josefina returns to clinic and has been doing quite well.  She has mild low back and sacral pain and no fatigue, depression or anxiety.  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  She has never had COVID.  She has had COVID vaccinations x2.  She did have a sinus infection earlier this month which lasted about 2 weeks and she has now recovered.       Josefina continues to exercise 150 minutes a week.  She is eating a healthful diet.  She does not drink alcohol.  She takes vitamin D and calcium.  Her most recent DEXA showed a score of -2.4, consistent with osteoporosis and Reclast was given last week, resulting in fever and pain.  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      Josefina Verma developed a fever during Reclast.  She was feeling unwell and went to the ED.  CT CAP showed new involvement of abdominal lymph nodes and multiple bone metastases.  She was admitted and I saw her in the hospital 4-7-22 to discuss the plan for biopsy.  I discussed that the most likely diagnosis is recurrence with metastatic breast cancer.  Biopsy of a metastatic site is recommended as is a colonoscopy given lobular histology, anemia and risk of GI involvement. Radiation oncology consult recommended.     She had mild anemia, increased WBC, and a biopsy of a sacral mass showed pleomorphic lobular breast cancer which was ER negative, SC negative, HER2 positive by FISH in 20% of the cells and negative in 80% of cells.  A mix of TNBC and ER- HER2+.  Clinic conference consensus was for the modified Vandana with paclitaxel  weekly to treat both clones.      REVIEW OF SYSTEMS:  A 10-point review of systems is negative.     PHYSICAL EXAMINATION:  BP (!) 143/77 (BP Location: Right arm)   Pulse (!) 121   Temp 99.8  F (37.7  C) (Oral)   Resp 16   Wt 62.1 kg (136 lb 12.8 oz)   LMP 12/18/2014   SpO2 98%   BMI 23.48 kg/m    No exam today.     LABORATORY DATA:    CMP showed slightly low albumin.  CBC showed WBC 11.6, Hb 10.0, Plt 252.  Urine contained albumin,       IMAGING:    CT AP   IMPRESSION:   1.  Prominent abdominopelvic lymph nodes, small volume ascites, and  extensive retroperitoneal, mesenteric, and omental fat  stranding/haziness with suggestion of soft tissue thickening about the  IVC and aorta and along the serosal aspect of the ascending/hepatic  flexure colon, findings which may reflect a systemic inflammatory  process, though disseminated malignancy remains a concern. Consider  diagnostic paracentesis, though the small volume of ascites may not be  amenable to sampling.  2.  Innumerable mixed lytic and sclerotic lesions in the skeleton,  compatible with metastatic disease. Associated soft tissue mass in the  posterior sacrum  3.  Asymmetric thickening of the superior bladder wall, concerning for  malignancy. No intraluminal bladder mass.  4.  Crescentic enhancing tissue under the left hemidiaphragm with  adjacent ill-defined hypodensity in the superior medial spleen,  indeterminate but also raising concern for metastatic disease.  Recommend attention on follow-up.  5.  Interlobular septal thickening in the lung bases suggestive of  pulmonary edema.     I have personally reviewed the examination and initial interpretation  and I agree with the findings.     DEVORA DHILLON, DO     CT CHEST PE PROTOCOL April 6.   IMPRESSION:   1. No evidence of pulmonary embolism.  2. No acute airspace disease.  3. Post radiation changes seen throughout the anterior right lung.  4. Innumerable mixed lytic and sclerotic foci throughout the  axial  skeleton, concerning for metastatic disease.     I have personally reviewed the examination and initial interpretation  and I agree with the findings.     HASMUKH CAMILO MD     BRAIN MRI April 6  Impression:   1. No brain metastases, abscess, or meningitis identified.   2. Little change in nonspecific white matter and basal ganglia signal  changes, likely at least in part related to chronic small vessel  ischemic disease.  3. Continued bilateral mastoid effusions.     I have personally reviewed the examination and initial interpretation  and I agree with the findings.     ELIF THOMAS MD      SPINE MRI  April 6, 2022  Findings:     Cervical:  The cervical vertebrae appear normally aligned.  Segmentation anomaly C6-7. Chronic depression deformities of C4-C6.  Diffuse heterogeneous bone marrow signal appearance of the cervical  spine.  There is a larger T2 hyperintense subtly enhancing lesion in  left lateral vertebral body of C6. There is no abnormal signal within  the cervical spinal cord.  Small hemangioma in the inferior endplate  of C7. On a level by level basis:     C2-3: No spinal canal or neural foraminal stenosis.  C3-4: Mild left neural foraminal stenosis. No significant right or  spinal canal stenosis.  C4-5: Mild left neural foraminal stenosis. No significant right or  spinal canal stenosis.  C5-6: No spinal canal or neural foraminal stenosis.  C6-7: No spinal canal or neural foraminal stenosis.  C7-T1:No spinal canal or neural foraminal stenosis.     No abnormal enhancement of the paraspinous tissues, vertebral column,  or within the spinal canal.     Thoracic: The thoracic vertebrae are in normal alignment. Diffuse  heterogeneous bone marrow signal on the thoracic spine.  There is no  abnormal signal within the thoracic spinal cord. Small hemangioma in  the vertebral body of T12. No spinal canal or neural foraminal  stenosis. No abnormal enhancement of the paraspinous tissues,  vertebral  column, or within the spinal canal. Abnormal signal in the  apex of the right lung appears to represent largely fibrosis on the CT  earlier today. Question anterior epidural extension of tumor  immediately posterior to the vertebral bodies T7-10.     Lumbar: There are 5 lumbar-type vertebrae assumed for the purposes of  this dictation. Diffuse heterogeneous bone marrow signal on the lumbar  spine. The tip of the conus medullaris is at L2.  The lumbar vertebral  column appears normally aligned. There is no disc height narrowing .      On a level by level basis:     L1-2: No significant spinal canal or neural foraminal stenosis.     L2-3: No significant spinal canal or neural foraminal stenosis.     L3-4: No significant spinal canal or neural foraminal stenosis.     L4-5: No significant spinal canal or neural foraminal stenosis.     L5-S1: No significant spinal canal or neural foraminal stenosis.     Enhancing partially visualized mass at S2-3 centered in the posterior  elements but effacing the spinal canal measuring 2.2 cm in AP  dimension, and at least 3.4 cm in superior to inferior dimension..  Normal paraspinous soft tissues..      Enhancing lesions within the sacrum and visualized iliac bones. No  abnormal enhancement of the paraspinous tissues, vertebral column, or  within the spinal canal.                                                                      Impression:  Innumerable likely metastatic skeletal lesions within the  cervical, thoracic and lumbar spine. No similar lesions are entirely  intraosseous, but there is a lesion at S2-3 that effaces the spinal  canal and possible anterior epidural extension of tumor T7-T10.     I have personally reviewed the examination and initial interpretation  and I agree with the findings.     ELIF THOMAS MD      PATHOLOGY:   Bone, sacrum, CT guided core biopsy:  -METASTATIC CARCINOMA, consistent with metastatic breast pleomorphic lobular carcinoma  -Metastatic  carcinoma is estrogen receptor negative and progesterone receptor negative by immunohistochemistry  -A subpopulation of metastatic carcinoma tumor cells are HER2 amplified by FISH (20% HER2 amplified (group 1); 80% HER2 equivocal (group 4)), see cytogenetics report for details of HER2 FISH results)  -Metastatic carcinoma is HER2 negative (score 1+) by immunohistochemistry  -HER2 FISH results will be reported separately by cytogenetics  -See comment and microscopic description    INTERPRETATION:  Because the majority of tumor cells were classified as Group 4 per the American Society of Clinical Oncology/College of American Pathologists Clinical Practice Guideline Focused Update (Jean STARR et al, 2018, Arch Pathol Lab Med  142:1364; doi:10.5858/arpa.6976-5163-DU), further immunohistochemical (IHC) analysis was necessary.      Per report ([id number]), the IHC results were interpreted as 1+. Taken together, the FISH and IHC results for the majority of the tumor are interpreted as HER2 Negative with the following COMMENT:     Comment:  It is uncertain whether patients with greater than/equal to 4.0 and less than 6.0 average HER2 signals/cell and HER2/CEP17 ratio less than 2.0 benefit from HER2-targeted therapy in the absence of protein overexpression (IHC 3+). If the specimen test result is close to the KRISTAL ratio threshold for positive, there is a higher likelihood that repeat testing will result in different results by chance alone. Therefore, when IHC results are not 3+ positive, it is recommended that the sample be considered HER2 negative without additional testing on the same specimen.       ASSESSMENT AND PLAN:   1.  Mary Bennett is a 56-year-old woman with a history of a clinical stage II right breast cancer, ER positive, CT positive, HER2 negative by immunohistochemistry.  She had a lobular histology.  She was on the I-SPY 2 clinical trial and was randomized to ganetespib and paclitaxel, then went on to AC.   She had intractable nausea and vomiting the first cycle of AC, and the second cycle of AC was complicated by Pneumocystis pneumonia requiring intubation and a 2-week hospitalization.  She did not complete the AC treatment.  She did undergo a right lumpectomy and sentinel node procedure.  She had a positive margin and underwent resection.  She still had a positive margin.  She underwent a right lumpectomy with clear margins.  She completed radiation in 12/2015.  Pathologic stage was IIIA  [id number] and of concern, she had an RCB3 histology meaning significant risk of recurrence of her breast cancer during the 5-year period following primary therapy.  There were 2 lymph nodes involved with mammary carcinoma with extracapsular extension.  She went on to have radiation therapy and then began hormonal therapy with tamoxifen in October 2015.   2.  Mary had oophorectomy and started letrozole in January 2016.  She continues on letrozole for adjuvant hormonal therapy with the plan of continuing for a total of 10 years.   3.  Josefina Veram returns to clinic to go over the results of the bone biopsy.  She was unexpectedly found to have metastatic breast cancer after her primary treatment in 2015.  In 2015 she was treated on the I-SPY 2 clinical trial with ganetespib, paclitaxel, Adriamycin and cyclophosphamide.  Her clinical course was complicated by febrile neutropenia on Adriamycin and cyclophosphamide, and she did not complete the full course of chemotherapy.  The original tumor was a lobular breast cancer, MammaPrint high risk, estrogen receptor positive, progesterone receptor negative, and HER2 negative by FISH.  Remarkably, the biopsy shows a pleomorphic lobular cancer, which is negative for estrogen receptor and negative for progesterone receptor, and 20% is HER2 positive by FISH.  I discussed with Josefina Verma that recurrence of breast cancer can exhibit a 10% discordance of HER2 expression, and I discussed it is also  remarkable that there was no estrogen receptor in the recurrent metastatic disease.  I discussed with her that she has a mixture of recurrent metastatic HER2 positive breast cancer that is estrogen receptor negative and 80% triple negative breast cancer.  I discussed with her that this is a complex situation.    4. Josefina Verma is doing quite well.  She has no pain, fatigue, depression or anxiety.  She is very enthusiastic about getting treatment started.  All of her questions were answered.    5.  Treatment options discussed. We discussed this situation in breast cancer conference on April 22 and the consensus opinion is to treat the minority HER2+ component first with Vandana regimen because that treatment would also cover the triple negative (TNBC) component with the taxane part of the treatment. This would push back the use of pembrolizumab to the second line setting or later if the 22C3 staining is positive, but would allow earlier treatment of the HER2 component.   5.  Discussion of the regimen. Recommended regimen is Vandana with every 3 week pertuzumab and trastuzumab but weekly paclitaxel because it is better tolerated than every 3 week docetaxel.  The weekly paclitaxel may be more effective vs the TNBC component.  The regimen will be load of trastuzumab of 8 mg/kg followed by 4 mg/kg every 3 weeks and then load of pertuzumab of 840 mg and 420 every 3 weeks and weekly paclitaxel.    6. Staining for PD-L1. We will stain the recurrent tumor with 22C3 antibody for PD-L1 and hold this in reserve for a Keynote 355 metastatic TNBC approach if the Vandana regimen does not control her metastatic disease.  5.  No hormonal therapy because the recurrent tumor is ER negative.   6.  Restaging with PET CT, brain MRI.  7. Port placement.   8. Echocardiogram.   9.  I discussed the plan with Josefina Verma that we will have her Emmanuelle Churchill, our EDIL, who will start the chemotherapy regimen.  I discussed that we will discuss  Josefina Verma at our clinic conference on Friday morning.  All of her questions were answered.  10.  The consensus is to not perform colonoscopy at this time.  We will treat anemia as needed.   11.  Follow up plan. IR port placement, echocardiogram, CBC, CMP, CA27.29 and CEA, PET CT, brain MRI, follow up appointment with Sandra Coughlin this coming week after the restaging imaging for initiation of trastuzuamb, pertuzumab every 3 weeks and weekly paclitaxel.  No provider visits with weekly paclitaxel. Follow up with me 3 weeks after starting treatment on a Tuesday or Thursday.       Thank you for allowing us to participate in this patient's care.         ADDENDUM:  Plan discussed with Viry by phone on April 22 after breast cancer conference.  I discussed the strategy, risks and potential benefits and she consented to treatment with the modified Vandana with weekly paclitaxel.      I spent 10 minutes with the patient more than 50% of which was in counseling and coordination of care and then 30 minutes reviewing results from her inpatient hospitalization, 40 minutes total time.       Again, thank you for allowing me to participate in the care of your patient.      Sincerely,    Evangelista Meza MD

## 2022-04-20 NOTE — PROGRESS NOTES
REASON FOR VISIT: Follow-up breast cancer     HISTORY OF PRESENT ILLNESS:  Josefina Bennett was self referred to our clinic for clinical trial recommendations for newly diagnosed stage II breast cancer. She had her last mammogram approximately a year prior. She did notice in early January 2015 that she was having some distortion of the right breast, and she went to see her gynecologist and had a mammogram performed. She had a screening mammogram performed on 01/15/2015. Breast tissue was heterogeneously dense, which might compromise the mammography. There was architectural distortion in the right breast approximately 11-12 o'clock position, zone 2, posterior depth, and a CC MLO spot compression was performed and an ultrasound was planned. The diagnostic mammogram from 01/28/2015 showed right breast architectural distortion centered at the 12 o'clock position, zone 2, suspicious for neoplasm. Breast tomosynthesis was used in the interpretation. Ultrasound findings included targeted ultrasound of the right upper breast demonstrating a band-like area of abnormal echogenicity between 11 and 12 o'clock position in the right breast at zone 2. This measures 4 cm transversely x 1.1 cm AP, and there was only a 1.5 cm measurement in the radial direction. There was blood flow suspicious for neoplasm at the 11 o'clock position in zone 2. There is a 1.4 cm hypoechoic nodule slightly  from the larger area of altered echogenicity that is also suspicious. A biopsy was performed. The biopsy showed infiltrating lobular carcinoma, grade 2, and a clip was placed at specimen [id number]. There were a few signet cells present. The tumor was ER-positive, WI low positive, and HER2 was negative by immunohistochemistry. She subsequently underwent an MRI showing a tumor that was 4.8 x 1.7 x 3.2 cm in size. Overall, her clinical stage was T2 NX MX.       She was enrolled in the I-SPY-2 clinical trial, and qualified with high-risk MammaPrint  score. She was randomized to ganetespib and paclitaxel, and she was treated with 12 cycles of treatment, and then started on AC on 05/26/2015. She developed intractable nausea and vomiting after the first cycle, which required hospitalization on the second cycle. She then developed Pneumocystis pneumonia, which resulted in intubation and a 2-week hospitalization during the course of AC. She was discharged on 07/02/2015, and decided she did not want to pursue any further chemotherapy. She had a right lumpectomy and sentinel lymph node procedure 08/12/2015. She had a positive margin, underwent a re-excision, and had a positive margin. Ultimately, she underwent a right mastectomy with clear margins. She began radiation treatment with Dr. Bill Chauhan, and completed radiation therapy on 12/04/2015. Pathologic stage was III-A, ypT3 N1a MX. Of concern, she had what could be considered an RCB3 tumor.  She started adjuvant letrozole on 1/28/16.     TREATMENT HISTORY:  A.  Neoadjuvant therapy on I SPY-2 with ganetespib and paclitaxel.  AC x 2 complicated by PCP pneumonia.    B.  Right lumpectomy, followed by right margin resections, followed by rigth mastectomy.   B.  Oophorectomy 10-16-15.   C.  Tamoxifen after 10-6-15.   D.  Radiation therapy. 5,040 cGy in 180 cGy/fraction to the chest wall and regional lymphatics followed by 720 cGy axillary lymph node boost and 1000 cGy mastectomy scar boost. Completed on 12/3/2015.  E.   Adjuvant letrozole begun 1-28-16.  Plan was to continue through 2026.  F.  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  A mix of TNBC and ER- HER2+.  Clinic conference consensus was for the modified Vandana with paclitaxel weekly to treat both clones.      Discussion with Dr. Ozuna.  Plan is to continue with  MRI and mammogram spaced 6 months apart.       INTERVAL HISTORY:    Josefina returns to clinic and has been doing quite well.  She has mild low back and sacral pain and no fatigue, depression or anxiety.  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  She has never had COVID.  She has had COVID vaccinations x2.  She did have a sinus infection earlier this month which lasted about 2 weeks and she has now recovered.       Josefina continues to exercise 150 minutes a week.  She is eating a healthful diet.  She does not drink alcohol.  She takes vitamin D and calcium.  Her most recent DEXA showed a score of -2.4, consistent with osteoporosis and Reclast was given last week, resulting in fever and pain.  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      Josefina Verma developed a fever during Reclast.  She was feeling unwell and went to the ED.  CT CAP showed new involvement of abdominal lymph nodes and multiple bone metastases.  She was admitted and I saw her in the hospital 4-7-22 to discuss the plan for biopsy.  I discussed that the most likely diagnosis is recurrence with metastatic breast cancer.  Biopsy of a metastatic site is recommended as is a colonoscopy given lobular histology, anemia and risk of GI involvement. Radiation oncology consult recommended.     She had mild anemia, increased WBC, and a biopsy of a sacral mass showed pleomorphic lobular breast cancer which was ER negative, AR negative, HER2 positive by FISH in 20% of the cells and negative in 80% of cells.  A mix of TNBC and ER- HER2+.  Clinic conference consensus was for the modified Vandana with paclitaxel weekly to treat both clones.      REVIEW OF SYSTEMS:  A 10-point review of systems is negative.     PHYSICAL EXAMINATION:  BP (!) 143/77 (BP Location: Right arm)   Pulse (!) 121   Temp 99.8  F (37.7  C) (Oral)   Resp 16   Wt 62.1 kg (136 lb 12.8  oz)   LMP 12/18/2014   SpO2 98%   BMI 23.48 kg/m    No exam today.     LABORATORY DATA:    CMP showed slightly low albumin.  CBC showed WBC 11.6, Hb 10.0, Plt 252.  Urine contained albumin,       IMAGING:    CT AP   IMPRESSION:   1.  Prominent abdominopelvic lymph nodes, small volume ascites, and  extensive retroperitoneal, mesenteric, and omental fat  stranding/haziness with suggestion of soft tissue thickening about the  IVC and aorta and along the serosal aspect of the ascending/hepatic  flexure colon, findings which may reflect a systemic inflammatory  process, though disseminated malignancy remains a concern. Consider  diagnostic paracentesis, though the small volume of ascites may not be  amenable to sampling.  2.  Innumerable mixed lytic and sclerotic lesions in the skeleton,  compatible with metastatic disease. Associated soft tissue mass in the  posterior sacrum  3.  Asymmetric thickening of the superior bladder wall, concerning for  malignancy. No intraluminal bladder mass.  4.  Crescentic enhancing tissue under the left hemidiaphragm with  adjacent ill-defined hypodensity in the superior medial spleen,  indeterminate but also raising concern for metastatic disease.  Recommend attention on follow-up.  5.  Interlobular septal thickening in the lung bases suggestive of  pulmonary edema.     I have personally reviewed the examination and initial interpretation  and I agree with the findings.     DEVORA DHILLON, DO     CT CHEST PE PROTOCOL April 6.   IMPRESSION:   1. No evidence of pulmonary embolism.  2. No acute airspace disease.  3. Post radiation changes seen throughout the anterior right lung.  4. Innumerable mixed lytic and sclerotic foci throughout the axial  skeleton, concerning for metastatic disease.     I have personally reviewed the examination and initial interpretation  and I agree with the findings.     HASMUKH CAMILO MD     BRAIN MRI April 6  Impression:   1. No brain metastases,  abscess, or meningitis identified.   2. Little change in nonspecific white matter and basal ganglia signal  changes, likely at least in part related to chronic small vessel  ischemic disease.  3. Continued bilateral mastoid effusions.     I have personally reviewed the examination and initial interpretation  and I agree with the findings.     ELIF THOMAS MD      SPINE MRI  April 6, 2022  Findings:     Cervical:  The cervical vertebrae appear normally aligned.  Segmentation anomaly C6-7. Chronic depression deformities of C4-C6.  Diffuse heterogeneous bone marrow signal appearance of the cervical  spine.  There is a larger T2 hyperintense subtly enhancing lesion in  left lateral vertebral body of C6. There is no abnormal signal within  the cervical spinal cord.  Small hemangioma in the inferior endplate  of C7. On a level by level basis:     C2-3: No spinal canal or neural foraminal stenosis.  C3-4: Mild left neural foraminal stenosis. No significant right or  spinal canal stenosis.  C4-5: Mild left neural foraminal stenosis. No significant right or  spinal canal stenosis.  C5-6: No spinal canal or neural foraminal stenosis.  C6-7: No spinal canal or neural foraminal stenosis.  C7-T1:No spinal canal or neural foraminal stenosis.     No abnormal enhancement of the paraspinous tissues, vertebral column,  or within the spinal canal.     Thoracic: The thoracic vertebrae are in normal alignment. Diffuse  heterogeneous bone marrow signal on the thoracic spine.  There is no  abnormal signal within the thoracic spinal cord. Small hemangioma in  the vertebral body of T12. No spinal canal or neural foraminal  stenosis. No abnormal enhancement of the paraspinous tissues,  vertebral column, or within the spinal canal. Abnormal signal in the  apex of the right lung appears to represent largely fibrosis on the CT  earlier today. Question anterior epidural extension of tumor  immediately posterior to the vertebral bodies  T7-10.     Lumbar: There are 5 lumbar-type vertebrae assumed for the purposes of  this dictation. Diffuse heterogeneous bone marrow signal on the lumbar  spine. The tip of the conus medullaris is at L2.  The lumbar vertebral  column appears normally aligned. There is no disc height narrowing .      On a level by level basis:     L1-2: No significant spinal canal or neural foraminal stenosis.     L2-3: No significant spinal canal or neural foraminal stenosis.     L3-4: No significant spinal canal or neural foraminal stenosis.     L4-5: No significant spinal canal or neural foraminal stenosis.     L5-S1: No significant spinal canal or neural foraminal stenosis.     Enhancing partially visualized mass at S2-3 centered in the posterior  elements but effacing the spinal canal measuring 2.2 cm in AP  dimension, and at least 3.4 cm in superior to inferior dimension..  Normal paraspinous soft tissues..      Enhancing lesions within the sacrum and visualized iliac bones. No  abnormal enhancement of the paraspinous tissues, vertebral column, or  within the spinal canal.                                                                      Impression:  Innumerable likely metastatic skeletal lesions within the  cervical, thoracic and lumbar spine. No similar lesions are entirely  intraosseous, but there is a lesion at S2-3 that effaces the spinal  canal and possible anterior epidural extension of tumor T7-T10.     I have personally reviewed the examination and initial interpretation  and I agree with the findings.     ELIF THOMAS MD      PATHOLOGY:   Bone, sacrum, CT guided core biopsy:  -METASTATIC CARCINOMA, consistent with metastatic breast pleomorphic lobular carcinoma  -Metastatic carcinoma is estrogen receptor negative and progesterone receptor negative by immunohistochemistry  -A subpopulation of metastatic carcinoma tumor cells are HER2 amplified by FISH (20% HER2 amplified (group 1); 80% HER2 equivocal (group 4)),  see cytogenetics report for details of HER2 FISH results)  -Metastatic carcinoma is HER2 negative (score 1+) by immunohistochemistry  -HER2 FISH results will be reported separately by cytogenetics  -See comment and microscopic description    INTERPRETATION:  Because the majority of tumor cells were classified as Group 4 per the American Society of Clinical Oncology/College of American Pathologists Clinical Practice Guideline Focused Update (Jean STARR et al, 2018, Arch Pathol Lab Med  142:1364; doi:10.5858/arpa.[email or web address]-AI), further immunohistochemical (IHC) analysis was necessary.      Per report ([id number]), the IHC results were interpreted as 1+. Taken together, the FISH and IHC results for the majority of the tumor are interpreted as HER2 Negative with the following COMMENT:     Comment:  It is uncertain whether patients with greater than/equal to 4.0 and less than 6.0 average HER2 signals/cell and HER2/CEP17 ratio less than 2.0 benefit from HER2-targeted therapy in the absence of protein overexpression (IHC 3+). If the specimen test result is close to the KRISTAL ratio threshold for positive, there is a higher likelihood that repeat testing will result in different results by chance alone. Therefore, when IHC results are not 3+ positive, it is recommended that the sample be considered HER2 negative without additional testing on the same specimen.       ASSESSMENT AND PLAN:   1.  Mary Bennett is a 56-year-old woman with a history of a clinical stage II right breast cancer, ER positive, [place] positive, HER2 negative by immunohistochemistry.  She had a lobular histology.  She was on the I-SPY 2 clinical trial and was randomized to ganetespib and paclitaxel, then went on to AC.  She had intractable nausea and vomiting the first cycle of AC, and the second cycle of AC was complicated by Pneumocystis pneumonia requiring intubation and a 2-week hospitalization.  She did not complete the AC treatment.  She did undergo a  right lumpectomy and sentinel node procedure.  She had a positive margin and underwent resection.  She still had a positive margin.  She underwent a right lumpectomy with clear margins.  She completed radiation in 12/2015.  Pathologic stage was IIIA  [id number] and of concern, she had an RCB3 histology meaning significant risk of recurrence of her breast cancer during the 5-year period following primary therapy.  There were 2 lymph nodes involved with mammary carcinoma with extracapsular extension.  She went on to have radiation therapy and then began hormonal therapy with tamoxifen in October 2015.   2.  Mary had oophorectomy and started letrozole in January 2016.  She continues on letrozole for adjuvant hormonal therapy with the plan of continuing for a total of 10 years.   3.  Josefina Verma returns to clinic to go over the results of the bone biopsy.  She was unexpectedly found to have metastatic breast cancer after her primary treatment in 2015.  In 2015 she was treated on the I-SPY 2 clinical trial with ganetespib, paclitaxel, Adriamycin and cyclophosphamide.  Her clinical course was complicated by febrile neutropenia on Adriamycin and cyclophosphamide, and she did not complete the full course of chemotherapy.  The original tumor was a lobular breast cancer, MammaPrint high risk, estrogen receptor positive, progesterone receptor negative, and HER2 negative by FISH.  Remarkably, the biopsy shows a pleomorphic lobular cancer, which is negative for estrogen receptor and negative for progesterone receptor, and 20% is HER2 positive by FISH.  I discussed with Josefina Verma that recurrence of breast cancer can exhibit a 10% discordance of HER2 expression, and I discussed it is also remarkable that there was no estrogen receptor in the recurrent metastatic disease.  I discussed with her that she has a mixture of recurrent metastatic HER2 positive breast cancer that is estrogen receptor negative and 80% triple negative  breast cancer.  I discussed with her that this is a complex situation.    4. Josefina Verma is doing quite well.  She has no pain, fatigue, depression or anxiety.  She is very enthusiastic about getting treatment started.  All of her questions were answered.    5.  Treatment options discussed. We discussed this situation in breast cancer conference on April 22 and the consensus opinion is to treat the minority HER2+ component first with Vandana regimen because that treatment would also cover the triple negative (TNBC) component with the taxane part of the treatment. This would push back the use of pembrolizumab to the second line setting or later if the 22C3 staining is positive, but would allow earlier treatment of the HER2 component.   5.  Discussion of the regimen. Recommended regimen is Vandana with every 3 week pertuzumab and trastuzumab but weekly paclitaxel because it is better tolerated than every 3 week docetaxel.  The weekly paclitaxel may be more effective vs the TNBC component.  The regimen will be load of trastuzumab of 8 mg/kg followed by 4 mg/kg every 3 weeks and then load of pertuzumab of 840 mg and 420 every 3 weeks and weekly paclitaxel.    6. Staining for PD-L1. We will stain the recurrent tumor with 22C3 antibody for PD-L1 and hold this in reserve for a Keynote 355 metastatic TNBC approach if the Vandana regimen does not control her metastatic disease.  5.  No hormonal therapy because the recurrent tumor is ER negative.   6.  Restaging with PET CT, brain MRI.  7. Port placement.   8. Echocardiogram.   9.  I discussed the plan with Josefina Verma that we will have her [email or web address], our EDIL, who will start the chemotherapy regimen.  I discussed that we will discuss Josefina Verma at our clinic conference on Friday morning.  All of her questions were answered.  10.  The consensus is to not perform colonoscopy at this time.  We will treat anemia as needed.   11.  Follow up plan. IR port placement,  echocardiogram, CBC, CMP, CA27.29 and CEA, PET CT, brain MRI, follow up appointment with Sandra Coughlin this coming week after the restaging imaging for initiation of trastuzuamb, pertuzumab every 3 weeks and weekly paclitaxel.  No provider visits with weekly paclitaxel. Follow up with me 3 weeks after starting treatment on a Tuesday or Thursday.       Thank you for allowing us to participate in this patient's care.       Sincerely,      Evangelista Meza MD  Professor  [facility]  [phone]     ADDENDUM:  Plan discussed with Viry by phone on April 22 after breast cancer conference.  I discussed the strategy, risks and potential benefits and she consented to treatment with the modified Vandana with weekly paclitaxel.     ADDENDUM2:  May 13, 2022.  I discussed the 22C3 PD-L1 CPS of 40 while 20% of the tumor is HER2+ by FISH and the overall ASCO  CAP assignment is negative for the bulk tumor with the qualifier that there is a significant HER2 clonal component that would be best treated with HER2 directed therapy.  The Tumor Board consensus was to continue with HER2 directed therapy to treat the new and HER2+ clone till best response and then change over to Keynote 355 with pembrolizumab and chemotherapy.  Pembrolizumab and gem/carbo or pembro and Abraxane could be potential options.       I spent 10 minutes with the patient more than 50% of which was in counseling and coordination of care and then 30 minutes reviewing results from her inpatient hospitalization, 40 minutes total time.

## 2022-04-22 NOTE — TELEPHONE ENCOUNTER
I called Josefinamarbrooke and went over the plan to treat the HER2+ breast cancer first with the modified Vandana regimen with weekly paclitaxel in place of docetaxel.  I discussed the outcome of our group discussion this morning and went over the plan with Sandra Coughlin, Teresa Ashton and the pharmacy.  I discussed that we will restage place a port and then start treatment this coming week.    Evangelista Meza MD        IR port placement, echocardiogram, CBC, CMP, CA27.29 and CEA, PET CT, brain MRI, follow up appointment with Sandra Coughlin this coming week after the restaging imaging for initiation of trastuzuamb, pertuzumab every 3 weeks and weekly paclitaxel.  No provider visits with weekly paclitaxel. Follow up with me 3 weeks after starting treatment on a Tuesday or Thursday.

## 2022-04-22 NOTE — TUMOR CONFERENCE
Tumor Conference Information           Documentation / Disclaimer Cancer Tumor Board Note  Cancer tumor board recommendations do not override what is determined to be reasonable care and treatment, which is dependent on the circumstances of a patient's case; the patient's medical, social, and personal concerns; and the clinical judgment of the oncologist [physician].

## 2022-04-27 NOTE — PROGRESS NOTES
REASON FOR VISIT: Follow-up breast cancer     HISTORY OF PRESENT ILLNESS:  Josefina Bennett was self referred to our clinic for clinical trial recommendations for newly diagnosed stage II breast cancer. She had her last mammogram approximately a year prior. She did notice in early January 2015 that she was having some distortion of the right breast, and she went to see her gynecologist and had a mammogram performed. She had a screening mammogram performed on 01/15/2015. Breast tissue was heterogeneously dense, which might compromise the mammography. There was architectural distortion in the right breast approximately 11-12 o'clock position, zone 2, posterior depth, and a CC MLO spot compression was performed and an ultrasound was planned. The diagnostic mammogram from 01/28/2015 showed right breast architectural distortion centered at the 12 o'clock position, zone 2, suspicious for neoplasm. Breast tomosynthesis was used in the interpretation. Ultrasound findings included targeted ultrasound of the right upper breast demonstrating a band-like area of abnormal echogenicity between 11 and 12 o'clock position in the right breast at zone 2. This measures 4 cm transversely x 1.1 cm AP, and there was only a 1.5 cm measurement in the radial direction. There was blood flow suspicious for neoplasm at the 11 o'clock position in zone 2. There is a 1.4 cm hypoechoic nodule slightly  from the larger area of altered echogenicity that is also suspicious. A biopsy was performed. The biopsy showed infiltrating lobular carcinoma, grade 2, and a clip was placed at specimen [id number]. There were a few signet cells present. The tumor was ER-positive, SC low positive, and HER2 was negative by immunohistochemistry. She subsequently underwent an MRI showing a tumor that was 4.8 x 1.7 x 3.2 cm in size. Overall, her clinical stage was T2 NX MX.       She was enrolled in the I-SPY-2 clinical trial, and qualified with high-risk MammaPrint  score. She was randomized to ganetespib and paclitaxel, and she was treated with 12 cycles of treatment, and then started on AC on 05/26/2015. She developed intractable nausea and vomiting after the first cycle, which required hospitalization on the second cycle. She then developed Pneumocystis pneumonia, which resulted in intubation and a 2-week hospitalization during the course of AC. She was discharged on 07/02/2015, and decided she did not want to pursue any further chemotherapy. She had a right lumpectomy and sentinel lymph node procedure 08/12/2015. She had a positive margin, underwent a re-excision, and had a positive margin. Ultimately, she underwent a right mastectomy with clear margins. She began radiation treatment with Dr. Bill Chauhan, and completed radiation therapy on 12/04/2015. Pathologic stage was III-A, ypT3 N1a MX. Of concern, she had what could be considered an RCB3 tumor.  She started adjuvant letrozole on 1/28/16.    4/6/2022 Josefina Verma developed a fever during Reclast.  She was feeling unwell and went to the ED.  CT CAP showed new involvement of abdominal lymph nodes and multiple bone metastases.  She was admitted and I saw her in the hospital 4-7-22 to discuss the plan for biopsy.  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  A mix of TNBC and ER- HER2+.  Clinic conference consensus was for the modified Vandana with paclitaxel weekly to treat both clones.              TREATMENT HISTORY:  A.  Neoadjuvant therapy on I SPY-2 with ganetespib and paclitaxel.  AC x 2 complicated by PCP pneumonia.    B.  Right lumpectomy, followed by right margin resections, followed by rigth mastectomy.   B.  Oophorectomy 10-16-15.   C.  Tamoxifen after 10-6-15.   D.  Radiation therapy. 5,040 cGy in 180 cGy/fraction to the chest wall and regional lymphatics followed by 720 cGy  axillary lymph node boost and 1000 cGy mastectomy scar boost. Completed on 12/3/2015.  E.   Adjuvant letrozole begun 1-28-16.  Plan was to continue through 2026.  F.  Diagnosis with recurrent/metastatic breast cancer with a sacral mass that was biopsied showing pleiomorphic lobular breast cancer that is ER negative, SC negative, HER2 positive by FISH in 20% of the cells and negative in 80% of cells.  A mix of TNBC and ER- HER2+.  Clinic conference consensus was for the modified Vandana with paclitaxel weekly to treat both clones.            INTERVAL HISTORY:    Today, Rebeka is seen with her significant other prior to starting Taxol/10/Perjeta.  She has been feeling well since her discharge from the hospital.  She has had no recurrent fevers, body aches, chills.  Her breathing is stable.  She denies any coughing or shortness of breath.  Her bowels have been moving regularly, she does not have any abdominal pain or bloating.  She has been eating and drinking regularly she denies any bony pain except for pain when she bends over, this is located at the site where the bone biopsy was performed.  She states that she feels extremely well, she has had no symptoms whatsoever.    When she previously had paclitaxel, she tolerated this generally well.  She did have loss of her nails.  She has some very mild residual tingling in her fingertips when it is cold out, otherwise no other residual neuropathy.    She had many great questions today.     REVIEW OF SYSTEMS:  A 10-point review of systems is negative.     PHYSICAL EXAMINATION:  BP (!) 153/85 (BP Location: Left arm, Patient Position: Sitting, Cuff Size: Adult Regular)   Pulse 102   Temp 98.2  F (36.8  C) (Oral)   Resp 16   Wt 61.2 kg (135 lb)   LMP 12/18/2014   SpO2 99%   BMI 23.17 kg/m      Wt Readings from Last 4 Encounters:   04/27/22 61.2 kg (135 lb)   04/19/22 62.1 kg (136 lb 12.8 oz)   04/08/22 61.1 kg (134 lb 11.2 oz)   04/05/22 62.4 kg (137 lb 9.6 oz)        General: Resting comfortably in no acute distress  Head: Normocephalic, atraumatic   EENT: No scleral icteris, EOMS intact.   Lymph: No palpable cervical, supraclavicular, or axillary lymphadenopathy   Heart: Regular rate and rhythm, no murmurs  Lung: Normal respiratory effort. Clear to auscultation bilaterally. No wheezes, rhonchi, or crackles   Abdomen: +Bowel Sounds, soft nontender to palpation. No rebound, guarding or rigidity. No palpable masses  Neuro: AAO x3. Gait is steady. Moving all extremities   Extremities: No lower extremity edema  Skin: Warm, dry, intact. No rashes, no suspicious lesions. No petechia or bruising noted       LABORATORY DATA:    Most Recent 3 CBC's:Recent Labs   Lab Test 04/27/22  1128 04/19/22  1216 04/08/22  0547   WBC 11.4* 11.6* 7.6   HGB 10.0* 10.0* 9.5*   MCV 85 85 84    252 136*    Most Recent 3 BMP's:  Recent Labs   Lab Test 04/27/22  1128 04/27/22  0712 04/19/22  1216 04/08/22  0547     --  142 142   POTASSIUM 4.3  --  4.6 3.7   CHLORIDE 108  --  109 114*   CO2 27  --  27 23   BUN 17  --  19 11   CR 0.89  --  0.88 0.93   ANIONGAP 5  --  6 5   VEE 9.0  --  9.2 7.6*   * 162* 191* 130*    Most Recent 2 LFT's:  Recent Labs   Lab Test 04/27/22  1128 04/19/22  1216   AST 40 33   ALT 29 25   ALKPHOS 108 98   BILITOTAL 0.4 0.3      I reviewed the above labs today.       IMAGING:    EXAM: PET ONCOLOGY WHOLE BODY  LOCATION: [facility]  DATE/TIME: 4/27/2022 7:04 AM     INDICATION: Subsequent treatment planning and restaging for malignant neoplasm of upper outer quadrant of right breast in female, estrogen receptor positive. Status post neoadjuvant chemotherapy completed in May 2015, right breast mastectomy followed by   radiation therapy completed in December 2015, currently receiving hormonal therapy. Monitor response.  COMPARISON: Thoracic spine MRI dated 04/06/2022 and CT the abdomen pelvis dated 04/06/2022  CONTRAST: None  TECHNIQUE:  Serum glucose level 116 2 mg/dL. One hour post intravenous administration of 10.1 mCi F-18 FDG, PET imaging was performed from the skull vertex to feet, utilizing attenuation correction with concurrent axial CT and PET/CT image fusion. Dose   reduction techniques were used.     FINDINGS: Scattered FDG avid lesions in the anterior liver (Max SUV 4.7 in the anterior right hepatic dome) and innumerable FDG avid osseous metastases scattered throughout the axial skeleton including examples in the C2 vertebral body (Max SUV 5.1),   left clavicular head (max SUV 4.6), L5 vertebral body (Max SUV 10.7), and midline posterior sacral elements (max SUV 11.3) suspicious for liver and widespread osseous metastases.      FDG avid bilateral palatine tonsils (max a 10.0) with mildly FDG avid left greater than right cervical chain lymph nodes (Max SUV 5.1) likely representing inflammatory/infectious process with reactive lymphadenopathy.     Bilateral basal ganglia mineralization. Mild coronary artery calcium. Hysterectomy. Pelvic phleboliths. Multilevel degenerative changes in spine. Lower extremities are unremarkable.                                                                      IMPRESSION:     Findings suspicious for liver and widespread osseous metastases.       ECHO 4/6/2022  Interpretation Summary  Left ventricular size, wall motion and function are normal. The ejection  fraction is 60-65%.     Right ventricular function, chamber size, wall motion, and thickness are  normal.     No pericardial effusion is present.     The inferior vena cava is normal.     Compared to imaging on 6/23/2015 There are no significant changes.      Brain MRI, 4/6/2022  Impression:   1. No brain metastases, abscess, or meningitis identified.   2. Little change in nonspecific white matter and basal ganglia signal  changes, likely at least in part related to chronic small vessel  ischemic disease.  3. Continued bilateral mastoid effusions.     I  have personally reviewed the examination and initial interpretation  and I agree with the findings.     ELIF THOMAS MD        ASSESSMENT AND PLAN:   1.  Mary Bennett is a 56-year-old woman with a history of a clinical stage II right breast cancer, ER positive, UT positive, HER2 negative by immunohistochemistry.  She had a lobular histology.  She was on the I-SPY 2 clinical trial and was randomized to ganetespib and paclitaxel, then went on to AC.  She had intractable nausea and vomiting the first cycle of AC, and the second cycle of AC was complicated by Pneumocystis pneumonia requiring intubation and a 2-week hospitalization.  She did not complete the AC treatment.  She did undergo a right lumpectomy and sentinel node procedure.  She had a positive margin and underwent resection.  She still had a positive margin.  She underwent a right lumpectomy with clear margins.  She completed radiation in 12/2015.  Pathologic stage was IIIA  [id number] and of concern, she had an RCB3 histology meaning significant risk of recurrence of her breast cancer during the 5-year period following primary therapy.  There were 2 lymph nodes involved with mammary carcinoma with extracapsular extension.  She went on to have radiation therapy and then began hormonal therapy with tamoxifen in October 2015. She had oophorectomy and started letrozole in January 2016.        2. Recurrent Metastatic breast cancer, lobular. New histology pleomorphic lobular cancer, which is negative for estrogen receptor and negative for progesterone receptor, and 20% is HER2 positive by FISH.  - She has a mixture of recurrent metastatic HER2 positive breast cancer that is estrogen receptor negative and 80% triple negative breast cancer.    - Discussed this situation in breast cancer conference on April 22 and the consensus opinion is to treat the minority HER2+ component first with Vandana regimen because that treatment would also cover the triple negative  (TNBC) component with the taxane part of the treatment. This would push back the use of pembrolizumab to the second line setting or later if the 22C3 staining is positive, but would allow earlier treatment of the HER2 component.   - The consensus is to not perform colonoscopy at this time.  We will treat anemia as needed.   -Discontinue letrozole  - Labs and echocardiogram are appropriate to proceed with week 1 of Taxol, Herceptin, her Perjeta on 4/29/2022  - Reviewed side effects in great detail.  - Port placement 5/4, Emla sent today    3. Coping:  - She feels that she is coping very well currently and does not need any additional support at this time  - Reviewed that she has been on Effexor since her original diagnosis.  She has been cutting her 225 mg pill in quarters (56mg) daily.  We will plan on prescribing her a lower dose when she needs refill    4. Bone Mets  - Asymptomatic  - Received Reclast 5mg on 4/5/2022, but had a very high fever  - Will have to address switching to xgeva, not addressed today      Follow up: As scheduled for infusion on Friday. Follow up for weekly labs/Taxol as scheduled, and with Dr. Meza in three weeks for THP    Patient will call in the interim with any questions or concerns. They voice understanding and agree with the above plan.     50 minutes spent on the date of the encounter doing chart review, review of test results, interpretation of tests, patient visit and documentation     Sandra Coughlin PA-C

## 2022-04-27 NOTE — NURSING NOTE
Chief Complaint   Patient presents with     Blood Draw     Labs drawn with  by rn.  VS taken.     Labs drawn with  by rn.  Pt tolerated well.  VS taken.  Pt checked in for next appt.    Renee Cevallos RN

## 2022-04-29 NOTE — PROGRESS NOTES
Infusion Nursing Note:  Josefina Bennett presents today for Day 1 Cycle 1 Perjeta, trastuzumab, and Taxol.    Patient seen by provider today: No   present during visit today: Not Applicable.    Note:   Patient arrives to infusion feeling well today.  She denies signs and symptoms of infection including:fever, cough, shortness of breath, sore throat, diarrhea, vomiting, rash, or pain with urination.     Patient last received chemotherapy about 7 years ago.  Patient has received Taxol before.  Perjeta and trastuzumab are new.  Pt oriented to infusion room and call light. New patient teaching done previously. Pt received new pt folder prior to coming to infusion. Writer reinforced chemotherapy teaching/side effects and schedule.     Pt instructed to call care coordinator, triage (or MD on call if after hours/weekends) with chills/temp >=100.4, questions/concerns. Pt stated understanding of plan.      About 30 minutes into Perjeta patient told this nurse that she had a headache after Perjeta started.  Pain was 4/10.  Pain improved on its own to 1/10.  Vital signs near patient's baseline.    ANGELA Temple/Renetta Garcia RN on 4/29/22 at 1020  No additional intervention needed for headache.  Continue to monitor.    When patient had 12 minutes of Perjeta left, she began to vomit.  Perjeta stopped.  IV Benadryl, Solu Medrol, and Pepcid given.  Patient began to rigor.  Demerol given with relief.  Patient's blood pressure up to 160s/100s.  Tachycardic in 110s.  Nausea improved.  Reviewed reaction with ANGELA Luevano.    ANGELA Temple/Renetta Garcia RN on 4/29/22 at 1100  Do not rechallenge Perjeta today.  Repeat Tylenol 650 mg po now.  Patient may have thrown up first dose.  OK to give trastuzumab if all symptoms continue to improve.    Vital signs near baseline and nausea gone when Herceptin started.      30 minutes into Herceptin patient vomitted.  BP and heart rate back up.   Ativan and Pepcid given.    ANGELA Temple/Renetta Garcia, RN on 4/29/22 at 1240  OK to restart Herceptin once vital signs improve.  Give Zofran 8 mg IV now.  If patient reacts with headache and vomitting again OK to give 125 mg Solu-Medrol and 25 mg IV Benaryl.    Patient tolerated the remainder of the trastuzumab well.    ANGELA Temple/Renetta Garcia, RN on 4/29/22 at 1432  Give 25mg IV Benadryl pre Taxol    Tolerated Taxol well.    Intravenous Access:  Peripheral IV placed.    Treatment Conditions:   Latest Reference Range & Units 04/27/22 11:28   Sodium 133 - 144 mmol/L 140   Potassium 3.4 - 5.3 mmol/L 4.3   Chloride 94 - 109 mmol/L 108   Carbon Dioxide 20 - 32 mmol/L 27 [1]   Urea Nitrogen 7 - 30 mg/dL 17 [2]   Creatinine 0.52 - 1.04 mg/dL 0.89 [3]   GFR Estimate >60 mL/min/1.73m2 76 [4]   Calcium 8.5 - 10.1 mg/dL 9.0 [5]   Anion Gap 3 - 14 mmol/L 5 [6]   Albumin 3.4 - 5.0 g/dL 3.4 [7]   Protein Total 6.8 - 8.8 g/dL 7.6 [8]   Bilirubin Total 0.2 - 1.3 mg/dL 0.4 [9]   Alkaline Phosphatase 40 - 150 U/L 108 [10]   ALT 0 - 50 U/L 29 [11]   AST 0 - 45 U/L 40 [12]   CA 27-29 0 - 39 U/mL 823 (H)   Glucose 70 - 99 mg/dL 109 (H) [13]   WBC 4.0 - 11.0 10e3/uL 11.4 (H)   Hemoglobin 11.7 - 15.7 g/dL 10.0 (L)   Hematocrit 35.0 - 47.0 % 32.2 (L)   Platelet Count 150 - 450 10e3/uL 205   RBC Count 3.80 - 5.20 10e6/uL 3.78 (L)   MCV 78 - 100 fL 85   MCH 26.5 - 33.0 pg 26.5   MCHC 31.5 - 36.5 g/dL 31.1 (L)   RDW 10.0 - 15.0 % 16.8 (H)   % Neutrophils % 56   % Lymphocytes % 34   % Monocytes % 7   % Eosinophils % 2   % Basophils % 1   Absolute Basophils 0.0 - 0.2 10e3/uL 0.1   Absolute Eosinophils 0.0 - 0.7 10e3/uL 0.2   Absolute Immature Granulocytes <=0.4 10e3/uL 0.0   Absolute Lymphocytes 0.8 - 5.3 10e3/uL 3.9   Absolute Monocytes 0.0 - 1.3 10e3/uL 0.7   % Immature Granulocytes % 0   Absolute Neutrophils 1.6 - 8.3 10e3/uL 6.5   Absolute NRBCs 10e3/uL 0.0   NRBCs per 100 WBC <1 /100 0     Results  reviewed, labs MET treatment parameters, ok to proceed with treatment.  ECHO/MUGA completed 4/7/22  EF 60-65%.      Post Infusion Assessment:  Blood return noted pre and post infusion.  Site patent and intact, free from redness, edema or discomfort.  No evidence of extravasations.  Access discontinued per protocol.       Discharge Plan:   Prescription refills given for Compazine.  Sent to local pharmacy per patient request.  Discharge instructions reviewed with: Patient.  Patient and/or family verbalized understanding of discharge instructions and all questions answered.  Copy of AVS reviewed with patient and/or family.  Patient will return 5/6/22 for next chemotherapy appointment.  Patient discharged in stable condition accompanied by: self.  Departure Mode: Ambulatory.  Patient's boyfriend is picking her up.      Renetta Garcia RN

## 2022-04-29 NOTE — PROGRESS NOTES
Rapid Response Epic Documentation     Situation:   Female Pt. C/o sudden onset of headache, nausea and uncontrolled shaking following an infusion. Pt. Denies SOB, and does not have urticaria.      Objective:    Con. And Alert Ox3, Skin is PWD, lungs clear bilaterally. Pt improved over time and will be monitored by staff.    Assessment:            BP:  160/100    Pulse:115  Respiration: 16  SPO2: 96 %  Glucose:  mg/dl  Mental Status: Alert  CMS: Intact  Stroke Scale: Not Applicable  EKG: Not Performed      Treatment:          Location:      2nd floor BMT    Disposition:      Stable (D/C home)    Protocol Used:     Allergic Reaction/Anaphylaxis

## 2022-04-29 NOTE — PATIENT INSTRUCTIONS
Contact Numbers  [facility]: [phone] (for symptom and scheduling needs)    Please call the [facility] Triage line if you experience a temperature greater than or equal to 100.4, shaking chills, have uncontrolled nausea, vomiting and/or diarrhea, dizziness, shortness of breath, chest pain, bleeding, unexplained bruising, or if you have any other new/concerning symptoms, questions or concerns.     If you are having any concerning symptoms or wish to speak to a provider before your next infusion visit, please call your care coordinator or triage to notify them so we can adequately serve you.     If you need a refill on a narcotic prescription or other medication, please call triage before your infusion appointment.

## 2022-05-02 NOTE — PROGRESS NOTES
Called patient and left a VM post C1 Herceptin/Perjeta/Taxol 4/27/2022. Teresa Ashton RN, BSN Breast Center Nurse Coordinator

## 2022-05-03 NOTE — ANESTHESIA PREPROCEDURE EVALUATION
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     PTSD (post-traumatic stress disorder)       Past Surgical History:   Procedure Laterality Date     APPENDECTOMY       CYSTOSCOPY N/A 10/16/2015    Procedure: CYSTOSCOPY;  Surgeon: Aleksandr Palafox MD;  Location: [facility]     HC REMOVAL OF OVARIAN CYST(S)      x 3     IR FLUORO 0-1 HOUR  2022     LAPAROSCOPIC HYSTERECTOMY TOTAL, BILATERAL SALPINGO-OOPHORECTOMY, COMBINED N/A 10/16/2015    Procedure: COMBINED LAPAROSCOPIC HYSTERECTOMY TOTAL, SALPINGO-OOPHORECTOMY;  Surgeon: Aleksandr Palafox MD;  Location: [facility]     LUMPECTOMY BREAST Right 2015    Procedure: LUMPECTOMY BREAST;  Surgeon: Philip Franco MD;  Location: [facility]     LUMPECTOMY BREAST WITH SENTINEL NODE, COMBINED Right 2015    Procedure: COMBINED LUMPECTOMY BREAST WITH SENTINEL NODE;  Surgeon: Philip Franco MD;  Location: [facility]     MASTECTOMY SIMPLE Right 2015    Procedure: MASTECTOMY SIMPLE;  Surgeon: Philip Franco MD;  Location: [facility]     REMOVE PORT VASCULAR ACCESS N/A 2015    Procedure: REMOVE PORT VASCULAR ACCESS;  Surgeon: Philip Franco MD;  Location: [facility]     SURGICAL HISTORY OF -       wisdom teeth     TONSILLECTOMY      at age 30 for tonsillitis      Allergies   Allergen Reactions     Morphine      Rash      Social History     Tobacco Use     Smoking status: Former Smoker     Packs/day: 0.50     Types: Cigarettes     Quit date: 2015      Years since quittin.2     Smokeless tobacco: Former User     Tobacco comment: 5 a day   Substance Use Topics     Alcohol use: No     Alcohol/week: 0.0 standard drinks     Comment: hx of etoh abuse in teens and 20's      Wt Readings from Last 1 Encounters:   22 59.8 kg (131 lb 14.4 oz)           Physical Exam    Airway        Mallampati: II   TM distance: > 3 FB   Neck ROM: full   Mouth opening: > 3 cm    Respiratory Devices and Support         Dental           Cardiovascular   cardiovascular exam normal          Pulmonary   pulmonary exam normal                OUTSIDE LABS:  CBC:   Lab Results   Component Value Date    WBC 11.4 (H) 2022    WBC 11.6 (H) 2022    HGB 10.0 (L) 2022    HGB 10.0 (L) 2022    HCT 32.2 (L) 2022    HCT 32.2 (L) 2022     2022     2022     BMP:   Lab Results   Component Value Date     2022     2022    POTASSIUM 4.3 2022    POTASSIUM 4.6 2022    CHLORIDE 108 2022    CHLORIDE 109 2022    CO2 27 2022    CO2 27 2022    BUN 17 2022    BUN 19 2022    CR 0.89 2022    CR 0.88 2022     (H) 2022     (H) 2022     COAGS:   Lab Results   Component Value Date    PTT 29 2015    INR 1.07 2022     POC:   Lab Results   Component Value Date     (H) 2015    HCG  2010     Negative   This test provides a presumptive diagnosis of pregnancy or non-pregnancy. A   confirmed pregnancy diagnosis should only be made by a physician after all   clinical and laboratory findings have been evaluated.    HCGS Negative 2015     HEPATIC:   Lab Results   Component Value Date    ALBUMIN 3.4 2022    PROTTOTAL 7.6 2022    ALT 29 2022    AST 40 2022    ALKPHOS 108 2022    BILITOTAL 0.4 2022     OTHER:   Lab Results   Component Value Date    PH 7.35 2015    LACT 0.6 (L) 2022     A1C 6.9 (H) 06/27/2015    VEE 9.0 04/27/2022    PHOS 2.5 06/27/2015    MAG 1.8 06/27/2015    LIPASE 68 (L) 05/31/2015    TSH 0.70 04/15/2013    CRP 61.0 (H) 04/07/2022    SED 30 04/07/2022       Anesthesia Plan    ASA Status:  3   NPO Status:  NPO Appropriate    Anesthesia Type: MAC.     - Reason for MAC: straight local not clinically adequate   Induction: Intravenous, Propofol.   Maintenance: TIVA.        Consents    Anesthesia Plan(s) and associated risks, benefits, and realistic alternatives discussed. Questions answered and patient/representative(s) expressed understanding.    - Discussed:     - Discussed with:  Patient      - Extended Intubation/Ventilatory Support Discussed: No.      - Patient is DNR/DNI Status: No    Use of blood products discussed: No .     Postoperative Care    Pain management: IV analgesics, Oral pain medications, Multi-modal analgesia.   PONV prophylaxis: Dexamethasone or Solumedrol, Ondansetron (or other 5HT-3), Background Propofol Infusion     Comments:           H&P reviewed: Unable to attach H&P to encounter due to EHR limitations. H&P Update: appropriate H&P reviewed, patient examined. No interval changes since H&P (within 30 days).         Jason Reese MD

## 2022-05-04 NOTE — ANESTHESIA POSTPROCEDURE EVALUATION
Patient: Josefina Bennett    Procedure: Procedure(s):  INSERTION, VASCULAR ACCESS PORT       Anesthesia Type:  MAC    Note:  Disposition: Outpatient   Postop Pain Control: Uneventful            Sign Out: Well controlled pain   PONV:    Neuro/Psych: Uneventful            Sign Out: Acceptable/Baseline neuro status   Airway/Respiratory: Uneventful            Sign Out: Acceptable/Baseline resp. status   CV/Hemodynamics: Uneventful            Sign Out: Acceptable CV status; No obvious hypovolemia; No obvious fluid overload   Other NRE:    DID A NON-ROUTINE EVENT OCCUR?            Last vitals:  Vitals Value Taken Time   /68 05/04/22 1100   Temp 36.4  C (97.6  F) 05/04/22 1100   Pulse 88 05/04/22 1100   Resp 14 05/04/22 1100   SpO2 96 % 05/04/22 1100       Electronically Signed By: Jason Reese MD  May 4, 2022  3:32 PM

## 2022-05-04 NOTE — OP NOTE
PROCEDURE 5/4/2022:  1. Ultrasound guidance for venous access  2. Tunneled port (age > 5 yrs.) placement  3. Fluoroscopic guidance placement    Clinical History: Right-sided breast cancer. Vascular port placement requested.    Comparisons: PET/CT dated 4/27/2022    Staff Radiologist: MD YUVAL Carlson, MAJOR DONAHUE MD, attest that I was present in the procedure room for the entire procedure.     Assistant: Andrei Trevino PA-C    Medications: The patient was placed on continuous monitoring. Monitored anesthesia care was provided by anesthesiology service. For details please refer to their dedicated note. The patient remained stable throughout the procedure.    Fluoroscopy time: 0.5 minutes.    PROCEDURE: The patient understood the limitations, alternatives, and risks of the procedure and requested the procedure be performed. Both written and oral consent were obtained.    Limited jugular ultrasound documented jugular vein patency.    The left neck and upper chest were prepped and draped in the usual sterile fashion. Ten to one volume mixture of 1% lidocaine without epinephrine buffered with 8.4% bicarbonate solution was used for local anesthesia.     Under ultrasound guidance, left internal jugular venotomy was made with a micropuncture needle. Permanent copy of the image documenting the vein was entered into the patients record.    Under fluoroscopic guidance, the micropuncture needle was exchanged over guidewire for the micropuncture sheath. Catheter length measured with the 0.018 guidewire. Micropuncture sheath saline locked. A subcutaneous pocket was created for the port reservoir over the left anterior chest using a combination of sharp and blunt dissection. The pocket was liberally irrigated with normal saline solution. Catheter was subcutaneously tunneled from the left anterior chest pocket to the left internal jugular venotomy site. Catheter was cut to length. Micropuncture sheath exchanged over  guidewire for the peel-away sheath. Catheter placed through the peel-away sheath and advanced under fluoroscopic guidance to the right atrium. Peel-away sheath removed. Port aspirated and flushed adequately. Port heparin locked with 100:1 heparin solution. The left anterior chest incision was closed with four 3-0 Vicryl interrupted sutures, a running 4-0 Monocryl subcuticular suture, and Dermabond. Left internal jugular venotomy site closed with Dermabond. No immediate complication.     IMPRESSION:   1. Ultrasound guided left internal jugular venotomy.  2. 24 cm 8 [place] single lumen power injectable port placed. Tip in the right atrium. Port heparin locked and ready for use.    MAJOR DONAHUE MD

## 2022-05-04 NOTE — DISCHARGE INSTRUCTIONS
Vascular Access Port Implantation   Port implantation is surgery to place (implant) a port under the skin. For vascular access, it's placed into a vein. The port allows medicines or nutrition to be sent right into your bloodstream. Blood can also be taken or given through the port. During the procedure, a long, thin tube (catheter) is threaded into one of your large veins. The tube is then attached to the port. This usually sits under the skin of your chest and causes a small bump. To use the port, a special needle is passed through your skin and into the port. The needle can stay in your skin for up to 7 days, if needed. A port can stay in place for weeks or months or longer.    Why is a vascular access port needed?  A vascular access port may allow healthcare providers to give you:  Chemotherapy or other cancer-fighting medicines  IV treatments, such as antibiotics or nutrition  Hemodialysis for kidney failure  The port may also be used to draw blood.  Before the procedure  Follow any instructions you are given on how to get ready.  Tell your provider about any medicines you are taking. This includes:  All prescription medicines  Over-the-counter medicines such as aspirin or ibuprofen  Herbs, vitamins, and other supplements  Also be sure your provider knows:  If you are pregnant or think you may be pregnant  If you are allergic to any medicines or substances, especially local anesthesia or iodine  Your full health history, including why you will need the port. Also tell your provider if you have a condition that make your blood more likely to form clots.  If you plan on doing any contact sports  During the procedure  Before the procedure, an IV may be put into a vein in your arm or hand. This gives you fluids and medicines. You may be given medicine through the IV to help you relax during the procedure. This is called sedation. But some surgeons place ports using general anesthesia.  The chest is used most  often for the port. In some cases, your belly (abdomen) or arm will be used instead.  The skin over the insertion area is numbed with local anesthesia.  Ultrasound or X-rays are used to help the healthcare provider guide the catheter into the correct place during the procedure.  A cut (incision) is made in the skin where the port will be placed. A small pocket for the port is formed under the skin.  A second small incision is made in the skin near the first incision. A tunnel under the skin is created. The catheter is put through the tunnel and into the blood vessel.  The skin is closed over the port. It's held shut with stitches or surgical glue or tape. The second small incision is also closed.  A chest X-ray may be done to make sure the port is placed correctly.    After the procedure  You may be taken to a recovery room to recover from the sedation. Nurses will check on you as you rest. If you have pain, nurses can give you medicine. If you are not staying in the hospital overnight, you will be sent home a few hours after the procedure is done. A healthcare provider will tell you when you can go home. An adult family member or friend will need to drive you home.  Recovering at home  Take pain medicine as directed by your healthcare provider.  Take it easy for 24 hours after the procedure. Don't do any physical activity or heavy lifting until your healthcare provider says it s OK.  Keep the port clean and dry. Ask when you can shower again. You will need to keep the port dry by covering it when you shower.  Care for the insertion site as you are directed.  Don t swim, bathe, or do other activities that cause water to cover the insertion site.  Your port needs to be flushed at least every 4 to 6 weeks to keep it from getting blocked with blood clots. Talk with your healthcare provider about who will do this for you.  In some cases, you may be taught how to flush it yourself.    Risks and possible complications of  implantation  Bleeding  Infection of the insertion site  Damage to a blood vessel  Nerve injury or irritation  Skin breakdown over the port  Rarely, collapsed lung (for chest port placements)    Risks and possible complications of having a port  Blocked  port or catheter  Leakage or breakage of the port or catheter  The port moves out of position  Blood clot  Skin or bloodstream infection  Skin breakdown over the port    When to seek medical care  Call your healthcare provider right away if you have any of the following:  A fever of 100.4 F(38 C) or higher, or as directed by your healthcare provider  Swelling, bleeding, or a collection of blood (hematoma) around the port insertion site  The skin near the port is red, warm, swollen, or broken  Shoulder pain or arm numbness, weakness, or tingling on the side where the port is located  You feel a heart flutter or racing heart  Swollen arm, if the port is placed in your arm  Cough, shortness of breath, or trouble taking a full, deep breath  Omid last reviewed this educational content on 1/1/2019 2000-2021 [employer]. All rights reserved. This information is not intended as a substitute for professional medical care. Always follow your healthcare professional's instructions.        A collaboration between [facility] and [facility]  Experts in minimally invasive, targeted treatments performed using imaging guidance    Venous Access Device,  Port Catheter or Tunneled or Non-Tunneled Central Line Placement    Today you had a procedure today to install a venous access device; either a tunneled central vein catheter or a subcutaneous port catheter.    After you go home:  Drink plenty of fluids.  Generally 6-8 (8 ounce) glasses a day is recommended.  Resume your regular diet unless otherwise ordered by a medical provider.  Keep any applied tape/gauze dressings clean and dry.  Change tape/gauze dressings  if they get wet or soiled.  You may shower the following day after procedure, however cover and protect from moisture any tape/gauze dressings.  You may let water hit and run over dried skin glue, but do not scrub.  Pat the area dry after showering.  Port placement incisions are closed with absorbable suture, meaning they do not need to be removed at a later date, and a topical skin adhesive (skin glue).  This glue will wear off in 7-14 days.  Do not remove before this time.  If 14 days have passed and residual glue is present, you may gently remove it.  Do not apply gels, lotions, or ointments to the glue site for the first 10 days as this may cause the glue to prematurely soften and fail.  Do not perform strenuous activities or lift greater than 10 pounds for the next three days.  If there is bleeding or oozing from the procedure site, apply firm pressure to the area for 5-10 minutes.  If the bleeding continues seek medical advice at the numbers below.  Mild procedure site discomfort can be treated with an ice pack and over-the-counter pain relievers.        For 24 hours after any sedation used:  Relax and take it easy.  No strenuous activities.  Do not drive or operate machines at home or at work.  No alcohol consumption.  Do not make any important or legal decisions.    Call our Interventional Radiology (IR) service if:  If you start bleeding from the procedure site.  If you do start to bleed from the site, lie down and hold some pressure on the site.  Our radiology provider can help you decide if you need to return to the hospital.  If you have new or worsening pain related to the procedure.  If you have concerning swelling at the procedure site.  If you develop persistent nausea or vomiting.  If you develop hives or a rash or any unexplained itching.  If you have a fever of greater than 100.5  F and chills in the first 5 days after procedure.  Any other concerns related to your procedure.      [facility]  [facility]  Interventional Radiology (IR)  [address]  [address]nd Floor, [place] [address]    Contact Number:  [phone]  (IR control desk)  Monday - Friday 8:00 am - 4:30 pm    After hours for urgent concerns:  [phone]  After 4:30 pm Monday - Friday, Weekends and Holidays.   Ask for Interventional Radiology on-call.  Someone is available 24 hours a day.  [facility] toll free number:  [phone]

## 2022-05-04 NOTE — PROGRESS NOTES
Called patient with Sandra oCughlin's recommendations to switch anti-nausea med from Compazine to Zofran due to excessive sleepiness. Prescription sent to the [facility] near her home. Teresa Ashton RN, BSN Breast Center Nurse Coordinator

## 2022-05-04 NOTE — ANESTHESIA CARE TRANSFER NOTE
Patient: Josefina Bennett    Procedure: Procedure(s):  INSERTION, VASCULAR ACCESS PORT       Diagnosis: Malignant neoplasm of upper-outer quadrant of right breast in female, estrogen receptor positive (H) [C50.411, Z17.0]  Diagnosis Additional Information: No value filed.    Anesthesia Type:   MAC     Note:    Oropharynx: oropharynx clear of all foreign objects and spontaneously breathing  Level of Consciousness: awake  Oxygen Supplementation: room air    Independent Airway: airway patency satisfactory and stable  Dentition: dentition unchanged  Vital Signs Stable: post-procedure vital signs reviewed and stable  Report to RN Given: handoff report given  Patient transferred to: Phase II    Handoff Report: Identifed the Patient, Identified the Reponsible Provider, Reviewed the pertinent medical history, Discussed the surgical course, Reviewed Intra-OP anesthesia mangement and issues during anesthesia, Set expectations for post-procedure period and Allowed opportunity for questions and acknowledgement of understanding      Vitals:  Vitals Value Taken Time   /64 05/04/22 1005   Temp 37  C (98.6  F) 05/04/22 1005   Pulse 86 05/04/22 1005   Resp 12 05/04/22 1005   SpO2 98 % 05/04/22 1005       Electronically Signed By: BONITA Garcia CRNA  May 4, 2022  10:33 AM

## 2022-05-04 NOTE — PROCEDURES
Josefina Bennett  [id number]    Completed placement of an 8 Albanian, 24 cm, Bard ClearVUE Slim brand, single lumen venous chest power-injectable port via LIJV.  Tip lying in the right atrium. PORT is ready for immediate use.  Dx:  Breast cancer (RIGHT).  Gregg.  ELLIE

## 2022-05-05 NOTE — PROGRESS NOTES
"Patient called with a swollen area on her L \"crease\" of her eye and felt a smaller than a pea on her rectum when she was showering that is not painful or any drainage from either area that she noticed today. Is afebrile feels well and eating and drinking w/o difficulty. Patient is due for chemotherapy tomorrow and will send this message to Sandra Coughlin that saw her this week for her recommendations. Patient instructed to call in if areas have increased swelling, drainage or becomes febrile.  Answered all patient's questions and verbalized understanding. Teresa Ashton RN, BSN.   "

## 2022-05-06 NOTE — PATIENT INSTRUCTIONS
[facility] Triage Nurse Line: [phone]    Call triage nurse with chills and/or temperature greater than or equal to 100.4, uncontrolled nausea/vomiting, diarrhea, constipation, dizziness, shortness of breath, chest pain, bleeding, unexplained bruising, or any new/concerning symptoms, questions/concerns.     If you are having any concerning symptoms or wish to speak to a provider before your next infusion visit, please call your care coordinator or triage to notify them so we can adequately serve you.        Lab Results:  Recent Results (from the past 12 hour(s))   COMPREHENSIVE METABOLIC PANEL    Collection Time: 05/06/22  2:17 PM   Result Value Ref Range    Sodium 143 133 - 144 mmol/L    Potassium 4.1 3.4 - 5.3 mmol/L    Chloride 107 94 - 109 mmol/L    Carbon Dioxide (CO2) 29 20 - 32 mmol/L    Anion Gap 7 3 - 14 mmol/L    Urea Nitrogen 13 7 - 30 mg/dL    Creatinine 0.87 0.52 - 1.04 mg/dL    Calcium 8.8 8.5 - 10.1 mg/dL    Glucose 113 (H) 70 - 99 mg/dL    Alkaline Phosphatase 91 40 - 150 U/L    AST 32 0 - 45 U/L    ALT 29 0 - 50 U/L    Protein Total 6.5 (L) 6.8 - 8.8 g/dL    Albumin 3.1 (L) 3.4 - 5.0 g/dL    Bilirubin Total 0.2 0.2 - 1.3 mg/dL    GFR Estimate 78 >60 mL/min/1.73m2   CBC with platelets and differential    Collection Time: 05/06/22  2:17 PM   Result Value Ref Range    WBC Count 13.3 (H) 4.0 - 11.0 10e3/uL    RBC Count 3.00 (L) 3.80 - 5.20 10e6/uL    Hemoglobin 8.2 (L) 11.7 - 15.7 g/dL    Hematocrit 26.0 (L) 35.0 - 47.0 %    MCV 87 78 - 100 fL    MCH 27.3 26.5 - 33.0 pg    MCHC 31.5 31.5 - 36.5 g/dL    RDW 18.6 (H) 10.0 - 15.0 %    Platelet Count 402 150 - 450 10e3/uL

## 2022-05-06 NOTE — PROGRESS NOTES
Infusion Nursing Note:  Josefina Bennett presents today for Cycle 1 Day 8 paclitaxel.    Patient seen by provider today: Yes: ANGELA Cummins, to evaluate symptoms reported in MyChart message on 05/05   present during visit today: Not Applicable.    Note: Josefina Verma presents today feeling well. Denies pain or nausea/vomiting. Denies fevers/chills. Denies SOB, cough, chest pain, or dizziness/lightheadedness. Denies constipation/diarrhea. Denies urinary issues. Endorses baseline neuropathy. Offers no new concerns at this appointment.    Per ANGELA Cummins: Ok to go ahead with treatment today      Intravenous Access:  Implanted Port.    Treatment Conditions:     Latest Reference Range & Units 05/06/22 14:17   Sodium 133 - 144 mmol/L 143   Potassium 3.4 - 5.3 mmol/L 4.1   Chloride 94 - 109 mmol/L 107   Carbon Dioxide 20 - 32 mmol/L 29   Urea Nitrogen 7 - 30 mg/dL 13   Creatinine 0.52 - 1.04 mg/dL 0.87   GFR Estimate >60 mL/min/1.73m2 78 [1]   Calcium 8.5 - 10.1 mg/dL 8.8   Anion Gap 3 - 14 mmol/L 7   Albumin 3.4 - 5.0 g/dL 3.1 (L)   Protein Total 6.8 - 8.8 g/dL 6.5 (L)   Bilirubin Total 0.2 - 1.3 mg/dL 0.2   Alkaline Phosphatase 40 - 150 U/L 91   ALT 0 - 50 U/L 29   AST 0 - 45 U/L 32   Glucose 70 - 99 mg/dL 113 (H)   WBC 4.0 - 11.0 10e3/uL 13.3 (H)   Hemoglobin 11.7 - 15.7 g/dL 8.2 (L)   Hematocrit 35.0 - 47.0 % 26.0 (L)   Platelet Count 150 - 450 10e3/uL 402   RBC Count 3.80 - 5.20 10e6/uL 3.00 (L)   MCV 78 - 100 fL 87   MCH 26.5 - 33.0 pg 27.3   MCHC 31.5 - 36.5 g/dL 31.5   RDW 10.0 - 15.0 % 18.6 (H)   % Neutrophils % 49   % Lymphocytes % 41   % Monocytes % 4   % Eosinophils % 3   % Basophils % 1   Absolute Basophils 0.0 - 0.2 10e3/uL 0.1   Absolute Eosinophils 0.0 - 0.7 10e3/uL 0.4   Absolute Immature Granulocytes <=0.4 10e3/uL 0.2   Absolute Lymphocytes 0.8 - 5.3 10e3/uL 5.5 (H)   Absolute Monocytes 0.0 - 1.3 10e3/uL 0.6   % Immature Granulocytes % 2   Absolute Neutrophils 1.6 - 8.3 10e3/uL 6.5    Absolute NRBCs 10e3/uL 0.1   NRBCs per 100 WBC <1 /100 1 (H)   RBC Morphology  Confirmed RBC Indices   Platelet Morphology Automated Count Confirmed. Platelet morphology is normal.  Automated Count Confirmed. Platelet morphology is normal.     Results reviewed, labs MET treatment parameters, ok to proceed with treatment.      Post Infusion Assessment:  Patient tolerated infusion without incident.  Blood return noted pre and post infusion.  Site patent and intact, free from redness, edema or discomfort.  No evidence of extravasations.  Access discontinued per protocol.       Discharge Plan:   Patient declined prescription refills.  Discharge instructions reviewed with: Patient.  Patient and/or family verbalized understanding of discharge instructions and all questions answered.  AVS to patient via [employer].  Patient will return 05/13 for next infusion appointment.   Patient discharged in stable condition accompanied by: self.  Departure Mode: Ambulatory.      Lucía Estrada RN

## 2022-05-06 NOTE — NURSING NOTE
Chief Complaint   Patient presents with     Port Draw     Labs drawn via port by rn in lab. VS taken.     Port accessed with 20g 3/4 inch gripper needle by RN, labs collected, line flushed with saline and heparin.  Vitals taken. Pt checked in for appointment(s).    Perez Hollingsworth, RN

## 2022-05-06 NOTE — PROGRESS NOTES
Stopped over to see patient while she was in infusion.  She has a small bump on her left eyelid, that is nonpainful.  Not affecting her vision, no redness or eye pain.  She also noticed a little lump in her anus when she was taking a shower recently.  It is nonpainful.  She is not having any issues with her bowel movements, no constipation or diarrhea.  No blood in the stool.  No drainage.  No fevers body aches or chills.  I did recommend a rectal exam, but the patient would like to defer it today.  We will add her on my schedule next week to do an exam.  Continue to monitor and call with any changes.    Sandra Coughlin PA-C on 5/6/2022 at 3:30 PM

## 2022-05-12 NOTE — PROGRESS NOTES
Patient noticing alopecia requesting a prescription for a cranial prosthesis. E-mailed prescription to patient's personal e-mail. Patient is feeling well and Zofran is improving her nausea only needing to take it once a day and using ginger lozenges as needed.  Answered all patient's questions and verbalized understanding. Teresa Ashton RN, BSN.

## 2022-05-13 NOTE — NURSING NOTE
Chief Complaint   Patient presents with     Port Flush     Port Draw     Port accessed, labs drawn, heparin locked, vitals taken, checked into next appt     BP (!) 153/79 (BP Location: Right arm, Patient Position: Sitting, Cuff Size: Adult Regular)   Pulse 100   Temp 98.8  F (37.1  C) (Oral)   Wt 61.1 kg (134 lb 9.6 oz)   LMP 12/18/2014   BMI 23.09 kg/m    Levar Terrazas RN on 5/13/2022 at 2:56 PM

## 2022-05-13 NOTE — NURSING NOTE
Chief Complaint   Patient presents with     Port Flush     Port Draw     Port accessed, labs drawn, heparin locked, vitals taken, checked into next appt     BP (!) 153/79 (BP Location: Right arm, Patient Position: Sitting, Cuff Size: Adult Regular)   Pulse 100   Temp 98.8  F (37.1  C) (Oral)   Wt 61.1 kg (134 lb 9.6 oz)   LMP 12/18/2014   BMI 23.09 kg/m    Levar Terrazas RN on 5/13/2022 at 2:45 PM

## 2022-05-13 NOTE — PROGRESS NOTES
Infusion Nursing Note:  Josefina Bennett presents today for Cycle 1, Day 15 paclitaxel.    Patient seen by provider today: Yes: Sandra Coughlin   present during visit today: Not Applicable.    Note: Patient seen in clinic by provider, offers no additional concerns.  Denies pain, fever, chills, cough or SOB.      Intravenous Access:  Implanted Port.    Treatment Conditions:  Lab Results   Component Value Date    HGB 9.5 (L) 05/13/2022    WBC 7.2 05/13/2022    ANEU 4.2 01/23/2020    ANEUTAUTO 3.0 05/13/2022     05/13/2022      Lab Results   Component Value Date     05/13/2022    POTASSIUM 3.8 05/13/2022    MAG 1.8 06/27/2015    CR 0.90 05/13/2022    VEE 8.6 05/13/2022    BILITOTAL 0.2 05/13/2022    ALBUMIN 3.3 (L) 05/13/2022    ALT 30 05/13/2022    AST 29 05/13/2022     Results reviewed, labs MET treatment parameters, ok to proceed with treatment.      Post Infusion Assessment:  Patient tolerated infusion without incident.  Blood return noted pre and post infusion.  No evidence of extravasations.  Access discontinued per protocol.       Discharge Plan:   Patient declined prescription refills.  Patient and/or family verbalized understanding of discharge instructions and all questions answered.  AVS to patient via [employer].  Patient will return 5/19 for next appointment.   Patient discharged in stable condition accompanied by: self.  Departure Mode: Ambulatory.      Emmanuelle Smith RN

## 2022-05-13 NOTE — NURSING NOTE
"Oncology Rooming Note    May 13, 2022 3:06 PM   Josefina Bennett is a 56 year old female who presents for:    Chief Complaint   Patient presents with     Port Flush     Port Draw     Port accessed, labs drawn, heparin locked, vitals taken, checked into next appt     Oncology Clinic Visit     Return; Breast Ca     Initial Vitals: BP (!) 153/79 (BP Location: Right arm, Patient Position: Sitting, Cuff Size: Adult Regular)   Pulse 100   Temp 98.8  F (37.1  C) (Oral)   Wt 61.1 kg (134 lb 9.6 oz)   LMP 12/18/2014   BMI 23.09 kg/m   Estimated body mass index is 23.09 kg/m  as calculated from the following:    Height as of 5/4/22: 1.626 m (5' 4.02\").    Weight as of this encounter: 61.1 kg (134 lb 9.6 oz). Body surface area is 1.66 meters squared.  No Pain (0) Comment: Data Unavailable   Patient's last menstrual period was 12/18/2014.  Allergies reviewed: Yes  Medications reviewed: Yes    Medications: Medication refills not needed today.  Pharmacy name entered into [facility]:    [facility] [place], [place] - [address] [address] [facility] [address] AT [facility]  [facility] - A MAIL ORDER [facility] - [place], [place] - [address] [facility] - [place], [place] - [address]    Clinical concerns:        Leandra Lizarraga CMA              "

## 2022-05-15 NOTE — TELEPHONE ENCOUNTER
I called and left a message to call.  Plan is to discuss the information below when she calls.     In conference on Friday I discussed the 22C3 PD-L1 CPS of 40 while 20% of the tumor is HER2+ by FISH and the overall ASCO  CAP assignment is negative for the bulk tumor with the qualifier that there is a significant HER2 clonal component that would be best treated with HER2 directed therapy.  The Tumor Board consensus was to continue with HER2 directed therapy to treat the new and HER2+ clone till best response and then change over to Keynote 355 with pembrolizumab and chemotherapy.  Pembrolizumab and gem/carbo or pembro and Abraxane could be potential options.       ADDENDUM:  Josefina Verma called back on Sunday afternoon 4:15 PM and we went over the recommendations from our conference.  She was in agreement with the plan.    Evangelista Meza MD

## 2022-05-17 NOTE — PROGRESS NOTES
REASON FOR VISIT: Follow-up breast cancer     HISTORY OF PRESENT ILLNESS:  Josefina Bennett was self referred to our clinic for clinical trial recommendations for newly diagnosed stage II breast cancer. She had her last mammogram approximately a year prior. She did notice in early January 2015 that she was having some distortion of the right breast, and she went to see her gynecologist and had a mammogram performed. She had a screening mammogram performed on 01/15/2015. Breast tissue was heterogeneously dense, which might compromise the mammography. There was architectural distortion in the right breast approximately 11-12 o'clock position, zone 2, posterior depth, and a CC MLO spot compression was performed and an ultrasound was planned. The diagnostic mammogram from 01/28/2015 showed right breast architectural distortion centered at the 12 o'clock position, zone 2, suspicious for neoplasm. Breast tomosynthesis was used in the interpretation. Ultrasound findings included targeted ultrasound of the right upper breast demonstrating a band-like area of abnormal echogenicity between 11 and 12 o'clock position in the right breast at zone 2. This measures 4 cm transversely x 1.1 cm AP, and there was only a 1.5 cm measurement in the radial direction. There was blood flow suspicious for neoplasm at the 11 o'clock position in zone 2. There is a 1.4 cm hypoechoic nodule slightly  from the larger area of altered echogenicity that is also suspicious. A biopsy was performed. The biopsy showed infiltrating lobular carcinoma, grade 2, and a clip was placed at specimen [id number]. There were a few signet cells present. The tumor was ER-positive, DE low positive, and HER2 was negative by immunohistochemistry. She subsequently underwent an MRI showing a tumor that was 4.8 x 1.7 x 3.2 cm in size. Overall, her clinical stage was T2 NX MX.       She was enrolled in the I-SPY-2 clinical trial, and qualified with high-risk MammaPrint  score. She was randomized to ganetespib and paclitaxel, and she was treated with 12 cycles of treatment, and then started on AC on 05/26/2015. She developed intractable nausea and vomiting after the first cycle, which required hospitalization on the second cycle. She then developed Pneumocystis pneumonia, which resulted in intubation and a 2-week hospitalization during the course of AC. She was discharged on 07/02/2015, and decided she did not want to pursue any further chemotherapy. She had a right lumpectomy and sentinel lymph node procedure 08/12/2015. She had a positive margin, underwent a re-excision, and had a positive margin. Ultimately, she underwent a right mastectomy with clear margins. She began radiation treatment with Dr. Bill Chauhan, and completed radiation therapy on 12/04/2015. Pathologic stage was III-A, ypT3 N1a MX. Of concern, she had what could be considered an RCB3 tumor.  She started adjuvant letrozole on 1/28/16.     4/6/2022 Josefina Verma developed a fever during Reclast.  She was feeling unwell and went to the ED.  CT CAP showed new involvement of abdominal lymph nodes and multiple bone metastases.  She was admitted and I saw her in the hospital 4-7-22 to discuss the plan for biopsy.  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  A mix of TNBC and ER- HER2+.  Clinic conference consensus was for the modified Vandana with paclitaxel weekly to treat both clones.                  TREATMENT HISTORY:  A.  Neoadjuvant therapy on I SPY-2 with ganetespib and paclitaxel.  AC x 2 complicated by PCP pneumonia.    B.  Right lumpectomy, followed by right margin resections, followed by rigth mastectomy.   B.  Oophorectomy 10-16-15.   C.  Tamoxifen after 10-6-15.   D.  Radiation therapy. 5,040 cGy in 180 cGy/fraction to the chest wall and regional lymphatics followed by 720 cGy  axillary lymph node boost and 1000 cGy mastectomy scar boost. Completed on 12/3/2015.  E.   Adjuvant letrozole begun 1-28-16.  Plan was to continue through 2026.  F.  Diagnosis with recurrent/metastatic breast cancer with a sacral mass that was biopsied showing pleiomorphic lobular breast cancer that is ER negative, DE negative, HER2 positive by FISH in 20% of the cells and negative in 80% of cells.  A mix of TNBC and ER- HER2+.  Clinic conference consensus was for the modified Vandana with paclitaxel weekly to treat both clones.             INTERVAL HISTORY:    Today, Mary is seen for an add-on visit.  She has had some pain with defecation over the last week.  She describes the pain as being 1 out of 10.  It is located on the outside of her anus, and she felt a little bump or scratch when she did an exam at home.  She does note that she had a large bowel movement that preceded this.  She has not had any bloody or dark stools.  She denies any foul discharge or worsening pain.  No fevers body aches or chills.    She otherwise has been tolerating the chemotherapy very well.  No numbness or tingling fingers or toes.  No mouth sores.  No nausea or vomiting after starting the Zofran.  Energy level has been stable.     REVIEW OF SYSTEMS:    Patient denies the following except if noted above: fevers, body aches, chills, headaches, vision changes, dizziness, chest pain, shortness of breath, nausea, vomiting, diarrhea, constipation, abdominal pain, rashes, bruising/bleeding, mouth sores, swelling or pain in the legs.        PHYSICAL EXAMINATION:  BP (!) 153/79 (BP Location: Right arm, Patient Position: Sitting, Cuff Size: Adult Regular)   Pulse 100   Temp 98.8  F (37.1  C) (Oral)   Wt 61.1 kg (134 lb 9.6 oz)   LMP 12/18/2014   BMI 23.09 kg/m      Wt Readings from Last 4 Encounters:   05/13/22 61.1 kg (134 lb 9.6 oz)   05/06/22 61.8 kg (136 lb 4.8 oz)   05/04/22 61.2 kg (135 lb)   04/29/22 59.8 kg (131 lb 14.4 oz)        General: Resting comfortably in no acute distress  Head: Normocephalic, atraumatic   EENT: No scleral icteris, EOMS intact. Mucous membranes are moist, no mucositis or oral lesions  Heart: Regular rate and rhythm, no murmurs  Lung: Normal respiratory effort. Clear to auscultation bilaterally. No wheezes, rhonchi, or crackles   Rectal: Small perianal crack left posterior skin, no swelling erythema or draining. Non tender to palpation. Unable to appreciate any hemorrhoids  Neuro: AAO x3. Gait is steady. Moving all extremities   Extremities: No lower extremity edema  Skin: Warm, dry, intact. No rashes, no suspicious lesions. No petechia or bruising noted             LABORATORY DATA:    Most Recent 3 CBC's:Recent Labs   Lab Test 05/13/22  1453 05/06/22  1417 04/27/22  1128   WBC 7.2 13.3* 11.4*   HGB 9.5* 8.2* 10.0*   MCV 88 87 85    402 205    Most Recent 3 BMP's:  Recent Labs   Lab Test 05/13/22  1453 05/06/22  1417 04/27/22  1128    143 140   POTASSIUM 3.8 4.1 4.3   CHLORIDE 106 107 108   CO2 28 29 27   BUN 15 13 17   CR 0.90 0.87 0.89   ANIONGAP 9 7 5   VEE 8.6 8.8 9.0   * 113* 109*    Most Recent 2 LFT's:  Recent Labs   Lab Test 05/13/22  1453 05/06/22  1417   AST 29 32   ALT 30 29   ALKPHOS 102 91   BILITOTAL 0.2 0.2      I reviewed the above labs today.          IMAGING:    No new imaging         ASSESSMENT AND PLAN:   1.  Mary Bennett is a 56-year-old woman with a history of a clinical stage II right breast cancer, ER positive, VA positive, HER2 negative by immunohistochemistry.  She had a lobular histology.  She was on the I-SPY 2 clinical trial and was randomized to ganetespib and paclitaxel, then went on to AC.  She had intractable nausea and vomiting the first cycle of AC, and the second cycle of AC was complicated by Pneumocystis pneumonia requiring intubation and a 2-week hospitalization.  She did not complete the AC treatment.  She did undergo a right lumpectomy and sentinel  node procedure.  She had a positive margin and underwent resection.  She still had a positive margin.  She underwent a right lumpectomy with clear margins.  She completed radiation in 12/2015.  Pathologic stage was IIIA  [id number] and of concern, she had an RCB3 histology meaning significant risk of recurrence of her breast cancer during the 5-year period following primary therapy.  There were 2 lymph nodes involved with mammary carcinoma with extracapsular extension.  She went on to have radiation therapy and then began hormonal therapy with tamoxifen in October 2015. She had oophorectomy and started letrozole in January 2016.          2. Recurrent Metastatic breast cancer, lobular. New histology pleomorphic lobular cancer, which is negative for estrogen receptor and negative for progesterone receptor, and 20% is HER2 positive by FISH.  - She has a mixture of recurrent metastatic HER2 positive breast cancer that is estrogen receptor negative and 80% triple negative breast cancer.    - Discussed this situation in breast cancer conference on April 22 and the consensus opinion is to treat the minority HER2+ component first with Vandana regimen because that treatment would also cover the triple negative (TNBC) component with the taxane part of the treatment. This would push back the use of pembrolizumab to the second line setting or later if the 22C3 staining is positive, but would allow earlier treatment of the HER2 component.   - The consensus is to not perform colonoscopy at this time.  We will treat anemia as needed.   -Discontinued letrozole when chemo started   - Labs and echocardiogram are appropriate to proceed with week 3 of Taxol, Herceptin, her Perjeta today       3. Coping:  - She feels that she is coping very well currently and does not need any additional support at this time  - Reviewed that she has been on Effexor since her original diagnosis.  She has been cutting her 225 mg pill in quarters (56mg)  daily.    - Will send new script for 75mg daily     4. Bone Mets  - Asymptomatic  - Received Reclast 5mg on 4/5/2022, but had a very high fever  - Will have to address switching to xgeva, not addressed today       5. Perianal pain  - Exam not consistent with an abscess, appears to have a superficial crack in the tissue just posterior to her anus, not infected. Continue to monitor      Patient will call in the interim with any questions or concerns. They voice understanding and agree with the above plan    Sandra Coughlin PA-C

## 2022-05-19 NOTE — TELEPHONE ENCOUNTER
Yesterday at work they had pizza and everyone got sick, they assume from the pizza.      Patient has an appt at 8am with Dr. Meza and she is not going to make it.     Will be in tomorrow morning for her labs as long as she is feeling better.     Please call pt to reschedule this appt.     Reason for Disposition    Nursing judgment    Protocols used: NO GUIDELINE OR REFERENCE [id number]    Donna Murillo RN on 5/19/2022 at 6:14 AM

## 2022-05-20 NOTE — NURSING NOTE
Chief Complaint   Patient presents with     Port Draw     Labs drawn by RN via port, vitals taken.     Port accessed with 20 gauge 3/4 inch gripper needle by RN, labs collected, line flushed with saline and heparin.  Vitals taken. Pt checked in for appointment(s).    Thelma Arrington RN

## 2022-05-20 NOTE — PROGRESS NOTES
Infusion Nursing Note:  Josefina Bennett presents today for cycle 2 day 1 perjeta, trastuzumab-qyyp, taxol.    Patient seen by provider today: No   present during visit today: Not Applicable.    Note:   Patient reported having extremely upset stomach yesterday after having pizza at work. She was nauseous, but did not throw up or have diarrhea. She is feeling much better today.    She denies fevers, chills, SOB, chest pain, nausea, and pain.    Due to stomach upset, patient missed appointment with Dr. Meza on 5/19. Unable to reach Dr. Meza today to sign orders. Dr. Lauryn martin for assistance.    TORB: Dr. Combs/Yadira Jay RN at 1317 on 5/20/22: will sign orders. Ok to give dexamethasone and pepcid, will also add in zofran due to previous reaction.    Intravenous Access:  Implanted Port.    Treatment Conditions:  Lab Results   Component Value Date    HGB 10.5 (L) 05/20/2022    WBC 9.2 05/20/2022    ANEU 4.2 01/23/2020    ANEUTAUTO 4.7 05/20/2022     05/20/2022      Lab Results   Component Value Date     05/20/2022    POTASSIUM 4.0 05/20/2022    MAG 1.8 06/27/2015    CR 0.85 05/20/2022    VEE 8.8 05/20/2022    BILITOTAL 0.2 05/20/2022    ALBUMIN 3.3 (L) 05/20/2022    ALT 27 05/20/2022    AST 27 05/20/2022     Results reviewed, labs MET treatment parameters, ok to proceed with treatment.  ECHO/MUGA completed 4/7/22  EF 60-65%.      Post Infusion Assessment:  Patient tolerated infusion without incident.  Blood return noted pre and post infusion.  Site patent and intact, free from redness, edema or discomfort.  No evidence of extravasations.  Access discontinued per protocol.       Discharge Plan:   Patient declined prescription refills.  Discharge instructions reviewed with: Patient.  Patient and/or family verbalized understanding of discharge instructions and all questions answered.  AVS to patient via [employer].  Patient will return 5/27 for next appointment.   Patient discharged in  stable condition accompanied by: self.  Departure Mode: Ambulatory.      Yadira Jay RN

## 2022-05-20 NOTE — PATIENT INSTRUCTIONS
[facility] Triage and after hours / weekends / holidays:  [phone]    Please call the triage or after hours line if you experience a temperature greater than or equal to 100.5, shaking chills, have uncontrolled nausea, vomiting and/or diarrhea, dizziness, shortness of breath, chest pain, bleeding, unexplained bruising, or if you have any other new/concerning symptoms, questions or concerns.      If you are having any concerning symptoms or wish to speak to a provider before your next infusion visit, please call your care coordinator or triage to notify them so we can adequately serve you.     If you need a refill on a narcotic prescription or other medication, please call before your infusion appointment.      May 2022      Rory Monday Tuesday Wednesday Thursday Friday Saturday   1     2     3     4    Outpatient Visit   7:29 AM   [facility] Main OR [place]    IR CHEST PORT PLACEMENT >5 YRS   7:30 AM   (60 min.)   [email or web address]   [facility] ASC Imaging [place]    INSERTION, VASCULAR ACCESS PORT   9:00 AM   Darren Yates MD   [facility] [place] 5     6    LAB CENTRAL   1:45 PM   (15 min.)   [facility] LAB DRAW   [facility]    ONC INFUSION 2 HR (120 MIN)   2:30 PM   (120 min.)    ONC INFUSION NURSE   [facility] 7       8     9     10     11     12     13    BREAST TUMOR CONFERENCE   7:20 AM   (10 min.)   BREAST TUMOR CONFERENCE   [facility] Tumor Conference Virtual Scheduling    RETURN   1:45 PM   (45 min.)   Sandra Coughlin PA-C   [facility]    LAB CENTRAL   1:45 PM   (15 min.)   [facility]   [facility]    ONC INFUSION 2 HR (120 MIN)   2:30 PM   (120 min.)    ONC INFUSION NURSE   [facility] 14       15     16     17     18     19    LAB CENTRAL   6:30 AM   (15 min.)   [facility] LAB DRAW   [facility]  Clinic    RETURN   6:55 AM   (30 min.)   Evangelista eMza MD   [facility] 20    LAB CENTRAL  12:15 PM   (15 min.)   [facility]   [facility]    ONC INFUSION 4 HR (240 MIN)   1:00 PM   (240 min.)   UC ONC INFUSION NURSE   [facility] 21       22     23     24     25     26     27    LAB CENTRAL   1:45 PM   (15 min.)   [facility]   [facility]    ONC INFUSION 2 HR (120 MIN)   2:30 PM   (120 min.)   UC ONC INFUSION NURSE   [facility] 28       29     30     31    TELEPHONE VISIT RETURN   8:30 AM   (30 min.)   Evangelista Meza MD   [facility]                                   June 2022 Sunday Monday Tuesday Wednesday Thursday Friday Saturday                  1     2     3    LAB CENTRAL   1:15 PM   (15 min.)   [facility]   [facility]    ONC INFUSION 2 HR (120 MIN)   2:00 PM   (120 min.)   UC ONC INFUSION NURSE   [facility] 4       5     6     7     8     9     10    LAB CENTRAL  10:45 AM   (15 min.)   [facility]   [facility]    RETURN  11:15 AM   (45 min.)   Sandra Coughlin PA-C   [facility]    ONC INFUSION 4 HR (240 MIN)   1:00 PM   (240 min.)   UC ONC INFUSION NURSE   [facility] 11       12     13     14     15     16     17    LAB CENTRAL   1:45 PM   (15 min.)   [facility]   [facility]    ONC INFUSION 2 HR (120 MIN)   2:30 PM   (120 min.)   UC ONC INFUSION NURSE   [facility] 18       19     20     21     22     23     24    LAB CENTRAL   1:45 PM   (15 min.)   [facility]   [facility]    ONC INFUSION 2 HR (120 MIN)   2:30 PM   (120 min.)   UC  ONC INFUSION NURSE   [facility] 25       26     27     28     29     30                                  Recent Results (from the past 24 hour(s))   Comprehensive metabolic panel    Collection Time: 05/20/22 12:33 PM   Result Value Ref Range    Sodium 142 133 - 144 mmol/L    Potassium 4.0 3.4 - 5.3 mmol/L    Chloride 106 94 - 109 mmol/L    Carbon Dioxide (CO2) 28 20 - 32 mmol/L    Anion Gap 8 3 - 14 mmol/L    Urea Nitrogen 13 7 - 30 mg/dL    Creatinine 0.85 0.52 - 1.04 mg/dL    Calcium 8.8 8.5 - 10.1 mg/dL    Glucose 148 (H) 70 - 99 mg/dL    Alkaline Phosphatase 96 40 - 150 U/L    AST 27 0 - 45 U/L    ALT 27 0 - 50 U/L    Protein Total 7.1 6.8 - 8.8 g/dL    Albumin 3.3 (L) 3.4 - 5.0 g/dL    Bilirubin Total 0.2 0.2 - 1.3 mg/dL    GFR Estimate 80 >60 mL/min/1.73m2   CBC with platelets and differential    Collection Time: 05/20/22 12:33 PM   Result Value Ref Range    WBC Count 9.2 4.0 - 11.0 10e3/uL    RBC Count 3.81 3.80 - 5.20 10e6/uL    Hemoglobin 10.5 (L) 11.7 - 15.7 g/dL    Hematocrit 33.7 (L) 35.0 - 47.0 %    MCV 89 78 - 100 fL    MCH 27.6 26.5 - 33.0 pg    MCHC 31.2 (L) 31.5 - 36.5 g/dL    RDW 18.8 (H) 10.0 - 15.0 %    Platelet Count 438 150 - 450 10e3/uL    % Neutrophils 51 %    % Lymphocytes 41 %    % Monocytes 5 %    % Eosinophils 1 %    % Basophils 1 %    % Immature Granulocytes 1 %    NRBCs per 100 WBC 0 <1 /100    Absolute Neutrophils 4.7 1.6 - 8.3 10e3/uL    Absolute Lymphocytes 3.8 0.8 - 5.3 10e3/uL    Absolute Monocytes 0.5 0.0 - 1.3 10e3/uL    Absolute Eosinophils 0.1 0.0 - 0.7 10e3/uL    Absolute Basophils 0.1 0.0 - 0.2 10e3/uL    Absolute Immature Granulocytes 0.1 <=0.4 10e3/uL    Absolute NRBCs 0.0 10e3/uL

## 2022-05-27 NOTE — PATIENT INSTRUCTIONS
Contact Numbers  [facility]: [phone] (for symptom and scheduling needs)    Please call the [facility] Triage line if you experience a temperature greater than or equal to 100.4, shaking chills, have uncontrolled nausea, vomiting and/or diarrhea, dizziness, shortness of breath, chest pain, bleeding, unexplained bruising, or if you have any other new/concerning symptoms, questions or concerns.     If you are having any concerning symptoms or wish to speak to a provider before your next infusion visit, please call your care coordinator or triage to notify them so we can adequately serve you.     If you need a refill on a narcotic prescription or other medication, please call triage before your infusion appointment.           May 2022      Rory Monday Tuesday Wednesday Thursday Friday Saturday   1     2     3     4    Outpatient Visit   7:29 AM   [facility] Main OR [place]    IR CHEST PORT PLACEMENT >5 YRS   7:30 AM   (60 min.)   [email or web address]   [facility] ASC Imaging [place]    INSERTION, VASCULAR ACCESS PORT   9:00 AM   Darren Yates MD   [facility] [place] 5     6    LAB CENTRAL   1:45 PM   (15 min.)   [facility] LAB DRAW   [facility]    ONC INFUSION 2 HR (120 MIN)   2:30 PM   (120 min.)    ONC INFUSION NURSE   [facility] 7       8     9     10     11     12     13    BREAST TUMOR CONFERENCE   7:20 AM   (10 min.)   BREAST TUMOR CONFERENCE   [facility] Tumor Conference Virtual Scheduling    RETURN   1:45 PM   (45 min.)   Sandra Coughlin PA-C   [facility]    LAB CENTRAL   1:45 PM   (15 min.)   [facility] LAB DRAW   [facility]    ONC INFUSION 2 HR (120 MIN)   2:30 PM   (120 min.)    ONC INFUSION NURSE   [facility] 14       15     16     17     18     19    LAB CENTRAL   6:30 AM   (15 min.)   [facility]   [facility]  Cancer [facility] 20    LAB CENTRAL  12:15 PM   (15 min.)   [facility]   [facility]    ONC INFUSION 4 HR (240 MIN)   1:00 PM   (240 min.)   UC ONC INFUSION NURSE   [facility] 21       22     23     24     25     26     27    LAB CENTRAL   1:45 PM   (15 min.)   [facility]   [facility]    ONC INFUSION 2 HR (120 MIN)   2:30 PM   (120 min.)   UC ONC INFUSION NURSE   [facility] 28       29     30     31    TELEPHONE VISIT RETURN   8:30 AM   (30 min.)   Evangelista Meza MD   [facility]                                   June 2022 Sunday Monday Tuesday Wednesday Thursday Friday Saturday                  1     2     3    LAB CENTRAL   1:15 PM   (15 min.)   [facility]   [facility]    ONC INFUSION 2 HR (120 MIN)   2:00 PM   (120 min.)   UC ONC INFUSION NURSE   [facility] 4       5     6     7     8     9     10    LAB CENTRAL  10:45 AM   (15 min.)   [facility]   [facility]    RETURN  11:15 AM   (45 min.)   Sandra Coughlin PA-C   [facility]    ONC INFUSION 4 HR (240 MIN)   1:00 PM   (240 min.)   UC ONC INFUSION NURSE   [facility] 11       12     13     14     15     16     17    LAB CENTRAL   1:45 PM   (15 min.)   [facility]   [facility]    ONC INFUSION 2 HR (120 MIN)   2:30 PM   (120 min.)   UC ONC INFUSION NURSE   [facility] 18       19     20     21     22     23     24    LAB CENTRAL   1:45 PM   (15 min.)   [facility]   [facility]    ONC INFUSION 2 HR (120 MIN)   2:30 PM   (120 min.)   UC ONC INFUSION NURSE   [facility] 25       26     27     28      29     30                                 Lab Results:  Recent Results (from the past 12 hour(s))   COMPREHENSIVE METABOLIC PANEL    Collection Time: 05/27/22  2:14 PM   Result Value Ref Range    Sodium 141 133 - 144 mmol/L    Potassium 4.0 3.4 - 5.3 mmol/L    Chloride 106 94 - 109 mmol/L    Carbon Dioxide (CO2) 28 20 - 32 mmol/L    Anion Gap 7 3 - 14 mmol/L    Urea Nitrogen 16 7 - 30 mg/dL    Creatinine 0.85 0.52 - 1.04 mg/dL    Calcium 8.7 8.5 - 10.1 mg/dL    Glucose 129 (H) 70 - 99 mg/dL    Alkaline Phosphatase 96 40 - 150 U/L    AST 25 0 - 45 U/L    ALT 27 0 - 50 U/L    Protein Total 6.8 6.8 - 8.8 g/dL    Albumin 3.4 3.4 - 5.0 g/dL    Bilirubin Total 0.2 0.2 - 1.3 mg/dL    GFR Estimate 80 >60 mL/min/1.73m2   CBC with platelets and differential    Collection Time: 05/27/22  2:14 PM   Result Value Ref Range    WBC Count 10.2 4.0 - 11.0 10e3/uL    RBC Count 3.90 3.80 - 5.20 10e6/uL    Hemoglobin 10.7 (L) 11.7 - 15.7 g/dL    Hematocrit 34.1 (L) 35.0 - 47.0 %    MCV 87 78 - 100 fL    MCH 27.4 26.5 - 33.0 pg    MCHC 31.4 (L) 31.5 - 36.5 g/dL    RDW 18.6 (H) 10.0 - 15.0 %    Platelet Count 463 (H) 150 - 450 10e3/uL    % Neutrophils 49 %    % Lymphocytes 40 %    % Monocytes 7 %    % Eosinophils 2 %    % Basophils 1 %    % Immature Granulocytes 1 %    NRBCs per 100 WBC 0 <1 /100    Absolute Neutrophils 5.1 1.6 - 8.3 10e3/uL    Absolute Lymphocytes 4.1 0.8 - 5.3 10e3/uL    Absolute Monocytes 0.7 0.0 - 1.3 10e3/uL    Absolute Eosinophils 0.2 0.0 - 0.7 10e3/uL    Absolute Basophils 0.1 0.0 - 0.2 10e3/uL    Absolute Immature Granulocytes 0.1 <=0.4 10e3/uL    Absolute NRBCs 0.0 10e3/uL   RBC and Platelet Morphology    Collection Time: 05/27/22  2:14 PM   Result Value Ref Range    Platelet Assessment  Automated Count Confirmed. Platelet morphology is normal.     Automated Count Confirmed. Platelet morphology is normal.    RBC Morphology Confirmed RBC Indices

## 2022-05-27 NOTE — PROGRESS NOTES
Infusion Nursing Note:  Josefina Bennett presents today for Cycle 2 Day 8 Taxol.    Patient seen by provider today: No   present during visit today: Not Applicable.    Note: Patient presents to infusion today doing well. Denies any fevers, chills, shortness of breath, chest pains or cough. Offers no new changes or concerns today.    Intravenous Access:  Implanted Port.    Treatment Conditions:  Lab Results   Component Value Date    HGB 10.7 (L) 05/27/2022    WBC 10.2 05/27/2022    ANEU 4.2 01/23/2020    ANEUTAUTO 5.1 05/27/2022     (H) 05/27/2022      Lab Results   Component Value Date     05/27/2022    POTASSIUM 4.0 05/27/2022    MAG 1.8 06/27/2015    CR 0.85 05/27/2022    VEE 8.7 05/27/2022    BILITOTAL 0.2 05/27/2022    ALBUMIN 3.4 05/27/2022    ALT 27 05/27/2022    AST 25 05/27/2022     Results reviewed, labs MET treatment parameters, ok to proceed with treatment.    Post Infusion Assessment:  Patient tolerated infusion without incident.  Blood return noted pre and post infusion.  Site patent and intact, free from redness, edema or discomfort.  No evidence of extravasations.  Access discontinued per protocol.     Discharge Plan:   Patient declined prescription refills.  Discharge instructions reviewed with: Patient.  Patient and/or family verbalized understanding of discharge instructions and all questions answered.  AVS to patient via [employer].  Patient will return 5/31 for next appointment with Dr. Meza.   Patient discharged in stable condition accompanied by: self.  Departure Mode: Ambulatory.      Tierra Gardner RN

## 2022-05-27 NOTE — NURSING NOTE
Chief Complaint   Patient presents with     Port Draw     Labs drawn by RN via port, vitals taken.       Port accessed with 20 gauge 3/4 inch flat needle by RN, labs collected, line flushed with saline and heparin.  Vitals taken. Pt checked in for appointment(s).    Thelma Arrington RN

## 2022-05-31 NOTE — LETTER
5/31/2022         RE: Josefina Bennett  [address]  [place] [facility] [address]        Dear Colleague,    Thank you for referring your patient, Josefina Bennett, to the [facility]. Please see a copy of my visit note below.    REASON FOR VISIT: Follow-up breast cancer     HISTORY OF PRESENT ILLNESS:  Josefina Bennett was self referred to our clinic for clinical trial recommendations for newly diagnosed stage II breast cancer. She had her last mammogram approximately a year prior. She did notice in early January 2015 that she was having some distortion of the right breast, and she went to see her gynecologist and had a mammogram performed. She had a screening mammogram performed on 01/15/2015. Breast tissue was heterogeneously dense, which might compromise the mammography. There was architectural distortion in the right breast approximately 11-12 o'clock position, zone 2, posterior depth, and a CC MLO spot compression was performed and an ultrasound was planned. The diagnostic mammogram from 01/28/2015 showed right breast architectural distortion centered at the 12 o'clock position, zone 2, suspicious for neoplasm. Breast tomosynthesis was used in the interpretation. Ultrasound findings included targeted ultrasound of the right upper breast demonstrating a band-like area of abnormal echogenicity between 11 and 12 o'clock position in the right breast at zone 2. This measures 4 cm transversely x 1.1 cm AP, and there was only a 1.5 cm measurement in the radial direction. There was blood flow suspicious for neoplasm at the 11 o'clock position in zone 2. There is a 1.4 cm hypoechoic nodule slightly  from the larger area of altered echogenicity that is also suspicious. A biopsy was performed. The biopsy showed infiltrating lobular carcinoma, grade 2, and a clip was placed at specimen [id number]. There were a few signet cells present. The tumor was ER-positive, CT low positive, and HER2 was negative  by immunohistochemistry. She subsequently underwent an MRI showing a tumor that was 4.8 x 1.7 x 3.2 cm in size. Overall, her clinical stage was T2 NX MX.       She was enrolled in the I-SPY-2 clinical trial, and qualified with high-risk MammaPrint score. She was randomized to ganetespib and paclitaxel, and she was treated with 12 cycles of treatment, and then started on AC on 05/26/2015. She developed intractable nausea and vomiting after the first cycle, which required hospitalization on the second cycle. She then developed Pneumocystis pneumonia, which resulted in intubation and a 2-week hospitalization during the course of AC. She was discharged on 07/02/2015, and decided she did not want to pursue any further chemotherapy. She had a right lumpectomy and sentinel lymph node procedure 08/12/2015. She had a positive margin, underwent a re-excision, and had a positive margin. Ultimately, she underwent a right mastectomy with clear margins. She began radiation treatment with Dr. Bill Chauhan, and completed radiation therapy on 12/04/2015. Pathologic stage was III-A, ypT3 N1a MX. Of concern, she had what could be considered an RCB3 tumor.  She started adjuvant letrozole on 1/28/16.     TREATMENT HISTORY:  A.  Neoadjuvant therapy on I SPY-2 with ganetespib and paclitaxel.  AC x 2 complicated by PCP pneumonia.    B.  Right lumpectomy, followed by right margin resections, followed by rigth mastectomy.   B.  Oophorectomy 10-16-15.   C.  Tamoxifen after 10-6-15.   D.  Radiation therapy. 5,040 cGy in 180 cGy/fraction to the chest wall and regional lymphatics followed by 720 cGy axillary lymph node boost and 1000 cGy mastectomy scar boost. Completed on 12/3/2015.  E.   Adjuvant letrozole begun 1-28-16.  Plan was to continue through 2026.  F.  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  A mix of TNBC and ER- HER2+.  Clinic conference consensus was for the modified Vandana with paclitaxel weekly to treat both clones. T-DXd could be a next regimen.  G.  Metastatic carcinoma is HER2 negative (score 1+) by immunohistochemistry.  She is also a candidate for T-DXd.  H.  Recurrent tumor with 22C3 antibody for PD-L1 and hold this in reserve for a Keynote 355 metastatic TNBC approach if the Vandana regimen does not control her metastatic disease.  Keynote 355 could also be an active regimen.     TELEPHONE VISIT     INTERVAL HISTORY:    Josefina Verma returns to clinic by phone visit.  She is doing quite well.  She is tolerating the VANDANA regimen without difficulty.  She denies pain, fatigue, depression or anxiety.  She has no evidence of neuropathy at this time.  She has had COVID vaccination x1.  I did recommend a booster.    She is eating a healthful diet.  She is exercising about 150 minutes a week.  She is taking vitamin D3 and calcium.     REVIEW OF SYSTEMS:  A 10-point review of systems is negative.     PHYSICAL EXAMINATION:  None today.  Mood normal.      LABORATORY DATA:    CBC showed WBC 9.0 hemoglobin 11.3 platelets 460,000.  Absolute neutrophil count 4400.  CMP normal.  CA 27-29 continues to decline and is 388.  CEA continues to decline and is 354.     IMAGING:    None today     ASSESSMENT AND PLAN:   1.  Mary Bennett is a 56-year-old woman with a history of a clinical stage II right breast cancer, ER positive, FL positive, HER2 negative by immunohistochemistry.  She had a lobular histology.  She was on the I-SPY 2 clinical trial and was randomized to ganetespib and paclitaxel, then went on to AC.  She had intractable nausea and vomiting the first cycle of AC, and the second cycle of AC was complicated by Pneumocystis pneumonia requiring intubation and a 2-week hospitalization.  She did not complete the AC treatment.  She did undergo a  right lumpectomy and sentinel node procedure.  She had a positive margin and underwent resection.  She still had a positive margin.  She underwent a right lumpectomy with clear margins.  She completed radiation in 12/2015.  Pathologic stage was IIIA  [id number] and of concern, she had an RCB3 histology meaning significant risk of recurrence of her breast cancer during the 5-year period following primary therapy.  There were 2 lymph nodes involved with mammary carcinoma with extracapsular extension.  She went on to have radiation therapy and then began hormonal therapy with tamoxifen in October 2015.  She had oophorectomy and started letrozole in January 2016.  She continues on letrozole for adjuvant hormonal therapy with the plan of continuing for a total of 10 years.   2.  Breast conference recommended starting with HER2 directed therapy.  Biphenotypic cancer with majority TNBC component and a HER2 component.  I discussed the 22C3 PD-L1 CPS of 40 while 20% of the tumor is HER2+ by FISH and the overall ASCO  CAP assignment is negative for the bulk tumor with the qualifier that there is a significant HER2 clonal component that would be best treated with HER2 directed therapy.  The Tumor Board consensus was to continue with HER2 directed therapy to treat the new and HER2+ clone till best response and then change over to Keynote 355 with pembrolizumab and chemotherapy.  Pembrolizumab and gem/carbo or pembro and Abraxane could be potential options. We continuing with the recommended regimen of Vandana with every 3 week pertuzumab and trastuzumab but weekly paclitaxel because it is better tolerated than every 3 week docetaxel.  The weekly paclitaxel may be more effective vs the TNBC component.  The regimen will be load of trastuzumab of 8 mg/kg followed by 4 mg/kg every 3 weeks and then load of pertuzumab of 840 mg and 420 every 3 weeks and weekly paclitaxel.    3.  She is tolerating treatment well.  We are continuing  with the TERESA regimen with weekly paclitaxel in place of docetaxel.  The plan is for her to have a PET/CT 06/27 and then follow up with me to assess response to therapy.  We will add zoledronic acid because of bone metastases and we will send an information sheet on Zometa.  The plan I discussed with Josefina Verma is to continue the TERESA regimen with paclitaxel weekly for a total of 4 months and then stop the paclitaxel and continue with pertuzumab and trastuzumab alone.  All of her questions were answered.  4.  No hormonal therapy because the recurrent tumor is ER negative.   5.  Restaging with PET CT, brain MRI in about 1 month.    6.  Follow up plan:  6-3 paclitaxel and Zometa, CBC, CMP.  6-10 follow up with Sandra Coughlin with pertuzumab, trastuzumab, paclitaxel CBC, CMP, CA27.29 and CEA.  Paclitaxel 6-17 and 6-24 with CBC, CMP no provider visit.  PET CT 6-27.  6-30 follow up with me with pertuzumab, trastuzumab, paclitaxel, Zometa CBC, CMP, CA27.29 and CEA.     Thank you for allowing us to continue to participate in Josefina Bennett' care.      Sincerely,       Evangelista Meza MD  Professor  [facility]  [phone]        I spent 5 minutes with the patient more than 50% of which was in counseling and coordination of care and then 30 minutes reviewing results from her inpatient hospitalization, 40 minutes total time.

## 2022-06-03 NOTE — PROGRESS NOTES
Infusion Nursing Note:  Josefina Bennett presents today for cycle 2 day 15 taxol, zometa (not given).    Patient seen by provider today: No   present during visit today: Not Applicable.    Note:   Patient is feeling well today. She has no complaints or concerns. She denies fevers, chills, SOB, chest pain, nausea, and pain.    Patient nervous about zometa infusion due to fevers and pain with recalst infusion in April. Discussed with Sandra Coughlin - recommended tylenol and pushing fluids. Sandra ok with waiting for zometa until her appointment with Josefina Verma next week if patient would prefer.    Patient would prefer to discuss zometa infusion at her appointment with Sandra Coughlin next week, and then do the zometa infusion on her next taxol only infusion day (6/17). Sandra Coughlin in agreement with plan.    Intravenous Access:  Implanted Port.    Treatment Conditions:  Lab Results   Component Value Date    HGB 11.3 (L) 06/03/2022    WBC 9.0 06/03/2022    ANEU 4.2 01/23/2020    ANEUTAUTO 4.4 06/03/2022     (H) 06/03/2022      Lab Results   Component Value Date     06/03/2022    POTASSIUM 3.9 06/03/2022    MAG 1.8 06/27/2015    CR 0.90 06/03/2022    VEE 9.5 06/03/2022    BILITOTAL 0.3 06/03/2022    ALBUMIN 3.5 06/03/2022    ALT 32 06/03/2022    AST 28 06/03/2022     Results reviewed, labs MET treatment parameters, ok to proceed with treatment.      Post Infusion Assessment:  Patient tolerated infusion without incident.  Blood return noted pre and post infusion.  Site patent and intact, free from redness, edema or discomfort.  No evidence of extravasations.  Access discontinued per protocol.       Discharge Plan:   Patient declined prescription refills.  Discharge instructions reviewed with: Patient.  Patient and/or family verbalized understanding of discharge instructions and all questions answered.  AVS to patient via [employer].  Patient will return 6/10 for next appointment.   Patient discharged in  stable condition accompanied by: self.  Departure Mode: Ambulatory.      Yadira Jay RN

## 2022-06-03 NOTE — PATIENT INSTRUCTIONS
[facility] Triage and after hours / weekends / holidays:  [phone]    Please call the triage or after hours line if you experience a temperature greater than or equal to 100.5, shaking chills, have uncontrolled nausea, vomiting and/or diarrhea, dizziness, shortness of breath, chest pain, bleeding, unexplained bruising, or if you have any other new/concerning symptoms, questions or concerns.      If you are having any concerning symptoms or wish to speak to a provider before your next infusion visit, please call your care coordinator or triage to notify them so we can adequately serve you.     If you need a refill on a narcotic prescription or other medication, please call before your infusion appointment.      June 2022 Sunday Monday Tuesday Wednesday Thursday Friday Saturday                  1     2     3    LAB CENTRAL   1:15 PM   (15 min.)   [facility]   [facility]    ONC INFUSION 2 HR (120 MIN)   2:00 PM   (120 min.)    ONC INFUSION NURSE   [facility] 4       5     6     7     8     9     10    LAB CENTRAL  10:45 AM   (15 min.)   [facility]   [facility]    RETURN  11:15 AM   (45 min.)   Sandra Coughlin PA-C   [facility]    ONC INFUSION 4 HR (240 MIN)   1:00 PM   (240 min.)    ONC INFUSION NURSE   [facility] 11       12     13     14     15     16     17    LAB CENTRAL   1:45 PM   (15 min.)   [facility]   [facility]    ONC INFUSION 2 HR (120 MIN)   2:30 PM   (120 min.)   [facility] ONC INFUSION NURSE   [facility] 18       19     20     21     22     23     24    LAB CENTRAL   1:45 PM   (15 min.)   [facility]   [facility]    ONC INFUSION 2 HR (120 MIN)   2:30 PM   (120 min.)   [facility] ONC INFUSION NURSE   [facility]  Clinic 25       26     27     28     29     30 July 2022 Sunday Monday Tuesday Wednesday Thursday Friday Saturday                            1    LAB CENTRAL  10:30 AM   (15 min.)   [facility] LAB DRAW   [facility]    RETURN  10:45 AM   (45 min.)   Sandra Coughlin PA-C   [facility]    ONC INFUSION 4 HR (240 MIN)  12:00 PM   (240 min.)    ONC INFUSION NURSE   [facility] 2       3     4     5     6     7     8     9       10     11     12     13     14     15     16       17     18     19     20     21     22     23       24     25     26     27     28     29     30       31                                                      Recent Results (from the past 24 hour(s))   COMPREHENSIVE METABOLIC PANEL    Collection Time: 06/03/22  1:33 PM   Result Value Ref Range    Sodium 139 133 - 144 mmol/L    Potassium 3.9 3.4 - 5.3 mmol/L    Chloride 106 94 - 109 mmol/L    Carbon Dioxide (CO2) 27 20 - 32 mmol/L    Anion Gap 6 3 - 14 mmol/L    Urea Nitrogen 15 7 - 30 mg/dL    Creatinine 0.90 0.52 - 1.04 mg/dL    Calcium 9.5 8.5 - 10.1 mg/dL    Glucose 106 (H) 70 - 99 mg/dL    Alkaline Phosphatase 101 40 - 150 U/L    AST 28 0 - 45 U/L    ALT 32 0 - 50 U/L    Protein Total 7.0 6.8 - 8.8 g/dL    Albumin 3.5 3.4 - 5.0 g/dL    Bilirubin Total 0.3 0.2 - 1.3 mg/dL    GFR Estimate 75 >60 mL/min/1.73m2   CBC with platelets and differential    Collection Time: 06/03/22  1:33 PM   Result Value Ref Range    WBC Count 9.0 4.0 - 11.0 10e3/uL    RBC Count 4.11 3.80 - 5.20 10e6/uL    Hemoglobin 11.3 (L) 11.7 - 15.7 g/dL    Hematocrit 36.0 35.0 - 47.0 %    MCV 88 78 - 100 fL    MCH 27.5 26.5 - 33.0 pg    MCHC 31.4 (L) 31.5 - 36.5 g/dL    RDW 18.4 (H) 10.0 - 15.0 %    Platelet Count 460 (H) 150 - 450 10e3/uL    % Neutrophils 48 %    % Lymphocytes 43 %    % Monocytes 6 %    % Eosinophils 2 %    % Basophils 1 %    %  Immature Granulocytes 0 %    NRBCs per 100 WBC 0 <1 /100    Absolute Neutrophils 4.4 1.6 - 8.3 10e3/uL    Absolute Lymphocytes 3.9 0.8 - 5.3 10e3/uL    Absolute Monocytes 0.6 0.0 - 1.3 10e3/uL    Absolute Eosinophils 0.2 0.0 - 0.7 10e3/uL    Absolute Basophils 0.1 0.0 - 0.2 10e3/uL    Absolute Immature Granulocytes 0.0 <=0.4 10e3/uL    Absolute NRBCs 0.0 10e3/uL

## 2022-06-10 NOTE — PATIENT INSTRUCTIONS
[facility] Triage and after hours / weekends / holidays:  [phone]    Please call the triage or after hours line if you experience a temperature greater than or equal to 100.4, shaking chills, have uncontrolled nausea, vomiting and/or diarrhea, dizziness, shortness of breath, chest pain, bleeding, unexplained bruising, or if you have any other new/concerning symptoms, questions or concerns.      If you are having any concerning symptoms or wish to speak to a provider before your next infusion visit, please call your care coordinator or triage to notify them so we can adequately serve you.     If you need a refill on a narcotic prescription or other medication, please call before your infusion appointment.                 June 2022 Sunday Monday Tuesday Wednesday Thursday Friday Saturday                  1     2     3    LAB CENTRAL   1:15 PM   (15 min.)   [facility]   [facility]    ONC INFUSION 2 HR (120 MIN)   2:00 PM   (120 min.)    ONC INFUSION NURSE   [facility] 4       5     6     7     8     9     10    LAB CENTRAL  10:45 AM   (15 min.)   [facility]   [facility]    RETURN  11:15 AM   (45 min.)   Sandra Coughlin PA-C   [facility]    ONC INFUSION 4 HR (240 MIN)   1:00 PM   (240 min.)    ONC INFUSION NURSE   [facility] 11       12     13     14     15     16     17    LAB CENTRAL   1:45 PM   (15 min.)   [facility]   [facility]    ONC INFUSION 2 HR (120 MIN)   2:30 PM   (120 min.)   [facility] ONC INFUSION NURSE   [facility] 18       19     20     21     22     23     24    LAB CENTRAL   1:45 PM   (15 min.)   [facility]   [facility]    ONC INFUSION 2 HR (120 MIN)   2:30 PM   (120 min.)   [facility] ONC INFUSION NURSE   [facility]  Cancer Clinic 25       26     27     28    PET ONCOLOGY WHOLE BODY  10:00 AM   (30 min.)   [id number]   [facility] 29 30 July 2022 Sunday Monday Tuesday Wednesday Thursday Friday Saturday                            1    LAB CENTRAL  10:30 AM   (15 min.)   [facility] LAB DRAW   [facility]    RETURN  10:45 AM   (45 min.)   Sandra Coughlin PA-C   [facility]    ONC INFUSION 4 HR (240 MIN)  12:00 PM   (240 min.)    ONC INFUSION NURSE   [facility] 2       3     4     5     6     7     8     9       10     11     12     13     14     15     16       17     18     19     20     21     22     23       24     25     26     27     28     29     30       31                                                     Lab Results:  Recent Results (from the past 12 hour(s))   Comprehensive metabolic panel    Collection Time: 06/10/22 11:12 AM   Result Value Ref Range    Sodium 138 133 - 144 mmol/L    Potassium 4.4 3.4 - 5.3 mmol/L    Chloride 106 94 - 109 mmol/L    Carbon Dioxide (CO2) 26 20 - 32 mmol/L    Anion Gap 6 3 - 14 mmol/L    Urea Nitrogen 16 7 - 30 mg/dL    Creatinine 0.84 0.52 - 1.04 mg/dL    Calcium 8.9 8.5 - 10.1 mg/dL    Glucose 133 (H) 70 - 99 mg/dL    Alkaline Phosphatase 96 40 - 150 U/L    AST 23 0 - 45 U/L    ALT 27 0 - 50 U/L    Protein Total 7.0 6.8 - 8.8 g/dL    Albumin 3.6 3.4 - 5.0 g/dL    Bilirubin Total 0.3 0.2 - 1.3 mg/dL    GFR Estimate 81 >60 mL/min/1.73m2   CBC with platelets and differential    Collection Time: 06/10/22 11:12 AM   Result Value Ref Range    WBC Count 7.4 4.0 - 11.0 10e3/uL    RBC Count 4.16 3.80 - 5.20 10e6/uL    Hemoglobin 11.4 (L) 11.7 - 15.7 g/dL    Hematocrit 37.1 35.0 - 47.0 %    MCV 89 78 - 100 fL    MCH 27.4 26.5 - 33.0 pg    MCHC 30.7 (L) 31.5 - 36.5 g/dL    RDW 18.3 (H) 10.0 - 15.0 %    Platelet Count 393 150 - 450 10e3/uL    %  Neutrophils 50 %    % Lymphocytes 39 %    % Monocytes 8 %    % Eosinophils 2 %    % Basophils 1 %    % Immature Granulocytes 0 %    NRBCs per 100 WBC 0 <1 /100    Absolute Neutrophils 3.7 1.6 - 8.3 10e3/uL    Absolute Lymphocytes 2.9 0.8 - 5.3 10e3/uL    Absolute Monocytes 0.6 0.0 - 1.3 10e3/uL    Absolute Eosinophils 0.1 0.0 - 0.7 10e3/uL    Absolute Basophils 0.1 0.0 - 0.2 10e3/uL    Absolute Immature Granulocytes 0.0 <=0.4 10e3/uL    Absolute NRBCs 0.0 10e3/uL

## 2022-06-10 NOTE — NURSING NOTE
"Oncology Rooming Note    Zehra 10, 2022 11:45 AM   Josefina Bennett is a 56 year old female who presents for:    Chief Complaint   Patient presents with     Port Draw     Power needle, heparin locked,vitals checked     Oncology Clinic Visit     [facility] RETURN - BREAST CANCER     Initial Vitals: BP (!) 142/73   Pulse 101   Temp 99.1  F (37.3  C)   Resp 18   Ht 1.626 m (5' 4.02\")   Wt 60.6 kg (133 lb 11.2 oz)   LMP 12/18/2014   SpO2 99%   BMI 22.94 kg/m   Estimated body mass index is 22.94 kg/m  as calculated from the following:    Height as of this encounter: 1.626 m (5' 4.02\").    Weight as of this encounter: 60.6 kg (133 lb 11.2 oz). Body surface area is 1.65 meters squared.  No Pain (0) Comment: Data Unavailable   Patient's last menstrual period was 12/18/2014.  Allergies reviewed: Yes  Medications reviewed: Yes    Medications: Medication refills not needed today.  Pharmacy name entered into [employer]:    [facility] - [place], [place] - [address] [facility] MAIL [facility] - [place], [place] - [address] - [place], [place] - [address]    Clinical concerns: No new concerns. Ced was notified.      Dung Ortiz LPN            "

## 2022-06-10 NOTE — NURSING NOTE
Chief Complaint   Patient presents with     Port Draw     Power needle, heparin locked,vitals checked     Alisa Brand RN on 6/10/2022 at 11:14 AM

## 2022-06-10 NOTE — PROGRESS NOTES
REASON FOR VISIT: Follow-up breast cancer     HISTORY OF PRESENT ILLNESS:  Josefina Bennett was self referred to our clinic for clinical trial recommendations for newly diagnosed stage II breast cancer. She had her last mammogram approximately a year prior. She did notice in early January 2015 that she was having some distortion of the right breast, and she went to see her gynecologist and had a mammogram performed. She had a screening mammogram performed on 01/15/2015. Breast tissue was heterogeneously dense, which might compromise the mammography. There was architectural distortion in the right breast approximately 11-12 o'clock position, zone 2, posterior depth, and a CC MLO spot compression was performed and an ultrasound was planned. The diagnostic mammogram from 01/28/2015 showed right breast architectural distortion centered at the 12 o'clock position, zone 2, suspicious for neoplasm. Breast tomosynthesis was used in the interpretation. Ultrasound findings included targeted ultrasound of the right upper breast demonstrating a band-like area of abnormal echogenicity between 11 and 12 o'clock position in the right breast at zone 2. This measures 4 cm transversely x 1.1 cm AP, and there was only a 1.5 cm measurement in the radial direction. There was blood flow suspicious for neoplasm at the 11 o'clock position in zone 2. There is a 1.4 cm hypoechoic nodule slightly  from the larger area of altered echogenicity that is also suspicious. A biopsy was performed. The biopsy showed infiltrating lobular carcinoma, grade 2, and a clip was placed at specimen [id number]. There were a few signet cells present. The tumor was ER-positive, MT low positive, and HER2 was negative by immunohistochemistry. She subsequently underwent an MRI showing a tumor that was 4.8 x 1.7 x 3.2 cm in size. Overall, her clinical stage was T2 NX MX.       She was enrolled in the I-SPY-2 clinical trial, and qualified with high-risk MammaPrint  score. She was randomized to ganetespib and paclitaxel, and she was treated with 12 cycles of treatment, and then started on AC on 05/26/2015. She developed intractable nausea and vomiting after the first cycle, which required hospitalization on the second cycle. She then developed Pneumocystis pneumonia, which resulted in intubation and a 2-week hospitalization during the course of AC. She was discharged on 07/02/2015, and decided she did not want to pursue any further chemotherapy. She had a right lumpectomy and sentinel lymph node procedure 08/12/2015. She had a positive margin, underwent a re-excision, and had a positive margin. Ultimately, she underwent a right mastectomy with clear margins. She began radiation treatment with Dr. Bill Chauhan, and completed radiation therapy on 12/04/2015. Pathologic stage was III-A, ypT3 N1a MX. Of concern, she had what could be considered an RCB3 tumor.  She started adjuvant letrozole on 1/28/16.     4/6/2022 Josefina Verma developed a fever during Reclast.  She was feeling unwell and went to the ED.  CT CAP showed new involvement of abdominal lymph nodes and multiple bone metastases.  She was admitted and I saw her in the hospital 4-7-22 to discuss the plan for biopsy.  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  A mix of TNBC and ER- HER2+.  Clinic conference consensus was for the modified Vandana with paclitaxel weekly to treat both clones.                  TREATMENT HISTORY:  A.  Neoadjuvant therapy on I SPY-2 with ganetespib and paclitaxel.  AC x 2 complicated by PCP pneumonia.    B.  Right lumpectomy, followed by right margin resections, followed by rigth mastectomy.   B.  Oophorectomy 10-16-15.   C.  Tamoxifen after 10-6-15.   D.  Radiation therapy. 5,040 cGy in 180 cGy/fraction to the chest wall and regional lymphatics followed by 720 cGy  "axillary lymph node boost and 1000 cGy mastectomy scar boost. Completed on 12/3/2015.  E.   Adjuvant letrozole begun 1-28-16.  Plan was to continue through 2026.  F.  Diagnosis with recurrent/metastatic breast cancer with a sacral mass that was biopsied showing pleiomorphic lobular breast cancer that is ER negative, DE negative, HER2 positive by FISH in 20% of the cells and negative in 80% of cells.  A mix of TNBC and ER- HER2+.  Clinic conference consensus was for the modified Vandana with paclitaxel weekly to treat both clones.             INTERVAL HISTORY:    Today, Mary is seen in person.  She has been feeling very well.  She denies any concerns.  No numbness or tingling of the fingers or toes.  No chest pain or shortness of breath.  No lower extremity swelling.  No bone pain.  She denies any mouth sores.  No rashes.  She continues to work, and is eating and drinking regularly.  Her hair has been falling out, and she is going to get it shaved.  The perianal pain that she was having has completely resolved.    She does not have any dental concerns, tooth pain.     REVIEW OF SYSTEMS:    Patient denies the following except if noted above: fevers, body aches, chills, headaches, vision changes, dizziness, chest pain, shortness of breath, nausea, vomiting, diarrhea, constipation, abdominal pain, rashes, bruising/bleeding, mouth sores, swelling or pain in the legs.         PHYSICAL EXAMINATION:  BP (!) 142/73   Pulse 101   Temp 99.1  F (37.3  C)   Resp 18   Ht 1.626 m (5' 4.02\")   Wt 60.6 kg (133 lb 11.2 oz)   LMP 12/18/2014   SpO2 99%   BMI 22.94 kg/m      General: Resting comfortably in no acute distress  Head: Normocephalic, atraumatic   EENT: No scleral icteris, EOMS intact. Mucous membranes are moist, no mucositis or oral lesions  Heart: Regular rate and rhythm, no murmurs  Lung: Normal respiratory effort. Clear to auscultation bilaterally. No wheezes, rhonchi, or crackles   Rectal: Small perianal " crack left posterior skin, no swelling erythema or draining. Non tender to palpation. Unable to appreciate any hemorrhoids  Neuro: AAO x3. Gait is steady. Moving all extremities   Extremities: No lower extremity edema  Skin: Warm, dry, intact. No rashes, no suspicious lesions. No petechia or bruising noted              LABORATORY DATA:    Most Recent 3 CBC's:Recent Labs   Lab Test 06/10/22  1112 06/03/22  1333 05/27/22  1414   WBC 7.4 9.0 10.2   HGB 11.4* 11.3* 10.7*   MCV 89 88 87    460* 463*    Most Recent 3 BMP's:  Recent Labs   Lab Test 06/10/22  1112 06/03/22  1333 05/27/22  1414    139 141   POTASSIUM 4.4 3.9 4.0   CHLORIDE 106 106 106   CO2 26 27 28   BUN 16 15 16   CR 0.84 0.90 0.85   ANIONGAP 6 6 7   VEE 8.9 9.5 8.7   * 106* 129*    Most Recent 2 LFT's:  Recent Labs   Lab Test 06/10/22  1112 06/03/22  1333   AST 23 28   ALT 27 32   ALKPHOS 96 101   BILITOTAL 0.3 0.3      I reviewed the above labs today.             IMAGING:    No new imaging         ASSESSMENT AND PLAN:   1Ekta Bennett is a 56-year-old woman with a history of a clinical stage II right breast cancer, ER positive, KS positive, HER2 negative by immunohistochemistry.  She had a lobular histology.  She was on the I-SPY 2 clinical trial and was randomized to ganetespib and paclitaxel, then went on to AC.  She had intractable nausea and vomiting the first cycle of AC, and the second cycle of AC was complicated by Pneumocystis pneumonia requiring intubation and a 2-week hospitalization.  She did not complete the AC treatment.  She did undergo a right lumpectomy and sentinel node procedure.  She had a positive margin and underwent resection.  She still had a positive margin.  She underwent a right lumpectomy with clear margins.  She completed radiation in 12/2015.  Pathologic stage was IIIA  [id number] and of concern, she had an RCB3 histology meaning significant risk of recurrence of her breast cancer during the 5-year  period following primary therapy.  There were 2 lymph nodes involved with mammary carcinoma with extracapsular extension.  She went on to have radiation therapy and then began hormonal therapy with tamoxifen in October 2015. She had oophorectomy and started letrozole in January 2016.          2. Recurrent Metastatic breast cancer, lobular. New histology pleomorphic lobular cancer, which is negative for estrogen receptor and negative for progesterone receptor, and 20% is HER2 positive by FISH.  - She has a mixture of recurrent metastatic HER2 positive breast cancer that is estrogen receptor negative and 80% triple negative breast cancer.    - Discussed this situation in breast cancer conference on April 22 and the consensus opinion is to treat the minority HER2+ component first with Vandana regimen because that treatment would also cover the triple negative (TNBC) component with the taxane part of the treatment. This would push back the use of pembrolizumab to the second line setting or later if the 22C3 staining is positive, but would allow earlier treatment of the HER2 component.   - The consensus is to not perform colonoscopy at this time.  We will treat anemia as needed.   -Discontinued letrozole when chemo started   - Labs and echocardiogram are appropriate to proceed with week 7 of THP today     - Had a reaction to perjeta with her first infusion   - With her second, was able to tolerate well with dex, pepcid, benadryl. Continue these premeds today         3. Psych:  - She feels that she is coping very well currently and does not need any additional support at this time  - Continue Effexor 75mg daily      4. Bone Mets  - Asymptomatic  - Received Reclast 5mg on 4/5/2022, but had a very high fever (104), body aches, nausea, shortness of breath, tachycardia- requiring admission. Understandably does not want to try this medication again.   - Discussed xgeva. Will put in for insurance approval and hopefully will  be able to get it next week          Patient will call in the interim with any questions or concerns. They voice understanding and agree with the above plan     Sandra Coughlin PA-C

## 2022-06-10 NOTE — PROGRESS NOTES
Infusion Nursing Note:  Josefina Bennett presents today for Day 1 Cycle 3 Perjeta, Trazimera Taxol   Patient seen by provider today: Yes: Sandra MILLER   present during visit today: Not Applicable.    Note: Patient presents to infusion feeling well. Patient denies acute discomfort and states no acute complaints or concerns not addressed during appointment with Sandra MILLER.    TORB. 6/10/2022. 1216. Sandra MILLER. Wilmer Packer RN. Pt good to go for treatment. Zometa discontinued, Xgeva ordered instead. Xgeva to be given next week. Give the same pre-meds she received with Day 1 Cycle 2 since patient tolerated Perjeta without issues.     Patient voices understanding of the above plan. Patient is aware to see dentist prior to Xgeva for dental clearance.     Intravenous Access:  Implanted Port.    Treatment Conditions:  Lab Results   Component Value Date    HGB 11.4 (L) 06/10/2022    WBC 7.4 06/10/2022    ANEU 4.2 01/23/2020    ANEUTAUTO 3.7 06/10/2022     06/10/2022      Lab Results   Component Value Date     06/10/2022    POTASSIUM 4.4 06/10/2022    MAG 1.8 06/27/2015    CR 0.84 06/10/2022    VEE 8.9 06/10/2022    BILITOTAL 0.3 06/10/2022    ALBUMIN 3.6 06/10/2022    ALT 27 06/10/2022    AST 23 06/10/2022     Results reviewed, labs MET treatment parameters, ok to proceed with treatment.  ECHO/MUGA completed 4/7/2022  EF 60-65%.    Post Infusion Assessment:  Patient tolerated infusion without incident.  Blood return noted pre and post infusion.  Site patent and intact, free from redness, edema or discomfort.  No evidence of extravasations.  Access discontinued per protocol.     Discharge Plan:   Patient declined prescription refills.  Discharge instructions reviewed with: Patient.  Patient and/or family verbalized understanding of discharge instructions and all questions answered.  AVS to patient via [facility].  Patient will return 6/17 for next appointment.   Patient discharged in  stable condition accompanied by: self.  Departure Mode: Ambulatory.  Face to Face time: 0 minutes.      Wilmer Packer RN

## 2022-06-17 NOTE — PATIENT INSTRUCTIONS
[facility] Triage and after hours / weekends / holidays:  [phone]    Please call the triage or after hours line if you experience a temperature greater than or equal to 100.5, shaking chills, have uncontrolled nausea, vomiting and/or diarrhea, dizziness, shortness of breath, chest pain, bleeding, unexplained bruising, or if you have any other new/concerning symptoms, questions or concerns.      If you are having any concerning symptoms or wish to speak to a provider before your next infusion visit, please call your care coordinator or triage to notify them so we can adequately serve you.     If you need a refill on a narcotic prescription or other medication, please call before your infusion appointment.      June 2022 Sunday Monday Tuesday Wednesday Thursday Friday Saturday                  1     2     3    LAB CENTRAL   1:15 PM   (15 min.)   [facility]   [facility]    ONC INFUSION 2 HR (120 MIN)   2:00 PM   (120 min.)    ONC INFUSION NURSE   [facility] 4       5     6     7     8     9     10    LAB CENTRAL  10:45 AM   (15 min.)   [facility]   [facility]    RETURN  11:15 AM   (45 min.)   Sandra Coughlin PA-C   [facility]    ONC INFUSION 4 HR (240 MIN)   1:00 PM   (240 min.)    ONC INFUSION NURSE   [facility] 11       12     13     14     15     16     17    LAB CENTRAL   1:45 PM   (15 min.)   [facility]   [facility]    ONC INFUSION 2 HR (120 MIN)   2:30 PM   (120 min.)   [facility] ONC INFUSION NURSE   [facility] 18       19     20     21     22     23     24    LAB CENTRAL   1:45 PM   (15 min.)   [facility]   [facility]    ONC INFUSION 2 HR (120 MIN)   2:30 PM   (120 min.)   [facility] ONC INFUSION NURSE   [facility] 25       26     27    PET ONCOLOGY WHOLE BODY   1:30 PM   (30 min.)   [email or web address]   [facility] 28    RETURN   7:15 AM   (30 min.)   Evangelista Meza MD   [facility] 29 30 July 2022 Sunday Monday Tuesday Wednesday Thursday Friday Saturday                            1    LAB CENTRAL  11:30 AM   (15 min.)    [facility]   [facility]    ONC INFUSION 4 HR (240 MIN)  12:00 PM   (240 min.)   [facility] ONC INFUSION NURSE   [facility] 2       3     4     5    ECHO COMPLETE   3:30 PM   (60 min.)   [email or web address]   [facility] 6     7     8    LAB CENTRAL   6:30 AM   (15 min.)    [facility]   [facility]    ONC INFUSION 2 HR (120 MIN)   7:00 AM   (120 min.)   [facility] ONC INFUSION NURSE   [facility] 9       10     11     12     13     14     15    LAB CENTRAL   6:30 AM   (15 min.)    [facility]   [facility]    ONC INFUSION 2 HR (120 MIN)   7:00 AM   (120 min.)   [facility] ONC INFUSION NURSE   [facility] 16       17     18     19     20     21     22    LAB CENTRAL   8:15 AM   (15 min.)    [facility]   [facility]    RETURN   8:45 AM   (45 min.)   Sandra Coughlin PA-C   [facility]    ONC INFUSION 4 HR (240 MIN)  10:00 AM   (240 min.)    ONC INFUSION NURSE   [facility] 23       24     25     26     27     28     29     30       31                                                      Recent Results (from the past 24 hour(s))   COMPREHENSIVE METABOLIC PANEL    Collection Time: 06/17/22  2:01 PM   Result Value Ref Range    Sodium 140 133 - 144 mmol/L    Potassium 3.9 3.4 - 5.3 mmol/L    Chloride 106 94 - 109 mmol/L    Carbon Dioxide (CO2) 28  20 - 32 mmol/L    Anion Gap 6 3 - 14 mmol/L    Urea Nitrogen 16 7 - 30 mg/dL    Creatinine 0.90 0.52 - 1.04 mg/dL    Calcium 8.7 8.5 - 10.1 mg/dL    Glucose 140 (H) 70 - 99 mg/dL    Alkaline Phosphatase 104 40 - 150 U/L    AST 23 0 - 45 U/L    ALT 30 0 - 50 U/L    Protein Total 6.8 6.8 - 8.8 g/dL    Albumin 3.5 3.4 - 5.0 g/dL    Bilirubin Total 0.2 0.2 - 1.3 mg/dL    GFR Estimate 75 >60 mL/min/1.73m2   CBC with platelets and differential    Collection Time: 06/17/22  2:01 PM   Result Value Ref Range    WBC Count 9.3 4.0 - 11.0 10e3/uL    RBC Count 4.17 3.80 - 5.20 10e6/uL    Hemoglobin 11.4 (L) 11.7 - 15.7 g/dL    Hematocrit 35.8 35.0 - 47.0 %    MCV 86 78 - 100 fL    MCH 27.3 26.5 - 33.0 pg    MCHC 31.8 31.5 - 36.5 g/dL    RDW 18.1 (H) 10.0 - 15.0 %    Platelet Count 426 150 - 450 10e3/uL    % Neutrophils 48 %    % Lymphocytes 41 %    % Monocytes 8 %    % Eosinophils 2 %    % Basophils 1 %    % Immature Granulocytes 0 %    NRBCs per 100 WBC 0 <1 /100    Absolute Neutrophils 4.6 1.6 - 8.3 10e3/uL    Absolute Lymphocytes 3.8 0.8 - 5.3 10e3/uL    Absolute Monocytes 0.7 0.0 - 1.3 10e3/uL    Absolute Eosinophils 0.2 0.0 - 0.7 10e3/uL    Absolute Basophils 0.1 0.0 - 0.2 10e3/uL    Absolute Immature Granulocytes 0.0 <=0.4 10e3/uL    Absolute NRBCs 0.0 10e3/uL

## 2022-06-17 NOTE — PROGRESS NOTES
Infusion Nursing Note:  Josefina Bennett presents today for cycle 3 day 8 taxol, monthly xgeva.    Patient seen by provider today: No    Note:   Patient is feeling well today. She denies fevers, chills, SOB, chest pain, nausea, diarrhea, and pain.     She is receiving xgeva for the first time. She confirms she has received education, she denied questions. She was previously cleared by her dentist for reclast earlier this year. She denies any tooth/mouth pain or problems. She is taking calcium/vitamin D supplements.    Intravenous Access:  Implanted Port.    Treatment Conditions:  Lab Results   Component Value Date    HGB 11.4 (L) 06/17/2022    WBC 9.3 06/17/2022    ANEU 4.2 01/23/2020    ANEUTAUTO 4.6 06/17/2022     06/17/2022      Lab Results   Component Value Date     06/17/2022    POTASSIUM 3.9 06/17/2022    MAG 1.8 06/27/2015    CR 0.90 06/17/2022    VEE 8.7 06/17/2022    BILITOTAL 0.2 06/17/2022    ALBUMIN 3.5 06/17/2022    ALT 30 06/17/2022    AST 23 06/17/2022     Results reviewed, labs MET treatment parameters, ok to proceed with treatment.  Corrected calcium: 9.1.    Post Infusion Assessment:  Patient tolerated infusion without incident.  Patient tolerated xgeva injection into left arm without incident.  Blood return noted pre and post infusion.  Site patent and intact, free from redness, edema or discomfort.  No evidence of extravasations.  Access discontinued per protocol.     Discharge Plan:   Patient declined prescription refills.  Discharge instructions reviewed with: Patient.  Patient and/or family verbalized understanding of discharge instructions and all questions answered.  AVS to patient via [facility].  Patient will return 6/24 for next appointment.   Patient discharged in stable condition accompanied by: self.  Departure Mode: Ambulatory.      Yadira Jay RN

## 2022-06-24 NOTE — NURSING NOTE
Chief Complaint   Patient presents with     Blood Draw     Port blood draw with heparin flush by lab RN. Vitals taken and appointment arrived     Adwoa Mena RN

## 2022-06-24 NOTE — PROGRESS NOTES
Infusion Nursing Note:  Josefina Bennett presents today for Cycle 3 Day 15 Taxol.    Patient seen by provider today: No   present during visit today: Not Applicable.    Note: Patient arrives to infusion feeling very well. Endorses very mild, intermittent tingling in her bilateral thumbs. Otherwise, denies any complaints or acute concerns.    Intravenous Access:  Implanted Port.    Treatment Conditions:  Lab Results   Component Value Date    HGB 11.5 (L) 06/24/2022    WBC 9.3 06/24/2022    ANEU 4.2 01/23/2020    ANEUTAUTO 4.6 06/24/2022     06/24/2022      Lab Results   Component Value Date     06/24/2022    POTASSIUM 4.2 06/24/2022    MAG 1.8 06/27/2015    CR 0.87 06/24/2022    VEE 9.4 06/24/2022    BILITOTAL 0.2 06/24/2022    ALBUMIN 3.6 06/24/2022    ALT 28 06/24/2022    AST 21 06/24/2022     Results reviewed, labs MET treatment parameters, ok to proceed with treatment.    Post Infusion Assessment:  Patient tolerated infusion without incident.  Blood return noted pre and post infusion.  Site patent and intact, free from redness, edema or discomfort.  No evidence of extravasations.  Access discontinued per protocol.     Discharge Plan:   Patient declined prescription refills.  Discharge instructions reviewed with: Patient.  Patient and/or family verbalized understanding of discharge instructions and all questions answered.  [facility].  Patient will return 7/1 for next appointment.   Patient discharged in stable condition accompanied by: self.  Departure Mode: Ambulatory.      Farnaz Armenta RN

## 2022-06-26 NOTE — PROGRESS NOTES
REASON FOR VISIT: Follow-up breast cancer     HISTORY OF PRESENT ILLNESS:  Josefina Bennett was self referred to our clinic for clinical trial recommendations for newly diagnosed stage II breast cancer. She had her last mammogram approximately a year prior. She did notice in early January 2015 that she was having some distortion of the right breast, and she went to see her gynecologist and had a mammogram performed. She had a screening mammogram performed on 01/15/2015. Breast tissue was heterogeneously dense, which might compromise the mammography. There was architectural distortion in the right breast approximately 11-12 o'clock position, zone 2, posterior depth, and a CC MLO spot compression was performed and an ultrasound was planned. The diagnostic mammogram from 01/28/2015 showed right breast architectural distortion centered at the 12 o'clock position, zone 2, suspicious for neoplasm. Breast tomosynthesis was used in the interpretation. Ultrasound findings included targeted ultrasound of the right upper breast demonstrating a band-like area of abnormal echogenicity between 11 and 12 o'clock position in the right breast at zone 2. This measures 4 cm transversely x 1.1 cm AP, and there was only a 1.5 cm measurement in the radial direction. There was blood flow suspicious for neoplasm at the 11 o'clock position in zone 2. There is a 1.4 cm hypoechoic nodule slightly  from the larger area of altered echogenicity that is also suspicious. A biopsy was performed. The biopsy showed infiltrating lobular carcinoma, grade 2, and a clip was placed at specimen [id number]. There were a few signet cells present. The tumor was ER-positive, KS low positive, and HER2 was negative by immunohistochemistry. She subsequently underwent an MRI showing a tumor that was 4.8 x 1.7 x 3.2 cm in size. Overall, her clinical stage was T2 NX MX.       She was enrolled in the I-SPY-2 clinical trial, and qualified with high-risk MammaPrint  score. She was randomized to ganetespib and paclitaxel, and she was treated with 12 cycles of treatment, and then started on AC on 05/26/2015. She developed intractable nausea and vomiting after the first cycle, which required hospitalization on the second cycle. She then developed Pneumocystis pneumonia, which resulted in intubation and a 2-week hospitalization during the course of AC. She was discharged on 07/02/2015, and decided she did not want to pursue any further chemotherapy. She had a right lumpectomy and sentinel lymph node procedure 08/12/2015. She had a positive margin, underwent a re-excision, and had a positive margin. Ultimately, she underwent a right mastectomy with clear margins. She began radiation treatment with Dr. Bill Chauhan, and completed radiation therapy on 12/04/2015. Pathologic stage was III-A, ypT3 N1a MX. Of concern, she had what could be considered an RCB3 tumor.  She started adjuvant letrozole on 1/28/16.     TREATMENT HISTORY:  A.  Neoadjuvant therapy on I SPY-2 with ganetespib and paclitaxel.  AC x 2 complicated by PCP pneumonia.    B.  Right lumpectomy, followed by right margin resections, followed by rigth mastectomy.   B.  Oophorectomy 10-16-15.   C.  Tamoxifen after 10-6-15.   D.  Radiation therapy. 5,040 cGy in 180 cGy/fraction to the chest wall and regional lymphatics followed by 720 cGy axillary lymph node boost and 1000 cGy mastectomy scar boost. Completed on 12/3/2015.  E.   Adjuvant letrozole begun 1-28-16.  Plan was to continue through 2026.  F.  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  A mix of TNBC and ER- HER2+.  Clinic conference consensus was for the modified Vandana with paclitaxel weekly to treat both clones. T-DXd could be a next regimen.  G.  Metastatic carcinoma is  HER2 negative (score 1+) by immunohistochemistry.  She is also a candidate for T-DXd.  H.  Recurrent tumor with 22C3 antibody for PD-L1 and hold this in reserve for a Keynote 355 metastatic TNBC approach if the Vandana regimen does not control her metastatic disease.  Keynote 355 could also be an active regimen.      TELEPHONE VISIT     INTERVAL HISTORY:    Josefina returns to clinic to go over the results of her PET/CT scan, which showed a positive response to therapy, although qualified by some muscle uptake of the FDG tracer.  She denies pain, fatigue, depression or anxiety.  She does have alopecia and is wearing a wig.  She is working full time.    REVIEW OF SYSTEMS:  She is fully active.  She has no fever, chills, cough, chest pain, shortness of breath, nausea, vomiting, constipation, diarrhea, bone pain, back pain or headache.  The remainder of a 10-point review of systems is negative.  She does have some mild numbness in her hands.  Otherwise, she has had no fatigue related to the weekly Taxol.  She continues on Perjeta and Herceptin.     She is eating a healthful diet.  She is exercising about 150 minutes a week.  She is taking vitamin D3 and calcium.     PHYSICAL EXAMINATION:  VITALS: BP (!) 156/82   Pulse 110   Temp 97  F (36.1  C) (Tympanic)   Resp 18   Wt 60.5 kg (133 lb 4.8 oz)   LMP 12/18/2014   SpO2 100%   BMI 22.87 kg/m    GENERAL:  Josefina appeared generally well.    HEENT:  No lesions in the oropharynx.  She has alopecia and is wearing a wig.  LYMPH:  There is no palpable cervical, supraclavicular, subclavicular or axillary lymphadenopathy.  BREASTS:  Exam was not performed today.  LUNGS:  Clear to percussion and auscultation.  HEART:  Regular rate and rhythm.  S1, S2.  ABDOMEN:  Soft, nontender without hepatosplenomegaly.  EXTREMITIES:  Without edema.  PSYCH:  Mood and affect were normal.    LABORATORY DATA:  Will be obtained later this week prior to treatment.     LABORATORY DATA:    CA27-29  declining ascertained by [facility] test.  CEA is declining.     IMAGING:       BONES:   There is no abnormal FDG uptake in the skeleton. Diffusely decreased  uptake to the previously seen hypermetabolic lesions, with no focally  hypermetabolic residual lesion appreciated. There are extensive  nonhypermetabolic sclerotic foci throughout the axial skeleton,  including in the spine, ribs, sternum, clavicles, and pelvis.  Degenerative changes in the spine.                                                                      IMPRESSION:      1. Diffuse intramuscular uptake in the extremities and trunk, likely  secondary to exertion or diet. This muscular uptake directly competes  with lesional uptake and may lead to underestimation of lesional  uptake. With this possible limitation in mind, there is evidence of  positive response to treatment since 4/27/2022, with diffusely marked  decreased uptake to the skeletal and hepatic lesions. No new focally  hypermetabolic lesion.     2. Resolved uptake to the palatine tonsils and upper cervical lymph  nodes, which may be resolved inflammatory lesions or now treated  metastases.     GUS PALMA MD      ASSESSMENT AND PLAN:   1.  Mary Bennett is a 56-year-old woman with a history of a clinical stage II right breast cancer, ER positive, NH positive, HER2 negative by immunohistochemistry.  She had a lobular histology.  She was on the I-SPY 2 clinical trial and was randomized to ganetespib and paclitaxel, then went on to AC.  She had intractable nausea and vomiting the first cycle of AC, and the second cycle of AC was complicated by Pneumocystis pneumonia requiring intubation and a 2-week hospitalization.  She did not complete the AC treatment.  She did undergo a right lumpectomy and sentinel node procedure.  She had a positive margin and underwent resection.  She still had a positive margin.  She underwent a right lumpectomy with clear margins.  She completed radiation  in 12/2015.  Pathologic stage was IIIA  [id number] and of concern, she had an RCB3 histology meaning significant risk of recurrence of her breast cancer during the 5-year period following primary therapy.  There were 2 lymph nodes involved with mammary carcinoma with extracapsular extension.  She went on to have radiation therapy and then began hormonal therapy with tamoxifen in October 2015.  She had oophorectomy and started letrozole in January 2016.   2.  Breast conference recommended starting with HER2 directed therapy.  Biphenotypic cancer with majority TNBC component and a HER2 component.  I discussed the 22C3 PD-L1 CPS of 40 while 20% of the tumor is HER2+ by FISH and the overall ASCO  CAP assignment is negative for the bulk tumor with the qualifier that there is a significant HER2 clonal component that would be best treated with HER2 directed therapy.  The Tumor Board consensus was to continue with HER2 directed therapy to treat the new and HER2+ clone till best response and then change over to Keynote 355 with pembrolizumab and chemotherapy.  Pembrolizumab and gem/carbo or pembro and Abraxane could be potential options. We continuing with the recommended regimen of Vandana with every 3 week pertuzumab and trastuzumab but weekly paclitaxel because it is better tolerated than every 3 week docetaxel.  The weekly paclitaxel may be more effective vs the TNBC component.  The regimen will be load of trastuzumab of 8 mg/kg followed by 4 mg/kg every 3 weeks and then load of pertuzumab of 840 mg and 420 every 3 weeks and weekly paclitaxel.  No brain metastases.  3.  Assessment of response to HER2 directed therapy which includes chemotherapy. Josefina Sabrina has a positive response to the VANDANA regimen for metastatic ER positive, HER2 positive breast cancer.  Our plan is to continue the chemotherapy for a total of 4 months and then switch to antibodies alone.  This may be complicated somewhat by the fact that there is  a triple negative component which is HER2- by IHC and we will need to pay close attention to the staging.  We are not treating the TNBC component as a HER2 low TNBC because the IHC for HER2 is negative but rather with paclitaxel alone.   4.  Continue with Zometa every 3 months.  Although there is no FDG uptake in bones, bones are involved.   5.  No hormonal therapy because the recurrent tumor is ER negative.   6.  Restaging with PET CT at end of August.  7.  Follow up plan.  CBC, CMP, CA27.29, ,  and CEA on 6-30 with pertuzumab, trastuzumab, paclitaxel, Zometa.  Follow up with paclitaxel with CBC, CMP July 7 and 14.  Follow up with Sandra Coughlin July 21 with CBC, CMP, , CA27.29 and CEA.  PET CT August 29 with CBC, CMP, , CA27.29 and CEA and visit with me August 30.       Thank you for allowing us to continue to participate in Josefina Bennett' care.      Sincerely,       Evangelista Meza MD  Professor  [facility]  [phone]         I spent 15 minutes with the patient more than 50% of which was in counseling and coordination of care and then 30 minutes reviewing results from her inpatient hospitalization, 45 minutes total time.

## 2022-06-28 NOTE — LETTER
6/28/2022         RE: Josefina Bennett  [address]  [facility] [address]        Dear Colleague,    Thank you for referring your patient, Josefina Bennett, to the [facility]. Please see a copy of my visit note below.    REASON FOR VISIT: Follow-up breast cancer     HISTORY OF PRESENT ILLNESS:  Josefina Bennett was self referred to our clinic for clinical trial recommendations for newly diagnosed stage II breast cancer. She had her last mammogram approximately a year prior. She did notice in early January 2015 that she was having some distortion of the right breast, and she went to see her gynecologist and had a mammogram performed. She had a screening mammogram performed on 01/15/2015. Breast tissue was heterogeneously dense, which might compromise the mammography. There was architectural distortion in the right breast approximately 11-12 o'clock position, zone 2, posterior depth, and a CC MLO spot compression was performed and an ultrasound was planned. The diagnostic mammogram from 01/28/2015 showed right breast architectural distortion centered at the 12 o'clock position, zone 2, suspicious for neoplasm. Breast tomosynthesis was used in the interpretation. Ultrasound findings included targeted ultrasound of the right upper breast demonstrating a band-like area of abnormal echogenicity between 11 and 12 o'clock position in the right breast at zone 2. This measures 4 cm transversely x 1.1 cm AP, and there was only a 1.5 cm measurement in the radial direction. There was blood flow suspicious for neoplasm at the 11 o'clock position in zone 2. There is a 1.4 cm hypoechoic nodule slightly  from the larger area of altered echogenicity that is also suspicious. A biopsy was performed. The biopsy showed infiltrating lobular carcinoma, grade 2, and a clip was placed at specimen [id number]. There were a few signet cells present. The tumor was ER-positive, AR low positive, and HER2 was negative  by immunohistochemistry. She subsequently underwent an MRI showing a tumor that was 4.8 x 1.7 x 3.2 cm in size. Overall, her clinical stage was T2 NX MX.       She was enrolled in the I-SPY-2 clinical trial, and qualified with high-risk MammaPrint score. She was randomized to ganetespib and paclitaxel, and she was treated with 12 cycles of treatment, and then started on AC on 05/26/2015. She developed intractable nausea and vomiting after the first cycle, which required hospitalization on the second cycle. She then developed Pneumocystis pneumonia, which resulted in intubation and a 2-week hospitalization during the course of AC. She was discharged on 07/02/2015, and decided she did not want to pursue any further chemotherapy. She had a right lumpectomy and sentinel lymph node procedure 08/12/2015. She had a positive margin, underwent a re-excision, and had a positive margin. Ultimately, she underwent a right mastectomy with clear margins. She began radiation treatment with Dr. Bill Chauhan, and completed radiation therapy on 12/04/2015. Pathologic stage was III-A, ypT3 N1a MX. Of concern, she had what could be considered an RCB3 tumor.  She started adjuvant letrozole on 1/28/16.     TREATMENT HISTORY:  A.  Neoadjuvant therapy on I SPY-2 with ganetespib and paclitaxel.  AC x 2 complicated by PCP pneumonia.    B.  Right lumpectomy, followed by right margin resections, followed by rigth mastectomy.   B.  Oophorectomy 10-16-15.   C.  Tamoxifen after 10-6-15.   D.  Radiation therapy. 5,040 cGy in 180 cGy/fraction to the chest wall and regional lymphatics followed by 720 cGy axillary lymph node boost and 1000 cGy mastectomy scar boost. Completed on 12/3/2015.  E.   Adjuvant letrozole begun 1-28-16.  Plan was to continue through 2026.  F.  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  A mix of TNBC and ER- HER2+.  Clinic conference consensus was for the modified Vandana with paclitaxel weekly to treat both clones. T-DXd could be a next regimen.  G.  Metastatic carcinoma is HER2 negative (score 1+) by immunohistochemistry.  She is also a candidate for T-DXd.  H.  Recurrent tumor with 22C3 antibody for PD-L1 and hold this in reserve for a Keynote 355 metastatic TNBC approach if the Vandana regimen does not control her metastatic disease.  Keynote 355 could also be an active regimen.      TELEPHONE VISIT     INTERVAL HISTORY:    Josefina returns to clinic to go over the results of her PET/CT scan, which showed a positive response to therapy, although qualified by some muscle uptake of the FDG tracer.  She denies pain, fatigue, depression or anxiety.  She does have alopecia and is wearing a wig.  She is working full time.    REVIEW OF SYSTEMS:  She is fully active.  She has no fever, chills, cough, chest pain, shortness of breath, nausea, vomiting, constipation, diarrhea, bone pain, back pain or headache.  The remainder of a 10-point review of systems is negative.  She does have some mild numbness in her hands.  Otherwise, she has had no fatigue related to the weekly Taxol.  She continues on Perjeta and Herceptin.     She is eating a healthful diet.  She is exercising about 150 minutes a week.  She is taking vitamin D3 and calcium.     PHYSICAL EXAMINATION:  VITALS: BP (!) 156/82   Pulse 110   Temp 97  F (36.1  C) (Tympanic)   Resp 18   Wt 60.5 kg (133 lb 4.8 oz)   LMP 12/18/2014   SpO2 100%   BMI 22.87 kg/m    GENERAL:  Josefina appeared generally well.    HEENT:  No lesions in the oropharynx.  She has alopecia and is wearing a wig.  LYMPH:  There is no palpable cervical, supraclavicular, subclavicular or axillary lymphadenopathy.  BREASTS:  Exam was not performed today.  LUNGS:  Clear to percussion and auscultation.  HEART:  Regular rate and  rhythm.  S1, S2.  ABDOMEN:  Soft, nontender without hepatosplenomegaly.  EXTREMITIES:  Without edema.  PSYCH:  Mood and affect were normal.    LABORATORY DATA:  Will be obtained later this week prior to treatment.     LABORATORY DATA:    CA27-29 declining ascertained by [facility] test.  CEA is declining.     IMAGING:       BONES:   There is no abnormal FDG uptake in the skeleton. Diffusely decreased  uptake to the previously seen hypermetabolic lesions, with no focally  hypermetabolic residual lesion appreciated. There are extensive  nonhypermetabolic sclerotic foci throughout the axial skeleton,  including in the spine, ribs, sternum, clavicles, and pelvis.  Degenerative changes in the spine.                                                                      IMPRESSION:      1. Diffuse intramuscular uptake in the extremities and trunk, likely  secondary to exertion or diet. This muscular uptake directly competes  with lesional uptake and may lead to underestimation of lesional  uptake. With this possible limitation in mind, there is evidence of  positive response to treatment since 4/27/2022, with diffusely marked  decreased uptake to the skeletal and hepatic lesions. No new focally  hypermetabolic lesion.     2. Resolved uptake to the palatine tonsils and upper cervical lymph  nodes, which may be resolved inflammatory lesions or now treated  metastases.     GUS PALMA MD      ASSESSMENT AND PLAN:   1.  Mary Bennett is a 56-year-old woman with a history of a clinical stage II right breast cancer, ER positive, UT positive, HER2 negative by immunohistochemistry.  She had a lobular histology.  She was on the I-SPY 2 clinical trial and was randomized to ganetespib and paclitaxel, then went on to AC.  She had intractable nausea and vomiting the first cycle of AC, and the second cycle of AC was complicated by Pneumocystis pneumonia requiring intubation and a 2-week hospitalization.  She did not complete the  AC treatment.  She did undergo a right lumpectomy and sentinel node procedure.  She had a positive margin and underwent resection.  She still had a positive margin.  She underwent a right lumpectomy with clear margins.  She completed radiation in 12/2015.  Pathologic stage was IIIA  [id number] and of concern, she had an RCB3 histology meaning significant risk of recurrence of her breast cancer during the 5-year period following primary therapy.  There were 2 lymph nodes involved with mammary carcinoma with extracapsular extension.  She went on to have radiation therapy and then began hormonal therapy with tamoxifen in October 2015.  She had oophorectomy and started letrozole in January 2016.   2.  Breast conference recommended starting with HER2 directed therapy.  Biphenotypic cancer with majority TNBC component and a HER2 component.  I discussed the 22C3 PD-L1 CPS of 40 while 20% of the tumor is HER2+ by FISH and the overall ASCO  CAP assignment is negative for the bulk tumor with the qualifier that there is a significant HER2 clonal component that would be best treated with HER2 directed therapy.  The Tumor Board consensus was to continue with HER2 directed therapy to treat the new and HER2+ clone till best response and then change over to Keynote 355 with pembrolizumab and chemotherapy.  Pembrolizumab and gem/carbo or pembro and Abraxane could be potential options. We continuing with the recommended regimen of Vandana with every 3 week pertuzumab and trastuzumab but weekly paclitaxel because it is better tolerated than every 3 week docetaxel.  The weekly paclitaxel may be more effective vs the TNBC component.  The regimen will be load of trastuzumab of 8 mg/kg followed by 4 mg/kg every 3 weeks and then load of pertuzumab of 840 mg and 420 every 3 weeks and weekly paclitaxel.  No brain metastases.  3.  Assessment of response to HER2 directed therapy which includes chemotherapy. Josefina Bennett has a positive response  to the TERESA regimen for metastatic ER positive, HER2 positive breast cancer.  Our plan is to continue the chemotherapy for a total of 4 months and then switch to antibodies alone.  This may be complicated somewhat by the fact that there is a triple negative component which is HER2- by IHC and we will need to pay close attention to the staging.  We are not treating the TNBC component as a HER2 low TNBC because the IHC for HER2 is negative but rather with paclitaxel alone.   4.  Continue with Zometa every 3 months.  Although there is no FDG uptake in bones, bones are involved.   5.  No hormonal therapy because the recurrent tumor is ER negative.   6.  Restaging with PET CT at end of August.  7.  Follow up plan.  CBC, CMP, CA27.29, ,  and CEA on 6-30 with pertuzumab, trastuzumab, paclitaxel, Zometa.  Follow up with paclitaxel with CBC, CMP July 7 and 14.  Follow up with Sandra Coughlin July 21 with CBC, CMP, , CA27.29 and CEA.  PET CT August 29 with CBC, CMP, , CA27.29 and CEA and visit with me August 30.       Thank you for allowing us to continue to participate in Josefina Bennett' care.     I spent 15 minutes with the patient more than 50% of which was in counseling and coordination of care and then 30 minutes reviewing results from her inpatient hospitalization, 45 minutes total time.         Again, thank you for allowing me to participate in the care of your patient.      Sincerely,    Evangelista Meza MD

## 2022-06-28 NOTE — NURSING NOTE
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    Weight as of this encounter: 60.5 kg (133 lb 4.8 oz). Body surface area is 1.65 meters squared.  No Pain (0) Comment: Data Unavailable   Patient's last menstrual period was 12/18/2014.  Allergies reviewed: Yes  Medications reviewed: Yes    Medications: Medication refills not needed today.  Pharmacy name entered into [facility]:    [facility] [place], [place] - [address] [facility] - [place], [place] - [address] [facility] - [place], [place] - [address]    Clinical concerns: No new concerns        [facility]            "

## 2022-06-29 NOTE — TELEPHONE ENCOUNTER
mirtazapine (REMERON)  Refill   Last prescribing provider: Larisa Ahuja    Last clinic visit date: 6/28/22 Dr Meza     Any missed appointments or no-shows since last clinic visit?: no     Recommendations for requested medication (if none, N/A): N/A    Next clinic visit date: 7/22/22 Sandra Coughlin     Any other pertinent information (if none, N/A): n/a

## 2022-07-01 NOTE — PROGRESS NOTES
Infusion Nursing Note:  Josefina Bennett presents today for Cycle 4 Day 1 Pertuzumab, Trastuzumab-qqyp and Taxol.    Patient seen by provider today: No   present during visit today: Not Applicable.    Note: Patient presents to infusion today doing well. Denies any recent fevers, chills, shortness of breath, chest pains or cough. Offers no new changes or concerns since her visit with Dr. Meza on 6/28.    IV Zofran given with premedications today as patient has been receiving this with previous cycles and tolerating well.     Intravenous Access:  Implanted Port.    Treatment Conditions:  Lab Results   Component Value Date    HGB 11.5 (L) 07/01/2022    WBC 8.1 07/01/2022    ANEU 4.2 01/23/2020    ANEUTAUTO 4.1 07/01/2022     07/01/2022      Lab Results   Component Value Date     07/01/2022    POTASSIUM 4.3 07/01/2022    MAG 1.8 06/27/2015    CR 0.84 07/01/2022    VEE 8.7 07/01/2022    BILITOTAL 0.3 07/01/2022    ALBUMIN 3.6 07/01/2022    ALT 34 07/01/2022    AST 23 07/01/2022     Results reviewed, labs MET treatment parameters, ok to proceed with treatment.  ECHO/MUGA completed 4/7/2022  EF 60-65%.    Post Infusion Assessment:  Patient tolerated infusion without incident.  Blood return noted pre and post infusion.  Site patent and intact, free from redness, edema or discomfort.  No evidence of extravasations.  Access discontinued per protocol.     Discharge Plan:   Patient declined prescription refills.  Discharge instructions reviewed with: Patient.  Patient and/or family verbalized understanding of discharge instructions and all questions answered.  AVS to patient via [employer].  Patient will return 7/8 for next appointment.   Patient discharged in stable condition accompanied by: self.  Departure Mode: Ambulatory.      Tierra Gardner RN

## 2022-07-01 NOTE — NURSING NOTE
Chief Complaint   Patient presents with     Port Draw     Power needle. Heparin locked,vitals checked     Alisa Brand RN on 7/1/2022 at 11:07 AM

## 2022-07-01 NOTE — PATIENT INSTRUCTIONS
Contact Numbers  [facility]: [phone] (for symptom and scheduling needs)    Please call the [facility] Triage line if you experience a temperature greater than or equal to 100.4, shaking chills, have uncontrolled nausea, vomiting and/or diarrhea, dizziness, shortness of breath, chest pain, bleeding, unexplained bruising, or if you have any other new/concerning symptoms, questions or concerns.     If you are having any concerning symptoms or wish to speak to a provider before your next infusion visit, please call your care coordinator or triage to notify them so we can adequately serve you.     If you need a refill on a narcotic prescription or other medication, please call triage before your infusion appointment.           July 2022 Sunday Monday Tuesday Wednesday Thursday Friday Saturday 1    LAB CENTRAL  11:30 AM   (15 min.)   [facility] LAB DRAW   [facility]    ONC INFUSION 4 HR (240 MIN)  12:00 PM   (240 min.)    ONC INFUSION NURSE   [facility] 2       3     4     5    ECHO COMPLETE   3:30 PM   (60 min.)   UCECHCR1   [facility] 6     7     8    LAB CENTRAL   6:30 AM   (15 min.)   [facility] LAB DRAW   [facility]    ONC INFUSION 2 HR (120 MIN)   7:00 AM   (120 min.)    ONC INFUSION NURSE   [facility] 9       10     11     12     13     14     15    LAB CENTRAL   6:30 AM   (15 min.)   [facility] LAB DRAW   [facility]    ONC INFUSION 2 HR (120 MIN)   7:00 AM   (120 min.)    ONC INFUSION NURSE   [facility] 16       17     18     19     20     21     22    LAB CENTRAL   8:15 AM   (15 min.)   [facility] LAB DRAW   [facility]    RETURN   8:45 AM   (45 min.)   Sandra Coughlin PA-C   [facility]    ONC INFUSION 4 HR  (240 MIN)  10:00 AM   (240 min.)    ONC INFUSION NURSE   [facility] 23       24     25     26     27     28     29     30       31 August 2022 Sunday Monday Tuesday Wednesday Thursday Friday Saturday        1     2     3     4     5     6       7     8     9     10     11     12     13       14     15     16     17     18     19     20       21     22     23     24     25     26     27       28     29     30    RETURN   7:15 AM   (30 min.)   Evangelista Meza MD   [facility] 31                                      Lab Results:  Recent Results (from the past 12 hour(s))   Comprehensive metabolic panel    Collection Time: 07/01/22 11:04 AM   Result Value Ref Range    Sodium 142 133 - 144 mmol/L    Potassium 4.3 3.4 - 5.3 mmol/L    Chloride 106 94 - 109 mmol/L    Carbon Dioxide (CO2) 27 20 - 32 mmol/L    Anion Gap 9 3 - 14 mmol/L    Urea Nitrogen 14 7 - 30 mg/dL    Creatinine 0.84 0.52 - 1.04 mg/dL    Calcium 8.7 8.5 - 10.1 mg/dL    Glucose 119 (H) 70 - 99 mg/dL    Alkaline Phosphatase 92 40 - 150 U/L    AST 23 0 - 45 U/L    ALT 34 0 - 50 U/L    Protein Total 6.7 (L) 6.8 - 8.8 g/dL    Albumin 3.6 3.4 - 5.0 g/dL    Bilirubin Total 0.3 0.2 - 1.3 mg/dL    GFR Estimate 81 >60 mL/min/1.73m2   CBC with platelets and differential    Collection Time: 07/01/22 11:04 AM   Result Value Ref Range    WBC Count 8.1 4.0 - 11.0 10e3/uL    RBC Count 4.20 3.80 - 5.20 10e6/uL    Hemoglobin 11.5 (L) 11.7 - 15.7 g/dL    Hematocrit 35.7 35.0 - 47.0 %    MCV 85 78 - 100 fL    MCH 27.4 26.5 - 33.0 pg    MCHC 32.2 31.5 - 36.5 g/dL    RDW 18.0 (H) 10.0 - 15.0 %    Platelet Count 440 150 - 450 10e3/uL    % Neutrophils 51 %    % Lymphocytes 40 %    % Monocytes 7 %    % Eosinophils 1 %    % Basophils 1 %    % Immature Granulocytes 0 %    NRBCs per 100 WBC 0 <1 /100    Absolute Neutrophils 4.1 1.6 - 8.3 10e3/uL    Absolute  Lymphocytes 3.2 0.8 - 5.3 10e3/uL    Absolute Monocytes 0.6 0.0 - 1.3 10e3/uL    Absolute Eosinophils 0.1 0.0 - 0.7 10e3/uL    Absolute Basophils 0.1 0.0 - 0.2 10e3/uL    Absolute Immature Granulocytes 0.0 <=0.4 10e3/uL    Absolute NRBCs 0.0 10e3/uL

## 2022-07-08 NOTE — PATIENT INSTRUCTIONS
[facility] Triage and after hours / weekends / holidays:  [phone]    Please call the triage or after hours line if you experience a temperature greater than or equal to 100.4, shaking chills, have uncontrolled nausea, vomiting and/or diarrhea, dizziness, shortness of breath, chest pain, bleeding, unexplained bruising, or if you have any other new/concerning symptoms, questions or concerns.      If you are having any concerning symptoms or wish to speak to a provider before your next infusion visit, please call your care coordinator or triage to notify them so we can adequately serve you.     If you need a refill on a narcotic prescription or other medication, please call before your infusion appointment.                July 2022 Sunday Monday Tuesday Wednesday Thursday Friday Saturday 1    LAB CENTRAL  11:30 AM   (15 min.)   [facility] LAB DRAW   [facility]    ONC INFUSION 4 HR (240 MIN)  12:00 PM   (240 min.)    ONC INFUSION NURSE   [facility] 2       3     4     5    ECHO COMPLETE   3:30 PM   (60 min.)   UCECHCR1   [facility] 6     7     8    LAB CENTRAL   6:30 AM   (15 min.)   [facility] DRAW   [facility]    ONC INFUSION 2 HR (120 MIN)   7:00 AM   (120 min.)    ONC INFUSION NURSE   [facility] 9       10     11     12     13     14     15    LAB CENTRAL   6:30 AM   (15 min.)   [facility] DRAW   [facility]    ONC INFUSION 2 HR (120 MIN)   7:00 AM   (120 min.)    ONC INFUSION NURSE   [facility] 16       17     18     19     20     21     22    LAB CENTRAL   8:15 AM   (15 min.)   [facility] LAB DRAW   [facility]    RETURN   8:45 AM   (45 min.)   Sandra Coughlin PA-C   [facility]    ONC INFUSION 4 HR (240  MIN)  10:00 AM   (240 min.)    ONC INFUSION NURSE   [facility] 23       24     25     26     27     28     29     30       31 August 2022 Sunday Monday Tuesday Wednesday Thursday Friday Saturday        1     2     3     4     5     6       7     8     9     10     11     12     13       14     15     16     17     18     19     20       21     22     23     24     25     26     27       28     29     30    RETURN   7:15 AM   (30 min.)   Evangelista Meza MD   [facility] 31                                       Recent Results (from the past 24 hour(s))   COMPREHENSIVE METABOLIC PANEL    Collection Time: 07/08/22  6:56 AM   Result Value Ref Range    Sodium 143 133 - 144 mmol/L    Potassium 4.3 3.4 - 5.3 mmol/L    Chloride 108 94 - 109 mmol/L    Carbon Dioxide (CO2) 27 20 - 32 mmol/L    Anion Gap 8 3 - 14 mmol/L    Urea Nitrogen 22 7 - 30 mg/dL    Creatinine 0.84 0.52 - 1.04 mg/dL    Calcium 9.0 8.5 - 10.1 mg/dL    Glucose 139 (H) 70 - 99 mg/dL    Alkaline Phosphatase 82 40 - 150 U/L    AST 19 0 - 45 U/L    ALT 25 0 - 50 U/L    Protein Total 6.4 (L) 6.8 - 8.8 g/dL    Albumin 3.3 (L) 3.4 - 5.0 g/dL    Bilirubin Total 0.2 0.2 - 1.3 mg/dL    GFR Estimate 81 >60 mL/min/1.73m2   CBC with platelets and differential    Collection Time: 07/08/22  6:56 AM   Result Value Ref Range    WBC Count 8.7 4.0 - 11.0 10e3/uL    RBC Count 3.97 3.80 - 5.20 10e6/uL    Hemoglobin 11.0 (L) 11.7 - 15.7 g/dL    Hematocrit 34.3 (L) 35.0 - 47.0 %    MCV 86 78 - 100 fL    MCH 27.7 26.5 - 33.0 pg    MCHC 32.1 31.5 - 36.5 g/dL    RDW 18.2 (H) 10.0 - 15.0 %    Platelet Count 419 150 - 450 10e3/uL    % Neutrophils 48 %    % Lymphocytes 39 %    % Monocytes 9 %    % Eosinophils 2 %    % Basophils 1 %    % Immature Granulocytes 1 %    NRBCs per 100 WBC 0 <1 /100    Absolute Neutrophils 4.3 1.6 - 8.3 10e3/uL    Absolute Lymphocytes 3.4  0.8 - 5.3 10e3/uL    Absolute Monocytes 0.8 0.0 - 1.3 10e3/uL    Absolute Eosinophils 0.2 0.0 - 0.7 10e3/uL    Absolute Basophils 0.1 0.0 - 0.2 10e3/uL    Absolute Immature Granulocytes 0.0 <=0.4 10e3/uL    Absolute NRBCs 0.0 10e3/uL

## 2022-07-08 NOTE — PROGRESS NOTES
Infusion Nursing Note:  Josefina Bennett presents today for Cycle 4 Day 8 Taxol.  Patient seen by provider today: No    Note: Patient presents to the infusion center today denying any new symptoms or concerns. No pain, fevers, chills, chest pain or shortness of breath.    Intravenous Access:  Implanted Port.    Treatment Conditions:   Latest Reference Range & Units 07/08/22 06:56   Sodium 133 - 144 mmol/L 143   Potassium 3.4 - 5.3 mmol/L 4.3   Chloride 94 - 109 mmol/L 108   Carbon Dioxide 20 - 32 mmol/L 27   Urea Nitrogen 7 - 30 mg/dL 22   Creatinine 0.52 - 1.04 mg/dL 0.84   GFR Estimate >60 mL/min/1.73m2 81   Calcium 8.5 - 10.1 mg/dL 9.0   Anion Gap 3 - 14 mmol/L 8   Albumin 3.4 - 5.0 g/dL 3.3 (L)   Protein Total 6.8 - 8.8 g/dL 6.4 (L)   Alkaline Phosphatase 40 - 150 U/L 82   ALT 0 - 50 U/L 25   AST 0 - 45 U/L 19   Bilirubin Total 0.2 - 1.3 mg/dL 0.2   Glucose 70 - 99 mg/dL 139 (H)   WBC 4.0 - 11.0 10e3/uL 8.7   Hemoglobin 11.7 - 15.7 g/dL 11.0 (L)   Hematocrit 35.0 - 47.0 % 34.3 (L)   Platelet Count 150 - 450 10e3/uL 419   RBC Count 3.80 - 5.20 10e6/uL 3.97   MCV 78 - 100 fL 86   MCH 26.5 - 33.0 pg 27.7   MCHC 31.5 - 36.5 g/dL 32.1   RDW 10.0 - 15.0 % 18.2 (H)   % Neutrophils % 48   % Lymphocytes % 39   % Monocytes % 9   % Eosinophils % 2   % Basophils % 1   Absolute Basophils 0.0 - 0.2 10e3/uL 0.1   Absolute Eosinophils 0.0 - 0.7 10e3/uL 0.2   Absolute Immature Granulocytes <=0.4 10e3/uL 0.0   Absolute Lymphocytes 0.8 - 5.3 10e3/uL 3.4   Absolute Monocytes 0.0 - 1.3 10e3/uL 0.8   % Immature Granulocytes % 1   Absolute Neutrophils 1.6 - 8.3 10e3/uL 4.3   Absolute NRBCs 10e3/uL 0.0   NRBCs per 100 WBC <1 /100 0     Results reviewed, labs MET treatment parameters, ok to proceed with treatment.    Post Infusion Assessment:  Patient tolerated infusion without incident.  Blood return noted pre and post infusion.  No evidence of extravasations.  Access discontinued per protocol.     Discharge Plan:   Patient declined  prescription refills.  Discharge instructions reviewed with: Patient.  Patient and/or family verbalized understanding of discharge instructions and all questions answered.  AVS to patient via [employer].  Patient will return 7/15 for next appointment.   Patient discharged in stable condition accompanied by: self.  Departure Mode: Ambulatory.      Ally Ledesma RN

## 2022-07-15 NOTE — PROGRESS NOTES
Infusion Nursing Note:  Josefina Bennett presents today for cycle 4 day 15 taxol, monthly xgeva.    Patient seen by provider today: No   present during visit today: Not Applicable.    Note:   Patient feels well today. 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 She denies fevers, chills, SOB, chest pain, nausea, diarrhea, and pain.    Intravenous Access:  Implanted Port.    Treatment Conditions:  Lab Results   Component Value Date    HGB 10.9 (L) 07/15/2022    WBC 8.9 07/15/2022    ANEU 4.2 01/23/2020    ANEUTAUTO 4.7 07/15/2022     07/15/2022      Lab Results   Component Value Date     07/15/2022    POTASSIUM 4.2 07/15/2022    MAG 1.8 06/27/2015    CR 0.80 07/15/2022    VEE 8.5 07/15/2022    BILITOTAL 0.2 07/15/2022    ALBUMIN 3.4 07/15/2022    ALT 28 07/15/2022    AST 22 07/15/2022     Results reviewed, labs MET treatment parameters, ok to proceed with treatment.  Corrected calcium 8.98.    Post Infusion Assessment:  Patient tolerated infusion without incident.  Patient tolerated xgeva injection into left arm without incident.  Blood return noted pre and post infusion.  Site patent and intact, free from redness, edema or discomfort.  No evidence of extravasations.  Access discontinued per protocol.     Discharge Plan:   Patient declined prescription refills.  Discharge instructions reviewed with: Patient.  Patient and/or family verbalized understanding of discharge instructions and all questions answered.  AVS to patient via [employer].  Patient will return 7/20 for virtual provider visit, and 7/22 for next infusion appointment.   Patient discharged in stable condition accompanied by: self.  Departure Mode: Ambulatory.      Yadira Jay RN

## 2022-07-15 NOTE — PATIENT INSTRUCTIONS
[facility] Triage and after hours / weekends / holidays:  [phone]    Please call the triage or after hours line if you experience a temperature greater than or equal to 100.5, shaking chills, have uncontrolled nausea, vomiting and/or diarrhea, dizziness, shortness of breath, chest pain, bleeding, unexplained bruising, or if you have any other new/concerning symptoms, questions or concerns.      If you are having any concerning symptoms or wish to speak to a provider before your next infusion visit, please call your care coordinator or triage to notify them so we can adequately serve you.     If you need a refill on a narcotic prescription or other medication, please call before your infusion appointment.      July 2022 Sunday Monday Tuesday Wednesday Thursday Friday Saturday 1    LAB CENTRAL  11:30 AM   (15 min.)   [facility]   [facility]    ONC INFUSION 4 HR (240 MIN)  12:00 PM   (240 min.)    ONC INFUSION NURSE   [facility] 2       3     4     5    ECHO COMPLETE   3:30 PM   (60 min.)   [id number]   [facility] 6     7     8    LAB CENTRAL   6:30 AM   (15 min.)   [facility]   [facility]    ONC INFUSION 2 HR (120 MIN)   7:00 AM   (120 min.)    ONC INFUSION NURSE   [facility] 9       10     11     12     13    ECHO COMPLETE   8:00 AM   (60 min.)   [email or web address]   [facility] 14     15    LAB CENTRAL   6:30 AM   (15 min.)   [facility]   [facility]    ONC INFUSION 2 HR (120 MIN)   7:00 AM   (120 min.)    ONC INFUSION NURSE   [facility] 16       17     18     19     20    VIDEO VISIT RETURN  12:45 PM   (45 min.)   Sandra Coughlin PA-C   [facility] 21     22    LAB CENTRAL   7:00 AM   (15 min.)     [facility]   [facility]    ONC INFUSION 4 HR (240 MIN)   7:30 AM   (240 min.)   [facility] ONC INFUSION NURSE   [facility] 23       24     25     26     27     28     29    LAB CENTRAL   6:30 AM   (15 min.)    [facility]   [facility]    ONC INFUSION 2 HR (120 MIN)   7:30 AM   (120 min.)   [facility] ONC INFUSION NURSE   [facility] 30       31 August 2022 Sunday Monday Tuesday Wednesday Thursday Friday Saturday        1     2     3     4     5    ONC INFUSION 2 HR (120 MIN)   7:00 AM   (120 min.)   [facility] ONC INFUSION NURSE   [facility] 6       7     8     9     10    VIDEO VISIT RETURN   4:45 PM   (45 min.)   Sandra Coughlin PA-C   [facility] 11     12    ONC INFUSION 4 HR (240 MIN)  11:30 AM   (240 min.)    ONC INFUSION NURSE   [facility] 13       14     15     16     17     18     19    ONC INFUSION 2 HR (120 MIN)   7:30 AM   (120 min.)    ONC INFUSION NURSE   [facility] 20       21     22     23     24     25     26    ONC INFUSION 2 HR (120 MIN)   7:30 AM   (120 min.)    ONC INFUSION NURSE   [facility] 27       28     29    PET ONCOLOGY WHOLE BODY   7:30 AM   (30 min.)   [email or web address]   [facility] 30    LAB CENTRAL   6:45 AM   (15 min.)    [facility]   [facility]    RETURN   7:15 AM   (30 min.)   Evangelista Meza MD   [facility] 31                                       Recent Results (from the past 24 hour(s))   CBC with platelets and differential    Collection Time: 07/15/22  6:57 AM   Result Value Ref Range    WBC Count 8.9 4.0 - 11.0 10e3/uL    RBC Count 3.99 3.80 - 5.20 10e6/uL    Hemoglobin 10.9 (L) 11.7  - 15.7 g/dL    Hematocrit 34.3 (L) 35.0 - 47.0 %    MCV 86 78 - 100 fL    MCH 27.3 26.5 - 33.0 pg    MCHC 31.8 31.5 - 36.5 g/dL    RDW 18.1 (H) 10.0 - 15.0 %    Platelet Count 424 150 - 450 10e3/uL    % Neutrophils 52 %    % Lymphocytes 37 %    % Monocytes 7 %    % Eosinophils 2 %    % Basophils 1 %    % Immature Granulocytes 1 %    NRBCs per 100 WBC 0 <1 /100    Absolute Neutrophils 4.7 1.6 - 8.3 10e3/uL    Absolute Lymphocytes 3.3 0.8 - 5.3 10e3/uL    Absolute Monocytes 0.6 0.0 - 1.3 10e3/uL    Absolute Eosinophils 0.1 0.0 - 0.7 10e3/uL    Absolute Basophils 0.1 0.0 - 0.2 10e3/uL    Absolute Immature Granulocytes 0.1 <=0.4 10e3/uL    Absolute NRBCs 0.0 10e3/uL   Comprehensive metabolic panel    Collection Time: 07/15/22  7:11 AM   Result Value Ref Range    Sodium 142 133 - 144 mmol/L    Potassium 4.2 3.4 - 5.3 mmol/L    Chloride 108 94 - 109 mmol/L    Carbon Dioxide (CO2) 27 20 - 32 mmol/L    Anion Gap 7 3 - 14 mmol/L    Urea Nitrogen 17 7 - 30 mg/dL    Creatinine 0.80 0.52 - 1.04 mg/dL    Calcium 8.5 8.5 - 10.1 mg/dL    Glucose 138 (H) 70 - 99 mg/dL    Alkaline Phosphatase 80 40 - 150 U/L    AST 22 0 - 45 U/L    ALT 28 0 - 50 U/L    Protein Total 6.6 (L) 6.8 - 8.8 g/dL    Albumin 3.4 3.4 - 5.0 g/dL    Bilirubin Total 0.2 0.2 - 1.3 mg/dL    GFR Estimate 86 >60 mL/min/1.73m2

## 2022-07-15 NOTE — NURSING NOTE
Chief Complaint   Patient presents with     Port Draw     Labs drawn via port by RN in lab. VS taken.      Labs drawn via Port accessed using 20g flat needle. Line flushed and Heparin locked. Vital signs taken. Checked into next appointment.     Madelin Julian RN

## 2022-07-20 NOTE — NURSING NOTE
Patient declined individual  medication review by support staff because patient states nothing has changed and does not want to review.    Josephine Reece

## 2022-07-20 NOTE — PROGRESS NOTES
Mary is a 56 year old who is being evaluated via a billable video visit.      How would you like to obtain your AVS? MyChart  If the video visit is dropped, the invitation should be resent by: Text to cell phone: [phone]  Will anyone else be joining your video visit? No        Video-Visit Details    Video Start Time: 12:57pm    Type of service:  Video Visit    Video End Time:1:06 PM    Originating Location (pt. Location): Home    Distant Location (provider location):  [facility]     Platform used for Video Visit: [employer] [employer] [employer]ece      REASON FOR VISIT: Follow-up breast cancer     HISTORY OF PRESENT ILLNESS:  Josefina Bennett was self referred to our clinic for clinical trial recommendations for newly diagnosed stage II breast cancer. She had her last mammogram approximately a year prior. She did notice in early January 2015 that she was having some distortion of the right breast, and she went to see her gynecologist and had a mammogram performed. She had a screening mammogram performed on 01/15/2015. Breast tissue was heterogeneously dense, which might compromise the mammography. There was architectural distortion in the right breast approximately 11-12 o'clock position, zone 2, posterior depth, and a CC MLO spot compression was performed and an ultrasound was planned. The diagnostic mammogram from 01/28/2015 showed right breast architectural distortion centered at the 12 o'clock position, zone 2, suspicious for neoplasm. Breast tomosynthesis was used in the interpretation. Ultrasound findings included targeted ultrasound of the right upper breast demonstrating a band-like area of abnormal echogenicity between 11 and 12 o'clock position in the right breast at zone 2. This measures 4 cm transversely x 1.1 cm AP, and there was only a 1.5 cm measurement in the radial direction. There was blood flow suspicious for neoplasm at the 11 o'clock position in zone 2. There is a 1.4  cm hypoechoic nodule slightly  from the larger area of altered echogenicity that is also suspicious. A biopsy was performed. The biopsy showed infiltrating lobular carcinoma, grade 2, and a clip was placed at specimen [id number]. There were a few signet cells present. The tumor was ER-positive, AK low positive, and HER2 was negative by immunohistochemistry. She subsequently underwent an MRI showing a tumor that was 4.8 x 1.7 x 3.2 cm in size. Overall, her clinical stage was T2 NX MX.       She was enrolled in the I-SPY-2 clinical trial, and qualified with high-risk MammaPrint score. She was randomized to ganetespib and paclitaxel, and she was treated with 12 cycles of treatment, and then started on AC on 05/26/2015. She developed intractable nausea and vomiting after the first cycle, which required hospitalization on the second cycle. She then developed Pneumocystis pneumonia, which resulted in intubation and a 2-week hospitalization during the course of AC. She was discharged on 07/02/2015, and decided she did not want to pursue any further chemotherapy. She had a right lumpectomy and sentinel lymph node procedure 08/12/2015. She had a positive margin, underwent a re-excision, and had a positive margin. Ultimately, she underwent a right mastectomy with clear margins. She began radiation treatment with Dr. Bill Chauhan, and completed radiation therapy on 12/04/2015. Pathologic stage was III-A, ypT3 N1a MX. Of concern, she had what could be considered an RCB3 tumor.  She started adjuvant letrozole on 1/28/16.     4/6/2022 Josefina Verma developed a fever during Reclast.  She was feeling unwell and went to the ED.  CT CAP showed new involvement of abdominal lymph nodes and multiple bone metastases.  She was admitted and I saw her in the hospital 4-7-22 to discuss the plan for biopsy.  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  A mix of TNBC and ER- HER2+.  Clinic conference consensus was for the modified Vandana with paclitaxel weekly to treat both clones.                  TREATMENT HISTORY:  A.  Neoadjuvant therapy on I SPY-2 with ganetespib and paclitaxel.  AC x 2 complicated by PCP pneumonia.    B.  Right lumpectomy, followed by right margin resections, followed by rigth mastectomy.   B.  Oophorectomy 10-16-15.   C.  Tamoxifen after 10-6-15.   D.  Radiation therapy. 5,040 cGy in 180 cGy/fraction to the chest wall and regional lymphatics followed by 720 cGy axillary lymph node boost and 1000 cGy mastectomy scar boost. Completed on 12/3/2015.  E.   Adjuvant letrozole begun 1-28-16.  Plan was to continue through 2026.  F.  Diagnosis with recurrent/metastatic breast cancer with a sacral mass that was biopsied showing pleiomorphic lobular breast cancer that is ER negative, SC negative, HER2 positive by FISH in 20% of the cells and negative in 80% of cells.  A mix of TNBC and ER- HER2+.  Clinic conference consensus was for the modified Vandana with paclitaxel weekly to treat both clones.             INTERVAL HISTORY:    Today, Mary is seen virtually.  She continues to tolerate the regimen very well.  She denies any significant issues with the chemo.  She denies any nausea or vomiting.  Bowels have been moving regularly.  Energy level has been stable.  She does have some occasional numbness and tingling in her fingertips, this is not affecting her activities of daily living.  Not in her toes.  She denies any chest pain or shortness of breath.  No lower extremity swelling. No abdominal pain. She has not had any recent infectious symptoms, including fevers body aches or chills.  She is looking forward to a visit from her best friend of this weekend.    No muscle pain, no joint pain. She thinks that she mowed her lawn prior to PET     REVIEW OF SYSTEMS:    Patient denies the following except if  noted above: fevers, body aches, chills, headaches, vision changes, dizziness, chest pain, shortness of breath, nausea, vomiting, diarrhea, constipation, abdominal pain, rashes, bruising/bleeding, mouth sores, swelling or pain in the legs.         PHYSICAL EXAMINATION:  General: Resting comfortably in no acute distress  Head: Normocephalic, atraumatic   EENT: No scleral icteris, EOMS intact.   Lung: Normal respiratory effort. Speaking fluently without shortness of breath. No audible wheezes or coughing.   Neuro: AAO x3. Moving upper extremities equally and symmetrically   Skin: Warm, dry, intact. No rashes, no suspicious lesions. No petechia or bruising noted on visible skin     The rest of a comprehensive physical examination is deferred due to PHE (public health emergency) video restrictions             LABORATORY DATA:    Most Recent 3 CBC's:  Recent Labs   Lab Test 07/15/22  0657 07/08/22  0656 07/01/22  1104   WBC 8.9 8.7 8.1   HGB 10.9* 11.0* 11.5*   MCV 86 86 85    419 440    Most Recent 3 BMP's:  Recent Labs   Lab Test 07/15/22  0711 07/08/22  0656 07/01/22  1104    143 142   POTASSIUM 4.2 4.3 4.3   CHLORIDE 108 108 106   CO2 27 27 27   BUN 17 22 14   CR 0.80 0.84 0.84   ANIONGAP 7 8 9   VEE 8.5 9.0 8.7   * 139* 119*    Most Recent 2 LFT's:  Recent Labs   Lab Test 07/15/22  0711 07/08/22  0656   AST 22 19   ALT 28 25   ALKPHOS 80 82   BILITOTAL 0.2 0.2      I reviewed the above labs today.    Repeat labs prior to chemo on 7/22           IMAGING:    PET 6/27  IMPRESSION:      1. Diffuse intramuscular uptake in the extremities and trunk, likely  secondary to exertion or diet. This muscular uptake directly competes  with lesional uptake and may lead to underestimation of lesional  uptake. With this possible limitation in mind, there is evidence of  positive response to treatment since 4/27/2022, with diffusely marked  decreased uptake to the skeletal and hepatic lesions. No new  focally  hypermetabolic lesion.     2. Resolved uptake to the palatine tonsils and upper cervical lymph  nodes, which may be resolved inflammatory lesions or now treated  metastases.     GUS PALMA MD        ECHO 7/13  Interpretation Summary  Global and regional left ventricular function is normal with an EF of 60-65%.  Right ventricular function, chamber size, wall motion, and thickness are  normal.  The inferior vena cava is normal.  No pericardial effusion is present.  No significant changes noted.     ASSESSMENT AND PLAN:   1.  Mary Bennett is a 56-year-old woman with a history of a clinical stage II right breast cancer, ER positive, CT positive, HER2 negative by immunohistochemistry.  She had a lobular histology.  She was on the I-SPY 2 clinical trial and was randomized to ganetespib and paclitaxel, then went on to AC.  She had intractable nausea and vomiting the first cycle of AC, and the second cycle of AC was complicated by Pneumocystis pneumonia requiring intubation and a 2-week hospitalization.  She did not complete the AC treatment.  She did undergo a right lumpectomy and sentinel node procedure.  She had a positive margin and underwent resection.  She still had a positive margin.  She underwent a right lumpectomy with clear margins.  She completed radiation in 12/2015.  Pathologic stage was IIIA  [id number] and of concern, she had an RCB3 histology meaning significant risk of recurrence of her breast cancer during the 5-year period following primary therapy.  There were 2 lymph nodes involved with mammary carcinoma with extracapsular extension.  She went on to have radiation therapy and then began hormonal therapy with tamoxifen in October 2015. She had oophorectomy and started letrozole in January 2016.          2. Recurrent Metastatic breast cancer, lobular. New histology pleomorphic lobular cancer, which is negative for estrogen receptor and negative for progesterone receptor, and 20% is HER2  positive by FISH.  - She has a mixture of recurrent metastatic HER2 positive breast cancer that is estrogen receptor negative and 80% triple negative breast cancer.    - Discussed this situation in breast cancer conference on April 22 and the consensus opinion is to treat the minority HER2+ component first with Vandana regimen because that treatment would also cover the triple negative (TNBC) component with the taxane part of the treatment. This would push back the use of pembrolizumab to the second line setting or later if the 22C3 staining is positive, but would allow earlier treatment of the HER2 component.     - PET and tumor markers showing response. Plan for repeat PET in August   - If labs are appropriate, ok to proceed with THP on Friday as scheduled   - Had a reaction to perjeta with her first infusion   - With her second, was able to tolerate well with dex, pepcid, benadryl. Continue these premeds  -         3. Psych:  - She feels that she is coping very well currently and does not need any additional support at this time  - Continue Effexor 75mg daily      4. Bone Mets  - Asymptomatic  - Received Reclast 5mg on 4/5/2022, but had a very high fever (104), body aches, nausea, shortness of breath, tachycardia- requiring admission. Understandably does not want to try this medication again.   - Currently getting xgeva monthly      5. Neuropathy, grade 1, fingertips  - Continue to monitor closely      Patient will call in the interim with any questions or concerns. They voice understanding and agree with the above plan     Sandra Coughlin PA-C

## 2022-07-22 NOTE — PROGRESS NOTES
Infusion Nursing Note:  Josefina Bennett presents today for Cycle 5 Day 1 Pertuzumab, Trastuzumab and Paclitaxel.  Patient seen by provider today: No   present during visit today: Not Applicable.    Note: Pt presents to infusion pleasantly surprised about how well she has been feeling. She has mild, intermittent numbness on the tips of her fingers - which is at baseline. She denies fevers/chills, SOB, chest pain, nausea/vomiting/diarrhea or other further concerns today.    Creatinine trending upward - 1.01 today. Pt admits she could be hydrating more, and has made it a goal to push fluids going forward.    Intravenous Access:  Implanted Port.    Treatment Conditions:  Lab Results   Component Value Date    HGB 10.5 (L) 07/22/2022    WBC 8.0 07/22/2022    ANEU 4.2 01/23/2020    ANEUTAUTO 3.9 07/22/2022     07/22/2022      Lab Results   Component Value Date     07/22/2022    POTASSIUM 4.4 07/22/2022    MAG 1.8 06/27/2015    CR 1.01 07/22/2022    VEE 8.9 07/22/2022    BILITOTAL 0.2 07/22/2022    ALBUMIN 3.3 (L) 07/22/2022    ALT 30 07/22/2022    AST 24 07/22/2022     Results reviewed, labs MET treatment parameters, ok to proceed with treatment.  ECHO/MUGA completed 7/13/22  EF 60-65%.    Post Infusion Assessment:  Patient tolerated infusion without incident.  Blood return noted pre and post infusion.  Site patent and intact, free from redness, edema or discomfort.  No evidence of extravasations.  Access discontinued per protocol.     Discharge Plan:   Patient declined prescription refills.  Discharge instructions reviewed with: Patient.  Patient and/or family verbalized understanding of discharge instructions and all questions answered.  AVS to patient via [employer]. Patient will return 7/29/22 for next appointment.   Patient discharged in stable condition accompanied by: self.  Departure Mode: Ambulatory.      Sena Ramirez RN

## 2022-07-22 NOTE — NURSING NOTE
Chief Complaint   Patient presents with     Port Draw     Labs drawn via port by RN in lab. VS taken.      Port accessed with 20 gauge 3/4 inch flat needle and labs drawn by RN.  Port flushed with saline and heparin.  Pt tolerated well.  VS taken.  Pt checked in for next appt.    Lesley Bhatia RN

## 2022-07-22 NOTE — PATIENT INSTRUCTIONS
[facility] Triage and after hours / weekends / holidays:  [phone]    Please call the triage or after hours line if you experience a temperature greater than or equal to 100.4, shaking chills, have uncontrolled nausea, vomiting and/or diarrhea, dizziness, shortness of breath, chest pain, bleeding, unexplained bruising, or if you have any other new/concerning symptoms, questions or concerns.      If you are having any concerning symptoms or wish to speak to a provider before your next infusion visit, please call your care coordinator or triage to notify them so we can adequately serve you.     If you need a refill on a narcotic prescription or other medication, please call before your infusion appointment.                July 2022 Sunday Monday Tuesday Wednesday Thursday Friday Saturday 1    LAB CENTRAL  11:30 AM   (15 min.)   [facility]   [facility]    ONC INFUSION 4 HR (240 MIN)  12:00 PM   (240 min.)    ONC INFUSION NURSE   [facility] 2       3     4     5    ECHO COMPLETE   3:30 PM   (60 min.)   [id number]   [facility] 6     7     8    LAB CENTRAL   6:30 AM   (15 min.)   [facility]   [facility]    ONC INFUSION 2 HR (120 MIN)   7:00 AM   (120 min.)    ONC INFUSION NURSE   [facility] 9       10     11     12     13    ECHO COMPLETE   8:00 AM   (60 min.)   [email or web address]   [facility] 14     15    LAB CENTRAL   6:30 AM   (15 min.)   [facility]   [facility]    ONC INFUSION 2 HR (120 MIN)   7:00 AM   (120 min.)    ONC INFUSION NURSE   [facility] 16       17     18     19     20    VIDEO VISIT RETURN  12:45 PM   (45 min.)   Sandra Coughlin PA-C   [facility] 21     22    LAB CENTRAL   7:00 AM   (15  min.)   [facility]   [facility]    ONC INFUSION 4 HR (240 MIN)   7:30 AM   (240 min.)   [facility] INFUSION NURSE   [facility] 23       24     25     26     27     28     29    LAB CENTRAL   6:30 AM   (15 min.)    [facility]   [facility]    ONC INFUSION 2 HR (120 MIN)   7:30 AM   (120 min.)    ONC INFUSION NURSE   [facility] 30       31 August 2022 Sunday Monday Tuesday Wednesday Thursday Friday Saturday        1     2     3     4     5    ONC INFUSION 2 HR (120 MIN)   7:00 AM   (120 min.)    ONC INFUSION NURSE   [facility] 6       7     8     9     10    VIDEO VISIT RETURN   4:45 PM   (45 min.)   Sandra Coughlin PA-C   [facility] 11     12    ONC INFUSION 4 HR (240 MIN)  11:30 AM   (240 min.)    ONC INFUSION NURSE   [facility] 13       14     15     16     17     18     19    ONC INFUSION 2 HR (120 MIN)   7:30 AM   (120 min.)    ONC INFUSION NURSE   [facility] 20       21     22     23     24     25     26    ONC INFUSION 2 HR (120 MIN)   7:30 AM   (120 min.)    ONC INFUSION NURSE   [facility] 27       28     29    PET ONCOLOGY WHOLE BODY   7:30 AM   (30 min.)   UUPET1   [facility] 30    LAB CENTRAL   6:45 AM   (15 min.)    [facility]   [facility]    RETURN   7:15 AM   (30 min.)   Evangelista Meza MD   [facility] 31                                      Lab Results:  Recent Results (from the past 12 hour(s))   Comprehensive metabolic panel    Collection Time: 07/22/22  7:19 AM   Result Value Ref Range    Sodium 143 133 - 144 mmol/L    Potassium 4.4 3.4 - 5.3 mmol/L    Chloride 108 94  - 109 mmol/L    Carbon Dioxide (CO2) 26 20 - 32 mmol/L    Anion Gap 9 3 - 14 mmol/L    Urea Nitrogen 22 7 - 30 mg/dL    Creatinine 1.01 0.52 - 1.04 mg/dL    Calcium 8.9 8.5 - 10.1 mg/dL    Glucose 159 (H) 70 - 99 mg/dL    Alkaline Phosphatase 73 40 - 150 U/L    AST 24 0 - 45 U/L    ALT 30 0 - 50 U/L    Protein Total 6.4 (L) 6.8 - 8.8 g/dL    Albumin 3.3 (L) 3.4 - 5.0 g/dL    Bilirubin Total 0.2 0.2 - 1.3 mg/dL    GFR Estimate 65 >60 mL/min/1.73m2   CBC with platelets and differential    Collection Time: 07/22/22  7:19 AM   Result Value Ref Range    WBC Count 8.0 4.0 - 11.0 10e3/uL    RBC Count 3.84 3.80 - 5.20 10e6/uL    Hemoglobin 10.5 (L) 11.7 - 15.7 g/dL    Hematocrit 33.0 (L) 35.0 - 47.0 %    MCV 86 78 - 100 fL    MCH 27.3 26.5 - 33.0 pg    MCHC 31.8 31.5 - 36.5 g/dL    RDW 18.5 (H) 10.0 - 15.0 %    Platelet Count 421 150 - 450 10e3/uL    % Neutrophils 49 %    % Lymphocytes 40 %    % Monocytes 7 %    % Eosinophils 3 %    % Basophils 1 %    % Immature Granulocytes 0 %    NRBCs per 100 WBC 0 <1 /100    Absolute Neutrophils 3.9 1.6 - 8.3 10e3/uL    Absolute Lymphocytes 3.2 0.8 - 5.3 10e3/uL    Absolute Monocytes 0.5 0.0 - 1.3 10e3/uL    Absolute Eosinophils 0.2 0.0 - 0.7 10e3/uL    Absolute Basophils 0.1 0.0 - 0.2 10e3/uL    Absolute Immature Granulocytes 0.0 <=0.4 10e3/uL    Absolute NRBCs 0.0 10e3/uL

## 2022-07-29 NOTE — NURSING NOTE
Chief Complaint   Patient presents with     Port Draw     Power needle. Heparin locked,vitals checked     Alisa Brand RN on 7/29/2022 at 7:32 AM

## 2022-07-29 NOTE — PROGRESS NOTES
Infusion Nursing Note:  Josefina Bennett presents today for C5D8 Taxol.    Patient seen by provider today: No   present during visit today: Not Applicable.    Note: Patient reports bilateral fingers neuropathy not worsening. Denies nausea/vomiting. No new complaints made. BP still at 150's/80's, denies dizziness and light headedness. Patient states that it is always at that range when she is here. Instructed patient if symptoms occur to call triage. Verbalized understanding.     Intravenous Access:  Implanted Port.    Treatment Conditions:  Lab Results   Component Value Date    HGB 10.9 (L) 07/29/2022    WBC 9.2 07/29/2022    ANEU 4.2 01/23/2020    ANEUTAUTO 5.3 07/29/2022     07/29/2022      Lab Results   Component Value Date     07/29/2022    POTASSIUM 4.2 07/29/2022    MAG 1.8 06/27/2015    CR 0.90 07/29/2022    VEE 8.2 (L) 07/29/2022    BILITOTAL 0.2 07/29/2022    ALBUMIN 3.3 (L) 07/29/2022    ALT 27 07/29/2022    AST 22 07/29/2022     Results reviewed, labs MET treatment parameters, ok to proceed with treatment.    Post Infusion Assessment:  Patient tolerated infusion without incident.  Blood return noted pre and post infusion.  Site patent and intact, free from redness, edema or discomfort.  No evidence of extravasations.  Access discontinued per protocol.     Discharge Plan:   Patient declined prescription refills.  Discharge instructions reviewed with: Patient.  Patient and/or family verbalized understanding of discharge instructions and all questions answered.  AVS to patient via [employer].  Patient will return 8/5/22 for next appointment.   Patient discharged in stable condition accompanied by: self.  Departure Mode: Ambulatory.      YOLANDA SANCHEZ RN

## 2022-07-29 NOTE — PATIENT INSTRUCTIONS
Contact Numbers  [facility]: [phone]    After Hours:  [phone]  Triage: [phone]    Please call the [facility] Triage line if you experience a temperature greater than or equal to 100.5, shaking chills, have uncontrolled nausea, vomiting and/or diarrhea, dizziness, shortness of breath, chest pain, bleeding, unexplained bruising, or if you have any other new/concerning symptoms, questions or concerns.     If it is after hours, weekends, or holidays, please call the main hospital  at  [phone] and ask to speak to the Oncology doctor on call.     If you are having any concerning symptoms or wish to speak to a provider before your next infusion visit, please call your care coordinator or triage to notify them so we can adequately serve you.     If you need a refill on a narcotic prescription or other medication, please call triage before your infusion appointment.       July 2022 Sunday Monday Tuesday Wednesday Thursday Friday Saturday 1    LAB CENTRAL  11:30 AM   (15 min.)   [facility]   [facility]    ONC INFUSION 4 HR (240 MIN)  12:00 PM   (240 min.)    ONC INFUSION NURSE   [facility] 2       3     4     5    ECHO COMPLETE   3:30 PM   (60 min.)   [id number]   [facility] 6     7     8    LAB CENTRAL   6:30 AM   (15 min.)   [facility]   [facility]    ONC INFUSION 2 HR (120 MIN)   7:00 AM   (120 min.)   [facility] ONC INFUSION NURSE   [facility] 9       10     11     12     13    ECHO COMPLETE   8:00 AM   (60 min.)   UCECHCR2   [facility] 14     15    LAB CENTRAL   6:30 AM   (15 min.)   [facility]   [facility]    ONC INFUSION 2 HR (120 MIN)   7:00 AM   (120 min.)    ONC INFUSION NURSE   [facility] 16       17      18     19     20    VIDEO VISIT RETURN  12:45 PM   (45 min.)   Sandra Coughlin PA-C   [facility] 21     22    LAB CENTRAL   7:00 AM   (15 min.)   [facility]   [facility]    ONC INFUSION 4 HR (240 MIN)   7:30 AM   (240 min.)   [facility] ONC INFUSION NURSE   [facility] 23       24     25     26     27     28     29    LAB CENTRAL   6:30 AM   (15 min.)   [facility]   [facility]    ONC INFUSION 2 HR (120 MIN)   7:30 AM   (120 min.)   [facility] ONC INFUSION NURSE   [facility] 30       31 August 2022 Sunday Monday Tuesday Wednesday Thursday Friday Saturday        1     2     3     4     5    LAB CENTRAL   6:30 AM   (15 min.)   [facility]   [facility]    ONC INFUSION 2 HR (120 MIN)   7:00 AM   (120 min.)   [facility] ONC INFUSION NURSE   [facility] 6       7     8     9     10    VIDEO VISIT RETURN   4:45 PM   (45 min.)   Sandra Coughlin PA-C   [facility] 11     12    LAB CENTRAL  11:00 AM   (15 min.)   [facility]   [facility]    ONC INFUSION 4 HR (240 MIN)  11:30 AM   (240 min.)   [facility] ONC INFUSION NURSE   [facility] 13       14     15     16     17     18     19    LAB CENTRAL   7:00 AM   (15 min.)   [facility]   [facility]    ONC INFUSION 2 HR (120 MIN)   7:30 AM   (120 min.)   [facility] ONC INFUSION NURSE   [facility] 20       21     22     23     24     25     26    LAB CENTRAL   7:00 AM   (15 min.)   [facility]   [facility]    ONC INFUSION 2 HR (120 MIN)   7:30 AM   (120 min.)   [facility] ONC INFUSION NURSE   [facility] 27        28     29    PET ONCOLOGY WHOLE BODY   7:30 AM   (30 min.)   UUPET1   [facility] 30    LAB CENTRAL   6:45 AM   (15 min.)    [facility]   [facility]    RETURN   7:15 AM   (30 min.)   Evangelista Meza MD   [facility] 31                                      Lab Results:  Recent Results (from the past 12 hour(s))   CBC with platelets and differential    Collection Time: 07/29/22  7:28 AM   Result Value Ref Range    WBC Count 9.2 4.0 - 11.0 10e3/uL    RBC Count 4.01 3.80 - 5.20 10e6/uL    Hemoglobin 10.9 (L) 11.7 - 15.7 g/dL    Hematocrit 33.9 (L) 35.0 - 47.0 %    MCV 85 78 - 100 fL    MCH 27.2 26.5 - 33.0 pg    MCHC 32.2 31.5 - 36.5 g/dL    RDW 18.6 (H) 10.0 - 15.0 %    Platelet Count 415 150 - 450 10e3/uL    % Neutrophils 56 %    % Lymphocytes 32 %    % Monocytes 8 %    % Eosinophils 2 %    % Basophils 1 %    % Immature Granulocytes 1 %    NRBCs per 100 WBC 0 <1 /100    Absolute Neutrophils 5.3 1.6 - 8.3 10e3/uL    Absolute Lymphocytes 2.9 0.8 - 5.3 10e3/uL    Absolute Monocytes 0.7 0.0 - 1.3 10e3/uL    Absolute Eosinophils 0.2 0.0 - 0.7 10e3/uL    Absolute Basophils 0.1 0.0 - 0.2 10e3/uL    Absolute Immature Granulocytes 0.1 <=0.4 10e3/uL    Absolute NRBCs 0.0 10e3/uL   COMPREHENSIVE METABOLIC PANEL    Collection Time: 07/29/22  7:29 AM   Result Value Ref Range    Sodium 144 133 - 144 mmol/L    Potassium 4.2 3.4 - 5.3 mmol/L    Chloride 108 94 - 109 mmol/L    Carbon Dioxide (CO2) 26 20 - 32 mmol/L    Anion Gap 10 3 - 14 mmol/L    Urea Nitrogen 14 7 - 30 mg/dL    Creatinine 0.90 0.52 - 1.04 mg/dL    Calcium 8.2 (L) 8.5 - 10.1 mg/dL    Glucose 124 (H) 70 - 99 mg/dL    Alkaline Phosphatase 71 40 - 150 U/L    AST 22 0 - 45 U/L    ALT 27 0 - 50 U/L    Protein Total 6.4 (L) 6.8 - 8.8 g/dL    Albumin 3.3 (L) 3.4 - 5.0 g/dL    Bilirubin Total 0.2 0.2 - 1.3 mg/dL    GFR Estimate 75 >60 mL/min/1.73m2

## 2022-08-05 NOTE — PROGRESS NOTES
Infusion Nursing Note:  Josefina Bennett presents today for Cycle 5 Day 15 Taxol.    Patient seen by provider today: No   present during visit today: Not Applicable.    Note: See doc flow sheet for assessment.  Offers no new complaints.  Her bilateral finger tip, bilat toes and left thumb neuropathy are unchanged since her last provider visit.    Intravenous Access:  Implanted Port.    Treatment Conditions:  Lab Results   Component Value Date    HGB 10.9 (L) 08/05/2022    WBC 9.0 08/05/2022    ANEU 4.2 01/23/2020    ANEUTAUTO 5.2 08/05/2022     08/05/2022      Results reviewed, labs MET treatment parameters, ok to proceed with treatment.    Post Infusion Assessment:  Patient tolerated infusion without incident.  Blood return noted pre and post infusion.  Site patent and intact, free from redness, edema or discomfort.  No evidence of extravasations.  Access discontinued per protocol.     Discharge Plan:   Patient declined prescription refills.  AVS to patient via [employer].  Patient will return 8/10/22 EDIL, 8/12/22 labs/chemo for next appointment.   Patient discharged in stable condition accompanied by: self.  Departure Mode: Ambulatory.  Face to Face time: 0.      Kaylene Knight RN

## 2022-08-05 NOTE — PATIENT INSTRUCTIONS
[facility] Main Line: [phone]    Call triage nurse with chills and/or temperature greater than or equal to 100.4, uncontrolled nausea/vomiting, diarrhea, constipation, dizziness, shortness of breath, chest pain, bleeding, unexplained bruising, or any new/concerning symptoms, questions/concerns.   If you are having any concerning symptoms or wish to speak to a provider before your next infusion visit, please call your care coordinator or triage to notify them so we can adequately serve you.   Triage Nurse Line: [phone]    If after hours, weekends, or holidays [phone]

## 2022-08-10 NOTE — PROGRESS NOTES
Mary is a 56 year old who is being evaluated via a billable video visit.      How would you like to obtain your AVS? Mail a copy  If the video visit is dropped, the invitation should be resent by: Text to cell phone: [phone]  Will anyone else be joining your video visit? No      Josephine CARBALLO    Video-Visit Details    Video Start Time: 4:55 PM    Type of service:  Video Visit    Video End Time:5:10 PM    Originating Location (pt. Location): Home    Distant Location (provider location):  [facility]     Platform used for Video Visit: Zak Hernandez is a 56 year old who is being evaluated via a billable video visit.      How would you like to obtain your AVS? MyChart  If the video visit is dropped, the invitation should be resent by: Text to cell phone: [phone]  Will anyone else be joining your video visit? No        REASON FOR VISIT: Follow-up breast cancer     HISTORY OF PRESENT ILLNESS:  Josefina Bennett was self referred to our clinic for clinical trial recommendations for newly diagnosed stage II breast cancer. She had her last mammogram approximately a year prior. She did notice in early January 2015 that she was having some distortion of the right breast, and she went to see her gynecologist and had a mammogram performed. She had a screening mammogram performed on 01/15/2015. Breast tissue was heterogeneously dense, which might compromise the mammography. There was architectural distortion in the right breast approximately 11-12 o'clock position, zone 2, posterior depth, and a CC MLO spot compression was performed and an ultrasound was planned. The diagnostic mammogram from 01/28/2015 showed right breast architectural distortion centered at the 12 o'clock position, zone 2, suspicious for neoplasm. Breast tomosynthesis was used in the interpretation. Ultrasound findings included targeted ultrasound of the right upper breast demonstrating a band-like area of  abnormal echogenicity between 11 and 12 o'clock position in the right breast at zone 2. This measures 4 cm transversely x 1.1 cm AP, and there was only a 1.5 cm measurement in the radial direction. There was blood flow suspicious for neoplasm at the 11 o'clock position in zone 2. There is a 1.4 cm hypoechoic nodule slightly  from the larger area of altered echogenicity that is also suspicious. A biopsy was performed. The biopsy showed infiltrating lobular carcinoma, grade 2, and a clip was placed at specimen [id number]. There were a few signet cells present. The tumor was ER-positive, MD low positive, and HER2 was negative by immunohistochemistry. She subsequently underwent an MRI showing a tumor that was 4.8 x 1.7 x 3.2 cm in size. Overall, her clinical stage was T2 NX MX.       She was enrolled in the I-SPY-2 clinical trial, and qualified with high-risk MammaPrint score. She was randomized to ganetespib and paclitaxel, and she was treated with 12 cycles of treatment, and then started on AC on 05/26/2015. She developed intractable nausea and vomiting after the first cycle, which required hospitalization on the second cycle. She then developed Pneumocystis pneumonia, which resulted in intubation and a 2-week hospitalization during the course of AC. She was discharged on 07/02/2015, and decided she did not want to pursue any further chemotherapy. She had a right lumpectomy and sentinel lymph node procedure 08/12/2015. She had a positive margin, underwent a re-excision, and had a positive margin. Ultimately, she underwent a right mastectomy with clear margins. She began radiation treatment with Dr. Bill Chauhan, and completed radiation therapy on 12/04/2015. Pathologic stage was III-A, ypT3 N1a MX. Of concern, she had what could be considered an RCB3 tumor.  She started adjuvant letrozole on 1/28/16.     4/6/2022 Josefina Verma developed a fever during Reclast.  She was feeling unwell and went to the ED.  CT CAP  showed new involvement of abdominal lymph nodes and multiple bone metastases.  She was admitted and I saw her in the hospital 4-7-22 to discuss the plan for biopsy.  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  A mix of TNBC and ER- HER2+.  Clinic conference consensus was for the modified Vandana with paclitaxel weekly to treat both clones.                  TREATMENT HISTORY:  A.  Neoadjuvant therapy on I SPY-2 with ganetespib and paclitaxel.  AC x 2 complicated by PCP pneumonia.    B.  Right lumpectomy, followed by right margin resections, followed by rigth mastectomy.   B.  Oophorectomy 10-16-15.   C.  Tamoxifen after 10-6-15.   D.  Radiation therapy. 5,040 cGy in 180 cGy/fraction to the chest wall and regional lymphatics followed by 720 cGy axillary lymph node boost and 1000 cGy mastectomy scar boost. Completed on 12/3/2015.  E.   Adjuvant letrozole begun 1-28-16.  Plan was to continue through 2026.  F.  Diagnosis with recurrent/metastatic breast cancer with a sacral mass that was biopsied showing pleiomorphic lobular breast cancer that is ER negative, IN negative, HER2 positive by FISH in 20% of the cells and negative in 80% of cells.  A mix of TNBC and ER- HER2+.  Clinic conference consensus was for the modified Vandana with paclitaxel weekly to treat both clones.             INTERVAL HISTORY:    Today, Mary is seen virtually.    She continues to feel well.  The numbness and tingling in her fingers is ongoing, just at the tips.  She does not feel that it is affecting her activities of daily living.  She would like to continue with the same dose, and will let us know if it gets any worse.  She does not feel its gotten worse over the last 2 cycles.  She does have a nail lifting up off the bed on her phone, and is not having any discharge or redness.  She denies any fevers body  aches or chills.  No chest pain or shortness of breath.  No lower extremity swelling.  Continues to feel overall very great.       REVIEW OF SYSTEMS:    Patient denies the following except if noted above: fevers, body aches, chills, headaches, vision changes, dizziness, chest pain, shortness of breath, nausea, vomiting, diarrhea, constipation, abdominal pain, rashes, bruising/bleeding, mouth sores, swelling or pain in the legs.         PHYSICAL EXAMINATION:  General: Resting comfortably in no acute distress  Head: Normocephalic, atraumatic   EENT: No scleral icteris, EOMS intact.   Lung: Normal respiratory effort. Speaking fluently without shortness of breath. No audible wheezes or coughing.   Neuro: AAO x3. Moving upper extremities equally and symmetrically   Skin: Warm, dry, intact. No rashes, no suspicious lesions. No petechia or bruising noted on visible skin     The rest of a comprehensive physical examination is deferred due to PHE (public health emergency) video restrictions             LABORATORY DATA:    Most Recent 3 CBC's:  Recent Labs   Lab Test 08/05/22  0646 07/29/22  0728 07/22/22  0719   WBC 9.0 9.2 8.0   HGB 10.9* 10.9* 10.5*   MCV 85 85 86    415 421    Most Recent 3 BMP's:  Recent Labs   Lab Test 07/29/22  0729 07/22/22  0719 07/15/22  0711    143 142   POTASSIUM 4.2 4.4 4.2   CHLORIDE 108 108 108   CO2 26 26 27   BUN 14 22 17   CR 0.90 1.01 0.80   ANIONGAP 10 9 7   VEE 8.2* 8.9 8.5   * 159* 138*    Most Recent 2 LFT's:  Recent Labs   Lab Test 07/29/22  0729 07/22/22  0719   AST 22 24   ALT 27 30   ALKPHOS 71 73   BILITOTAL 0.2 0.2      I reviewed the above labs today.    Repeat labs prior to chemo on 8/12          ECHO 7/13  Interpretation Summary  Global and regional left ventricular function is normal with an EF of 60-65%.  Right ventricular function, chamber size, wall motion, and thickness are  normal.  The inferior vena cava is normal.  No pericardial effusion is  present.  No significant changes noted.     ASSESSMENT AND PLAN:   1.  Mary Bennett is a 56-year-old woman with a history of a clinical stage II right breast cancer, ER positive, ME positive, HER2 negative by immunohistochemistry.  She had a lobular histology.  She was on the I-SPY 2 clinical trial and was randomized to ganetespib and paclitaxel, then went on to AC.  She had intractable nausea and vomiting the first cycle of AC, and the second cycle of AC was complicated by Pneumocystis pneumonia requiring intubation and a 2-week hospitalization.  She did not complete the AC treatment.  She did undergo a right lumpectomy and sentinel node procedure.  She had a positive margin and underwent resection.  She still had a positive margin.  She underwent a right lumpectomy with clear margins.  She completed radiation in 12/2015.  Pathologic stage was IIIA  [id number] and of concern, she had an RCB3 histology meaning significant risk of recurrence of her breast cancer during the 5-year period following primary therapy.  There were 2 lymph nodes involved with mammary carcinoma with extracapsular extension.  She went on to have radiation therapy and then began hormonal therapy with tamoxifen in October 2015. She had oophorectomy and started letrozole in January 2016.          2. Recurrent Metastatic breast cancer, lobular. New histology pleomorphic lobular cancer, which is negative for estrogen receptor and negative for progesterone receptor, and 20% is HER2 positive by FISH.  - She has a mixture of recurrent metastatic HER2 positive breast cancer that is estrogen receptor negative and 80% triple negative breast cancer.    - Discussed this situation in breast cancer conference on April 22 and the consensus opinion is to treat the minority HER2+ component first with Vandana regimen because that treatment would also cover the triple negative (TNBC) component with the taxane part of the treatment. This would push back the  use of pembrolizumab to the second line setting or later if the 22C3 staining is positive, but would allow earlier treatment of the HER2 component.     - PET and tumor markers showing response. Plan for repeat PET in August. Repeat tumor markers tomorrow   - If labs are appropriate, ok to proceed with THP on Friday as scheduled   - Had a reaction to perjeta with her first infusion   - With her second, was able to tolerate well with dex, pepcid, benadryl. Continue these premeds  - ECHO due in October           3. Psych:  - She feels that she is coping very well currently and does not need any additional support at this time  - Continue Effexor 75mg daily      4. Bone Mets  - Asymptomatic  - Received Reclast 5mg on 4/5/2022, but had a very high fever (104), body aches, nausea, shortness of breath, tachycardia- requiring admission. Understandably does not want to try this medication again.   - Currently getting xgeva monthly   - Due on 8/12, no dental concerns     5. Neuropathy, grade 1, fingertips  - Continue to monitor closely   - If worsens, discussed dose reduction to 60mg/m2    6. Nail changes 2/2 Taxol  - Start tea tree oil soaks      Patient will call in the interim with any questions or concerns. They voice understanding and agree with the above plan     Sandra Coughlin PA-C

## 2022-08-12 NOTE — PATIENT INSTRUCTIONS
[facility] Triage and after hours / weekends / holidays:  [phone]    Please call the triage or after hours line if you experience a temperature greater than or equal to 100.4, shaking chills, have uncontrolled nausea, vomiting and/or diarrhea, dizziness, shortness of breath, chest pain, bleeding, unexplained bruising, or if you have any other new/concerning symptoms, questions or concerns.      If you are having any concerning symptoms or wish to speak to a provider before your next infusion visit, please call your care coordinator or triage to notify them so we can adequately serve you.     If you need a refill on a narcotic prescription or other medication, please call before your infusion appointment.                August 2022 Sunday Monday Tuesday Wednesday Thursday Friday Saturday        1     2     3     4     5    LAB CENTRAL   6:30 AM   (15 min.)   [facility]   [facility]    ONC INFUSION 2 HR (120 MIN)   7:00 AM   (120 min.)    ONC INFUSION NURSE   [facility] 6       7     8     9     10    VIDEO VISIT RETURN   4:45 PM   (45 min.)   Sandra Coughlin PA-C   [facility] 11     12    LAB CENTRAL  11:00 AM   (15 min.)   [facility]   [facility]    ONC INFUSION 4 HR (240 MIN)  11:30 AM   (240 min.)    ONC INFUSION NURSE   [facility] 13       14     15     16     17     18     19    LAB CENTRAL   7:00 AM   (15 min.)   [facility]   [facility]    ONC INFUSION 2 HR (120 MIN)   7:30 AM   (120 min.)    ONC INFUSION NURSE   [facility] 20       21     22     23     24     25     26    LAB CENTRAL   7:00 AM   (15 min.)   [facility]   [facility]    ONC INFUSION 2 HR (120 MIN)   7:30 AM   (120 min.)   [facility] INFUSION NURSE   [facility]  [facility] 27       28     29    PET ONCOLOGY WHOLE BODY   7:30 AM   (30 min.)   UUPET1   [facility] 30    LAB CENTRAL   6:45 AM   (15 min.)   [facility]   [facility]    RETURN   7:15 AM   (30 min.)   Evangelista Meza MD   [facility] 31 September 2022 Sunday Monday Tuesday Wednesday Thursday Friday Saturday                       1     2     3       4     5     6     7     8     9    ONC INFUSION 0.5 HR (30 MIN)   8:30 AM   (30 min.)    ONC INFUSION NURSE   [facility] 10       11     12     13     14     15     16     17       18     19     20     21     22     23     24       25     26     27     28     29     30                            Lab Results:  Recent Results (from the past 12 hour(s))   Comprehensive metabolic panel    Collection Time: 08/12/22 11:18 AM   Result Value Ref Range    Sodium 140 133 - 144 mmol/L    Potassium 4.0 3.4 - 5.3 mmol/L    Chloride 107 94 - 109 mmol/L    Carbon Dioxide (CO2) 26 20 - 32 mmol/L    Anion Gap 7 3 - 14 mmol/L    Urea Nitrogen 19 7 - 30 mg/dL    Creatinine 0.83 0.52 - 1.04 mg/dL    Calcium 8.5 8.5 - 10.1 mg/dL    Glucose 174 (H) 70 - 99 mg/dL    Alkaline Phosphatase 76 40 - 150 U/L    AST 25 0 - 45 U/L    ALT 31 0 - 50 U/L    Protein Total 6.5 (L) 6.8 - 8.8 g/dL    Albumin 3.2 (L) 3.4 - 5.0 g/dL    Bilirubin Total 0.3 0.2 - 1.3 mg/dL    GFR Estimate 82 >60 mL/min/1.73m2   [facility];  (Laboratory Miscellaneous Order)    Collection Time: 08/12/22 11:18 AM   Result Value Ref Range    See Scanned Result       Specimen received. Reordered and sent to performing laboratory. Report to follow up on completion.     Performing Laboratory [facility]     Test Name Breast Carcinoma-Associated Antigen,Serum     Test Code     CBC with platelets and differential     Collection Time: 08/12/22 11:18 AM   Result Value Ref Range    WBC Count 8.3 4.0 - 11.0 10e3/uL    RBC Count 3.97 3.80 - 5.20 10e6/uL    Hemoglobin 10.8 (L) 11.7 - 15.7 g/dL    Hematocrit 33.8 (L) 35.0 - 47.0 %    MCV 85 78 - 100 fL    MCH 27.2 26.5 - 33.0 pg    MCHC 32.0 31.5 - 36.5 g/dL    RDW 18.4 (H) 10.0 - 15.0 %    Platelet Count 437 150 - 450 10e3/uL    % Neutrophils 61 %    % Lymphocytes 29 %    % Monocytes 6 %    % Eosinophils 2 %    % Basophils 1 %    % Immature Granulocytes 1 %    NRBCs per 100 WBC 0 <1 /100    Absolute Neutrophils 5.1 1.6 - 8.3 10e3/uL    Absolute Lymphocytes 2.4 0.8 - 5.3 10e3/uL    Absolute Monocytes 0.5 0.0 - 1.3 10e3/uL    Absolute Eosinophils 0.2 0.0 - 0.7 10e3/uL    Absolute Basophils 0.1 0.0 - 0.2 10e3/uL    Absolute Immature Granulocytes 0.1 <=0.4 10e3/uL    Absolute NRBCs 0.0 10e3/uL

## 2022-08-12 NOTE — PROGRESS NOTES
Infusion Nursing Note:  Josefina Bennett presents today for Cycle 6 Day 1 Pertuzumab, Trastuzumab, and Taxol + Xgeva.    Patient seen by provider today: Viry, Sandra Coughlin 8/10   present during visit today: Not Applicable.    Note: Pt presents to infusion feeling well. She offers no new concerns since her provider visit on 8/10. Her neuropathy continues to be bothersome in her fingertips, but is stable. She is trying tea tree oil on her brittle nails. She denies fevers/chills, SOB, chest pain, nausea or further concerns.     Mary has been hypertensive for the past several infusion visits. She doesn't currently have a BP cuff at home and isn't sure if her BPs are lower at baseline. She plans to pick one up and start monitoring, and will report back. She denies headaches or vision changes.    She is taking Calcium and Vitamin D as prescribed, and we reviewed calcium-rich foods today as her level is at the lower end of normal. She denies any dental concerns and sees her dentist regularly.    Intravenous Access:  Implanted Port.    Treatment Conditions:  Lab Results   Component Value Date    HGB 10.8 (L) 08/12/2022    WBC 8.3 08/12/2022    ANEU 4.2 01/23/2020    ANEUTAUTO 5.1 08/12/2022     08/12/2022      Lab Results   Component Value Date     08/12/2022    POTASSIUM 4.0 08/12/2022    MAG 1.8 06/27/2015    CR 0.83 08/12/2022    VEE 8.5 08/12/2022    BILITOTAL 0.3 08/12/2022    ALBUMIN 3.2 (L) 08/12/2022    ALT 31 08/12/2022    AST 25 08/12/2022     Results reviewed, labs MET treatment parameters, ok to proceed with treatment.  ECHO/MUGA completed 6/13/22  EF 60-65%.  Corrected Calcium: 9.14    Post Infusion Assessment:  Patient tolerated infusion without incident.  Patient tolerated Xgeva injection to LEFT back of arm without incident.  Blood return noted pre and post infusion.  Site patent and intact, free from redness, edema or discomfort.  No evidence of extravasations.  Access  discontinued per protocol.     Discharge Plan:   Patient declined prescription refills.  Discharge instructions reviewed with: Patient.  Patient and/or family verbalized understanding of discharge instructions and all questions answered.  AVS to patient via [employer]. Patient will return 8/19/22 for next appointment.   Patient discharged in stable condition accompanied by: self.  Departure Mode: Ambulatory.      Sena Ramirez RN

## 2022-08-12 NOTE — NURSING NOTE
Chief Complaint   Patient presents with     Port Draw     Port accessed with 20g flat needle by RN, labs collected, line flushed with saline and heparin.  Vitals taken. Pt checked in for appointment(s).      Gabi GARCIA RN PHN BSN  BMT/Oncology Lab

## 2022-08-19 NOTE — NURSING NOTE
"Chief Complaint   Patient presents with     Port Draw     Labs drawn from port by rn.  VS taken.     Port accessed with 20 gauge 3/4\" gripper needle and labs drawn by rn.  Port flushed with NS and heparin.  Pt tolerated well.  VS taken.  Pt checked in for next appt.    Renee Cevallos RN      "

## 2022-08-19 NOTE — PATIENT INSTRUCTIONS
Contact Numbers  [facility]: [phone]    After Hours:  [phone]  Triage: [phone]    Please call the [facility] Triage line if you experience a temperature greater than or equal to 100.5, shaking chills, have uncontrolled nausea, vomiting and/or diarrhea, dizziness, shortness of breath, chest pain, bleeding, unexplained bruising, or if you have any other new/concerning symptoms, questions or concerns.     If it is after hours, weekends, or holidays, please call the main hospital  at  [phone] and ask to speak to the Oncology doctor on call.     If you are having any concerning symptoms or wish to speak to a provider before your next infusion visit, please call your care coordinator or triage to notify them so we can adequately serve you.     If you need a refill on a narcotic prescription or other medication, please call triage before your infusion appointment.       August 2022 Sunday Monday Tuesday Wednesday Thursday Friday Saturday        1     2     3     4     5    LAB CENTRAL   6:30 AM   (15 min.)   [facility] LAB DRAW   [facility]    ONC INFUSION 2 HR (120 MIN)   7:00 AM   (120 min.)    ONC INFUSION NURSE   [facility] 6       7     8     9     10    VIDEO VISIT RETURN   4:45 PM   (45 min.)   Sandra Coughlin PA-C   [facility] 11     12    LAB CENTRAL  11:00 AM   (15 min.)   [facility]   [facility]    ONC INFUSION 4 HR (240 MIN)  11:30 AM   (240 min.)   [facility] ONC INFUSION NURSE   [facility] 13       14     15     16     17     18     19    LAB CENTRAL   7:00 AM   (15 min.)   [facility]   [facility]    ONC INFUSION 2 HR (120 MIN)   7:30 AM   (120 min.)    ONC INFUSION NURSE   [facility] 20       21     22     23     24     25     26    LAB CENTRAL    7:00 AM   (15 min.)    [facility]   [facility]    ONC INFUSION 2 HR (120 MIN)   7:30 AM   (120 min.)   [facility] ONC INFUSION NURSE   [facility] 27       28     29    PET ONCOLOGY WHOLE BODY   7:30 AM   (30 min.)   UUPET1   [facility] 30    LAB CENTRAL   6:45 AM   (15 min.)   [facility]   [facility]    RETURN   7:15 AM   (30 min.)   Evangelista Meza MD   [facility] 31 September 2022 Sunday Monday Tuesday Wednesday Thursday Friday Saturday                       1     2     3       4     5     6     7     8     9    ONC INFUSION 0.5 HR (30 MIN)   8:30 AM   (30 min.)   [facility] INFUSION NURSE   [facility] 10       11     12     13     14     15     16     17       18     19     20     21     22     23     24       25     26     27     28     29     30                            Lab Results:  Recent Results (from the past 12 hour(s))   CBC with platelets and differential    Collection Time: 08/19/22  8:51 AM   Result Value Ref Range    WBC Count 8.4 4.0 - 11.0 10e3/uL    RBC Count 3.82 3.80 - 5.20 10e6/uL    Hemoglobin 10.7 (L) 11.7 - 15.7 g/dL    Hematocrit 32.9 (L) 35.0 - 47.0 %    MCV 86 78 - 100 fL    MCH 28.0 26.5 - 33.0 pg    MCHC 32.5 31.5 - 36.5 g/dL    RDW 18.6 (H) 10.0 - 15.0 %    Platelet Count 395 150 - 450 10e3/uL    % Neutrophils 59 %    % Lymphocytes 29 %    % Monocytes 8 %    % Eosinophils 2 %    % Basophils 1 %    % Immature Granulocytes 1 %    NRBCs per 100 WBC 0 <1 /100    Absolute Neutrophils 5.1 1.6 - 8.3 10e3/uL    Absolute Lymphocytes 2.4 0.8 - 5.3 10e3/uL    Absolute Monocytes 0.6 0.0 - 1.3 10e3/uL    Absolute Eosinophils 0.2 0.0 - 0.7 10e3/uL    Absolute Basophils 0.1 0.0 - 0.2 10e3/uL    Absolute Immature Granulocytes 0.1 <=0.4 10e3/uL    Absolute NRBCs 0.0 10e3/uL

## 2022-08-19 NOTE — PROGRESS NOTES
Infusion Nursing Note:  Josefina Bennett presents today for C6D8 Taxol.    Patient seen by provider today: No   present during visit today: Not Applicable.    Note: Patient reports feeling well. Denies fever/chills. Denies nausea/vomiting nor chest and abdominal discomfort. No new complaints made. Otherwise well.    Intravenous Access:  Implanted Port.    Treatment Conditions:  Lab Results   Component Value Date    HGB 10.7 (L) 08/19/2022    WBC 8.4 08/19/2022    ANEU 4.2 01/23/2020    ANEUTAUTO 5.1 08/19/2022     08/19/2022      Lab Results   Component Value Date     08/12/2022    POTASSIUM 4.0 08/12/2022    MAG 1.8 06/27/2015    CR 0.83 08/12/2022    VEE 8.5 08/12/2022    BILITOTAL 0.3 08/12/2022    ALBUMIN 3.2 (L) 08/12/2022    ALT 31 08/12/2022    AST 25 08/12/2022     Results reviewed, labs MET treatment parameters, ok to proceed with treatment.    Post Infusion Assessment:  Patient tolerated infusion without incident.  Blood return noted pre and post infusion.  Site patent and intact, free from redness, edema or discomfort.  No evidence of extravasations.  Access discontinued per protocol.     Discharge Plan:   Patient declined prescription refills.  Discharge instructions reviewed with: Patient.  Patient and/or family verbalized understanding of discharge instructions and all questions answered.  AVS to patient via [employer].  Patient will return 8/26/22 for next appointment.   Patient discharged in stable condition accompanied by: self.  Departure Mode: Ambulatory.      YOLANDA SANCHEZ RN

## 2022-08-26 NOTE — NURSING NOTE
Chief Complaint   Patient presents with     Port Draw     Labs drawn by RN in Lab from Left Chest Port-a-Cath. Line flushed with Saline and Heparin.      Priyanka Garnett RN

## 2022-08-26 NOTE — PROGRESS NOTES
Infusion Nursing Note:  Josefina Bennett presents today for C6D15 Taxol.    Patient seen by provider today: No   present during visit today: Not Applicable.    Note: Josefina Verma presents to infusion today feeling well. Denies any pain, infectious symptoms, or other concerns. Neuropathy in fingertips remains the same.    Intravenous Access:  Implanted Port.    Treatment Conditions:  Lab Results   Component Value Date    HGB 10.5 (L) 08/26/2022    WBC 8.7 08/26/2022    ANEU 4.2 01/23/2020    ANEUTAUTO 5.0 08/26/2022     08/26/2022      Results reviewed, labs MET treatment parameters, ok to proceed with treatment.    Post Infusion Assessment:  Patient tolerated infusion without incident.  Blood return noted pre and post infusion.  Site patent and intact, free from redness, edema or discomfort.  No evidence of extravasations.  Access discontinued per protocol.     Discharge Plan:   Patient declined prescription refills.  Discharge instructions reviewed with: Patient.  Patient and/or family verbalized understanding of discharge instructions and all questions answered.  AVS to patient via [employer].  Patient will return 9/9 for next appointment.   Patient discharged in stable condition accompanied by: self.  Departure Mode: Ambulatory.      Irene Husain RN

## 2022-08-28 NOTE — PROGRESS NOTES
REASON FOR VISIT: Follow-up breast cancer     HISTORY OF PRESENT ILLNESS:  Josefina Bennett was self referred to our clinic for clinical trial recommendations for newly diagnosed stage II breast cancer. She had her last mammogram approximately a year prior. She did notice in early January 2015 that she was having some distortion of the right breast, and she went to see her gynecologist and had a mammogram performed. She had a screening mammogram performed on 01/15/2015. Breast tissue was heterogeneously dense, which might compromise the mammography. There was architectural distortion in the right breast approximately 11-12 o'clock position, zone 2, posterior depth, and a CC MLO spot compression was performed and an ultrasound was planned. The diagnostic mammogram from 01/28/2015 showed right breast architectural distortion centered at the 12 o'clock position, zone 2, suspicious for neoplasm. Breast tomosynthesis was used in the interpretation. Ultrasound findings included targeted ultrasound of the right upper breast demonstrating a band-like area of abnormal echogenicity between 11 and 12 o'clock position in the right breast at zone 2. This measures 4 cm transversely x 1.1 cm AP, and there was only a 1.5 cm measurement in the radial direction. There was blood flow suspicious for neoplasm at the 11 o'clock position in zone 2. There is a 1.4 cm hypoechoic nodule slightly  from the larger area of altered echogenicity that is also suspicious. A biopsy was performed. The biopsy showed infiltrating lobular carcinoma, grade 2, and a clip was placed at specimen [id number]. There were a few signet cells present. The tumor was ER-positive, MN low positive, and HER2 was negative by immunohistochemistry. She subsequently underwent an MRI showing a tumor that was 4.8 x 1.7 x 3.2 cm in size. Overall, her clinical stage was T2 NX MX.       She was enrolled in the I-SPY-2 clinical trial, and qualified with high-risk MammaPrint  score. She was randomized to ganetespib and paclitaxel, and she was treated with 12 cycles of treatment, and then started on AC on 05/26/2015. She developed intractable nausea and vomiting after the first cycle, which required hospitalization on the second cycle. She then developed Pneumocystis pneumonia, which resulted in intubation and a 2-week hospitalization during the course of AC. She was discharged on 07/02/2015, and decided she did not want to pursue any further chemotherapy. She had a right lumpectomy and sentinel lymph node procedure 08/12/2015. She had a positive margin, underwent a re-excision, and had a positive margin. Ultimately, she underwent a right mastectomy with clear margins. She began radiation treatment with Dr. Bill Chauhan, and completed radiation therapy on 12/04/2015. Pathologic stage was III-A, ypT3 N1a MX. Of concern, she had what could be considered an RCB3 tumor.  She started adjuvant letrozole on 1/28/16.     TREATMENT HISTORY:  A.  Neoadjuvant therapy on I SPY-2 with ganetespib and paclitaxel.  AC x 2 complicated by PCP pneumonia.  She had intractable nausea and vomiting the first cycle of AC, and the second cycle of AC was complicated by Pneumocystis pneumonia requiring intubation and a 2-week hospitalization.  She did not complete the AC treatment.  She did undergo a right lumpectomy and sentinel node procedure.  She had a positive margin and underwent resection.  She still had a positive margin.  She underwent a right mastecomy with clear margins.  Pathologic stage was IIIA  [id number] and of concern, she had an RCB3 histology meaning significant risk of recurrence of her breast cancer during the 5-year period following primary therapy.   She completed radiation in 12/2015.      B.  Right lumpectomy, followed by right margin resections, followed by rigth mastectomy.   B.  Oophorectomy 10-16-15.   C.  Tamoxifen after 10-6-15.   D.  Radiation therapy. 5,040 cGy in 180 cGy/fraction  to the chest wall and regional lymphatics followed by 720 cGy axillary lymph node boost and 1000 cGy mastectomy scar boost. Completed on 12/3/2015.  E.   Adjuvant letrozole begun 1-28-16.  Plan was to continue through 2026.  F.  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  A mix of TNBC and ER- HER2+.  Clinic conference consensus was for the modified Vandana with paclitaxel weekly to treat both clones. T-DXd could be a next regimen.  G.  Metastatic carcinoma is HER2 negative (score 1+) by immunohistochemistry.  She is also a candidate for T-DXd.  H.  Recurrent tumor with 22C3 antibody for PD-L1 and hold this in reserve for a Keynote 355 metastatic TNBC approach if the Vandana regimen does not control her metastatic disease.  Keynote 355 could also be an active regimen.       INTERVAL HISTORY:     Josefina Verma returns to clinic today, and her PET/CT scan shows stable disease.  There is response of her bony lesions and no new areas of FDG uptake in the bones or liver.  Overall, she has been feeling quite well.  She has an ECOG 0 performance status and is working full time.  She denies pain, fatigue, depression or anxiety.  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  The plan is to continue the paclitaxel for one more cycle and then begin Perjeta and Herceptin alone.  There is no role for adding in any hormonal therapy because her recurrent disease is hormone-receptor negative. There is a triple-negative component, and we will watch her markers between scans.  I discussed with her that her next PET/CT scan will be 11/21.  She is having dental issues with a left maxillary molar, and we will hold the Xgeva for now and get  a dental consult.     REVIEW OF SYSTEMS:  She is fully active.  She has no fever, chills, cough, chest pain, shortness of breath, nausea, vomiting, constipation, diarrhea, bone pain, back pain or headache.   She does have some mild numbness in her hands.  Otherwise, she has had no fatigue related to the weekly Taxol.  She continues on Perjeta and Herceptin. The remainder of a 10-point review of systems is negative.     She is eating a healthful diet.  She is exercising about 150 minutes a week.  She is taking vitamin D3 and calcium.     PHYSICAL EXAMINATION:  GENERAL:  Josefina Verma appears generally well.    VITALS: /65 (BP Location: Left arm)   Pulse 110   Temp 98.6  F (37  C) (Oral)   Resp 16   Wt 60.6 kg (133 lb 11.2 oz)   LMP 12/18/2014   SpO2 99%   BMI 22.94 kg/m    HEENT:  She has alopecia and is wearing a wig.  In the oropharynx, there is a left maxillary molar, which appears loose with some erythema of the gums.  Otherwise, no other lesions in the oropharynx.  LYMPH:  There is no palpable cervical, supraclavicular, subclavicular or axillary lymphadenopathy.  BREASTS:  Exam was not performed today.  LUNGS:  Clear to percussion and auscultation.  HEART:  Regular rate and rhythm.  S1, S2.  ABDOMEN:  Soft, nontender, without hepatosplenomegaly.  EXTREMITIES:  Without edema.  PSYCHIATRIC:  Mood and affect are normal.     LABORATORY DATA:    CA27.29 is stable.  CEA gradually rising.     IMAGING:   PET CT August 29.  IMPRESSION:   In this patient with history of recurrent metastatic breast cancer  status post multiple therapies and currently on pertuzumab,  trastuzumab, and paclitaxel, there is continued treatment response:     1. No new suspicious lesion. No focal hypermetabolic intrahepatic or  intraosseous lesions relative to adjacent background levels. Diffuse  nonavid sclerotic lesions are stable.     2. Focal FDG uptake centered at the left 1st maxillary molar,  suspected dental infection given  overlying left maxillary probable  odontogenic sinusitis. Consider dentistry referral.     3. Mildly increased FDG uptake involving cervical lymph and left  axillary nodes nodes. The small size favors reactive nodes, versus  possible developing metastases. Attention on follow-up.     I have personally reviewed the examination and initial interpretation  and I agree with the findings.     GUS PALMA MD      ASSESSMENT AND PLAN:   1.  Mary Bennett is a 56-year-old woman with a history of a clinical stage II right breast cancer, ER positive, IL positive, HER2 negative by immunohistochemistry.  She had a lobular histology.  She was on the I-SPY 2 clinical trial and was randomized to ganetespib and paclitaxel, then went on to AC. Pathologic stage was IIIA  [id number] and of concern, she had an RCB3 histology meaning significant risk of recurrence of her breast cancer during the 5-year period following primary therapy.  There were 2 lymph nodes involved with mammary carcinoma with extracapsular extension.  She went on to have radiation therapy and then began hormonal therapy with tamoxifen in October 2015.  She had oophorectomy and started letrozole in January 2016.   2.  Breast conference recommended starting with HER2 directed therapy.  Biphenotypic cancer with majority TNBC component and a HER2 component.  I discussed the 22C3 PD-L1 CPS of 40 while 20% of the tumor is HER2+ by FISH and the overall ASCO  CAP assignment is negative for the bulk tumor with the qualifier that there is a significant HER2 clonal component that would be best treated with HER2 directed therapy.  The Tumor Board consensus was to continue with HER2 directed therapy to treat the new and HER2+ clone till best response and then change over to Keynote 355 with pembrolizumab and chemotherapy.  Pembrolizumab and gem/carbo or pembro and Abraxane could be potential options. We continuing with the recommended regimen of Vandana with every 3  week pertuzumab and trastuzumab but weekly paclitaxel because it is better tolerated than every 3 week docetaxel.  The weekly paclitaxel may be more effective vs the TNBC component.  The regimen will be load of trastuzumab of 8 mg/kg followed by 4 mg/kg every 3 weeks and then load of pertuzumab of 840 mg and 420 every 3 weeks and weekly paclitaxel.  No brain metastases.  3.  Treatment.  She currently is on the Vandana regimen and has tolerated it quite well with response of her metastatic disease as seen on her PET scan yesterday.  The plan is to continue with Taxol, Perjeta and Herceptin for one more cycle and then drop the paclitaxel and then continue with Perjeta and Herceptin every 3 weeks and then reimage with a PET/CT scan on 11/21.  4.  If there are signs or symptoms of tumor progression, we will consider changing therapy to a KEYNOTE-355 type regimen for PD-L1 positive, triple-negative breast cancer.  Of concern, she does have mild discordant lymphadenopathy in the left cervical region and in the axillary region.  The FDG uptake is in the cervical and left axillary lymph nodes.  We will continue to monitor this area and perform additional imaging as dictated by signs and symptoms.  5.  I am concerned about the new uptake in the left axilla and rising CEA.  We will plan to biopsy the axilla to assess marker status. We need to biopsy to tell reactive lymph nodes vs progression.   6.  Enhertu is a potential therapy that could cover both histologies.   7.  Xgeva will be held because of need for a dental procedure.  Although there is no FDG uptake in bones, bones are involved.   8.  Assessment of response to HER2 directed therapy which includes chemotherapy. Josefina Bennett has a positive response to the VANDANA regimen for metastatic ER positive, HER2 positive breast cancer.  Our plan is to continue the chemotherapy for a total of 4 months and then switch to antibodies alone.  This may be complicated somewhat by  the fact that there is a triple negative component which is HER2- by IHC and we will need to pay close attention to the staging.  We are not treating the TNBC component as a HER2 low TNBC because the IHC for HER2 is negative but rather with paclitaxel alone.   9.  No hormonal therapy because the recurrent tumor is ER negative.   10.  Restaging with PET CT 11-21.   11.  Follow up plan.  Dental referral.  Perjeta, Herceptin and paclitaxel 9-2, 9-23, 10-14, 11-4 with CBC, CMP,  and CEA. With weekly paclitaxel in between weeks 9-2, 9-9, 9-16 and then stop paclitaxel with CBC, CMP.  Visits with either me or [facility] on 9-9, 9-16, 9-23, 10-14, 11-14. PET CT 11-21 and follow up with me 11-22.  Echo October 14. Hold Xgeva for dental procedure.     Thank you for allowing us to continue to participate in Josefina Bennett' care.      Sincerely,       Evangelista Meza MD  Professor  [facility]  [phone]    ADDENDUM:  On further review, I think we should biopsy the axilla to tell reactive LN vs progression.  The rising CEA is of concern.  Changing therapy to Enhertu could cover both histologies. I called to review the plan on 9-7 but no answer.          I spent 40 minutes with the patient more than 50% of which was in counseling and coordination of care and then 30 minutes reviewing results from her inpatient hospitalization, 45 minutes total time.

## 2022-08-30 NOTE — LETTER
8/30/2022         RE: Josefina Bennett  [address]  [facility] [address]        Dear Colleague,    Thank you for referring your patient, Josefina Bennett, to the [facility]. Please see a copy of my visit note below.    REASON FOR VISIT: Follow-up breast cancer     HISTORY OF PRESENT ILLNESS:  Josefina Bennett was self referred to our clinic for clinical trial recommendations for newly diagnosed stage II breast cancer. She had her last mammogram approximately a year prior. She did notice in early January 2015 that she was having some distortion of the right breast, and she went to see her gynecologist and had a mammogram performed. She had a screening mammogram performed on 01/15/2015. Breast tissue was heterogeneously dense, which might compromise the mammography. There was architectural distortion in the right breast approximately 11-12 o'clock position, zone 2, posterior depth, and a CC MLO spot compression was performed and an ultrasound was planned. The diagnostic mammogram from 01/28/2015 showed right breast architectural distortion centered at the 12 o'clock position, zone 2, suspicious for neoplasm. Breast tomosynthesis was used in the interpretation. Ultrasound findings included targeted ultrasound of the right upper breast demonstrating a band-like area of abnormal echogenicity between 11 and 12 o'clock position in the right breast at zone 2. This measures 4 cm transversely x 1.1 cm AP, and there was only a 1.5 cm measurement in the radial direction. There was blood flow suspicious for neoplasm at the 11 o'clock position in zone 2. There is a 1.4 cm hypoechoic nodule slightly  from the larger area of altered echogenicity that is also suspicious. A biopsy was performed. The biopsy showed infiltrating lobular carcinoma, grade 2, and a clip was placed at specimen [id number]. There were a few signet cells present. The tumor was ER-positive, ND low positive, and HER2 was negative  by immunohistochemistry. She subsequently underwent an MRI showing a tumor that was 4.8 x 1.7 x 3.2 cm in size. Overall, her clinical stage was T2 NX MX.       She was enrolled in the I-SPY-2 clinical trial, and qualified with high-risk MammaPrint score. She was randomized to ganetespib and paclitaxel, and she was treated with 12 cycles of treatment, and then started on AC on 05/26/2015. She developed intractable nausea and vomiting after the first cycle, which required hospitalization on the second cycle. She then developed Pneumocystis pneumonia, which resulted in intubation and a 2-week hospitalization during the course of AC. She was discharged on 07/02/2015, and decided she did not want to pursue any further chemotherapy. She had a right lumpectomy and sentinel lymph node procedure 08/12/2015. She had a positive margin, underwent a re-excision, and had a positive margin. Ultimately, she underwent a right mastectomy with clear margins. She began radiation treatment with Dr. Bill Chauhan, and completed radiation therapy on 12/04/2015. Pathologic stage was III-A, ypT3 N1a MX. Of concern, she had what could be considered an RCB3 tumor.  She started adjuvant letrozole on 1/28/16.     TREATMENT HISTORY:  A.  Neoadjuvant therapy on I SPY-2 with ganetespib and paclitaxel.  AC x 2 complicated by PCP pneumonia.    B.  Right lumpectomy, followed by right margin resections, followed by rigth mastectomy.   B.  Oophorectomy 10-16-15.   C.  Tamoxifen after 10-6-15.   D.  Radiation therapy. 5,040 cGy in 180 cGy/fraction to the chest wall and regional lymphatics followed by 720 cGy axillary lymph node boost and 1000 cGy mastectomy scar boost. Completed on 12/3/2015.  E.   Adjuvant letrozole begun 1-28-16.  Plan was to continue through 2026.  F.  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  A mix of TNBC and ER- HER2+.  Clinic conference consensus was for the modified Vandana with paclitaxel weekly to treat both clones. T-DXd could be a next regimen.  G.  Metastatic carcinoma is HER2 negative (score 1+) by immunohistochemistry.  She is also a candidate for T-DXd.  H.  Recurrent tumor with 22C3 antibody for PD-L1 and hold this in reserve for a Keynote 355 metastatic TNBC approach if the Vandana regimen does not control her metastatic disease.  Keynote 355 could also be an active regimen.       INTERVAL HISTORY:     Josefina Verma returns to clinic today, and her PET/CT scan shows stable disease.  There is response of her bony lesions and no new areas of FDG uptake in the bones or liver.  Overall, she has been feeling quite well.  She has an ECOG 0 performance status and is working full time.  She denies pain, fatigue, depression or anxiety.  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  The plan is to continue the paclitaxel for one more cycle and then begin Perjeta and Herceptin alone.  There is no role for adding in any hormonal therapy because her recurrent disease is hormone-receptor negative. There is a triple-negative component, and we will watch her markers between scans.  I discussed with her that her next PET/CT scan will be 11/21.  She is having dental issues with a left maxillary molar, and we will hold the Xgeva for now and get a dental consult.     REVIEW OF SYSTEMS:  She is fully active.  She has no fever, chills, cough, chest pain, shortness of breath, nausea, vomiting, constipation, diarrhea, bone pain, back pain or headache.   She does have some mild numbness in her hands.  Otherwise, she has had no fatigue related to the weekly Taxol.  She continues on Perjeta and Herceptin. The remainder of a 10-point review of systems is  negative.     She is eating a healthful diet.  She is exercising about 150 minutes a week.  She is taking vitamin D3 and calcium.     PHYSICAL EXAMINATION:  GENERAL:  Josefina Verma appears generally well.    VITALS: /65 (BP Location: Left arm)   Pulse 110   Temp 98.6  F (37  C) (Oral)   Resp 16   Wt 60.6 kg (133 lb 11.2 oz)   LMP 12/18/2014   SpO2 99%   BMI 22.94 kg/m    HEENT:  She has alopecia and is wearing a wig.  In the oropharynx, there is a left maxillary molar, which appears loose with some erythema of the gums.  Otherwise, no other lesions in the oropharynx.  LYMPH:  There is no palpable cervical, supraclavicular, subclavicular or axillary lymphadenopathy.  BREASTS:  Exam was not performed today.  LUNGS:  Clear to percussion and auscultation.  HEART:  Regular rate and rhythm.  S1, S2.  ABDOMEN:  Soft, nontender, without hepatosplenomegaly.  EXTREMITIES:  Without edema.  PSYCHIATRIC:  Mood and affect are normal.     LABORATORY DATA:    CA27-29 declining ascertained by [facility] test.  CEA is declining.     IMAGING:       BONES:   There is no abnormal FDG uptake in the skeleton. Diffusely decreased  uptake to the previously seen hypermetabolic lesions, with no focally  hypermetabolic residual lesion appreciated. There are extensive  nonhypermetabolic sclerotic foci throughout the axial skeleton,  including in the spine, ribs, sternum, clavicles, and pelvis.  Degenerative changes in the spine.                                                                      IMPRESSION:      1. Diffuse intramuscular uptake in the extremities and trunk, likely  secondary to exertion or diet. This muscular uptake directly competes  with lesional uptake and may lead to underestimation of lesional  uptake. With this possible limitation in mind, there is evidence of  positive response to treatment since 4/27/2022, with diffusely marked  decreased uptake to the skeletal and hepatic lesions. No new  focally  hypermetabolic lesion.     2. Resolved uptake to the palatine tonsils and upper cervical lymph  nodes, which may be resolved inflammatory lesions or now treated  metastases.     GUS PALMA MD      ASSESSMENT AND PLAN:   1.  Mary Bennett is a 56-year-old woman with a history of a clinical stage II right breast cancer, ER positive, PA positive, HER2 negative by immunohistochemistry.  She had a lobular histology.  She was on the I-SPY 2 clinical trial and was randomized to ganetespib and paclitaxel, then went on to AC.  She had intractable nausea and vomiting the first cycle of AC, and the second cycle of AC was complicated by Pneumocystis pneumonia requiring intubation and a 2-week hospitalization.  She did not complete the AC treatment.  She did undergo a right lumpectomy and sentinel node procedure.  She had a positive margin and underwent resection.  She still had a positive margin.  She underwent a right lumpectomy with clear margins.  She completed radiation in 12/2015.  Pathologic stage was IIIA  [id number] and of concern, she had an RCB3 histology meaning significant risk of recurrence of her breast cancer during the 5-year period following primary therapy.  There were 2 lymph nodes involved with mammary carcinoma with extracapsular extension.  She went on to have radiation therapy and then began hormonal therapy with tamoxifen in October 2015.  She had oophorectomy and started letrozole in January 2016.   2.  Breast conference recommended starting with HER2 directed therapy.  Biphenotypic cancer with majority TNBC component and a HER2 component.  I discussed the 22C3 PD-L1 CPS of 40 while 20% of the tumor is HER2+ by FISH and the overall ASCO  CAP assignment is negative for the bulk tumor with the qualifier that there is a significant HER2 clonal component that would be best treated with HER2 directed therapy.  The Tumor Board consensus was to continue with HER2 directed therapy to treat the  new and HER2+ clone till best response and then change over to Keynote 355 with pembrolizumab and chemotherapy.  Pembrolizumab and gem/carbo or pembro and Abraxane could be potential options. We continuing with the recommended regimen of Vandana with every 3 week pertuzumab and trastuzumab but weekly paclitaxel because it is better tolerated than every 3 week docetaxel.  The weekly paclitaxel may be more effective vs the TNBC component.  The regimen will be load of trastuzumab of 8 mg/kg followed by 4 mg/kg every 3 weeks and then load of pertuzumab of 840 mg and 420 every 3 weeks and weekly paclitaxel.  No brain metastases.  3.  Treatment.  She currently is on the Vandana regimen and has tolerated it quite well with response of her metastatic disease as seen on her PET scan yesterday.  The plan is to continue with Taxol, Perjeta and Herceptin for one more cycle and then drop the paclitaxel and then continue with Perjeta and Herceptin every 3 weeks and then reimage with a PET/CT scan on 11/21.  4.  If there are signs or symptoms of tumor progression, we will consider changing therapy to a KEYNOTE-355 type regimen for PD-L1 positive, triple-negative breast cancer.  Of concern, she does have mild discordant lymphadenopathy in the left cervical region and in the axillary region.  The FDG uptake is in the cervical and left axillary lymph nodes.  We will continue to monitor this area and perform additional imaging as dictated by signs and symptoms.  5.  Xgeva will be held because of need for a dental procedure.  Although there is no FDG uptake in bones, bones are involved.   6.  Assessment of response to HER2 directed therapy which includes chemotherapy. Josefina Bennett has a positive response to the VANDANA regimen for metastatic ER positive, HER2 positive breast cancer.  Our plan is to continue the chemotherapy for a total of 4 months and then switch to antibodies alone.  This may be complicated somewhat by the fact  that there is a triple negative component which is HER2- by IHC and we will need to pay close attention to the staging.  We are not treating the TNBC component as a HER2 low TNBC because the IHC for HER2 is negative but rather with paclitaxel alone.   7.  No hormonal therapy because the recurrent tumor is ER negative.   8.  Restaging with PET CT 11-21.   9.  Follow up plan.  Dental referral.  Perjeta, Herceptin and paclitaxel 9-2, 9-23, 10-14, 11-4 with CBC, CMP,  and CEA. With weekly paclitaxel in between weeks 9-2, 9-9, 9-16 and then stop paclitaxel with CBC, CMP.  Visits with either me or EDIL on 9-9, 9-16, 9-23, 10-14, 11-14. PET CT 11-21 and follow up with me 11-22.  Echo October 14. Hold Xgeva for dental procedure.     Thank you for allowing us to continue to participate in Josefina Bennett' care.         I spent 40 minutes with the patient more than 50% of which was in counseling and coordination of care and then 30 minutes reviewing results from her inpatient hospitalization, 45 minutes total time.         Again, thank you for allowing me to participate in the care of your patient.      Sincerely,    Evangelista Meza MD

## 2022-08-30 NOTE — NURSING NOTE
"Oncology Rooming Note    August 30, 2022 7:24 AM   Josefina Bennett is a 56 year old female who presents for:    Chief Complaint   Patient presents with     Blood Draw     Labs drawn via  by RN. Vitals taken.     Oncology Clinic Visit     Breast Cancer R     Initial Vitals: /65 (BP Location: Left arm)   Pulse 110   Temp 98.6  F (37  C) (Oral)   Resp 16   Wt 60.6 kg (133 lb 11.2 oz)   LMP 12/18/2014   SpO2 99%   BMI 22.94 kg/m   Estimated body mass index is 22.94 kg/m  as calculated from the following:    Height as of 6/10/22: 1.626 m (5' 4.02\").    Weight as of this encounter: 60.6 kg (133 lb 11.2 oz). Body surface area is 1.65 meters squared.  No Pain (0) Comment: Data Unavailable   Patient's last menstrual period was 12/18/2014.  Allergies reviewed: Yes  Medications reviewed: Yes    Medications: Medication refills not needed today.  Pharmacy name entered into [facility]:    [facility] - [place], [place] - [address] [facility] A MAIL ORDER [facility], located [facility] - [place], [place] - [address] [facility] [place], [place] - [address]    Clinical concerns: Pt presents today for f/u with Dr Meza.       Maida Murphy, ADRIA  8/30/2022              "

## 2022-09-02 NOTE — PROGRESS NOTES
Infusion Nursing Note:  Josefina Bennett presents today for Day 1 Cycle 7 Perjeta, Herceptin, Taxol.    Patient seen by provider : Yes, Dr. Meza Date of visit: 8/30/22      present during visit today: Not Applicable.    Note: Josefina Verma arrives to infusion today doing well. She offers no concerns or changes since her visit with Dr. Meza this week.    Intravenous Access:  Implanted Port.    Treatment Conditions:  Lab Results   Component Value Date    HGB 10.4 (L) 09/02/2022    WBC 8.1 09/02/2022    ANEU 4.2 01/23/2020    ANEUTAUTO 4.7 09/02/2022     09/02/2022      Lab Results   Component Value Date     09/02/2022    POTASSIUM 4.3 09/02/2022    MAG 1.8 06/27/2015    CR 1.01 (H) 09/02/2022    VEE 9.1 09/02/2022    BILITOTAL 0.3 09/02/2022    ALBUMIN 3.9 09/02/2022    ALT 31 09/02/2022    AST 38 (H) 09/02/2022     Results reviewed, labs MET treatment parameters, ok to proceed with treatment.    Post Infusion Assessment:  Patient tolerated infusion without incident.  Blood return noted pre and post infusion.  Site patent and intact, free from redness, edema or discomfort.  No evidence of extravasations.  Access discontinued per protocol.     Discharge Plan:   Patient declined prescription refills.  AVS to patient via [employer].  Patient will return 9/9 for next appointment, patient is on the wait list for infusion on 9/9.   Patient discharged in stable condition accompanied by: self.  Departure Mode: Ambulatory.    Jayla Farrell RN

## 2022-09-02 NOTE — NURSING NOTE
"Chief Complaint   Patient presents with     Port Draw     Labs drawn via port by RN. Vitals taken.     Labs drawn via port by RN. Port accessed with 20G 3/4\" power needle. Flushed with NS and heparin. Pt tolerated well. Vitals taken. /77. Recheck was 97/64. Pt denies dizziness/ light-headedness. Pt to let staff know if she starts experiencing any symptoms. Pt checked in for next appointment.    Josephine Carr RN  "

## 2022-09-07 NOTE — TELEPHONE ENCOUNTER
On further review, I think we should biopsy the axilla to tell reactive LN vs progression.  The rising CEA is of concern.  Changing therapy to Enhertu could cover both histologies. I called to review the plan on 9-7 but no answer.     Evangelista Meza MD

## 2022-09-08 NOTE — TELEPHONE ENCOUNTER
Call to Mary - no answer.      I think the left axillary lymphadenopathy is likely progression and we need a biopsy.  The recurrent metastatic breast cancer is HER2 1+ and ER negative 22C3 PD-L1 positive.  She is completing Vandana for a HER2 amplified component.  I would see if there is evidence of progression in the axilla.  I think it would be OK to continue Vandana right now with Taxol tomorrow and day 15.   Next Pertuzumab and Trastuzumab is 9-23.   I think we will get the biopsy back before 9-23 and we can decide what to do next based on that.  If the axillary involvement is TNBC and HER2 0, I would go with pembrolizumab.  If the axillary involvement is HER2 low or 3+ I would change HER2 therapy and go with Enhertu and that would cover the TNBC (HER2 low component).    Evangelista

## 2022-09-09 NOTE — NURSING NOTE
"Oncology Rooming Note    September 9, 2022 10:20 AM   Josefina Bennett is a 56 year old female who presents for:    Chief Complaint   Patient presents with     Port Draw     Vitals taken, port accessed, labs drawn, heparin locked, checked into next appt     Oncology Clinic Visit     Rtn for breast cancer     Initial Vitals: BP (!) 141/74 (BP Location: Left arm, Patient Position: Sitting, Cuff Size: Adult Regular)   Pulse 120   Temp 99.1  F (37.3  C) (Oral)   Resp 16   Wt 60.9 kg (134 lb 3.2 oz)   LMP 12/18/2014   SpO2 100%   BMI 23.02 kg/m   Estimated body mass index is 23.02 kg/m  as calculated from the following:    Height as of 6/10/22: 1.626 m (5' 4.02\").    Weight as of this encounter: 60.9 kg (134 lb 3.2 oz). Body surface area is 1.66 meters squared.  No Pain (0) Comment: Data Unavailable   Patient's last menstrual period was 12/18/2014.  Allergies reviewed: Yes  Medications reviewed: Yes    Medications: Medication refills not needed today.  Pharmacy name entered into [facility]:    [facility] - [place], [place] - [address] [facility] AT [facility]  [employer] - A MAIL ORDER [facility] - [place], [place] - [address] [facility] [place], [place] - [address]    Clinical concerns: Patient has no specific questions or clinical concerns outside of the reason for the visit.       Peri Alcala, EMT            "

## 2022-09-09 NOTE — PROGRESS NOTES
Sep 9, 2022      REASON FOR VISIT: Follow-up breast cancer     HISTORY OF PRESENT ILLNESS:  Josefina Bennett was self referred to our clinic for clinical trial recommendations for newly diagnosed stage II breast cancer. She had her last mammogram approximately a year prior. She did notice in early January 2015 that she was having some distortion of the right breast, and she went to see her gynecologist and had a mammogram performed. She had a screening mammogram performed on 01/15/2015. Breast tissue was heterogeneously dense, which might compromise the mammography. There was architectural distortion in the right breast approximately 11-12 o'clock position, zone 2, posterior depth, and a CC MLO spot compression was performed and an ultrasound was planned. The diagnostic mammogram from 01/28/2015 showed right breast architectural distortion centered at the 12 o'clock position, zone 2, suspicious for neoplasm. Breast tomosynthesis was used in the interpretation. Ultrasound findings included targeted ultrasound of the right upper breast demonstrating a band-like area of abnormal echogenicity between 11 and 12 o'clock position in the right breast at zone 2. This measures 4 cm transversely x 1.1 cm AP, and there was only a 1.5 cm measurement in the radial direction. There was blood flow suspicious for neoplasm at the 11 o'clock position in zone 2. There is a 1.4 cm hypoechoic nodule slightly  from the larger area of altered echogenicity that is also suspicious. A biopsy was performed. The biopsy showed infiltrating lobular carcinoma, grade 2, and a clip was placed at specimen [id number]. There were a few signet cells present. The tumor was ER-positive, AK low positive, and HER2 was negative by immunohistochemistry. She subsequently underwent an MRI showing a tumor that was 4.8 x 1.7 x 3.2 cm in size. Overall, her clinical stage was T2 NX MX.       She was enrolled in the I-SPY-2 clinical trial, and qualified with  high-risk MammaPrint score. She was randomized to ganetespib and paclitaxel, and she was treated with 12 cycles of treatment, and then started on AC on 05/26/2015. She developed intractable nausea and vomiting after the first cycle, which required hospitalization on the second cycle. She then developed Pneumocystis pneumonia, which resulted in intubation and a 2-week hospitalization during the course of AC. She was discharged on 07/02/2015, and decided she did not want to pursue any further chemotherapy. She had a right lumpectomy and sentinel lymph node procedure 08/12/2015. She had a positive margin, underwent a re-excision, and had a positive margin. Ultimately, she underwent a right mastectomy with clear margins. She began radiation treatment with Dr. Bill Chauhan, and completed radiation therapy on 12/04/2015. Pathologic stage was III-A, ypT3 N1a MX. Of concern, she had what could be considered an RCB3 tumor.  She started adjuvant letrozole on 1/28/16.     4/6/2022 Josefina Verma developed a fever during Reclast.  She was feeling unwell and went to the ED.  CT CAP showed new involvement of abdominal lymph nodes and multiple bone metastases.  She was admitted and I saw her in the hospital 4-7-22 to discuss the plan for biopsy.  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  A mix of TNBC and ER- HER2+.  Clinic conference consensus was for the modified Vandana with paclitaxel weekly to treat both clones.                  TREATMENT HISTORY:  A.  Neoadjuvant therapy on I SPY-2 with ganetespib and paclitaxel.  AC x 2 complicated by PCP pneumonia.    B.  Right lumpectomy, followed by right margin resections, followed by rigth mastectomy.   B.  Oophorectomy 10-16-15.   C.  Tamoxifen after 10-6-15.   D.  Radiation therapy. 5,040 cGy in 180 cGy/fraction to the chest wall and regional lymphatics  "followed by 720 cGy axillary lymph node boost and 1000 cGy mastectomy scar boost. Completed on 12/3/2015.  E.   Adjuvant letrozole begun 1-28-16.  Plan was to continue through 2026.  F.  Diagnosis with recurrent/metastatic breast cancer with a sacral mass that was biopsied showing pleiomorphic lobular breast cancer that is ER negative, LA negative, HER2 positive by FISH in 20% of the cells and negative in 80% of cells.  A mix of TNBC and ER- HER2+.  Clinic conference consensus was for the modified Vandana with paclitaxel weekly to treat both clones.             INTERVAL HISTORY:    Today, Mary is seen in person. She continues to feel well and work full time.      The numbness and tingling in her fingers is ongoing, just at the tips.  She does not feel that it is affecting her activities of daily living.  She would like to continue with the same dose, and will let us know if it gets any worse.     She has lost some of her nails, and there is some tenderness with using her fingers, but this is stable.       She denies any fevers body aches or chills.  No chest pain or shortness of breath.  No lower extremity swelling.  Continues to feel overall very great.    She did have a molar on the upper left gum that was very tender with some facial swelling, she feels she likely ate something hard that irritated this, but now its back to baseline (she doesn't eat on this side for \"years\")       REVIEW OF SYSTEMS:    Patient denies the following except if noted above: fevers, body aches, chills, headaches, vision changes, dizziness, chest pain, shortness of breath, nausea, vomiting, diarrhea, constipation, abdominal pain, rashes, bruising/bleeding, mouth sores, swelling or pain in the legs.         PHYSICAL EXAMINATION:BP (!) 141/74 (BP Location: Left arm, Patient Position: Sitting, Cuff Size: Adult Regular)   Pulse 120   Temp 99.1  F (37.3  C) (Oral)   Resp 16   Wt 60.9 kg (134 lb 3.2 oz)   LMP 12/18/2014   SpO2 " 100%   BMI 23.02 kg/m    Wt Readings from Last 4 Encounters:   09/09/22 60.9 kg (134 lb 3.2 oz)   09/02/22 60.6 kg (133 lb 11.2 oz)   08/30/22 60.6 kg (133 lb 11.2 oz)   08/26/22 62.1 kg (136 lb 14.5 oz)     Vital signs were reviewed.   Patient alert and oriented x3.   PERRLA. EOMI. No scleral icterus noted. OP without thrush/sores. Left upper molar has a screw in her hard palate.  There is a small amount of exposed bone around the screw. No signs of infection   Neck exam: No palpable cervical, supraclavicular or axillary nodes bilaterally.   Heart: RRR no murmurs noted.   Lungs: clear to auscultation bilaterally.  No crackles or wheezing.   Abd: positive bowel sounds in all four quadrants.  No tenderness to palpation.  No hepatomegaly.   Extremities: No lower extremity edema.   Neuro: grossly intact.   Mood and affect is stable.         LABORATORY DATA:     09/02/22 12:59 09/09/22 10:15   Sodium 139 139   Potassium 4.3 4.2   Chloride 104 105   Carbon Dioxide (CO2) 23 25   Urea Nitrogen 21.2 (H) 16.8   Creatinine 1.01 (H) 0.96 (H)   GFR Estimate 65 69   Calcium 9.1 8.9   Anion Gap 12 9   Albumin 3.9 3.7   Protein Total 6.2 (L) 6.0 (L)   Alkaline Phosphatase 75 70   ALT 31 30   AST 38 (H) 32   Bilirubin Total 0.3 0.3      09/02/22 12:59 09/09/22 10:15   WBC 8.1 8.0   Hemoglobin 10.4 (L) 10.7 (L)   Hematocrit 32.1 (L) 33.5 (L)   Platelet Count 420 377   RBC Count 3.71 (L) 3.80   MCV 87 88     ECHO 7/13  Interpretation Summary  Global and regional left ventricular function is normal with an EF of 60-65%.  Right ventricular function, chamber size, wall motion, and thickness are  normal.  The inferior vena cava is normal.  No pericardial effusion is present.  No significant changes noted.     ASSESSMENT AND PLAN:   1.  Mary Bennett is a 56-year-old woman with a history of a clinical stage II right breast cancer, ER positive, AR positive, HER2 negative by immunohistochemistry, now with recurrent Metastatic breast  cancer, lobular. New histology pleomorphic lobular cancer, which is negative for estrogen receptor and negative for progesterone receptor, and 20% is HER2 positive by FISH.  - She has a mixture of recurrent metastatic HER2 positive breast cancer that is estrogen receptor negative and 80% triple negative breast cancer.    - Discussed this situation in breast cancer conference on April 22 and the consensus opinion is to treat the minority HER2+ component first with Vandana regimen because that treatment would also cover the triple negative (TNBC) component with the taxane part of the treatment. This would push back the use of pembrolizumab to the second line setting or later if the 22C3 staining is positive, but would allow earlier treatment of the HER2 component.     - PET and tumor markers showing response. PET in August showed some LAD on the left neck and axillae. Uncertain if this was inflammatory or concerning for disease.  She is getting an US and biopsy next 9/13.   - OK to continue with Taxol today.  Plan is to drop Taxol and continue with Herceptin/Perjeta on 9/23.  However path from 9/13 may change this plan.   - Has been tolerating Perjeta with dex, pepcid, benadryl. Continue these premeds  - ECHO due in October   -repeat PET in November        3. Psych:  - She feels that she is coping very well currently and does not need any additional support at this time  - Continue Effexor 75mg daily      4. Bone Mets  - Asymptomatic  - Currently getting xgeva monthly --ON HOLD due to molar issue, needs to see dentist is setting this up     5. Neuropathy, grade 1, fingertips  - Continue to monitor closely   - If worsens, discussed dose reduction to 60mg/m2    6. Nail changes 2/2 Taxol  - Start tea tree oil soaks      35 minutes spent on the date of the encounter doing chart review, review of test results, interpretation of tests, patient visit and documentation     Kate Hendrix PA-C

## 2022-09-09 NOTE — PROGRESS NOTES
Infusion Nursing Note:  Josefina Bennett presents today for Cycle 7 Day 8 Taxol.    Patient seen by provider today: Yes: ANGELA Garcia   present during visit today: Not Applicable.    Note: Patient arrives feeling well other than dealing with laryngitis. Denies any further complaints or concerns. Xgeva on hold for now due to pending dental referral.     Intravenous Access:  Implanted Port. TPA instilled in lab but positive blood return noted on first attempt upon arrival to infusion.    Treatment Conditions:  Lab Results   Component Value Date    HGB 10.7 (L) 09/09/2022    WBC 8.0 09/09/2022    ANEU 4.2 01/23/2020    ANEUTAUTO 5.4 09/09/2022     09/09/2022      Lab Results   Component Value Date     09/09/2022    POTASSIUM 4.2 09/09/2022    MAG 1.8 06/27/2015    CR 0.96 (H) 09/09/2022    VEE 8.9 09/09/2022    BILITOTAL 0.3 09/09/2022    ALBUMIN 3.7 09/09/2022    ALT 30 09/09/2022    AST 32 09/09/2022     Results reviewed, labs MET treatment parameters, ok to proceed with treatment.    Post Infusion Assessment:  Patient tolerated infusion without incident.  Blood return noted pre and post infusion.  Site patent and intact, free from redness, edema or discomfort.  No evidence of extravasations.  Access discontinued per protocol.     Discharge Plan:   Patient declined prescription refills.  Discharge instructions reviewed with: Patient.  Patient and/or family verbalized understanding of discharge instructions and all questions answered.  AVS to patient via [facility].  Patient will return 9/16 for next appointment.   Patient discharged in stable condition accompanied by: self.  Departure Mode: Ambulatory.      Farnaz Armenta RN

## 2022-09-12 NOTE — NURSING NOTE
Chief Complaint   Patient presents with     Blood Draw     VPT  blood draw lab RN and Vitals taken      Port would not give a great blood return - flushed with heparin and de-accessed. Will return on Friday and check port blood return during lab visit.   Adwoa Mena RN

## 2022-09-12 NOTE — PROGRESS NOTES
Sep 12, 2022      REASON FOR VISIT: Follow-up breast cancer     HISTORY OF PRESENT ILLNESS:  Josefina Bennett was self referred to our clinic for clinical trial recommendations for newly diagnosed stage II breast cancer. She had her last mammogram approximately a year prior. She did notice in early January 2015 that she was having some distortion of the right breast, and she went to see her gynecologist and had a mammogram performed. She had a screening mammogram performed on 01/15/2015. Breast tissue was heterogeneously dense, which might compromise the mammography. There was architectural distortion in the right breast approximately 11-12 o'clock position, zone 2, posterior depth, and a CC MLO spot compression was performed and an ultrasound was planned. The diagnostic mammogram from 01/28/2015 showed right breast architectural distortion centered at the 12 o'clock position, zone 2, suspicious for neoplasm. Breast tomosynthesis was used in the interpretation. Ultrasound findings included targeted ultrasound of the right upper breast demonstrating a band-like area of abnormal echogenicity between 11 and 12 o'clock position in the right breast at zone 2. This measures 4 cm transversely x 1.1 cm AP, and there was only a 1.5 cm measurement in the radial direction. There was blood flow suspicious for neoplasm at the 11 o'clock position in zone 2. There is a 1.4 cm hypoechoic nodule slightly  from the larger area of altered echogenicity that is also suspicious. A biopsy was performed. The biopsy showed infiltrating lobular carcinoma, grade 2, and a clip was placed at specimen [id number]. There were a few signet cells present. The tumor was ER-positive, FL low positive, and HER2 was negative by immunohistochemistry. She subsequently underwent an MRI showing a tumor that was 4.8 x 1.7 x 3.2 cm in size. Overall, her clinical stage was T2 NX MX.       She was enrolled in the I-SPY-2 clinical trial, and qualified with  high-risk MammaPrint score. She was randomized to ganetespib and paclitaxel, and she was treated with 12 cycles of treatment, and then started on AC on 05/26/2015. She developed intractable nausea and vomiting after the first cycle, which required hospitalization on the second cycle. She then developed Pneumocystis pneumonia, which resulted in intubation and a 2-week hospitalization during the course of AC. She was discharged on 07/02/2015, and decided she did not want to pursue any further chemotherapy. She had a right lumpectomy and sentinel lymph node procedure 08/12/2015. She had a positive margin, underwent a re-excision, and had a positive margin. Ultimately, she underwent a right mastectomy with clear margins. She began radiation treatment with Dr. Bill Chauhan, and completed radiation therapy on 12/04/2015. Pathologic stage was III-A, ypT3 N1a MX. Of concern, she had what could be considered an RCB3 tumor.  She started adjuvant letrozole on 1/28/16.     4/6/2022 Josefina Verma developed a fever during Reclast.  She was feeling unwell and went to the ED.  CT CAP showed new involvement of abdominal lymph nodes and multiple bone metastases.  She was admitted and I saw her in the hospital 4-7-22 to discuss the plan for biopsy.  A biopsy of a sacral mass showed pleomorphic lobular breast cancer which was ER negative, NH negative, HER2 positive by FISH in 20% of the cells and negative in 80% of cells.  A mix of TNBC and ER- HER2+.  Clinic conference consensus was for the modified Vandana with paclitaxel weekly to treat both clones.         TREATMENT HISTORY:  A.  Neoadjuvant therapy on I SPY-2 with ganetespib and paclitaxel.  AC x 2 complicated by PCP pneumonia.    B.  Right lumpectomy, followed by right margin resections, followed by right mastectomy.   B.  Oophorectomy 10-16-15.   C.  Tamoxifen after 10-6-15.   D.  Radiation therapy. 5,040 cGy in 180 cGy/fraction to the chest wall and regional lymphatics followed  "by 720 cGy axillary lymph node boost and 1000 cGy mastectomy scar boost. Completed on 12/3/2015.  E.   Adjuvant letrozole begun 1-28-16.  Plan was to continue through 2026.  F.  Diagnosis with recurrent/metastatic breast cancer with a sacral mass that was biopsied showing pleiomorphic lobular breast cancer that is ER negative, WA negative, HER2 positive by FISH in 20% of the cells and negative in 80% of cells.  A mix of TNBC and ER- HER2+.  Clinic conference consensus was for the modified Vandana with paclitaxel weekly to treat both clones.             INTERVAL HISTORY:    Mary presents to clinic for an acute add on. She reports last week she had what she thought was laryngitis associated with fatigue for one week. Starting on Friday and throughout the weekend an intermittent fever of 101F that breaks with tylenol cold and flu. She has been taking tylenol cold and flu q 4 hours. She also reports on Friday a new onset of myalgias and arthralgias and that her \"skin hurts\". Endorses a cough that is worse in the evenings and at bed time. Endorses right sided chest wall pain. Reports that her co-worker had a sore throat last week that happened to be strep throat but was also exposed to covid. Reports that her left upper gum and molar tenderness has resolved with no further facial swelling. No headaches or vision changes. No urinary or bowel concerns. No new rashes or lesions. No peripheral edema. No sore throat.        REVIEW OF SYSTEMS:    Patient denies the following except if noted above: fevers, body aches, chills, headaches, vision changes, dizziness, chest pain, shortness of breath, nausea, vomiting, diarrhea, constipation, abdominal pain, rashes, bruising/bleeding, mouth sores, swelling or pain in the legs.      PHYSICAL EXAMINATION:/80   Pulse 104   Temp 99.3  F (37.4  C) (Oral)   Resp 18   Wt 60.6 kg (133 lb 9.6 oz)   LMP 12/18/2014   SpO2 98%   BMI 22.92 kg/m    Wt Readings from Last 4 " Encounters:   09/12/22 60.6 kg (133 lb 9.6 oz)   09/09/22 60.9 kg (134 lb 3.2 oz)   09/02/22 60.6 kg (133 lb 11.2 oz)   08/30/22 60.6 kg (133 lb 11.2 oz)     Vital signs were reviewed.   Patient alert and oriented x3.   PERRLA. EOMI. No scleral icterus noted. OP without thrush/sores. Left upper molar has a screw in her hard palate.  There is a small amount of exposed bone around the screw. No signs of infection   Neck exam: No palpable cervical, supraclavicular nodes.   Heart: RRR no murmurs noted.   Lungs: clear to auscultation bilaterally.  No crackles or wheezing.   Abd: positive bowel sounds in all four quadrants.  No tenderness to palpation.  No hepatomegaly.   Extremities: No lower extremity edema.   Neuro: grossly intact.   Mood and affect is stable.         LABORATORY DATA:     Latest Reference Range & Units 09/12/22 12:06   WBC 4.0 - 11.0 10e3/uL 10.4   Hemoglobin 11.7 - 15.7 g/dL 10.6 (L)   Hematocrit 35.0 - 47.0 % 33.0 (L)   Platelet Count 150 - 450 10e3/uL 374   RBC Count 3.80 - 5.20 10e6/uL 3.74 (L)   MCV 78 - 100 fL 88   MCH 26.5 - 33.0 pg 28.3   MCHC 31.5 - 36.5 g/dL 32.1   RDW 10.0 - 15.0 % 18.1 (H)   % Neutrophils % 84   % Lymphocytes % 9   % Monocytes % 4   % Eosinophils % 0   % Basophils % 1   Absolute Basophils 0.0 - 0.2 10e3/uL 0.1   Absolute Eosinophils 0.0 - 0.7 10e3/uL 0.0   Absolute Immature Granulocytes <=0.4 10e3/uL 0.2   Absolute Lymphocytes 0.8 - 5.3 10e3/uL 0.9   Absolute Monocytes 0.0 - 1.3 10e3/uL 0.5   % Immature Granulocytes % 2   Absolute Neutrophils 1.6 - 8.3 10e3/uL 8.8 (H)   Absolute NRBCs 10e3/uL 0.0   NRBCs per 100 WBC <1 /100 0   (L): Data is abnormally low  (H): Data is abnormally high    CMP 09/12/22: pending.     Covid-19 PCR: pending.     CXR 09/12/22:  Findings:   Left chest wall internal jugular Port-A-Cath tip at the mid to low  SVC. The cardiomediastinal silhouette and pulmonary vasculature are  within normal limits. No pleural effusion or pneumothorax. Right  apical  scarring. No new focal airspace opacity. Right mastectomy  changes. Multifocal sclerotic osseous metastases involving the ribs  and spine.                                                                      Impression:   1. No convincing focal airspace disease.  2. Multifocal sclerotic osseous metastatic disease involving the ribs  and spine.    ECHO 7/13  Interpretation Summary  Global and regional left ventricular function is normal with an EF of 60-65%.  Right ventricular function, chamber size, wall motion, and thickness are  normal.  The inferior vena cava is normal.  No pericardial effusion is present.  No significant changes noted.     ASSESSMENT AND PLAN:   1.  Mary Bennett is a 56-year-old woman with a history of a clinical stage II right breast cancer, ER positive, NM positive, HER2 negative by immunohistochemistry, now with recurrent Metastatic breast cancer, lobular. New histology pleomorphic lobular cancer, which is negative for estrogen receptor and negative for progesterone receptor, and 20% is HER2 positive by FISH.  - She has a mixture of recurrent metastatic HER2 positive breast cancer that is estrogen receptor negative and 80% triple negative breast cancer.    - Discussed this situation in breast cancer conference on April 22 and the consensus opinion is to treat the minority HER2+ component first with Vandana regimen because that treatment would also cover the triple negative (TNBC) component with the taxane part of the treatment. This would push back the use of pembrolizumab to the second line setting or later if the 22C3 staining is positive, but would allow earlier treatment of the HER2 component.     - PET and tumor markers showing response. PET in August showed some LAD on the left neck and axillae. Uncertain if this was inflammatory or concerning for disease.  She is getting an US and biopsy next 9/13.   - Last Taxol on 09/09/22. Plan is to drop Taxol and continue with  Herceptin/Perjeta on 9/23.  However path from 9/13 may change this plan.   - Has been tolerating Perjeta with dex, pepcid, benadryl. Continue these premeds  - ECHO due in October   -repeat PET in November    2. URI. Cough  - CXR without signs of acute consolidation. Afebrile in clinic today. Suspect viral etiology and will continue with conservative management. Start tessalon pearls as needed. Discussed reasons to follow up sooner.      3. Psych:  - She feels that she is coping very well currently and does not need any additional support at this time  - Continue Effexor 75mg daily   - Not specifically discussed today.      3. Bone Mets  - Asymptomatic  - Currently getting xgeva monthly --ON HOLD due to molar issue, needs to see dentist is setting this up  - Reviewed again today the need for dental consultation. Continue to monitor for infectious concerns.      4. Neuropathy, grade 1, fingertips  - Continue to monitor closely   - If worsens, discussed dose reduction to 60mg/m2  - Not specifically dicussed today.     5. Nail changes 2/2 Taxol  - Start tea tree oil soaks   - Not specifically dicussed today.     30 minutes spent on the date of the encounter doing chart review, review of test results, interpretation of tests, patient visit and documentation     Michelle Lin PA-C

## 2022-09-12 NOTE — NURSING NOTE
Nasal swab collected in clinic, pt tolerated. Specimen sent to lab.    Charlette Damico CMA on 9/12/2022 at 12:47 PM

## 2022-09-12 NOTE — LETTER
9/12/2022         RE: Josefina Bennett  [address]  [facility] [address]        Dear Colleague,    Thank you for referring your patient, Josefina Bennett, to the [facility]. Please see a copy of my visit note below.    Sep 12, 2022      REASON FOR VISIT: Follow-up breast cancer     HISTORY OF PRESENT ILLNESS:  Josefina Bennett was self referred to our clinic for clinical trial recommendations for newly diagnosed stage II breast cancer. She had her last mammogram approximately a year prior. She did notice in early January 2015 that she was having some distortion of the right breast, and she went to see her gynecologist and had a mammogram performed. She had a screening mammogram performed on 01/15/2015. Breast tissue was heterogeneously dense, which might compromise the mammography. There was architectural distortion in the right breast approximately 11-12 o'clock position, zone 2, posterior depth, and a CC MLO spot compression was performed and an ultrasound was planned. The diagnostic mammogram from 01/28/2015 showed right breast architectural distortion centered at the 12 o'clock position, zone 2, suspicious for neoplasm. Breast tomosynthesis was used in the interpretation. Ultrasound findings included targeted ultrasound of the right upper breast demonstrating a band-like area of abnormal echogenicity between 11 and 12 o'clock position in the right breast at zone 2. This measures 4 cm transversely x 1.1 cm AP, and there was only a 1.5 cm measurement in the radial direction. There was blood flow suspicious for neoplasm at the 11 o'clock position in zone 2. There is a 1.4 cm hypoechoic nodule slightly  from the larger area of altered echogenicity that is also suspicious. A biopsy was performed. The biopsy showed infiltrating lobular carcinoma, grade 2, and a clip was placed at specimen [id number]. There were a few signet cells present. The tumor was ER-positive, IL low positive, and  HER2 was negative by immunohistochemistry. She subsequently underwent an MRI showing a tumor that was 4.8 x 1.7 x 3.2 cm in size. Overall, her clinical stage was T2 NX MX.       She was enrolled in the I-SPY-2 clinical trial, and qualified with high-risk MammaPrint score. She was randomized to ganetespib and paclitaxel, and she was treated with 12 cycles of treatment, and then started on AC on 05/26/2015. She developed intractable nausea and vomiting after the first cycle, which required hospitalization on the second cycle. She then developed Pneumocystis pneumonia, which resulted in intubation and a 2-week hospitalization during the course of AC. She was discharged on 07/02/2015, and decided she did not want to pursue any further chemotherapy. She had a right lumpectomy and sentinel lymph node procedure 08/12/2015. She had a positive margin, underwent a re-excision, and had a positive margin. Ultimately, she underwent a right mastectomy with clear margins. She began radiation treatment with Dr. Bill Chauhan, and completed radiation therapy on 12/04/2015. Pathologic stage was III-A, ypT3 N1a MX. Of concern, she had what could be considered an RCB3 tumor.  She started adjuvant letrozole on 1/28/16.     4/6/2022 Josefina Verma developed a fever during Reclast.  She was feeling unwell and went to the ED.  CT CAP showed new involvement of abdominal lymph nodes and multiple bone metastases.  She was admitted and I saw her in the hospital 4-7-22 to discuss the plan for biopsy.  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  A mix of TNBC and ER- HER2+.  Clinic conference consensus was for the modified Vandana with paclitaxel weekly to treat both clones.         TREATMENT HISTORY:  A.  Neoadjuvant therapy on I SPY-2 with ganetespib and paclitaxel.  AC x 2 complicated by PCP pneumonia.    B.  Right  "lumpectomy, followed by right margin resections, followed by right mastectomy.   B.  Oophorectomy 10-16-15.   C.  Tamoxifen after 10-6-15.   D.  Radiation therapy. 5,040 cGy in 180 cGy/fraction to the chest wall and regional lymphatics followed by 720 cGy axillary lymph node boost and 1000 cGy mastectomy scar boost. Completed on 12/3/2015.  E.   Adjuvant letrozole begun 1-28-16.  Plan was to continue through 2026.  F.  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  A mix of TNBC and ER- HER2+.  Clinic conference consensus was for the modified Vandana with paclitaxel weekly to treat both clones.             INTERVAL HISTORY:    Mary presents to clinic for an acute add on. She reports last week she had what she thought was laryngitis associated with fatigue for one week. Starting on Friday and throughout the weekend an intermittent fever of 101F that breaks with tylenol cold and flu. She has been taking tylenol cold and flu q 4 hours. She also reports on Friday a new onset of myalgias and arthralgias and that her \"skin hurts\". Endorses a cough that is worse in the evenings and at bed time. Endorses right sided chest wall pain. Reports that her co-worker had a sore throat last week that happened to be strep throat but was also exposed to covid. Reports that her left upper gum and molar tenderness has resolved with no further facial swelling. No headaches or vision changes. No urinary or bowel concerns. No new rashes or lesions. No peripheral edema. No sore throat.        REVIEW OF SYSTEMS:    Patient denies the following except if noted above: fevers, body aches, chills, headaches, vision changes, dizziness, chest pain, shortness of breath, nausea, vomiting, diarrhea, constipation, abdominal pain, rashes, bruising/bleeding, " mouth sores, swelling or pain in the legs.      PHYSICAL EXAMINATION:/80   Pulse 104   Temp 99.3  F (37.4  C) (Oral)   Resp 18   Wt 60.6 kg (133 lb 9.6 oz)   LMP 12/18/2014   SpO2 98%   BMI 22.92 kg/m    Wt Readings from Last 4 Encounters:   09/12/22 60.6 kg (133 lb 9.6 oz)   09/09/22 60.9 kg (134 lb 3.2 oz)   09/02/22 60.6 kg (133 lb 11.2 oz)   08/30/22 60.6 kg (133 lb 11.2 oz)     Vital signs were reviewed.   Patient alert and oriented x3.   PERRLA. EOMI. No scleral icterus noted. OP without thrush/sores. Left upper molar has a screw in her hard palate.  There is a small amount of exposed bone around the screw. No signs of infection   Neck exam: No palpable cervical, supraclavicular nodes.   Heart: RRR no murmurs noted.   Lungs: clear to auscultation bilaterally.  No crackles or wheezing.   Abd: positive bowel sounds in all four quadrants.  No tenderness to palpation.  No hepatomegaly.   Extremities: No lower extremity edema.   Neuro: grossly intact.   Mood and affect is stable.         LABORATORY DATA:     Latest Reference Range & Units 09/12/22 12:06   WBC 4.0 - 11.0 10e3/uL 10.4   Hemoglobin 11.7 - 15.7 g/dL 10.6 (L)   Hematocrit 35.0 - 47.0 % 33.0 (L)   Platelet Count 150 - 450 10e3/uL 374   RBC Count 3.80 - 5.20 10e6/uL 3.74 (L)   MCV 78 - 100 fL 88   MCH 26.5 - 33.0 pg 28.3   MCHC 31.5 - 36.5 g/dL 32.1   RDW 10.0 - 15.0 % 18.1 (H)   % Neutrophils % 84   % Lymphocytes % 9   % Monocytes % 4   % Eosinophils % 0   % Basophils % 1   Absolute Basophils 0.0 - 0.2 10e3/uL 0.1   Absolute Eosinophils 0.0 - 0.7 10e3/uL 0.0   Absolute Immature Granulocytes <=0.4 10e3/uL 0.2   Absolute Lymphocytes 0.8 - 5.3 10e3/uL 0.9   Absolute Monocytes 0.0 - 1.3 10e3/uL 0.5   % Immature Granulocytes % 2   Absolute Neutrophils 1.6 - 8.3 10e3/uL 8.8 (H)   Absolute NRBCs 10e3/uL 0.0   NRBCs per 100 WBC <1 /100 0   (L): Data is abnormally low  (H): Data is abnormally high    CMP 09/12/22: pending.     Covid-19 PCR: pending.      CXR 09/12/22:  Findings:   Left chest wall internal jugular Port-A-Cath tip at the mid to low  SVC. The cardiomediastinal silhouette and pulmonary vasculature are  within normal limits. No pleural effusion or pneumothorax. Right  apical scarring. No new focal airspace opacity. Right mastectomy  changes. Multifocal sclerotic osseous metastases involving the ribs  and spine.                                                                      Impression:   1. No convincing focal airspace disease.  2. Multifocal sclerotic osseous metastatic disease involving the ribs  and spine.    ECHO 7/13  Interpretation Summary  Global and regional left ventricular function is normal with an EF of 60-65%.  Right ventricular function, chamber size, wall motion, and thickness are  normal.  The inferior vena cava is normal.  No pericardial effusion is present.  No significant changes noted.     ASSESSMENT AND PLAN:   1.  Mary Bennett is a 56-year-old woman with a history of a clinical stage II right breast cancer, ER positive, TX positive, HER2 negative by immunohistochemistry, now with recurrent Metastatic breast cancer, lobular. New histology pleomorphic lobular cancer, which is negative for estrogen receptor and negative for progesterone receptor, and 20% is HER2 positive by FISH.  - She has a mixture of recurrent metastatic HER2 positive breast cancer that is estrogen receptor negative and 80% triple negative breast cancer.    - Discussed this situation in breast cancer conference on April 22 and the consensus opinion is to treat the minority HER2+ component first with Vandana regimen because that treatment would also cover the triple negative (TNBC) component with the taxane part of the treatment. This would push back the use of pembrolizumab to the second line setting or later if the 22C3 staining is positive, but would allow earlier treatment of the HER2 component.     - PET and tumor markers showing response. PET in  August showed some LAD on the left neck and axillae. Uncertain if this was inflammatory or concerning for disease.  She is getting an US and biopsy next 9/13.   - Last Taxol on 09/09/22. Plan is to drop Taxol and continue with Herceptin/Perjeta on 9/23.  However path from 9/13 may change this plan.   - Has been tolerating Perjeta with dex, pepcid, benadryl. Continue these premeds  - ECHO due in October   -repeat PET in November    2. URI. Cough  - CXR without signs of acute consolidation. Afebrile in clinic today. Suspect viral etiology and will continue with conservative management. Start tessalon pearls as needed. Discussed reasons to follow up sooner.      3. Psych:  - She feels that she is coping very well currently and does not need any additional support at this time  - Continue Effexor 75mg daily   - Not specifically discussed today.      3. Bone Mets  - Asymptomatic  - Currently getting xgeva monthly --ON HOLD due to molar issue, needs to see dentist is setting this up  - Reviewed again today the need for dental consultation. Continue to monitor for infectious concerns.      4. Neuropathy, grade 1, fingertips  - Continue to monitor closely   - If worsens, discussed dose reduction to 60mg/m2  - Not specifically dicussed today.     5. Nail changes 2/2 Taxol  - Start tea tree oil soaks   - Not specifically dicussed today.     30 minutes spent on the date of the encounter doing chart review, review of test results, interpretation of tests, patient visit and documentation           Again, thank you for allowing me to participate in the care of your patient.      Sincerely,    Michelle Lin PA-C

## 2022-09-12 NOTE — TELEPHONE ENCOUNTER
Josefina Verma calls in with complaint of cold symptoms.   Took Covid test the last 2 days and those have been negative. Last Covid test was last night and that was negative.   Has had sharp pains in right side of chest up by axilla.   She states it hurts to take a deep breath.   Has been running temp of 101.1 since Saturday this is all day long, has been staying pretty steady.   Has cough, hurts when she coughs. Has coughed up thick glob of mucous on occasion.   Has a little bit of stuffy nose, has been blowing really thick mucous out of nose.   Denies any difficulty with urination.   States everything hurts, feels like skin even hurts.    Has been taking tylenol cold and flu and that seems to help with cough.     7:59 Paged/messaged Monika Hendrix   9:00 Paged Monika Hendrix   9:19 Per Monika Hendrix   Patient should get labs, chest x ray and appointment to be seen by provider.     9:37 Call placed to Josefina to let her know that there was an 11:30 opening for appt with Michelle Lin and would do chest xray and labs prior to visit per Kate Hendrix.   Monika Hendrix placed orders for labs and chest xray.     Message sent to scheduling to set up appts.

## 2022-09-13 NOTE — NURSING NOTE
"Oncology Rooming Note    September 13, 2022 8:50 AM   Josefina Bennett is a 56 year old female who presents for:    Chief Complaint   Patient presents with     Blood Draw     VPT  blood draw lab RN and Vitals taken      Oncology Clinic Visit     Malignant neoplasm of nipple of right breast     Initial Vitals: /80   Pulse 104   Temp 99.3  F (37.4  C) (Oral)   Resp 18   Wt 60.6 kg (133 lb 9.6 oz)   LMP 12/18/2014   SpO2 98%   BMI 22.92 kg/m   Estimated body mass index is 22.92 kg/m  as calculated from the following:    Height as of 6/10/22: 1.626 m (5' 4.02\").    Weight as of this encounter: 60.6 kg (133 lb 9.6 oz). Body surface area is 1.65 meters squared.  Extreme Pain (9) Comment: chest pain on right side near axilla   Patient's last menstrual period was 12/18/2014.  Allergies reviewed: Yes  Medications reviewed: Yes    Medications: Medication refills not needed today.  Pharmacy name entered into [employer]:    [facility] [place], [place] - [address] [facility] AT [facility]  [facility] [facility] - A MAIL ORDER [facility] - [place], [place] - [address] [facility] - [place], [place] - [address]    Clinical concerns: none       Charlette Damico CMA            "

## 2022-09-14 NOTE — PROGRESS NOTES
Followed up with patient's L axilla biopsy and viral infection. Using Tylenol PM with improvement of her temperature. Cough at night not allowing her to sleep well. Will try Robitussin and use cough drops she has at home as Tiffanie Marcus not helping. Has a follow up appointment with Sandra Coughlin this Friday. Awaiting results of L axilla biopsy done yesterday. Answered all patient's questions and verbalized understanding. Teresa Ashton RN, BSN.

## 2022-09-14 NOTE — TELEPHONE ENCOUNTER
Spoke with patient regarding left axillary lymph node biopsy results, which indicate a benign lymph node. Notified patient that the radiologist's recommendation is clinical follow-up. Patient verbalized understanding of these results and all questions answered to her satisfaction.

## 2022-09-15 NOTE — TELEPHONE ENCOUNTER
I called Mary today and discussed that although the left axillary biopsy did not show evidence for breast cancer and yielded benign results, her markers have been rising significantly since troughing in the summer.  The CEA is rising.  The  is steady.  I think that there are 2 clones as indicated by the pathology one of them being triple negative the other being HER2 positive.  It is possible that the CEA represents the triple negative clone although I am not sure at this time.  On the August 29 PET/CT the site of initial metastasis static disease showed no FDG uptake.  There were no suspicious bony lesions.  Biopsy of the sacrum in April showed 2 clones 1 of which was 20% of the tumor and was HER2 amplified remainder of the tumor is HER2 1+.  Given the rising markers I would favor changing her therapy from pertuzumab, trastuzumab, paclitaxel to Enhertu.  The Enhertu would cover the HER2 1+ status of the sacral mass and would also cover the 20% of cells that were HER2 amplified.  Essentially this should cover both the triple negative component and the minority HER2 positive component.      I discussed the risks and potential benefits of Enhertu.  I discussed that there is a 8% risk of interstitial lung disease and that we need to perform CT scans every 6 weeks to monitor for this potential side effect.  I also discussed that most patients have significant nausea with Enhertu during the first few days after infusion and recommended use of prophylactic antiemetic medicines including Compazine ondansetron.  Josefina eVrma understands the risks and potential benefits and would like to switch therapy as we have vies.  All of her questions were answered.  She consented to chemotherapy with Enhertu.  We will continue to follow ejection fraction by echo.    Evangelista Meza MD

## 2022-09-19 NOTE — NURSING NOTE
"Oncology Rooming Note    September 19, 2022 12:59 PM   Josefina Bennett is a 56 year old female who presents for:    Chief Complaint   Patient presents with     Oncology Clinic Visit     Malignant neoplasm of areola of right breast in female, unspecified estrogen receptor status (H) (Primary Dx)      Initial Vitals: /79 (BP Location: Right arm, Patient Position: Sitting, Cuff Size: Adult Regular)   Pulse 98   Temp 99.2  F (37.3  C) (Oral)   Resp 14   Wt 61.2 kg (134 lb 14.4 oz)   LMP 12/18/2014   SpO2 98%   BMI 23.14 kg/m   Estimated body mass index is 23.14 kg/m  as calculated from the following:    Height as of 6/10/22: 1.626 m (5' 4.02\").    Weight as of this encounter: 61.2 kg (134 lb 14.4 oz). Body surface area is 1.66 meters squared.  Data Unavailable Comment: Data Unavailable   Patient's last menstrual period was 12/18/2014.  Allergies reviewed: Yes  Medications reviewed: Yes    Medications: Medication refills not needed today.  Pharmacy name entered into [employer]:    [facility] - [place], [place] - [address] [facility] AT [facility]  [facility] - A MAIL ORDER [facility] - [place], [place] - [address] [facility] - [place], [place] - [address]    Clinical concerns:     Pt has been feeling ill for the last week after her last chemo, on Friday (She was already horse before the appt).     She has been expriencing couching (keeps her up at night), Body aches, chills, sweating, weakness, and congestion.     Pt has been taking over-the-counter meds and they have not helped. She has tried sleeping sitting up and that has not helped.     She's wondering if her port might be infected.     Rikki Carbajal            "

## 2022-09-19 NOTE — PROGRESS NOTES
Sep 19, 2022      REASON FOR VISIT: Follow-up breast cancer     HISTORY OF PRESENT ILLNESS:  Josefina Bennett was self referred to our clinic for clinical trial recommendations for newly diagnosed stage II breast cancer. She had her last mammogram approximately a year prior. She did notice in early January 2015 that she was having some distortion of the right breast, and she went to see her gynecologist and had a mammogram performed. She had a screening mammogram performed on 01/15/2015. Breast tissue was heterogeneously dense, which might compromise the mammography. There was architectural distortion in the right breast approximately 11-12 o'clock position, zone 2, posterior depth, and a CC MLO spot compression was performed and an ultrasound was planned. The diagnostic mammogram from 01/28/2015 showed right breast architectural distortion centered at the 12 o'clock position, zone 2, suspicious for neoplasm. Breast tomosynthesis was used in the interpretation. Ultrasound findings included targeted ultrasound of the right upper breast demonstrating a band-like area of abnormal echogenicity between 11 and 12 o'clock position in the right breast at zone 2. This measures 4 cm transversely x 1.1 cm AP, and there was only a 1.5 cm measurement in the radial direction. There was blood flow suspicious for neoplasm at the 11 o'clock position in zone 2. There is a 1.4 cm hypoechoic nodule slightly  from the larger area of altered echogenicity that is also suspicious. A biopsy was performed. The biopsy showed infiltrating lobular carcinoma, grade 2, and a clip was placed at specimen [id number]. There were a few signet cells present. The tumor was ER-positive, MT low positive, and HER2 was negative by immunohistochemistry. She subsequently underwent an MRI showing a tumor that was 4.8 x 1.7 x 3.2 cm in size. Overall, her clinical stage was T2 NX MX.       She was enrolled in the I-SPY-2 clinical trial, and qualified with  high-risk MammaPrint score. She was randomized to ganetespib and paclitaxel, and she was treated with 12 cycles of treatment, and then started on AC on 05/26/2015. She developed intractable nausea and vomiting after the first cycle, which required hospitalization on the second cycle. She then developed Pneumocystis pneumonia, which resulted in intubation and a 2-week hospitalization during the course of AC. She was discharged on 07/02/2015, and decided she did not want to pursue any further chemotherapy. She had a right lumpectomy and sentinel lymph node procedure 08/12/2015. She had a positive margin, underwent a re-excision, and had a positive margin. Ultimately, she underwent a right mastectomy with clear margins. She began radiation treatment with Dr. Bill Chauhan, and completed radiation therapy on 12/04/2015. Pathologic stage was III-A, ypT3 N1a MX. Of concern, she had what could be considered an RCB3 tumor.  She started adjuvant letrozole on 1/28/16.     4/6/2022 Josefina Verma developed a fever during Reclast.  She was feeling unwell and went to the ED.  CT CAP showed new involvement of abdominal lymph nodes and multiple bone metastases.  She was admitted and I saw her in the hospital 4-7-22 to discuss the plan for biopsy.  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  A mix of TNBC and ER- HER2+.  Clinic conference consensus was for the modified Vandana with paclitaxel weekly to treat both clones.         TREATMENT HISTORY:  A.  Neoadjuvant therapy on I SPY-2 with ganetespib and paclitaxel.  AC x 2 complicated by PCP pneumonia.    B.  Right lumpectomy, followed by right margin resections, followed by right mastectomy.   B.  Oophorectomy 10-16-15.   C.  Tamoxifen after 10-6-15.   D.  Radiation therapy. 5,040 cGy in 180 cGy/fraction to the chest wall and regional lymphatics followed  by 720 cGy axillary lymph node boost and 1000 cGy mastectomy scar boost. Completed on 12/3/2015.  E.   Adjuvant letrozole begun 1-28-16.  Plan was to continue through 2026.  F.  Diagnosis with recurrent/metastatic breast cancer with a sacral mass that was biopsied showing pleiomorphic lobular breast cancer that is ER negative, IN negative, HER2 positive by FISH in 20% of the cells and negative in 80% of cells.  A mix of TNBC and ER- HER2+.  Clinic conference consensus was for the modified Vandana with paclitaxel weekly to treat both clones.          INTERVAL HISTORY:    Mary presents to clinic for an acute add on.She reports that she is continues to feel poorly over the past week. She initially felt like she had laryngitis on 09/09/22 and has been having intermittent fevers since. Today, she reports a new onset of sinus congestion and pressure which is new since last week. She also reports having ear congestion. No tooth or mouth pain. No sore throat. Her arthalgias and myalgias have improved but still experiences them when she is febrile. Temp at been between .0 F over the past week. She overall feels tired and weak. Denies any falls. Feels some chest congestion but feels as though this is secondary to sinus drainage. Endorses a dry, non-productive cough that is worse in the evenings. No headaches or vision changes. No urinary or bowel concerns. No new rashes or lesions. No peripheral edema. No sore throat. No bowel concerns. No lightheadedness or dizziness. Has been pushing fluids and food.     REVIEW OF SYSTEMS:    Patient denies the following except if noted above: fevers, body aches, chills, headaches, vision changes, dizziness, chest pain, shortness of breath, nausea, vomiting, diarrhea, constipation, abdominal pain, rashes, bruising/bleeding, mouth sores, swelling or pain in the legs.      PHYSICAL EXAMINATION:/79 (BP Location: Right arm, Patient Position: Sitting, Cuff Size: Adult Regular)    Pulse 98   Temp 99.2  F (37.3  C) (Oral)   Resp 14   Wt 61.2 kg (134 lb 14.4 oz)   LMP 12/18/2014   SpO2 98%   BMI 23.14 kg/m    Wt Readings from Last 4 Encounters:   09/19/22 61.2 kg (134 lb 14.4 oz)   09/12/22 60.6 kg (133 lb 9.6 oz)   09/09/22 60.9 kg (134 lb 3.2 oz)   09/02/22 60.6 kg (133 lb 11.2 oz)     Vital signs were reviewed.   Patient alert and oriented x3.    PERRLA. EOMI. No scleral icterus noted. OP without thrush/sores. Nares erythematous. Ear canal clear with non-bulging TM. No signs of infection   Neck exam: No palpable cervical, supraclavicular nodes.   Heart: RRR no murmurs noted.   Lungs: clear to auscultation bilaterally.  No crackles or wheezing.   Abd: positive bowel sounds in all four quadrants.  No tenderness to palpation.  No hepatomegaly.   Extremities: No lower extremity edema.   Neuro: grossly intact.   Mood and affect is stable.         LABORATORY DATA:     Latest Reference Range & Units 09/19/22 13:34   Sodium 136 - 145 mmol/L 139   Potassium 3.4 - 5.3 mmol/L 4.2   Chloride 98 - 107 mmol/L 103   Carbon Dioxide (CO2) 22 - 29 mmol/L 26   Urea Nitrogen 6.0 - 20.0 mg/dL 11.4   Creatinine 0.51 - 0.95 mg/dL 0.85   GFR Estimate >60 mL/min/1.73m2 80   Calcium 8.6 - 10.0 mg/dL 9.2   Anion Gap 7 - 15 mmol/L 10   Albumin 3.5 - 5.2 g/dL 3.5   Protein Total 6.4 - 8.3 g/dL 6.5   Alkaline Phosphatase 35 - 104 U/L 82   ALT 10 - 35 U/L 22   AST 10 - 35 U/L 29   Bilirubin Total <=1.2 mg/dL 0.2   Glucose 70 - 99 mg/dL 127 (H)   WBC 4.0 - 11.0 10e3/uL 12.6 (H)   Hemoglobin 11.7 - 15.7 g/dL 9.9 (L)   Hematocrit 35.0 - 47.0 % 31.1 (L)   Platelet Count 150 - 450 10e3/uL 581 (H)   RBC Count 3.80 - 5.20 10e6/uL 3.59 (L)   MCV 78 - 100 fL 87   MCH 26.5 - 33.0 pg 27.6   MCHC 31.5 - 36.5 g/dL 31.8   RDW 10.0 - 15.0 % 17.5 (H)   % Neutrophils % 67   % Lymphocytes % 19   % Monocytes % 9   % Eosinophils % 1   % Basophils % 1   Absolute Basophils 0.0 - 0.2 10e3/uL 0.1   Absolute Eosinophils 0.0 - 0.7 10e3/uL  0.2   Absolute Immature Granulocytes <=0.4 10e3/uL 0.3   Absolute Lymphocytes 0.8 - 5.3 10e3/uL 2.3   Absolute Monocytes 0.0 - 1.3 10e3/uL 1.2   % Immature Granulocytes % 3   Absolute Neutrophils 1.6 - 8.3 10e3/uL 8.5 (H)   Absolute NRBCs 10e3/uL 0.0   NRBCs per 100 WBC <1 /100 0   (H): Data is abnormally high  (L): Data is abnormally low    Covid-19 PCR 09/12/22: Negative. Repeat today is pending.     I personally reviewed the above labs.     CXR 09/12/22:  Findings:   Left chest wall internal jugular Port-A-Cath tip at the mid to low  SVC. The cardiomediastinal silhouette and pulmonary vasculature are  within normal limits. No pleural effusion or pneumothorax. Right  apical scarring. No new focal airspace opacity. Right mastectomy  changes. Multifocal sclerotic osseous metastases involving the ribs  and spine.                                                                      Impression:   1. No convincing focal airspace disease.  2. Multifocal sclerotic osseous metastatic disease involving the ribs  and spine.    ECHO 7/13  Interpretation Summary  Global and regional left ventricular function is normal with an EF of 60-65%.  Right ventricular function, chamber size, wall motion, and thickness are  normal.  The inferior vena cava is normal.  No pericardial effusion is present.  No significant changes noted.     ASSESSMENT AND PLAN:   1.  Mary Bennett is a 56-year-old woman with a history of a clinical stage II right breast cancer, ER positive, CT positive, HER2 negative by immunohistochemistry, now with recurrent Metastatic breast cancer, lobular. New histology pleomorphic lobular cancer, which is negative for estrogen receptor and negative for progesterone receptor, and 20% is HER2 positive by FISH.  - She has a mixture of recurrent metastatic HER2 positive breast cancer that is estrogen receptor negative and 80% triple negative breast cancer.    - Discussed this situation in breast cancer conference on  April 22 and the consensus opinion is to treat the minority HER2+ component first with Vandana regimen because that treatment would also cover the triple negative (TNBC) component with the taxane part of the treatment. This would push back the use of pembrolizumab to the second line setting or later if the 22C3 staining is positive, but would allow earlier treatment of the HER2 component.     - Tumor markers have been rising significantly, most recent . Dr. Meza has concerns that there are 2 closes as indicated by pathology-- one of them being triple negative the other being HER2 positive.   - August 29 PET/CT the site of initial metastasis static disease showed no FDG uptake.  There were no suspicious bony lesions. The PET did demonstrate LAD on the left neck and axillae. 09/13/22 axillary lymph node biopsy negative for malignancy.   -- Biopsy of the sacrum in April showed 2 clones 1 of which was 20% of the tumor and was HER2 amplified remainder of the tumor is HER2 1+.    -- Given the rising markers, plan is to change therapy from pertuzumab, trastuzumab, paclitaxel to Enhertu to cover both the triple negative and the minority HER2 positive component.  - ECHO due in October   - Repeat PET in November 2. Acute sinusitis  - WBC trending up at 12.6 and ANC 8.5. Afebrile in clinic today.   - Suspect secondary bacterial infection following a viral illness. Given new sinus congestion, will treat for presumed bacterial sinusitis. Suspect cough is secondary to post nasal drip but will need to continue to monitor closely. If breathing worsens, low threshold to complete CT chest.   - Start Augmentin BID x 7 days. May add in flonase or Afrin. Reviewed if trials Afrin, must discontinue after 3 days of use to prevent rebound congestion.    - Close follow up with Sandra on 09/21/22.      3. Psych:  - She feels that she is coping very well currently and does not need any additional support at this time  - Continue  Effexor 75mg daily   - Not specifically discussed today.      3. Bone Mets  - Asymptomatic  - Currently getting xgeva monthly --ON HOLD due to molar issue, needs to see dentist is setting this up  - Reviewed again today the need for dental consultation. Continue to monitor for infectious concerns.      4. Neuropathy, grade 1, fingertips  - Continue to monitor closely   - If worsens, discussed dose reduction to 60mg/m2  - Not specifically dicussed today.     5. Nail changes 2/2 Taxol  - Start tea tree oil soaks   - Not specifically dicussed today.     6. Follow up   - RTC on 09/21 with Sandra in person to follow up on acute illness prior to initiation of cycle 1 Enhertu on 09/23/22.     35 minutes spent on the date of the encounter doing chart review, review of test results, interpretation of tests, patient visit and documentation      Michelle Lin PA-C

## 2022-09-19 NOTE — LETTER
[facility]  [address] [facility] [address] [address]  Phone: [phone]  Fax: [phone]    September 19, 2022        Josefina Bennett  [address] [place] [id number]          To whom it may concern:    RE: Josefina Bennett    Patient was seen and treated today at our clinic and missed work for an acute illness. She may return to work once she is feeling better.     Please contact me for questions or concerns.      Sincerely,        Michelle Lin PA-C

## 2022-09-19 NOTE — LETTER
9/19/2022         RE: Josefina Bennett  [address]  [facility] [address]      Sep 19, 2022      REASON FOR VISIT: Follow-up breast cancer     HISTORY OF PRESENT ILLNESS:  Josefina Bennett was self referred to our clinic for clinical trial recommendations for newly diagnosed stage II breast cancer. She had her last mammogram approximately a year prior. She did notice in early January 2015 that she was having some distortion of the right breast, and she went to see her gynecologist and had a mammogram performed. She had a screening mammogram performed on 01/15/2015. Breast tissue was heterogeneously dense, which might compromise the mammography. There was architectural distortion in the right breast approximately 11-12 o'clock position, zone 2, posterior depth, and a CC MLO spot compression was performed and an ultrasound was planned. The diagnostic mammogram from 01/28/2015 showed right breast architectural distortion centered at the 12 o'clock position, zone 2, suspicious for neoplasm. Breast tomosynthesis was used in the interpretation. Ultrasound findings included targeted ultrasound of the right upper breast demonstrating a band-like area of abnormal echogenicity between 11 and 12 o'clock position in the right breast at zone 2. This measures 4 cm transversely x 1.1 cm AP, and there was only a 1.5 cm measurement in the radial direction. There was blood flow suspicious for neoplasm at the 11 o'clock position in zone 2. There is a 1.4 cm hypoechoic nodule slightly  from the larger area of altered echogenicity that is also suspicious. A biopsy was performed. The biopsy showed infiltrating lobular carcinoma, grade 2, and a clip was placed at specimen [id number]. There were a few signet cells present. The tumor was ER-positive, GA low positive, and HER2 was negative by immunohistochemistry. She subsequently underwent an MRI showing a tumor that was 4.8 x 1.7 x 3.2 cm in size. Overall, her clinical stage was T2 NX  MX.       She was enrolled in the I-SPY-2 clinical trial, and qualified with high-risk MammaPrint score. She was randomized to ganetespib and paclitaxel, and she was treated with 12 cycles of treatment, and then started on AC on 05/26/2015. She developed intractable nausea and vomiting after the first cycle, which required hospitalization on the second cycle. She then developed Pneumocystis pneumonia, which resulted in intubation and a 2-week hospitalization during the course of AC. She was discharged on 07/02/2015, and decided she did not want to pursue any further chemotherapy. She had a right lumpectomy and sentinel lymph node procedure 08/12/2015. She had a positive margin, underwent a re-excision, and had a positive margin. Ultimately, she underwent a right mastectomy with clear margins. She began radiation treatment with Dr. Bill Chauhan, and completed radiation therapy on 12/04/2015. Pathologic stage was III-A, ypT3 N1a MX. Of concern, she had what could be considered an RCB3 tumor.  She started adjuvant letrozole on 1/28/16.     4/6/2022 Josefina Verma developed a fever during Reclast.  She was feeling unwell and went to the ED.  CT CAP showed new involvement of abdominal lymph nodes and multiple bone metastases.  She was admitted and I saw her in the hospital 4-7-22 to discuss the plan for biopsy.  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  A mix of TNBC and ER- HER2+.  Clinic conference consensus was for the modified Vandana with paclitaxel weekly to treat both clones.         TREATMENT HISTORY:  A.  Neoadjuvant therapy on I SPY-2 with ganetespib and paclitaxel.  AC x 2 complicated by PCP pneumonia.    B.  Right lumpectomy, followed by right margin resections, followed by right mastectomy.   B.  Oophorectomy 10-16-15.   C.  Tamoxifen after 10-6-15.   D.  Radiation therapy.  5,040 cGy in 180 cGy/fraction to the chest wall and regional lymphatics followed by 720 cGy axillary lymph node boost and 1000 cGy mastectomy scar boost. Completed on 12/3/2015.  E.   Adjuvant letrozole begun 1-28-16.  Plan was to continue through 2026.  F.  Diagnosis with recurrent/metastatic breast cancer with a sacral mass that was biopsied showing pleiomorphic lobular breast cancer that is ER negative, MN negative, HER2 positive by FISH in 20% of the cells and negative in 80% of cells.  A mix of TNBC and ER- HER2+.  Clinic conference consensus was for the modified Vandana with paclitaxel weekly to treat both clones.          INTERVAL HISTORY:    Mary presents to clinic for an acute add on.She reports that she is continues to feel poorly over the past week. She initially felt like she had laryngitis on 09/09/22 and has been having intermittent fevers since. Today, she reports a new onset of sinus congestion and pressure which is new since last week. She also reports having ear congestion. No tooth or mouth pain. No sore throat. Her arthalgias and myalgias have improved but still experiences them when she is febrile. Temp at been between .0 F over the past week. She overall feels tired and weak. Denies any falls. Feels some chest congestion but feels as though this is secondary to sinus drainage. Endorses a dry, non-productive cough that is worse in the evenings. No headaches or vision changes. No urinary or bowel concerns. No new rashes or lesions. No peripheral edema. No sore throat. No bowel concerns. No lightheadedness or dizziness. Has been pushing fluids and food.     REVIEW OF SYSTEMS:    Patient denies the following except if noted above: fevers, body aches, chills, headaches, vision changes, dizziness, chest pain, shortness of breath, nausea, vomiting, diarrhea, constipation, abdominal pain, rashes, bruising/bleeding, mouth sores, swelling or pain in the legs.      PHYSICAL EXAMINATION:BP  137/79 (BP Location: Right arm, Patient Position: Sitting, Cuff Size: Adult Regular)   Pulse 98   Temp 99.2  F (37.3  C) (Oral)   Resp 14   Wt 61.2 kg (134 lb 14.4 oz)   LMP 12/18/2014   SpO2 98%   BMI 23.14 kg/m    Wt Readings from Last 4 Encounters:   09/19/22 61.2 kg (134 lb 14.4 oz)   09/12/22 60.6 kg (133 lb 9.6 oz)   09/09/22 60.9 kg (134 lb 3.2 oz)   09/02/22 60.6 kg (133 lb 11.2 oz)     Vital signs were reviewed.   Patient alert and oriented x3.    PERRLA. EOMI. No scleral icterus noted. OP without thrush/sores. Nares erythematous. Ear canal clear with non-bulging TM. No signs of infection   Neck exam: No palpable cervical, supraclavicular nodes.   Heart: RRR no murmurs noted.   Lungs: clear to auscultation bilaterally.  No crackles or wheezing.   Abd: positive bowel sounds in all four quadrants.  No tenderness to palpation.  No hepatomegaly.   Extremities: No lower extremity edema.   Neuro: grossly intact.   Mood and affect is stable.         LABORATORY DATA:     Latest Reference Range & Units 09/19/22 13:34   Sodium 136 - 145 mmol/L 139   Potassium 3.4 - 5.3 mmol/L 4.2   Chloride 98 - 107 mmol/L 103   Carbon Dioxide (CO2) 22 - 29 mmol/L 26   Urea Nitrogen 6.0 - 20.0 mg/dL 11.4   Creatinine 0.51 - 0.95 mg/dL 0.85   GFR Estimate >60 mL/min/1.73m2 80   Calcium 8.6 - 10.0 mg/dL 9.2   Anion Gap 7 - 15 mmol/L 10   Albumin 3.5 - 5.2 g/dL 3.5   Protein Total 6.4 - 8.3 g/dL 6.5   Alkaline Phosphatase 35 - 104 U/L 82   ALT 10 - 35 U/L 22   AST 10 - 35 U/L 29   Bilirubin Total <=1.2 mg/dL 0.2   Glucose 70 - 99 mg/dL 127 (H)   WBC 4.0 - 11.0 10e3/uL 12.6 (H)   Hemoglobin 11.7 - 15.7 g/dL 9.9 (L)   Hematocrit 35.0 - 47.0 % 31.1 (L)   Platelet Count 150 - 450 10e3/uL 581 (H)   RBC Count 3.80 - 5.20 10e6/uL 3.59 (L)   MCV 78 - 100 fL 87   MCH 26.5 - 33.0 pg 27.6   MCHC 31.5 - 36.5 g/dL 31.8   RDW 10.0 - 15.0 % 17.5 (H)   % Neutrophils % 67   % Lymphocytes % 19   % Monocytes % 9   % Eosinophils % 1   % Basophils %  1   Absolute Basophils 0.0 - 0.2 10e3/uL 0.1   Absolute Eosinophils 0.0 - 0.7 10e3/uL 0.2   Absolute Immature Granulocytes <=0.4 10e3/uL 0.3   Absolute Lymphocytes 0.8 - 5.3 10e3/uL 2.3   Absolute Monocytes 0.0 - 1.3 10e3/uL 1.2   % Immature Granulocytes % 3   Absolute Neutrophils 1.6 - 8.3 10e3/uL 8.5 (H)   Absolute NRBCs 10e3/uL 0.0   NRBCs per 100 WBC <1 /100 0   (H): Data is abnormally high  (L): Data is abnormally low    Covid-19 PCR 09/12/22: Negative. Repeat today is pending.     I personally reviewed the above labs.     CXR 09/12/22:  Findings:   Left chest wall internal jugular Port-A-Cath tip at the mid to low  SVC. The cardiomediastinal silhouette and pulmonary vasculature are  within normal limits. No pleural effusion or pneumothorax. Right  apical scarring. No new focal airspace opacity. Right mastectomy  changes. Multifocal sclerotic osseous metastases involving the ribs  and spine.                                                                      Impression:   1. No convincing focal airspace disease.  2. Multifocal sclerotic osseous metastatic disease involving the ribs  and spine.    ECHO 7/13  Interpretation Summary  Global and regional left ventricular function is normal with an EF of 60-65%.  Right ventricular function, chamber size, wall motion, and thickness are  normal.  The inferior vena cava is normal.  No pericardial effusion is present.  No significant changes noted.     ASSESSMENT AND PLAN:   1.  Mary Bennett is a 56-year-old woman with a history of a clinical stage II right breast cancer, ER positive, DE positive, HER2 negative by immunohistochemistry, now with recurrent Metastatic breast cancer, lobular. New histology pleomorphic lobular cancer, which is negative for estrogen receptor and negative for progesterone receptor, and 20% is HER2 positive by FISH.  - She has a mixture of recurrent metastatic HER2 positive breast cancer that is estrogen receptor negative and 80% triple  negative breast cancer.    - Discussed this situation in breast cancer conference on April 22 and the consensus opinion is to treat the minority HER2+ component first with Vandana regimen because that treatment would also cover the triple negative (TNBC) component with the taxane part of the treatment. This would push back the use of pembrolizumab to the second line setting or later if the 22C3 staining is positive, but would allow earlier treatment of the HER2 component.     - Tumor markers have been rising significantly, most recent . Dr. Meza has concerns that there are 2 closes as indicated by pathology-- one of them being triple negative the other being HER2 positive.   - August 29 PET/CT the site of initial metastasis static disease showed no FDG uptake.  There were no suspicious bony lesions. The PET did demonstrate LAD on the left neck and axillae. 09/13/22 axillary lymph node biopsy negative for malignancy.   -- Biopsy of the sacrum in April showed 2 clones 1 of which was 20% of the tumor and was HER2 amplified remainder of the tumor is HER2 1+.    -- Given the rising markers, plan is to change therapy from pertuzumab, trastuzumab, paclitaxel to Enhertu to cover both the triple negative and the minority HER2 positive component.  - ECHO due in October   - Repeat PET in November 2. Acute sinusitis  - WBC trending up at 12.6 and ANC 8.5. Afebrile in clinic today.   - Suspect secondary bacterial infection following a viral illness. Given new sinus congestion, will treat for presumed bacterial sinusitis. Suspect cough is secondary to post nasal drip but will need to continue to monitor closely. If breathing worsens, low threshold to complete CT chest.   - Start Augmentin BID x 7 days. May add in flonase or Afrin. Reviewed if trials Afrin, must discontinue after 3 days of use to prevent rebound congestion.    - Close follow up with Sandra on 09/21/22.      3. Psych:  - She feels that she is coping  very well currently and does not need any additional support at this time  - Continue Effexor 75mg daily   - Not specifically discussed today.      3. Bone Mets  - Asymptomatic  - Currently getting xgeva monthly --ON HOLD due to molar issue, needs to see dentist is setting this up  - Reviewed again today the need for dental consultation. Continue to monitor for infectious concerns.      4. Neuropathy, grade 1, fingertips  - Continue to monitor closely   - If worsens, discussed dose reduction to 60mg/m2  - Not specifically dicussed today.     5. Nail changes 2/2 Taxol  - Start tea tree oil soaks   - Not specifically dicussed today.     6. Follow up   - RTC on 09/21 with Sandra in person to follow up on acute illness prior to initiation of cycle 1 Enhertu on 09/23/22.     35 minutes spent on the date of the encounter doing chart review, review of test results, interpretation of tests, patient visit and documentation      ROCCO Mariano PA-C

## 2022-09-19 NOTE — NURSING NOTE
"Chief Complaint   Patient presents with     Oncology Clinic Visit     Malignant neoplasm of areola of right breast in female, unspecified estrogen receptor status (H) (Primary Dx)      Port Draw     Labs drawn via port by RN.     Port accessed with 20G 3/4\" flat needle by RN, labs collected, line flushed with saline and heparin.      Valeri Montague RN    "

## 2022-09-19 NOTE — LETTER
9/19/2022         RE: Josefina Bennett  [address]  [facility] [address]        Dear Colleague,    Thank you for referring your patient, Josefina Bennett, to the [facility]. Please see a copy of my visit note below.    Sep 19, 2022      REASON FOR VISIT: Follow-up breast cancer     HISTORY OF PRESENT ILLNESS:  Josefina Bennett was self referred to our clinic for clinical trial recommendations for newly diagnosed stage II breast cancer. She had her last mammogram approximately a year prior. She did notice in early January 2015 that she was having some distortion of the right breast, and she went to see her gynecologist and had a mammogram performed. She had a screening mammogram performed on 01/15/2015. Breast tissue was heterogeneously dense, which might compromise the mammography. There was architectural distortion in the right breast approximately 11-12 o'clock position, zone 2, posterior depth, and a CC MLO spot compression was performed and an ultrasound was planned. The diagnostic mammogram from 01/28/2015 showed right breast architectural distortion centered at the 12 o'clock position, zone 2, suspicious for neoplasm. Breast tomosynthesis was used in the interpretation. Ultrasound findings included targeted ultrasound of the right upper breast demonstrating a band-like area of abnormal echogenicity between 11 and 12 o'clock position in the right breast at zone 2. This measures 4 cm transversely x 1.1 cm AP, and there was only a 1.5 cm measurement in the radial direction. There was blood flow suspicious for neoplasm at the 11 o'clock position in zone 2. There is a 1.4 cm hypoechoic nodule slightly  from the larger area of altered echogenicity that is also suspicious. A biopsy was performed. The biopsy showed infiltrating lobular carcinoma, grade 2, and a clip was placed at specimen [id number]. There were a few signet cells present. The tumor was ER-positive, CA low positive, and  HER2 was negative by immunohistochemistry. She subsequently underwent an MRI showing a tumor that was 4.8 x 1.7 x 3.2 cm in size. Overall, her clinical stage was T2 NX MX.       She was enrolled in the I-SPY-2 clinical trial, and qualified with high-risk MammaPrint score. She was randomized to ganetespib and paclitaxel, and she was treated with 12 cycles of treatment, and then started on AC on 05/26/2015. She developed intractable nausea and vomiting after the first cycle, which required hospitalization on the second cycle. She then developed Pneumocystis pneumonia, which resulted in intubation and a 2-week hospitalization during the course of AC. She was discharged on 07/02/2015, and decided she did not want to pursue any further chemotherapy. She had a right lumpectomy and sentinel lymph node procedure 08/12/2015. She had a positive margin, underwent a re-excision, and had a positive margin. Ultimately, she underwent a right mastectomy with clear margins. She began radiation treatment with Dr. Bill Chauhan, and completed radiation therapy on 12/04/2015. Pathologic stage was III-A, ypT3 N1a MX. Of concern, she had what could be considered an RCB3 tumor.  She started adjuvant letrozole on 1/28/16.     4/6/2022 Josefina Verma developed a fever during Reclast.  She was feeling unwell and went to the ED.  CT CAP showed new involvement of abdominal lymph nodes and multiple bone metastases.  She was admitted and I saw her in the hospital 4-7-22 to discuss the plan for biopsy.  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  A mix of TNBC and ER- HER2+.  Clinic conference consensus was for the modified Vandana with paclitaxel weekly to treat both clones.         TREATMENT HISTORY:  A.  Neoadjuvant therapy on I SPY-2 with ganetespib and paclitaxel.  AC x 2 complicated by PCP pneumonia.    B.  Right  lumpectomy, followed by right margin resections, followed by right mastectomy.   B.  Oophorectomy 10-16-15.   C.  Tamoxifen after 10-6-15.   D.  Radiation therapy. 5,040 cGy in 180 cGy/fraction to the chest wall and regional lymphatics followed by 720 cGy axillary lymph node boost and 1000 cGy mastectomy scar boost. Completed on 12/3/2015.  E.   Adjuvant letrozole begun 1-28-16.  Plan was to continue through 2026.  F.  Diagnosis with recurrent/metastatic breast cancer with a sacral mass that was biopsied showing pleiomorphic lobular breast cancer that is ER negative, AZ negative, HER2 positive by FISH in 20% of the cells and negative in 80% of cells.  A mix of TNBC and ER- HER2+.  Clinic conference consensus was for the modified Vandana with paclitaxel weekly to treat both clones.          INTERVAL HISTORY:    Mary presents to clinic for an acute add on.She reports that she is continues to feel poorly over the past week. She initially felt like she had laryngitis on 09/09/22 and has been having intermittent fevers since. Today, she reports a new onset of sinus congestion and pressure which is new since last week. She also reports having ear congestion. No tooth or mouth pain. No sore throat. Her arthalgias and myalgias have improved but still experiences them when she is febrile. Temp at been between .0 F over the past week. She overall feels tired and weak. Denies any falls. Feels some chest congestion but feels as though this is secondary to sinus drainage. Endorses a dry, non-productive cough that is worse in the evenings. No headaches or vision changes. No urinary or bowel concerns. No new rashes or lesions. No peripheral edema. No sore throat. No bowel concerns. No lightheadedness or dizziness. Has been pushing fluids and food.     REVIEW OF SYSTEMS:    Patient denies the following except if noted above: fevers, body aches, chills, headaches, vision changes, dizziness, chest pain, shortness of  breath, nausea, vomiting, diarrhea, constipation, abdominal pain, rashes, bruising/bleeding, mouth sores, swelling or pain in the legs.      PHYSICAL EXAMINATION:/79 (BP Location: Right arm, Patient Position: Sitting, Cuff Size: Adult Regular)   Pulse 98   Temp 99.2  F (37.3  C) (Oral)   Resp 14   Wt 61.2 kg (134 lb 14.4 oz)   LMP 12/18/2014   SpO2 98%   BMI 23.14 kg/m    Wt Readings from Last 4 Encounters:   09/19/22 61.2 kg (134 lb 14.4 oz)   09/12/22 60.6 kg (133 lb 9.6 oz)   09/09/22 60.9 kg (134 lb 3.2 oz)   09/02/22 60.6 kg (133 lb 11.2 oz)     Vital signs were reviewed.   Patient alert and oriented x3.    PERRLA. EOMI. No scleral icterus noted. OP without thrush/sores. Nares erythematous. Ear canal clear with non-bulging TM. No signs of infection   Neck exam: No palpable cervical, supraclavicular nodes.   Heart: RRR no murmurs noted.   Lungs: clear to auscultation bilaterally.  No crackles or wheezing.   Abd: positive bowel sounds in all four quadrants.  No tenderness to palpation.  No hepatomegaly.   Extremities: No lower extremity edema.   Neuro: grossly intact.   Mood and affect is stable.         LABORATORY DATA:     Latest Reference Range & Units 09/19/22 13:34   Sodium 136 - 145 mmol/L 139   Potassium 3.4 - 5.3 mmol/L 4.2   Chloride 98 - 107 mmol/L 103   Carbon Dioxide (CO2) 22 - 29 mmol/L 26   Urea Nitrogen 6.0 - 20.0 mg/dL 11.4   Creatinine 0.51 - 0.95 mg/dL 0.85   GFR Estimate >60 mL/min/1.73m2 80   Calcium 8.6 - 10.0 mg/dL 9.2   Anion Gap 7 - 15 mmol/L 10   Albumin 3.5 - 5.2 g/dL 3.5   Protein Total 6.4 - 8.3 g/dL 6.5   Alkaline Phosphatase 35 - 104 U/L 82   ALT 10 - 35 U/L 22   AST 10 - 35 U/L 29   Bilirubin Total <=1.2 mg/dL 0.2   Glucose 70 - 99 mg/dL 127 (H)   WBC 4.0 - 11.0 10e3/uL 12.6 (H)   Hemoglobin 11.7 - 15.7 g/dL 9.9 (L)   Hematocrit 35.0 - 47.0 % 31.1 (L)   Platelet Count 150 - 450 10e3/uL 581 (H)   RBC Count 3.80 - 5.20 10e6/uL 3.59 (L)   MCV 78 - 100 fL 87   MCH 26.5 -  33.0 pg 27.6   MCHC 31.5 - 36.5 g/dL 31.8   RDW 10.0 - 15.0 % 17.5 (H)   % Neutrophils % 67   % Lymphocytes % 19   % Monocytes % 9   % Eosinophils % 1   % Basophils % 1   Absolute Basophils 0.0 - 0.2 10e3/uL 0.1   Absolute Eosinophils 0.0 - 0.7 10e3/uL 0.2   Absolute Immature Granulocytes <=0.4 10e3/uL 0.3   Absolute Lymphocytes 0.8 - 5.3 10e3/uL 2.3   Absolute Monocytes 0.0 - 1.3 10e3/uL 1.2   % Immature Granulocytes % 3   Absolute Neutrophils 1.6 - 8.3 10e3/uL 8.5 (H)   Absolute NRBCs 10e3/uL 0.0   NRBCs per 100 WBC <1 /100 0   (H): Data is abnormally high  (L): Data is abnormally low    Covid-19 PCR 09/12/22: Negative. Repeat today is pending.     I personally reviewed the above labs.     CXR 09/12/22:  Findings:   Left chest wall internal jugular Port-A-Cath tip at the mid to low  SVC. The cardiomediastinal silhouette and pulmonary vasculature are  within normal limits. No pleural effusion or pneumothorax. Right  apical scarring. No new focal airspace opacity. Right mastectomy  changes. Multifocal sclerotic osseous metastases involving the ribs  and spine.                                                                      Impression:   1. No convincing focal airspace disease.  2. Multifocal sclerotic osseous metastatic disease involving the ribs  and spine.    ECHO 7/13  Interpretation Summary  Global and regional left ventricular function is normal with an EF of 60-65%.  Right ventricular function, chamber size, wall motion, and thickness are  normal.  The inferior vena cava is normal.  No pericardial effusion is present.  No significant changes noted.     ASSESSMENT AND PLAN:   1.  Mary Bennett is a 56-year-old woman with a history of a clinical stage II right breast cancer, ER positive, OH positive, HER2 negative by immunohistochemistry, now with recurrent Metastatic breast cancer, lobular. New histology pleomorphic lobular cancer, which is negative for estrogen receptor and negative for progesterone  receptor, and 20% is HER2 positive by FISH.  - She has a mixture of recurrent metastatic HER2 positive breast cancer that is estrogen receptor negative and 80% triple negative breast cancer.    - Discussed this situation in breast cancer conference on April 22 and the consensus opinion is to treat the minority HER2+ component first with Vandana regimen because that treatment would also cover the triple negative (TNBC) component with the taxane part of the treatment. This would push back the use of pembrolizumab to the second line setting or later if the 22C3 staining is positive, but would allow earlier treatment of the HER2 component.     - Tumor markers have been rising significantly, most recent . Dr. Meza has concerns that there are 2 closes as indicated by pathology-- one of them being triple negative the other being HER2 positive.   - August 29 PET/CT the site of initial metastasis static disease showed no FDG uptake.  There were no suspicious bony lesions. The PET did demonstrate LAD on the left neck and axillae. 09/13/22 axillary lymph node biopsy negative for malignancy.   -- Biopsy of the sacrum in April showed 2 clones 1 of which was 20% of the tumor and was HER2 amplified remainder of the tumor is HER2 1+.    -- Given the rising markers, plan is to change therapy from pertuzumab, trastuzumab, paclitaxel to Enhertu to cover both the triple negative and the minority HER2 positive component.  - ECHO due in October   - Repeat PET in November 2. Acute sinusitis  - WBC trending up at 12.6 and ANC 8.5. Afebrile in clinic today.   - Suspect secondary bacterial infection following a viral illness. Given new sinus congestion, will treat for presumed bacterial sinusitis. Suspect cough is secondary to post nasal drip but will need to continue to monitor closely. If breathing worsens, low threshold to complete CT chest.   - Start Augmentin BID x 7 days. May add in flonase or Afrin. Reviewed if trials  Afrin, must discontinue after 3 days of use to prevent rebound congestion.    - Close follow up with Sandra on 09/21/22.      3. Psych:  - She feels that she is coping very well currently and does not need any additional support at this time  - Continue Effexor 75mg daily   - Not specifically discussed today.      3. Bone Mets  - Asymptomatic  - Currently getting xgeva monthly --ON HOLD due to molar issue, needs to see dentist is setting this up  - Reviewed again today the need for dental consultation. Continue to monitor for infectious concerns.      4. Neuropathy, grade 1, fingertips  - Continue to monitor closely   - If worsens, discussed dose reduction to 60mg/m2  - Not specifically dicussed today.     5. Nail changes 2/2 Taxol  - Start tea tree oil soaks   - Not specifically dicussed today.     6. Follow up   - RTC on 09/21 with Sandra in person to follow up on acute illness prior to initiation of cycle 1 Enhertu on 09/23/22.     35 minutes spent on the date of the encounter doing chart review, review of test results, interpretation of tests, patient visit and documentation      Michelle Lin PA-C          Again, thank you for allowing me to participate in the care of your patient.        Sincerely,        Michelle Lin PA-C

## 2022-09-21 NOTE — NURSING NOTE
"Oncology Rooming Note    September 21, 2022 10:28 AM   Josefina Bennett is a 56 year old female who presents for:    Chief Complaint   Patient presents with     Oncology Clinic Visit     Rtn for breast cancer     Initial Vitals: BP (!) 152/84 (BP Location: Right arm, Patient Position: Sitting, Cuff Size: Adult Regular)   Pulse 102   Temp 99.1  F (37.3  C) (Oral)   Wt 60.8 kg (134 lb)   LMP 12/18/2014   SpO2 99%   BMI 22.99 kg/m   Estimated body mass index is 22.99 kg/m  as calculated from the following:    Height as of 6/10/22: 1.626 m (5' 4.02\").    Weight as of this encounter: 60.8 kg (134 lb). Body surface area is 1.66 meters squared.  No Pain (0) Comment: Data Unavailable   Patient's last menstrual period was 12/18/2014.  Allergies reviewed: Yes  Medications reviewed: Yes    Medications: Medication refills not needed today.  Pharmacy name entered into Not[facility]:    [facility] - [place], [place] - [address] [address] - [place], [place] - [address] [facility] [facility] - [place], [place] - [address]    Clinical concerns: Pt is still feeling sick but feels slightly improved since starting antibiotic on Monday.      Peri Alcala, EMT            "

## 2022-09-21 NOTE — PROGRESS NOTES
- Fever has gotten better, last temp  - Pressure has started to improve since starting the Augmentin   - Appetite is down,   - Dry cough has been getting a little bit better. Mostly at night  - Dental swelling and pain went away over a week ago, has not come back   - Not having nearly enough chills

## 2022-09-21 NOTE — PROGRESS NOTES
Sep 21, 2022      REASON FOR VISIT: Follow-up breast cancer     HISTORY OF PRESENT ILLNESS:  Josefina Bennett was self referred to our clinic for clinical trial recommendations for newly diagnosed stage II breast cancer. She had her last mammogram approximately a year prior. She did notice in early January 2015 that she was having some distortion of the right breast, and she went to see her gynecologist and had a mammogram performed. She had a screening mammogram performed on 01/15/2015. Breast tissue was heterogeneously dense, which might compromise the mammography. There was architectural distortion in the right breast approximately 11-12 o'clock position, zone 2, posterior depth, and a CC MLO spot compression was performed and an ultrasound was planned. The diagnostic mammogram from 01/28/2015 showed right breast architectural distortion centered at the 12 o'clock position, zone 2, suspicious for neoplasm. Breast tomosynthesis was used in the interpretation. Ultrasound findings included targeted ultrasound of the right upper breast demonstrating a band-like area of abnormal echogenicity between 11 and 12 o'clock position in the right breast at zone 2. This measures 4 cm transversely x 1.1 cm AP, and there was only a 1.5 cm measurement in the radial direction. There was blood flow suspicious for neoplasm at the 11 o'clock position in zone 2. There is a 1.4 cm hypoechoic nodule slightly  from the larger area of altered echogenicity that is also suspicious. A biopsy was performed. The biopsy showed infiltrating lobular carcinoma, grade 2, and a clip was placed at specimen [id number]. There were a few signet cells present. The tumor was ER-positive, IL low positive, and HER2 was negative by immunohistochemistry. She subsequently underwent an MRI showing a tumor that was 4.8 x 1.7 x 3.2 cm in size. Overall, her clinical stage was T2 NX MX.       She was enrolled in the I-SPY-2 clinical trial, and qualified with  high-risk MammaPrint score. She was randomized to ganetespib and paclitaxel, and she was treated with 12 cycles of treatment, and then started on AC on 05/26/2015. She developed intractable nausea and vomiting after the first cycle, which required hospitalization on the second cycle. She then developed Pneumocystis pneumonia, which resulted in intubation and a 2-week hospitalization during the course of AC. She was discharged on 07/02/2015, and decided she did not want to pursue any further chemotherapy. She had a right lumpectomy and sentinel lymph node procedure 08/12/2015. She had a positive margin, underwent a re-excision, and had a positive margin. Ultimately, she underwent a right mastectomy with clear margins. She began radiation treatment with Dr. Bill Chauhan, and completed radiation therapy on 12/04/2015. Pathologic stage was III-A, ypT3 N1a MX. Of concern, she had what could be considered an RCB3 tumor.  She started adjuvant letrozole on 1/28/16.     4/6/2022 Josefina Verma developed a fever during Reclast.  She was feeling unwell and went to the ED.  CT CAP showed new involvement of abdominal lymph nodes and multiple bone metastases.  She was admitted and I saw her in the hospital 4-7-22 to discuss the plan for biopsy.  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  A mix of TNBC and ER- HER2+.  Clinic conference consensus was for the modified Vandana with paclitaxel weekly to treat both clones.         TREATMENT HISTORY:  A.  Neoadjuvant therapy on I SPY-2 with ganetespib and paclitaxel.  AC x 2 complicated by PCP pneumonia.    B.  Right lumpectomy, followed by right margin resections, followed by right mastectomy.   B.  Oophorectomy 10-16-15.   C.  Tamoxifen after 10-6-15.   D.  Radiation therapy. 5,040 cGy in 180 cGy/fraction to the chest wall and regional lymphatics followed  by 720 cGy axillary lymph node boost and 1000 cGy mastectomy scar boost. Completed on 12/3/2015.  E.   Adjuvant letrozole begun 1-28-16.  Plan was to continue through 2026.  F.  Diagnosis with recurrent/metastatic breast cancer with a sacral mass that was biopsied showing pleiomorphic lobular breast cancer that is ER negative, MT negative, HER2 positive by FISH in 20% of the cells and negative in 80% of cells.  A mix of TNBC and ER- HER2+.  Clinic conference consensus was for the modified Vandana with paclitaxel weekly to treat both clones.          INTERVAL HISTORY:    Mary presents to clinic prior to starting Enhertu on Friday.  She states that over the last 24 hours she has started to feel little bit better.  She is pushing terms of fluids, doing soups and teas.  She has been doing the Augmentin for 48 hours.  She has not had any recurrent fevers.  Her cough has improved slightly, although is still keeping her up at night.  The Robitussin is not really helping.  She notes that the cough is dry, no hemoptysis or coughing up phlegm.  Her sinuses continue to be tender but also improved.  The dental aches that she was previously having has completely resolved, no swelling or pain in that area.  Ears feel full, like they are clogged.  No abdominal pain.  No urinary symptoms.  Did develop a little bit of diarrhea following starting Augmentin, has been well controlled with diet and Imodium.    REVIEW OF SYSTEMS:    Patient denies the following except if noted above: fevers, body aches, chills, headaches, vision changes, dizziness, chest pain, shortness of breath, nausea, vomiting, diarrhea, constipation, abdominal pain, rashes, bruising/bleeding, mouth sores, swelling or pain in the legs.      PHYSICAL EXAMINATION:BP (!) 152/84 (BP Location: Right arm, Patient Position: Sitting, Cuff Size: Adult Regular)   Pulse 102   Temp 99.1  F (37.3  C) (Oral)   Wt 60.8 kg (134 lb)   LMP 12/18/2014   SpO2 99%   BMI 22.99  kg/m    Wt Readings from Last 4 Encounters:   09/21/22 60.8 kg (134 lb)   09/19/22 61.2 kg (134 lb 14.4 oz)   09/12/22 60.6 kg (133 lb 9.6 oz)   09/09/22 60.9 kg (134 lb 3.2 oz)     Vital signs were reviewed.   Patient alert and oriented x3.    PERRLA. EOMI. No scleral icterus noted. OP without thrush/sores. Nares erythematous. Ear canal clear with non-bulging TM. No signs of infection   Neck exam: No palpable cervical, supraclavicular nodes.   Heart: RRR no murmurs noted.   Lungs: clear to auscultation bilaterally.  No crackles or wheezing.   Abd: positive bowel sounds in all four quadrants.  No tenderness to palpation.  No hepatomegaly.   Extremities: No lower extremity edema.   Neuro: grossly intact.   Mood and affect is stable.         LABORATORY DATA:    Most Recent 3 CBC's:Recent Labs   Lab Test 09/19/22  1334 09/12/22  1206 09/09/22  1015   WBC 12.6* 10.4 8.0   HGB 9.9* 10.6* 10.7*   MCV 87 88 88   * 374 377    Most Recent 3 BMP's:  Recent Labs   Lab Test 09/19/22  1334 09/12/22  1206 09/09/22  1015    138 139   POTASSIUM 4.2 4.3 4.2   CHLORIDE 103 104 105   CO2 26 23 25   BUN 11.4 14.9 16.8   CR 0.85 0.80 0.96*   ANIONGAP 10 11 9   VEE 9.2 9.0 8.9   * 147* 115*    Most Recent 2 LFT's:  Recent Labs   Lab Test 09/19/22  1334 09/12/22  1206   AST 29 29   ALT 22 27   ALKPHOS 82 77   BILITOTAL 0.2 0.6      I reviewed the above labs today.      No new imaging          ASSESSMENT AND PLAN:   1.  Mary Bennett is a 56-year-old woman with a history of a clinical stage II right breast cancer, ER positive, RI positive, HER2 negative by immunohistochemistry, now with recurrent Metastatic breast cancer, lobular. New histology pleomorphic lobular cancer, which is negative for estrogen receptor and negative for progesterone receptor, and 20% is HER2 positive by FISH.  - She has a mixture of recurrent metastatic HER2 positive breast cancer that is estrogen receptor negative and 80% triple negative  breast cancer.    - Discussed this situation in breast cancer conference on April 22 and the consensus opinion is to treat the minority HER2+ component first with Vandana regimen because that treatment would also cover the triple negative (TNBC) component with the taxane part of the treatment. This would push back the use of pembrolizumab to the second line setting or later if the 22C3 staining is positive, but would allow earlier treatment of the HER2 component.     - Tumor markers have been rising significantly, most recent . Dr. Meza has concerns that there are 2 closes as indicated by pathology-- one of them being triple negative the other being HER2 positive.   - August 29 PET/CT the site of initial metastasis static disease showed no FDG uptake.  There were no suspicious bony lesions. The PET did demonstrate LAD on the left neck and axillae. 09/13/22 axillary lymph node biopsy negative for malignancy.   -- Biopsy of the sacrum in April showed 2 clones 1 of which was 20% of the tumor and was HER2 amplified remainder of the tumor is HER2 1+.    -- Given the rising markers, plan is to change therapy from pertuzumab, trastuzumab, paclitaxel to Enhertu to cover both the triple negative and the minority HER2 positive component.  - ECHO due in October   - Repeat PET in November    - If patient continues to improve and has appropriate labs, ok to proceed with cycle 1 of Enhertu on Friday. If not improved, will plan to delay a week     2. Acute sinusitis  - Symptoms have mildly improved today  - Continue supportive cares as is   - Continue Augmentin BID x 7 days. Started 9/19  - Add albuterol and robitussen AC for cough at night      3. Psych:  - She feels that she is coping very well currently and does not need any additional support at this time  - Continue Effexor 75mg daily   - Not specifically discussed today.      3. Bone Mets  - Asymptomatic  - Currently getting xgeva monthly --ON HOLD due to molar  issue, needs to see dentist is setting this up       4. Nail changes 2/2 Taxol  - Continue tea tree oil soaks             Patient will call in the interim with any questions or concerns. They voice understanding and agree with the above plan.         Sandra Coughlin PA-C      ADDENDUM:  Patient feeling better, breathing better, no further fevers. Doesn't feel up for starting chemo just yet, would like another week to recover. Will message scheduling.     Sandra Coughlin PA-C on 9/23/2022 at 11:24 AM

## 2022-09-23 NOTE — PROGRESS NOTES
Called patient with missed appointment for her new treatment today. Left a VM and also sent a [employer]Auth[employer] message to have patient return call. Will discuss getting permission of who can to speak to in the future. Teresa Ashton RN, BSN Breast Center Nurse Coordinator

## 2022-09-29 NOTE — TELEPHONE ENCOUNTER
[facility] Triage    Incoming Call: Cold    Treated for a Sinus infection with Augmentin 9/19 and finished 9/26    Patient started to improve, but did not totally go away.      Congested and a runny nose, yellow drainage and pale green  Fever 100.3 last night  No fever today  Tylenol cold and flu  No sore throat  No headache  Teeth do not hurt  Doesn't hurt when she bends over  Continues with cough at night (at HS) sleeps sitting up  Fatigue  Had 4 covid tests, last one being 9/22/22 and all negative    9/21/22 saw Sandra Coughlin next appt 10/21/22 and Infusion is tomorrow.    Advised rest, fluids, tylenol and I will page provider for direction.  Paged Michelle Lin.    Michelle would like Mary to have labs at 630 tomorrow, see Michelle at 7 prior to infusion.    Spoke to Josefina Verma, she agrees to arrive at 630 for lab and 7 am to see Michelle before infusion.  If her temp goes up, her symptoms increase she should go to ED.    [facility] requested to change appts to the above and keep infusion as is.    Routing to Michelle Lin

## 2022-09-29 NOTE — TELEPHONE ENCOUNTER
[facility] Triage    Last prescribing provider: Ced    Last clinic visit date:9/21 [place]    Any missed appointments or no-shows since last clinic visit?: YES    Recommendations for requested medication (if none, N/A): Took her last one today    Next clinic visit date: 10/21/22 Ced    Any other pertinent information (if none, N/A):     Routing to Sandra Coughlin

## 2022-09-29 NOTE — PROGRESS NOTES
Sep 30, 2022    REASON FOR VISIT: Follow-up breast cancer     HISTORY OF PRESENT ILLNESS:  Josefina Bennett was self referred to our clinic for clinical trial recommendations for newly diagnosed stage II breast cancer. She had her last mammogram approximately a year prior. She did notice in early January 2015 that she was having some distortion of the right breast, and she went to see her gynecologist and had a mammogram performed. She had a screening mammogram performed on 01/15/2015. Breast tissue was heterogeneously dense, which might compromise the mammography. There was architectural distortion in the right breast approximately 11-12 o'clock position, zone 2, posterior depth, and a CC MLO spot compression was performed and an ultrasound was planned. The diagnostic mammogram from 01/28/2015 showed right breast architectural distortion centered at the 12 o'clock position, zone 2, suspicious for neoplasm. Breast tomosynthesis was used in the interpretation. Ultrasound findings included targeted ultrasound of the right upper breast demonstrating a band-like area of abnormal echogenicity between 11 and 12 o'clock position in the right breast at zone 2. This measures 4 cm transversely x 1.1 cm AP, and there was only a 1.5 cm measurement in the radial direction. There was blood flow suspicious for neoplasm at the 11 o'clock position in zone 2. There is a 1.4 cm hypoechoic nodule slightly  from the larger area of altered echogenicity that is also suspicious. A biopsy was performed. The biopsy showed infiltrating lobular carcinoma, grade 2, and a clip was placed at specimen [id number]. There were a few signet cells present. The tumor was ER-positive, [place] low positive, and HER2 was negative by immunohistochemistry. She subsequently underwent an MRI showing a tumor that was 4.8 x 1.7 x 3.2 cm in size. Overall, her clinical stage was T2 NX MX.       She was enrolled in the I-SPY-2 clinical trial, and qualified with  high-risk MammaPrint score. She was randomized to ganetespib and paclitaxel, and she was treated with 12 cycles of treatment, and then started on AC on 05/26/2015. She developed intractable nausea and vomiting after the first cycle, which required hospitalization on the second cycle. She then developed Pneumocystis pneumonia, which resulted in intubation and a 2-week hospitalization during the course of AC. She was discharged on 07/02/2015, and decided she did not want to pursue any further chemotherapy. She had a right lumpectomy and sentinel lymph node procedure 08/12/2015. She had a positive margin, underwent a re-excision, and had a positive margin. Ultimately, she underwent a right mastectomy with clear margins. She began radiation treatment with Dr. Bill Chauhan, and completed radiation therapy on 12/04/2015. Pathologic stage was III-A, ypT3 N1a MX. Of concern, she had what could be considered an RCB3 tumor.  She started adjuvant letrozole on 1/28/16.     4/6/2022 Josefina Verma developed a fever during Reclast.  She was feeling unwell and went to the ED.  CT CAP showed new involvement of abdominal lymph nodes and multiple bone metastases.  She was admitted and I saw her in the hospital 4-7-22 to discuss the plan for biopsy.  A biopsy of a sacral mass showed pleomorphic lobular breast cancer which was ER negative, SD negative, HER2 positive by FISH in 20% of the cells and negative in 80% of cells.  A mix of TNBC and ER- HER2+.  Clinic conference consensus was for the modified Vandana with paclitaxel weekly to treat both clones.         TREATMENT HISTORY:  A.  Neoadjuvant therapy on I SPY-2 with ganetespib and paclitaxel.  AC x 2 complicated by PCP pneumonia.    B.  Right lumpectomy, followed by right margin resections, followed by right mastectomy.   B.  Oophorectomy 10-16-15.   C.  Tamoxifen after 10-6-15.   D.  Radiation therapy. 5,040 cGy in 180 cGy/fraction to the chest wall and regional lymphatics followed  by 720 cGy axillary lymph node boost and 1000 cGy mastectomy scar boost. Completed on 12/3/2015.  E.   Adjuvant letrozole begun 1-28-16.  Plan was to continue through 2026.  F.  Diagnosis with recurrent/metastatic breast cancer with a sacral mass that was biopsied showing pleiomorphic lobular breast cancer that is ER negative, IL negative, HER2 positive by FISH in 20% of the cells and negative in 80% of cells.  A mix of TNBC and ER- HER2+.  Clinic conference consensus was for the modified Vandana with paclitaxel weekly to treat both clones.          INTERVAL HISTORY:    Mary presents to clinic for an acute add on prior to starting Enhertu today. She states that she completed her Augmentin on Monday 09/26/22 and felt that her symptoms of sinus pressure had improved but never completely resolved. Today, reports that her sinus pressure her sinus pressure returned yesterday and today. Reports yellow/green drainage from the sinuses. Has been doing sinus rinses which has been helpful and flonase. At home, she was febrile to 100.9 F last night but has been afebrile so far today.  Did develop a little bit of diarrhea following starting Augmentin, has been well controlled with diet and Imodium. Diarrhea has improved since discontinuing Augmentin. Continues to endorse a primarily dry cough at night time. She found the albuterol inhaler to be helpful last week but reports this week she cannot get it to spray any medication and thinks it is broken. Ears feel full, like they are clogged. She was able to work this week but reports energy continues to be low. No dental pain.  No urinary symptoms. Endorses sweats and chills associated with fevers. No myalgias.       REVIEW OF SYSTEMS:    Patient denies the following except if noted above: fevers, body aches, chills, headaches, vision changes, dizziness, chest pain, shortness of breath, nausea, vomiting, diarrhea, constipation, abdominal pain, rashes, bruising/bleeding, mouth  sores, swelling or pain in the legs.      PHYSICAL EXAMINATION:BP (!) 144/75 (BP Location: Left arm, Patient Position: Sitting, Cuff Size: Adult Regular)   Pulse 102   Temp 98.7  F (37.1  C) (Oral)   Resp 16   Wt 61.7 kg (136 lb 1.6 oz)   LMP 12/18/2014   SpO2 97%   BMI 23.35 kg/m    Wt Readings from Last 4 Encounters:   09/30/22 61.7 kg (136 lb 1.6 oz)   09/21/22 60.8 kg (134 lb)   09/19/22 61.2 kg (134 lb 14.4 oz)   09/12/22 60.6 kg (133 lb 9.6 oz)     Vital signs were reviewed.   Patient alert and oriented x3.    PERRLA. EOMI. No scleral icterus noted. OP without thrush/sores. Ear canal clear with non-bulging TM. No signs of infection   Neck exam: No palpable cervical, supraclavicular nodes.   Heart: RRR no murmurs noted.   Lungs: clear to auscultation bilaterally.  No crackles or wheezing.   Abd: positive bowel sounds in all four quadrants.  No tenderness to palpation.  No hepatomegaly.   Extremities: No lower extremity edema.   Neuro: grossly intact.   Mood and affect is stable.         LABORATORY DATA:    Most Recent 3 CBC's:  Recent Labs   Lab Test 09/30/22  0724 09/19/22  1334 09/12/22  1206   WBC 11.6* 12.6* 10.4   HGB 10.7* 9.9* 10.6*   MCV 86 87 88    581* 374    Most Recent 3 BMP's:  Recent Labs   Lab Test 09/30/22  0724 09/19/22  1334 09/12/22  1206    139 138   POTASSIUM 4.6 4.2 4.3   CHLORIDE 103 103 104   CO2 26 26 23   BUN 17.7 11.4 14.9   CR 0.93 0.85 0.80   ANIONGAP 10 10 11   VEE 9.5 9.2 9.0   * 127* 147*    Most Recent 2 LFT's:  Recent Labs   Lab Test 09/30/22  0724 09/19/22  1334   AST 44* 29   ALT 41* 22   ALKPHOS 106* 82   BILITOTAL 0.2 0.2     Component      Latest Ref Rng & Units 9/2/2022 9/9/2022 9/19/2022 9/30/2022   Cancer Antigen 15-3      <=25 U/mL 230 (H) 238 (H) 237 (H) 249 (H)     Component      Latest Ref Rng & Units 9/2/2022 9/9/2022 9/19/2022 9/30/2022   CEA      ng/mL 413.0 440.0 469.0 698.0        I reviewed the above labs today.    CT Chest and CT  sinus: pending.        ASSESSMENT AND PLAN:   1.  Mary Bennett is a 56-year-old woman with a history of a clinical stage II right breast cancer, ER positive, [place] positive, HER2 negative by immunohistochemistry, now with recurrent Metastatic breast cancer, lobular. New histology pleomorphic lobular cancer, which is negative for estrogen receptor and negative for progesterone receptor, and 20% is HER2 positive by FISH.  - She has a mixture of recurrent metastatic HER2 positive breast cancer that is estrogen receptor negative and 80% triple negative breast cancer.    - Discussed this situation in breast cancer conference on April 22 and the consensus opinion is to treat the minority HER2+ component first with Vandana regimen because that treatment would also cover the triple negative (TNBC) component with the taxane part of the treatment. This would push back the use of pembrolizumab to the second line setting or later if the 22C3 staining is positive, but would allow earlier treatment of the HER2 component.     - Tumor markers have been rising significantly, most recent . Dr. Meza has concerns that there are 2 closes as indicated by pathology-- one of them being triple negative the other being HER2 positive.   - August 29 PET/CT the site of initial metastasis static disease showed no FDG uptake.  There were no suspicious bony lesions. The PET did demonstrate LAD on the left neck and axillae. 09/13/22 axillary lymph node biopsy negative for malignancy.   -- Biopsy of the sacrum in April showed 2 clones 1 of which was 20% of the tumor and was HER2 amplified remainder of the tumor is HER2 1+.    -- Given the rising markers, plan is to change therapy from pertuzumab, trastuzumab, paclitaxel to Enhertu to cover both the triple negative and the minority HER2 positive component.  - ECHO due in October   - Repeat PET in November    - Patient is feeling well enough for Enhertu cycle 1. Will plan to administer  today. Labs significant for mild leukocytosis,but improved, ANC WNL.   - CEA and CA 15-3 continue to uptrend, will start Enhertu as above.     2. Acute sinusitis  - Symptoms initially improved but has been gradually returning the last couple of days.   - Continue supportive cares as is.   - Continue Augmentin BID x 7 days. Completed on 09/26/22.   - Continue albuterol and robitussen AC for cough at night   - Will obtain a CT scan of the sinuses and CT of the chest to evaluate further and need to for re treatment.      3. Psych:  - She feels that she is coping very well currently and does not need any additional support at this time  - Continue Effexor 75mg daily. Refill sent today.      4. Bone Mets  - Asymptomatic  - Currently getting xgeva monthly --ON HOLD due to molar issue, needs to see dentist is setting this up.      5. Nail changes 2/2 Taxol  - Continue tea tree oil soaks. Not specifically discussed today.     6. Mildly elevated LFTs  - May be secondary to tylenol usage. Continue to monitor.     40 minutes spent on the date of the encounter doing chart review, review of test results, interpretation of tests, patient visit and documentation     Michelle Lin PA-C    Addendum     Sinus CT scan demonstrates chronic sinusitis. No acute bacterial sinusitis. It did continue to show apical lucency of the left molar.     I called and discussed preliminary results of the CT chest with radiology. It does demonstrate new nodular consolidations in the right upper, middle, and lower lung lobe. Per radiology, the appearance is most consistent with infection and less likely metastasis.     Will start levaquin 750 mg x 7 days.     I attempted to call Mary but was unable to reach out. I followed up with a Zeer message.

## 2022-09-30 NOTE — PROGRESS NOTES
Infusion Nursing Note:  Josefina Bennett presents today for C1D1 Enhurtu.    Patient seen by provider today: Yes: Michelle Lin PA-C   present during visit today: Not Applicable.    Note: Patient reported to clinic today with no new complaints or concerns after seeing provider.    TORB: Michelle Lin PA-C/Levar Terrazas RN  -ok to proceed with treatment today.  -will get Echo prior to next infusion  -pt to go to CT after infusion complete    Intravenous Access:  Labs drawn without difficulty.  Implanted Port.  By lab staff.    Treatment Conditions:  Lab Results   Component Value Date    HGB 10.7 (L) 09/30/2022    WBC 11.6 (H) 09/30/2022    ANEU 4.2 01/23/2020    ANEUTAUTO 7.6 09/30/2022     09/30/2022      Lab Results   Component Value Date     09/30/2022    POTASSIUM 4.6 09/30/2022    MAG 1.8 06/27/2015    CR 0.93 09/30/2022    VEE 9.5 09/30/2022    BILITOTAL 0.2 09/30/2022    ALBUMIN 3.6 09/30/2022    ALT 41 (H) 09/30/2022    AST 44 (H) 09/30/2022     Results reviewed, labs MET treatment parameters, ok to proceed with treatment.    Post Infusion Assessment:  Patient tolerated infusion without incident.  Blood return noted pre and post infusion.  Site patent and intact, free from redness, edema or discomfort.  No evidence of extravasations.  Access discontinued per protocol.     Discharge Plan:   Patient declined prescription refills.  Discharge instructions reviewed with: Patient.  Patient and/or family verbalized understanding of discharge instructions and all questions answered.  AVS to patient via [employer].  Patient will return 10/21/22 for next appointment.   Patient discharged in stable condition accompanied by: self.  Departure Mode: Ambulatory.  Face to Face time: 10 minutes.    Levar Terrazas RN

## 2022-09-30 NOTE — LETTER
9/30/2022         RE: Josefina Bennett  [address]  [facility] [address]      Sep 30, 2022    REASON FOR VISIT: Follow-up breast cancer     HISTORY OF PRESENT ILLNESS:  Josefina Bennett was self referred to our clinic for clinical trial recommendations for newly diagnosed stage II breast cancer. She had her last mammogram approximately a year prior. She did notice in early January 2015 that she was having some distortion of the right breast, and she went to see her gynecologist and had a mammogram performed. She had a screening mammogram performed on 01/15/2015. Breast tissue was heterogeneously dense, which might compromise the mammography. There was architectural distortion in the right breast approximately 11-12 o'clock position, zone 2, posterior depth, and a CC MLO spot compression was performed and an ultrasound was planned. The diagnostic mammogram from 01/28/2015 showed right breast architectural distortion centered at the 12 o'clock position, zone 2, suspicious for neoplasm. Breast tomosynthesis was used in the interpretation. Ultrasound findings included targeted ultrasound of the right upper breast demonstrating a band-like area of abnormal echogenicity between 11 and 12 o'clock position in the right breast at zone 2. This measures 4 cm transversely x 1.1 cm AP, and there was only a 1.5 cm measurement in the radial direction. There was blood flow suspicious for neoplasm at the 11 o'clock position in zone 2. There is a 1.4 cm hypoechoic nodule slightly  from the larger area of altered echogenicity that is also suspicious. A biopsy was performed. The biopsy showed infiltrating lobular carcinoma, grade 2, and a clip was placed at specimen [id number]. There were a few signet cells present. The tumor was ER-positive, LA low positive, and HER2 was negative by immunohistochemistry. She subsequently underwent an MRI showing a tumor that was 4.8 x 1.7 x 3.2 cm in size. Overall, her clinical stage was T2 NX MX.        She was enrolled in the I-SPY-2 clinical trial, and qualified with high-risk MammaPrint score. She was randomized to ganetespib and paclitaxel, and she was treated with 12 cycles of treatment, and then started on AC on 05/26/2015. She developed intractable nausea and vomiting after the first cycle, which required hospitalization on the second cycle. She then developed Pneumocystis pneumonia, which resulted in intubation and a 2-week hospitalization during the course of AC. She was discharged on 07/02/2015, and decided she did not want to pursue any further chemotherapy. She had a right lumpectomy and sentinel lymph node procedure 08/12/2015. She had a positive margin, underwent a re-excision, and had a positive margin. Ultimately, she underwent a right mastectomy with clear margins. She began radiation treatment with Dr. Bill Chauhan, and completed radiation therapy on 12/04/2015. Pathologic stage was III-A, ypT3 N1a MX. Of concern, she had what could be considered an RCB3 tumor.  She started adjuvant letrozole on 1/28/16.     4/6/2022 Josefina Verma developed a fever during Reclast.  She was feeling unwell and went to the ED.  CT CAP showed new involvement of abdominal lymph nodes and multiple bone metastases.  She was admitted and I saw her in the hospital 4-7-22 to discuss the plan for biopsy.  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  A mix of TNBC and ER- HER2+.  Clinic conference consensus was for the modified Vandana with paclitaxel weekly to treat both clones.         TREATMENT HISTORY:  A.  Neoadjuvant therapy on I SPY-2 with ganetespib and paclitaxel.  AC x 2 complicated by PCP pneumonia.    B.  Right lumpectomy, followed by right margin resections, followed by right mastectomy.   B.  Oophorectomy 10-16-15.   C.  Tamoxifen after 10-6-15.   D.  Radiation therapy. 5,040  cGy in 180 cGy/fraction to the chest wall and regional lymphatics followed by 720 cGy axillary lymph node boost and 1000 cGy mastectomy scar boost. Completed on 12/3/2015.  E.   Adjuvant letrozole begun 1-28-16.  Plan was to continue through 2026.  F.  Diagnosis with recurrent/metastatic breast cancer with a sacral mass that was biopsied showing pleiomorphic lobular breast cancer that is ER negative, NJ negative, HER2 positive by FISH in 20% of the cells and negative in 80% of cells.  A mix of TNBC and ER- HER2+.  Clinic conference consensus was for the modified Vandana with paclitaxel weekly to treat both clones.          INTERVAL HISTORY:    Mary presents to clinic for an acute add on prior to starting Enhertu today. She states that she completed her Augmentin on Monday 09/26/22 and felt that her symptoms of sinus pressure had improved but never completely resolved. Today, reports that her sinus pressure her sinus pressure returned yesterday and today. Reports yellow/green drainage from the sinuses. Has been doing sinus rinses which has been helpful and flonase. At home, she was febrile to 100.9 F last night but has been afebrile so far today.  Did develop a little bit of diarrhea following starting Augmentin, has been well controlled with diet and Imodium. Diarrhea has improved since discontinuing Augmentin. Continues to endorse a primarily dry cough at night time. She found the albuterol inhaler to be helpful last week but reports this week she cannot get it to spray any medication and thinks it is broken. Ears feel full, like they are clogged. She was able to work this week but reports energy continues to be low. No dental pain.  No urinary symptoms. Endorses sweats and chills associated with fevers. No myalgias.       REVIEW OF SYSTEMS:    Patient denies the following except if noted above: fevers, body aches, chills, headaches, vision changes, dizziness, chest pain, shortness of breath, nausea,  vomiting, diarrhea, constipation, abdominal pain, rashes, bruising/bleeding, mouth sores, swelling or pain in the legs.      PHYSICAL EXAMINATION:BP (!) 144/75 (BP Location: Left arm, Patient Position: Sitting, Cuff Size: Adult Regular)   Pulse 102   Temp 98.7  F (37.1  C) (Oral)   Resp 16   Wt 61.7 kg (136 lb 1.6 oz)   LMP 12/18/2014   SpO2 97%   BMI 23.35 kg/m    Wt Readings from Last 4 Encounters:   09/30/22 61.7 kg (136 lb 1.6 oz)   09/21/22 60.8 kg (134 lb)   09/19/22 61.2 kg (134 lb 14.4 oz)   09/12/22 60.6 kg (133 lb 9.6 oz)     Vital signs were reviewed.   Patient alert and oriented x3.    PERRLA. EOMI. No scleral icterus noted. OP without thrush/sores. Ear canal clear with non-bulging TM. No signs of infection   Neck exam: No palpable cervical, supraclavicular nodes.   Heart: RRR no murmurs noted.   Lungs: clear to auscultation bilaterally.  No crackles or wheezing.   Abd: positive bowel sounds in all four quadrants.  No tenderness to palpation.  No hepatomegaly.   Extremities: No lower extremity edema.   Neuro: grossly intact.   Mood and affect is stable.         LABORATORY DATA:    Most Recent 3 CBC's:  Recent Labs   Lab Test 09/30/22  0724 09/19/22  1334 09/12/22  1206   WBC 11.6* 12.6* 10.4   HGB 10.7* 9.9* 10.6*   MCV 86 87 88    581* 374    Most Recent 3 BMP's:  Recent Labs   Lab Test 09/30/22  0724 09/19/22  1334 09/12/22  1206    139 138   POTASSIUM 4.6 4.2 4.3   CHLORIDE 103 103 104   CO2 26 26 23   BUN 17.7 11.4 14.9   CR 0.93 0.85 0.80   ANIONGAP 10 10 11   VEE 9.5 9.2 9.0   * 127* 147*    Most Recent 2 LFT's:  Recent Labs   Lab Test 09/30/22  0724 09/19/22  1334   AST 44* 29   ALT 41* 22   ALKPHOS 106* 82   BILITOTAL 0.2 0.2     Component      Latest Ref Rng & Units 9/2/2022 9/9/2022 9/19/2022 9/30/2022   Cancer Antigen 15-3      <=25 U/mL 230 (H) 238 (H) 237 (H) 249 (H)     Component      Latest Ref Rng & Units 9/2/2022 9/9/2022 9/19/2022 9/30/2022   CEA      ng/mL  413.0 440.0 469.0 698.0        I reviewed the above labs today.    CT Chest and CT sinus: pending.        ASSESSMENT AND PLAN:   1.  Mary Bennett is a 56-year-old woman with a history of a clinical stage II right breast cancer, ER positive, [place] positive, HER2 negative by immunohistochemistry, now with recurrent Metastatic breast cancer, lobular. New histology pleomorphic lobular cancer, which is negative for estrogen receptor and negative for progesterone receptor, and 20% is HER2 positive by FISH.  - She has a mixture of recurrent metastatic HER2 positive breast cancer that is estrogen receptor negative and 80% triple negative breast cancer.    - Discussed this situation in breast cancer conference on April 22 and the consensus opinion is to treat the minority HER2+ component first with Vandana regimen because that treatment would also cover the triple negative (TNBC) component with the taxane part of the treatment. This would push back the use of pembrolizumab to the second line setting or later if the 22C3 staining is positive, but would allow earlier treatment of the HER2 component.     - Tumor markers have been rising significantly, most recent . Dr. Meza has concerns that there are 2 closes as indicated by pathology-- one of them being triple negative the other being HER2 positive.   - August 29 PET/CT the site of initial metastasis static disease showed no FDG uptake.  There were no suspicious bony lesions. The PET did demonstrate LAD on the left neck and axillae. 09/13/22 axillary lymph node biopsy negative for malignancy.   -- Biopsy of the sacrum in April showed 2 clones 1 of which was 20% of the tumor and was HER2 amplified remainder of the tumor is HER2 1+.    -- Given the rising markers, plan is to change therapy from pertuzumab, trastuzumab, paclitaxel to Enhertu to cover both the triple negative and the minority HER2 positive component.  - ECHO due in October   - Repeat PET in  November    - Patient is feeling well enough for Enhertu cycle 1. Will plan to administer today. Labs significant for mild leukocytosis,but improved, ANC WNL.   - CEA and CA 15-3 continue to uptrend, will start Enhertu as above.     2. Acute sinusitis  - Symptoms initially improved but has been gradually returning the last couple of days.   - Continue supportive cares as is.   - Continue Augmentin BID x 7 days. Completed on 09/26/22.   - Continue albuterol and robitussen AC for cough at night   - Will obtain a CT scan of the sinuses and CT of the chest to evaluate further and need to for re treatment.      3. Psych:  - She feels that she is coping very well currently and does not need any additional support at this time  - Continue Effexor 75mg daily. Refill sent today.      4. Bone Mets  - Asymptomatic  - Currently getting xgeva monthly --ON HOLD due to molar issue, needs to see dentist is setting this up.      5. Nail changes 2/2 Taxol  - Continue tea tree oil soaks. Not specifically discussed today.     6. Mildly elevated LFTs  - May be secondary to tylenol usage. Continue to monitor.     40 minutes spent on the date of the encounter doing chart review, review of test results, interpretation of tests, patient visit and documentation     Michelle Lin PA-C    Addendum     Sinus CT scan demonstrates chronic sinusitis. No acute bacterial sinusitis. It did continue to show apical lucency of the left molar.     I called and discussed preliminary results of the CT chest with radiology. It does demonstrate new nodular consolidations in the right upper, middle, and lower lung lobe. Per radiology, the appearance is most consistent with infection and less likely metastasis.     Will start levaquin 750 mg x 7 days.     I attempted to call Mary but was unable to reach out. I followed up with a [employer] message.       Michelle Lin PA-C

## 2022-09-30 NOTE — NURSING NOTE
"Oncology Rooming Note    September 30, 2022 7:28 AM   Josefina Bennett is a 56 year old female who presents for:    Chief Complaint   Patient presents with     Port Draw     Labs drawn from port by rn.  VS taken.     Oncology Clinic Visit     Breast Ca     Initial Vitals: BP (!) 144/75 (BP Location: Left arm, Patient Position: Sitting, Cuff Size: Adult Regular)   Pulse 102   Temp 98.7  F (37.1  C) (Oral)   Resp 16   Wt 61.7 kg (136 lb 1.6 oz)   LMP 12/18/2014   SpO2 97%   BMI 23.35 kg/m   Estimated body mass index is 23.35 kg/m  as calculated from the following:    Height as of 6/10/22: 1.626 m (5' 4.02\").    Weight as of this encounter: 61.7 kg (136 lb 1.6 oz). Body surface area is 1.67 meters squared.  No Pain (0) Comment: Data Unavailable   Patient's last menstrual period was 12/18/2014.  Allergies reviewed: Yes  Medications reviewed: Yes    Medications: MEDICATION REFILLS NEEDED TODAY. Provider was notified.  Pharmacy name entered into [employer]:    [facility] - [place], [place] - [address] AT [facility]  [facility] - A MAIL ORDER [facility] [place] - [place], [place] - [address] [facility] - [place], [place] - [address]    Clinical concerns: Refill of Venlafaxine and possibly another course of antibiotics. Michelle was notified.      Leandra Lizarraga CMA              "

## 2022-09-30 NOTE — Clinical Note
9/30/2022         RE: Josefina Bennett  [address]  [facility] [address]        Dear Colleague,    Thank you for referring your patient, Josefina Bennett, to the [facility]. Please see a copy of my visit note below.    Sep 30, 2022    REASON FOR VISIT: Follow-up breast cancer     HISTORY OF PRESENT ILLNESS:  Josefina Bennett was self referred to our clinic for clinical trial recommendations for newly diagnosed stage II breast cancer. She had her last mammogram approximately a year prior. She did notice in early January 2015 that she was having some distortion of the right breast, and she went to see her gynecologist and had a mammogram performed. She had a screening mammogram performed on 01/15/2015. Breast tissue was heterogeneously dense, which might compromise the mammography. There was architectural distortion in the right breast approximately 11-12 o'clock position, zone 2, posterior depth, and a CC MLO spot compression was performed and an ultrasound was planned. The diagnostic mammogram from 01/28/2015 showed right breast architectural distortion centered at the 12 o'clock position, zone 2, suspicious for neoplasm. Breast tomosynthesis was used in the interpretation. Ultrasound findings included targeted ultrasound of the right upper breast demonstrating a band-like area of abnormal echogenicity between 11 and 12 o'clock position in the right breast at zone 2. This measures 4 cm transversely x 1.1 cm AP, and there was only a 1.5 cm measurement in the radial direction. There was blood flow suspicious for neoplasm at the 11 o'clock position in zone 2. There is a 1.4 cm hypoechoic nodule slightly  from the larger area of altered echogenicity that is also suspicious. A biopsy was performed. The biopsy showed infiltrating lobular carcinoma, grade 2, and a clip was placed at specimen [id number]. There were a few signet cells present. The tumor was ER-positive, AL low positive, and  HER2 was negative by immunohistochemistry. She subsequently underwent an MRI showing a tumor that was 4.8 x 1.7 x 3.2 cm in size. Overall, her clinical stage was T2 NX MX.       She was enrolled in the I-SPY-2 clinical trial, and qualified with high-risk MammaPrint score. She was randomized to ganetespib and paclitaxel, and she was treated with 12 cycles of treatment, and then started on AC on 05/26/2015. She developed intractable nausea and vomiting after the first cycle, which required hospitalization on the second cycle. She then developed Pneumocystis pneumonia, which resulted in intubation and a 2-week hospitalization during the course of AC. She was discharged on 07/02/2015, and decided she did not want to pursue any further chemotherapy. She had a right lumpectomy and sentinel lymph node procedure 08/12/2015. She had a positive margin, underwent a re-excision, and had a positive margin. Ultimately, she underwent a right mastectomy with clear margins. She began radiation treatment with Dr. Bill Chauhan, and completed radiation therapy on 12/04/2015. Pathologic stage was III-A, ypT3 N1a MX. Of concern, she had what could be considered an RCB3 tumor.  She started adjuvant letrozole on 1/28/16.     4/6/2022 Josefina Verma developed a fever during Reclast.  She was feeling unwell and went to the ED.  CT CAP showed new involvement of abdominal lymph nodes and multiple bone metastases.  She was admitted and I saw her in the hospital 4-7-22 to discuss the plan for biopsy.  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  A mix of TNBC and ER- HER2+.  Clinic conference consensus was for the modified Vandana with paclitaxel weekly to treat both clones.         TREATMENT HISTORY:  A.  Neoadjuvant therapy on I SPY-2 with ganetespib and paclitaxel.  AC x 2 complicated by PCP pneumonia.    B.  Right  lumpectomy, followed by right margin resections, followed by right mastectomy.   B.  Oophorectomy 10-16-15.   C.  Tamoxifen after 10-6-15.   D.  Radiation therapy. 5,040 cGy in 180 cGy/fraction to the chest wall and regional lymphatics followed by 720 cGy axillary lymph node boost and 1000 cGy mastectomy scar boost. Completed on 12/3/2015.  E.   Adjuvant letrozole begun 1-28-16.  Plan was to continue through 2026.  F.  Diagnosis with recurrent/metastatic breast cancer with a sacral mass that was biopsied showing pleiomorphic lobular breast cancer that is ER negative, VA negative, HER2 positive by FISH in 20% of the cells and negative in 80% of cells.  A mix of TNBC and ER- HER2+.  Clinic conference consensus was for the modified Vandana with paclitaxel weekly to treat both clones.          INTERVAL HISTORY:    Mary presents to clinic for an acute add on prior to starting Enhertu today. She states that she completed her Augmentin on Monday 09/26/22 and felt that her symptoms of sinus pressure had improved but never completely resolved. Today, reports that her sinus pressure her sinus pressure returned yesterday and today. Reports yellow/green drainage from the sinuses. Has been doing sinus rinses which has been helpful and flonase. At home, she was febrile to 100.9 F last night but has been afebrile so far today.  Did develop a little bit of diarrhea following starting Augmentin, has been well controlled with diet and Imodium. Diarrhea has improved since discontinuing Augmentin. Continues to endorse a primarily dry cough at night time. She found the albuterol inhaler to be helpful last week but reports this week she cannot get it to spray any medication and thinks it is broken. Ears feel full, like they are clogged. She was able to work this week but reports energy continues to be low. No dental pain.  No urinary symptoms. Endorses sweats and chills associated with fevers. No myalgias.       REVIEW OF  SYSTEMS:    Patient denies the following except if noted above: fevers, body aches, chills, headaches, vision changes, dizziness, chest pain, shortness of breath, nausea, vomiting, diarrhea, constipation, abdominal pain, rashes, bruising/bleeding, mouth sores, swelling or pain in the legs.      PHYSICAL EXAMINATION:BP (!) 144/75 (BP Location: Left arm, Patient Position: Sitting, Cuff Size: Adult Regular)   Pulse 102   Temp 98.7  F (37.1  C) (Oral)   Resp 16   Wt 61.7 kg (136 lb 1.6 oz)   LMP 12/18/2014   SpO2 97%   BMI 23.35 kg/m    Wt Readings from Last 4 Encounters:   09/30/22 61.7 kg (136 lb 1.6 oz)   09/21/22 60.8 kg (134 lb)   09/19/22 61.2 kg (134 lb 14.4 oz)   09/12/22 60.6 kg (133 lb 9.6 oz)     Vital signs were reviewed.   Patient alert and oriented x3.    PERRLA. EOMI. No scleral icterus noted. OP without thrush/sores. Nares erythematous. Ear canal clear with non-bulging TM. No signs of infection   Neck exam: No palpable cervical, supraclavicular nodes.   Heart: RRR no murmurs noted.   Lungs: clear to auscultation bilaterally.  No crackles or wheezing.   Abd: positive bowel sounds in all four quadrants.  No tenderness to palpation.  No hepatomegaly.   Extremities: No lower extremity edema.   Neuro: grossly intact.   Mood and affect is stable.         LABORATORY DATA:    Most Recent 3 CBC's:  Recent Labs   Lab Test 09/30/22  0724 09/19/22  1334 09/12/22  1206   WBC 11.6* 12.6* 10.4   HGB 10.7* 9.9* 10.6*   MCV 86 87 88    581* 374    Most Recent 3 BMP's:  Recent Labs   Lab Test 09/30/22  0724 09/19/22  1334 09/12/22  1206    139 138   POTASSIUM 4.6 4.2 4.3   CHLORIDE 103 103 104   CO2 26 26 23   BUN 17.7 11.4 14.9   CR 0.93 0.85 0.80   ANIONGAP 10 10 11   VEE 9.5 9.2 9.0   * 127* 147*    Most Recent 2 LFT's:  Recent Labs   Lab Test 09/30/22  0724 09/19/22  1334   AST 44* 29   ALT 41* 22   ALKPHOS 106* 82   BILITOTAL 0.2 0.2      I reviewed the above labs today.    No new imaging        ASSESSMENT AND PLAN:   1.  Mary Bennett is a 56-year-old woman with a history of a clinical stage II right breast cancer, ER positive, MT positive, HER2 negative by immunohistochemistry, now with recurrent Metastatic breast cancer, lobular. New histology pleomorphic lobular cancer, which is negative for estrogen receptor and negative for progesterone receptor, and 20% is HER2 positive by FISH.  - She has a mixture of recurrent metastatic HER2 positive breast cancer that is estrogen receptor negative and 80% triple negative breast cancer.    - Discussed this situation in breast cancer conference on April 22 and the consensus opinion is to treat the minority HER2+ component first with Vandana regimen because that treatment would also cover the triple negative (TNBC) component with the taxane part of the treatment. This would push back the use of pembrolizumab to the second line setting or later if the 22C3 staining is positive, but would allow earlier treatment of the HER2 component.     - Tumor markers have been rising significantly, most recent . Dr. Meza has concerns that there are 2 closes as indicated by pathology-- one of them being triple negative the other being HER2 positive.   - August 29 PET/CT the site of initial metastasis static disease showed no FDG uptake.  There were no suspicious bony lesions. The PET did demonstrate LAD on the left neck and axillae. 09/13/22 axillary lymph node biopsy negative for malignancy.   -- Biopsy of the sacrum in April showed 2 clones 1 of which was 20% of the tumor and was HER2 amplified remainder of the tumor is HER2 1+.    -- Given the rising markers, plan is to change therapy from pertuzumab, trastuzumab, paclitaxel to Enhertu to cover both the triple negative and the minority HER2 positive component.  - ECHO due in October   - Repeat PET in November    - Patient is feeling well enough for Enhertu. Will plan to administer today. Labs significant for  mild leukocytosis,but improved, ANC WNL.   - CEA and CA 15-3 pending.     2. Acute sinusitis  - Symptoms initially improved but has been gradually returning the last couple of days.   - Continue supportive cares as is.   - Continue Augmentin BID x 7 days. Completed on 09/26/22.   - Continue albuterol and robitussen AC for cough at night   - Will obtain a CT scan of the sinuses and CT of the chest to evaluate further and need to for re treatment.        3. Psych:  - She feels that she is coping very well currently and does not need any additional support at this time  - Continue Effexor 75mg daily. Refill sent.      4. Bone Mets  - Asymptomatic  - Currently getting xgeva monthly --ON HOLD due to molar issue, needs to see dentist is setting this up.      5. Nail changes 2/2 Taxol  - Continue tea tree oil soaks.     6. Mildly elevated LFTs  - May be secondary to tylenol usage. Continue to monitor.     40 minutes spent on the date of the encounter doing chart review, review of test results, interpretation of tests, patient visit and documentation     Michelle Lin PA-C                Again, thank you for allowing me to participate in the care of your patient.        Sincerely,        Michelle Lin PA-C

## 2022-09-30 NOTE — PATIENT INSTRUCTIONS
[facility] Main Line: [phone]    Call triage nurse with chills and/or temperature greater than or equal to 100.4, uncontrolled nausea/vomiting, diarrhea, constipation, dizziness, shortness of breath, chest pain, bleeding, unexplained bruising, or any new/concerning symptoms, questions/concerns.   If you are having any concerning symptoms or wish to speak to a provider before your next infusion visit, please call your care coordinator or triage to notify them so we can adequately serve you.   Nurse Triage line:  [phone]    If after hours, weekends, or holidays, call main hospital  and ask for Oncology doctor on call @ [phone]     September 2022 Sunday Monday Tuesday Wednesday Thursday Friday Saturday                       1     2    LAB CENTRAL  12:30 PM   (15 min.)   [facility] LAB DRAW   [facility]    ONC INFUSION 4 HR (240 MIN)   1:00 PM   (240 min.)    ONC INFUSION NURSE   [facility] 3       4     5     6     7     8     9    LAB CENTRAL   9:30 AM   (15 min.)   [facility] LAB DRAW   [facility]    RETURN   9:45 AM   (45 min.)   Monika Hendrix PA-C   [facility]    ONC INFUSION 2 HR (120 MIN)  11:00 AM   (120 min.)    ONC INFUSION NURSE   [facility] 10       11     12    LAB CENTRAL  11:00 AM   (15 min.)   [facility] LAB DRAW   [facility]    XR CHEST 2 VIEWS  11:00 AM   (20 min.)   [email or web address]   [facility] [place]    RETURN  11:15 AM   (45 min.)   Michelle Lin PA-C   [facility] 13    US AXILLARY LEFT  12:45 PM   (30 min.)   [email or web address]   [facility] [place]    US BREAST BX CORE LYMPH NODE L   1:15 PM   (60 min.)   [email or web address]   [facility] [place] 14     15     16     17        18     19    LAB CENTRAL  12:00 PM   (15 min.)   [facility] LAB DRAW   [facility]    RETURN  12:15 PM   (45 min.)   Michelle Lin PA-C   [facility] 20     21    RETURN  10:15 AM   (45 min.)   Sandra Coughlin PA-C   [facility] 22     23    LAB CENTRAL   7:30 AM   (15 min.)   [facility] DRAW   [facility]    ONC INFUSION 3 HR (180 MIN)   8:00 AM   (180 min.)   [facility] ONC INFUSION NURSE   [facility] 24       25     26     27     28     29     30    LAB CENTRAL   6:30 AM   (15 min.)    [facility] DRAW   [facility]    RETURN   6:45 AM   (45 min.)   Michelle Lin PA-C   [facility]    ONC INFUSION 3 HR (180 MIN)   7:30 AM   (180 min.)   [facility] ONC INFUSION NURSE   [facility]    CT CHEST WO  10:45 AM   (20 min.)   [facility]    CT SINUS WO  12:45 PM   (20 min.)   [facility]                    October 2022 Sunday Monday Tuesday Wednesday Thursday Friday Saturday                                 1       2     3     4     5     6     7     8       9     10     11     12     13     14     15       16     17     18     19     20     21    LAB CENTRAL   1:00 PM   (15 min.)    [facility] DRAW   [facility]    RETURN   1:15 PM   (45 min.)   Sandra Coughlin PA-C   [facility]    ONC INFUSION 2 HR (120 MIN)   2:30 PM   (120 min.)   [facility] ONC INFUSION NURSE   [facility] 22       23     24     25     26     27     28     29       30     31                                                Lab Results:  Recent Results (from the past 12 hour(s))   COMPREHENSIVE METABOLIC PANEL    Collection Time:  09/30/22  7:24 AM   Result Value Ref Range    Sodium 139 136 - 145 mmol/L    Potassium 4.6 3.4 - 5.3 mmol/L    Chloride 103 98 - 107 mmol/L    Carbon Dioxide (CO2) 26 22 - 29 mmol/L    Anion Gap 10 7 - 15 mmol/L    Urea Nitrogen 17.7 6.0 - 20.0 mg/dL    Creatinine 0.93 0.51 - 0.95 mg/dL    Calcium 9.5 8.6 - 10.0 mg/dL    Glucose 158 (H) 70 - 99 mg/dL    Alkaline Phosphatase 106 (H) 35 - 104 U/L    AST 44 (H) 10 - 35 U/L    ALT 41 (H) 10 - 35 U/L    Protein Total 6.4 6.4 - 8.3 g/dL    Albumin 3.6 3.5 - 5.2 g/dL    Bilirubin Total 0.2 <=1.2 mg/dL    GFR Estimate 72 >60 mL/min/1.73m2   CBC with platelets and differential    Collection Time: 09/30/22  7:24 AM   Result Value Ref Range    WBC Count 11.6 (H) 4.0 - 11.0 10e3/uL    RBC Count 3.95 3.80 - 5.20 10e6/uL    Hemoglobin 10.7 (L) 11.7 - 15.7 g/dL    Hematocrit 34.1 (L) 35.0 - 47.0 %    MCV 86 78 - 100 fL    MCH 27.1 26.5 - 33.0 pg    MCHC 31.4 (L) 31.5 - 36.5 g/dL    RDW 17.4 (H) 10.0 - 15.0 %    Platelet Count 450 150 - 450 10e3/uL    % Neutrophils 66 %    % Lymphocytes 22 %    % Monocytes 8 %    % Eosinophils 3 %    % Basophils 1 %    % Immature Granulocytes 0 %    NRBCs per 100 WBC 0 <1 /100    Absolute Neutrophils 7.6 1.6 - 8.3 10e3/uL    Absolute Lymphocytes 2.5 0.8 - 5.3 10e3/uL    Absolute Monocytes 0.9 0.0 - 1.3 10e3/uL    Absolute Eosinophils 0.4 0.0 - 0.7 10e3/uL    Absolute Basophils 0.1 0.0 - 0.2 10e3/uL    Absolute Immature Granulocytes 0.1 <=0.4 10e3/uL    Absolute NRBCs 0.0 10e3/uL

## 2022-10-03 NOTE — TELEPHONE ENCOUNTER
Feeling Nauseated.   Taking Compazine every 6 hours.  Was started on antibiotic for pneumonia, Saturday felt OK Sunday felt sick stomach was upset and she feels so weak.   Feels weaker than she did on Friday and has waves of nausea that just keep coming.   Temp has been normal for past 2 days.     3:35 Paged Dr Meza     3:40 per Dr Meza patient needs to go to ER at [address] [place] [address].   3:45 Call placed Mary and let her know above information.   3:46 Call placed to [facility] ER and gave report to Shannon.

## 2022-10-04 NOTE — ED TRIAGE NOTES
Patient ambulatory to triage for shortness of breath. Patient is receiving chemotherapy for breast cancer and says she feels very fatigued and is having trouble eating. Patient also complaining of nausea.      Triage Assessment     Row Name 10/03/22 2059       Triage Assessment (Adult)    Airway WDL WDL       Respiratory WDL    Respiratory WDL WDL       Skin Circulation/Temperature WDL    Skin Circulation/Temperature WDL WDL       Cardiac WDL    Cardiac WDL WDL       Peripheral/Neurovascular WDL    Peripheral Neurovascular WDL WDL       Cognitive/Neuro/Behavioral WDL    Cognitive/Neuro/Behavioral WDL WDL

## 2022-10-04 NOTE — PLAN OF CARE
Plan of Care Reviewed With: patient    Plan of Care Reviewed With: patient    Shift: [id number]  VS: wnl,/70 (BP Location: Left arm)   Pulse 90   Temp 98.1  F (36.7  C) (Oral)   Resp 16   LMP 12/18/2014   SpO2 100% RA  Pain:  Denies pain  Neuro: Wnl, alert and oriented x 4  Cardiac: wnl, no cp and sob reported  Respiratory: wnl, remains on RA  GI/: spontaneously voiding.  BM x 1  Diet/Appetite: fair   LDA's: Left port, LR infusing and continues with iv Vanco and Zosyn  Skin: No new skin deficit noted  Activity: Independent with ADLs  Tests/Procedures:   Pertinent Labs/Lab Collection: AM Labs     Plan:  Goal Outcome Evaluation:  CRP trending down. Pt reported of nausea. No emesis reported. [place] controlled with Zofran and Reglan and effective per Pt.Ate 75 % meal

## 2022-10-04 NOTE — TELEPHONE ENCOUNTER
Patient has cancer.  Patient has been sick for 3 weeks.  Patient was diagnosed with pneumonia.  Patient was recommended to go to ED tonight for IV antibiotics.  Patient states she has been sitting in the ED for 3 hours and was told it could be another 3-4 hours.  Patient is wondering if she can go home and come back tomorrow.    I told patient she is able to do whatever she feels she needs to do but that I cannot recommend anything different that what her provider recommended.  I encouraged her to stay per the recommendations.    Priyanka Maria RN   10/03/22 8:11 PM  [facility] Nurse Advisor    Reason for Disposition    General information question, no triage required and triager able to answer question    Additional Information    Negative: [1] Caller is not with the adult (patient) AND [2] reporting urgent symptoms    Negative: Lab result questions    Negative: Medication questions    Negative: Caller can't be reached by phone    Negative: Caller has already spoken to PCP or another triager    Negative: RN needs further essential information from caller in order to complete triage    Negative: Requesting regular office appointment    Negative: [1] Caller requesting NON-URGENT health information AND [2] PCP's office is the best resource    Negative: Health Information question, no triage required and triager able to answer question    Protocols used: INFORMATION ONLY CALL - NO TRIAGE-A-

## 2022-10-04 NOTE — H&P
[facility]    History and Physical - Medicine Service, MAROON TEAM        Date of Admission:  10/3/2022    Assessment & Plan      Josefina Bennett is a 56 year old female admitted on 10/3/2022. She has a history of metastatic beast cancer (s/p R mastectomy, hysterectomy+oopherectomy and chemo/radiation), and depression is admitted for the management of pneumonia and generalized weakness.    Fatigue  Nausea/Vomiting/Diarrhea s/p recent chemo 9/30  For the past 3 days pt has had severe malaise, generalized weakness, and nausea with vomiting. She received her chemo on 9/30. Was generally doing well except for the cough she has been having. BMP unremarkable.  - IVF; received 1L bolus in the ED; mIVF 100ml/hr X10 hrs; reassess fluid status in the am  - Nausea/vomiting - IV zofran Q6H prn ; QTc 425  - Diet - regular diet  - BMP daily  - Mg, P in the am - replace as needed  - imodium prn    R upper and middle lung consolidation, likely CAP  Has been having cough (minimally productive) for the past 2 weeks. Had 5 days of Augmentin and 4 days of levofloxacin. Could be treated as failed treatment given unchanged CXR findings.  - Continue vanc and zosyn (using these abx because she had augmentin and levoflx recently without changes in the CXR)  - Sputum gram stain and culture  - Resp viral panel    Hyponatremia, hypovolemic  Mildly elevated creatinine  Do not satisfy criteria for LISA (1.07 at admission from 0.88 at baseline); Na 131 at admission  could be related to hypovolemia from vomiting and diarrhea  - Continue BMP monitoring  - IVF as above    Transaminitis - CTM     Diet:  Regular  DVT Prophylaxis: Enoxaparin (Lovenox) SQ  Berger Catheter: Not present  Fluids: mIVF LR  Central Lines: PRESENT     Cardiac Monitoring: None  Code Status:  Full    Disposition Plan      Expected Discharge Date: 10/06/2022                Patient will be staffed in the am    Aziza Arroyo  MD Samir  Medicine Service, [facility]  Securely message with the [employer] Web Console (learn more here)  Text page via [facility] Paging/Directory   Please see signed in provider for up to date coverage information  ________________________________________________________________    Chief Complaint   Fatigue and cough    History is obtained from the patient    History of Present Illness   Josefina Bennett is a 56 year old female admitted on 10/3/2022. She has a history of metastatic beast cancer (s/p R mastectomy, hysterectomy+oopherectomy and chemo/radiation), and depression is admitted for the management of cough and generalized weakness.    Pt started having minimally productive cough for the past 2 weeks with associated fever (max 101), chills, exertional SOB, orthopnea, and myalgia. Cough was preceded by sinusitis and mild headache. No associated chest pain. She took 5 days of Augmentin followed by 3-4 days of levofloxacin without improvement. Patient tried tessalon, robitussin and albuterol inhalers without much relief. On 9/30, patient completed her last cycle of chemo and has felt very tired by the end of it. The following day she started having nausea, vomiting and watery diarrhea (no blood or black color). She has previously tolerated chemo without nause/vomiting, but has had diarrhea. She took imodium for diarrhea and the episodes are now reduced to ~2 per day. There were several episodes of non-bloody vomiting since Saturday which were note responding to PTA compazine. She has been have poor PO following these episodes of vomiting.    Pt spoke to her oncologist who suggested going to ED given her previous hx of severe pneumonia requiring intubation.    ED course: Vitals are stable; satting well on room air; CXR suggested unchanged findings in the R upper and middle lobes concerning for pneumonia. Labs are mostly unremarkable. Pt received IV NS 1L  and IV zofran. Weakness persisted, but vomiting improved.    Review of Systems    The 10 point Review of Systems is negative other than noted in the HPI or here.     Past Medical History    I have reviewed this patient's medical history and updated it with pertinent information if needed.   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  Past Surgical History:   Procedure Laterality Date     APPENDECTOMY  1997     CYSTOSCOPY N/A 10/16/2015    Procedure: CYSTOSCOPY;  Surgeon: Aleksandr Palafox MD;  Location: [facility] OR     HC REMOVAL OF OVARIAN CYST(S)      x 3     INSERT PORT VASCULAR ACCESS Left 5/4/2022    Procedure: INSERTION, VASCULAR ACCESS PORT;  Surgeon: Darren Yates MD;  Location: [facility] OR     IR CHEST PORT PLACEMENT > 5 YRS OF AGE  5/4/2022     IR FLUORO 0-1 HOUR  4/12/2022     LAPAROSCOPIC HYSTERECTOMY TOTAL, BILATERAL SALPINGO-OOPHORECTOMY, COMBINED N/A 10/16/2015    Procedure: COMBINED LAPAROSCOPIC HYSTERECTOMY TOTAL, SALPINGO-OOPHORECTOMY;  Surgeon: Aleksandr Palafox MD;  Location: [facility] OR     LUMPECTOMY BREAST Right 9/2/2015    Procedure: LUMPECTOMY BREAST;  Surgeon: Philip Franco MD;  Location: [facility] OR     LUMPECTOMY BREAST WITH SENTINEL NODE, COMBINED Right 8/12/2015    Procedure: COMBINED LUMPECTOMY BREAST WITH SENTINEL NODE;  Surgeon: Philip Franco MD;  Location: [facility] OR     MASTECTOMY SIMPLE Right 9/21/2015     Procedure: MASTECTOMY SIMPLE;  Surgeon: Philip Franco MD;  Location: [facility] OR     REMOVE PORT VASCULAR ACCESS N/A 2015    Procedure: REMOVE PORT VASCULAR ACCESS;  Surgeon: Philip Franco MD;  Location: [facility] OR     SURGICAL HISTORY OF -       wisdom teeth     TONSILLECTOMY      at age 30 for tonsillitis        Social History   I have reviewed this patient's social history and updated it with pertinent information if needed. Josefina Bennett  reports that she quit smoking about 7 years ago. Her smoking use included cigarettes. She smoked 0.50 packs per day. She has never used smokeless tobacco. She reports that she does not drink alcohol and does not use drugs.     She has 2 dogs (Kei and Yuan) and 1 cat. Has a room mate.     She works in the  [employer] near [place]    Family History   I have reviewed this patient's family history and updated it with pertinent information if needed.  Family History   Problem Relation Age of Onset     Breast Cancer Other         paternal cousin     Diabetes Mother         Borderline diabetic     Alcohol/Drug Mother         alcoholic     Depression Mother      Alcohol/Drug Father         alcoholic     Depression Father      Diabetes Maternal Grandfather      Depression Maternal Grandfather      Depression Paternal Grandfather      Breast Cancer Paternal Aunt         dad's 1/2 sister     Respiratory Sister         Cystic Fibrosis,  age 39     Depression Sister      Cancer Other         maternal grandmothers sisters daughter.       Prior to Admission Medications   Prior to Admission Medications   Prescriptions Last Dose Informant Patient Reported? Taking?   Multiple Vitamins-Minerals (MULTIVITAMIN ADULTS PO)   Yes No   Sig: Take 1 tablet by mouth daily   albuterol (PROAIR HFA/PROVENTIL HFA/VENTOLIN HFA) 108 (90 Base) MCG/ACT inhaler   No No   Sig: Inhale 2 puffs into the lungs every 6 hours as needed for shortness of breath / dyspnea or wheezing    amoxicillin-clavulanate (AUGMENTIN) 875-125 MG tablet   No No   Sig: Take 1 tablet by mouth 2 times daily   benzonatate (TESSALON) 100 MG capsule   No No   Sig: Take 1 capsule (100 mg) by mouth 3 times daily as needed for cough   calcium carbonate (OS- MG [place]. [place]) 500 MG tablet  Self Yes No   Sig: Take 500 mg by mouth At Bedtime    cholecalciferol (VITAMIN D3) 1000 UNIT tablet   No No   Sig: Take 1 tablet (1,000 Units) by mouth daily   clindamycin (CLINDAMAX) 1 % topical gel   No No   Sig: Apply topically 2 times daily   guaiFENesin-codeine (ROBITUSSIN AC) 100-10 MG/5ML solution   No No   Sig: Take 5-10 mLs by mouth every 4 hours as needed for cough   levofloxacin (LEVAQUIN) 750 MG tablet   No No   Sig: Take 1 tablet (750 mg) by mouth daily   lidocaine-prilocaine (EMLA) 2.5-2.5 % external cream   No No   Sig: Apply to port site one hour prior to access may use six days after port placement   mirtazapine (REMERON) 7.5 MG tablet   No No   Sig: TAKE 1 TABLET BY MOUTH ONCE DAILY AT BEDTIME   ondansetron (ZOFRAN) 8 MG tablet   No No   Sig: Take 1 tablet (8 mg) by mouth every 8 hours as needed for nausea   prochlorperazine (COMPAZINE) 10 MG tablet   No No   Sig: Take 1 tablet (10 mg) by mouth every 6 hours as needed for nausea or vomiting   prochlorperazine (COMPAZINE) 10 MG tablet   No No   Sig: Take 1 tablet (10 mg) by mouth every 6 hours as needed for nausea or vomiting   venlafaxine (EFFEXOR) 75 MG tablet   No No   Sig: Take 1 tablet (75 mg) by mouth daily      Facility-Administered Medications: None     Allergies   Allergies   Allergen Reactions     Morphine      Rash       Physical Exam   Vital Signs: Temp: 97.6  F (36.4  C) Temp src: Oral BP: 138/70 Pulse: 109   Resp: 17 SpO2: 98 % O2 Device: None (Room air)    Weight: 0 lbs 0 oz     General: Pt NAD  HEENT: PERRLA, EOMI, sclera clear, anicteric  Neck: Supple with midline trachea, Thyroid without masses Trachea midline, A few lymph nodes on the edge of R  SCM and submandibular area   Card: RRR, normal S1 and S2, no murmurs/rubs or gallops; no JVD/carotid bruit  Lung: Equal BS bilaterally; no wheezing; scattered crackles in R back.  Abdomen:  soft and nontender, nondistended, no organomegaly, BS+  Neuro: Alert and oriented X 3  Psych: Normal mood, affect and response  Skin: No rashes, skin warm and dry, no erythematous areas  Extremities: No edema, pulse 2+ DP bilaterally    Data   Data reviewed today: I reviewed all medications, new labs and imaging results over the last 24 hours.     Recent Labs   Lab 10/03/22  2131 09/30/22  0724   WBC 10.0 11.6*   HGB 11.9 10.7*   MCV 86 86    450   * 139   POTASSIUM 4.7 4.6   CHLORIDE 96* 103   CO2 26 26   BUN 24.0* 17.7   CR 1.07* 0.93   ANIONGAP 9 10   VEE 9.6 9.5   * 158*   ALBUMIN 3.8 3.6   PROTTOTAL 6.7 6.4   BILITOTAL 0.5 0.2   ALKPHOS 98 106*   ALT 59* 41*   AST 61* 44*     Recent Results (from the past 24 hour(s))   XR Chest 2 Views    Narrative    EXAM: XR CHEST 2 VIEWS  LOCATION: [facility]  DATE/TIME: 10/3/2022 10:14 PM    INDICATION: cough, fever, c f pna  COMPARISON: Chest CT 09/30/2022      Impression    IMPRESSION: No change in subtle patchy slightly nodular opacities in the right upper lung peripherally and right midlung medially, correlating with recent CT findings. Left lung remains clear. No pleural effusion or pneumothorax. Normal heart size.   Stable left-sided Port-A-Cath.

## 2022-10-04 NOTE — PHARMACY-VANCOMYCIN DOSING SERVICE
"Pharmacy Vancomycin Initial Note  Date of Service 2022  Patient's  1965  56 year old, female    Indication: Community Acquired Pneumonia    Current estimated CrCl = Estimated Creatinine Clearance: 57.2 mL/min (A) (based on SCr of 1.07 mg/dL (H)).    Creatinine for last 3 days  10/3/2022:  9:31 PM Creatinine 1.07 mg/dL    Recent Vancomycin Level(s) for last 3 days  No results found for requested labs within last 72 hours.      Vancomycin IV Administrations (past 72 hours)      No vancomycin orders with administrations in past 72 hours.                Nephrotoxins and other renal medications (From now, onward)    Start     Dose/Rate Route Frequency Ordered Stop    10/04/22 0010  piperacillin-tazobactam (ZOSYN) 4.5 g vial to attach to  mL bag        Note to Pharmacy: For [facility], [facility] and [facility]: For Zosyn-naive patients, use the \"Zosyn initial dose + extended infusion\" order panel.    4.5 g  over 30 Minutes Intravenous ONCE 10/04/22 0005            Contrast Orders - past 72 hours (72h ago, onward)    None          [employer] Prediction of Planned Initial Vancomycin Regimen  Loading dose: 1500 mg at 01:00 10/04/2022.  Regimen: 1000 mg IV every 18 hours.  Start time:1900 on 10/04/2022  Exposure target: AUC24 (range)400-600 mg/L.hr   AUC24,ss: 526 mg/L.hr  Probability of AUC24 > 400: 78 %  Ctrough,ss: 16.2 mg/L  Probability of Ctrough,ss > 20: 32 %  Probability of nephrotoxicity (Lodise FRANCESCA ): 12 %          Plan:  1. Start vancomycin  1500 mg IV once, followed by 1000 mg IV q18h.   2. Vancomycin monitoring method: AUC  3. Vancomycin therapeutic monitoring goal: 400-600 mg*h/L  4. Pharmacy will check vancomycin levels as appropriate in 1-3 Days.    5. Serum creatinine levels will be ordered daily for the first week of therapy and at least twice weekly for subsequent weeks.      Addison Nunez [facility]  "

## 2022-10-04 NOTE — ED NOTES
ED Triage Provider Note  [facility]  Encounter Date: Oct 3, 2022    History:  Chief Complaint   Patient presents with     Shortness of Breath     Josefina Bennett is a 56 year old female who presents to the ED with concern for pneumonia and generalized weakness.  She states she has had some sinus congestion for the past week.  Saw her primary team last week and x-ray showed pneumonia.  She was started on Levaquin.  Has a history of breast cancer which was recently in remission but has now reoccurred.  Her last chemotherapy was on Friday.  For the past 3 days she has had severe malaise, generalized weakness, and nausea with vomiting.  She is unable to keep anything down.  She has had intermittent fevers with a T-max of 101.  She continues to have a productive cough.  Sometimes she coughs she develops some dyspnea.  Denies chest pain.      Review of Systems:  10 point review of systems is negative aside from what is mentioned in the HPI.    Exam:  /70   Pulse 109   Temp 97.6  F (36.4  C) (Oral)   Resp 17   LMP 12/18/2014   SpO2 98%   General: No acute distress. Appears stated age.   Cardio: Regular rate, extremities well perfused  Resp: Normal work of breathing, grossly normal respiratory rate  Neuro: Alert. CN II-XII grossly intact. Grossly intact strength.       Medical Decision Making:  Patient arriving to the ED with problem as above. A medical screening exam was performed.  orders initiated from Triage. The patient is appropriate to wait in padmini Chun DO on 10/3/2022 at 9:04 PM     Aram Chun DO  10/03/22 2110

## 2022-10-04 NOTE — PROGRESS NOTES
[facility]    Progress Note - Medicine Service, ZION TEAM 3       Date of Admission:  10/3/2022    Assessment & Plan           Josefina Bennett is a 56 year old female admitted on 10/3/2022. She has a history of metastatic beast cancer (s/p R mastectomy, hysterectomy+oopherectomy and chemo/radiation), and depression is admitted for the management of pneumonia and generalized weakness.    Today  - await stool studies  - consult onc, appreciate recs  - vanc/zosyn, de-escalate as able  - trending CRP    Fatigue  Nausea/Vomiting/Diarrhea s/p recent chemo 9/30  For the past 3 days pt has had severe malaise, generalized weakness, and nausea with vomiting. She received her chemo on 9/30. Was generally doing well except for the cough she has been having. BMP unremarkable.  - IVF; received 1L bolus in the ED; mIVF 100ml/hr X10 hrs; reassess fluid status in the am  - Nausea/vomiting - IV zofran Q6H prn as well as PRN reglan  - Diet - regular diet  - BMP daily  - imodium prn    ?R upper and middle lung consolidation  Has been having cough (minimally productive) for the past 2 weeks. Had 5 days of Augmentin and 4 days of levofloxacin. Could be treated as failed treatment given unchanged CXR findings though CXR findings are minimal to begin with. Patient reports improvement in her respiratory symptoms. At this time will contiue antibiotics until ensuring no infectious etiology and will de-escalate as able. Respiratory viral panel negative.   - Continue vanc and zosyn    - await MRSA nares  - Sputum gram stain and culture  - watch CRP    Hyponatremia, hypovolemic- resolved  Mildly elevated creatinine  Do not satisfy criteria for LISA (1.07 at admission from 0.88 at baseline); Na 131 at admission though improved with initial fluids.   - Continue BMP monitoring  - IVF as needed          Diet: Combination Diet Regular Diet Adult    DVT Prophylaxis: Enoxaparin (Lovenox) SQ  Berger  Catheter: Not present  Fluids: oral  Central Lines: PRESENT     Cardiac Monitoring: None  Code Status: Full Code      Disposition Plan      Expected Discharge Date: 10/06/2022                The patient's care was discussed with the Attending Physician, Dr. Sagastume.    Jorge Meek MD  Medicine Service, [facility] TEAM [address] [place] [address]  Securely message with the Voc[employer] Web Console (learn more here)  Text page via [facility] Paging/Directory   Please see signed in provider for up to date coverage information      Clinically Significant Risk Factors Present on Admission                       ______________________________________________________________________    Interval History   Patient with improvemt in nausea with reglan and zofran, feels better overall. No further abdominal pain, nausea or vomiting.     Physical Exam   Vital Signs: Temp: 98.1  F (36.7  C) Temp src: Oral BP: 132/70 Pulse: 90   Resp: 16 SpO2: 100 % O2 Device: None (Room air)    Weight: 0 lbs 0 oz  General: Pt NAD  HEENT: PERRLA, EOMI, sclera clear, anicteric  Neck: Supple with midline trachea, Thyroid without masses Trachea midline, A few lymph nodes on the edge of R SCM and submandibular area   Card: RRR, normal S1 and S2, no murmurs/rubs or gallops; no JVD/carotid bruit  Lung: Equal BS bilaterally; no wheezing; scattered crackles in R back.  Abdomen:  soft and nontender, nondistended, no organomegaly, BS+  Neuro: Alert and oriented X 3  Psych: Normal mood, affect and response  Skin: No rashes, skin warm and dry, no erythematous areas  Extremities: No edema, pulse 2+ DP bilaterally    Data   Recent Labs   Lab 10/04/22  1041 10/03/22  2131 09/30/22  0724   WBC 8.3 10.0 11.6*   HGB 10.6* 11.9 10.7*   MCV 86 86 86    422 450    131* 139   POTASSIUM 3.8 4.7 4.6   CHLORIDE 105 96* 103   CO2 25 26 26   BUN 17.7 24.0* 17.7   CR 1.02* 1.07* 0.93   ANIONGAP 9 9 10   VEE 8.5* 9.6 9.5   * 117* 158*    ALBUMIN  --  3.8 3.6   PROTTOTAL  --  6.7 6.4   BILITOTAL  --  0.5 0.2   ALKPHOS  --  98 106*   ALT  --  59* 41*   AST  --  61* 44*

## 2022-10-04 NOTE — ED PROVIDER NOTES
ED Provider Note  [facility]      History     Chief Complaint   Patient presents with     Shortness of Breath     HPI  Josefina Bennett is a 56 year old female who presents to the ED with concern for pneumonia and generalized weakness.  She states she has had some sinus congestion for the past week.  Saw her primary team last week and x-ray showed pneumonia.  She was started on Levaquin.  Has a history of breast cancer which was recently in remission but has now reoccurred.  Her last chemotherapy was on Friday.  For the past 3 days she has had severe malaise, generalized weakness, and nausea with vomiting.  She is unable to keep anything down.  She has had intermittent fevers with a T-max of 101.  She continues to have a productive cough.  Sometimes she coughs she develops some dyspnea.  Denies chest pain.    Past Medical History  Past Medical History:   Diagnosis Date     Breast cancer (H) 01/01/2015     depression      Foot fracture      History of blood transfusion 06/01/2015     Insomnia      Malignant neoplasm of breast (female), unspecified site 02/05/2015     Papanicolaou smear of cervix with low grade squamous intraepithelial lesion (LGSIL) 06/01/2010    8/10-colp-benign     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) 06/01/2015     PONV (postoperative nausea and vomiting)      PTSD (post-traumatic stress disorder)      Past Surgical History:   Procedure Laterality Date     APPENDECTOMY  1997     CYSTOSCOPY N/A 10/16/2015    Procedure: CYSTOSCOPY;  Surgeon: Aleksandr Palafox MD;  Location:  OR      REMOVAL OF OVARIAN CYST(S)      x 3     INSERT PORT VASCULAR ACCESS Left 5/4/2022    Procedure: INSERTION, VASCULAR ACCESS PORT;  Surgeon: Darren Yates MD;  Location: [facility] OR     IR CHEST PORT PLACEMENT > 5 YRS OF AGE  5/4/2022     IR FLUORO 0-1 HOUR  4/12/2022     LAPAROSCOPIC HYSTERECTOMY TOTAL, BILATERAL SALPINGO-OOPHORECTOMY, COMBINED N/A 10/16/2015    Procedure:  COMBINED LAPAROSCOPIC HYSTERECTOMY TOTAL, SALPINGO-OOPHORECTOMY;  Surgeon: Aleksandr Palafox MD;  Location: [facility] OR     LUMPECTOMY BREAST Right 9/2/2015    Procedure: LUMPECTOMY BREAST;  Surgeon: Philip Franco MD;  Location: [facility] OR     LUMPECTOMY BREAST WITH SENTINEL NODE, COMBINED Right 8/12/2015    Procedure: COMBINED LUMPECTOMY BREAST WITH SENTINEL NODE;  Surgeon: Philip Franco MD;  Location: [facility] OR     MASTECTOMY SIMPLE Right 9/21/2015    Procedure: MASTECTOMY SIMPLE;  Surgeon: Philip Franco MD;  Location: [facility] OR     REMOVE PORT VASCULAR ACCESS N/A 8/12/2015    Procedure: REMOVE PORT VASCULAR ACCESS;  Surgeon: Philip Franco MD;  Location: [facility] OR     SURGICAL HISTORY OF -       wisdom teeth     TONSILLECTOMY      at age 30 for tonsillitis     albuterol (PROAIR HFA/PROVENTIL HFA/VENTOLIN HFA) 108 (90 Base) MCG/ACT inhaler  amoxicillin-clavulanate (AUGMENTIN) 875-125 MG tablet  benzonatate (TESSALON) 100 MG capsule  calcium carbonate (OS- MG [place]. [place]) 500 MG tablet  cholecalciferol (VITAMIN D3) 1000 UNIT tablet  clindamycin (CLINDAMAX) 1 % topical gel  guaiFENesin-codeine (ROBITUSSIN AC) 100-10 MG/5ML solution  levofloxacin (LEVAQUIN) 750 MG tablet  lidocaine-prilocaine (EMLA) 2.5-2.5 % external cream  mirtazapine (REMERON) 7.5 MG tablet  Multiple Vitamins-Minerals (MULTIVITAMIN ADULTS PO)  ondansetron (ZOFRAN) 8 MG tablet  prochlorperazine (COMPAZINE) 10 MG tablet  prochlorperazine (COMPAZINE) 10 MG tablet  venlafaxine (EFFEXOR) 75 MG tablet      Allergies   Allergen Reactions     Morphine      Rash     Family History  Family History   Problem Relation Age of Onset     Breast Cancer Other         paternal cousin     Diabetes Mother         Borderline diabetic     Alcohol/Drug Mother         alcoholic     Depression Mother      Alcohol/Drug Father         alcoholic     Depression Father      Diabetes Maternal Grandfather      Depression Maternal Grandfather      Depression Paternal Grandfather       Breast Cancer Paternal Aunt         dad's 1/2 sister     Respiratory Sister         Cystic Fibrosis,  age 39     Depression Sister      Cancer Other         maternal grandmothers sisters daughter.     Social History   Social History     Tobacco Use     Smoking status: Former Smoker     Packs/day: 0.50     Types: Cigarettes     Quit date: 2015     Years since quittin.6     Smokeless tobacco: Never Used     Tobacco comment: 5 a day   Substance Use Topics     Alcohol use: No     Alcohol/week: 0.0 standard drinks     Comment: hx of etoh abuse in teens and 20's     Drug use: No      Past medical history, past surgical history, medications, allergies, family history, and social history were reviewed with the patient. No additional pertinent items.       Review of Systems   Constitutional: Positive for chills, fatigue and fever.   HENT: Negative for congestion and sore throat.    Eyes: Negative for pain and visual disturbance.   Respiratory: Positive for cough and shortness of breath. Negative for chest tightness.    Cardiovascular: Negative for chest pain and palpitations.   Gastrointestinal: Negative for abdominal distention, abdominal pain, constipation, diarrhea, nausea and vomiting.   Genitourinary: Negative for difficulty urinating, dysuria, frequency and urgency.   Musculoskeletal: Negative for arthralgias, back pain, myalgias and neck pain.   Skin: Negative for color change and rash.   Neurological: Positive for weakness. Negative for dizziness and headaches.   Psychiatric/Behavioral: Negative for confusion.     A complete review of systems was performed with pertinent positives and negatives noted in the HPI, and all other systems negative.    Physical Exam   BP: 138/70  Pulse: 109  Temp: 97.6  F (36.4  C)  Resp: 17  SpO2: 98 %  Physical Exam  Vitals and nursing note reviewed.   Constitutional:       General: She is not in acute distress.     Appearance: Normal appearance. She is ill-appearing. She  is not toxic-appearing.   HENT:      Head: Normocephalic and atraumatic.      Nose: Nose normal.      Mouth/Throat:      Mouth: Mucous membranes are moist.   Eyes:      Pupils: Pupils are equal, round, and reactive to light.   Cardiovascular:      Rate and Rhythm: Normal rate.      Pulses: Normal pulses.      Heart sounds: Normal heart sounds.   Pulmonary:      Effort: Pulmonary effort is normal. No respiratory distress.      Breath sounds: Rales present. No wheezing or rhonchi.   Abdominal:      General: Abdomen is flat. There is no distension.      Palpations: Abdomen is soft.   Musculoskeletal:         General: No swelling or deformity. Normal range of motion.      Cervical back: Normal range of motion. No rigidity.   Skin:     General: Skin is warm.      Capillary Refill: Capillary refill takes less than 2 seconds.   Neurological:      Mental Status: She is alert and oriented to person, place, and time.   Psychiatric:         Mood and Affect: Mood normal.         ED Course      Procedures       The medical record was reviewed and interpreted.  Current labs reviewed and interpreted.  Previous labs reviewed and interpreted.              Results for orders placed or performed during the hospital encounter of 10/03/22   XR Chest 2 Views     Status: None    Narrative    EXAM: XR CHEST 2 VIEWS  LOCATION: [facility]  DATE/TIME: 10/3/2022 10:14 PM    INDICATION: cough, fever, c f pna  COMPARISON: Chest CT 09/30/2022      Impression    IMPRESSION: No change in subtle patchy slightly nodular opacities in the right upper lung peripherally and right midlung medially, correlating with recent CT findings. Left lung remains clear. No pleural effusion or pneumothorax. Normal heart size.   Stable left-sided Port-A-Cath.   Comprehensive metabolic panel     Status: Abnormal   Result Value Ref Range    Sodium 131 (L) 136 - 145 mmol/L    Potassium 4.7 3.4 - 5.3 mmol/L    Chloride 96 (L)  98 - 107 mmol/L    Carbon Dioxide (CO2) 26 22 - 29 mmol/L    Anion Gap 9 7 - 15 mmol/L    Urea Nitrogen 24.0 (H) 6.0 - 20.0 mg/dL    Creatinine 1.07 (H) 0.51 - 0.95 mg/dL    Calcium 9.6 8.6 - 10.0 mg/dL    Glucose 117 (H) 70 - 99 mg/dL    Alkaline Phosphatase 98 35 - 104 U/L    AST 61 (H) 10 - 35 U/L    ALT 59 (H) 10 - 35 U/L    Protein Total 6.7 6.4 - 8.3 g/dL    Albumin 3.8 3.5 - 5.2 g/dL    Bilirubin Total 0.5 <=1.2 mg/dL    GFR Estimate 61 >60 mL/min/1.73m2   Lactic acid whole blood     Status: Normal   Result Value Ref Range    Lactic Acid 1.2 0.7 - 2.0 mmol/L   CBC with platelets and differential     Status: Abnormal   Result Value Ref Range    WBC Count 10.0 4.0 - 11.0 10e3/uL    RBC Count 4.39 3.80 - 5.20 10e6/uL    Hemoglobin 11.9 11.7 - 15.7 g/dL    Hematocrit 37.6 35.0 - 47.0 %    MCV 86 78 - 100 fL    MCH 27.1 26.5 - 33.0 pg    MCHC 31.6 31.5 - 36.5 g/dL    RDW 17.1 (H) 10.0 - 15.0 %    Platelet Count 422 150 - 450 10e3/uL   Manual Differential     Status: Abnormal   Result Value Ref Range    % Neutrophils 73 %    % Lymphocytes 22 %    % Monocytes 1 %    % Eosinophils 3 %    % Basophils 1 %    Absolute Neutrophils 7.3 1.6 - 8.3 10e3/uL    Absolute Lymphocytes 2.2 0.8 - 5.3 10e3/uL    Absolute Monocytes 0.1 0.0 - 1.3 10e3/uL    Absolute Eosinophils 0.3 0.0 - 0.7 10e3/uL    Absolute Basophils 0.1 0.0 - 0.2 10e3/uL    RBC Morphology Confirmed RBC Indices     Platelet Assessment  Automated Count Confirmed. Platelet morphology is normal.     Automated Count Confirmed. Platelet morphology is normal.    Hypersegmented Neutrophils Present (A) None Seen   EKG 12-lead, tracing only     Status: None (Preliminary result)   Result Value Ref Range    Systolic Blood Pressure  mmHg    Diastolic Blood Pressure  mmHg    Ventricular Rate 88 BPM    Atrial Rate 88 BPM    MT Interval 154 ms    QRS Duration 72 ms     ms    QTc 425 ms    P Axis 69 degrees    R AXIS 63 degrees    T Axis 68 degrees    Interpretation ECG  Sinus rhythm  Normal ECG      CBC with platelets differential     Status: Abnormal    Narrative    The following orders were created for panel order CBC with platelets differential.  Procedure                               Abnormality         Status                     ---------                               -----------         ------                     CBC with platelets and d...[[id number]]  Abnormal            Final result               Manual Differential[[id number]]          Abnormal            Final result                 Please view results for these tests on the individual orders.     Medications   ondansetron (ZOFRAN) injection 4 mg (4 mg Intravenous Given 10/3/22 2136)   0.9% sodium chloride BOLUS (1,000 mLs Intravenous New Bag 10/3/22 2136)     Followed by   sodium chloride 0.9% infusion (has no administration in time range)   piperacillin-tazobactam (ZOSYN) 4.5 g vial to attach to  mL bag (has no administration in time range)   pharmacy alert - intermittent dosing (has no administration in time range)   vancomycin (VANCOCIN) 1,500 mg in 0.9% NaCl 250 mL intermittent infusion (has no administration in time range)   vancomycin (VANCOCIN) 1000 mg in dextrose 5% 200 mL PREMIX (has no administration in time range)        Assessments & Plan (with Medical Decision Making)   Patient with history of recurrent breast cancer, on chemotherapy presents the ED for evaluation of worsening cough, dyspnea, generalized fatigue, weakness, nausea/vomiting, fever.    Differential diagnosis includes pneumonia, COVID-19, influenza, lecture abnormality, dehydration neutropenic fever, sepsis, shock    On arrival, patient slightly tachycardic, has normal vital signs.  Temp is 97.6.  Has right-sided rales.  No respiratory distress.  Abdomen is soft and nontender.    Chest x-ray redemonstrates right-sided opacities.  Given recurrence of malignancy, postobstructive pneumonia possibility as well.  Patient was started on Vanco  and Zosyn.  Blood cultures pending.  Lactic acid normal, continues to be hemodynamically stable, low suspicion for septic shock.    Patient was given IV fluids and Zofran    Admit to medicine service.  Discussed with the triage hospitalist.      I have reviewed the nursing notes. I have reviewed the findings, diagnosis, plan and need for follow up with the patient.    New Prescriptions    No medications on file       Final diagnoses:   Pneumonia of right upper lobe due to infectious organism       --  Aram Chun DO  [facility] EMERGENCY DEPARTMENT  10/3/2022     Aram Chun DO  10/04/22 0153

## 2022-10-04 NOTE — CONSULTS
Oncology  Consult Note   Date of Service: 10/04/2022    Patient: Josefina Bennett  MRN: [id number]  Admission Date: 10/3/2022  Hospital Day # 0  Cancer Diagnosis:   Primary Outpatient Oncologist: Dr. Lars Meza  Current Treatment Plan: Trastuzumab deruxtecan    Reason for Consult: Nausea, weakness, pneumonia      History of Present Illness:    Mary Bennett is a 56-year-old female with a history of a clinical stage II right breast cancer, ER positive, FL positive, HER2 negative by immunohistochemistry, now with recurrent metastatic breast cancer. 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 She recently started Trastuzumab deruxtecan C1D1 09/30/22. She was admitted via the ED on 10/3/22 for nausea and weakness and subsequently admitted for further workup and management.     Oncology History:  ==Adapted from clinic note from 9/30/22==  Patient noticed in early January 2015 that she was having some distortion of the right breast, and she went to see her gynecologist and had a mammogram performed. She had a screening mammogram performed on 01/15/2015. Breast tissue was heterogeneously dense, which might compromise the mammography. There was architectural distortion in the right breast approximately 11-12 o'clock position, zone 2, posterior depth, and a CC MLO spot compression was performed and an ultrasound was planned. The diagnostic mammogram from 01/28/2015 showed right breast architectural distortion centered at the 12 o'clock position, zone 2, suspicious for neoplasm. Breast tomosynthesis was used in the interpretation. Ultrasound findings included targeted ultrasound of the right upper breast demonstrating a band-like area of abnormal echogenicity between 11 and 12 o'clock position in the right breast at zone 2. This measures 4 cm transversely x 1.1 cm AP, and there was only a  1.5 cm measurement in the radial direction. There was blood flow suspicious for neoplasm at the 11 o'clock position in zone 2. There is a 1.4 cm hypoechoic nodule slightly  from the larger area of altered echogenicity that is also suspicious. A biopsy was performed. The biopsy showed infiltrating lobular carcinoma, grade 2, and a clip was placed at specimen [id number]. There were a few signet cells present. The tumor was ER-positive, OK low positive, and HER2 was negative by immunohistochemistry. She subsequently underwent an MRI showing a tumor that was 4.8 x 1.7 x 3.2 cm in size. Overall, her clinical stage was T2 NX MX.       She was enrolled in the I-SPY-2 clinical trial, and qualified with high-risk MammaPrint score. She was randomized to ganetespib and paclitaxel, and she was treated with 12 cycles of treatment, and then started on AC on 05/26/2015. She developed intractable nausea and vomiting after the first cycle, which required hospitalization on the second cycle. She then developed Pneumocystis pneumonia, which resulted in intubation and a 2-week hospitalization during the course of AC. She was discharged on 07/02/2015, and decided she did not want to pursue any further chemotherapy. She had a right lumpectomy and sentinel lymph node procedure 08/12/2015. She had a positive margin, underwent a re-excision, and had a positive margin. Ultimately, she underwent a right mastectomy with clear margins. She began radiation treatment with Dr. Bill Chauhan, and completed radiation therapy on 12/04/2015. Pathologic stage was III-A, ypT3 N1a MX. Of concern, she had what could be considered an RCB3 tumor.  She started adjuvant letrozole on 1/28/16.     4/6/2022 Josefina Verma developed a fever during Reclast.  She was feeling unwell and went to the ED.  CT CAP showed new involvement of abdominal lymph nodes and multiple bone metastases.  She was admitted and I saw her in the hospital 4-7-22 to discuss the plan for  biopsy.  A biopsy of a sacral mass showed pleomorphic lobular breast cancer which was ER negative, LA negative, HER2 positive by FISH in 20% of the cells and negative in 80% of cells.  A mix of TNBC and ER- HER2+.  Clinic conference consensus was for the modified Vandana with paclitaxel weekly to treat both clones.      Treatment History:  A.  Neoadjuvant therapy on I SPY-2 with ganetespib and paclitaxel.  AC x 2 complicated by PCP pneumonia.    B.  Right lumpectomy, followed by right margin resections, followed by right mastectomy.   B.  Oophorectomy 10-16-15.   C.  Tamoxifen after 10-6-15.   D.  Radiation therapy. 5,040 cGy in 180 cGy/fraction to the chest wall and regional lymphatics followed by 720 cGy axillary lymph node boost and 1000 cGy mastectomy scar boost. Completed on 12/3/2015.  E.   Adjuvant letrozole begun 1-28-16.  Plan was to continue through 2026.  F.  Diagnosis with recurrent/metastatic breast cancer with a sacral mass that was biopsied showing pleiomorphic lobular breast cancer that is ER negative, LA negative, HER2 positive by FISH in 20% of the cells and negative in 80% of cells.  A mix of TNBC and ER- HER2+.  Clinic conference consensus was for the modified Vandana with paclitaxel weekly to treat both clones.  G. Started Enhertu C1D1 on 9/30/22    Interval History:  Patient was seen and examined at bedside. She reported ongoing waves of nausea, but was overall feeling much better with PRN zofran and Reglan. She was able to eat a meal today. She was feeling stronger. She was earlier having jacobo emesis but not any more at this time. She was earlier having focal right chest pain and heavy dry hacking cough, but both are much better. Her earlier diarrhea has improved, and she is nearly back to her baseline stooling. No dysuria or urinary symptoms. No current sinus symptoms.      Review of Systems:  A comprehensive ROS was performed and found to be negative or non-contributory with the  exception of that noted in the HPI above.    Past Medical History:  Past Medical History:   Diagnosis Date     Breast cancer (H) 01/01/2015     depression      Foot fracture      History of blood transfusion 06/01/2015     Insomnia      Malignant neoplasm of breast (female), unspecified site 02/05/2015     Papanicolaou smear of cervix with low grade squamous intraepithelial lesion (LGSIL) 06/01/2010    8/10-colp-benign     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) 06/01/2015     PONV (postoperative nausea and vomiting)      PTSD (post-traumatic stress disorder)        Past Surgical History:  Past Surgical History:   Procedure Laterality Date     APPENDECTOMY  1997     CYSTOSCOPY N/A 10/16/2015    Procedure: CYSTOSCOPY;  Surgeon: Aleksandr Palafox MD;  Location: [facility] OR     HC REMOVAL OF OVARIAN CYST(S)      x 3     INSERT PORT VASCULAR ACCESS Left 5/4/2022    Procedure: INSERTION, VASCULAR ACCESS PORT;  Surgeon: Darren Yates MD;  Location: [facility] OR     IR CHEST PORT PLACEMENT > 5 YRS OF AGE  5/4/2022     IR FLUORO 0-1 HOUR  4/12/2022     LAPAROSCOPIC HYSTERECTOMY TOTAL, BILATERAL SALPINGO-OOPHORECTOMY, COMBINED N/A 10/16/2015    Procedure: COMBINED LAPAROSCOPIC HYSTERECTOMY TOTAL, SALPINGO-OOPHORECTOMY;  Surgeon: Aleksandr Palafox MD;  Location: [facility] OR     LUMPECTOMY BREAST Right 9/2/2015    Procedure: LUMPECTOMY BREAST;  Surgeon: Philip Franco MD;  Location: [facility] OR     LUMPECTOMY BREAST WITH SENTINEL NODE, COMBINED Right 8/12/2015    Procedure: COMBINED LUMPECTOMY BREAST WITH SENTINEL NODE;  Surgeon: Philip Franco MD;  Location: [facility] OR     MASTECTOMY SIMPLE Right 9/21/2015    Procedure: MASTECTOMY SIMPLE;  Surgeon: Philip Franco MD;  Location: [facility] OR     REMOVE PORT VASCULAR ACCESS N/A 8/12/2015    Procedure: REMOVE PORT VASCULAR ACCESS;  Surgeon: Philip Franco MD;  Location:  OR     SURGICAL HISTORY OF -       wisdom teeth     TONSILLECTOMY      at age 30 for tonsillitis       Social  History:  Social History     Socioeconomic History     Marital status:    Occupational History     Employer: OTHER     Comment: training for    Tobacco Use     Smoking status: Former Smoker     Packs/day: 0.50     Types: Cigarettes     Quit date: 2015     Years since quittin.6     Smokeless tobacco: Never Used     Tobacco comment: 5 a day   Substance and Sexual Activity     Alcohol use: No     Alcohol/week: 0.0 standard drinks     Comment: hx of etoh abuse in teens and 20's     Drug use: No     Sexual activity: Yes     Partners: Male     Birth control/protection: Condom     Comment: condoms   Other Topics Concern     Parent/sibling w/ CABG, MI or angioplasty before 65F 55M? No   Social History Narrative    Josefina currently rents a house in [place], [place].  She  from her  2 years ago and  him 1 year ago.  He was having an affair with a 21-year-old that was living in their house. Apparently, the 21-year-old is still living there.  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  She applied for unemployment and was denied, but recently got a call from a supervisor and is getting unemployment, including back pay.  She has a boyfriend, Nathaniel who she is seeing.  She is active in AA.  She has suffered numerous losses in the last few years including her sister and cousin and a friend.     Social Determinants of Health     Intimate Partner Violence: Not At Risk     Fear of Current or Ex-Partner: No     Emotionally Abused: No     Physically Abused: No     Sexually Abused: No        Family History:  Family History   Problem Relation Age of Onset     Breast Cancer Other         paternal cousin     Diabetes Mother         Borderline diabetic     Alcohol/Drug Mother         alcoholic     Depression Mother      Alcohol/Drug Father         alcoholic     Depression  Father      Diabetes Maternal Grandfather      Depression Maternal Grandfather      Depression Paternal Grandfather      Breast Cancer Paternal Aunt         dad's 1/2 sister     Respiratory Sister         Cystic Fibrosis,  age 39     Depression Sister      Cancer Other         maternal grandmothers sisters daughter.       Inpatient Medications:    calcium carbonate 500 mg (elemental)  500 mg Oral At Bedtime     enoxaparin ANTICOAGULANT  40 mg Subcutaneous Q24H     heparin  5-10 mL Intracatheter Q28 Days     heparin lock flush  5-10 mL Intracatheter Q24H     mirtazapine  7.5 mg Oral At Bedtime     multivitamin w/minerals  1 tablet Oral Daily     piperacillin-tazobactam  4.5 g Intravenous Q6H     sodium chloride (PF)  10-20 mL Intracatheter Q28 Days     sodium chloride (PF)  3 mL Intracatheter Q8H     vancomycin  1,000 mg Intravenous Q18H     venlafaxine  75 mg Oral Daily     vitamin D3  1,000 Units Oral Daily       Physical Exam:    Blood pressure 130/67, pulse 92, temperature 98.3  F (36.8  C), temperature source Oral, resp. rate 16, last menstrual period 2014, SpO2 99 %, not currently breastfeeding.  General: No acute distress, alert and cooperative  HEENT: Some oral thrush on the dorsum of the tongue, NC/AT, PERRLA, EOMI, oropharynx unremarkable  Resp: Breathing comfortably on room air   GI: Soft, non-tender, non-distended, bowel sounds present and normoactive  MSK: No peripheral edema  Skin: No rash/lesions on limited exam  Neuro: No gross focal deficits    Labs & Studies: I personally reviewed the following studies:  ROUTINE LABS (Last four results):  CMP  Recent Labs   Lab 10/04/22  1041 10/03/22  2131 22  0724    131* 139   POTASSIUM 3.8 4.7 4.6   CHLORIDE 105 96* 103   CO2 25 26 26   ANIONGAP 9 9 10   * 117* 158*   BUN 17.7 24.0* 17.7   CR 1.02* 1.07* 0.93   GFRESTIMATED 64 61 72   VEE 8.5* 9.6 9.5   PROTTOTAL  --  6.7 6.4   ALBUMIN  --  3.8 3.6   BILITOTAL  --  0.5 0.2    ALKPHOS  --  98 106*   AST  --  61* 44*   ALT  --  59* 41*     CBC  Recent Labs   Lab 10/04/22  1041 10/03/22  2131 09/30/22  0724   WBC 8.3 10.0 11.6*   RBC 3.95 4.39 3.95   HGB 10.6* 11.9 10.7*   HCT 33.8* 37.6 34.1*   MCV 86 86 86   MCH 26.8 27.1 27.1   MCHC 31.4* 31.6 31.4*   RDW 16.7* 17.1* 17.4*    422 450     INRNo lab results found in last 7 days.    CXR 10/3/22  IMPRESSION: No change in subtle patchy slightly nodular opacities in the right upper lung peripherally and right midlung medially, correlating with recent CT findings. Left lung remains clear. No pleural effusion or pneumothorax. Normal heart size.   Stable left-sided Port-A-Cath.    CT sinus 9/30/22  Impression:    Mucosal thickening of the left maxillary sinus without evidence of  acute sinusitis.    CT chest 9/30/22  IMPRESSION:   1a. New multifocal nodular and consolidative opacities involving the  right upper, middle and lower lobes. Findings are new when compared  with 8/29/2022 PET/CT and are most suspicious for infectious and/or  inflammatory etiology. Recommend short-term follow-up.  1b. New 4 mm nodule in the left upper lobe. Recommend attention on  follow-up.  2. Extensive osseous metastatic disease appears grossly stable  compared to prior. No acute osseous abnormalities.  3. Stable postradiation changes to the right lung apex.      Assessment & Recommendations:   Mary Bennett is a 56-year-old female with a history of a clinical stage II right breast cancer, ER positive, [place] positive, HER2 negative by immunohistochemistry, now with recurrent metastatic breast cancer. 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 She recently started Trastuzumab deruxtecan C1D1 09/30/22. She was admitted via the ED on 10/3/22 for nausea and weakness and subsequently admitted for further workup and management.      #  Nausea/vomiting  Nausea mainly started after patient received fam-trastuzumab deruxtecan (Enhertu) on 9/30/22. Enhertu is associated with a moderate emetic potential, and is likely responsible for acutely precipitating nausea and vomiting. It may also be drawing from other factors such as starting levofloxacin on 9/30/22 and generally being sick with suspected pneumonia. It does not look infectious based on history and clinical presentation. No concerning abdominal symptoms to suspect an otherwise acute abdominal pathology. Earlier diarrhea has resolved. No CNS symptoms to suspect new intracranial metastases as the etiology.   - Continue current supportive cares with PRN zofran/Reglan and IV fluids per internal medicine.   - We can add more/adjust nausea medications depending upon her response trajectory.   - C. Diff is currently pending.     # Pneumonia  Patient describes having recently had severe dry hacking cough (currently improved). CT chest on 9/30/22 showed new multifocal nodular and consolidative opacities involving the right upper, middle and lower lobes, new compared to PET/CT from 8/29/22. She has received 5 days of Augmentin followed by levofloxacin which was started 9/30/22 as outpatient. She is currently on vancomycin and zosyn. RVP was negative. Influenza A/B, RSV and SARS-CoV2 PCR negative. Lactic acid was unremarkable. Blood cultures have been preliminarily negative.   - Continue management per internal medicine.  - Sputum culture remains to be collected. MRSA nasal swab is pending.   - We suspect that we should be able to de-escalate antibiotics based on the results of the above testing.     # Oral thrush  Patient has mild coating of thrush on the posterior aspect of the dorsum tongue.  - Recommend supportive cares with Nystatin and magic mouth wash rinses three times daily.     We will continue to follow this patient. Patient was seen and plan of care was discussed with attending physician   Trell.    Alessandro Macias  Hematology/Oncology Fellow  [facility]  Pager: [phone]

## 2022-10-05 NOTE — PROGRESS NOTES
[facility]    Progress Note - Medicine Service, [facility] TEAM 3       Date of Admission:  10/3/2022    Assessment & Plan           Josefina Bennett is a 56 year old female admitted on 10/3/2022. She has a history of metastatic beast cancer (s/p R mastectomy, hysterectomy+oopherectomy and chemo/radiation), and depression is admitted for the management of pneumonia and generalized weakness.    Today  - stop antibiotics  - further nausea management per oncology   - zofran q8hr   - ativan 0.5mg q6hr IV PRN   - zyprexa 5mg at bedtime  - supportive cares    Fatigue  Nausea/Vomiting/Diarrhea s/p recent chemo 9/30  For the past 3 days pt has had severe malaise, generalized weakness, and nausea with vomiting. She received her chemo on 9/30. Was generally doing well except for the cough she has been having. BMP unremarkable. Most likely related to her chemotherapy after discussion with oncology who additionally have recommendations on how to impact her nausea that is related to targeted therapies, appreciate assistance  - oncology recommendations appreciated    - Changed dose of PRN Zofran from 4 mg to 8 mg q8hr   - Added Ativan 0.5 mg q6hr IV PRN. Of note, there is shortage of IV Ativan so ordered as trial for 24 hours after discussing with pharmacy.    - Start Zyprexa 5 mg at bedtime tonight for nausea. If this doesn't work, will consider increasing dose to 10 mg.   - PO intake encouraged    ?R upper and middle lung consolidation  Has been having cough (minimally productive) for the past 2 weeks. Had 5 days of Augmentin and 4 days of levofloxacin. Could be treated as failed treatment given unchanged CXR findings though CXR findings are minimal to begin with. Patient reports improvement in her respiratory symptoms. We continued antibiotics for a few days though will stop them now with low clinical suspicion for active infection, will monitor and see if she if infection presents  itself  - stop antibiotics  - will continue to monitor and see if further antibiotics are indicated    Hyponatremia, hypovolemic- resolved  Mildly elevated creatinine  Do not satisfy criteria for LISA (1.07 at admission from 0.88 at baseline); Na 131 at admission though improved with initial fluids.   - Continue BMP monitoring  - IVF as needed, encourage PO          Diet: Combination Diet Regular Diet Adult    DVT Prophylaxis: Enoxaparin (Lovenox) SQ  Berger Catheter: Not present  Fluids: oral  Central Lines: PRESENT       Cardiac Monitoring: None  Code Status: Full Code      Disposition Plan      Expected Discharge Date: 10/08/2022        Discharge Comments: From the ER.  Work up in progress.        The patient's care was discussed with the Attending Physician, Dr. Sagastume.    Jorge Meek MD  Medicine Service, [address]  Securely message with the Vocera Web Console (learn more here)  Text page via [facility] Paging/Directory   Please see signed in provider for up to date coverage information      Clinically Significant Risk Factors Present on Admission                   ______________________________________________________________________    Interval History   Patient with improvemt in nausea when getting medications, no further abdominal pain, no other pains or concerns    Physical Exam   Vital Signs: Temp: 97.5  F (36.4  C) Temp src: Oral BP: (!) 143/49 Pulse: 92   Resp: 16 SpO2: 97 % O2 Device: None (Room air)    Weight: 0 lbs 0 oz  General: Pt NAD  HEENT: PERRLA, EOMI, sclera clear, anicteric  Neck: Supple with midline trachea, Thyroid without masses Trachea midline, A few lymph nodes on the edge of R SCM and submandibular area   Card: RRR, normal S1 and S2, no murmurs/rubs or gallops; no JVD/carotid bruit  Lung: Equal BS bilaterally; no wheezing; scattered crackles in R back.  Abdomen:  soft and nontender, nondistended, no organomegaly, BS+  Neuro:  Alert and oriented X 3  Psych: Normal mood, affect and response  Skin: No rashes, skin warm and dry, no erythematous areas  Extremities: No edema, pulse 2+ DP bilaterally    Data   Recent Labs   Lab 10/05/22  1039 10/04/22  1041 10/03/22  2131 09/30/22  0724   WBC 8.7 8.3 10.0 11.6*   HGB 10.7* 10.6* 11.9 10.7*   MCV 84 86 86 86    321 422 450    139 131* 139   POTASSIUM 3.9 3.8 4.7 4.6   CHLORIDE 106 105 96* 103   CO2 22 25 26 26   BUN 11.7 17.7 24.0* 17.7   CR 0.95 1.02* 1.07* 0.93   ANIONGAP 11 9 9 10   VEE 8.7 8.5* 9.6 9.5   * 117* 117* 158*   ALBUMIN  --   --  3.8 3.6   PROTTOTAL  --   --  6.7 6.4   BILITOTAL  --   --  0.5 0.2   ALKPHOS  --   --  98 106*   ALT  --   --  59* 41*   AST  --   --  61* 44*

## 2022-10-05 NOTE — PROGRESS NOTES
Admitted/transferred from:   2 RN skin assessment completed by Rianna Carrizales RN and Lucila PANDEY RN.  Skin assessment finding: Right mastectomy, left chest port. No other apparent skin issues noted.   Interventions/actions: No interventions needed at this time.    Will continue to monitor.

## 2022-10-05 NOTE — PROGRESS NOTES
Pt admitted from ED this evening. A/O x4 vitally stable on RA. Denies pain, reported nausea, given Zofran at  10:45pm. Walking independent to bathroom, voiding spontaneously, and one BM this AM per patient report. Chest port infusing Vanco. Will continue to monitor.

## 2022-10-05 NOTE — PROGRESS NOTES
Solid Tumor Oncology Consult Service  Progress Note   Date of Service: 10/05/2022  Patient: Josefina Bennett  MRN: [id number]  Admission Date: 10/3/2022  Hospital Day # 1  Cancer Diagnosis: Recurrent metastatic breast cancer  Primary Outpatient Oncologist: Dr. Meza  Current Treatment Plan: Enhertu (C1D1=9/30/22)    Summary & Recommendations:   - Nausea ongoing this morning despite PRN Zofran and Reglan. The following changes to antiemetic regimen have been ordered for you:  - Changed dose of PRN Zofran from 4 mg to 8 mg q8hr   - Added Ativan 0.5 mg q6hr IV PRN. Of note, there is shortage of IV Ativan so ordered as trial for 24 hours after discussing with pharmacy.   - Start Zyprexa 5 mg at bedtime tonight for nausea. If this doesn't work, will consider increasing dose to 10 mg.   - Once nausea is better controlled, we will need to work on switching PRNs from IV to PO.     Assessment & Plan:   Mary Bennett is a 56 year old female with a history of a stage II right breast cancer, ER+/KY+/HER2-, now with recurrent metastatic breast cancer. 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 She recently started trastuzumab deruxtecan C1D1 9/30/22. She presented to the ED on 10/3/22 for nausea and weakness and subsequently admitted for further workup and management.     # Recurrent metastatic breast cancer, ER-/KY-/HER2+  See initial cnsult note 10/4 for full oncologic history. In brief, pt was initially diagnosed in January 2015 with infiltrating lobular R breast cancer, ER positive, [place] low positive, HER2 negative. Enrolled in I-SPY-2 trial and received 12 cycles of ganetespib and paclitaxel, then started AC on 5/26/2015 c/b CINV and severe PJP. She underwent R lumpectomy and sentinel lymph node procedure on 8/12/15 with positive margins so proceeded to R mastectomy with clear margins. Completed radiation in Dec 2015. Started adjuvant  letrozole on 1/28/16. In April 2022, CT CAP showed recurrence with new abdominal lymph nodes and multiple bone metastases, including sacral mass. Sacral mass biopsy showed pleomorphic lobular breast cancer ER-/[place]-/HER2+ (20% by FISH).  Clinic conference consensus was for the modified Vandana with paclitaxel weekly to treat both clones. She started Enhertu C1D1 on 9/30/22.     # Treatment-related nausea/vomitting  Nausea mainly started after patient received fam-trastuzumab deruxtecan (Enhertu) on 9/30/22. Enhertu is associated with a moderate emetic potential, and is likely responsible for acutely precipitating nausea and vomiting. It may also be drawing from other factors such as starting levofloxacin on 9/30/22 and generally being sick with suspected pneumonia. Does not look infectious based on history and clinical presentation. No concerning abdominal symptoms to suspect an otherwise acute abdominal pathology. Earlier diarrhea has resolved. No CNS symptoms to suspect new intracranial metastases as the etiology.   - Continue supportive cares:   - Zofran 8 mg q8hr IV PRN  - Reglan 10 mg q4hr IV PRN  - Ativan 0.5 mg q4hr PRN  - Zyprexa 5 mg at bedtime (x10.5)  - Patient tried Compazine at home but finds Reglan more effective  - If no improvement, will increase dose of Zyprexa to 10 mg    # Pneumonia  Patient describes having recently had severe dry hacking cough (currently improved). CT chest on 9/30/22 showed new multifocal nodular and consolidative opacities involving the right upper, middle and lower lobes, new compared to PET/CT from 8/29/22. She has received 5 days of Augmentin followed by levofloxacin which was started 9/30/22 as outpatient. She is currently on Zosyn. Vancomycin discontinued with MRSA negative. RVP was negative. Influenza A/B, RSV and SARS-CoV2 PCR negative. Lactic acid was unremarkable. Blood cultures have been preliminarily negative.   - Continue management per medicine team  - No current  symptoms of pneumonia per pt     # Oral thrush  Patient has mild coating of thrush on the posterior aspect of the dorsum tongue.  - Nystatin QID (one week course is sufficient)  - MMW PRN    Patient was seen and plan of care was discussed with attending physician Dr. Rockwell.    Thank you for the opportunity to partake in this patient's plan of care. Please do not hesitate to page with questions. We will continue to follow.     I spent >55 minutes face-to-face or coordinating care of Josefina Bennett. Over 50% of our time on the unit was spent counseling the patient and/or coordinating care.    Ana Lamb PA-C   Hematology/Oncology   Pager: [phone]  Desk phone: [phone]  ___________________________________________________________________    Subjective & Interval History:    No acute events noted overnight. Patient is feeling very nauseous this morning, more so than yesterday. She states that nausea comes in waves. During our visit, she is not due for Zofran nor Reglan. She reports that Reglan works better than Compazine. Discussed changes to antiemetic regimen, and patient was on board. Otherwise feeling well.     Physical Exam:    Blood pressure (!) 143/49, pulse 92, temperature 97.5  F (36.4  C), temperature source Oral, resp. rate 16, last menstrual period 12/18/2014, SpO2 97 %, not currently breastfeeding.    General: lying in bed, no acute distress  HEENT: sclera anicteric, EOMI  Neck: supple, normal ROM  Resp: normal respiratory effort on ambient air  GI: non-distended  MSK: warm and well-perfused, normal tone  Skin: no rashes on limited exam, no jaundice  Neuro: Alert and interactive, moves all extremities equally, no focal deficits    Labs & Studies: I personally reviewed the following studies:  ROUTINE LABS (Last four results):  CMP  Recent Labs   Lab 10/05/22  1039 10/04/22  1041 10/03/22  2131 09/30/22  0724    139 131* 139   POTASSIUM 3.9 3.8 4.7 4.6   CHLORIDE 106 105 96* 103   CO2 22 25 26 26    ANIONGAP 11 9 9 10   * 117* 117* 158*   BUN 11.7 17.7 24.0* 17.7   CR 0.95 1.02* 1.07* 0.93   GFRESTIMATED 70 64 61 72   VEE 8.7 8.5* 9.6 9.5   MAG 1.9  --   --   --    PHOS 2.0*  --   --   --    PROTTOTAL  --   --  6.7 6.4   ALBUMIN  --   --  3.8 3.6   BILITOTAL  --   --  0.5 0.2   ALKPHOS  --   --  98 106*   AST  --   --  61* 44*   ALT  --   --  59* 41*     CBC  Recent Labs   Lab 10/05/22  1039 10/04/22  1041 10/03/22  2131 09/30/22  0724   WBC 8.7 8.3 10.0 11.6*   RBC 4.02 3.95 4.39 3.95   HGB 10.7* 10.6* 11.9 10.7*   HCT 33.8* 33.8* 37.6 34.1*   MCV 84 86 86 86   MCH 26.6 26.8 27.1 27.1   MCHC 31.7 31.4* 31.6 31.4*   RDW 16.5* 16.7* 17.1* 17.4*    321 422 450     INRNo lab results found in last 7 days.  Medications list for reference:  Current Facility-Administered Medications   Medication     benzonatate (TESSALON) capsule 100 mg     calcium carbonate 500 mg (elemental) (OSCAL) tablet 500 mg     enoxaparin ANTICOAGULANT (LOVENOX) injection 40 mg     guaiFENesin-codeine (ROBITUSSIN AC) 100-10 MG/5ML solution 5-10 mL     heparin 100 UNIT/ML injection 5-10 mL     heparin lock flush 10 UNIT/ML injection 5-10 mL     heparin lock flush 10 UNIT/ML injection 5-10 mL     lactated ringers infusion     lidocaine (LMX4) cream     lidocaine 1 % 0.1-1 mL     loperamide (IMODIUM) capsule 2 mg     LORazepam (ATIVAN) injection 0.5 mg     magic mouthwash suspension (diphenhydramine, lidocaine, aluminum-magnesium & simethicone)     melatonin tablet 1-3 mg     metoclopramide (REGLAN) injection 10 mg     mirtazapine (REMERON) tablet TABS 7.5 mg     multivitamin w/minerals (THERA-VIT-M) tablet 1 tablet     nystatin (MYCOSTATIN) suspension 500,000 Units     ondansetron (ZOFRAN) injection 4 mg     prochlorperazine (COMPAZINE) injection 5 mg    Or     prochlorperazine (COMPAZINE) tablet 5 mg    Or     prochlorperazine (COMPAZINE) suppository 25 mg     sodium chloride (PF) 0.9% PF flush 10-20 mL     sodium chloride (PF)  0.9% PF flush 10-20 mL     sodium chloride (PF) 0.9% PF flush 10-20 mL     sodium chloride (PF) 0.9% PF flush 3 mL     sodium chloride (PF) 0.9% PF flush 3 mL     venlafaxine (EFFEXOR) tablet 75 mg     Vitamin D3 (CHOLECALCIFEROL) tablet 1,000 Units

## 2022-10-05 NOTE — PLAN OF CARE
Goal Outcome Evaluation:    Plan of Care Reviewed With: patient     Overall Patient Progress: no change    Time: 8212-2125  Neuros: A&O x4, pleasant. Numbness and tingling present in fingertips and toes at baseline due to chemo.   Cardiac: WDL  Respiratory: WDL, stable on room air.   GI/: Voids spontaneously. Last bowel movement 10/4.   Diet/Nausea: Regular diet, poor intake due to nausea. Pt complains of constant nausea, PRN zofran and reglan given with relief.   Skin: No apparent skin issues noted.   LDA: Left chest port infusing continuous LR @ 75mL/hr.   Labs: Reviewed  Pain: Denies  Activity: Independent in room.  New changes this shift: Pt reports not sleeping well due to nausea. Waiting to collect sputum and stool sample.  Plan: Continue with antibiotic treatment.

## 2022-10-05 NOTE — PLAN OF CARE
Goal Outcome Evaluation:    Plan of Care Reviewed With: patient     Time: 07:00-19:30     Reason for admit: Management of pneumonia and generalized weakness  Vitals: Slightly elevated BP  Activity: Activity as tolerates, independent  Neuro: Alert and oriented x4  Mood/Behavior: Calm and cooperative  Lines/Drains: L Port-a-cath infusing with LR @ 75 ml/hr.  Cardiac: WNL  Resp: Lung sounds clear, no cough noted  Diet: Regular diet with poor-fair appetite  GI/: Voids without difficulty  Skin: Skin is unremarkable  Pain: No c/o pain this shift  Labs/Imaging: Phosphorus was 2.0 today, providers informed, no new orders.     New this shift: c/o nausea PRN zofran IV, Compazine tablet and Reglan IV given with moderate efficacy. Ambulated in the hallway and out of the unit twice this shift. Abdominal xray done. C. Diff. Stool sample sent to lab.      Plan: Continue with POC.

## 2022-10-06 NOTE — PLAN OF CARE
/56 (BP Location: Left arm)   Pulse 94   Temp 97.9  F (36.6  C) (Oral)   Resp 16   LMP 12/18/2014   SpO2 96%      Time: 2872-7115    Reason for admission: Pneumonia  Activity: SBA  Pain: denied pain or discomfort.   Neuro: alert and oriented.   Cardiac:WDL  Resp: denied SOB, lung sounds clear bilaterally.   GI/: Regular diet, voids without difficulties, bowel sounds present x four quadrants.   Lines: L chest port, LR infusing at 75 ml/hr.   Skin: WDL  Labs/Imaging: no lab or  Imaging this shift.   New changes to shift: No change.   Plan: Continue with current plan of cares.

## 2022-10-06 NOTE — PLAN OF CARE
Assumed cares 7507-5079    /54 (BP Location: Left arm)   Pulse 94   Temp 96.8  F (36  C) (Oral)   Resp 16   LMP 12/18/2014   SpO2 96%       Status: VSS on RA  Neuro: A&Ox4  Respiratory: WDL  Cardiac: WDL  GI: One soft medium bowel movement.   : Voided spontaneously without difficulty   Pain/Nausea: Denied pain. Intermittent nausea treated with PO Compazine, IV Reglan, PO Zofran with relief.   Mobility: Up ad hermelindo  Diet: Tolerated regular diet with good appetite   Labs: Phosphourous 2.3. Replacement administered. Recheck tomorrow AM. Stool sample sent to lab.   Lines: Left chest port-a-cath heparin locked  Skin/Incisions: WDL  New Changes: Discontinued IV fluids. Transitioned to PO antiemetics.   Plan: Continue with vancomycin treatment. Possible discharge 10/7/22 as long as patient tolerates regular diet and oral antiemetics.

## 2022-10-06 NOTE — PROGRESS NOTES
Solid Tumor Oncology Consult Service  Progress Note   Date of Service: 10/06/2022  Patient: Josefina Bennett  MRN: [id number]  Admission Date: 10/3/2022  Hospital Day # 2  Cancer Diagnosis: Recurrent metastatic breast cancer  Primary Outpatient Oncologist: Dr. Meza  Current Treatment Plan: Enhertu (C1D1=9/30/22)    Summary & Recommendations:   - Nausea improved with initiation of Zyprexa. Recommend transitioning PRN antiemetics from IV to PO to work toward discharge.   - Agree with course of PO vancomycin for cdiff.     Assessment & Plan:   Mary Bennett is a 56 year old female with a history of a stage II right breast cancer, ER+/GA+/HER2-, now with recurrent metastatic breast cancer. 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 She recently started trastuzumab deruxtecan C1D1 9/30/22. She presented to the ED on 10/3/22 for nausea and weakness and subsequently admitted for further workup and management.     # Recurrent metastatic breast cancer, ER-/GA-/HER2+  See initial cnsult note 10/4 for full oncologic history. In brief, pt was initially diagnosed in January 2015 with infiltrating lobular R breast cancer, ER positive, [place] low positive, HER2 negative. Enrolled in I-SPY-2 trial and received 12 cycles of ganetespib and paclitaxel, then started AC on 5/26/2015 c/b CINV and severe PJP. She underwent R lumpectomy and sentinel lymph node procedure on 8/12/15 with positive margins so proceeded to R mastectomy with clear margins. Completed radiation in Dec 2015. Started adjuvant letrozole on 1/28/16. In April 2022, CT CAP showed recurrence with new abdominal lymph nodes and multiple bone metastases, including sacral mass. Sacral mass biopsy showed pleomorphic lobular breast cancer ER-/GA-/HER2+ (20% by FISH).  Clinic conference consensus was for the modified Vandana with paclitaxel weekly to treat both clones. She started Enhertu C1D1  on 9/30/22.     # Treatment-related nausea/vomitting  Nausea mainly started after patient received fam-trastuzumab deruxtecan (Enhertu) on 9/30/22. Enhertu is associated with a moderate emetic potential, and is likely responsible for acutely precipitating nausea and vomiting. It may also be drawing from other factors such as starting levofloxacin on 9/30/22 and generally being sick with suspected pneumonia. Does not look infectious based on history and clinical presentation. No concerning abdominal symptoms to suspect an otherwise acute abdominal pathology. Earlier diarrhea has resolved. No CNS symptoms to suspect new intracranial metastases as the etiology.   - Continue supportive cares:   - Zofran ODT q6hr PRN  - Compazine 5 mg q6hr PRN  - Reglan 10 mg q4hr IV PRN  - Zyprexa 5 mg at bedtime (x10/5)  - Patient tried Compazine at home but finds Reglan more effective  - Nausea improved with Zyprexa. Option to increase dose of Zyprexa to 10 mg if needed.     # Pneumonia  Patient describes having recently had severe dry hacking cough (currently improved). CT chest on 9/30/22 showed new multifocal nodular and consolidative opacities involving the right upper, middle and lower lobes, new compared to PET/CT from 8/29/22. She has received 5 days of Augmentin followed by levofloxacin which was started 9/30/22 as outpatient. She is currently on Zosyn. Vancomycin discontinued with MRSA negative. RVP was negative. Influenza A/B, RSV and SARS-CoV2 PCR negative. Lactic acid was unremarkable. Blood cultures have been preliminarily negative.   - Continue management per medicine team  - No current symptoms of pneumonia per pt     # Oral thrush  Patient has mild coating of thrush on the posterior aspect of the dorsum tongue.  - Nystatin QID (one week course is sufficient)  - MMW PRN    # Cdiff positive  Positive 10/5. Stools currently firm, diarrhea resolved.   - Course of PO vancomycin per primary team    Patient was seen and plan  of care was discussed with attending physician Dr. Rockwell.    Thank you for the opportunity to partake in this patient's plan of care. Please do not hesitate to page with questions. We will continue to follow.     I spent >40 minutes face-to-face or coordinating care of Josefina Bennett. Over 50% of our time on the unit was spent counseling the patient and/or coordinating care.    Ana Lamb PA-C   Hematology/Oncology   Pager: [phone]  Desk phone: [phone]  ___________________________________________________________________    Subjective & Interval History:    No acute events noted overnight. Patient reports nausea is much better today. She is still having small waves of nausea and using PRNs. Slept better last night. Had a solid BM yesterday which tested positive for cdiff.     Physical Exam:    Blood pressure (!) 147/76, pulse 80, temperature (!) 96.6  F (35.9  C), temperature source Oral, resp. rate 17, last menstrual period 12/18/2014, SpO2 98 %, not currently breastfeeding.    General: lying in bed, no acute distress  HEENT: sclera anicteric, EOMI  Neck: supple, normal ROM  Resp: normal respiratory effort on ambient air  GI: non-distended  MSK: warm and well-perfused, normal tone  Skin: no rashes on limited exam, no jaundice  Neuro: Alert and interactive, moves all extremities equally, no focal deficits    Labs & Studies: I personally reviewed the following studies:  ROUTINE LABS (Last four results):  CMP  Recent Labs   Lab 10/06/22  0744 10/05/22  1039 10/04/22  1041 10/03/22  2131 09/30/22  0724    139 139 131* 139   POTASSIUM 3.9 3.9 3.8 4.7 4.6   CHLORIDE 106 106 105 96* 103   CO2 27 22 25 26 26   ANIONGAP 5* 11 9 9 10   * 129* 117* 117* 158*   BUN 8.8 11.7 17.7 24.0* 17.7   CR 0.82 0.95 1.02* 1.07* 0.93   GFRESTIMATED 83 70 64 61 72   VEE 8.4* 8.7 8.5* 9.6 9.5   MAG  --  1.9  --   --   --    PHOS 2.3* 2.0*  --   --   --    PROTTOTAL  --   --   --  6.7 6.4   ALBUMIN  --   --   --  3.8 3.6    BILITOTAL  --   --   --  0.5 0.2   ALKPHOS  --   --   --  98 106*   AST  --   --   --  61* 44*   ALT  --   --   --  59* 41*     CBC  Recent Labs   Lab 10/06/22  0744 10/05/22  1039 10/04/22  1041 10/03/22  2131   WBC 7.0 8.7 8.3 10.0   RBC 3.77* 4.02 3.95 4.39   HGB 10.1* 10.7* 10.6* 11.9   HCT 32.0* 33.8* 33.8* 37.6   MCV 85 84 86 86   MCH 26.8 26.6 26.8 27.1   MCHC 31.6 31.7 31.4* 31.6   RDW 16.5* 16.5* 16.7* 17.1*    300 321 422     INRNo lab results found in last 7 days.  Medications list for reference:  Current Facility-Administered Medications   Medication     benzonatate (TESSALON) capsule 100 mg     calcium carbonate 500 mg (elemental) (OSCAL) tablet 500 mg     enoxaparin ANTICOAGULANT (LOVENOX) injection 40 mg     guaiFENesin-codeine (ROBITUSSIN AC) 100-10 MG/5ML solution 5-10 mL     heparin 100 UNIT/ML injection 5-10 mL     heparin lock flush 10 UNIT/ML injection 5-10 mL     heparin lock flush 10 UNIT/ML injection 5-10 mL     lactated ringers infusion     lidocaine (LMX4) cream     lidocaine 1 % 0.1-1 mL     magic mouthwash suspension (diphenhydramine, lidocaine, aluminum-magnesium & simethicone)     melatonin tablet 1-3 mg     metoclopramide (REGLAN) injection 10 mg     mirtazapine (REMERON) tablet TABS 7.5 mg     multivitamin w/minerals (THERA-VIT-M) tablet 1 tablet     nystatin (MYCOSTATIN) suspension 500,000 Units     OLANZapine (zyPREXA) tablet 5 mg     ondansetron (ZOFRAN ODT) ODT tab 4 mg     potassium & sodium phosphates (NEUTRA-PHOS) Packet 1 packet     prochlorperazine (COMPAZINE) injection 5 mg    Or     prochlorperazine (COMPAZINE) tablet 5 mg    Or     prochlorperazine (COMPAZINE) suppository 25 mg     sodium chloride (PF) 0.9% PF flush 10-20 mL     sodium chloride (PF) 0.9% PF flush 10-20 mL     sodium chloride (PF) 0.9% PF flush 10-20 mL     sodium chloride (PF) 0.9% PF flush 3 mL     sodium chloride (PF) 0.9% PF flush 3 mL     vancomycin (VANCOCIN) capsule 125 mg     venlafaxine  (EFFEXOR) tablet 75 mg     Vitamin D3 (CHOLECALCIFEROL) tablet 1,000 Units

## 2022-10-06 NOTE — PROGRESS NOTES
[facility]    Progress Note - Medicine Service, [facility] TEAM 3       Date of Admission:  10/3/2022    Assessment & Plan           Josefina Bennett is a 56 year old female admitted on 10/3/2022. She has a history of metastatic beast cancer (s/p R mastectomy, hysterectomy+oopherectomy and chemo/radiation), and depression is admitted for the management of pneumonia and generalized weakness.    Today  - continue anti-nausea medications  - c. Diff positive, treating orally with vancomycin  - will watch to ensure clinically better while on PO medications    Fatigue  Nausea/Vomiting/Diarrhea s/p recent chemo 9/30  For the past 3 days pt has had severe malaise, generalized weakness, and nausea with vomiting. She received her chemo on 9/30. Was generally doing well except for the cough she has been having. BMP unremarkable. Most likely related to her chemotherapy after discussion with oncology who additionally have recommendations on how to impact her nausea that is related to targeted therapies, appreciate assistance  - oncology recommendations appreciated  - will attempt oral management of nausea  - PO intake encouraged    ?R upper and middle lung consolidation  Has been having cough (minimally productive) for the past 2 weeks. Had 5 days of Augmentin and 4 days of levofloxacin. Could be treated as failed treatment given unchanged CXR findings though CXR findings are minimal to begin with. Patient reports improvement in her respiratory symptoms. We continued antibiotics for a few days though will stop them now with low clinical suspicion for active infection, will monitor and see if she if infection presents itself. Antibiotics stopped 10/5, and will monitor for any worsening of condition or new fevers.   - will continue to monitor and see if further antibiotics are indicated    #C diff  Patient with diarrhea was tested for C diff and found to be positive. Though some  component of diarrhea could be associated with her chemotherapy there certainly is concern that she could have had a concurrent c diff infection.   - oral vancomycin 125mg QID for 10 days (started night of 10/5)    Hyponatremia, hypovolemic- resolved  Mildly elevated creatinine  Do not satisfy criteria for ILSA (1.07 at admission from 0.88 at baseline); Na 131 at admission though improved with initial fluids.   - Continue BMP monitoring  - IVF as needed, encourage PO          Diet: Combination Diet Regular Diet Adult    DVT Prophylaxis: Enoxaparin (Lovenox) SQ  Berger Catheter: Not present  Fluids: oral  Central Lines: PRESENT       Cardiac Monitoring: None  Code Status: Full Code      Disposition Plan      Expected Discharge Date: 10/07/2022        Discharge Comments: From the ER.  Work up in progress for nausea and weakness.  Recently had chemo.        The patient's care was discussed with the Attending Physician, Dr. Sagastume.    Jorge Meek MD  Medicine Service, [facility] TEAM [address] [place] [address]  Securely message with the Vocera Web Console (learn more here)  Text page via [facility] Paging/Directory   Please see signed in provider for up to date coverage information      Clinically Significant Risk Factors Present on Admission                   ______________________________________________________________________    Interval History   Patient still with some nausea when she wakes up. Otherwise no abdominal pain, vomiting, loose stools, fevers or toher concerns.     Physical Exam   Vital Signs: Temp: (!) 96.6  F (35.9  C) Temp src: Oral BP: (!) 147/76 Pulse: 80   Resp: 17 SpO2: 98 % O2 Device: None (Room air)    Weight: 0 lbs 0 oz  General: Pt NAD  HEENT: PERRLA, EOMI, sclera clear, anicteric  Neck: Supple with midline trachea, Thyroid without masses Trachea midline, A few lymph nodes on the edge of R SCM and submandibular area   Card: RRR, normal S1 and S2, no murmurs/rubs  or gallops; no JVD/carotid bruit  Lung: Equal BS bilaterally; no wheezing; scattered crackles in R back.  Abdomen:  soft and nontender, nondistended, no organomegaly, BS+  Neuro: Alert and oriented X 3  Psych: Normal mood, affect and response  Skin: No rashes, skin warm and dry, no erythematous areas  Extremities: No edema, pulse 2+ DP bilaterally    Data   Recent Labs   Lab 10/06/22  0744 10/05/22  1039 10/04/22  1041 10/03/22  2131 09/30/22  0724   WBC 7.0 8.7 8.3 10.0 11.6*   HGB 10.1* 10.7* 10.6* 11.9 10.7*   MCV 85 84 86 86 86    300 321 422 450    139 139 131* 139   POTASSIUM 3.9 3.9 3.8 4.7 4.6   CHLORIDE 106 106 105 96* 103   CO2 27 22 25 26 26   BUN 8.8 11.7 17.7 24.0* 17.7   CR 0.82 0.95 1.02* 1.07* 0.93   ANIONGAP 5* 11 9 9 10   VEE 8.4* 8.7 8.5* 9.6 9.5   * 129* 117* 117* 158*   ALBUMIN  --   --   --  3.8 3.6   PROTTOTAL  --   --   --  6.7 6.4   BILITOTAL  --   --   --  0.5 0.2   ALKPHOS  --   --   --  98 106*   ALT  --   --   --  59* 41*   AST  --   --   --  61* 44*

## 2022-10-06 NOTE — CONSULTS
Care Management Initial Consult    General Information  Assessment completed with: Care Team MemberDOUGLAS-chart review,    Type of CM/SW Visit: Initial Assessment    Primary Care Provider verified and updated as needed: Yes   Readmission within the last 30 days:        Reason for Consult: discharge planning  Advance Care Planning: Advance Care Planning Reviewed: no concerns identified        Communication Assessment  Patient's communication style: spoken language (English or Bilingual)    Hearing Difficulty or Deaf: no   Wear Glasses or Blind: no    Cognitive  Cognitive/Neuro/Behavioral: WDL                      Living Environment:   People in home: significant other     Current living Arrangements:        Able to return to prior arrangements: yes     Family/Social Support:  Care provided by: self  Provides care for:                  Description of Support System:  Supportive per report     Current Resources:   Patient receiving home care services:  Unknown/TBD     Community Resources:    Equipment currently used at home: none  Supplies currently used at home:         Lifestyle & Psychosocial Needs:  (From Chart Flow Sheet)  Social Determinants of Health     Tobacco Use: Medium Risk     Smoking Tobacco Use: Former Smoker     Smokeless Tobacco Use: Never Used   Alcohol Use: Not on file   Financial Resource Strain: Not on file   Food Insecurity: Not on file   Transportation Needs: Not on file   Physical Activity: Not on file   Stress: Not on file   Social Connections: Not on file   Intimate Partner Violence: Not At Risk     Fear of Current or Ex-Partner: No     Emotionally Abused: No     Physically Abused: No     Sexually Abused: No   Depression: Not on file   Housing Stability: Not on file       Functional Status:  Prior to admission patient needed assistance:      Values/Beliefs:  Spiritual, Cultural Beliefs, Druze Practices, Values that affect care: no               Additional Information:    Inpatient work up  continues.  Inpatient Care Management Team will continue to follow for transition needs prn.    DAVID Soriano.GERSON., R.N., P.H.N..  Care Coordinator     Pager: [phone]/Phone: [phone]  [facility]

## 2022-10-06 NOTE — PROGRESS NOTES
[id number]  Neuro: A&Ox4.   Cardiac: VSS   Respiratory: Sating >95% on RA  GI/: Adequate urine output. Multiple loose BMs per pt, cdiff +, started on PO vanco overnight   Diet/appetite: Tolerating regular diet. Decreased appetite d/t nausea. PRN antiemetics given   Activity:  Up ad hermelindo in room   Pain: At acceptable level on current regimen.   Skin: No new deficits noted.  LDA's: L Port with LR infusing at 75ml /hr    Plan: Phos replaced per protocol, Continue with POC. Notify primary team with changes.

## 2022-10-07 NOTE — PROGRESS NOTES
Solid Tumor Oncology Consult Service  Progress Note   Date of Service: 10/07/2022  Patient: Josefina Bennett  MRN: [id number]  Admission Date: 10/3/2022  Hospital Day # 3  Cancer Diagnosis: Recurrent metastatic breast cancer  Primary Outpatient Oncologist: Dr. Meza  Current Treatment Plan: Enhertu (C1D1=9/30/22)    Summary & Recommendations:   - Nausea continues to be improved. Pt to discharge to home today.   - Antiemetic regimen at discharge:    - Zyprexa 5 mg at bedtime + 2.5 PRN to up-titrate as needed   - Zofran 4 mg ODT q6hr PRN   - Compazine 5-10 mg q6hr PRN   - Reglan 10 mg q4hr PRN (last line)  - Discharge instructions for antiemetic regimen and side effects to monitor for were placed in AVS.   - She has oncology follow up on 10/21.     Assessment & Plan:   Mary Bennett is a 56 year old female with a history of a stage II right breast cancer, ER+/NC+/HER2-, now with recurrent metastatic breast cancer. 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 She recently started trastuzumab deruxtecan C1D1 9/30/22. She presented to the ED on 10/3/22 for nausea and weakness and subsequently admitted for further workup and management.     # Recurrent metastatic breast cancer, ER-/NC-/HER2+  See initial cnsult note 10/4 for full oncologic history. In brief, pt was initially diagnosed in January 2015 with infiltrating lobular R breast cancer, ER positive, NC low positive, HER2 negative. Enrolled in I-SPY-2 trial and received 12 cycles of ganetespib and paclitaxel, then started AC on 5/26/2015 c/b CINV and severe PJP. She underwent R lumpectomy and sentinel lymph node procedure on 8/12/15 with positive margins so proceeded to R mastectomy with clear margins. Completed radiation in Dec 2015. Started adjuvant letrozole on 1/28/16. In April 2022, CT CAP showed recurrence with new abdominal lymph nodes and multiple bone metastases, including  sacral mass. Sacral mass biopsy showed pleomorphic lobular breast cancer ER-/PA-/HER2+ (20% by FISH). Clinic conference consensus was for the modified Vandana with paclitaxel weekly to treat both clones. She started Enhertu C1D1 on 9/30/22.     # Treatment-related nausea/vomitting  Nausea mainly started after patient received fam-trastuzumab deruxtecan (Enhertu) on 9/30/22. Enhertu is associated with a moderate emetic potential, and is likely responsible for acutely precipitating nausea and vomiting. It may also be drawing from other factors such as starting levofloxacin on 9/30/22 and generally being sick with suspected pneumonia. Does not look infectious based on history and clinical presentation. No concerning abdominal symptoms to suspect an otherwise acute abdominal pathology. Earlier diarrhea has resolved. No CNS symptoms to suspect new intracranial metastases as the etiology.   - Continue supportive cares:   - Zofran ODT q6hr PRN  - Compazine 5-10 mg q6hr PRN  - Reglan 10 mg q4hr PRN (takes third)  - Zyprexa 5 mg at bedtime (x10/5). Ok up increase to 7.5 mg at home if needed.   - Nausea improved with Zyprexa. Option to increase dose of Zyprexa to 10 mg if needed.     # Pneumonia, resolved  Patient describes having recently had severe dry hacking cough (currently improved). CT chest on 9/30/22 showed new multifocal nodular and consolidative opacities involving the right upper, middle and lower lobes, new compared to PET/CT from 8/29/22. She has received 5 days of Augmentin followed by levofloxacin which was started 9/30/22 as outpatient. She is currently on Zosyn. Vancomycin discontinued with MRSA negative. RVP was negative. Influenza A/B, RSV and SARS-CoV2 PCR negative. Lactic acid was unremarkable. Blood cultures have been preliminarily negative.   - Continue management per medicine team  - No current symptoms of pneumonia per pt     # Oral thrush  Patient has mild coating of thrush on the posterior aspect  of the dorsum tongue.  - Nystatin QID (one week course is sufficient)  - MMW PRN    # Cdiff positive  Positive 10/5. Stools currently firm, diarrhea resolved.   - Course of PO vancomycin per primary team x10/6    Patient was seen and plan of care was discussed with attending physician Dr. Rockwell.    Thank you for the opportunity to partake in this patient's plan of care. Please do not hesitate to page with questions. We will continue to follow.     I spent >45 minutes face-to-face or coordinating care of Josefina Bennett. Over 50% of our time on the unit was spent counseling the patient and/or coordinating care.    Ana Lamb PA-C   Hematology/Oncology   Pager: [phone]  Desk phone: *[phone]  ___________________________________________________________________    Subjective & Interval History:    No acute events noted overnight. Patient reports nausea is much better today. She still has nausea when she wakes up but feels she has a good routine of taking Compazine first, then Zofran. Discussed discharge instructions. Pt is excited to discharge home.     Physical Exam:    Blood pressure 138/56, pulse 79, temperature (!) 96.6  F (35.9  C), temperature source Oral, resp. rate 16, last menstrual period 12/18/2014, SpO2 98 %, not currently breastfeeding.    General: lying in bed, no acute distress  HEENT: sclera anicteric, EOMI  Neck: supple, normal ROM  Resp: normal respiratory effort on ambient air  GI: non-distended  MSK: warm and well-perfused, normal tone  Skin: no rashes on limited exam, no jaundice  Neuro: Alert and interactive, moves all extremities equally, no focal deficits    Labs & Studies: I personally reviewed the following studies:  ROUTINE LABS (Last four results):  CMP  Recent Labs   Lab 10/07/22  0601 10/06/22  0744 10/05/22  1039 10/04/22  1041 10/03/22  2131   NA  --  138 139 139 131*   POTASSIUM  --  3.9 3.9 3.8 4.7   CHLORIDE  --  106 106 105 96*   CO2  --  27 22 25 26   ANIONGAP  --  5* 11 9 9    GLC  --  120* 129* 117* 117*   BUN  --  8.8 11.7 17.7 24.0*   CR  --  0.82 0.95 1.02* 1.07*   GFRESTIMATED  --  83 70 64 61   VEE  --  8.4* 8.7 8.5* 9.6   MAG  --   --  1.9  --   --    PHOS 2.9 2.3* 2.0*  --   --    PROTTOTAL  --   --   --   --  6.7   ALBUMIN  --   --   --   --  3.8   BILITOTAL  --   --   --   --  0.5   ALKPHOS  --   --   --   --  98   AST  --   --   --   --  61*   ALT  --   --   --   --  59*     CBC  Recent Labs   Lab 10/07/22  0601 10/06/22  0744 10/05/22  1039 10/04/22  1041   WBC 6.9 7.0 8.7 8.3   RBC 3.87 3.77* 4.02 3.95   HGB 10.5* 10.1* 10.7* 10.6*   HCT 32.5* 32.0* 33.8* 33.8*   MCV 84 85 84 86   MCH 27.1 26.8 26.6 26.8   MCHC 32.3 31.6 31.7 31.4*   RDW 16.6* 16.5* 16.5* 16.7*    234 300 321     INRNo lab results found in last 7 days.  Medications list for reference:  Current Facility-Administered Medications   Medication     benzonatate (TESSALON) capsule 100 mg     calcium carbonate 500 mg (elemental) (OSCAL) tablet 500 mg     enoxaparin ANTICOAGULANT (LOVENOX) injection 40 mg     guaiFENesin-codeine (ROBITUSSIN AC) 100-10 MG/5ML solution 5-10 mL     heparin 100 UNIT/ML injection 5-10 mL     heparin lock flush 10 UNIT/ML injection 5-10 mL     heparin lock flush 10 UNIT/ML injection 5-10 mL     lidocaine (LMX4) cream     lidocaine 1 % 0.1-1 mL     magic mouthwash suspension (diphenhydramine, lidocaine, aluminum-magnesium & simethicone)     melatonin tablet 1-3 mg     metoclopramide (REGLAN) tablet 10 mg     mirtazapine (REMERON) tablet TABS 7.5 mg     multivitamin w/minerals (THERA-VIT-M) tablet 1 tablet     nystatin (MYCOSTATIN) suspension 500,000 Units     OLANZapine (zyPREXA) tablet 5 mg     ondansetron (ZOFRAN ODT) ODT tab 4 mg     prochlorperazine (COMPAZINE) injection 5 mg    Or     prochlorperazine (COMPAZINE) tablet 5 mg    Or     prochlorperazine (COMPAZINE) suppository 25 mg     sodium chloride (PF) 0.9% PF flush 10-20 mL     sodium chloride (PF) 0.9% PF flush 10-20 mL      sodium chloride (PF) 0.9% PF flush 10-20 mL     sodium chloride (PF) 0.9% PF flush 3 mL     sodium chloride (PF) 0.9% PF flush 3 mL     vancomycin (VANCOCIN) capsule 125 mg     venlafaxine (EFFEXOR) tablet 75 mg     Vitamin D3 (CHOLECALCIFEROL) tablet 1,000 Units

## 2022-10-07 NOTE — PROGRESS NOTES
Goal Outcome Evaluation:     Patient is A&Ox4, able to make needs known. VSS, RA. Denies any pain or discomfort, dneies chest pain or SOB. Ambulating in the halls independently. Has been having intermittent nausea this am, relieved with antiemetic.  No emesis and tolerating regular diet. Port Hep locked.  Voiding spontaneously, had diarrhea couple of times today per pt. Pt is hoping to discharge to home today.

## 2022-10-07 NOTE — PLAN OF CARE
Goal Outcome Evaluation:    VSS.  Slept majority of night.  Denies pain.  Mild nausea this am, compazine given.  No emesis tonight. Port Hep locked.  Voiding not saving.  No bowel movement tonight but had one last evening.  Stools are thickening up.  Hoping to discharge home today.

## 2022-10-08 NOTE — DISCHARGE SUMMARY
[facility]  Discharge Summary - Medicine & Pediatrics       Date of Admission:  10/3/2022  Date of Discharge:  10/7/2022  5:27 PM  Discharging Provider: Dr. Malka Sagastume  Discharge Service: Medicine ServiceZION TEAM 3    Discharge Diagnoses   Fatigue  Nausea/vomiting/diarrhea  ?R upper and middle lobe consolidation  Uncomplicated C diff infection  Hyponatremia    Follow-ups Needed After Discharge   Follow-up Appointments     Follow Up and recommended labs and tests      You should follow up with your primary care provider in 1-2 weeks to   ensure that your nausea is continuing to resolve.   You should follow up with your oncologist as recommended.             Unresulted Labs Ordered in the Past 30 Days of this Admission     Date and Time Order Name Status Description    10/5/2022 10:42 AM Aspergillus Galactomannan Antigen In process     10/3/2022  9:03 PM Blood Culture Line, venous Preliminary     10/3/2022  9:03 PM Blood Culture Peripheral Blood Preliminary           Discharge Disposition   Discharged to home  Condition at discharge: Good    Hospital Course   Josefina Bennett is a 56 year old female admitted on 10/3/2022. She has a history of metastatic beast cancer (s/p R mastectomy, hysterectomy+oopherectomy and chemo/radiation), and depression is admitted for the management of pneumonia and generalized weakness.    Fatigue  Nausea/Vomiting/Diarrhea s/p recent chemo 9/30  Presented with severe malaise, generalized weakness, and nausea with vomiting. She received her chemo on 9/30. Was generally doing well except for the cough she has been having. BMP unremarkable. Most likely related to her chemotherapy after discussion with oncology who additionally have recommendations on how to impact her nausea that is related to targeted therapies, appreciate assistance. Nausea treatment and fluids significantly helped patient improve clinically and on day of discharge  patient was able to eat and drink without difficulty.  On discharge  - continue xyprexa 2.5-5mg at bedtime, uptitrating as discussed with oncology  - continue regimen of alternating reglan, compazine and zofran for nausea    ?R upper and middle lung consolidation  Has been having cough (minimally productive) for the past 2 weeks. Had 5 days of Augmentin and 4 days of levofloxacin. CXR findings are minimal. Patient reports improvement in her respiratory symptoms. We continued antibiotics for a few days though stopped them with low clinical suspicion for active infection,     #uncomplicated C diff gastroenteritis  Patient with diarrhea was tested for C diff and found to be positive. Though some component of diarrhea could be associated with her chemotherapy there certainly is concern that she could have had a concurrent c diff infection.   - oral vancomycin 125mg QID for 10 days (started night of 10/5)    Consultations This Hospital Stay   PHARMACY TO DOSE VANCO  ONCOLOGY ADULT IP CONSULT  CARE MANAGEMENT / SOCIAL WORK IP CONSULT    Code Status   Prior       The patient [facility]  [facility] [address] [address]  Phone: [phone]  ______________________________________________________________________    Physical Exam   Vital Signs: Temp: 97  F (36.1  C) Temp src: Oral BP: 122/62 Pulse: 72   Resp: 18 SpO2: 99 % O2 Device: None (Room air)    Weight: 0 lbs 0 oz  General: Pt NAD  HEENT: PERRLA, EOMI, sclera clear, anicteric  Card: RRR, normal S1 and S2, no murmurs/rubs or gallops; no JVD/carotid bruit  Lung: Equal BS bilaterally; no wheezing; scattered crackles in R back.  Abdomen:  soft and nontender, nondistended, no organomegaly, BS+  Neuro: Alert and oriented X 3  Psych: Normal mood, affect and response  Skin: No rashes, skin warm and dry, no erythematous areas  Extremities: No edema, pulse  2+ DP bilaterally      Primary Care Physician   Camille Araujo    Discharge Orders      Reason for your hospital stay    You were admitted with nausea, vomiting and diarrhea causing significant weakness. We believe this was related to your recent chemotherapy infusion as well as a intestinal infection called Clostridium difficile. We treated you with IV fluids, nausea medications and oral antibiotics and by the day of discharge we believe that you improved and were able to tolerate oral food and drink enough to return home. On discharge we will send you with a supply of nausea medications as well as oral vancomycin to finish off the rest of your antibiotic course for your infection.     Activity    Your activity upon discharge: activity as tolerated     Follow Up and recommended labs and tests    You should follow up with your primary care provider in 1-2 weeks to ensure that your nausea is continuing to resolve.   You should follow up with your oncologist as recommended.     When to contact your care team    [facility] cancer clinic triage line at [phone] for temp > or = 100.4, uncontrolled nausea/vomiting/diarrhea/constipation, unrelieved pain, bleeding not relieved with pressure, dizziness, chest pain, shortness of breath, loss of consciousness, and any new or concerning symptoms.     Discharge Instructions    Discharge instructions from the oncology team:  - Take Zyprexa (olanzapine) every night at bedtime. In the hospital you have been receiving 5 mg. We will order you 5 mg and 2.5 mg tablets, so you can increase your dose to 7.5 mg if needed. In the future you could also increase dose to 10 mg if the Zyprexa is working well, and you can discuss this with your outpatient oncology team. Side effects of increased dose may be sedation.   - Take as needed Compazine, Zofran, and Reglan. We typically don't use Compazine and Reglan together, so taper off Reglan first as your nausea improves. Side effects of  Zofran can include constipation and headache.     Diet    Follow this diet upon discharge: Orders Placed This Encounter      Combination Diet Regular Diet Adult       Significant Results and Procedures   Most Recent 3 CBC's:Recent Labs   Lab Test 10/07/22  0601 10/06/22  0744 10/05/22  1039   WBC 6.9 7.0 8.7   HGB 10.5* 10.1* 10.7*   MCV 84 85 84    234 300     Most Recent 3 BMP's:Recent Labs   Lab Test 10/06/22  0744 10/05/22  1039 10/04/22  1041    139 139   POTASSIUM 3.9 3.9 3.8   CHLORIDE 106 106 105   CO2 27 22 25   BUN 8.8 11.7 17.7   CR 0.82 0.95 1.02*   ANIONGAP 5* 11 9   VEE 8.4* 8.7 8.5*   * 129* 117*       Discharge Medications   Discharge Medication List as of 10/7/2022  2:58 PM      START taking these medications    Details   metoclopramide (REGLAN) 10 MG tablet Take 1 tablet (10 mg) by mouth every 6 hours as needed (nausea/vomiting), Disp-60 tablet, R-0, E-Prescribe      ondansetron (ZOFRAN ODT) 4 MG ODT tab Take 1 tablet (4 mg) by mouth every 6 hours as needed for nausea or vomiting, Disp-60 tablet, R-0, E-Prescribe      vancomycin (VANCOCIN) 125 MG capsule Take 1 capsule (125 mg) by mouth 4 times daily for 8 days, Disp-32 capsule, R-0, E-Prescribe         CONTINUE these medications which have CHANGED    Details   OLANZapine (ZYPREXA) 2.5 MG tablet Take 1 tablet (2.5 mg) by mouth At Bedtime Up-titrate to 5mg as indicated per discussion with oncology, Disp-30 tablet, R-0, E-Prescribe      prochlorperazine (COMPAZINE) 5 MG tablet Take 1 tablet (5 mg) by mouth every 6 hours as needed for nausea or vomiting, Disp-60 tablet, R-0, E-Prescribe         CONTINUE these medications which have NOT CHANGED    Details   albuterol (PROAIR HFA/PROVENTIL HFA/VENTOLIN HFA) 108 (90 Base) MCG/ACT inhaler Inhale 2 puffs into the lungs every 6 hours as needed for shortness of breath / dyspnea or wheezing, Disp-18 g, R-0, E-PrescribePharmacy may dispense brand covered by insurance (Proair, or proventil  or ventolin or generic albuterol inhaler)      benzonatate (TESSALON) 100 MG capsule Take 1 capsule (100 mg) by mouth 3 times daily as needed for cough, Disp-30 capsule, R-0, E-Prescribe      calcium carbonate (OS- MG Hoh. CA) 500 MG tablet Take 500 mg by mouth At Bedtime , Historical      cholecalciferol (VITAMIN D3) 1000 UNIT tablet Take 1 tablet (1,000 Units) by mouth daily, Disp-30 tablet, R-0, E-Prescribe      clindamycin (CLINDAMAX) 1 % topical gel Apply topically 2 times dailyDisp-60 g, [id number]      guaiFENesin-codeine (ROBITUSSIN AC) 100-10 MG/5ML solution Take 5-10 mLs by mouth every 4 hours as needed for cough, Disp-180 mL, R-0, E-Prescribe      lidocaine-prilocaine (EMLA) 2.5-2.5 % external cream Apply to port site one hour prior to access may use six days after port placementDisp-30 g, [id number]      mirtazapine (REMERON) 7.5 MG tablet TAKE 1 TABLET BY MOUTH ONCE DAILY AT BEDTIME, Disp-90 tablet, R-3, E-Prescribe      Multiple Vitamins-Minerals (MULTIVITAMIN ADULTS PO) Take 1 tablet by mouth daily, Historical      ondansetron (ZOFRAN) 8 MG tablet Take 1 tablet (8 mg) by mouth every 8 hours as needed for nausea, Disp-30 tablet, R-3, E-Prescribe      venlafaxine (EFFEXOR) 75 MG tablet Take 1 tablet (75 mg) by mouth daily, Disp-30 tablet, R-3, E-Prescribe         STOP taking these medications       amoxicillin-clavulanate (AUGMENTIN) 875-125 MG tablet Comments:   Reason for Stopping:         levofloxacin (LEVAQUIN) 750 MG tablet Comments:   Reason for Stopping:             Allergies   Allergies   Allergen Reactions     Morphine      Rash

## 2022-10-10 NOTE — PROGRESS NOTES
[facility]    Background: Transitional Care Management program auto-identified and prompting a chart review by [facility] team.    Assessment: Upon chart review, [facility] Team member will cancel/close this episode of Transitional Care Management program due to reason below:    Patient has active communication with a nurse, provider or care team for reason of post-hospital follow up plan.  Outreach call by CCR team not indicated to minimize duplicative efforts.     Plan: Transitional Care Management episode changed to enrolled.    Stephanie Melendrez RN  [facility] Resource [facility]    *Connected Care Resource Team does NOT follow patient ongoing. Referrals are identified based on internal discharge reports and the outreach is to ensure patient has an understanding of their discharge instructions.

## 2022-10-11 NOTE — TELEPHONE ENCOUNTER
Called patient to discuss +aspergillus Galactomannan done at the hospital. She is feeling overall much better.     I discussed her case with infectious disease on call, who recommended starting treatment with voriconazole, as well as ID consult which has already been placed. Discussed interactions with pharmacist, no significant interactions, recommend monitoring mirtazapine side effects while on voriconazole.     Dr. Meza agrees with treatment recommendations given her hx of sespis, PCP and difficulty with infections with her neoadjuvant treatment.     Sandra Coughlin PA-C on 10/11/2022 at 1:17 PM

## 2022-10-11 NOTE — TELEPHONE ENCOUNTER
I called Mary and left a message to call us.  She was discharged on 10-7.  Galactomanin was positive and we need to repeat it and obtain ID consultation.  I discussed that I did discuss her situation with Sandra Coughlin today and we need to follow up tomorrow.     TDXd given 9-30-22.     Mary Ann left a message that she is feeling better and the cough has cleared up.  Vanco oral started for C.dif.  We need follow up check of stool c dif.     She reported to Sandra today that she is feeling better.     Evangelista Meza MD

## 2022-10-12 NOTE — LETTER
10/12/2022       RE: Josefina Bennett  [address]  [place] [address]     Dear Colleague,    Thank you for referring your patient, Josefina Bennett, to the [facility] [place] [facility]. Please see a copy of my visit note below.    Mary is a 56 year old who is being evaluated via a billable video visit.      How would you like to obtain your AVS? MyChart  If the video visit is dropped, the invitation should be resent by: Text to cell phone: [phone]  Will anyone else be joining your video visit? No        Video-Visit Details    Video Start Time: 10:00am    Type of service:  Video Visit    Video End Time:10:25am    Originating Location (pt. Location): Home    Distant Location (provider location):  [facility] [place]     Platform used for Video Visit: Zak     Benjamin Hernandez is a 56 year old with history of metastatic beast cancer (s/p R mastectomy, hysterectomy+oopherectomy and chemo/radiation, last dose of  trastuzumab deruxtecan on 9/30/22), and depression who was admitted 10/4 for the management of cough and generalized weakness. She states she had been having cough (minimally productive) for the 2 weeks preceding her admission (though perhaps had some intermittent cough as early as the first week of September). She had been prescribed 5 days of Augmentin and 4 days of levofloxacin for suspected sinusitis and pneumonia, respectively, prior to her admission. Antibiotics were initially continued on admission but stopped shortly after due to quick improvement in respiratory symptoms and minimal new chest imaging findings (CXR with subtle, patchy nodular opacities in the RUL and RML, similar to CT from 9/30/22). She was also found to have C difficile and is taking PO vancomycin for this. After discharge, her Galactomannan returned positive, prompting bartoloide with ID on-call  physician, and she was started on voriconazole. Most recent QTc was 425 on 10/3/22. 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    Review of Systems   Constitutional: Negative.    HENT: Negative.    Eyes: Negative.    Respiratory: Positive for cough and shortness of breath.    Cardiovascular: Negative.    Gastrointestinal: Negative.    Endocrine: Negative.    Breasts:  negative.    Genitourinary: Negative.    Musculoskeletal: Negative.    Skin: Negative.    Allergic/Immunologic: Negative.    Neurological: Negative.    Hematological: Negative.    Psychiatric/Behavioral: Negative.       Objective    Vitals - Patient Reported  Pain Score: No Pain (0)    Physical Exam   GENERAL: Healthy, alert and no distress  EYES: Eyes grossly normal to inspection.  No discharge or erythema, or obvious scleral/conjunctival abnormalities.  RESP: No audible wheeze, cough, or visible cyanosis.  No visible retractions or increased work of breathing.    SKIN: Visible skin clear. No significant rash, abnormal pigmentation or lesions.  NEURO: Cranial nerves grossly intact.  Mentation and speech appropriate for age.  PSYCH: Mentation appears normal, affect normal/bright, judgement and insight intact, normal speech and appearance well-groomed.    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 Currently on voriconazole    1) Continue voriconazole  2) Ordered voriconazole level for 10/26 (2 weeks from initiation of therapy)  3) Ordered repeat galactomannan for same day  4) Anticipated duration of therapy is 6-12 weeks. Has repeat imaging (CT) planned for November, if symptoms resolved and imaging much  improved, may be able to stop then.  5) RTC in 6 weeks or next available    Sergo Couch MD  Division of Infectious Diseases and International Medicine  P: [phone]

## 2022-10-12 NOTE — PROGRESS NOTES
Mary is a 56 year old who is being evaluated via a billable video visit.      How would you like to obtain your AVS? MyChart  If the video visit is dropped, the invitation should be resent by: Text to cell phone: [phone]  Will anyone else be joining your video visit? No        Video-Visit Details    Video Start Time: 10:00am    Type of service:  Video Visit    Video End Time:10:25am    Originating Location (pt. Location): Home    Distant Location (provider location):  [facility] [place]     Platform used for Video Visit: [facility]     Benjamin Hernandez is a 56 year old with history of metastatic beast cancer (s/p R mastectomy, hysterectomy+oopherectomy and chemo/radiation, last dose of  trastuzumab deruxtecan on 9/30/22), and depression who was admitted 10/4 for the management of cough and generalized weakness. She states she had been having cough (minimally productive) for the 2 weeks preceding her admission (though perhaps had some intermittent cough as early as the first week of September). She had been prescribed 5 days of Augmentin and 4 days of levofloxacin for suspected sinusitis and pneumonia, respectively, prior to her admission. Antibiotics were initially continued on admission but stopped shortly after due to quick improvement in respiratory symptoms and minimal new chest imaging findings (CXR with subtle, patchy nodular opacities in the RUL and RML, similar to CT from 9/30/22). She was also found to have C difficile and is taking PO vancomycin for this. After discharge, her Galactomannan returned positive, prompting curbside with ID on-call physician, and she was started on voriconazole. Most recent QTc was 425 on 10/3/22. 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    Review of Systems   Constitutional: Negative.    HENT: Negative.    Eyes: Negative.    Respiratory: Positive for cough and  shortness of breath.    Cardiovascular: Negative.    Gastrointestinal: Negative.    Endocrine: Negative.    Breasts:  negative.    Genitourinary: Negative.    Musculoskeletal: Negative.    Skin: Negative.    Allergic/Immunologic: Negative.    Neurological: Negative.    Hematological: Negative.    Psychiatric/Behavioral: Negative.       Objective    Vitals - Patient Reported  Pain Score: No Pain (0)    Physical Exam   GENERAL: Healthy, alert and no distress  EYES: Eyes grossly normal to inspection.  No discharge or erythema, or obvious scleral/conjunctival abnormalities.  RESP: No audible wheeze, cough, or visible cyanosis.  No visible retractions or increased work of breathing.    SKIN: Visible skin clear. No significant rash, abnormal pigmentation or lesions.  NEURO: Cranial nerves grossly intact.  Mentation and speech appropriate for age.  PSYCH: Mentation appears normal, affect normal/bright, judgement and insight intact, normal speech and appearance well-groomed.    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 Currently on voriconazole    1) Continue voriconazole  2) Ordered voriconazole level for 10/26 (2 weeks from initiation of therapy)  3) Ordered repeat galactomannan for same day  4) Anticipated duration of therapy is 6-12 weeks. Has repeat imaging (CT) planned for November, if symptoms resolved and imaging much improved, may be able to stop then.  5) RTC in 6 weeks or next available    Sergo Couch MD  Division of Infectious Diseases and International Medicine  P: [phone]

## 2022-10-12 NOTE — TELEPHONE ENCOUNTER
RECORDS RECEIVED FROM: Internal   DATE RECEIVED: 10.12.22   NOTES (Gather within 2 years) STATUS DETAILS   OFFICE NOTE from referring provider   Internal 10.11.22, 9.21.22  [facility]  Oncology   OFFICE NOTE from other specialist Internal 9.30.22  [facility]  Oncology   DISCHARGE SUMMARY from hospital Internal 10.3-10.7.22  [facility]  [facility]   LABS (any labs) Internal    MEDICATION LIST Internal    IMAGING  (NEED IMAGES AND REPORTS)     Other (anything related to diagnoses Internal 10.3.22, 9.12.22  XR Chest    9.30.22  CT Chest

## 2022-10-19 NOTE — PROGRESS NOTES
Oct 19, 2022      REASON FOR VISIT: Follow-up breast cancer     HISTORY OF PRESENT ILLNESS:  Josefina Bennett was self referred to our clinic for clinical trial recommendations for newly diagnosed stage II breast cancer. She had her last mammogram approximately a year prior. She did notice in early January 2015 that she was having some distortion of the right breast, and she went to see her gynecologist and had a mammogram performed. She had a screening mammogram performed on 01/15/2015. Breast tissue was heterogeneously dense, which might compromise the mammography. There was architectural distortion in the right breast approximately 11-12 o'clock position, zone 2, posterior depth, and a CC MLO spot compression was performed and an ultrasound was planned. The diagnostic mammogram from 01/28/2015 showed right breast architectural distortion centered at the 12 o'clock position, zone 2, suspicious for neoplasm. Breast tomosynthesis was used in the interpretation. Ultrasound findings included targeted ultrasound of the right upper breast demonstrating a band-like area of abnormal echogenicity between 11 and 12 o'clock position in the right breast at zone 2. This measures 4 cm transversely x 1.1 cm AP, and there was only a 1.5 cm measurement in the radial direction. There was blood flow suspicious for neoplasm at the 11 o'clock position in zone 2. There is a 1.4 cm hypoechoic nodule slightly  from the larger area of altered echogenicity that is also suspicious. A biopsy was performed. The biopsy showed infiltrating lobular carcinoma, grade 2, and a clip was placed at specimen [id number]. There were a few signet cells present. The tumor was ER-positive, AK low positive, and HER2 was negative by immunohistochemistry. She subsequently underwent an MRI showing a tumor that was 4.8 x 1.7 x 3.2 cm in size. Overall, her clinical stage was T2 NX MX.       She was enrolled in the I-SPY-2 clinical trial, and qualified with  high-risk MammaPrint score. She was randomized to ganetespib and paclitaxel, and she was treated with 12 cycles of treatment, and then started on AC on 05/26/2015. She developed intractable nausea and vomiting after the first cycle, which required hospitalization on the second cycle. She then developed Pneumocystis pneumonia, which resulted in intubation and a 2-week hospitalization during the course of AC. She was discharged on 07/02/2015, and decided she did not want to pursue any further chemotherapy. She had a right lumpectomy and sentinel lymph node procedure 08/12/2015. She had a positive margin, underwent a re-excision, and had a positive margin. Ultimately, she underwent a right mastectomy with clear margins. She began radiation treatment with Dr. Bill Chauhan, and completed radiation therapy on 12/04/2015. Pathologic stage was III-A, ypT3 N1a MX. Of concern, she had what could be considered an RCB3 tumor.  She started adjuvant letrozole on 1/28/16.     4/6/2022 Josefina Verma developed a fever during Reclast.  She was feeling unwell and went to the ED.  CT CAP showed new involvement of abdominal lymph nodes and multiple bone metastases.  She was admitted and I saw her in the hospital 4-7-22 to discuss the plan for biopsy.  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  A mix of TNBC and ER- HER2+.  Clinic conference consensus was for the modified Vandana with paclitaxel weekly to treat both clones.         TREATMENT HISTORY:  A.  Neoadjuvant therapy on I SPY-2 with ganetespib and paclitaxel.  AC x 2 complicated by PCP pneumonia.    B.  Right lumpectomy, followed by right margin resections, followed by right mastectomy.   B.  Oophorectomy 10-16-15.   C.  Tamoxifen after 10-6-15.   D.  Radiation therapy. 5,040 cGy in 180 cGy/fraction to the chest wall and regional lymphatics followed  by 720 cGy axillary lymph node boost and 1000 cGy mastectomy scar boost. Completed on 12/3/2015.  E.   Adjuvant letrozole begun 1-28-16.  Plan was to continue through 2026.  F.  Diagnosis with recurrent/metastatic breast cancer with a sacral mass that was biopsied showing pleiomorphic lobular breast cancer that is ER negative, OH negative, HER2 positive by FISH in 20% of the cells and negative in 80% of cells.  A mix of TNBC and ER- HER2+.  Clinic conference consensus was for the modified Vandana with paclitaxel weekly to treat both clones.   G. 9/30/22 Started Enhertu   H. Was admitted 10/3-10/7, C. Difficile, also had + Galactomanin. Started on voriconazole 10/11/22         INTERVAL HISTORY:    Today, she states that she has been feeling much better.  She is no longer having any loose stools, her stool has been formed for about a week.  She completed her antibiotics for the C. difficile on 10/11.  Her cough is completely resolved.  She is not having any shortness of breath or wheezing.  She is having no fevers, and has had no recurrent fevers since prior to her hospitalization.  She is not having any sinus pressure or pain.  She continues to take the voriconazole, and has been tolerating this well.  She has not noticed any palpitations.     She notes that she felt extremely nauseous for 3 days following her Enhertu infusion.  She presented to the ER because of this.  She was taking Compazine and Zofran, but these were not helping.  She was vomiting.  She notes that the Zyprexa while she was inpatient helped the most.  She has not had any nausea since she was discharged from the hospital.    REVIEW OF SYSTEMS:    Patient denies the following except if noted above: fevers, body aches, chills, headaches, vision changes, dizziness, chest pain, shortness of breath, nausea, vomiting, diarrhea, constipation, abdominal pain, rashes, bruising/bleeding, mouth sores, swelling or pain in the legs.      PHYSICAL  EXAMINATION:/75 (BP Location: Right arm, Patient Position: Sitting, Cuff Size: Adult Regular)   Pulse 103   Temp 98.4  F (36.9  C) (Oral)   Resp 16   Wt 62.2 kg (137 lb 1.6 oz)   LMP 12/18/2014   SpO2 99%   BMI 23.52 kg/m    Wt Readings from Last 4 Encounters:   10/19/22 62.2 kg (137 lb 1.6 oz)   09/30/22 61.7 kg (136 lb 1.6 oz)   09/21/22 60.8 kg (134 lb)   09/19/22 61.2 kg (134 lb 14.4 oz)     Vital signs were reviewed.   Patient alert and oriented x3.    PERRLA. EOMI. No scleral icterus noted. OP without thrush/sores. Nares erythematous. Ear canal clear with non-bulging TM. No signs of infection   Neck exam: No palpable cervical, supraclavicular nodes.   Heart: RRR no murmurs noted.   Lungs: clear to auscultation bilaterally.  No crackles or wheezing.   Abd: positive bowel sounds in all four quadrants.  No tenderness to palpation.  No hepatomegaly.   Extremities: No lower extremity edema.   Neuro: grossly intact.   Mood and affect is stable.         LABORATORY DATA:    Most Recent 3 CBC's:  Recent Labs   Lab Test 10/19/22  1322 10/07/22  0601 10/06/22  0744   WBC 6.7 6.9 7.0   HGB 10.6* 10.5* 10.1*   MCV 87 84 85   * 203 234    Most Recent 3 BMP's:  Recent Labs   Lab Test 10/19/22  1322 10/06/22  0744 10/05/22  1039    138 139   POTASSIUM 4.0 3.9 3.9   CHLORIDE 109* 106 106   CO2 26 27 22   BUN 16.4 8.8 11.7   CR 0.89 0.82 0.95   ANIONGAP 8 5* 11   VEE 9.2 8.4* 8.7   * 120* 129*    Most Recent 2 LFT's:  Recent Labs   Lab Test 10/19/22  1322 10/03/22  2131   AST 44* 61*   ALT 37* 59*   ALKPHOS 113* 98   BILITOTAL <0.2 0.5      I reviewed the above labs today.  Repeat tumor markers are pending     No new imaging          ASSESSMENT AND PLAN:   Shelby Bennett is a 56-year-old woman with a history of a clinical stage II right breast cancer, ER positive, CT positive, HER2 negative by immunohistochemistry, now with recurrent Metastatic breast cancer, lobular. New  histology pleomorphic lobular cancer, which is negative for estrogen receptor and negative for progesterone receptor, and 20% is HER2 positive by FISH.  - She has a mixture of recurrent metastatic HER2 positive breast cancer that is estrogen receptor negative and 80% triple negative breast cancer.    - Discussed this situation in breast cancer conference on April 22 and the consensus opinion is to treat the minority HER2+ component first with Vandana regimen because that treatment would also cover the triple negative (TNBC) component with the taxane part of the treatment. This would push back the use of pembrolizumab to the second line setting or later if the 22C3 staining is positive, but would allow earlier treatment of the HER2 component.     - Tumor markers had been rising significantly. Dr. Meza has concerns that there are 2 closes as indicated by pathology-- one of them being triple negative the other being HER2 positive.   - August 29 PET/CT the site of initial metastasis static disease showed no FDG uptake.  There were no suspicious bony lesions. The PET did demonstrate LAD on the left neck and axillae. 09/13/22 axillary lymph node biopsy negative for malignancy.   -- Biopsy of the sacrum in April showed 2 clones 1 of which was 20% of the tumor and was HER2 amplified remainder of the tumor is HER2 1+.    -- Given the rising markers, plan is to change therapy from pertuzumab, trastuzumab, paclitaxel to Enhertu to cover both the triple negative and the minority HER2 positive component.  - ECHO due in October -- scheduled   - Repeat PET in November    - Labs and exam are appropriate to proceed with cycle #2 Enhertu today. We will dose reduce this to 4.4mg/kg today, given severe nausea that required hospitalization, recent infections  - Discussed with pharmacy, no interactions with voriconazole and enhertu     #CINV, severe   - Switch IV zofran to IV aloxi  - Patient can't get emend, it interacts with  voriconazole. 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 Diff 10/5  - Resolved      Follow up: As scheduled with repeat CT prior to cycle 3 as scheduled     Patient will call in the interim with any questions or concerns. They voice understanding and agree with the above plan.         Sandra Coughlin PA-C

## 2022-10-19 NOTE — PROGRESS NOTES
Infusion Nursing Note:  Josefina Bennett presents today for cycle 2 day 1 enhertu.    Patient seen by provider today: Yes: Sandra Coughlin PA-C   present during visit today: Not Applicable.    Note:   Patient has no questions or concerns after her appointment with Sandra Coughlin.    Intravenous Access:  Implanted Port.    Treatment Conditions:  Lab Results   Component Value Date    HGB 10.6 (L) 10/19/2022    WBC 6.7 10/19/2022    ANEU 7.3 10/03/2022    ANEUTAUTO 3.3 10/19/2022     (H) 10/19/2022      Lab Results   Component Value Date     10/19/2022    POTASSIUM 4.0 10/19/2022    MAG 1.9 10/05/2022    CR 0.89 10/19/2022    VEE 9.2 10/19/2022    BILITOTAL <0.2 10/19/2022    ALBUMIN 3.6 10/19/2022    ALT 37 (H) 10/19/2022    AST 44 (H) 10/19/2022     Results reviewed, labs MET treatment parameters, ok to proceed with treatment.  ECHO/MUGA completed 7/13/22  EF 60-65%. Next ECHO scheduled on 10/29/22    Post Infusion Assessment:  Patient tolerated infusion without incident.  Blood return noted pre and post infusion.  Site patent and intact, free from redness, edema or discomfort.  No evidence of extravasations.  Access discontinued per protocol.     Discharge Plan:   Patient declined prescription refills.  Discharge instructions reviewed with: Patient.  Patient and/or family verbalized understanding of discharge instructions and all questions answered.  AVS to patient via [employer].  Patient will return 10/29 for ECHO, and 11/11 for next infusion appointment.   Patient discharged in stable condition accompanied by: self.  Departure Mode: Ambulatory.      Yadira Jay RN

## 2022-10-19 NOTE — PATIENT INSTRUCTIONS
[facility] Triage and after hours / weekends / holidays:  [phone]    Please call the triage or after hours line if you experience a temperature greater than or equal to 100.5, shaking chills, have uncontrolled nausea, vomiting and/or diarrhea, dizziness, shortness of breath, chest pain, bleeding, unexplained bruising, or if you have any other new/concerning symptoms, questions or concerns.      If you are having any concerning symptoms or wish to speak to a provider before your next infusion visit, please call your care coordinator or triage to notify them so we can adequately serve you.     If you need a refill on a narcotic prescription or other medication, please call before your infusion appointment.        October 2022 Sunday Monday Tuesday Wednesday Thursday Friday Saturday                                 1       2     3    XR CHEST 2 VIEWS   8:55 PM   (20 min.)   [email or web address]   [facility]    Admission   8:57 PM   Malka Sagastume MD   [facility] [address] [place]   (Discharge: 10/7/2022) 4     5    XR ABDOMEN PORT 1 VIEW   3:45 PM   (20 min.)   [id number]   [facility] 6     7     8       9     10     11     12    VIDEO VISIT NEW   9:45 AM   (60 min.)   Sergo Couch MD   [facility] [place] 13     14     15       16     17     18     19    LAB CENTRAL  12:15 PM   (15 min.)   [facility] LAB DRAW   [facility]    RETURN  12:45 PM   (45 min.)   Sandra Coughlin PA-C   [facility]    ONC INFUSION 2 HR (120 MIN)   3:00 PM   (120 min.)    ONC INFUSION NURSE   [facility] 20     21     22       23     24     25     26     27     28     29    ECHO COMPLETE   2:00 PM   (60 min.)   [email or web address]   [facility] [place]   30 31 November 2022 Sunday Monday Tuesday  Wednesday Thursday Friday Saturday             1    CT CHEST/ABDOMEN/PELVIS W  11:10 AM   (20 min.)   [facility] CT 2   [facility] CT 2     3     4     5       6     7     8     9     10     11    LAB CENTRAL  10:30 AM   (15 min.)   [facility] LAB DRAW   [facility]    RETURN  10:45 AM   (45 min.)   Sandra Coughlin PA-C   [facility]    ONC INFUSION 2 HR (120 MIN)  12:00 PM   (120 min.)    ONC INFUSION NURSE   [facility] 12       13     14     15     16     17     18     19       20     21    PET ONCOLOGY WHOLE BODY   9:30 AM   (30 min.)   [id number]   [facility] 22    LAB CENTRAL   6:45 AM   (15 min.)   [facility] LAB DRAW   [facility]    RETURN   7:15 AM   (30 min.)   Evangelista Meza MD   [facility] 23     24     25     26       27     28     29     30                                       Recent Results (from the past 24 hour(s))   COMPREHENSIVE METABOLIC PANEL    Collection Time: 10/19/22  1:22 PM   Result Value Ref Range    Sodium 143 136 - 145 mmol/L    Potassium 4.0 3.4 - 5.3 mmol/L    Chloride 109 (H) 98 - 107 mmol/L    Carbon Dioxide (CO2) 26 22 - 29 mmol/L    Anion Gap 8 7 - 15 mmol/L    Urea Nitrogen 16.4 6.0 - 20.0 mg/dL    Creatinine 0.89 0.51 - 0.95 mg/dL    Calcium 9.2 8.6 - 10.0 mg/dL    Glucose 135 (H) 70 - 99 mg/dL    Alkaline Phosphatase 113 (H) 35 - 104 U/L    AST 44 (H) 10 - 35 U/L    ALT 37 (H) 10 - 35 U/L    Protein Total 6.3 (L) 6.4 - 8.3 g/dL    Albumin 3.6 3.5 - 5.2 g/dL    Bilirubin Total <0.2 <=1.2 mg/dL    GFR Estimate 76 >60 mL/min/1.73m2   CBC with platelets and differential    Collection Time: 10/19/22  1:22 PM   Result Value Ref Range    WBC Count 6.7 4.0 - 11.0 10e3/uL    RBC Count 3.90 3.80 - 5.20 10e6/uL    Hemoglobin 10.6 (L) 11.7 - 15.7 g/dL    Hematocrit 33.8 (L) 35.0 - 47.0 %     MCV 87 78 - 100 fL    MCH 27.2 26.5 - 33.0 pg    MCHC 31.4 (L) 31.5 - 36.5 g/dL    RDW 18.4 (H) 10.0 - 15.0 %    Platelet Count 509 (H) 150 - 450 10e3/uL    % Neutrophils 50 %    % Lymphocytes 33 %    % Monocytes 12 %    % Eosinophils 4 %    % Basophils 1 %    % Immature Granulocytes 0 %    NRBCs per 100 WBC 0 <1 /100    Absolute Neutrophils 3.3 1.6 - 8.3 10e3/uL    Absolute Lymphocytes 2.2 0.8 - 5.3 10e3/uL    Absolute Monocytes 0.8 0.0 - 1.3 10e3/uL    Absolute Eosinophils 0.3 0.0 - 0.7 10e3/uL    Absolute Basophils 0.1 0.0 - 0.2 10e3/uL    Absolute Immature Granulocytes 0.0 <=0.4 10e3/uL    Absolute NRBCs 0.0 10e3/uL

## 2022-10-19 NOTE — PROGRESS NOTES
Was on antibiotic for C. Diff. Was having very runny stools. They have since solidified. No fevers, no chills. No

## 2022-10-19 NOTE — NURSING NOTE
"Oncology Rooming Note    October 19, 2022 1:26 PM   Josefina Bennett is a 56 year old female who presents for:    Chief Complaint   Patient presents with     Port Draw     Labs drawn from port by rn.  VS taken.     Oncology Clinic Visit     Rtn for breast cancer     Initial Vitals: /75 (BP Location: Right arm, Patient Position: Sitting, Cuff Size: Adult Regular)   Pulse 103   Temp 98.4  F (36.9  C) (Oral)   Resp 16   Wt 62.2 kg (137 lb 1.6 oz)   LMP 12/18/2014   SpO2 99%   BMI 23.52 kg/m   Estimated body mass index is 23.52 kg/m  as calculated from the following:    Height as of 6/10/22: 1.626 m (5' 4.02\").    Weight as of this encounter: 62.2 kg (137 lb 1.6 oz). Body surface area is 1.68 meters squared.  No Pain (0) Comment: Data Unavailable   Patient's last menstrual period was 12/18/2014.  Allergies reviewed: Yes  Medications reviewed: Yes    Medications: Medication refills not needed today.  Pharmacy name entered into [employer]:    [facility] - [place], [place] - [address] [address] [facility] - A MAIL ORDER [facility] - [place], [place] - [address] [facility] - [place], [place] - [address]    Clinical concerns: Patient has no specific questions or clinical concerns outside of the reason for the visit.       Peri Alcala, EMT              "

## 2022-10-25 NOTE — TELEPHONE ENCOUNTER
Per Sandra Coughlin:   She was supposed to be on zyprexa as well. Is she taking this at night? We could increase this. Is she still nauseated?     We can discuss dose reduction at our next visit.     Is taking Zyprexa as well. Still a little nauseated, taking reglan, zofran and compazine along with Zyprexa.      Per Sandra Coughlin increase Zyprexa to 5 mg at HS to see if that helps with nausea.     Left message for Mary telling her she could increase her zyprexa to 5 mg at HS. Asked that she call the triage line if she needs more tablets of Zyprexa or if she has any questions or concerns.

## 2022-10-28 NOTE — PROGRESS NOTES
"Patient called with a massage to include \"lymphatic drainage\" massage. Instructed patient to have a gentle massage and no further lymphatic manipulation.  Answered all patient's questions and verbalized understanding. Teresa Ashton RN, BSN.   "

## 2022-10-31 NOTE — TELEPHONE ENCOUNTER
Medication: olanzapine    Last prescribing provider: Increased from 2.5 mg to 5mg at bedtime which is working very well for her.  She would like 5mg tabs sent in.     Last clinic visit date: 10/19/2022 ARLENE Coughlin    Any missed appointments or no-shows since last clinic visit?: No    Recommendations for requested medication (if none, N/A): From last note:  - Increase zyprexa to 5mg at bedtime to help with nausea, can titrate up further if needed     Next clinic visit date:11/2/2022 ANABELA Hendrix    Any other pertinent information (if none, N/A): N/A

## 2022-11-01 NOTE — LETTER
[facility] EMERGENCY DEPARTMENT  [address] [address]  Phone: [phone]    November 3, 2022        Josefina Bennett  [address] [place] [id number]          To whom it may concern:    RE: Josefina Bennett    Patient was seen and treated November 1-3rd. Please excuse her from work for these dates. The patient is able to return to work November 4th.     Please contact me for questions or concerns.      Sincerely,  Perez Huitron, DO  PGY-2, Internal Medicine  [facility]

## 2022-11-02 PROBLEM — I26.93 SINGLE SUBSEGMENTAL PULMONARY EMBOLISM WITHOUT ACUTE COR PULMONALE (H): Status: ACTIVE | Noted: 2022-01-01

## 2022-11-02 PROBLEM — R50.9 FEVER OF UNKNOWN ORIGIN: Status: ACTIVE | Noted: 2022-01-01

## 2022-11-02 NOTE — UTILIZATION REVIEW
"  Admission Status; Secondary Review Determination         Under the authority of the Utilization Management Committee, the utilization review process indicated a secondary review on the above patient.  The review outcome is based on review of the medical records, discussions with staff, and applying clinical experience noted on the date of the review.        ()      Inpatient Status Appropriate - This patient's medical care is consistent with medical management for inpatient care and reasonable inpatient medical practice.      (xxx) Observation Status Appropriate - This patient does not meet hospital inpatient criteria and is placed in observation status. If this patient's primary payer is Medicare and was admitted as an inpatient, Condition Code 44 should be used and patient status changed to \"observation\".   () Admission Status NOT Appropriate - This patient's medical care is not consistent with medical management for Inpatient or Observation Status.          RATIONALE FOR DETERMINATION     56 year old year female with known metastatic breast cancer (currently on treatment with transtuzumab-deruxtecan) with recent fungal pneumonia (on antifungal therapy) who presented to the ER yesterday at the direction of her oncologist following an incidental finding of a PE on recent surveillance CT CAP done on 11/1/22. The patient was started on eliquis. She is not hypoxemic and borderline tachycardic, and Dr. Huitron reports that they are planning to discharge her shortly. ID and oncology have both been consulted and recommend no other changes to her treatment regimen.     Observation status is appropriate.      The severity of illness, intensity of service provided, expected LOS and risk for adverse outcome make the care complex, high risk and appropriate for hospital admission.        The information on this document is developed by the utilization review team in order for the business office to ensure compliance.  This " only denotes the appropriateness of proper admission status and does not reflect the quality of care rendered.         The definitions of Inpatient Status and Observation Status used in making the determination above are those provided in the CMS Coverage Manual, Chapter 1 and Chapter 6, section 70.4.      Sincerely,     Cullen Joaquin DO  Physician Advisor  Utilization Review/ Case Management  [facility] [place].

## 2022-11-02 NOTE — PLAN OF CARE
Goal Outcome Evaluation:     A&Ox4, able  to make her her needs know to caregiver. Lungs sounds  are clear and she is ambulatory with no SOB. Eating regular diet but has yet to have a bowel movement. No GI symptoms reported at this time. She expressed desire to go home when writer had a discussion with her during beginning of shift round. Blood pressure 131/53, pulse 100, temperature 98.8  F (37.1  C), temperature source Oral, resp. rate 18, weight 60.8 kg (134 lb), last menstrual period 12/18/2014, SpO2 100 %, not currently breastfeeding.

## 2022-11-02 NOTE — PLAN OF CARE
Goal Outcome Evaluation:VSS,A&Ox4 up independently denied pain and nausea voided adequately ,no BM.LS clear,BS+.regular diet good appetite.ID consult ordered.Posible discharge home today or tomorrow.Will continue to monitor.

## 2022-11-02 NOTE — PROGRESS NOTES
[facility]    Progress Note - Medicine Service, ZION TEAM 5       Date of Admission:  11/1/2022    Assessment & Plan   Josefina Bennett is a 56 year old female admitted on 11/1/2022. She has a history of metastatic beast cancer s/p right mastectomy, hysterectomy+oopherectomy and chemo/radiation, as well as recent fungal pneumonia who presents with nonocclusive left pulmonary artery thrombus.    Today:  - Echocardiogram  - Infectious diseases consulted  - Oncology consulted     Nonocclusive left pulmonary artery thrombus  History of aspergillus pneumonia  Dyspnea  Left PA thrombus noted as an incidental finding on outside CT imaging obtained on 11/1 in the setting of aspergillus pneumonia surveillance, was not present on prior scan obtained in 4/2022. Describes subacute dyspnea on exertion developing over a week, as well as generalized malaise with two days of fevers. Of note, is currently being treated for aspergillus pneumonia with voriconazole with CT scan demonstrating improvement in right middle lobe opacities. Will treat for provoked PE in the setting of breast cancer with apixaban, as well as obtain echo to evaluate for heart strain. Currently stable with normal oxygen saturations on room air and improved tachycardia. Given interval improvement in lobe affected by fungal infection and negative aspergillus antigen on 10/19, less concern for active infection or bacterial/viral PNA but will continue to monitor closely given mild leukocytosis. UA with negative leukocyte esterase and nitrites.  Infectious diseases saw patient and did not believe that she had an active infection.  - apixaban 10 mg BID for 7 days to be followed by 5 mg BID qdaily until outpatient follow up  - TTE order placed  - continue voriconazole 200 mg BID  - f/u blood cultures     -- Chronic Problems ----  Metastatic breast cancer s/p mastectomy, chemo/radiation  Nausea  History of metastatic  breast cancer with last reported chemo treatment 10/19, recent PET scan with continued treatment response. Plans for immunotherapy in the future. Reports history of significant nausea requiring multiple medications. QTc 429.  - continue PTA zofran PRN  - continue PTA compazine PRN  - continue PTA metoclopramide PRN  - continue PTA olanzapine 2.5 mg nightly     Depression  - continue PTA Effexor 75 mg daily     Diet: Combination Diet Regular Diet Adult    DVT Prophylaxis: Apixaban as above  Berger Catheter: Not present  Fluids: None  Central Lines: PRESENT     Cardiac Monitoring: None  Code Status: Full Code      Disposition Plan      Expected Discharge Date: 11/04/2022                   Patient seen and case discussed with Dr. Ramirez who agrees with the above assessment and plan.      Perez Huitron,   PGY-2, Internal Medicine  [facility]   Securely message with the Vocera Web Console (learn more here)  Text page via [facility] Paging/Directory   Please see signed in provider for up to date coverage information      Clinically Significant Risk Factors Present on Admission                              ______________________________________________________________________    Interval History     Nursing notes reviewed. No acute events overnight.  Patient states that over the weekend she felt more fatigued, weak and noted dyspnea when walking up the stairs.  She states that she has since improved but was found to have a blood clot. Patient has no further concerns or questions at this time.      Data reviewed today: I reviewed all medications, new labs and imaging results over the last 24 hours.    Physical Exam   Vital Signs: Temp: 98.8  F (37.1  C) Temp src: Oral BP: 132/65 Pulse: 97   Resp: 18 SpO2: 100 % O2 Device: Nasal cannula Oxygen Delivery: 2 LPM  Weight: 134 lbs 0 oz    General: Alert and oriented without distress  HEENT: Normocephalic/atraumatic  Respiratory: CTAB without increased  respiratory effort or accessory muscle use  Cardiovascular: RRR without murmurs, rubs or gallop. S1, S2 intact.  Abdomen: Bowel sounds present. Soft, non-distended without tenderness to palpation or rebound.   Skin: No overt lesions, bruises, rashes or bleeding on exposed skin surfaces.    Data   Recent Labs   Lab 11/01/22 2021   WBC 13.4*   HGB 11.3*   MCV 87      INR 0.98      POTASSIUM 4.5   CHLORIDE 104   CO2 25   BUN 17.4   CR 0.94   ANIONGAP 11   VEE 9.3   *   ALBUMIN 4.1   PROTTOTAL 6.8   BILITOTAL 0.2   ALKPHOS 136*   ALT 42*   AST 44*     Recent Results (from the past 24 hour(s))   CT Chest/Abdomen/Pelvis w Contrast    Addendum: 11/2/2022    I discussed the findings with Dr. Grossman the oncologist at 4:45 PM      Addendum: 11/1/2022    I have attempted calling the [facility] directly, but was on hold for 20 minutes, with no answer. Dr. Cogan is the on call hematologist who returned my call at 4:30 PM. He is apparently not the appropriate clinician to discuss this finding. He   is attempting to find an appropriate contact number for me.        Narrative    EXAM: CT CHEST/ABDOMEN/PELVIS W CONTRAST  LOCATION: [facility]  DATE/TIME: 11/1/2022 12:24 PM    INDICATION:  Malignant neoplasm of areola of right breast in female, unspecified estrogen receptor status (H)  COMPARISON: CT chest 09/30/2022. PET/CT 04/27/2022. CT abdomen 04/27/2022, 04/06/2020  TECHNIQUE: CT scan of the chest, abdomen, and pelvis was performed following injection of IV contrast. Multiplanar reformats were obtained. Dose reduction techniques were used.   CONTRAST: ISOVUE 370 90mL    FINDINGS:   LUNGS AND PLEURA: Stable right apical consolidation with traction bronchiectasis, compatible with scar.    The right upper and lower lobe consolidations have evolved into a groundglass opacities. Improved right middle lobe consolidation. This suggests an improving inflammatory process.    Left  lower lobe posteromedial segment nonocclusive thrombi. These could be acute or old.    MEDIASTINUM/AXILLAE: Normal.    CORONARY ARTERY CALCIFICATION: None.    HEPATOBILIARY: No hepatic lesion seen. Nodular anterior hepatic surface contour consistent with posttreatment scarring.    PANCREAS: Normal.    SPLEEN: Normal.    ADRENAL GLANDS: Normal.    KIDNEYS/BLADDER: Small left hepatic cyst which does not require further evaluation.    BOWEL: Stable appearing right paracolic gutter scarring. Moderate amount stool in colon. Small bowel appears normal. No ascites.    LYMPH NODES: Normal.    VASCULATURE: Stable para-aortic retroperitoneal stranding. Aortoiliac atherosclerosis. Patent mesenteric and renal arteries.    PELVIC ORGANS: Absent uterus. No mass or ascites.    MUSCULOSKELETAL: Increase in diffuse pelvic sclerotic lesions when comparing current study to CT chest abdomen and pelvis 04/27/2022. This could represent treatment response, or metastasis progression. The spinal metastases do not appear changed.    Absent right breast.      Impression    IMPRESSION:  1.  Improved hepatic metastases.  2.  Increased pelvic sclerotic lesions which could represent treatment response, or metastasis progression.  3.  No intra-abdominal malignant recurrence.  4.  Left lower lobe nonocclusive pulmonary artery thrombus. This could be acute or chronic.  5.  Dr. Cogan, the on-call oncologist was paged to discuss these results at 4:10 PM.   XR Chest Port 1 View    Narrative    EXAM: XR CHEST PORTABLE 1 VIEW  LOCATION: [facility]  DATE/TIME: 11/02/2022, 4:03 AM    INDICATION: Fever. Chest cancer. Evaluate for pneumonia.  COMPARISON: CT chest, abdomen and pelvis with IV contrast 11/01/2022.      Impression    IMPRESSION: Implanted left chest port, catheter via IJ access, tip in the distal SVC. Airspace infiltrate in the right lower lung, likely representing an infectious or an inflammatory  process. Left lung relatively clear. No adenopathy or effusion. Normal   cardiac size and pulmonary vascularity. Postoperative changes right breast. Mild degenerative changes both shoulders and the spine. Foci of sclerosis involving the visualized bones representing metastases.       Medications       apixaban ANTICOAGULANT  10 mg Oral BID     [START ON 11/7/2022] apixaban ANTICOAGULANT  5 mg Oral BID     calcium carbonate  600 mg Oral At Bedtime     OLANZapine  2.5 mg Oral At Bedtime     sodium chloride (PF)  3 mL Intracatheter Q8H     venlafaxine  75 mg Oral Daily     vitamin D3  1,000 Units Oral Daily     voriconazole  200 mg Oral BID

## 2022-11-02 NOTE — H&P
[facility]    History and Physical - Medicine Service, MARFANG TEAM        Date of Admission:  11/1/2022    Assessment & Plan      Josefina Bennett is a 56 year old female admitted on 11/1/2022. She has a history of metastatic beast cancer s/p right mastectomy, hysterectomy+oopherectomy and chemo/radiation, as well as recent fungal pneumonia who presents with nonocclusive left pulmonary artery thrombus.    Nonocclusive left pulmonary artery thrombus  History of aspergillus pneumonia  Dyspnea  Left PA thrombus noted as an incidental finding on outside CT imaging obtained on 11/1 in the setting of aspergillus pneumonia surveillance, was not present on prior scan obtained in 4/2022. Describes subacute dyspnea on exertion developing over a week, as well as generalized malaise with two days of fevers. Of note, is currently being treated for aspergillus pneumonia with voriconazole with CT scan demonstrating improvement in right middle lobe opacities. Will treat for provoked PE in the setting of breast cancer with apixaban, as well as obtain echo to evaluate for heart strain. Currently stable with normal oxygen saturations on room air and improved tachycardia. Given interval improvement in lobe affected by fungal infection and negative aspergillus antigen on 10/19, less concern for active infection or bacterial/viral PNA but will continue to monitor closely given mild leukocytosis. UA with negative leuk esterase and nitrites.  - apixaban 10 mg BID for 5 days to be followed by 5 mg BID  - TTE order placed  - CXR to evaluate for new consolidation  - IVF bolus  - continue voriconazole 200 mg BID  - f/u blood cultures  - AM CBC    -- Chronic Problems ----  Metastatic breast cancer s/p mastectomy, chemo/radiation  Nausea  History of metastatic breast cancer with last reported chemo treatment 10/19, recent PET scan with continued treatment response. Plans for immunotherapy in the future.  Reports history of significant nausea requiring multiple medications. QTc 429.  - continue PTA zofran PRN  - continue PTA compazine PRN  - continue PTA metoclopramide PRN  - continue PTA olanzapine 2.5 mg nightly    Depression  - continue PTA Effexor 75 mg daily     Diet: Combination Diet Regular Diet Adult  DVT Prophylaxis: Therapeutic DOAC as above  Berger Catheter: Not present  Fluids: s/p 1 L bolus  Central Lines: PRESENT       Cardiac Monitoring: None  Code Status: Full Code    Clinically Significant Risk Factors Present on Admission                              Disposition Plan      Expected Discharge Date: 11/04/2022                The patient's care was discussed with the Attending Physician, Dr. Abdi.    Nahun Herrera MD  Medicine Service, [facility]  Securely message with the Vocera Web Console (learn more here)  Text page via [employer] Paging/Directory   Please see signed in provider for up to date coverage information    ______________________________________________________________________    Chief Complaint   Pulmonary embolism    History is obtained from the patient    History of Present Illness   Josefina Bennett is a 56 year old female who has a history of metastatic breast cancer s/p right mastectomy, hysterectomy+oopherectomy and chemo/radiation, as well as recent fungal pneumonia and is admitted for pulmonary embolism found on outside imaging.    Ms. Bennett states that she was referred to the ED immediately by her oncologist when a nonocclusive left subsegmental PE was found incidentally on a CT chest assessing treatment response for aspergillus PNA, for which she is on voriconazole for 1 month total therapy, of which she has completed 2 weeks of therapy. She states that she has otherwise been well but has noted increased dyspnea on exertion and fatigue that has been subtle and developed over one week. For the past two evenings, she has had a  fever each night and chills that have since resolved. She has no recent sick contacts and denies cough, sputum production, chest pain, nausea, and diarrhea. She has had a decreased appetite but was otherwise able to maintain adequate hydration.     Of note, Ms. Bennett has a history of metastatic breast cancer diagnosed initially in 2015 and underwent chemotherapy, surgery, and radiation. The cancer subsequently returned in 3/2022 and she completed chemotherapy on 10/19 with plans to consider immunotherapy in the coming months. Her course has been complicated by aspergillus PNA as well as significant nausea and vomiting, which has been treated with several medications in the outpatient setting. Recent aspergillus antigen studies were negative, and the CT scan demonstrating the PE also showed improvement in right lung opacities related to the suspected infection.     In the ED, she was found to be afebrile with adequate O2 saturations and normal blood pressure. EKG with normal sinus rhythm. UA negative, troponin and BNP within normal limits.     Review of Systems    The 10 point Review of Systems is negative other than noted in the HPI or here.     Past Medical History    I have reviewed this patient's medical history and updated it with pertinent information if needed.   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  Past Surgical History:    Procedure Laterality Date     APPENDECTOMY  1997     CYSTOSCOPY N/A 10/16/2015    Procedure: CYSTOSCOPY;  Surgeon: Aleksandr Palafox MD;  Location: [facility] OR     HC REMOVAL OF OVARIAN CYST(S)      x 3     INSERT PORT VASCULAR ACCESS Left 5/4/2022    Procedure: INSERTION, VASCULAR ACCESS PORT;  Surgeon: Darren Yates MD;  Location: [facility] OR     IR CHEST PORT PLACEMENT > 5 YRS OF AGE  5/4/2022     IR FLUORO 0-1 HOUR  4/12/2022     LAPAROSCOPIC HYSTERECTOMY TOTAL, BILATERAL SALPINGO-OOPHORECTOMY, COMBINED N/A 10/16/2015    Procedure: COMBINED LAPAROSCOPIC HYSTERECTOMY TOTAL, SALPINGO-OOPHORECTOMY;  Surgeon: Aleksandr Palafox MD;  Location: [facility] OR     LUMPECTOMY BREAST Right 9/2/2015    Procedure: LUMPECTOMY BREAST;  Surgeon: Philip Franco MD;  Location: [facility] OR     LUMPECTOMY BREAST WITH SENTINEL NODE, COMBINED Right 8/12/2015    Procedure: COMBINED LUMPECTOMY BREAST WITH SENTINEL NODE;  Surgeon: Philip Franco MD;  Location: [facility] OR     MASTECTOMY SIMPLE Right 9/21/2015    Procedure: MASTECTOMY SIMPLE;  Surgeon: Philip Franco MD;  Location: [facility] OR     REMOVE PORT VASCULAR ACCESS N/A 8/12/2015    Procedure: REMOVE PORT VASCULAR ACCESS;  Surgeon: Philip Franco MD;  Location: [facility] OR     SURGICAL HISTORY OF -       wisdom teeth     TONSILLECTOMY      at age 30 for tonsillitis        Social History   I have reviewed this patient's social history and updated it with pertinent information if needed. Josefina Bennett  reports that she quit smoking about 7 years ago. Her smoking use included cigarettes. She smoked an average of .5 packs per day. She has never used smokeless tobacco. She reports that she does not drink alcohol and does not use drugs. She works at a [employer].    Family History   I have reviewed this patient's family history and updated it with pertinent information if needed.  Family History   Problem Relation Age of Onset     Breast Cancer Other         paternal cousin     Diabetes Mother          Borderline diabetic     Alcohol/Drug Mother         alcoholic     Depression Mother      Alcohol/Drug Father         alcoholic     Depression Father      Diabetes Maternal Grandfather      Depression Maternal Grandfather      Depression Paternal Grandfather      Breast Cancer Paternal Aunt         dad's 1/2 sister     Respiratory Sister         Cystic Fibrosis,  age 39     Depression Sister      Cancer Other         maternal grandmothers sisters daughter.       Prior to Admission Medications   Prior to Admission Medications   Prescriptions Last Dose Informant Patient Reported? Taking?   Multiple Vitamins-Minerals (MULTIVITAMIN ADULTS PO)   Yes No   Sig: Take 1 tablet by mouth daily   OLANZapine (ZYPREXA) 2.5 MG tablet   No No   Sig: Take 1 tablet (2.5 mg) by mouth At Bedtime Up-titrate to 5mg as indicated per discussion with oncology   calcium carbonate (OS- MG [place]. [place]) 500 MG tablet  Self Yes No   Sig: Take 500 mg by mouth At Bedtime    cholecalciferol (VITAMIN D3) 1000 UNIT tablet   No No   Sig: Take 1 tablet (1,000 Units) by mouth daily   clindamycin (CLINDAMAX) 1 % topical gel   No No   Sig: Apply topically 2 times daily   lidocaine-prilocaine (EMLA) 2.5-2.5 % external cream   No No   Sig: Apply to port site one hour prior to access may use six days after port placement   metoclopramide (REGLAN) 10 MG tablet   No No   Sig: Take 1 tablet (10 mg) by mouth every 6 hours as needed (nausea/vomiting)   mirtazapine (REMERON) 7.5 MG tablet   No No   Sig: TAKE 1 TABLET BY MOUTH ONCE DAILY AT BEDTIME   ondansetron (ZOFRAN) 8 MG tablet   No No   Sig: Take 1 tablet (8 mg) by mouth every 8 hours as needed for nausea   prochlorperazine (COMPAZINE) 5 MG tablet   No No   Sig: Take 1 tablet (5 mg) by mouth every 6 hours as needed for nausea or vomiting   venlafaxine (EFFEXOR) 75 MG tablet   No No   Sig: Take 1 tablet (75 mg) by mouth daily   voriconazole (VFEND) 200 MG tablet   No No   Sig: Take 1 tablet (200  mg) by mouth 2 times daily      Facility-Administered Medications: None     Allergies   Allergies   Allergen Reactions     Morphine      Rash       Physical Exam   Vital Signs: Temp: 98.8  F (37.1  C) Temp src: Oral BP: 130/63 Pulse: 95   Resp: 18 SpO2: 100 % O2 Device: None (Room air)    Weight: 134 lbs 0 oz    General Appearance: sitting upright in stretcher, in no acute distress  Eyes: EOMi, sclera normal  Respiratory: no increased work of breathing, CTAB  Cardiovascular: RRR, no murmurs  GI: soft, non-distended  Skin: no rashes on exposed skin  Musculoskeletal: no LE edema  Neurologic: moves extremities spontaneously  Psychiatric: extremely pleasant    Data   Data reviewed today: I reviewed all medications, new labs and imaging results over the last 24 hours. I personally reviewed the EKG tracing showing sinus rhythm, the chest x-ray image(s) showing no new consolidations and the chest CT image(s) showing nonocclusive subsegmental PE.    Recent Labs   Lab 11/01/22 2021   WBC 13.4*   HGB 11.3*   MCV 87      INR 0.98      POTASSIUM 4.5   CHLORIDE 104   CO2 25   BUN 17.4   CR 0.94   ANIONGAP 11   VEE 9.3   *   ALBUMIN 4.1   PROTTOTAL 6.8   BILITOTAL 0.2   ALKPHOS 136*   ALT 42*   AST 44*

## 2022-11-02 NOTE — ED PROVIDER NOTES
ED Provider Note  [facility]      History     Chief Complaint   Patient presents with     Fatigue     Pt presents to ED after being referred by PCM for possible PE. Pt reports having a CT scan today which showed pulmonary thrombosis. Pt reports fever, fatigue and mild SOB. Denies chest pain or any other symptoms.     Fever     HPI  Josefina Bennett is a 56 year old female with past medical history of PTSD, breast cancer with bony metastases presents to the emergency department for chief complaint of PE was visualized on outside imaging earlier today in clinic.  States that the reason why she had outpatient CT was to evaluate if she still had fungal pneumonia.  She was called as there was an incidental finding of nonocclusive left lower lobe pulmonary arterial thrombus, acute versus chronic.  She states that she did not receive the CT today because of any acute symptoms.  However she does note that she has had 2 nights of fevers, 101.3 and 101.4 at home, but was afebrile this morning.  In addition for the past week she has noticed fatigue and dyspnea if she walks up a flight of stairs.    She follows with hematology oncology and is currently participating in a clinical trial for her newly diagnosed stage II breast cancer.  She has received chemoradiation, stating she just finished chemotherapy last week.      Past Medical History  Past Medical History:   Diagnosis Date     Breast cancer (H) 01/01/2015     depression      Foot fracture      History of blood transfusion 06/01/2015     Insomnia      Malignant neoplasm of breast (female), unspecified site 02/05/2015     Papanicolaou smear of cervix with low grade squamous intraepithelial lesion (LGSIL) 06/01/2010    8/10-colp-benign     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) 06/01/2015     PONV (postoperative nausea and vomiting)      PTSD (post-traumatic stress disorder)      Past Surgical History:   Procedure Laterality Date     APPENDECTOMY   1997     CYSTOSCOPY N/A 10/16/2015    Procedure: CYSTOSCOPY;  Surgeon: Aleksandr Palafox MD;  Location: [facility] OR     HC REMOVAL OF OVARIAN CYST(S)      x 3     INSERT PORT VASCULAR ACCESS Left 5/4/2022    Procedure: INSERTION, VASCULAR ACCESS PORT;  Surgeon: Darren Yates MD;  Location: [facility] OR     IR CHEST PORT PLACEMENT > 5 YRS OF AGE  5/4/2022     IR FLUORO 0-1 HOUR  4/12/2022     LAPAROSCOPIC HYSTERECTOMY TOTAL, BILATERAL SALPINGO-OOPHORECTOMY, COMBINED N/A 10/16/2015    Procedure: COMBINED LAPAROSCOPIC HYSTERECTOMY TOTAL, SALPINGO-OOPHORECTOMY;  Surgeon: Aleksandr Palafox MD;  Location: [facility] OR     LUMPECTOMY BREAST Right 9/2/2015    Procedure: LUMPECTOMY BREAST;  Surgeon: Philip Franco MD;  Location: [facility] OR     LUMPECTOMY BREAST WITH SENTINEL NODE, COMBINED Right 8/12/2015    Procedure: COMBINED LUMPECTOMY BREAST WITH SENTINEL NODE;  Surgeon: Philip Franco MD;  Location: [facility] OR     MASTECTOMY SIMPLE Right 9/21/2015    Procedure: MASTECTOMY SIMPLE;  Surgeon: Philip Franco MD;  Location: [facility] OR     REMOVE PORT VASCULAR ACCESS N/A 8/12/2015    Procedure: REMOVE PORT VASCULAR ACCESS;  Surgeon: Philip Franco MD;  Location: [facility] OR     SURGICAL HISTORY OF -       wisdom teeth     TONSILLECTOMY      at age 30 for tonsillitis     calcium carbonate (OS- MG [place]. [place]) 500 MG tablet  cholecalciferol (VITAMIN D3) 1000 UNIT tablet  clindamycin (CLINDAMAX) 1 % topical gel  lidocaine-prilocaine (EMLA) 2.5-2.5 % external cream  metoclopramide (REGLAN) 10 MG tablet  mirtazapine (REMERON) 7.5 MG tablet  Multiple Vitamins-Minerals (MULTIVITAMIN ADULTS PO)  OLANZapine (ZYPREXA) 2.5 MG tablet  ondansetron (ZOFRAN) 8 MG tablet  prochlorperazine (COMPAZINE) 5 MG tablet  venlafaxine (EFFEXOR) 75 MG tablet  voriconazole (VFEND) 200 MG tablet      Allergies   Allergen Reactions     Morphine      Rash     Family History  Family History   Problem Relation Age of Onset     Breast Cancer Other         paternal cousin      Diabetes Mother         Borderline diabetic     Alcohol/Drug Mother         alcoholic     Depression Mother      Alcohol/Drug Father         alcoholic     Depression Father      Diabetes Maternal Grandfather      Depression Maternal Grandfather      Depression Paternal Grandfather      Breast Cancer Paternal Aunt         dad's 1/2 sister     Respiratory Sister         Cystic Fibrosis,  age 39     Depression Sister      Cancer Other         maternal grandmothers sisters daughter.     Social History   Social History     Tobacco Use     Smoking status: Former     Packs/day: 0.50     Types: Cigarettes     Quit date: 2015     Years since quittin.7     Smokeless tobacco: Never     Tobacco comments:     5 a day   Substance Use Topics     Alcohol use: No     Alcohol/week: 0.0 standard drinks     Comment: hx of etoh abuse in teens and 20's     Drug use: No      Past medical history, past surgical history, medications, allergies, family history, and social history were reviewed with the patient. No additional pertinent items.       Review of Systems  A complete review of systems was performed with pertinent positives and negatives noted in the HPI, and all other systems negative.    Physical Exam   BP: 130/63  Pulse: 113  Temp: 98.8  F (37.1  C)  Resp: 18  Weight: 60.8 kg (134 lb)  SpO2: 98 %  Physical Exam  General: no acute distress.  HENT: Normocephalic and atraumatic.  No oropharyngeal exudate.   Eyes: EOMI, conjunctivae normal.   Cardiovascular:  Normal rate and regular rhythm.   No murmur heard.  Pulmonary:  No respiratory distress. Normal breath sounds.   Abdominal: no distension.  Abdomen is soft. There is no mass. There is no abdominal tenderness.   Musculoskeletal:    No swelling or tenderness.  Moving all extremities spontaneously.  No lower extremity edema.  Negative Homans' sign.  Skin: warm and dry  Neurological:  No focal deficit present.    Psychiatric:    normal affect      ED Course       Procedures            EKG Interpretation:      Interpreted by Cara Burnett MD  Time reviewed: 8:30 PM  Symptoms at time of EKG: None  Rhythm: normal sinus   Rate: normal  Axis: normal  Ectopy: none  Conduction: normal  ST Segments/ T Waves: No ST-T wave changes  Q Waves: none  Comparison to prior: Unchanged from 10/3/2022    Clinical Impression: normal EKG                    Results for orders placed or performed during the hospital encounter of 11/01/22   Comprehensive metabolic panel     Status: Abnormal   Result Value Ref Range    Sodium 140 136 - 145 mmol/L    Potassium 4.5 3.4 - 5.3 mmol/L    Chloride 104 98 - 107 mmol/L    Carbon Dioxide (CO2) 25 22 - 29 mmol/L    Anion Gap 11 7 - 15 mmol/L    Urea Nitrogen 17.4 6.0 - 20.0 mg/dL    Creatinine 0.94 0.51 - 0.95 mg/dL    Calcium 9.3 8.6 - 10.0 mg/dL    Glucose 135 (H) 70 - 99 mg/dL    Alkaline Phosphatase 136 (H) 35 - 104 U/L    AST 44 (H) 10 - 35 U/L    ALT 42 (H) 10 - 35 U/L    Protein Total 6.8 6.4 - 8.3 g/dL    Albumin 4.1 3.5 - 5.2 g/dL    Bilirubin Total 0.2 <=1.2 mg/dL    GFR Estimate 71 >60 mL/min/1.73m2   INR     Status: Normal   Result Value Ref Range    INR 0.98 0.85 - 1.15   Troponin T, High Sensitivity     Status: Normal   Result Value Ref Range    Troponin T, High Sensitivity <6 <=14 ng/L   Nt probnp inpatient (BNP)     Status: Normal   Result Value Ref Range    N terminal Pro BNP Inpatient 47 0 - 900 pg/mL   CBC with platelets and differential     Status: Abnormal   Result Value Ref Range    WBC Count 13.4 (H) 4.0 - 11.0 10e3/uL    RBC Count 4.18 3.80 - 5.20 10e6/uL    Hemoglobin 11.3 (L) 11.7 - 15.7 g/dL    Hematocrit 36.2 35.0 - 47.0 %    MCV 87 78 - 100 fL    MCH 27.0 26.5 - 33.0 pg    MCHC 31.2 (L) 31.5 - 36.5 g/dL    RDW 19.2 (H) 10.0 - 15.0 %    Platelet Count 353 150 - 450 10e3/uL    % Neutrophils 65 %    % Lymphocytes 23 %    % Monocytes 8 %    % Eosinophils 2 %    % Basophils 1 %    % Immature Granulocytes 1 %    NRBCs per 100 WBC 0 <1  /100    Absolute Neutrophils 8.8 (H) 1.6 - 8.3 10e3/uL    Absolute Lymphocytes 3.1 0.8 - 5.3 10e3/uL    Absolute Monocytes 1.1 0.0 - 1.3 10e3/uL    Absolute Eosinophils 0.3 0.0 - 0.7 10e3/uL    Absolute Basophils 0.1 0.0 - 0.2 10e3/uL    Absolute Immature Granulocytes 0.1 <=0.4 10e3/uL    Absolute NRBCs 0.0 10e3/uL   Asymptomatic COVID-19 Virus (Coronavirus) by PCR Nasopharyngeal     Status: Normal    Specimen: Nasopharyngeal; Swab   Result Value Ref Range    SARS CoV2 PCR Negative Negative    Narrative    Testing was performed using the [employer] Xpress SARS-CoV-2 Assay on the  Cepheid Gene-Xpert Instrument Systems. Additional information about  this Emergency Use Authorization (EUA) assay can be found via the Lab  Guide. This test should be ordered for the detection of SARS-CoV-2 in  individuals who meet SARS-CoV-2 clinical and/or epidemiological  criteria. Test performance is unknown in asymptomatic patients. This  test is for in vitro diagnostic use under the FDA EUA for  laboratories certified under CLIA to perform high complexity testing.  This test has not been FDA cleared or approved. A negative result  does not rule out the presence of PCR inhibitors in the specimen or  target RNA in concentration below the limit of detection for the  assay. The possibility of a false negative should be considered if  the patient's recent exposure or clinical presentation suggests  COVID-19. This test was validated by the [facility]. This laboratory is certified under  the Clinical Laboratory Improvement Amendments of 1988 (CLIA-88) as  qualified to perform high complexity laboratory testing.     iStat Troponin, POCT     Status: Normal   Result Value Ref Range    TROPPC POCT 0.00 <=0.12 ug/L   EKG 12-lead, tracing only     Status: None (Preliminary result)   Result Value Ref Range    Systolic Blood Pressure  mmHg    Diastolic Blood Pressure  mmHg    Ventricular Rate 95 BPM     Atrial Rate 95 BPM    MN Interval 132 ms    QRS Duration 64 ms     ms    QTc 429 ms    P Axis 35 degrees    R AXIS 79 degrees    T Axis 72 degrees    Interpretation ECG Sinus rhythm  Normal ECG      CBC with platelets differential     Status: Abnormal    Narrative    The following orders were created for panel order CBC with platelets differential.  Procedure                               Abnormality         Status                     ---------                               -----------         ------                     CBC with platelets and d...[[id number]]  Abnormal            Final result                 Please view results for these tests on the individual orders.   Results for orders placed or performed during the hospital encounter of 11/01/22   CT Chest/Abdomen/Pelvis w Contrast     Status: None    Narrative    EXAM: CT CHEST/ABDOMEN/PELVIS W CONTRAST  LOCATION: [facility]  DATE/TIME: 11/1/2022 12:24 PM    INDICATION:  Malignant neoplasm of areola of right breast in female, unspecified estrogen receptor status (H)  COMPARISON: CT chest 09/30/2022. PET/CT 04/27/2022. CT abdomen 04/27/2022, 04/06/2020  TECHNIQUE: CT scan of the chest, abdomen, and pelvis was performed following injection of IV contrast. Multiplanar reformats were obtained. Dose reduction techniques were used.   CONTRAST: ISOVUE 370 90mL    FINDINGS:   LUNGS AND PLEURA: Stable right apical consolidation with traction bronchiectasis, compatible with scar.    The right upper and lower lobe consolidations have evolved into a groundglass opacities. Improved right middle lobe consolidation. This suggests an improving inflammatory process.    Left lower lobe posteromedial segment nonocclusive thrombi. These could be acute or old.    MEDIASTINUM/AXILLAE: Normal.    CORONARY ARTERY CALCIFICATION: None.    HEPATOBILIARY: No hepatic lesion seen. Nodular anterior hepatic surface contour consistent with posttreatment  scarring.    PANCREAS: Normal.    SPLEEN: Normal.    ADRENAL GLANDS: Normal.    KIDNEYS/BLADDER: Small left hepatic cyst which does not require further evaluation.    BOWEL: Stable appearing right paracolic gutter scarring. Moderate amount stool in colon. Small bowel appears normal. No ascites.    LYMPH NODES: Normal.    VASCULATURE: Stable para-aortic retroperitoneal stranding. Aortoiliac atherosclerosis. Patent mesenteric and renal arteries.    PELVIC ORGANS: Absent uterus. No mass or ascites.    MUSCULOSKELETAL: Increase in diffuse pelvic sclerotic lesions when comparing current study to CT chest abdomen and pelvis 04/27/2022. This could represent treatment response, or metastasis progression. The spinal metastases do not appear changed.    Absent right breast.      Impression    IMPRESSION:  1.  Improved hepatic metastases.  2.  Increased pelvic sclerotic lesions which could represent treatment response, or metastasis progression.  3.  No intra-abdominal malignant recurrence.  4.  Left lower lobe nonocclusive pulmonary artery thrombus. This could be acute or chronic.  5.  Dr. Cogan, the on-call oncologist was paged to discuss these results at 4:10 PM.     Medications - No data to display     Assessments & Plan (with Medical Decision Making)   Patient arrived to the emergency department due to visualized nonocclusive left pulmonary artery thrombus on outside CT performed today.  When compared to CT chest on 9/30/2022, thrombus is new.  Patient does not have any acute complaints today, it was a CT to evaluate if she still had a fungal pneumonia.  On exam, she denies on EKG she is normal sinus rhythm.  Oxygen saturation on room air is 98%.  Blood pressure 130/63.  She is afebrile, however has noted fevers for the past 2 nights.    COVID-19 negative.  Stat troponin and BNP not elevated.  She has leukocytosis of 13.4.  CT imaging reviewed, without evidence of recurrent fungal pneumonia.  UA negative for infection.   COVID-19 negative.  Patient was admitted to hospitalist service for initiation of anticoagulation as well as fever of unknown origin in a patient who recently had chemotherapy.  Patient remained hemodynamically stable during her time under my care in the ED.    I have reviewed the nursing notes. I have reviewed the findings, diagnosis, plan and need for follow up with the patient.    New Prescriptions    No medications on file       Final diagnoses:   None       --  DO TOBI Cameron [facility] EMERGENCY DEPARTMENT  11/1/2022     Cara Burnett MD  11/02/22 0139

## 2022-11-02 NOTE — CONSULTS
Medical Oncology  Consult Note   Date of Service: 11/02/2022    Patient: Josefina Bennett  MRN: [id number]  Admission Date: 11/1/2022  Hospital Day # 0    Reason for Consult: New PE in patient with metastatic breast cancer     HISTORY OF PRESENTING ILLNESS:      Josefina Bennett is a 56 year old year female with known metastatic breast cancer (currently on treatment with transtuzumab-deruxtecan) with recent fungal pneumonia (on antifungal therapy) who presented to the ER yesterday at the direction of her oncologist following an incidental finding of a PE on recent surveillance CT CAP done on 11/1/22.     Ms. Bennett has felt reasonably well these past few weeks. She only noted some mild fatigue with exertion which she attributed to ongoing chemotherapy (last dose of Enhertu was on 10/19/22). She denies any cough or exertional dyspnea or dyspnea at rest. She denied any chest pain, pleuritic or otherwise. She denied any dizziness or headaches. She is happy that her hair is growing back. She reported that her pain was under good control.     Following official read of her CT CAP yesterday evening, her primary oncologist (Dr. Meza) contacted patient and asked her to come in to the ER as soon as possible. In ER, patient noted to be hemodynamically stable and without any respiratory distress, hypoxia or hypotension. NT pro-BNP and troponin were normal. EKG unremarkable and ECHO was requested.     Oncologic History:(obtained from most recent oncology clinic progress note 10/19/22)  A.  Neoadjuvant therapy on I SPY-2 with ganetespib and paclitaxel.  AC x 2 complicated by PCP pneumonia.    B.  Right lumpectomy, followed by right margin resections, followed by right mastectomy.   B.  Oophorectomy 10-16-15.   C.  Tamoxifen after 10-6-15.   D.  Radiation therapy. 5,040 cGy in 180 cGy/fraction to the chest wall and regional lymphatics followed by 720 cGy axillary lymph node boost and 1000 cGy mastectomy scar boost. Completed on  12/3/2015.  E.   Adjuvant letrozole begun 1-28-16.  Plan was to continue through 2026.  F.  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  A mix of TNBC and ER- HER2+.  Clinic conference consensus was for the modified Vandana with paclitaxel weekly to treat both clones.   G. 9/30/22 Started Enhertu   H. Was admitted 10/3-10/7, C. Difficile, also had + Galactomanin. Started on voriconazole 10/11/22         Review of Systems:  A comprehensive ROS was performed and found to be negative or non-contributory with the exception of that noted in the HPI above.    PAST MEDICAL HISTORY      Past Medical History:   Diagnosis Date     Breast cancer (H) 01/01/2015     depression      Foot fracture      History of blood transfusion 06/01/2015     Insomnia      Malignant neoplasm of breast (female), unspecified site 02/05/2015     Papanicolaou smear of cervix with low grade squamous intraepithelial lesion (LGSIL) 06/01/2010    8/10-colp-benign     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) 06/01/2015     PONV (postoperative nausea and vomiting)      PTSD (post-traumatic stress disorder)        PAST SURGICAL HISTORY:      Past Surgical History:   Procedure Laterality Date     APPENDECTOMY  1997     CYSTOSCOPY N/A 10/16/2015    Procedure: CYSTOSCOPY;  Surgeon: Aleksandr Palafox MD;  [facility]      REMOVAL OF OVARIAN CYST(S)      x 3     INSERT PORT VASCULAR ACCESS Left 5/4/2022    Procedure: INSERTION, VASCULAR ACCESS PORT;  Surgeon: Darren Yates MD;  Location: [facility] OR     IR CHEST PORT PLACEMENT > 5 YRS OF AGE  5/4/2022     IR FLUORO 0-1 HOUR  4/12/2022     LAPAROSCOPIC HYSTERECTOMY TOTAL, BILATERAL SALPINGO-OOPHORECTOMY, COMBINED N/A 10/16/2015    Procedure: COMBINED LAPAROSCOPIC HYSTERECTOMY TOTAL, SALPINGO-OOPHORECTOMY;   Surgeon: Aleksandr Palafox MD;  Location: [facility] OR     LUMPECTOMY BREAST Right 2015    Procedure: LUMPECTOMY BREAST;  Surgeon: Philip Franco MD;  Location: [facility] OR     LUMPECTOMY BREAST WITH SENTINEL NODE, COMBINED Right 2015    Procedure: COMBINED LUMPECTOMY BREAST WITH SENTINEL NODE;  Surgeon: Philip Franco MD;  Location: [facility] OR     MASTECTOMY SIMPLE Right 2015    Procedure: MASTECTOMY SIMPLE;  Surgeon: Philip Franco MD;  Location: [facility] OR     REMOVE PORT VASCULAR ACCESS N/A 2015    Procedure: REMOVE PORT VASCULAR ACCESS;  Surgeon: Philip Franco MD;  Location: [facility] OR     SURGICAL HISTORY OF -       wisdom teeth     TONSILLECTOMY      at age 30 for tonsillitis       SOCIAL HISTORY:      Social History     Socioeconomic History     Marital status:    Occupational History     Employer: OTHER     Comment: training for    Tobacco Use     Smoking status: Former     Packs/day: 0.50     Types: Cigarettes     Quit date: 2015     Years since quittin.7     Smokeless tobacco: Never     Tobacco comments:     5 a day   Substance and Sexual Activity     Alcohol use: No     Alcohol/week: 0.0 standard drinks     Comment: hx of etoh abuse in teens and 20's     Drug use: No     Sexual activity: Yes     Partners: Male     Birth control/protection: Condom     Comment: condoms   Other Topics Concern     Parent/sibling w/ CABG, MI or angioplasty before 65F 55M? No   Social History Narrative    Josefina currently rents a house in [place], [place].  She  from her  2 years ago and  him 1 year ago.  He was having an affair with a 21-year-old that was living in their house. Apparently, the 21-year-old is still living there.  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  She applied for unemployment and was denied, but recently got a call from a  supervisor and is getting unemployment, including back pay.  She has a boyfriend, Nathaniel who she is seeing.  She is active in AA.  She has suffered numerous losses in the last few years including her sister and cousin and a friend.     Social Determinants of Health     Intimate Partner Violence: Not At Risk     Fear of Current or Ex-Partner: No     Emotionally Abused: No     Physically Abused: No     Sexually Abused: No        FAMILY HISTORY:      Family History   Problem Relation Age of Onset     Breast Cancer Other         paternal cousin     Diabetes Mother         Borderline diabetic     Alcohol/Drug Mother         alcoholic     Depression Mother      Alcohol/Drug Father         alcoholic     Depression Father      Diabetes Maternal Grandfather      Depression Maternal Grandfather      Depression Paternal Grandfather      Breast Cancer Paternal Aunt         dad's 1/2 sister     Respiratory Sister         Cystic Fibrosis,  age 39     Depression Sister      Cancer Other         maternal grandmothers sisters daughter.       MEDICATIONS:      No current facility-administered medications on file prior to encounter.  calcium carbonate (OS- MG Yuhaaviatam. CA) 500 MG tablet, Take 500 mg by mouth At Bedtime   cholecalciferol (VITAMIN D3) 1000 UNIT tablet, Take 1 tablet (1,000 Units) by mouth daily  clindamycin (CLINDAMAX) 1 % topical gel, Apply topically 2 times daily  lidocaine-prilocaine (EMLA) 2.5-2.5 % external cream, Apply to port site one hour prior to access may use six days after port placement  metoclopramide (REGLAN) 10 MG tablet, Take 1 tablet (10 mg) by mouth every 6 hours as needed (nausea/vomiting)  mirtazapine (REMERON) 7.5 MG tablet, TAKE 1 TABLET BY MOUTH ONCE DAILY AT BEDTIME  Multiple Vitamins-Minerals (MULTIVITAMIN ADULTS PO), Take 1 tablet by mouth daily  OLANZapine (ZYPREXA) 2.5 MG tablet, Take 1 tablet (2.5 mg) by mouth At Bedtime Up-titrate to 5mg as indicated per discussion with  oncology  ondansetron (ZOFRAN) 8 MG tablet, Take 1 tablet (8 mg) by mouth every 8 hours as needed for nausea  prochlorperazine (COMPAZINE) 5 MG tablet, Take 1 tablet (5 mg) by mouth every 6 hours as needed for nausea or vomiting  venlafaxine (EFFEXOR) 75 MG tablet, Take 1 tablet (75 mg) by mouth daily  voriconazole (VFEND) 200 MG tablet, Take 1 tablet (200 mg) by mouth 2 times daily         PHYSICAL EXAM:      Blood pressure 132/65, pulse 97, temperature 98.8  F (37.1  C), temperature source Oral, resp. rate 18, weight 60.8 kg (134 lb), last menstrual period 12/18/2014, SpO2 100 %, not currently breastfeeding.  General: alert and cooperative, lying in bed, no acute distress  HEENT: sclera anicteric, EOMI, MMM  Neck: supple, normal ROM  CV: RRR, no murmurs  Resp: CTAB, normal respiratory effort on ambient air  GI: soft, non-tender, non-distended, bowel sounds present and normoactive  MSK: warm and well-perfused, normal tone  Skin: no rashes on limited exam, no jaundice  Neuro: Alert and interactive, moves all extremities equally, no focal deficits    LABS:    I personally reviewed the following studies:  ROUTINE LABS (Last four results):  CBC  Recent Labs   Lab 11/01/22 2021   WBC 13.4*   RBC 4.18   HGB 11.3*   HCT 36.2   MCV 87   MCH 27.0   MCHC 31.2*   RDW 19.2*        CMP  Recent Labs   Lab 11/01/22 2021      POTASSIUM 4.5   CHLORIDE 104   CO2 25   ANIONGAP 11   *   BUN 17.4   CR 0.94   GFRESTIMATED 71   VEE 9.3   PROTTOTAL 6.8   ALBUMIN 4.1   BILITOTAL 0.2   ALKPHOS 136*   AST 44*   ALT 42*     Troponin T is less than 6  NT proBNP is normal at 47.        IMAGING:   The following imaging results were reviewed by myself and Dr. Meza -  No evident hepatic lesions. Some sclerotic, stable-appearing pelvic lesions - could be increased sclerosis over actual progression      CT CAP (11/1/22):  LUNGS AND PLEURA: Stable right apical consolidation with traction bronchiectasis, compatible with  scar.  The right upper and lower lobe consolidations have evolved into a groundglass opacities. Improved right middle lobe consolidation. This suggests an improving inflammatory process.  Left lower lobe posteromedial segment nonocclusive thrombi. These could be acute or old.  MEDIASTINUM/AXILLAE: Normal.  CORONARY ARTERY CALCIFICATION: None.  HEPATOBILIARY: No hepatic lesion seen. Nodular anterior hepatic surface contour consistent with posttreatment scarring.  PANCREAS: Normal.  SPLEEN: Normal.  ADRENAL GLANDS: Normal.  KIDNEYS/BLADDER: Small left hepatic cyst which does not require furter evaluation.  BOWEL: Stable appearing right paracolic gutter scarring. Moderate amount stool in colon. Small bowel appears normal. No ascites.  LYMPH NODES: Normal.  VASCULATURE: Stable para-aortic retroperitoneal stranding. Aortoiliac atherosclerosis. Patent mesenteric and renal arteries.  PELVIC ORGANS: Absent uterus. No mass or ascites.  MUSCULOSKELETAL: Increase in diffuse pelvic sclerotic lesions when comparing current study to CT chest abdomen and pelvis 04/27/2022. This could represent treatment response, or metastasis progression. The spinal metastases do not appear changed.Absent right breast.                                   IMPRESSION:  1.  Improved hepatic metastases.  2.  Increased pelvic sclerotic lesions which could represent treatment response, or metastasis progression.  3.  No intra-abdominal malignant recurrence.  4.  Left lower lobe nonocclusive pulmonary artery thrombus. This could be acute or chronic.      ASSESSMENT AND PLAN:      Josefina Bennett is a 56 year old female with metastatic breast cancer (biphenotypic, TNBC and [id number]) with predominantly osseous metastases, currently on second-line treatment with Enhertu who presented yesterday to the ER after incidental finding of left lower lobe segmental PE.     Left Lower Lobe Segemental PE, acute  - This appears to be a small and relatively mild PE,  "with no features of hemodynamic instability, heart strain (normal Trop, NT proBNP) or hypoxia/respiratory distress.   - TTE pending  - Agree with treatment with a DOAC.     Metastatic Lobular Breast Cancer with bone and liver metastases  -Diagnosed in April 2022 with disease in liver and predominantly axial osseous mets  - biphenotypic with two clones - one, which is triple-negative (TNBC) and other which is Her2 1+ by IHC.  Systemic therapy: currently on second-line therapy with transtuzumab-deruxtecan (TDxd or ENHERTU)., last dose was on 10/19/22 (Cycle 2). Previously received first-line therapy with TH+P per Vandana trial (4-9/2022) with partial response, stopped due to concern for progression with elevated tumor markers (CEA) in September 2022  Bone-directed therapy: on outpatient monthly denosumab, currently on hold due to dental issues.   - we once again reviewed the latest CT CAP ([facility]) from 11/1/22 - no evident liver masses, the bone lesions in sacrum appear slightly more sclerotic. Radiology read reports this as \"possible progression\" but this is likely just ongoing sclerosis of treated lesions. The comparison is to a prior PET-CT from Aug (that was done at [facility]) so not an ideal comparison. Per primary oncologist Dr. Meza, we will repeat a PET-CT in 1 week for a better evaluation/assessment of this. Can be done outpatient     Recommendations:  - agree with initiation of DOAC  - will repeat outpatient PET-CT in 1 week and follow up in clinic  - appreciate care by ER and primary medicine teams      Patient was seen with and the plan of care was discussed with attending physician Dr. Evangelista Meza MD, PhD who agrees with the above history, physical exam and assessment/plan.     Fredo Mendes   PGY-5 Fellow, Hematology,Oncology and BMT  Pager No: [phone]      STAFF NOTE:  Josefina Bennett is a 56 year old year female with known metastatic breast cancer (currently on treatment with " transtuzumab-deruxtecan) with recent fungal pneumonia (on antifungal therapy) who presented to the ER yesterday at the direction of her oncologist following an incidental finding of a PE on recent surveillance CT CAP done on 11/1/22.     Asymptomatic from PE.     ROS:  The remainder of the 10-point of ROS is negative.    PHYSICAL EXAM:  Blood pressure 132/65, pulse 97, temperature 98.8  F (37.1  C), temperature source Oral, resp. rate 18, weight 60.8 kg (134 lb), last menstrual period 12/18/2014, SpO2 100 %, not currently breastfeeding.  General: alert and cooperative, lying in bed, no acute distress  HEENT: sclera anicteric, EOMI, MMM  Neck: supple, normal ROM  CV: RRR, no murmurs  Resp: CTAB, normal respiratory effort on ambient air  GI: soft, non-tender, non-distended, bowel sounds present and normoactive  MSK: warm and well-perfused, normal tone  Skin: no rashes on limited exam, no jaundice  Neuro: Alert and interactive, moves all extremities equally, no focal deficits      ASSESSMENT AND PLAN:  Josefina Bennett is a 56 year old female with metastatic breast cancer (biphenotypic, TNBC and [id number]) with predominantly osseous metastases, currently on second-line treatment with Enhertu who presented yesterday to the ER after incidental finding of left lower lobe segmental PE.   Plan is to start DOAC.      Thank you for allowing us to participate in this patient's care.  The patient was seen and evaluated by me.  I discussed the patient with the fellow and agree with the findings and plan in the note, which was edited by me.    Sincerely,     Evangelista Meza MD  Professor  [facility]  [phone].          I spent 40 minutes with the patient more than 50% of which was in counseling and coordination of care.

## 2022-11-02 NOTE — CONSULTS
[facility] CONSULTATION     Patient:  Josefina Bennett   Date of birth 1965, Medical record number [id number]  Date of Visit:  11/02/2022  Date of Admission: 11/1/2022  Consult Requester:Haley Ramirez DO          Assessment and Recommendations:   ID Problem List   1. Mild Leukocytosis to 13K   2. Reported fevers at home, afebrile on admission   3. Provoked PE on recent CT chest evaluation 11/1  4. Aspergillus pneumonia, on voriconazole   -CT imaging with improvement in opacities and AG serum now negative     RECOMMENDATION:  1. Suspect mild leukocytosis, ELISE and low grade temps 2/2 newly diagnosed PE given patient has no other focal signs of infection at this time.   2. General ID work up has returned unremarkable thus far including benign UA, BCx NGTD and chest imaging w/o worsening pna (appears to be improving on CT). Pursue work up of new symptoms if they arise.   3. No antibiotics indicated at this time.   4. Continue PO voriconazole 200 mg BID for treatment of known aspergillus pneumonia with original plan for 6-12 wks of treatment. ID follow up in clinic 11/30/22 to determine final duration.     Thank you for the consult. Infectious disease will sign off at this time. Do not hesitate to contact us with additional questions or change in patient's clinical status.      ASSESSMENT:  Jsoefina Bennett is a 56 year old female with PMHx significant for metastatic breast cancer s/p R mastectomy, hysterectomy and oophorectomy, s/p chemorads, recently diagnosed aspergillus pneumonia on voriconazole who presents to the ED after CT findings of possible new PE.     Afebrile and HDS on admission. O2 sats normal on RA. Mild leukocytosis to 13K. UA unremarkable for infection. BCx drawn on admission NGTD. No cough to collect SCx. CT chest w/o evidence of new focal pneumonia (opacities improving). COVID PCR negative. No focal signs of infection. Recommend monitoring off of antibiotics and pursuing  "additional work up if new symptoms endorsed. Standard ID work up appears unremarkable and patient feels well overall.     Patient was discussed with Dr. Smith.    Lauren Forman PA-C  Infectious Diseases  Pager #[phone]          History of Present Illness:   Josefina Bennett is a 56 year old female with PMHx significant for metastatic breast cancer s/p R mastectomy, hysterectomy and oophorectomy, s/p chemorads, recently diagnosed aspergillus pneumonia on voriconazole who presents to the ED after CT findings of possible new PE.     On chart review, Josefina had a CT chest for follow up evaluation of her known fungal pneumonia on 11/1/22. She was called with incidental findings of a nonocclusive LLL PE. She was not complaining of new/acute symptoms. She did endorse some ELISE and fatigue x 1 week which she attributed to \"chemo catching up with me\" and 2 nights just PTA of temps to 100.4 with associated sweats/chills, but was afebrile on day of admission and throughout daytime. Recently completed chemotherapy on 10/19. Denied cough, sputum production, nausea/vomiting/diarrhea, dysuria or other UTI sx. Of note, her CT did show improvement of lung opacities thought to be 2/2 fungal pneumonia and AG from serum negative (from positive AG of 1.72 on 10/5).     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 [place] [facility] [place]. She recently traveled to [place], [place] by plane about 1 wk ago (did wear a mask on plane) to visit a friend. No known sick contacts.          Review of Systems:   CONSTITUTIONAL:  +chills/sweats and low grade temps x 2 nights   EYES: negative for icterus, redness, or purulent drainage.   ENT:  negative for nasal congestion, rhinorrhea, or sore throat.  RESPIRATORY:  negative for cough with sputum. +ELISE  CARDIOVASCULAR:  negative for chest pain or palpitations.  GASTROINTESTINAL:  negative for nausea, vomiting, diarrhea and " constipation.  GENITOURINARY:  negative for dysuria, frequency, or urgency.   HEME:  No easy bruising or bleeding.  INTEGUMENT:  negative for rash and pruritus.  NEURO:  Negative for headache, vision changes, or numbness/tingling in extremities.         Past Medical History:     Past Medical History:   Diagnosis Date     Breast cancer (H) 01/01/2015     depression      Foot fracture      History of blood transfusion 06/01/2015     Insomnia      Malignant neoplasm of breast (female), unspecified site 02/05/2015     Papanicolaou smear of cervix with low grade squamous intraepithelial lesion (LGSIL) 06/01/2010    8/10-colp-benign     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) 06/01/2015     PONV (postoperative nausea and vomiting)      PTSD (post-traumatic stress disorder)             Past Surgical History:     Past Surgical History:   Procedure Laterality Date     APPENDECTOMY  1997     CYSTOSCOPY N/A 10/16/2015    Procedure: CYSTOSCOPY;  Surgeon: Aleksandr Palafox MD;  Location: [facility] OR     HC REMOVAL OF OVARIAN CYST(S)      x 3     INSERT PORT VASCULAR ACCESS Left 5/4/2022    Procedure: INSERTION, VASCULAR ACCESS PORT;  Surgeon: Darren Yates MD;  Location: [facility] OR     IR CHEST PORT PLACEMENT > 5 YRS OF AGE  5/4/2022     IR FLUORO 0-1 HOUR  4/12/2022     LAPAROSCOPIC HYSTERECTOMY TOTAL, BILATERAL SALPINGO-OOPHORECTOMY, COMBINED N/A 10/16/2015    Procedure: COMBINED LAPAROSCOPIC HYSTERECTOMY TOTAL, SALPINGO-OOPHORECTOMY;  Surgeon: Aleksandr Palafox MD;  Location: [facility] OR     LUMPECTOMY BREAST Right 9/2/2015    Procedure: LUMPECTOMY BREAST;  Surgeon: Philip Franco MD;  Location: [facility] OR     LUMPECTOMY BREAST WITH SENTINEL NODE, COMBINED Right 8/12/2015    Procedure: COMBINED LUMPECTOMY BREAST WITH SENTINEL NODE;  Surgeon: Philip Franco MD;  Location: [facility] OR     MASTECTOMY SIMPLE Right 9/21/2015    Procedure: MASTECTOMY SIMPLE;  Surgeon: Philip Franco MD;  Location: [facility] OR     REMOVE PORT VASCULAR ACCESS  N/A 2015    Procedure: REMOVE PORT VASCULAR ACCESS;  Surgeon: Philip Franco MD;  Location:  OR     SURGICAL HISTORY OF -       wisdom teeth     TONSILLECTOMY      at age 30 for tonsillitis            Family History:     Family History   Problem Relation Age of Onset     Breast Cancer Other         paternal cousin     Diabetes Mother         Borderline diabetic     Alcohol/Drug Mother         alcoholic     Depression Mother      Alcohol/Drug Father         alcoholic     Depression Father      Diabetes Maternal Grandfather      Depression Maternal Grandfather      Depression Paternal Grandfather      Breast Cancer Paternal Aunt         dad's 1/2 sister     Respiratory Sister         Cystic Fibrosis,  age 39     Depression Sister      Cancer Other         maternal grandmothers sisters daughter.            Social History:     Social History     Tobacco Use     Smoking status: Former     Packs/day: 0.50     Types: Cigarettes     Quit date: 2015     Years since quittin.7     Smokeless tobacco: Never     Tobacco comments:     5 a day   Substance Use Topics     Alcohol use: No     Alcohol/week: 0.0 standard drinks     Comment: hx of etoh abuse in teens and 20's     History   Sexual Activity     Sexual activity: Yes     Partners: Male     Birth control/ protection: Condom     Comment: condoms            Current Medications:       apixaban ANTICOAGULANT  10 mg Oral BID     [START ON 2022] apixaban ANTICOAGULANT  5 mg Oral BID     calcium carbonate  600 mg Oral At Bedtime     OLANZapine  2.5 mg Oral At Bedtime     sodium chloride (PF)  3 mL Intracatheter Q8H     venlafaxine  75 mg Oral Daily     vitamin D3  1,000 Units Oral Daily     voriconazole  200 mg Oral BID            Allergies:     Allergies   Allergen Reactions     Morphine      Rash            Physical Exam:   Vitals were reviewed  Patient Vitals for the past 24 hrs:   BP Temp Temp src Pulse Resp SpO2 Weight   22 0800 -- -- -- 97 18  100 % --   11/02/22 0602 132/65 -- -- 92 18 100 % --   11/02/22 0336 -- -- -- 95 18 100 % --   11/01/22 2012 130/63 98.8  F (37.1  C) Oral 113 18 98 % 60.8 kg (134 lb)       Physical Examination:  Constitutional: Pleasant female seen lying in bed, in NAD. Alert and interactive.   HEENT: NCAT, anicteric sclerae, conjunctiva clear. Moist mucous membranes.  Respiratory: Non-labored breathing on RA. Lungs are clear to auscultation bilaterally, without focal crackles.  Cardiovascular: Regular rate and rhythm.  GI: Normoactive BS. Abdomen is soft, non-distended, and non-tender to palpation.   Skin: Warm and dry. No rashes.  Musculoskeletal: Extremities grossly normal. No tenderness or edema present.   Neurologic: A &O x3, speech normal, answering questions appropriately. Moves all extremities spontaneously. Grossly non-focal.  Neuropsychiatric: Mentation and affect normal/appropriate.  VAD: Port          Laboratory Data:     Inflammatory Markers    Recent Labs   Lab Test 10/07/22  0601 10/06/22  0744 10/05/22  1039 10/04/22  1041 04/07/22  0650 04/06/22  2107 04/06/22  1015   SED  --   --   --   --  30 29  --    CRP 7.05* 9.09* 11.40* 9.94* 61.0*  --  21.0*       Hematology Studies    Recent Labs   Lab Test 11/01/22  2021 10/19/22  1322 10/07/22  0601 10/06/22  0744 10/05/22  1039 10/04/22  1041 10/03/22  2131 10/19/21  1344 01/23/20  1524 07/23/19  1252 07/25/18  1545 04/19/18  1526 01/18/18  1438   WBC 13.4* 6.7 6.9 7.0 8.7 8.3 10.0   < > 7.8 9.7 9.4 8.9 8.3   ANEU  --   --   --   --   --   --  7.3  --  4.2 5.5 5.2 5.1 4.4   AEOS  --   --   --   --   --   --  0.3  --  0.2 0.2 0.2 0.2 0.2   HGB 11.3* 10.6* 10.5* 10.1* 10.7* 10.6* 11.9   < > 13.7 14.1 14.1 13.4 13.5   MCV 87 87 84 85 84 86 86   < > 87 84 84 82 84    509* 203 234 300 321 422   < > 283 251 306 264 271    < > = values in this interval not displayed.       Metabolic Studies     Recent Labs   Lab Test 11/01/22  2021 10/19/22  1322 10/06/22  0779  10/05/22  1039 10/04/22  1041    143 138 139 139   POTASSIUM 4.5 4.0 3.9 3.9 3.8   CHLORIDE 104 109* 106 106 105   CO2 25 26 27 22 25   BUN 17.4 16.4 8.8 11.7 17.7   CR 0.94 0.89 0.82 0.95 1.02*   GFRESTIMATED 71 76 83 70 64       Hepatic Studies    Recent Labs   Lab Test 11/01/22  2021 10/19/22  1322 10/03/22  2131 09/30/22  0724 09/19/22  1334 09/12/22  1206   BILITOTAL 0.2 <0.2 0.5 0.2 0.2 0.6   ALKPHOS 136* 113* 98 106* 82 77   ALBUMIN 4.1 3.6 3.8 3.6 3.5 3.7   AST 44* 44* 61* 44* 29 29   ALT 42* 37* 59* 41* 22 27       Microbiology:  Culture   Date Value Ref Range Status   11/01/2022 No growth after 12 hours  Preliminary   11/01/2022 No growth after 12 hours  Preliminary   10/04/2022 No Growth  Final   10/03/2022 No Growth  Final   04/07/2022 No Growth  Final   04/07/2022 No Growth  Final   04/06/2022 No Growth  Final   04/06/2022 No Growth  Final       Urine Studies    Recent Labs   Lab Test 11/01/22  2243 04/06/22  1124 06/23/15  0830 06/21/15  2300 05/31/15  1224 04/09/15  2259   LEUKEST Negative Negative Negative Negative Negative Negative   WBCU <5 1 1 1  --  0       Vancomycin Levels    Recent Labs   Lab Test 06/23/15  1740   VANCOMYCIN 28.0*       Hepatitis B Testing No lab results found.  Hepatitis C Testing   No results found for: HCVAB, HQTG, HCGENO, HCPCR, HQTRNA, HEPRNA  Respiratory Virus Testing    No results found for: RS, FLUAG    IMAGING    CT CAP w/ contrast (11/1/22)   IMPRESSION:  1.  Improved hepatic metastases.  2.  Increased pelvic sclerotic lesions which could represent treatment response, or metastasis progression.  3.  No intra-abdominal malignant recurrence.  4.  Left lower lobe nonocclusive pulmonary artery thrombus. This could be acute or chronic.  5.  Dr. Cogan, the on-call oncologist was paged to discuss these results at 4:10 PM.    CXR (11/2/22)   IMPRESSION: Implanted left chest port, catheter via IJ access, tip in the distal SVC. Airspace infiltrate in the right lower  lung, likely representing an infectious or an inflammatory process. Left lung relatively clear. No adenopathy or effusion. Normal   cardiac size and pulmonary vascularity. Postoperative changes right breast. Mild degenerative changes both shoulders and the spine. Foci of sclerosis involving the visualized bones representing metastases.

## 2022-11-02 NOTE — ED NOTES
ED Triage Provider Note  [facility]  Encounter Date: Nov 1, 2022    History:  Chief Complaint   Patient presents with     Fatigue     Pt presents to ED after being referred by PCM for possible PE. Pt reports having a CT scan today which showed pulmonary thrombosis. Pt reports fever, fatigue and mild SOB. Denies chest pain or any other symptoms.     Fever     Josefina Bennett is a 56 year old female who has a known cancer history and presents to the ED with report of PE from outpatient setting, but also reports some low-grade fevers on ROS.    Patient has a known history of breast cancer, known bony mets, amongst others, who had a CT C/A/P earlier today in clinic.  As part of the CT, patient was found to have a left lower lobe nonocclusive pulmonary artery thrombus that could be acute or chronic.  Based on these imaging results she was referred into the ED for further evaluation and management.    Currently no chest pain but is tachycardic, normal SPO2 on RA/normal work of breathing. No syncope,.  Did have some low-grade fevers but without other clear symptoms.    Review of Systems:  See above    Exam:  /63   Pulse 113   Temp 98.8  F (37.1  C) (Oral)   Resp 18   Wt 60.8 kg (134 lb)   LMP 12/18/2014   SpO2 98%   BMI 22.99 kg/m    General: No acute distress. Appears stated age.   Cardio: Mild tachycardia, extremities well perfused  Resp: Normal work of breathing, grossly normal respiratory rate  Neuro: Alert. CN II-XII grossly intact. Grossly intact strength.       Medical Decision Making:  Patient arriving to the ED with problem as above. A medical screening exam was performed.   -Screening ECG and laboratory orders initiated from Triage  -Should get echo to evaluate for RV dilation, etc. as well as the additional cardiac work-up  -Clinically looks well at this time, but has known PE abnormality and mild tachycardia, will remove soon as able (admittedly boarding currently, working  with RN to find space for patient).      Milli Garrison MD on 11/1/2022 at 8:19 PM       Milli Garrison MD  11/01/22 2022       Milli Garrison MD  11/01/22 2029

## 2022-11-03 NOTE — DISCHARGE SUMMARY
[facility]  Discharge Summary - Medicine & Pediatrics       Date of Admission:  11/1/2022  Date of Discharge:  11/3/2022  Discharging Provider: Dr. Haley Ramirez  Discharge Service: Medicine Service, [facility] TEAM 5    Discharge Diagnoses     1. Nonocclusive left pulmonary artery thrombus  2. History of aspergillus pneumonia  3. Dyspnea on exertion  4. Metastatic breast cancer status post mastectomy, chemotherapy and radiation  5. Nausea  6. Depression    Follow-ups Needed After Discharge   Follow-up Appointments     Adult [facility] Follow-up and recommended labs and tests      Follow up with primary care provider, Camille Araujo, within 7 days to   evaluate medication change.  The following labs/tests are recommended: CBC   and BMP.      Appointments on [place] and/or [facility] (with [facility] or [facility]   provider or service). Call [phone] if you haven't heard regarding   these appointments within 7 days of discharge.           Oncology follow up to assist with determining duration of anticoagulant therapy    Unresulted Labs Ordered in the Past 30 Days of this Admission     Date and Time Order Name Status Description    11/1/2022  8:27 PM Blood Culture Peripheral Blood Preliminary     11/1/2022  8:27 PM Blood Culture Peripheral Blood Preliminary       These results will be followed up by the patient's primary care provider and oncology    Discharge Disposition   Discharged to home  Condition at discharge: Stable    Hospital Course   Josefina Bennett is a 56 year old female with a pertinent past medical history of metastatic breast cancer status post chemotherapy, radiation, right mastectomy, hysterectomy and oophorectomy and aspergillosis who was admitted on 11/1/2022 for dyspnea on exertion found to have a nonocclusive left pulmonary artery thrombus.     Nonocclusive left pulmonary artery thrombus  History of aspergillus pneumonia  Dyspnea  Patient presented after  being found to have a nonocclusive left pulmonary artery thrombus on CT imaging.  She reported dyspnea on exertion, fatigue and weakness the weekend prior to admission.  She noted fevers for 2 days. Of note, the patient is currently being treated for Aspergillus pneumonia with voriconazole and is followed by infectious diseases in the outpatient setting.  The same CT imaging demonstrated improvement in her middle lobe opacities.  The patient remained stable and afebrile on room air during this admission.  Infectious diseases was consulted and did not believe that she had a new active infection.  An echocardiogram was performed that did not show right heart strain.  - Continue apixaban 10 mg twice daily for 7-day course (last dose on 11/8/2022) followed by 5 mg twice daily daily until outpatient follow-up with oncology  - Continue voriconazole 200 mg twice daily  - Follow-up with infectious diseases and oncology as scheduled by those specialty services  - Follow-up with primary care provider  - Follow-up blood cultures     #Metastatic breast cancer status postmastectomy, chemotherapy and radiation  #Nausea  Patient has a history of metastatic breast cancer with last reported chemo treatment on 10/19.  Her recent PET scan showed continued treatment response.  There are plans for patient to receive immunotherapy in the future.  As a result of her therapies and malignancy, the patient has significant nausea requiring multiple medications.  - Continue prior to admission ondansetron as needed  - Continue prior to admission prochlorperazine as needed  - Continue prior to admission medical provide as needed  - Continue prior to admission olanzapine 2.5 mg nightly    #Depression  - Continue prior to admission venlafaxine 75 mg p.o. daily      Consultations This Hospital Stay   ONCOLOGY ADULT IP CONSULT  INFECTIOUS DISEASE GENERAL ADULT IP CONSULT    Code Status   Prior       Patient seen and case discussed with Dr. Ramirez  who agrees with the above assessment and plan    Perez Huitron, DO  PGY-2, Internal Medicine  [facility] EMERGENCY DEPARTMENT  [address]  [place] [place] [address]  Phone: [phone]  ______________________________________________________________________    Physical Exam   Vital Signs: Temp: 98.6  F (37  C) Temp src: Oral BP: 138/67 Pulse: 91   Resp: 16 SpO2: 98 % O2 Device: None (Room air)    Weight: 134 lbs 0 oz    General: Alert and oriented without distress  HEENT: Normocephalic/atraumatic  Respiratory: CTAB without increased respiratory effort or accessory muscle use  Cardiovascular: RRR without murmurs, rubs or gallop. S1, S2 intact.  Abdomen: Bowel sounds present. Soft, non-distended without tenderness to palpation or rebound.   Skin: No overt lesions, bruises, rashes or bleeding on exposed skin surfaces.      Primary Care Physician   Camille Araujo    Discharge Orders      Reason for your hospital stay    You were admitted for shortness of breath and fever found to have a blood clot in your lungs. You have remained stable this admission and we believe you are safe to discharge with close outpatient follow up while taking a blood thinner (apixaban).     Activity    Your activity upon discharge: activity as tolerated     Adult [facility] Follow-up and recommended labs and tests    Follow up with primary care provider, Camille Araujo, within 7 days to evaluate medication change.  The following labs/tests are recommended: CBC and BMP.      Appointments on [place] and/or [facility] (with [facility] or [facility] provider or service). Call [phone] if you haven't heard regarding these appointments within 7 days of discharge.     Diet    Follow this diet upon discharge: Orders Placed This Encounter      Combination Diet Regular Diet Adult       Significant Results and Procedures   Results for orders placed or performed during the hospital encounter of  11/01/22   XR Chest Port 1 View    Narrative    EXAM: XR CHEST PORTABLE 1 VIEW  LOCATION: [facility]  DATE/TIME: 11/02/2022, 4:03 AM    INDICATION: Fever. Chest cancer. Evaluate for pneumonia.  COMPARISON: CT chest, abdomen and pelvis with IV contrast 11/01/2022.      Impression    IMPRESSION: Implanted left chest port, catheter via IJ access, tip in the distal SVC. Airspace infiltrate in the right lower lung, likely representing an infectious or an inflammatory process. Left lung relatively clear. No adenopathy or effusion. Normal   cardiac size and pulmonary vascularity. Postoperative changes right breast. Mild degenerative changes both shoulders and the spine. Foci of sclerosis involving the visualized bones representing metastases.           Discharge Medications   Discharge Medication List as of 11/3/2022  9:41 AM      START taking these medications    Details   !! apixaban ANTICOAGULANT (ELIQUIS) 5 MG tablet Take 2 tablets (10 mg) by mouth 2 times daily for 5 days, Disp-20 tablet, R-0, E-Prescribe      !! apixaban ANTICOAGULANT (ELIQUIS) 5 MG tablet Take 1 tablet (5 mg) by mouth 2 times daily, Disp-60 tablet, R-0, E-Prescribe      !! apixaban ANTICOAGULANT (ELIQUIS) 5 MG tablet Take 2 tablets (10 mg) by mouth 2 times daily, Disp-13 tablet, R-0, E-Prescribe      !! apixaban ANTICOAGULANT (ELIQUIS) 5 MG tablet Take 1 tablet (5 mg) by mouth 2 times daily, Disp-60 tablet, R-0, E-Prescribe       !! - Potential duplicate medications found. Please discuss with provider.      CONTINUE these medications which have NOT CHANGED    Details   calcium carbonate (OS- MG [place]. [place]) 500 MG tablet Take 500 mg by mouth At Bedtime , Historical      cholecalciferol (VITAMIN D3) 1000 UNIT tablet Take 1 tablet (1,000 Units) by mouth daily, Disp-30 tablet, R-0, E-Prescribe      clindamycin (CLINDAMAX) 1 % topical gel Apply topically 2 times dailyDisp-60 g, [id number]       lidocaine-prilocaine (EMLA) 2.5-2.5 % external cream Apply to port site one hour prior to access may use six days after port placementDisp-30 g, [id number]      metoclopramide (REGLAN) 10 MG tablet Take 1 tablet (10 mg) by mouth every 6 hours as needed (nausea/vomiting), Disp-60 tablet, R-0, E-Prescribe      mirtazapine (REMERON) 7.5 MG tablet TAKE 1 TABLET BY MOUTH ONCE DAILY AT BEDTIME, Disp-90 tablet, R-3, E-Prescribe      Multiple Vitamins-Minerals (MULTIVITAMIN ADULTS PO) Take 1 tablet by mouth daily, Historical      OLANZapine (ZYPREXA) 2.5 MG tablet Take 1 tablet (2.5 mg) by mouth At Bedtime Up-titrate to 5mg as indicated per discussion with oncology, Disp-30 tablet, R-0, E-Prescribe      ondansetron (ZOFRAN) 8 MG tablet Take 1 tablet (8 mg) by mouth every 8 hours as needed for nausea, Disp-30 tablet, R-3, E-Prescribe      prochlorperazine (COMPAZINE) 5 MG tablet Take 1 tablet (5 mg) by mouth every 6 hours as needed for nausea or vomiting, Disp-60 tablet, R-0, E-Prescribe      venlafaxine (EFFEXOR) 75 MG tablet Take 1 tablet (75 mg) by mouth daily, Disp-30 tablet, R-3, E-Prescribe      voriconazole (VFEND) 200 MG tablet Take 1 tablet (200 mg) by mouth 2 times daily, Disp-60 tablet, R-1, E-Prescribe           Allergies   Allergies   Allergen Reactions     Morphine      Rash

## 2022-11-03 NOTE — PROGRESS NOTES
A&Ox4, able  to make her her needs known to caregiver. Lungs sounds  are clear and she is ambulatory with no SOB. Eating regular diet but has yet to have a bowel movement. No GI symptoms reported at this time. Plan is to discharge at AM tomorrow. She already has her discharge medications at her bedside. She is aware that she cannot take any of her home medications while she is currently in pt. Delay for discharge today was due to US result not available. Therefore, team recommended to stay untill AM next day.  VS: she is on RA and is in the high 90% saturation.

## 2022-11-04 NOTE — PROGRESS NOTES
Patient discharged on 11/3/22, please follow up per TCM workflow.    Rikki Carbajal on 11/4/2022 at 6:50 AM

## 2022-11-04 NOTE — PROGRESS NOTES
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     ASSESSMENT        Discharge Assessment  How are you doing now that you are home?: Better with new medication.  How are your symptoms? (Red Flag symptoms escalate to triage hotline per guidelines): Improved  Do you feel your condition is stable enough to be safe at home until your provider visit?: Yes  Does the patient have their discharge instructions? : Yes  Does the patient have questions regarding their discharge instructions? : No  Were you started on any new medications or were there changes to any of your previous medications? : Yes (Patient stated that was started on new meds, but they're not listed in chart.)  Does the patient have all of their medications?: Yes  Do you have questions regarding any of your medications? : No  Do you have all of your needed medical supplies or equipment (DME)?  (i.e. oxygen tank, CPAP, cane, etc.): Yes  Discharge follow-up appointment scheduled within 14 calendar days? : Yes  Discharge Follow Up Appointment Date: 11/11/22  Discharge Follow Up Appointment Scheduled with?: Specialty Care Provider    Post-op ([facility] CTA Only)  If the patient had a surgery or procedure, do they have any questions for a nurse?: No      PLAN                                                      Outpatient Plan:      Follow up with primary care provider, Camille Araujo, within 7 days to evaluate medication change.  The following labs/tests are recommended: CBC and BMP.       Appointments on [place] and/or [facility] (with [facility] or [facility] provider or service). Call [phone] if you haven't heard regarding these appointments within 7 days of discharge.         Future Appointments   Date Time Provider Department Center   11/11/2022 10:30 AM  [facility] DRAW [facility]   11/11/2022 11:00 AM Sandra Coughlin PA-C [facility]   11/11/2022 12:00 PM  ONC INFUSION NURSE [facility]   11/21/2022 10:00 AM [email or web address]   11/30/2022  1:30 PM Gumaro Mckeon MD [facility]   12/2/2022  1:00 PM  [facility] DRAW [facility]   12/2/2022  1:30 PM Sandra Coughlin PA-C [facility]   12/2/2022  2:30 PM [facility] ONC INFUSION NURSE [facility]   12/21/2022  4:00 PM Sandra Coughlin PA-C [facility]   12/23/2022  2:00 PM [facility] DRAW [facility]   12/23/2022  2:30 PM  ONC INFUSION NURSE [facility]         For any urgent concerns, please contact our 24 hour nurse triage line: [phone] ([phone])         JOAO Abernathy  [phone]  [facility]

## 2022-11-10 NOTE — TELEPHONE ENCOUNTER
I called and went over the CT and discussed that it's OK to proceed with the next treatment with Enhertu because she is asymptomatic.  No cough, chest pain or shortness of breath.  GGO less than 25% of lung.  PET CT is delayed because of scheduling.      Evangelista Meza MD

## 2022-11-11 NOTE — NURSING NOTE
"Chief Complaint   Patient presents with     Port Draw     Labs drawn via port by RN. Vitals taken.     Labs drawn via port by RN. Port accessed with 20G 3/4\" power needle. Flushed with NS and heparin. Pt tolerated well. Vitals taken. Pt checked in for next appointment.    Josephine Carr, RN  "

## 2022-11-11 NOTE — PATIENT INSTRUCTIONS
[facility] Triage and after hours / weekends / holidays:  [phone]    Please call the triage or after hours line if you experience a temperature greater than or equal to 100.5, shaking chills, have uncontrolled nausea, vomiting and/or diarrhea, dizziness, shortness of breath, chest pain, bleeding, unexplained bruising, or if you have any other new/concerning symptoms, questions or concerns.      If you are having any concerning symptoms or wish to speak to a provider before your next infusion visit, please call your care coordinator or triage to notify them so we can adequately serve you.     If you need a refill on a narcotic prescription or other medication, please call before your infusion appointment.        November 2022 Sunday Monday Tuesday Wednesday Thursday Friday Saturday             1    CT CHEST/ABDOMEN/PELVIS W  11:10 AM   (20 min.)   [facility] CT 2   [facility] CT    ECHO COMPLETE  12:45 PM   (60 min.)   [facility] ECHO STAFF   [facility]    Admission   8:28 PM   Cara Burnett MD   [facility]   (Discharge: 11/3/2022) 2    XR CHEST PORT 1 VIEW   3:55 AM   (20 min.)   [id number]   [facility]    ECHO COMPLETE  12:50 PM   (60 min.)   [email or web address]   [facility] 3     4     5       6     7     8     9     10     11    LAB CENTRAL  10:30 AM   (15 min.)   [facility] LAB DRAW   [facility]    RETURN  10:45 AM   (45 min.)   Sandra Coughlin PA-C   [facility]    ONC INFUSION 2 HR (120 MIN)  12:00 PM   (120 min.)    ONC INFUSION NURSE   [facility] 12       13     14     15     16     17     18     19       20     21    PET ONCOLOGY WHOLE BODY   9:30 AM   (30 min.)   [email or web address]   [facility] 22     23     24     25     26       27      28     29     30    VIDEO VISIT RETURN   1:15 PM   (30 min.)   Gumaro Mckeon MD   [facility] [place]                              December 2022 Sunday Monday Tuesday Wednesday Thursday Friday Saturday                       1     2    LAB CENTRAL   1:00 PM   (15 min.)    [facility] DRAW   [facility]    RETURN   1:15 PM   (45 min.)   Sandra Coughlin PA-C   [facility]    ONC INFUSION 2 HR (120 MIN)   2:30 PM   (120 min.)    ONC INFUSION NURSE   [facility] 3       4     5     6     7     8     9     10       11     12     13     14     15     16     17       18     19     20     21    VIDEO VISIT RETURN   3:45 PM   (45 min.)   Sandra Coughlin PA-C   [facility] 22  Happy Birthday!     23    LAB CENTRAL   2:00 PM   (15 min.)   [facility] DRAW   [facility]    ONC INFUSION 2 HR (120 MIN)   2:30 PM   (120 min.)    ONC INFUSION NURSE   [facility] 24       25     26     27     28     29     30     31                        Recent Results (from the past 24 hour(s))   Comprehensive metabolic panel    Collection Time: 11/11/22 10:46 AM   Result Value Ref Range    Sodium 141 136 - 145 mmol/L    Potassium 4.7 3.4 - 5.3 mmol/L    Chloride 106 98 - 107 mmol/L    Carbon Dioxide (CO2) 26 22 - 29 mmol/L    Anion Gap 9 7 - 15 mmol/L    Urea Nitrogen 17.8 6.0 - 20.0 mg/dL    Creatinine 0.90 0.51 - 0.95 mg/dL    Calcium 9.6 8.6 - 10.0 mg/dL    Glucose 117 (H) 70 - 99 mg/dL    Alkaline Phosphatase 135 (H) 35 - 104 U/L    AST 51 (H) 10 - 35 U/L    ALT 49 (H) 10 - 35 U/L    Protein Total 6.8 6.4 - 8.3 g/dL    Albumin 4.0 3.5 - 5.2 g/dL    Bilirubin Total 0.2 <=1.2 mg/dL    GFR Estimate 75 >60 mL/min/1.73m2   CBC with platelets and differential    Collection Time: 11/11/22 10:46 AM   Result Value  Ref Range    WBC Count 9.9 4.0 - 11.0 10e3/uL    RBC Count 4.46 3.80 - 5.20 10e6/uL    Hemoglobin 12.0 11.7 - 15.7 g/dL    Hematocrit 38.3 35.0 - 47.0 %    MCV 86 78 - 100 fL    MCH 26.9 26.5 - 33.0 pg    MCHC 31.3 (L) 31.5 - 36.5 g/dL    RDW 18.6 (H) 10.0 - 15.0 %    Platelet Count 469 (H) 150 - 450 10e3/uL    % Neutrophils 62 %    % Lymphocytes 27 %    % Monocytes 8 %    % Eosinophils 2 %    % Basophils 1 %    % Immature Granulocytes 0 %    NRBCs per 100 WBC 0 <1 /100    Absolute Neutrophils 6.2 1.6 - 8.3 10e3/uL    Absolute Lymphocytes 2.6 0.8 - 5.3 10e3/uL    Absolute Monocytes 0.8 0.0 - 1.3 10e3/uL    Absolute Eosinophils 0.2 0.0 - 0.7 10e3/uL    Absolute Basophils 0.1 0.0 - 0.2 10e3/uL    Absolute Immature Granulocytes 0.0 <=0.4 10e3/uL    Absolute NRBCs 0.0 10e3/uL

## 2022-11-11 NOTE — PROGRESS NOTES
Nov 11, 2022      REASON FOR VISIT: Follow-up breast cancer     HISTORY OF PRESENT ILLNESS:  Josefina Bennett was self referred to our clinic for clinical trial recommendations for newly diagnosed stage II breast cancer. She had her last mammogram approximately a year prior. She did notice in early January 2015 that she was having some distortion of the right breast, and she went to see her gynecologist and had a mammogram performed. She had a screening mammogram performed on 01/15/2015. Breast tissue was heterogeneously dense, which might compromise the mammography. There was architectural distortion in the right breast approximately 11-12 o'clock position, zone 2, posterior depth, and a CC MLO spot compression was performed and an ultrasound was planned. The diagnostic mammogram from 01/28/2015 showed right breast architectural distortion centered at the 12 o'clock position, zone 2, suspicious for neoplasm. Breast tomosynthesis was used in the interpretation. Ultrasound findings included targeted ultrasound of the right upper breast demonstrating a band-like area of abnormal echogenicity between 11 and 12 o'clock position in the right breast at zone 2. This measures 4 cm transversely x 1.1 cm AP, and there was only a 1.5 cm measurement in the radial direction. There was blood flow suspicious for neoplasm at the 11 o'clock position in zone 2. There is a 1.4 cm hypoechoic nodule slightly  from the larger area of altered echogenicity that is also suspicious. A biopsy was performed. The biopsy showed infiltrating lobular carcinoma, grade 2, and a clip was placed at specimen [id number]. There were a few signet cells present. The tumor was ER-positive, CO low positive, and HER2 was negative by immunohistochemistry. She subsequently underwent an MRI showing a tumor that was 4.8 x 1.7 x 3.2 cm in size. Overall, her clinical stage was T2 NX MX.       She was enrolled in the I-SPY-2 clinical trial, and qualified with  high-risk MammaPrint score. She was randomized to ganetespib and paclitaxel, and she was treated with 12 cycles of treatment, and then started on AC on 05/26/2015. She developed intractable nausea and vomiting after the first cycle, which required hospitalization on the second cycle. She then developed Pneumocystis pneumonia, which resulted in intubation and a 2-week hospitalization during the course of AC. She was discharged on 07/02/2015, and decided she did not want to pursue any further chemotherapy. She had a right lumpectomy and sentinel lymph node procedure 08/12/2015. She had a positive margin, underwent a re-excision, and had a positive margin. Ultimately, she underwent a right mastectomy with clear margins. She began radiation treatment with Dr. Bill Chauhan, and completed radiation therapy on 12/04/2015. Pathologic stage was III-A, ypT3 N1a MX. Of concern, she had what could be considered an RCB3 tumor.  She started adjuvant letrozole on 1/28/16.     4/6/2022 Josefina Verma developed a fever during Reclast.  She was feeling unwell and went to the ED.  CT CAP showed new involvement of abdominal lymph nodes and multiple bone metastases.  She was admitted and I saw her in the hospital 4-7-22 to discuss the plan for biopsy.  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  A mix of TNBC and ER- HER2+.  Clinic conference consensus was for the modified Vandana with paclitaxel weekly to treat both clones.         TREATMENT HISTORY:  A.  Neoadjuvant therapy on I SPY-2 with ganetespib and paclitaxel.  AC x 2 complicated by PCP pneumonia.    B.  Right lumpectomy, followed by right margin resections, followed by right mastectomy.   B.  Oophorectomy 10-16-15.   C.  Tamoxifen after 10-6-15.   D.  Radiation therapy. 5,040 cGy in 180 cGy/fraction to the chest wall and regional lymphatics followed  by 720 cGy axillary lymph node boost and 1000 cGy mastectomy scar boost. Completed on 12/3/2015.  E.   Adjuvant letrozole begun 1-28-16.  Plan was to continue through 2026.  F.  Diagnosis with recurrent/metastatic breast cancer with a sacral mass that was biopsied showing pleiomorphic lobular breast cancer that is ER negative, ND negative, HER2 positive by FISH in 20% of the cells and negative in 80% of cells.  A mix of TNBC and ER- HER2+.  Clinic conference consensus was for the modified Vandana with paclitaxel weekly to treat both clones.   G. 9/30/22 Started Enhertu   H. Was admitted 10/3-10/7, C. Difficile, also had + Galactomanin. Started on voriconazole 10/11/22  I. 10/19 Cycle #2 Enhertu, dose reduced   J. PE-- incidentally found on 11/1, admitted for 48 hours, started on Eliquis          INTERVAL HISTORY:    Today, she states that she is feeling much better.  The shortness of breath she was experiencing for the week prior to her PE diagnosis has resolved.  She continues to take the voriconazole, but has not noticed any side effects from this.  She is not having any bruising or bleeding, no bloody or dark stool.  She did have some transient fevers also surrounding the PE, she has not had any fever since her discharge.  No body aches or chills.  No localizing infection symptoms, including abdominal pain, stool changes, urinary changes, cough, sinus issues.  Her stools have continued to be firm since her resolution of her C. difficile.  Her energy level has been improving, she continues to work.  Eating and drinking regularly.  She does not have any new sites of pain.  No headaches or double or blurry vision.    Nuasea greatly improved with zyprexa, not using very many PRNs     With her second dose of the Enhertu, she felt like the lower dose with the changed antiemetics really helped her nausea, and she has not had any vomiting with this last cycle.    REVIEW OF SYSTEMS:    Patient denies the following  except if noted above: fevers, body aches, chills, headaches, vision changes, dizziness, chest pain, shortness of breath, nausea, vomiting, diarrhea, constipation, abdominal pain, rashes, bruising/bleeding, mouth sores, swelling or pain in the legs.      PHYSICAL EXAMINATION:BP (!) 147/85 (BP Location: Left arm, Patient Position: Sitting, Cuff Size: Adult Regular)   Pulse 101   Temp 98.2  F (36.8  C) (Oral)   Resp 16   Wt 60.5 kg (133 lb 6.4 oz)   LMP 12/18/2014   SpO2 98%   BMI 22.89 kg/m    Wt Readings from Last 4 Encounters:   11/11/22 60.5 kg (133 lb 6.4 oz)   11/01/22 60.8 kg (134 lb)   10/19/22 62.2 kg (137 lb 1.6 oz)   09/30/22 61.7 kg (136 lb 1.6 oz)     Vital signs were reviewed.   Patient alert and oriented x3.    PERRLA. EOMI. No scleral icterus noted. OP without thrush/sores. Nares erythematous. Ear canal clear with non-bulging TM. No signs of infection   Neck exam: No palpable cervical, supraclavicular nodes.   Heart: RRR no murmurs noted.   Lungs: clear to auscultation bilaterally.  No crackles or wheezing.   Abd: positive bowel sounds in all four quadrants.  No tenderness to palpation.  No hepatomegaly.   Extremities: No lower extremity edema.   Neuro: grossly intact.   Mood and affect is stable.         LABORATORY DATA:    Most Recent 3 CBC's:  Recent Labs   Lab Test 11/11/22  1046 11/01/22  2021 10/19/22  1322   WBC 9.9 13.4* 6.7   HGB 12.0 11.3* 10.6*   MCV 86 87 87   * 353 509*    Most Recent 3 BMP's:  Recent Labs   Lab Test 11/11/22  1046 11/01/22  2021 10/19/22  1322    140 143   POTASSIUM 4.7 4.5 4.0   CHLORIDE 106 104 109*   CO2 26 25 26   BUN 17.8 17.4 16.4   CR 0.90 0.94 0.89   ANIONGAP 9 11 8   VEE 9.6 9.3 9.2   * 135* 135*    Most Recent 2 LFT's:  Recent Labs   Lab Test 11/11/22  1046 11/01/22 2021   AST 51* 44*   ALT 49* 42*   ALKPHOS 135* 136*   BILITOTAL 0.2 0.2      I reviewed the above labs today.      IMPRESSION:  1.  Improved hepatic metastases.  2.   Increased pelvic sclerotic lesions which could represent treatment response, or metastasis progression.  3.  No intra-abdominal malignant recurrence.  4.  Left lower lobe nonocclusive pulmonary artery thrombus. This could be acute or chronic.  5.  Dr. Cogan, the on-call oncologist was paged to discuss these results at 4:10 PM.         ASSESSMENT AND PLAN:   Shelby Bennett is a 56-year-old woman with a history of a clinical stage II right breast cancer, ER positive, MA positive, HER2 negative by immunohistochemistry, now with recurrent Metastatic breast cancer, lobular. New histology pleomorphic lobular cancer, which is negative for estrogen receptor and negative for progesterone receptor, and 20% is HER2 positive by FISH.  - She has a mixture of recurrent metastatic HER2 positive breast cancer that is estrogen receptor negative and 80% triple negative breast cancer.    - Discussed this situation in breast cancer conference on April 22 and the consensus opinion is to treat the minority HER2+ component first with Vandana regimen because that treatment would also cover the triple negative (TNBC) component with the taxane part of the treatment. This would push back the use of pembrolizumab to the second line setting or later if the 22C3 staining is positive, but would allow earlier treatment of the HER2 component.     - Tumor markers had been rising significantly. Dr. Meza has concerns that there are 2 clones ( Biopsy of the sacrum in April showed 2 clones 1 of which was 20% of the tumor and was HER2 amplified remainder of the tumor is HER2 1+.) as indicated by pathology-- one of them being triple negative the other being HER2 positive.   - August 29 PET/CT the site of initial metastasis static disease showed no FDG uptake.  There were no suspicious bony lesions. The PET did demonstrate LAD on the left neck and axillae. 09/13/22 axillary lymph node biopsy negative for malignancy. Dr. Meza opted to switch to  Enhertu given rising markers, see previous notes for details       - Labs and exam are appropriate to proceed with cycle #3 Enhertu today. We will continue dose reduction to 4.4mg/kg  - CEA has continued to rise, today is pending   - PET prior to next infusion as scheduled       #CINV, severe-- currently improved   - Continue IV aloxi  - Patient can't get emend, it interacts with voriconazole. 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 Repeat labs were negative and scan showed improvement   - Started on voriconazole, plan for 6-12 week course-- see ID at the end of November    # C. Diff 10/5  - Resolved    #PE  - On eliquis,  Breathing greatly improved   - No bruising bleeding        Patient will call in the interim with any questions or concerns. They voice understanding and agree with the above plan.         Sandra Coughlin PA-C

## 2022-11-11 NOTE — NURSING NOTE
"Oncology Rooming Note    November 11, 2022 11:01 AM   Josefina Bennett is a 56 year old female who presents for:    Chief Complaint   Patient presents with     Port Draw     Labs drawn via port by RN. Vitals taken.     Oncology Clinic Visit     Breast Cancer (R)     Initial Vitals: BP (!) 147/85 (BP Location: Left arm, Patient Position: Sitting, Cuff Size: Adult Regular)   Pulse 101   Temp 98.2  F (36.8  C) (Oral)   Resp 16   Wt 60.5 kg (133 lb 6.4 oz)   LMP 12/18/2014   SpO2 98%   BMI 22.89 kg/m   Estimated body mass index is 22.89 kg/m  as calculated from the following:    Height as of 6/10/22: 1.626 m (5' 4.02\").    Weight as of this encounter: 60.5 kg (133 lb 6.4 oz). Body surface area is 1.65 meters squared.  No Pain (0) Comment: Data Unavailable   Patient's last menstrual period was 12/18/2014.  Allergies reviewed: Yes  Medications reviewed: Yes    Medications: Pt is requesting a refill of Zyprexa.  Pharmacy name entered into [employer]:    [facility] - [place], [place] - [address] [facility]  [facility] - A MAIL ORDER [facility] - [place], [place] - [address] [facility] - [place], [place] - [address]    Clinical concerns: Pt presents today for f/u.       Maida Murphy LPN  11/11/2022              "

## 2022-11-11 NOTE — Clinical Note
11/11/2022         RE: Josefina Bennett  [address]  [facility] [address]        Dear Colleague,    Thank you for referring your patient, Josefina Bennett, to the [facility]. Please see a copy of my visit note below.    Nov 11, 2022      REASON FOR VISIT: Follow-up breast cancer     HISTORY OF PRESENT ILLNESS:  Josefina Bennett was self referred to our clinic for clinical trial recommendations for newly diagnosed stage II breast cancer. She had her last mammogram approximately a year prior. She did notice in early January 2015 that she was having some distortion of the right breast, and she went to see her gynecologist and had a mammogram performed. She had a screening mammogram performed on 01/15/2015. Breast tissue was heterogeneously dense, which might compromise the mammography. There was architectural distortion in the right breast approximately 11-12 o'clock position, zone 2, posterior depth, and a CC MLO spot compression was performed and an ultrasound was planned. The diagnostic mammogram from 01/28/2015 showed right breast architectural distortion centered at the 12 o'clock position, zone 2, suspicious for neoplasm. Breast tomosynthesis was used in the interpretation. Ultrasound findings included targeted ultrasound of the right upper breast demonstrating a band-like area of abnormal echogenicity between 11 and 12 o'clock position in the right breast at zone 2. This measures 4 cm transversely x 1.1 cm AP, and there was only a 1.5 cm measurement in the radial direction. There was blood flow suspicious for neoplasm at the 11 o'clock position in zone 2. There is a 1.4 cm hypoechoic nodule slightly  from the larger area of altered echogenicity that is also suspicious. A biopsy was performed. The biopsy showed infiltrating lobular carcinoma, grade 2, and a clip was placed at specimen [id number]. There were a few signet cells present. The tumor was ER-positive, SC low positive, and HER2  was negative by immunohistochemistry. She subsequently underwent an MRI showing a tumor that was 4.8 x 1.7 x 3.2 cm in size. Overall, her clinical stage was T2 NX MX.       She was enrolled in the I-SPY-2 clinical trial, and qualified with high-risk MammaPrint score. She was randomized to ganetespib and paclitaxel, and she was treated with 12 cycles of treatment, and then started on AC on 05/26/2015. She developed intractable nausea and vomiting after the first cycle, which required hospitalization on the second cycle. She then developed Pneumocystis pneumonia, which resulted in intubation and a 2-week hospitalization during the course of AC. She was discharged on 07/02/2015, and decided she did not want to pursue any further chemotherapy. She had a right lumpectomy and sentinel lymph node procedure 08/12/2015. She had a positive margin, underwent a re-excision, and had a positive margin. Ultimately, she underwent a right mastectomy with clear margins. She began radiation treatment with Dr. Bill Chauhan, and completed radiation therapy on 12/04/2015. Pathologic stage was III-A, ypT3 N1a MX. Of concern, she had what could be considered an RCB3 tumor.  She started adjuvant letrozole on 1/28/16.     4/6/2022 Josefina Verma developed a fever during Reclast.  She was feeling unwell and went to the ED.  CT CAP showed new involvement of abdominal lymph nodes and multiple bone metastases.  She was admitted and I saw her in the hospital 4-7-22 to discuss the plan for biopsy.  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  A mix of TNBC and ER- HER2+.  Clinic conference consensus was for the modified Vandana with paclitaxel weekly to treat both clones.         TREATMENT HISTORY:  A.  Neoadjuvant therapy on I SPY-2 with ganetespib and paclitaxel.  AC x 2 complicated by PCP pneumonia.    B.  Right  lumpectomy, followed by right margin resections, followed by right mastectomy.   B.  Oophorectomy 10-16-15.   C.  Tamoxifen after 10-6-15.   D.  Radiation therapy. 5,040 cGy in 180 cGy/fraction to the chest wall and regional lymphatics followed by 720 cGy axillary lymph node boost and 1000 cGy mastectomy scar boost. Completed on 12/3/2015.  E.   Adjuvant letrozole begun 1-28-16.  Plan was to continue through 2026.  F.  Diagnosis with recurrent/metastatic breast cancer with a sacral mass that was biopsied showing pleiomorphic lobular breast cancer that is ER negative, RI negative, HER2 positive by FISH in 20% of the cells and negative in 80% of cells.  A mix of TNBC and ER- HER2+.  Clinic conference consensus was for the modified Vandana with paclitaxel weekly to treat both clones.   G. 9/30/22 Started Enhertu   H. Was admitted 10/3-10/7, C. Difficile, also had + Galactomanin. Started on voriconazole 10/11/22  I. 10/19 Cycle #2 Enhertu, dose reduced   J. PE-- incidentally found on 11/1, admitted for 48 hours, started on Eliquis          INTERVAL HISTORY:    Today, she states that she is feeling much better.  The shortness of breath she was experiencing for the week prior to her PE diagnosis has resolved.  She continues to take the voriconazole, but has not noticed any side effects from this.  She is not having any bruising or bleeding, no bloody or dark stool.  She did have some transient fevers also surrounding the PE, she has not had any fever since her discharge.  No body aches or chills.  No localizing infection symptoms, including abdominal pain, stool changes, urinary changes, cough, sinus issues.  Her stools have continued to be firm since her resolution of her C. difficile.  Her energy level has been improving, she continues to work.  Eating and drinking regularly.  She does not have any new sites of pain.  No headaches or double or blurry vision.    Nuasea greatly improved with zyprexa, not using very many  PRNs     With her second dose of the Enhertu, she felt like the lower dose with the changed antiemetics really helped her nausea, and she has not had any vomiting with this last cycle.    REVIEW OF SYSTEMS:    Patient denies the following except if noted above: fevers, body aches, chills, headaches, vision changes, dizziness, chest pain, shortness of breath, nausea, vomiting, diarrhea, constipation, abdominal pain, rashes, bruising/bleeding, mouth sores, swelling or pain in the legs.      PHYSICAL EXAMINATION:BP (!) 147/85 (BP Location: Left arm, Patient Position: Sitting, Cuff Size: Adult Regular)   Pulse 101   Temp 98.2  F (36.8  C) (Oral)   Resp 16   Wt 60.5 kg (133 lb 6.4 oz)   LMP 12/18/2014   SpO2 98%   BMI 22.89 kg/m    Wt Readings from Last 4 Encounters:   11/11/22 60.5 kg (133 lb 6.4 oz)   11/01/22 60.8 kg (134 lb)   10/19/22 62.2 kg (137 lb 1.6 oz)   09/30/22 61.7 kg (136 lb 1.6 oz)     Vital signs were reviewed.   Patient alert and oriented x3.    PERRLA. EOMI. No scleral icterus noted. OP without thrush/sores. Nares erythematous. Ear canal clear with non-bulging TM. No signs of infection   Neck exam: No palpable cervical, supraclavicular nodes.   Heart: RRR no murmurs noted.   Lungs: clear to auscultation bilaterally.  No crackles or wheezing.   Abd: positive bowel sounds in all four quadrants.  No tenderness to palpation.  No hepatomegaly.   Extremities: No lower extremity edema.   Neuro: grossly intact.   Mood and affect is stable.         LABORATORY DATA:    Most Recent 3 CBC's:  Recent Labs   Lab Test 11/11/22  1046 11/01/22  2021 10/19/22  1322   WBC 9.9 13.4* 6.7   HGB 12.0 11.3* 10.6*   MCV 86 87 87   * 353 509*    Most Recent 3 BMP's:  Recent Labs   Lab Test 11/11/22  1046 11/01/22  2021 10/19/22  1322    140 143   POTASSIUM 4.7 4.5 4.0   CHLORIDE 106 104 109*   CO2 26 25 26   BUN 17.8 17.4 16.4   CR 0.90 0.94 0.89   ANIONGAP 9 11 8   VEE 9.6 9.3 9.2   * 135* 135*     Most Recent 2 LFT's:  Recent Labs   Lab Test 11/11/22  1046 11/01/22 2021   AST 51* 44*   ALT 49* 42*   ALKPHOS 135* 136*   BILITOTAL 0.2 0.2      I reviewed the above labs today.      IMPRESSION:  1.  Improved hepatic metastases.  2.  Increased pelvic sclerotic lesions which could represent treatment response, or metastasis progression.  3.  No intra-abdominal malignant recurrence.  4.  Left lower lobe nonocclusive pulmonary artery thrombus. This could be acute or chronic.  5.  Dr. Cogan, the on-call oncologist was paged to discuss these results at 4:10 PM.         ASSESSMENT AND PLAN:   Shelby Bennett is a 56-year-old woman with a history of a clinical stage II right breast cancer, ER positive, MD positive, HER2 negative by immunohistochemistry, now with recurrent Metastatic breast cancer, lobular. New histology pleomorphic lobular cancer, which is negative for estrogen receptor and negative for progesterone receptor, and 20% is HER2 positive by FISH.  - She has a mixture of recurrent metastatic HER2 positive breast cancer that is estrogen receptor negative and 80% triple negative breast cancer.    - Discussed this situation in breast cancer conference on April 22 and the consensus opinion is to treat the minority HER2+ component first with Vandana regimen because that treatment would also cover the triple negative (TNBC) component with the taxane part of the treatment. This would push back the use of pembrolizumab to the second line setting or later if the 22C3 staining is positive, but would allow earlier treatment of the HER2 component.     - Tumor markers had been rising significantly. Dr. Meza has concerns that there are 2 clones ( Biopsy of the sacrum in April showed 2 clones 1 of which was 20% of the tumor and was HER2 amplified remainder of the tumor is HER2 1+.) as indicated by pathology-- one of them being triple negative the other being HER2 positive.   - August 29 PET/CT the site of initial  metastasis static disease showed no FDG uptake.  There were no suspicious bony lesions. The PET did demonstrate LAD on the left neck and axillae. 09/13/22 axillary lymph node biopsy negative for malignancy. Dr. Meza opted to switch to Enhertu given rising markers, see previous notes for details       - Labs and exam are appropriate to proceed with cycle #3 Enhertu today. We will continue dose reduction to 4.4mg/kg  - CEA has continued to rise, today is pending   - PET prior to next infusion as scheduled       #CINV, severe-- currently improved   - Continue IV aloxi  - Patient can't get emend, it interacts with voriconazole. 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 Repeat labs were negative and scan showed improvement   - Started on voriconazole, plan for 6-12 week course-- see ID at the end of November    # C. Diff 10/5  - Resolved    #PE  - On eliquis,  Breathing greatly improved   - No bruising bleeding        Patient will call in the interim with any questions or concerns. They voice understanding and agree with the above plan.         Sandra Coughlin PA-C        Again, thank you for allowing me to participate in the care of your patient.        Sincerely,        Sandra Coughlin PA-C

## 2022-11-11 NOTE — PROGRESS NOTES
Infusion Nursing Note:  Josefina Bennett presents today for cycle 3 day 1 enhertu.    Patient seen by provider today: Yes: Sandra Coughlin PA-C   present during visit today: Not Applicable.    Note:   Patient has no questions or concerns after her appointment with Sandra Coughlin.    Intravenous Access:  Implanted Port.    Treatment Conditions:  Lab Results   Component Value Date    HGB 12.0 11/11/2022    WBC 9.9 11/11/2022    ANEU 7.3 10/03/2022    ANEUTAUTO 6.2 11/11/2022     (H) 11/11/2022      Lab Results   Component Value Date     11/11/2022    POTASSIUM 4.7 11/11/2022    MAG 1.9 10/05/2022    CR 0.90 11/11/2022    VEE 9.6 11/11/2022    BILITOTAL 0.2 11/11/2022    ALBUMIN 4.0 11/11/2022    ALT 49 (H) 11/11/2022    AST 51 (H) 11/11/2022     Results reviewed, labs MET treatment parameters, ok to proceed with treatment.  ECHO/MUGA completed 11/2/22  EF 55-60%.    Post Infusion Assessment:  Patient tolerated infusion without incident.  Blood return noted pre and post infusion.  Site patent and intact, free from redness, edema or discomfort.  No evidence of extravasations.  Access discontinued per protocol.     Discharge Plan:   Patient declined prescription refills.  Discharge instructions reviewed with: Patient.  Patient and/or family verbalized understanding of discharge instructions and all questions answered.  AVS to patient via [employer].  Patient will return 12/2 for next appointment.   Patient discharged in stable condition accompanied by: self.  Departure Mode: Ambulatory.      Yadira Jay RN

## 2022-11-15 NOTE — TELEPHONE ENCOUNTER
My chart message sent to Mary to let her know per Sandra Coughlin: I refilled her voriconazole, but can you let her know I only gave her enough to get to the ID appointment on 11/30? They can decide from there what to do moving forward.

## 2022-11-15 NOTE — TELEPHONE ENCOUNTER
Voriconazole Refill   Last prescribing provider: Sandra Coughlin     Last clinic visit date: 11/11/22 Sandra Coughlin     Recommendations for requested medication (if none, N/A): Copied from chart note 11/11/22 Sandra Coughlin   Following with ID. Repeat labs were negative and scan showed improvement   - Started on voriconazole, plan for 6-12 week course-- see ID at the end of November      Any other pertinent information (if none, N/A): N/A    Refilled: Y/N, if NO, why?

## 2022-11-29 NOTE — PROGRESS NOTES
[facility]  Division of Infectious Diseases and International Medicine  Department of Medicine    GENERAL INFECTIOUS DISEASE OUTPATIENT VISIT NOTE     Patient:  Josefina Bennett, Date of birth 1965, Medical record number [id number]  Date of Visit:  November 30, 2022  Referred by: Camille Araujo   Reason for Consult: follow-up of pulmonary aspergillosis    Time start: 1:18 (note: patient's microphone was not working during visit, attempted to trouble shoot, but converted to phone visit)  Time end: 1:46           Assessment and Plan   #Pulmonary aspergillosis, diagnosed via +serum Asp galactomannan and imaging showing nodular pulmonary opacities  The patient presents 6 weeks into a course of pulmonary aspergillosis, presenting with cough and dyspnea in late September 2022. Her major risk factor was recent chemotherapy for her metastatic breast cancer; chemotherapy (modified Vandana with weekly paclitaxel) ended in September per her report and she is now on trastuzumab immunotherapy only.     At this point, the patient presents 3 months out from intense chemotherapy and probably only mildly immunosuppressed related to her trastuzumab. Her respiratory symptoms are resolved. Her imaging suggests improvement in her nodular opacities which are now only ground-glass opacities. Her galactomannan was negative last month. Given all evidence points to impovement/resolution of her infection, would favor stopping voriconazole and monitoring clinically and radiographically (she will have serial CTs for her cancer therapy).    She does not need to follow-up with us necessarily, but advised patient that she can reach out to us directly if she develops new symptoms such as dyspnea, fevers, cough, coughing up blood. Her oncology team can also reach out if there are other concerns/worsening pulmonary imaging findings. At that time, we can consider restarting voriconazole and treating for a longer  period of time.    PLAN:  - stop voriconazole; completed 6 weeks of therapy  - RTC PRN, if she develops new dyspnea/fevers/cough/hemoptysis or worsening pulmonary nodules, repeat galactomannan at that time and can consider longer treatment duration (12 weeks instead of 6 weeks)    Gumaro Mckeon MD PharmD  Adult Infectious Disease Fellow PGY4  Pager: [phone]       History of Present Illness:     Mary is a 56 year old with history of metastatic beast cancer (s/p R mastectomy, hysterectomy+oopherectomy and then came back in her back and then started on chemo/radiation (done 9/30) and still on trastuzumab deruxtecan, and depression who was admitted 10/4 for the management of cough and generalized weakness. She states she had been having cough (minimally productive) for the 2 weeks preceding her admission (though perhaps had some intermittent cough as early as the first week of September). She had been prescribed 5 days of Augmentin and 4 days of levofloxacin for suspected sinusitis and pneumonia, respectively, prior to her admission. Augmentin for possible sinus infection did not really help, but levofloxacin did help with dyspnea and cough. Antibiotics were initially continued on admission but stopped shortly after due to quick improvement in respiratory symptoms and minimal new chest imaging findings (CXR with subtle, patchy nodular opacities in the RUL and RML, similar to CT from 9/30/22). She was also found to have C difficile and took PO vancomycin for this for 10 days. She has never had C diff before. After discharge, her Galactomannan returned positive, prompting curbside with ID on-call physician, and she was started on voriconazole. Most recent QTc was 425 on 10/3/22. 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    She was seen in follow-up on 10/12 in ID clinic for possible pulm aspergillosis (diagnosed based on nodular chest  "opacities, positive serum galactomannan). Planned for 6-12 weeks.     Ordered for repeat galactomannan and voriconazole levels 10/26: 0.1 (negative) and 4.1 respectively (200 mg po bid)    She was re-admitted in early November after repeat CT chest/abdo/pelvis for cancer screening showed new pulmonary embolism, though she had no symptoms. She was planned to continue voriconazole to follow-up at this visit (after approx 6 weeks of therapy). Blood cultures were negative during this admission.    She feels \"fantastic.\" Normal BMs, no diarrhea. No shortness of breath, cough is gone. No fevers/chills, night sweats, no abdominal pain, nausea, vomiting. She has noticed a little bit of brain fog intermittently, some mild forgetfulness. No changes in vision or changes in color. Notices a significant amount of malaise and having to miss work related to her immunotherapy, but feels well after this brief period.          Current Antimicrobials:     Voriconazole        Microbiologic Data:     10/5 aspergillus galactomannan: 1.72 (positive)  10/19 \": 0.1 (negative)    Vori level 4.1 on 10/19    Repeat CT imaging 11/1: RUL and RLL have evolved into GGO, suggesting improving inflammatory process. Increased pelvic sclerotic lesions, left lower lobe non-occlusive PE    PET 11/21  1. New hypermetabolic lesions in the liver, worrisome for disease  progression. Consider further evaluation with MRI with contrast.  2. 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  3. Multifocal groundglass opacities in the right lung are suspicious  for infection/inflammation, including viral etiologies.   4. Periapical dental disease.       Review of Systems:     As per HPI       Other Medical History:     Attempt was made to collect past, family and social history during this encounter,  this information was reviewed with the patient and updated    Morphine    Past Medical History  Past Medical History:   Diagnosis Date     " Breast cancer (H) 01/01/2015     depression      Foot fracture      History of blood transfusion 06/01/2015     Insomnia      Malignant neoplasm of breast (female), unspecified site 02/05/2015     Papanicolaou smear of cervix with low grade squamous intraepithelial lesion (LGSIL) 06/01/2010    8/10-colp-benign     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) 06/01/2015     PONV (postoperative nausea and vomiting)      PTSD (post-traumatic stress disorder)     PastSurgical History  Past Surgical History:   Procedure Laterality Date     APPENDECTOMY  1997     CYSTOSCOPY N/A 10/16/2015    Procedure: CYSTOSCOPY;  Surgeon: Aleksandr Palafox MD;  Location: [facility] OR     HC REMOVAL OF OVARIAN CYST(S)      x 3     INSERT PORT VASCULAR ACCESS Left 5/4/2022    Procedure: INSERTION, VASCULAR ACCESS PORT;  Surgeon: Darren Yates MD;  Location: [facility] OR     IR CHEST PORT PLACEMENT > 5 YRS OF AGE  5/4/2022     IR FLUORO 0-1 HOUR  4/12/2022     LAPAROSCOPIC HYSTERECTOMY TOTAL, BILATERAL SALPINGO-OOPHORECTOMY, COMBINED N/A 10/16/2015    Procedure: COMBINED LAPAROSCOPIC HYSTERECTOMY TOTAL, SALPINGO-OOPHORECTOMY;  Surgeon: Aleksandr Palafox MD;  Location: [facility] OR     LUMPECTOMY BREAST Right 9/2/2015    Procedure: LUMPECTOMY BREAST;  Surgeon: Philip Franco MD;  Location: [facility] OR     LUMPECTOMY BREAST WITH SENTINEL NODE, COMBINED Right 8/12/2015    Procedure: COMBINED LUMPECTOMY BREAST WITH SENTINEL NODE;  Surgeon: Philip Franco MD;  Location: [facility] OR     MASTECTOMY SIMPLE Right 9/21/2015    Procedure: MASTECTOMY SIMPLE;  Surgeon: Philip Franco MD;  Location: [facility] OR     REMOVE PORT VASCULAR ACCESS N/A 8/12/2015    Procedure: REMOVE PORT VASCULAR ACCESS;  Surgeon: Philip Franco MD;  Location: [facility] OR     SURGICAL HISTORY OF -       wisdom teeth     TONSILLECTOMY      at age 30 for tonsillitis      Family History  Family History   Problem Relation Age of Onset     Breast Cancer Other         paternal cousin     Diabetes Mother          Borderline diabetic     Alcohol/Drug Mother         alcoholic     Depression Mother      Alcohol/Drug Father         alcoholic     Depression Father      Diabetes Maternal Grandfather      Depression Maternal Grandfather      Depression Paternal Grandfather      Breast Cancer Paternal Aunt         dad's 1/2 sister     Respiratory Sister         Cystic Fibrosis,  age 39     Depression Sister      Cancer Other         maternal grandmothers sisters daughter.    Social History   reports that she quit smoking about 7 years ago. Her smoking use included cigarettes. She smoked an average of .5 packs per day. She has never used smokeless tobacco. She reports that she does not drink alcohol and does not use drugs.  She works at [address] [place] [address] [employer] as a [employer].    Lives near [place], lives alone with 2 dogs and 1 cat (23 year olds - [place] [employer])   Notable Exposures  None Vaccination History:  Immunization History   Administered Date(s) Administered     COVID-19 Vaccine 12+ (Pfizer) 10/31/2021     DTaP, Unspecified 2006, 2014     FLU 6-35 months 2015     Mantoux Tuberculin Skin Test 2015     TDAP Vaccine (Adacel) 2013     Tdap (Adacel,Boostrix) 2014, 2021     Tdap (Adult) Unspecified Formulation 1996             Physical Exam:     VITAL SIGNS:  Unable to take VS due to video/phone visit     GENERAL APPEARANCE: Not in acute distress, well appearing          Signature:     Gumaro Mckeon MD   PGY4 Infectious Disease Fellow    Discussed plan with ID staff

## 2022-11-30 NOTE — PROGRESS NOTES
Mary is a 56 year old who is being evaluated via a billable video visit.      How would you like to obtain your AVS? MyChart  If the video visit is dropped, the invitation should be resent by: Text to cell phone: [phone]  Will anyone else be joining your video visit? No

## 2022-11-30 NOTE — LETTER
11/30/2022       RE: Josefina Bennett  [address]  [facility] [address]     Dear Colleague,    Thank you for referring your patient, Josefina Bennett, to the [facility] [place] at [facility]. Please see a copy of my visit note below.    [facility]  Division of Infectious Diseases and International Medicine  Department of Medicine    GENERAL INFECTIOUS DISEASE OUTPATIENT VISIT NOTE     Patient:  Josefina Bennett, Date of birth 1965, Medical record number [id number]  Date of Visit:  November 30, 2022  Referred by: Camille Araujo   Reason for Consult: follow-up of pulmonary aspergillosis    Time start: 1:18 (note: patient's microphone was not working during visit, attempted to trouble shoot, but converted to phone visit)  Time end: 1:46           Assessment and Plan   #Pulmonary aspergillosis, diagnosed via +serum Asp galactomannan and imaging showing nodular pulmonary opacities  The patient presents 6 weeks into a course of pulmonary aspergillosis, presenting with cough and dyspnea in late September 2022. Her major risk factor was recent chemotherapy for her metastatic breast cancer; chemotherapy (modified Vandana with weekly paclitaxel) ended in September per her report and she is now on trastuzumab immunotherapy only.     At this point, the patient presents 3 months out from intense chemotherapy and probably only mildly immunosuppressed related to her trastuzumab. Her respiratory symptoms are resolved. Her imaging suggests improvement in her nodular opacities which are now only ground-glass opacities. Her galactomannan was negative last month. Given all evidence points to impovement/resolution of her infection, would favor stopping voriconazole and monitoring clinically and radiographically (she will have serial CTs for her cancer therapy).    She does not need to follow-up with us necessarily, but advised  patient that she can reach out to us directly if she develops new symptoms such as dyspnea, fevers, cough, coughing up blood. Her oncology team can also reach out if there are other concerns/worsening pulmonary imaging findings. At that time, we can consider restarting voriconazole and treating for a longer period of time.    PLAN:  - stop voriconazole; completed 6 weeks of therapy  - RTC PRN, if she develops new dyspnea/fevers/cough/hemoptysis or worsening pulmonary nodules, repeat galactomannan at that time and can consider longer treatment duration (12 weeks instead of 6 weeks)    Gumaro Mckeon MD PharmD  Adult Infectious Disease Fellow PGY4  Pager: [phone]       History of Present Illness:     Mary is a 56 year old with history of metastatic beast cancer (s/p R mastectomy, hysterectomy+oopherectomy and then came back in her back and then started on chemo/radiation (done 9/30) and still on trastuzumab deruxtecan, and depression who was admitted 10/4 for the management of cough and generalized weakness. She states she had been having cough (minimally productive) for the 2 weeks preceding her admission (though perhaps had some intermittent cough as early as the first week of September). She had been prescribed 5 days of Augmentin and 4 days of levofloxacin for suspected sinusitis and pneumonia, respectively, prior to her admission. Augmentin for possible sinus infection did not really help, but levofloxacin did help with dyspnea and cough. Antibiotics were initially continued on admission but stopped shortly after due to quick improvement in respiratory symptoms and minimal new chest imaging findings (CXR with subtle, patchy nodular opacities in the RUL and RML, similar to CT from 9/30/22). She was also found to have C difficile and took PO vancomycin for this for 10 days. She has never had C diff before. After discharge, her Galactomannan returned positive, prompting curbside with ID on-call  "physician, and she was started on voriconazole. Most recent QTc was 425 on 10/3/22. 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    She was seen in follow-up on 10/12 in ID clinic for possible pulm aspergillosis (diagnosed based on nodular chest opacities, positive serum galactomannan). Planned for 6-12 weeks.     Ordered for repeat galactomannan and voriconazole levels 10/26: 0.1 (negative) and 4.1 respectively (200 mg po bid)    She was re-admitted in early November after repeat CT chest/abdo/pelvis for cancer screening showed new pulmonary embolism, though she had no symptoms. She was planned to continue voriconazole to follow-up at this visit (after approx 6 weeks of therapy). Blood cultures were negative during this admission.    She feels \"fantastic.\" Normal BMs, no diarrhea. No shortness of breath, cough is gone. No fevers/chills, night sweats, no abdominal pain, nausea, vomiting. She has noticed a little bit of brain fog intermittently, some mild forgetfulness. No changes in vision or changes in color. Notices a significant amount of malaise and having to miss work related to her immunotherapy, but feels well after this brief period.          Current Antimicrobials:     Voriconazole        Microbiologic Data:     10/5 aspergillus galactomannan: 1.72 (positive)  10/19 \": 0.1 (negative)    Vori level 4.1 on 10/19    Repeat CT imaging 11/1: RUL and RLL have evolved into GGO, suggesting improving inflammatory process. Increased pelvic sclerotic lesions, left lower lobe non-occlusive PE    PET 11/21  1. New hypermetabolic lesions in the liver, worrisome for disease  progression. Consider further evaluation with MRI with contrast.  2. 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  3. Multifocal groundglass opacities in the right lung are suspicious  for infection/inflammation, including viral etiologies.   4. Periapical " dental disease.       Review of Systems:     As per HPI       Other Medical History:     Attempt was made to collect past, family and social history during this encounter,  this information was reviewed with the patient and updated    Morphine    Past Medical History  Past Medical History:   Diagnosis Date     Breast cancer (H) 01/01/2015     depression      Foot fracture      History of blood transfusion 06/01/2015     Insomnia      Malignant neoplasm of breast (female), unspecified site 02/05/2015     Papanicolaou smear of cervix with low grade squamous intraepithelial lesion (LGSIL) 06/01/2010    8/10-colp-benign     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) 06/01/2015     PONV (postoperative nausea and vomiting)      PTSD (post-traumatic stress disorder)     PastSurgical History  Past Surgical History:   Procedure Laterality Date     APPENDECTOMY  1997     CYSTOSCOPY N/A 10/16/2015    Procedure: CYSTOSCOPY;  Surgeon: Aleksandr Palafox MD;  Location: [facility] OR     HC REMOVAL OF OVARIAN CYST(S)      x 3     INSERT PORT VASCULAR ACCESS Left 5/4/2022    Procedure: INSERTION, VASCULAR ACCESS PORT;  Surgeon: Darren Yates MD;  Location: [facility] OR     IR CHEST PORT PLACEMENT > 5 YRS OF AGE  5/4/2022     IR FLUORO 0-1 HOUR  4/12/2022     LAPAROSCOPIC HYSTERECTOMY TOTAL, BILATERAL SALPINGO-OOPHORECTOMY, COMBINED N/A 10/16/2015    Procedure: COMBINED LAPAROSCOPIC HYSTERECTOMY TOTAL, SALPINGO-OOPHORECTOMY;  Surgeon: Aleksandr Palafox MD;  Location: [facility] OR     LUMPECTOMY BREAST Right 9/2/2015    Procedure: LUMPECTOMY BREAST;  Surgeon: Philip Franco MD;  Location: [facility] OR     LUMPECTOMY BREAST WITH SENTINEL NODE, COMBINED Right 8/12/2015    Procedure: COMBINED LUMPECTOMY BREAST WITH SENTINEL NODE;  Surgeon: Philip Franco MD;  Location: [facility] OR     MASTECTOMY SIMPLE Right 9/21/2015    Procedure: MASTECTOMY SIMPLE;  Surgeon: Philip Franco MD;  Location: [facility] OR     REMOVE PORT VASCULAR ACCESS N/A 8/12/2015     Procedure: REMOVE PORT VASCULAR ACCESS;  Surgeon: Philip Franco MD;  Location: [facility] OR     SURGICAL HISTORY OF -       wisdom teeth     TONSILLECTOMY      at age 30 for tonsillitis      Family History  Family History   Problem Relation Age of Onset     Breast Cancer Other         paternal cousin     Diabetes Mother         Borderline diabetic     Alcohol/Drug Mother         alcoholic     Depression Mother      Alcohol/Drug Father         alcoholic     Depression Father      Diabetes Maternal Grandfather      Depression Maternal Grandfather      Depression Paternal Grandfather      Breast Cancer Paternal Aunt         dad's 1/2 sister     Respiratory Sister         Cystic Fibrosis,  age 39     Depression Sister      Cancer Other         maternal grandmothers sisters daughter.    Social History   reports that she quit smoking about 7 years ago. Her smoking use included cigarettes. She smoked an average of .5 packs per day. She has never used smokeless tobacco. She reports that she does not drink alcohol and does not use drugs.  She works at [address] [place] [address] [employer] as a [employer].    Lives near [place], lives alone with 2 dogs and 1 cat (23 year olds - [place])   Notable Exposures  None Vaccination History:  Immunization History   Administered Date(s) Administered     COVID-19 Vaccine 12+ (Pfizer) 10/31/2021     DTaP, Unspecified 2006, 2014     FLU 6-35 months 2015     Mantoux Tuberculin Skin Test 2015     TDAP Vaccine (Adacel) 2013     Tdap (Adacel,Boostrix) 2014, 2021     Tdap (Adult) Unspecified Formulation 1996             Physical Exam:     VITAL SIGNS:  Unable to take VS due to video/phone visit     GENERAL APPEARANCE: Not in acute distress, well appearing          Signature:     Gumaro Mckeon MD   PGY4 Infectious Disease Fellow    Discussed plan with ID staff    Attestation signed by Sergo Couch MD at 2022  1:53 PM:  Attending  Physician Attestation    I saw Josefina Bennett on 11/30/22 with the fellow Dr. Mckeon. I have evaluated the patient and reviewed the history, vital signs, medications, labs and imaging. Assessment and plan were jointly made. I agree with and have edited the note and plan of care as needed.    Sergo Couch MD  Division of Infectious Diseases and International Medicine  P: [phone]      Mary is a 56 year old who is being evaluated via a billable video visit.      How would you like to obtain your AVS? MyChart  If the video visit is dropped, the invitation should be resent by: Text to cell phone: [phone]  Will anyone else be joining your video visit? No

## 2022-12-02 NOTE — PROGRESS NOTES
Infusion Nursing Note:  Josefina Bennett presents today for IVFs.    Patient seen by provider today: Yes: Sandra Coughlin PA-C    Note: Patient presents to the infusion center today after her provider appt.    Intravenous Access:  Implanted Port.    Treatment Conditions:   Latest Reference Range & Units 12/02/22 13:29   Sodium 136 - 145 mmol/L 145   Potassium 3.4 - 5.3 mmol/L 3.8   Chloride 98 - 107 mmol/L 106   Carbon Dioxide (CO2) 22 - 29 mmol/L 28   Urea Nitrogen 6.0 - 20.0 mg/dL 18.5   Creatinine 0.51 - 0.95 mg/dL 1.26 (H)   GFR Estimate >60 mL/min/1.73m2 50 (L)   Calcium 8.6 - 10.0 mg/dL 9.3   Anion Gap 7 - 15 mmol/L 11   Albumin 3.5 - 5.2 g/dL 3.7   Protein Total 6.4 - 8.3 g/dL 6.2 (L)   Alkaline Phosphatase 35 - 104 U/L 130 (H)   ALT 10 - 35 U/L 51 (H)   AST 10 - 35 U/L 47 (H)   Bilirubin Total <=1.2 mg/dL 0.2   Glucose 70 - 99 mg/dL 174 (H)   WBC 4.0 - 11.0 10e3/uL 8.5   Hemoglobin 11.7 - 15.7 g/dL 11.3 (L)   Hematocrit 35.0 - 47.0 % 36.9   Platelet Count 150 - 450 10e3/uL 386   RBC Count 3.80 - 5.20 10e6/uL 4.14   MCV 78 - 100 fL 89   MCH 26.5 - 33.0 pg 27.3   MCHC 31.5 - 36.5 g/dL 30.6 (L)   RDW 10.0 - 15.0 % 20.8 (H)   % Neutrophils % 67   % Lymphocytes % 22   % Monocytes % 9   % Eosinophils % 1   % Basophils % 1   Absolute Basophils 0.0 - 0.2 10e3/uL 0.1   Absolute Eosinophils 0.0 - 0.7 10e3/uL 0.1   Absolute Immature Granulocytes <=0.4 10e3/uL 0.0   Absolute Lymphocytes 0.8 - 5.3 10e3/uL 1.8   Absolute Monocytes 0.0 - 1.3 10e3/uL 0.7   % Immature Granulocytes % 0   Absolute Neutrophils 1.6 - 8.3 10e3/uL 5.7   Absolute NRBCs 10e3/uL 0.0   NRBCs per 100 WBC <1 /100 0     Results reviewed.    Post Infusion Assessment:  Patient tolerated infusion without incident.  Blood return noted pre and post infusion.  No evidence of extravasations.  Access discontinued per protocol.     Discharge Plan:   Patient declined prescription refills.  Discharge instructions reviewed with: Patient.  Patient and/or family verbalized  understanding of discharge instructions and all questions answered.  AVS to patient via [employer].  Patient will return 12/20 for next appointment.   Patient discharged in stable condition accompanied by: self.  Departure Mode: Ambulatory.      Ally Ledesma RN

## 2022-12-02 NOTE — PATIENT INSTRUCTIONS
[facility] Triage and after hours / weekends / holidays:  [phone]    Please call the triage or after hours line if you experience a temperature greater than or equal to 100.4, shaking chills, have uncontrolled nausea, vomiting and/or diarrhea, dizziness, shortness of breath, chest pain, bleeding, unexplained bruising, or if you have any other new/concerning symptoms, questions or concerns.      If you are having any concerning symptoms or wish to speak to a provider before your next infusion visit, please call your care coordinator or triage to notify them so we can adequately serve you.     If you need a refill on a narcotic prescription or other medication, please call before your infusion appointment.                December 2022 Sunday Monday Tuesday Wednesday Thursday Friday Saturday                       1     2    LAB CENTRAL   1:00 PM   (15 min.)   [facility] LAB DRAW   [facility]    RETURN   1:15 PM   (45 min.)   Sandra Coughlin PA-C   [facility]    ONC INFUSION 2 HR (120 MIN)   2:30 PM   (120 min.)    ONC INFUSION NURSE   [facility] 3       4     5     6     7     8     9     10       11     12     13     14     15     16     17       18     19     20    LAB CENTRAL  11:00 AM   (15 min.)   [facility] LAB DRAW   [facility]    RETURN  11:15 AM   (30 min.)   Evangelista Meza MD   [facility]    ONC INFUSION 2 HR (120 MIN)   3:00 PM   (120 min.)    ONC INFUSION NURSE   [facility] 21     22  Happy [birth year]!     23    MR LIVER WO & W CONTRAST  11:30 AM   (45 min.)   [id number]   [facility] 24       25     26     27     28     29     30     31                   January 2023 Sunday Monday Tuesday Wednesday Thursday Friday Saturday   1     2     3     4     5     6     7       8     9     10      11     12     13     14       15     16     17     18     19     20     21       22     23     24     25     26     27     28       29     30     31                                            Recent Results (from the past 24 hour(s))   COMPREHENSIVE METABOLIC PANEL    Collection Time: 12/02/22  1:29 PM   Result Value Ref Range    Sodium 145 136 - 145 mmol/L    Potassium 3.8 3.4 - 5.3 mmol/L    Chloride 106 98 - 107 mmol/L    Carbon Dioxide (CO2) 28 22 - 29 mmol/L    Anion Gap 11 7 - 15 mmol/L    Urea Nitrogen 18.5 6.0 - 20.0 mg/dL    Creatinine 1.26 (H) 0.51 - 0.95 mg/dL    Calcium 9.3 8.6 - 10.0 mg/dL    Glucose 174 (H) 70 - 99 mg/dL    Alkaline Phosphatase 130 (H) 35 - 104 U/L    AST 47 (H) 10 - 35 U/L    ALT 51 (H) 10 - 35 U/L    Protein Total 6.2 (L) 6.4 - 8.3 g/dL    Albumin 3.7 3.5 - 5.2 g/dL    Bilirubin Total 0.2 <=1.2 mg/dL    GFR Estimate 50 (L) >60 mL/min/1.73m2   CBC with platelets and differential    Collection Time: 12/02/22  1:29 PM   Result Value Ref Range    WBC Count 8.5 4.0 - 11.0 10e3/uL    RBC Count 4.14 3.80 - 5.20 10e6/uL    Hemoglobin 11.3 (L) 11.7 - 15.7 g/dL    Hematocrit 36.9 35.0 - 47.0 %    MCV 89 78 - 100 fL    MCH 27.3 26.5 - 33.0 pg    MCHC 30.6 (L) 31.5 - 36.5 g/dL    RDW 20.8 (H) 10.0 - 15.0 %    Platelet Count 386 150 - 450 10e3/uL    % Neutrophils 67 %    % Lymphocytes 22 %    % Monocytes 9 %    % Eosinophils 1 %    % Basophils 1 %    % Immature Granulocytes 0 %    NRBCs per 100 WBC 0 <1 /100    Absolute Neutrophils 5.7 1.6 - 8.3 10e3/uL    Absolute Lymphocytes 1.8 0.8 - 5.3 10e3/uL    Absolute Monocytes 0.7 0.0 - 1.3 10e3/uL    Absolute Eosinophils 0.1 0.0 - 0.7 10e3/uL    Absolute Basophils 0.1 0.0 - 0.2 10e3/uL    Absolute Immature Granulocytes 0.0 <=0.4 10e3/uL    Absolute NRBCs 0.0 10e3/uL

## 2022-12-02 NOTE — NURSING NOTE
"Chief Complaint   Patient presents with     Port Draw     Labs drawn via  by RN. Port accessed. Vitals taken.     Port accessed with 20G 3/4\" flat needle by RN, scant blood return & unable to collect labs, line flushed with saline and heparin. Labs collected from venipuncture by RN.  Vitals taken. Pt checked in for appointment(s).      Valeri Montague RN    "

## 2022-12-02 NOTE — LETTER
12/2/2022         RE: Josefina Bennett  [address]  [facility] [address]        Dear Colleague,    Thank you for referring your patient, Josefina Bennett, to the [facility]. Please see a copy of my visit note below.    Dec 2, 2022      REASON FOR VISIT: Follow-up breast cancer     HISTORY OF PRESENT ILLNESS:  Josefina Bennett was self referred to our clinic for clinical trial recommendations for newly diagnosed stage II breast cancer. She had her last mammogram approximately a year prior. She did notice in early January 2015 that she was having some distortion of the right breast, and she went to see her gynecologist and had a mammogram performed. She had a screening mammogram performed on 01/15/2015. Breast tissue was heterogeneously dense, which might compromise the mammography. There was architectural distortion in the right breast approximately 11-12 o'clock position, zone 2, posterior depth, and a CC MLO spot compression was performed and an ultrasound was planned. The diagnostic mammogram from 01/28/2015 showed right breast architectural distortion centered at the 12 o'clock position, zone 2, suspicious for neoplasm. Breast tomosynthesis was used in the interpretation. Ultrasound findings included targeted ultrasound of the right upper breast demonstrating a band-like area of abnormal echogenicity between 11 and 12 o'clock position in the right breast at zone 2. This measures 4 cm transversely x 1.1 cm AP, and there was only a 1.5 cm measurement in the radial direction. There was blood flow suspicious for neoplasm at the 11 o'clock position in zone 2. There is a 1.4 cm hypoechoic nodule slightly  from the larger area of altered echogenicity that is also suspicious. A biopsy was performed. The biopsy showed infiltrating lobular carcinoma, grade 2, and a clip was placed at specimen [id number]. There were a few signet cells present. The tumor was ER-positive, NV low positive, and HER2 was  negative by immunohistochemistry. She subsequently underwent an MRI showing a tumor that was 4.8 x 1.7 x 3.2 cm in size. Overall, her clinical stage was T2 NX MX.       She was enrolled in the I-SPY-2 clinical trial, and qualified with high-risk MammaPrint score. She was randomized to ganetespib and paclitaxel, and she was treated with 12 cycles of treatment, and then started on AC on 05/26/2015. She developed intractable nausea and vomiting after the first cycle, which required hospitalization on the second cycle. She then developed Pneumocystis pneumonia, which resulted in intubation and a 2-week hospitalization during the course of AC. She was discharged on 07/02/2015, and decided she did not want to pursue any further chemotherapy. She had a right lumpectomy and sentinel lymph node procedure 08/12/2015. She had a positive margin, underwent a re-excision, and had a positive margin. Ultimately, she underwent a right mastectomy with clear margins. She began radiation treatment with Dr. Bill Chauhan, and completed radiation therapy on 12/04/2015. Pathologic stage was III-A, ypT3 N1a MX. Of concern, she had what could be considered an RCB3 tumor.  She started adjuvant letrozole on 1/28/16.     4/6/2022 Josefina Verma developed a fever during Reclast.  She was feeling unwell and went to the ED.  CT CAP showed new involvement of abdominal lymph nodes and multiple bone metastases.  She was admitted and I saw her in the hospital 4-7-22 to discuss the plan for biopsy.  A biopsy of a sacral mass showed pleomorphic lobular breast cancer which was ER negative, TN negative, HER2 positive by FISH in 20% of the cells and negative in 80% of cells.  A mix of TNBC and ER- HER2+.  Clinic conference consensus was for the modified Vandana with paclitaxel weekly to treat both clones.         TREATMENT HISTORY:  A.  Neoadjuvant therapy on I SPY-2 with ganetespib and paclitaxel.  AC x 2 complicated by PCP pneumonia.    B.  Right  lumpectomy, followed by right margin resections, followed by right mastectomy.   B.  Oophorectomy 10-16-15.   C.  Tamoxifen after 10-6-15.   D.  Radiation therapy. 5,040 cGy in 180 cGy/fraction to the chest wall and regional lymphatics followed by 720 cGy axillary lymph node boost and 1000 cGy mastectomy scar boost. Completed on 12/3/2015.  E.   Adjuvant letrozole begun 1-28-16.  Plan was to continue through 2026.  F.  Diagnosis with recurrent/metastatic breast cancer with a sacral mass that was biopsied showing pleiomorphic lobular breast cancer that is ER negative, AK negative, HER2 positive by FISH in 20% of the cells and negative in 80% of cells.  A mix of TNBC and ER- HER2+.  Clinic conference consensus was for the modified Vandana with paclitaxel weekly to treat both clones.   G. 9/30/22 Started Enhertu   H. Was admitted 10/3-10/7, C. Difficile, also had + Galactomanin. Started on voriconazole 10/11/22  I. 10/19 Cycle #2 Enhertu, dose reduced   J. PE-- incidentally found on 11/1, admitted for 48 hours, started on Eliquis          INTERVAL HISTORY:    Today, she is seen in person.  She states that with her Enhertu she has 4 days where she feels absolutely dreadful.  She has significant fatigue, where she cannot get out of bed.  She does have some nausea, although this has been better controlled with the olanzapine.  She did not have any vomiting with this cycle.  She is happy to be off the voriconazole.  She is not having any breathing issues, no increase shortness of breath, new cough, or wheezing.  She has not had any fevers body aches or chills.  She is been eating and drinking regularly.  She is still able to work.  She has no areas of bone pain.  No headaches or double or blurry vision.  She does not have any abdominal pain.  No issues with her bowels, no diarrhea.  She continues on her blood thinner, and has had no bleeding issues or bloody or dark stools.    She has been urinating regularly, no bloody  or dark urine.  No burning with urination.        REVIEW OF SYSTEMS:    Patient denies the following except if noted above: fevers, body aches, chills, headaches, vision changes, dizziness, chest pain, shortness of breath, nausea, vomiting, diarrhea, constipation, abdominal pain, rashes, bruising/bleeding, mouth sores, swelling or pain in the legs.      PHYSICAL EXAMINATION:/65 (BP Location: Right arm)   Pulse 108   Temp 98.9  F (37.2  C) (Oral)   Resp 16   Wt 63.8 kg (140 lb 9.6 oz)   LMP 12/18/2014   SpO2 100%   BMI 24.12 kg/m    Wt Readings from Last 4 Encounters:   12/02/22 63.8 kg (140 lb 9.6 oz)   11/11/22 60.5 kg (133 lb 6.4 oz)   11/01/22 60.8 kg (134 lb)   10/19/22 62.2 kg (137 lb 1.6 oz)     Vital signs were reviewed.   Patient alert and oriented x3.    PERRLA. EOMI. No scleral icterus noted. OP without thrush/sores. Nares erythematous. Ear canal clear with non-bulging TM. No signs of infection   Neck exam: No palpable cervical, supraclavicular nodes.   Heart: RRR no murmurs noted.   Lungs: clear to auscultation bilaterally.  No crackles or wheezing.   Abd: positive bowel sounds in all four quadrants.  No tenderness to palpation.  No hepatomegaly.   Extremities: No lower extremity edema.   Neuro: grossly intact.   Mood and affect is stable.         LABORATORY DATA:    Most Recent 3 CBC's:  Recent Labs   Lab Test 12/02/22  1329 11/11/22  1046 11/01/22 2021   WBC 8.5 9.9 13.4*   HGB 11.3* 12.0 11.3*   MCV 89 86 87    469* 353    Most Recent 3 BMP's:  Recent Labs   Lab Test 11/11/22  1046 11/01/22  2021 10/19/22  1322    140 143   POTASSIUM 4.7 4.5 4.0   CHLORIDE 106 104 109*   CO2 26 25 26   BUN 17.8 17.4 16.4   CR 0.90 0.94 0.89   ANIONGAP 9 11 8   VEE 9.6 9.3 9.2   * 135* 135*    Most Recent 2 LFT's:  Recent Labs   Lab Test 11/11/22  1046 11/01/22 2021   AST 51* 44*   ALT 49* 42*   ALKPHOS 135* 136*   BILITOTAL 0.2 0.2      I reviewed the above labs today.    Combined  Report of:    PET and CT on  11/21/2022 10:35 AM :     1. PET of the neck, chest, abdomen, and pelvis.  2. PET CT Fusion for Attenuation Correction and Anatomical  Localization:    3. 3D MIP and PET-CT fused images were processed on an independent  workstation and archived to PACS and reviewed by a radiologist.     Technique:     1. PET: The patient received 10.12 mCi of F-18-FDG; the serum glucose  was 175 prior to administration, body weight was 60.5 kg. Images were  evaluated in the axial, sagittal, and coronal planes as well as the  rotational whole body MIP. Images were acquired from the Vertex to the  Feet.     UPTAKE WAS MEASURED AT 60 MINUTES.      BACKGROUND:  Liver SUV max= 3.8,   Aorta Blood SUV Max: 3.0.      2. CT: CT only obtained for attenuation correction and not diagnostic  purposes.     INDICATION: Malignant neoplasm of upper-outer quadrant of right breast  in female, estrogen receptor positive (H); Malignant neoplasm of  upper-outer quadrant of right breast in female, estrogen receptor  positive (H)     ADDITIONAL INFORMATION OBTAINED FROM EMR: Status post tamoxifen,  radiation. Most recently status post 3 cycles of Enhertu     COMPARISON: PET 8/29/2022, chest abdomen and pelvis CT 11/1/2022     FINDINGS:      HEAD/NECK:  Mildly hypermetabolic right level 2A lymph node with max SUV 4.3,  previously 2.9 series 4 image 67. A few other prominent right level  IIb nodes are likely reactive. Polypoid mucosal thickening left  maxillary sinus. Hypermetabolism associated with periapical lucency in  the left maxillary first molar and right maxillary cuspid.     CHEST:  There is no suspicious FDG uptake in the chest. Chronic scarring in  the right apex. Focal groundglass opacity in the right upper lobe  abutting the major fissure on series 4 image 113. Additional chronic  scarring in the subpleural right upper and middle lobes, most likely  radiation related. Focal groundglass opacity in the right middle  lobe  and right lower lobe. Left IJ chest port terminates in the low SVC. No  suspicious pulmonary nodule.     ABDOMEN AND PELVIS:  Progression in nodular liver contour and heterogenous appearance of  the liver parenchyma. Mildly hypermetabolism associated with segment  4b hypoattenuating lesion on series 4 image 180, Max SUV 4.8.  Additional likely new lesion in segment 6 on series 4 image 177.  Prominent colonic stool.     LOWER EXTREMITIES:   No abnormal masses or hypermetabolic lesions.     BONES:   No significant change in diffuse sclerotic lesions, greatest in the  axial skeleton consistent with treated lesions..                                                                      IMPRESSION: In this patient with history of right breast cancer:  1. New hypermetabolic lesions in the liver, worrisome for disease  progression. Consider further evaluation with MRI with contrast.  2. 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  3. Multifocal groundglass opacities in the right lung are suspicious  for infection/inflammation, including viral etiologies.   4. Periapical dental disease.        I have personally reviewed the examination and initial interpretation  and I agree with the findings.     BERNARD SAM MD         SYSTEM ID:  [id number]         ASSESSMENT AND PLAN:   Shelby Bennett is a 56-year-old woman with logy pleomorphic lobular cancer, wa history of a clinical stage II right breast cancer, ER positive, KS positive, HER2 negative by immunohistochemistry, now with recurrent Metastatic breast cancer, lobular. New histohich is negative for estrogen receptor and negative for progesterone receptor, and 20% is HER2 positive by FISH.  - She has a mixture of recurrent metastatic HER2 positive breast cancer that is estrogen receptor negative and 80% triple negative breast cancer.    - Discussed this situation in breast cancer conference on April 22 and the consensus opinion  is to treat the minority HER2+ component first with Vandana regimen because that treatment would also cover the triple negative (TNBC) component with the taxane part of the treatment. This would push back the use of pembrolizumab to the second line setting or later if the 22C3 staining is positive, but would allow earlier treatment of the HER2 component.     - Tumor markers had been rising significantly. Dr. Meza has concerns that there are 2 clones ( Biopsy of the sacrum in April showed 2 clones 1 of which was 20% of the tumor and was HER2 amplified remainder of the tumor is HER2 1+.) as indicated by pathology-- one of them being triple negative the other being HER2 positive.   - August 29 PET/CT the site of initial metastasis static disease showed no FDG uptake.  There were no suspicious bony lesions. The PET did demonstrate LAD on the left neck and axillae. 09/13/22 axillary lymph node biopsy negative for malignancy. Dr. Meza opted to switch to Enhertu given rising markers, see previous notes for details     - S/P three cycles of Enhertu   - Tumor markers continue to rise, PET with possible worsening hepatic mets  - We discussed the findings of her PET, which were previously discussed with Dr. Garcia. She recommended a liver MRI and follow up with Dr. Meza. This was ordered today  - Hold enhertu today, was not tolerating very well     -Of note she is going to visit her mother in [place] December 22 through January 1    #CINV, severe-- currently improved   - Continue IV aloxi with infusions.  Could add IV Emend in the future if needed as she is now off voriconazole  - Continue Zypreza 5mg at bedtime  - Has zofran, compazine, and reglan for PRN as well        # Psych:  - She feels that she is coping very well currently and does not need any additional support at this time  - Continue Effexor 75mg daily          # Bone Mets  - Asymptomatic  - Currently getting xgeva monthly --ON HOLD due to molar issue  - Has  met with the dentist, who recommended we continue to hold Xgeva until her dental work gets completed.  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  Recently seen, and voriconazole was discontinued.  - Resolved    # C. Diff 10/5  - Resolved    #PE  - On eliquis,  Breathing greatly improved   - No bruising bleeding    #Elevated creatinine  - 1 L of IV fluids today, push fluids at home, recheck at next visit        Patient will call in the interim with any questions or concerns. They voice understanding and agree with the above plan.         Sandra Coughlin PA-C

## 2022-12-02 NOTE — NURSING NOTE
"Oncology Rooming Note    December 2, 2022 1:48 PM   Josefina Bennett is a 56 year old female who presents for:    Chief Complaint   Patient presents with     Port Draw     Labs drawn via  by RN. Port accessed. Vitals taken.     Oncology Clinic Visit     Malignant neoplasm of upper-outer quadrant of right female breast     Initial Vitals: /65 (BP Location: Right arm)   Pulse 108   Temp 98.9  F (37.2  C) (Oral)   Resp 16   Wt 63.8 kg (140 lb 9.6 oz)   LMP 12/18/2014   SpO2 100%   BMI 24.12 kg/m   Estimated body mass index is 24.12 kg/m  as calculated from the following:    Height as of 6/10/22: 1.626 m (5' 4.02\").    Weight as of this encounter: 63.8 kg (140 lb 9.6 oz). Body surface area is 1.7 meters squared.  No Pain (0) Comment: Data Unavailable   Patient's last menstrual period was 12/18/2014.  Allergies reviewed: Yes  Medications reviewed: Yes    Medications: Medication refills not needed today.  Pharmacy name entered into [email or web address]:    [facility] - [place], [place] - [address] [address] [facility] AT [facility]  [employer] [facility] - A MAIL ORDER [facility] - [place], [place] - [address] [facility] - [place], [place] - [address]    Clinical concerns: none       Sabi Ventura"

## 2022-12-02 NOTE — PROGRESS NOTES
Dec 2, 2022      REASON FOR VISIT: Follow-up breast cancer     HISTORY OF PRESENT ILLNESS:  Josefina Bennett was self referred to our clinic for clinical trial recommendations for newly diagnosed stage II breast cancer. She had her last mammogram approximately a year prior. She did notice in early January 2015 that she was having some distortion of the right breast, and she went to see her gynecologist and had a mammogram performed. She had a screening mammogram performed on 01/15/2015. Breast tissue was heterogeneously dense, which might compromise the mammography. There was architectural distortion in the right breast approximately 11-12 o'clock position, zone 2, posterior depth, and a CC MLO spot compression was performed and an ultrasound was planned. The diagnostic mammogram from 01/28/2015 showed right breast architectural distortion centered at the 12 o'clock position, zone 2, suspicious for neoplasm. Breast tomosynthesis was used in the interpretation. Ultrasound findings included targeted ultrasound of the right upper breast demonstrating a band-like area of abnormal echogenicity between 11 and 12 o'clock position in the right breast at zone 2. This measures 4 cm transversely x 1.1 cm AP, and there was only a 1.5 cm measurement in the radial direction. There was blood flow suspicious for neoplasm at the 11 o'clock position in zone 2. There is a 1.4 cm hypoechoic nodule slightly  from the larger area of altered echogenicity that is also suspicious. A biopsy was performed. The biopsy showed infiltrating lobular carcinoma, grade 2, and a clip was placed at specimen [id number]. There were a few signet cells present. The tumor was ER-positive, AK low positive, and HER2 was negative by immunohistochemistry. She subsequently underwent an MRI showing a tumor that was 4.8 x 1.7 x 3.2 cm in size. Overall, her clinical stage was T2 NX MX.       She was enrolled in the I-SPY-2 clinical trial, and qualified with  high-risk MammaPrint score. She was randomized to ganetespib and paclitaxel, and she was treated with 12 cycles of treatment, and then started on AC on 05/26/2015. She developed intractable nausea and vomiting after the first cycle, which required hospitalization on the second cycle. She then developed Pneumocystis pneumonia, which resulted in intubation and a 2-week hospitalization during the course of AC. She was discharged on 07/02/2015, and decided she did not want to pursue any further chemotherapy. She had a right lumpectomy and sentinel lymph node procedure 08/12/2015. She had a positive margin, underwent a re-excision, and had a positive margin. Ultimately, she underwent a right mastectomy with clear margins. She began radiation treatment with Dr. Bill Chauhan, and completed radiation therapy on 12/04/2015. Pathologic stage was III-A, ypT3 N1a MX. Of concern, she had what could be considered an RCB3 tumor.  She started adjuvant letrozole on 1/28/16.     4/6/2022 Josefina Verma developed a fever during Reclast.  She was feeling unwell and went to the ED.  CT CAP showed new involvement of abdominal lymph nodes and multiple bone metastases.  She was admitted and I saw her in the hospital 4-7-22 to discuss the plan for biopsy.  A biopsy of a sacral mass showed pleomorphic lobular breast cancer which was ER negative, KS negative, HER2 positive by FISH in 20% of the cells and negative in 80% of cells.  A mix of TNBC and ER- HER2+.  Clinic conference consensus was for the modified Vandana with paclitaxel weekly to treat both clones.         TREATMENT HISTORY:  A.  Neoadjuvant therapy on I SPY-2 with ganetespib and paclitaxel.  AC x 2 complicated by PCP pneumonia.    B.  Right lumpectomy, followed by right margin resections, followed by right mastectomy.   B.  Oophorectomy 10-16-15.   C.  Tamoxifen after 10-6-15.   D.  Radiation therapy. 5,040 cGy in 180 cGy/fraction to the chest wall and regional lymphatics followed  by 720 cGy axillary lymph node boost and 1000 cGy mastectomy scar boost. Completed on 12/3/2015.  E.   Adjuvant letrozole begun 1-28-16.  Plan was to continue through 2026.  F.  Diagnosis with recurrent/metastatic breast cancer with a sacral mass that was biopsied showing pleiomorphic lobular breast cancer that is ER negative, DC negative, HER2 positive by FISH in 20% of the cells and negative in 80% of cells.  A mix of TNBC and ER- HER2+.  Clinic conference consensus was for the modified Vandana with paclitaxel weekly to treat both clones.   G. 9/30/22 Started Enhertu   H. Was admitted 10/3-10/7, C. Difficile, also had + Galactomanin. Started on voriconazole 10/11/22  I. 10/19 Cycle #2 Enhertu, dose reduced   J. PE-- incidentally found on 11/1, admitted for 48 hours, started on Eliquis          INTERVAL HISTORY:    Today, she is seen in person.  She states that with her Enhertu she has 4 days where she feels absolutely dreadful.  She has significant fatigue, where she cannot get out of bed.  She does have some nausea, although this has been better controlled with the olanzapine.  She did not have any vomiting with this cycle.  She is happy to be off the voriconazole.  She is not having any breathing issues, no increase shortness of breath, new cough, or wheezing.  She has not had any fevers body aches or chills.  She is been eating and drinking regularly.  She is still able to work.  She has no areas of bone pain.  No headaches or double or blurry vision.  She does not have any abdominal pain.  No issues with her bowels, no diarrhea.  She continues on her blood thinner, and has had no bleeding issues or bloody or dark stools.    She has been urinating regularly, no bloody or dark urine.  No burning with urination.        REVIEW OF SYSTEMS:    Patient denies the following except if noted above: fevers, body aches, chills, headaches, vision changes, dizziness, chest pain, shortness of breath, nausea, vomiting,  diarrhea, constipation, abdominal pain, rashes, bruising/bleeding, mouth sores, swelling or pain in the legs.      PHYSICAL EXAMINATION:/65 (BP Location: Right arm)   Pulse 108   Temp 98.9  F (37.2  C) (Oral)   Resp 16   Wt 63.8 kg (140 lb 9.6 oz)   LMP 12/18/2014   SpO2 100%   BMI 24.12 kg/m    Wt Readings from Last 4 Encounters:   12/02/22 63.8 kg (140 lb 9.6 oz)   11/11/22 60.5 kg (133 lb 6.4 oz)   11/01/22 60.8 kg (134 lb)   10/19/22 62.2 kg (137 lb 1.6 oz)     Vital signs were reviewed.   Patient alert and oriented x3.    PERRLA. EOMI. No scleral icterus noted. OP without thrush/sores. Nares erythematous. Ear canal clear with non-bulging TM. No signs of infection   Neck exam: No palpable cervical, supraclavicular nodes.   Heart: RRR no murmurs noted.   Lungs: clear to auscultation bilaterally.  No crackles or wheezing.   Abd: positive bowel sounds in all four quadrants.  No tenderness to palpation.  No hepatomegaly.   Extremities: No lower extremity edema.   Neuro: grossly intact.   Mood and affect is stable.         LABORATORY DATA:    Most Recent 3 CBC's:  Recent Labs   Lab Test 12/02/22  1329 11/11/22  1046 11/01/22 2021   WBC 8.5 9.9 13.4*   HGB 11.3* 12.0 11.3*   MCV 89 86 87    469* 353    Most Recent 3 BMP's:  Recent Labs   Lab Test 11/11/22  1046 11/01/22  2021 10/19/22  1322    140 143   POTASSIUM 4.7 4.5 4.0   CHLORIDE 106 104 109*   CO2 26 25 26   BUN 17.8 17.4 16.4   CR 0.90 0.94 0.89   ANIONGAP 9 11 8   VEE 9.6 9.3 9.2   * 135* 135*    Most Recent 2 LFT's:  Recent Labs   Lab Test 11/11/22  1046 11/01/22 2021   AST 51* 44*   ALT 49* 42*   ALKPHOS 135* 136*   BILITOTAL 0.2 0.2      I reviewed the above labs today.    Combined Report of:    PET and CT on  11/21/2022 10:35 AM :     1. PET of the neck, chest, abdomen, and pelvis.  2. PET CT Fusion for Attenuation Correction and Anatomical  Localization:    3. 3D MIP and PET-CT fused images were processed on an  independent  workstation and archived to PACS and reviewed by a radiologist.     Technique:     1. PET: The patient received 10.12 mCi of F-18-FDG; the serum glucose  was 175 prior to administration, body weight was 60.5 kg. Images were  evaluated in the axial, sagittal, and coronal planes as well as the  rotational whole body MIP. Images were acquired from the Vertex to the  Feet.     UPTAKE WAS MEASURED AT 60 MINUTES.      BACKGROUND:  Liver SUV max= 3.8,   Aorta Blood SUV Max: 3.0.      2. CT: CT only obtained for attenuation correction and not diagnostic  purposes.     INDICATION: Malignant neoplasm of upper-outer quadrant of right breast  in female, estrogen receptor positive (H); Malignant neoplasm of  upper-outer quadrant of right breast in female, estrogen receptor  positive (H)     ADDITIONAL INFORMATION OBTAINED FROM EMR: Status post tamoxifen,  radiation. Most recently status post 3 cycles of Enhertu     COMPARISON: PET 8/29/2022, chest abdomen and pelvis CT 11/1/2022     FINDINGS:      HEAD/NECK:  Mildly hypermetabolic right level 2A lymph node with max SUV 4.3,  previously 2.9 series 4 image 67. A few other prominent right level  IIb nodes are likely reactive. Polypoid mucosal thickening left  maxillary sinus. Hypermetabolism associated with periapical lucency in  the left maxillary first molar and right maxillary cuspid.     CHEST:  There is no suspicious FDG uptake in the chest. Chronic scarring in  the right apex. Focal groundglass opacity in the right upper lobe  abutting the major fissure on series 4 image 113. Additional chronic  scarring in the subpleural right upper and middle lobes, most likely  radiation related. Focal groundglass opacity in the right middle lobe  and right lower lobe. Left IJ chest port terminates in the low SVC. No  suspicious pulmonary nodule.     ABDOMEN AND PELVIS:  Progression in nodular liver contour and heterogenous appearance of  the liver parenchyma. Mildly  hypermetabolism associated with segment  4b hypoattenuating lesion on series 4 image 180, Max SUV 4.8.  Additional likely new lesion in segment 6 on series 4 image 177.  Prominent colonic stool.     LOWER EXTREMITIES:   No abnormal masses or hypermetabolic lesions.     BONES:   No significant change in diffuse sclerotic lesions, greatest in the  axial skeleton consistent with treated lesions..                                                                      IMPRESSION: In this patient with history of right breast cancer:  1. New hypermetabolic lesions in the liver, worrisome for disease  progression. Consider further evaluation with MRI with contrast.  2. 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  3. Multifocal groundglass opacities in the right lung are suspicious  for infection/inflammation, including viral etiologies.   4. Periapical dental disease.        I have personally reviewed the examination and initial interpretation  and I agree with the findings.     BERNARD SAM MD         SYSTEM ID:  [id number]         ASSESSMENT AND PLAN:   Shelby Bennett is a 56-year-old woman with logy pleomorphic lobular cancer, wa history of a clinical stage II right breast cancer, ER positive, MA positive, HER2 negative by immunohistochemistry, now with recurrent Metastatic breast cancer, lobular. New histohich is negative for estrogen receptor and negative for progesterone receptor, and 20% is HER2 positive by FISH.  - She has a mixture of recurrent metastatic HER2 positive breast cancer that is estrogen receptor negative and 80% triple negative breast cancer.    - Discussed this situation in breast cancer conference on April 22 and the consensus opinion is to treat the minority HER2+ component first with Vandana regimen because that treatment would also cover the triple negative (TNBC) component with the taxane part of the treatment. This would push back the use of pembrolizumab  to the second line setting or later if the 22C3 staining is positive, but would allow earlier treatment of the HER2 component.     - Tumor markers had been rising significantly. Dr. Meza has concerns that there are 2 clones ( Biopsy of the sacrum in April showed 2 clones 1 of which was 20% of the tumor and was HER2 amplified remainder of the tumor is HER2 1+.) as indicated by pathology-- one of them being triple negative the other being HER2 positive.   - August 29 PET/CT the site of initial metastasis static disease showed no FDG uptake.  There were no suspicious bony lesions. The PET did demonstrate LAD on the left neck and axillae. 09/13/22 axillary lymph node biopsy negative for malignancy. Dr. Meza opted to switch to Enhertu given rising markers, see previous notes for details     - S/P three cycles of Enhertu   - Tumor markers continue to rise, PET with possible worsening hepatic mets  - We discussed the findings of her PET, which were previously discussed with Dr. Garcia. She recommended a liver MRI and follow up with Dr. Meza. This was ordered today  - Hold enhertu today, was not tolerating very well     -Of note she is going to visit her mother in [place] December 22 through January 1    #CINV, severe-- currently improved   - Continue IV aloxi with infusions.  Could add IV Emend in the future if needed as she is now off voriconazole  - Continue Zypreza 5mg at bedtime  - Has zofran, compazine, and reglan for PRN as well        # Psych:  - She feels that she is coping very well currently and does not need any additional support at this time  - Continue Effexor 75mg daily          # Bone Mets  - Asymptomatic  - Currently getting xgeva monthly --ON HOLD due to molar issue  - Has met with the dentist, who recommended we continue to hold Xgeva until her dental work gets completed.  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  Recently seen, and voriconazole was discontinued.  - Resolved    # C. Diff 10/5  - Resolved    #PE  - On eliquis,  Breathing greatly improved   - No bruising bleeding    #Elevated creatinine  - 1 L of IV fluids today, push fluids at home, recheck at next visit        Patient will call in the interim with any questions or concerns. They voice understanding and agree with the above plan.         Sandra Coughlin PA-C

## 2022-12-05 NOTE — LETTER
12/5/2022         RE: Josefina Bennett  [address] [facility] [address]        Dear Colleague,    Thank you for referring your patient, Josefina Bennett, to the [facility]. Please see a copy of my visit note below.    Erroneous encounter. Patient was sent to the ED for further evaluation and was not seen for a clinic visit.       Again, thank you for allowing me to participate in the care of your patient.        Sincerely,        Michelle Lin PA-C

## 2022-12-05 NOTE — ED PROVIDER NOTES
ED Provider Note  [facility]      History   No chief complaint on file.    HPI  Josefina Bennett is a 56 year old female with a PMH of breast cancer, bone metastases, PE, severe sepsis and pneumonia who presents to the ED today reporting confusion.***    Patient was seen earlier today at [facility] ED reporting nausea and vomiting.    Past Medical History  Past Medical History:   Diagnosis Date     Breast cancer (H) 01/01/2015     depression      Foot fracture      History of blood transfusion 06/01/2015     Insomnia      Malignant neoplasm of breast (female), unspecified site 02/05/2015     Papanicolaou smear of cervix with low grade squamous intraepithelial lesion (LGSIL) 06/01/2010    8/10-colp-benign     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) 06/01/2015     PONV (postoperative nausea and vomiting)      PTSD (post-traumatic stress disorder)      Past Surgical History:   Procedure Laterality Date     APPENDECTOMY  1997     CYSTOSCOPY N/A 10/16/2015    Procedure: CYSTOSCOPY;  Surgeon: Aleksandr Palafox MD;  Location: [facility] OR     HC REMOVAL OF OVARIAN CYST(S)      x 3     INSERT PORT VASCULAR ACCESS Left 5/4/2022    Procedure: INSERTION, VASCULAR ACCESS PORT;  Surgeon: Darren Yates MD;  Location: [facility] OR     IR CHEST PORT PLACEMENT > 5 YRS OF AGE  5/4/2022     IR FLUORO 0-1 HOUR  4/12/2022     LAPAROSCOPIC HYSTERECTOMY TOTAL, BILATERAL SALPINGO-OOPHORECTOMY, COMBINED N/A 10/16/2015    Procedure: COMBINED LAPAROSCOPIC HYSTERECTOMY TOTAL, SALPINGO-OOPHORECTOMY;  Surgeon: Aleksandr Palafox MD;  Location: [facility] OR     LUMPECTOMY BREAST Right 9/2/2015    Procedure: LUMPECTOMY BREAST;  Surgeon: Philip Franco MD;  Location: [facility] OR     LUMPECTOMY BREAST WITH SENTINEL NODE, COMBINED Right 8/12/2015    Procedure: COMBINED LUMPECTOMY BREAST WITH SENTINEL NODE;  Surgeon: Philip Franco MD;  Location: [facility] OR     MASTECTOMY SIMPLE Right 9/21/2015    Procedure: MASTECTOMY SIMPLE;   Surgeon: Philip Franco MD;  Location: [facility] OR     REMOVE PORT VASCULAR ACCESS N/A 2015    Procedure: REMOVE PORT VASCULAR ACCESS;  Surgeon: Phiilp Franco MD;  Location: [facility] OR     SURGICAL HISTORY OF -       wisdom teeth     TONSILLECTOMY      at age 30 for tonsillitis     apixaban ANTICOAGULANT (ELIQUIS) 5 MG tablet  apixaban ANTICOAGULANT (ELIQUIS) 5 MG tablet  apixaban ANTICOAGULANT (ELIQUIS) 5 MG tablet  calcium carbonate (OS- MG [place]) 500 MG tablet  cholecalciferol (VITAMIN D3) 1000 UNIT tablet  clindamycin (CLINDAMAX) 1 % topical gel  lidocaine-prilocaine (EMLA) 2.5-2.5 % external cream  metoclopramide (REGLAN) 10 MG tablet  mirtazapine (REMERON) 7.5 MG tablet  Multiple Vitamins-Minerals (MULTIVITAMIN ADULTS PO)  OLANZapine (ZYPREXA) 5 MG tablet  ondansetron (ZOFRAN) 8 MG tablet  venlafaxine (EFFEXOR) 75 MG tablet  voriconazole (VFEND) 200 MG tablet      Allergies   Allergen Reactions     Morphine      Rash     Family History  Family History   Problem Relation Age of Onset     Breast Cancer Other         paternal cousin     Diabetes Mother         Borderline diabetic     Alcohol/Drug Mother         alcoholic     Depression Mother      Alcohol/Drug Father         alcoholic     Depression Father      Diabetes Maternal Grandfather      Depression Maternal Grandfather      Depression Paternal Grandfather      Breast Cancer Paternal Aunt         dad's 1/2 sister     Respiratory Sister         Cystic Fibrosis,  age 39     Depression Sister      Cancer Other         maternal grandmothers sisters daughter.     Social History   Social History     Tobacco Use     Smoking status: Former     Packs/day: 0.50     Types: Cigarettes     Quit date: 2015     Years since quittin.8     Smokeless tobacco: Never     Tobacco comments:     5 a day   Substance Use Topics     Alcohol use: No     Alcohol/week: 0.0 standard drinks     Comment: hx of etoh abuse in teens and 20's     Drug use: No      Past  "medical history, past surgical history, medications, allergies, family history, and social history were reviewed with the patient. No additional pertinent items.       Review of Systems  {Complete vs limited [facility]:329277::\"A complete review of systems was performed with pertinent positives and negatives noted in the HPI, and all other systems negative.\"}    Physical Exam      Physical Exam  ***  ED Course      Procedures       {ED Course Selections:275441}              No results found for any visits on 12/05/22.  Medications - No data to display     Assessments & Plan (with Medical Decision Making)   ***    I have reviewed the nursing notes. I have reviewed the findings, diagnosis, plan and need for follow up with the patient.    New Prescriptions    No medications on file       Final diagnoses:   None       --  ***  [facility] EMERGENCY DEPARTMENT  12/5/2022  "

## 2022-12-05 NOTE — LETTER
Date:December 8, 2022      Provider requested that no letter be sent. Do not send.       [facility]

## 2022-12-05 NOTE — PROGRESS NOTES
"Writer called patient and is very weak, dizzy, unable to eat with nausea and not out of bed since Friday after IV fluids that patient states did not \"help\" her. She does not remember when she last drank or ate something over the weekend. Checked her temperature and is 98.5. Discussed with Michelle Lin and writer able to get in IV fluids in [place] at 3PM today and see Michelle. Patient calling friend to bring her to clinic. Patient working to get someone to drive her to clinic. If not able to get a rdie encouraged her to call 911, but she will keep trying to have someone drive her to be seen today. Teresa Ashton RN, BSN Breast Center Nurse Coordinator  Returned call to patient and her phone is not letting her call out. Patient verbally gave writer permission to call her work with being ill and ask to have someone care for her dog. Also, patient gave verbal permission to call Huong Sousa about her being ill and if she could take her dog. Writer called 911 as patient not tracking well and worsening dizziness. Had patient sit in a chair by the front door and was able to unlock the door so the paramedics can get into her house to assist her to the [facility]. Teresa Ashton RN, BSN Breast Center Nurse Coordinator  "

## 2022-12-06 NOTE — PROGRESS NOTES
Erroneous encounter. Patient was sent to the ED for further evaluation and was not seen for a clinic visit.

## 2022-12-06 NOTE — PROGRESS NOTES
Patient called with being discharged from [facility] with a diagnosis of a UTI and antibiotics were prescribed. Patient's room mate Karl Hung ([phone]) picked her up and taking her home. Patient was more alert and was more aware of what happened and what was going on. Patient gave a verbal approval for writer to speak to her best friend Huong Sousa and room mate Karl Hung. They will make sure she has soup, protein drinks and will bring her in if more confusion, becomes febrile or any other worsening symptoms. Will schedule a virtual visit 12/7/2022 with Michelle Lin. Message sent to scheduling to arrange.  Answered all patient's questions and verbalized understanding. Teresa Ashton RN, BSN.

## 2022-12-07 PROBLEM — G91.0 COMMUNICATING HYDROCEPHALUS (H): Status: ACTIVE | Noted: 2022-01-01

## 2022-12-07 NOTE — NURSING NOTE
"Oncology Rooming Note    December 7, 2022 10:43 AM   Josefina Bennett is a 56 year old female who presents for:    Chief Complaint   Patient presents with     Oncology Clinic Visit     Malignant neoplasm of female breast     Initial Vitals: BP (!) 162/92   Pulse 100   Temp 98  F (36.7  C) (Oral)   Wt 61.2 kg (135 lb)   LMP 12/18/2014   SpO2 100%   BMI 23.16 kg/m   Estimated body mass index is 23.16 kg/m  as calculated from the following:    Height as of 6/10/22: 1.626 m (5' 4.02\").    Weight as of this encounter: 61.2 kg (135 lb). Body surface area is 1.66 meters squared.  No Pain (0) Comment: Data Unavailable   Patient's last menstrual period was 12/18/2014.  Allergies reviewed: Yes  Medications reviewed: Yes    Medications: MEDICATION REFILLS NEEDED TODAY. Provider was notified. Pt would like Eliquis refilled.    Pharmacy name entered into [employer]:    [facility] - [place], [place] - [address] [facility] - A MAIL ORDER [facility] - [place], [place] - [address] [facility] - [place], [place] - [address]    Clinical concerns: none       Sabi Ventura"

## 2022-12-07 NOTE — ED PROVIDER NOTES
[place] EMERGENCY DEPARTMENT ([facility])  12/07/22  ED Provider Note  [facility]      History     Chief Complaint   Patient presents with     Referral     The history is provided by the patient and medical records.     Josefina Bennett is a 56 year old female with a history of metastatic stage II breast cancer (s/p R mastectomy, hysterectomy + oophorectomy, chemo/radiation), bone metastases, PE, severe sepsis and pneumonia.  I was contacted by her oncologist Dr. Meza regarding results of brain MRI that was done today through the clinic.  The MRI shows acute hydrocephalus with metastatic disease to the right caudate and jude. She was advised to come here emergently Dr. Meza was going to contact both neurosurgery and radiation oncology to give them a heads up.  The patient reports that she has been having a headache for about a week she has had spells of profound confusion no problems with speech she has had balance issues she seems relatively unaffected now.  Neurosurgery attending is aware.    This part of the medical record was transcribed by Zenaida Holm, Medical Scribe, from a dictation done by Deepak Villa MD.         Past Medical History  Past Medical History:   Diagnosis Date     Breast cancer (H) 01/01/2015     depression      Foot fracture      History of blood transfusion 06/01/2015     Insomnia      Malignant neoplasm of breast (female), unspecified site 02/05/2015     Papanicolaou smear of cervix with low grade squamous intraepithelial lesion (LGSIL) 06/01/2010    8/10-colp-benign     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) 06/01/2015     PONV (postoperative nausea and vomiting)      PTSD (post-traumatic stress disorder)      Past Surgical History:   Procedure Laterality Date     APPENDECTOMY  1997     CYSTOSCOPY N/A 10/16/2015    Procedure: CYSTOSCOPY;  Surgeon: Aleksandr Palafox MD;  Location: [facility] OR      REMOVAL OF OVARIAN CYST(S)       x 3     INSERT PORT VASCULAR ACCESS Left 5/4/2022    Procedure: INSERTION, VASCULAR ACCESS PORT;  Surgeon: Darren Yates MD;  Location: [facility] OR     IR CHEST PORT PLACEMENT > 5 YRS OF AGE  5/4/2022     IR FLUORO 0-1 HOUR  4/12/2022     LAPAROSCOPIC HYSTERECTOMY TOTAL, BILATERAL SALPINGO-OOPHORECTOMY, COMBINED N/A 10/16/2015    Procedure: COMBINED LAPAROSCOPIC HYSTERECTOMY TOTAL, SALPINGO-OOPHORECTOMY;  Surgeon: Aleksandr Palafox MD;  Location: [facility] OR     LUMPECTOMY BREAST Right 9/2/2015    Procedure: LUMPECTOMY BREAST;  Surgeon: Philip Franco MD;  Location: [facility] OR     LUMPECTOMY BREAST WITH SENTINEL NODE, COMBINED Right 8/12/2015    Procedure: COMBINED LUMPECTOMY BREAST WITH SENTINEL NODE;  Surgeon: Philip Franco MD;  Location: [facility] OR     MASTECTOMY SIMPLE Right 9/21/2015    Procedure: MASTECTOMY SIMPLE;  Surgeon: Philip Franco MD;  Location: [facility] OR     REMOVE PORT VASCULAR ACCESS N/A 8/12/2015    Procedure: REMOVE PORT VASCULAR ACCESS;  Surgeon: Philip Franco MD;  Location: [facility] OR     SURGICAL HISTORY OF -       wisdom teeth     TONSILLECTOMY      at age 30 for tonsillitis     acetaminophen (TYLENOL) 325 MG tablet  apixaban ANTICOAGULANT (ELIQUIS) 5 MG tablet  calcium carbonate (OS- MG [place]. [place]) 500 MG tablet  cholecalciferol (VITAMIN D3) 1000 UNIT tablet  dexamethasone (DECADRON) 4 MG tablet  melatonin 3 MG tablet  metoclopramide (REGLAN) 10 MG tablet  mirtazapine (REMERON) 7.5 MG tablet  Multiple Vitamins-Minerals (MULTIVITAMIN ADULTS PO)  OLANZapine (ZYPREXA) 5 MG tablet  ondansetron (ZOFRAN) 8 MG tablet  oxyCODONE (ROXICODONE) 5 MG tablet  polyethylene glycol (MIRALAX) 17 GM/Dose powder  traZODone (DESYREL) 50 MG tablet  venlafaxine (EFFEXOR) 75 MG tablet      Allergies   Allergen Reactions     Morphine      Rash     Family History  Family History   Problem Relation Age of Onset     Breast Cancer Other         paternal cousin     Diabetes Mother         Borderline diabetic     Alcohol/Drug  Mother         alcoholic     Depression Mother      Alcohol/Drug Father         alcoholic     Depression Father      Diabetes Maternal Grandfather      Depression Maternal Grandfather      Depression Paternal Grandfather      Breast Cancer Paternal Aunt         dad's 1/2 sister     Respiratory Sister         Cystic Fibrosis,  age 39     Depression Sister      Cancer Other         maternal grandmothers sisters daughter.     Social History   Social History     Tobacco Use     Smoking status: Former     Packs/day: 0.50     Types: Cigarettes     Quit date: 2015     Years since quittin.8     Smokeless tobacco: Never     Tobacco comments:     5 a day   Substance Use Topics     Alcohol use: No     Alcohol/week: 0.0 standard drinks     Comment: hx of etoh abuse in teens and 20's     Drug use: No      Past medical history, past surgical history, medications, allergies, family history, and social history were reviewed with the patient. No additional pertinent items.       Review of Systems   All other systems reviewed and are negative.      Physical Exam   BP: (!) 166/67  Pulse: 99  Temp: 97.9  F (36.6  C)  Resp: 16  Weight: 58.4 kg (128 lb 12.8 oz)  SpO2: 95 %  Physical Exam  Vitals and nursing note reviewed.   Constitutional:       General: She is not in acute distress.     Appearance: She is well-developed.   HENT:      Head: Normocephalic and atraumatic.      Mouth/Throat:      Mouth: Mucous membranes are moist.   Eyes:      General: No scleral icterus.     Conjunctiva/sclera: Conjunctivae normal.      Pupils: Pupils are equal, round, and reactive to light.   Cardiovascular:      Rate and Rhythm: Normal rate and regular rhythm.      Heart sounds: Normal heart sounds.   Pulmonary:      Effort: Pulmonary effort is normal. No respiratory distress.      Breath sounds: Normal breath sounds. No wheezing.   Abdominal:      General: Abdomen is flat.      Palpations: Abdomen is soft.   Musculoskeletal:       Cervical back: Neck supple.   Skin:     General: Skin is warm and dry.   Neurological:      General: No focal deficit present.      Mental Status: She is alert and oriented to person, place, and time.      GCS: GCS eye subscore is 4. GCS verbal subscore is 5. GCS motor subscore is 6.      Cranial Nerves: No cranial nerve deficit.      Motor: Motor function is intact.      Coordination: Coordination is intact.   Psychiatric:         Mood and Affect: Mood normal.         Behavior: Behavior normal.         ED Course      Procedures            5:50 PM  Neurosurgery paged         Results for orders placed or performed during the hospital encounter of 12/07/22   XR Chest 2 Views     Status: None    Narrative    EXAM: XR CHEST 2 VIEWS  LOCATION: [facility]  DATE/TIME: 12/7/2022 10:43 PM    INDICATION: preop evaluation  COMPARISON: 11/2/2022.    FINDINGS: Left chest wall port. No pneumothorax. The heart size is normal. There are again areas of nodularity in left hemithorax which may relate ribs. Scarring at the lung apices. No pleural effusion. No significant interval change.      Impression    IMPRESSION: No acute abnormality.   MR Lumbar Spine w/o & w Contrast     Status: None    Narrative    EXAM: MR CERVICAL SPINE W/O & W CONTRAST, MR LUMBAR SPINE W/O  & W CONTRAST, MR THORACIC SPINE W/O & W CONTRAST   12/9/2022 1:42 PM     HISTORY:  r/o spinal/leptomeningeal disease (metastatic breast ca) in  prep for whole brain radiation       COMPARISON:  MRI 12/7/2022    TECHNIQUE: Cervical spine: Sagittal T1-weighted, sagittal T2-weighted,  sagittal STIR, sagittal diffusion-weighted, axial T1-weighted, and  axial T2-weighted images of the cervical spine were obtained without  the administration of intravenous contrast. After the administration  of intravenous contrast, fat saturated axial and sagittal T1-weighted  images of the cervical spine were obtained.    Thoracic spine: Sagittal  T1-weighted, sagittal T2-weighted, sagittal  STIR, sagittal diffusion weighted, axial T1-weighted, and axial  T2-weighted images of the thoracic spine were obtained without the  administration of intravenous contrast. After the administration of  intravenous contrast, fat saturated axial and sagittal T1-weighted  images of the thoracic spine were obtained.    Lumbar spine: Sagittal T1-weighted, sagittal T2-weighted, sagittal  STIR, axial T1-weighted, and axial T2-weighted images of the lumbar  spine were obtained without the administration of intravenous  contrast. After the administration of intravenous contrast, axial and  sagittal fat-saturated T1-weighted images of the lumbar spine were  obtained.    CONTRAST: 5.5 mL gadavist.    FINDINGS:    Cervical: Diffusely heterogeneous T1/T2 marrow signal with multiple  foci of postcontrast enhancement in the dens, C6, C7, T2 and T5  vertebral bodies. No abnormal foci of intra-axial enhancement. Normal  vertebral alignment. No loss of disc height. Normal marrow signal. No  abnormal cord signal. Diffusion-weighted images are negative for focal  abnormality.    No spinal canal or neural foraminal stenosis; or substantial  degenerative change at any level.    Visualized skull base, posterior fossa and paraspinal soft tissue  structures demonstrates partially visualized leptomeningeal  enhancement.    Thoracic Spine: Diffusely heterogeneous marrow T1/T2 signal. Abnormal  foci of postcontrast enhancement within the T2, T5, T8, T9, T12  vertebral bodies. Abnormal signal and postcontrast enhancement is seen  in the transverse processes at the level of T8. There are also areas  of abnormal enhancement in multiple posterior ribs. No abnormal cord  signal. No loss of disc height. No high grade thoracic spinal canal or  neuroforaminal stenosis. No focal abnormality on diffusion-weighted  images.    Lumbar Spine: There are 5 lumbar-type vertebrae used for the purposes  of this  dictation. Tip of the conus is at approximately L1. Normal  alignment. No loss of disc height. Diffusely heterogeneous marrow  T1/T2 signal. T2 hyperintense expansile postcontrast enhancing lesion  in the left L2 pedicle. Heterogeneous enhancement within the L3, S1  vertebral bodies are normally no abnormal intrathecal enhancement. No  focal abnormality on diffusion-weighted images.    On a level by level basis, the findings are as follows:    T12-L1: No spinal canal or neural foraminal stenosis.    L1-2: No spinal canal or neural foraminal stenosis.    L2-3: No spinal canal or neural foraminal stenosis.    L3-4: No spinal canal or neural foraminal stenosis.    L4-5: No spinal canal or neural foraminal stenosis.    L5-S1: No spinal canal or neural foraminal stenosis.    The visualized paraspinous tissues are without focal abnormality.       Impression    IMPRESSION:   1. No evidence of leptomeningeal disease in the cervical, thoracic  and/or lumbar spine.  2. Innumerable mostly sclerotic metastases throughout the cervical,  thoracic and lumbar spine. Multifocal areas of enhancing metastases as  detailed in the body of the report. No MRI evidence of pathologic  fracture.  3. No significant spinal canal or neural foraminal stenosis in the  cervical, thoracic and or lumbar spine.    I have personally reviewed the examination and initial interpretation  and I agree with the findings.    MAGDALENA CORTES MD         SYSTEM ID:  [id number]   MR Cervical Spine w/o & w Contrast     Status: None    Narrative    EXAM: MR CERVICAL SPINE W/O & W CONTRAST, MR LUMBAR SPINE W/O  & W CONTRAST, MR THORACIC SPINE W/O & W CONTRAST   12/9/2022 1:42 PM     HISTORY:  r/o spinal/leptomeningeal disease (metastatic breast ca) in  prep for whole brain radiation       COMPARISON:  MRI 12/7/2022    TECHNIQUE: Cervical spine: Sagittal T1-weighted, sagittal T2-weighted,  sagittal STIR, sagittal diffusion-weighted, axial T1-weighted, and  axial  T2-weighted images of the cervical spine were obtained without  the administration of intravenous contrast. After the administration  of intravenous contrast, fat saturated axial and sagittal T1-weighted  images of the cervical spine were obtained.    Thoracic spine: Sagittal T1-weighted, sagittal T2-weighted, sagittal  STIR, sagittal diffusion weighted, axial T1-weighted, and axial  T2-weighted images of the thoracic spine were obtained without the  administration of intravenous contrast. After the administration of  intravenous contrast, fat saturated axial and sagittal T1-weighted  images of the thoracic spine were obtained.    Lumbar spine: Sagittal T1-weighted, sagittal T2-weighted, sagittal  STIR, axial T1-weighted, and axial T2-weighted images of the lumbar  spine were obtained without the administration of intravenous  contrast. After the administration of intravenous contrast, axial and  sagittal fat-saturated T1-weighted images of the lumbar spine were  obtained.    CONTRAST: 5.5 mL gadavist.    FINDINGS:    Cervical: Diffusely heterogeneous T1/T2 marrow signal with multiple  foci of postcontrast enhancement in the dens, C6, C7, T2 and T5  vertebral bodies. No abnormal foci of intra-axial enhancement. Normal  vertebral alignment. No loss of disc height. Normal marrow signal. No  abnormal cord signal. Diffusion-weighted images are negative for focal  abnormality.    No spinal canal or neural foraminal stenosis; or substantial  degenerative change at any level.    Visualized skull base, posterior fossa and paraspinal soft tissue  structures demonstrates partially visualized leptomeningeal  enhancement.    Thoracic Spine: Diffusely heterogeneous marrow T1/T2 signal. Abnormal  foci of postcontrast enhancement within the T2, T5, T8, T9, T12  vertebral bodies. Abnormal signal and postcontrast enhancement is seen  in the transverse processes at the level of T8. There are also areas  of abnormal enhancement in  multiple posterior ribs. No abnormal cord  signal. No loss of disc height. No high grade thoracic spinal canal or  neuroforaminal stenosis. No focal abnormality on diffusion-weighted  images.    Lumbar Spine: There are 5 lumbar-type vertebrae used for the purposes  of this dictation. Tip of the conus is at approximately L1. Normal  alignment. No loss of disc height. Diffusely heterogeneous marrow  T1/T2 signal. T2 hyperintense expansile postcontrast enhancing lesion  in the left L2 pedicle. Heterogeneous enhancement within the L3, S1  vertebral bodies are normally no abnormal intrathecal enhancement. No  focal abnormality on diffusion-weighted images.    On a level by level basis, the findings are as follows:    T12-L1: No spinal canal or neural foraminal stenosis.    L1-2: No spinal canal or neural foraminal stenosis.    L2-3: No spinal canal or neural foraminal stenosis.    L3-4: No spinal canal or neural foraminal stenosis.    L4-5: No spinal canal or neural foraminal stenosis.    L5-S1: No spinal canal or neural foraminal stenosis.    The visualized paraspinous tissues are without focal abnormality.       Impression    IMPRESSION:   1. No evidence of leptomeningeal disease in the cervical, thoracic  and/or lumbar spine.  2. Innumerable mostly sclerotic metastases throughout the cervical,  thoracic and lumbar spine. Multifocal areas of enhancing metastases as  detailed in the body of the report. No MRI evidence of pathologic  fracture.  3. No significant spinal canal or neural foraminal stenosis in the  cervical, thoracic and or lumbar spine.    I have personally reviewed the examination and initial interpretation  and I agree with the findings.    MAGDALENA CORTES MD         SYSTEM ID:  [id number]   MR Thoracic Spine w/o & w Contrast     Status: None    Narrative    EXAM: MR CERVICAL SPINE W/O & W CONTRAST, MR LUMBAR SPINE W/O  & W CONTRAST, MR THORACIC SPINE W/O & W CONTRAST   12/9/2022 1:42 PM     HISTORY:  r/o  spinal/leptomeningeal disease (metastatic breast ca) in  prep for whole brain radiation       COMPARISON:  MRI 12/7/2022    TECHNIQUE: Cervical spine: Sagittal T1-weighted, sagittal T2-weighted,  sagittal STIR, sagittal diffusion-weighted, axial T1-weighted, and  axial T2-weighted images of the cervical spine were obtained without  the administration of intravenous contrast. After the administration  of intravenous contrast, fat saturated axial and sagittal T1-weighted  images of the cervical spine were obtained.    Thoracic spine: Sagittal T1-weighted, sagittal T2-weighted, sagittal  STIR, sagittal diffusion weighted, axial T1-weighted, and axial  T2-weighted images of the thoracic spine were obtained without the  administration of intravenous contrast. After the administration of  intravenous contrast, fat saturated axial and sagittal T1-weighted  images of the thoracic spine were obtained.    Lumbar spine: Sagittal T1-weighted, sagittal T2-weighted, sagittal  STIR, axial T1-weighted, and axial T2-weighted images of the lumbar  spine were obtained without the administration of intravenous  contrast. After the administration of intravenous contrast, axial and  sagittal fat-saturated T1-weighted images of the lumbar spine were  obtained.    CONTRAST: 5.5 mL gadavist.    FINDINGS:    Cervical: Diffusely heterogeneous T1/T2 marrow signal with multiple  foci of postcontrast enhancement in the dens, C6, C7, T2 and T5  vertebral bodies. No abnormal foci of intra-axial enhancement. Normal  vertebral alignment. No loss of disc height. Normal marrow signal. No  abnormal cord signal. Diffusion-weighted images are negative for focal  abnormality.    No spinal canal or neural foraminal stenosis; or substantial  degenerative change at any level.    Visualized skull base, posterior fossa and paraspinal soft tissue  structures demonstrates partially visualized leptomeningeal  enhancement.    Thoracic Spine: Diffusely  heterogeneous marrow T1/T2 signal. Abnormal  foci of postcontrast enhancement within the T2, T5, T8, T9, T12  vertebral bodies. Abnormal signal and postcontrast enhancement is seen  in the transverse processes at the level of T8. There are also areas  of abnormal enhancement in multiple posterior ribs. No abnormal cord  signal. No loss of disc height. No high grade thoracic spinal canal or  neuroforaminal stenosis. No focal abnormality on diffusion-weighted  images.    Lumbar Spine: There are 5 lumbar-type vertebrae used for the purposes  of this dictation. Tip of the conus is at approximately L1. Normal  alignment. No loss of disc height. Diffusely heterogeneous marrow  T1/T2 signal. T2 hyperintense expansile postcontrast enhancing lesion  in the left L2 pedicle. Heterogeneous enhancement within the L3, S1  vertebral bodies are normally no abnormal intrathecal enhancement. No  focal abnormality on diffusion-weighted images.    On a level by level basis, the findings are as follows:    T12-L1: No spinal canal or neural foraminal stenosis.    L1-2: No spinal canal or neural foraminal stenosis.    L2-3: No spinal canal or neural foraminal stenosis.    L3-4: No spinal canal or neural foraminal stenosis.    L4-5: No spinal canal or neural foraminal stenosis.    L5-S1: No spinal canal or neural foraminal stenosis.    The visualized paraspinous tissues are without focal abnormality.       Impression    IMPRESSION:   1. No evidence of leptomeningeal disease in the cervical, thoracic  and/or lumbar spine.  2. Innumerable mostly sclerotic metastases throughout the cervical,  thoracic and lumbar spine. Multifocal areas of enhancing metastases as  detailed in the body of the report. No MRI evidence of pathologic  fracture.  3. No significant spinal canal or neural foraminal stenosis in the  cervical, thoracic and or lumbar spine.    I have personally reviewed the examination and initial interpretation  and I agree with the  findings.    MAGDALENA CORTES MD         SYSTEM ID:  [id number]   CT Head w/o Contrast     Status: None    Narrative    Stealth CT imaging for purposes of stereotactic evaluation    Provided History: pre-op planing  ICD-10:  Comparison: MR 12/7/2022    Technique: CT imaging performed with axial, sagittal, and coronal  reconstructed images obtained without intravenous contrast.    Contrast: None    Findings: Limited imaging for stereotactic localization demonstrates  no acute hemorrhage, mass effect or midline shift. The ventricles are  proportionate to the sulci. No acute loss of gray-white  differentiation. Basal cisterns are clear.. Calcifications in the  bilateral basal ganglia. Calcifications along the right  periventricular white matter. The metastatic lesions in the right  caudate and jude seen on prior MRI are not well appreciated on today's  exam. Moderate diffuse cerebral volume loss and patchy periventricular  and subcortical white matter hypodensity    Mucosal thickening in the left maxillary sinus. Bilateral mastoid  effusions. The remainder of the paranasal sinuses are clear.      Impression    Impression: No acute intracranial pathology. Moderate diffuse cerebral  volume loss with leukoaraiosis. The known lesions in the right basal  ganglia and jude are not well visualized on today's exam. Bilateral  mastoid effusions. Limited imaging performed primarily for the  purposes of stereotactic localization.     I have personally reviewed the examination and initial interpretation  and I agree with the findings.    ELIF THOMAS MD         SYSTEM ID:  [id number]   CT Head w/o Contrast     Status: None    Narrative    CT HEAD W/O CONTRAST 12/9/2022 10:21 PM    History: postop  shunt   ICD-10:    Comparison: CT head 12/9/2022    Technique: Using multidetector thin collimation helical acquisition  technique, axial, coronal and sagittal CT images from the skull base  to the vertex were obtained without intravenous  contrast.   (topogram) image(s) also obtained and reviewed.    Findings: Interval placement of right frontal approach ventricular  shunt with tip in the third ventricle. Gas overlying the right frontal  lobe and within the right anterior horn. Stable size of the  ventricular system and unchanged confluent periventricular white  matter hypoattenuation. There is no intracranial hemorrhage, mass  effect, or midline shift. Gray/white matter differentiation in both  cerebral hemispheres is preserved. The basal cisterns are clear.  Unchanged mineralization of the bilateral basal ganglia and along the  right frontal periventricular white matter. Generalized parenchymal  volume loss. Moderate leukoaraiosis. Again, metastatic lesions in the  right caudate and left parasagittal jude seen on prior MRI are not  appreciated on this exam.    Other than the luz hole, the bony calvaria and the bones of the skull  base are normal. The visualized portions of the paranasal sinuses are  clear. Bilateral mastoid effusions.      Impression    Impression:  1. New right frontal approach ventricular shunt terminates in the  third ventricle. Unchanged hydrocephalus and transependymal edema.  2. No acute intracranial pathology.    I have personally reviewed the examination and initial interpretation  and I agree with the findings.    NORI ALEXANDER MD         SYSTEM ID:  [id number]   XR Shunt Malfunction Surgery Survey     Status: None    Narrative    Radiographic shunt survey 12/9/2022.    History:  Postop shunt.  ICD-10:    Comparison:  CT head same day.    Findings: There is a high right frontal approach ventriculoperitoneal  shunt in the skull with tip in the vicinity of the third ventricle,  potassium valve, and coursing down the right cervical soft tissue. The  shunt catheter traverses the right thorax and is looped within the  right flank with tip in the right upper quadrant.    Left chest wall Port-A-Cath with tip in the lower  IVC. The  cardiomediastinal silhouette is within normal limits. No pneumothorax  or focal airspace opacity. No acute osseous abnormality.    Nonobstructive bowel gas pattern. No pneumatosis or portal venous gas.      Impression    Impression:   New ventriculoperitoneal shunt catheter is continuous throughout its  course in the head, neck, chest and abdomen.    I have personally reviewed the examination and initial interpretation  and I agree with the findings.    NORI ALEXANDER MD         SYSTEM ID:  [id number]   US Lower Extremity Venous Duplex Bilateral     Status: None    Narrative    EXAMINATION: US LOWER EXTREMITY VENOUS DUPLEX BILATERAL  12/11/2022  12:09 PM      CLINICAL HISTORY: recent PE, rule out DVT    COMPARISON: PET/CT 11/21/2022    PROCEDURE COMMENTS: Ultrasound was performed of the deep venous system  of the right and left lower extremity using grayscale, color, and  spectral Doppler.    FINDINGS:  The bilateral common femoral, greater saphenous origin, femoral,  popliteal, and deep calf veins are visualized and are patent. Venous  waveforms are normal. There is normal response to compression.      Impression    IMPRESSION:.  No deep vein thrombosis in the right or left lower extremity.    I have personally reviewed the examination and initial interpretation  and I agree with the findings.    MARICRUZ RASMUSSEN MD         SYSTEM ID:  [id number]   US Lower Extremity Venous Duplex Bilateral     Status: None    Narrative    EXAMINATION: DOPPLER VENOUS ULTRASOUND OF BILATERAL LOWER EXTREMITIES,  12/13/2022 8:53 AM     COMPARISON: Ultrasound 12/11/2022    HISTORY: high risk of DVT off anticoagulation. Evaluate for underlying  DVT, prior to discharge    TECHNIQUE:  Gray-scale evaluation with compression, spectral flow and  color Doppler assessment of the deep venous system of both legs from  groin to knee, and then at the ankles.    FINDINGS:  In both lower extremities, the common femoral, femoral, popliteal  and  posterior tibial veins demonstrate normal compressibility and blood  flow.      Impression    IMPRESSION:  1.  No evidence of deep venous thrombosis in either lower extremity.       I have personally reviewed the examination and initial interpretation  and I agree with the findings.    NORI FRASER MD         SYSTEM ID:  [id number]   INR     Status: Normal   Result Value Ref Range    INR 1.10 0.85 - 1.15   Basic metabolic panel     Status: Abnormal   Result Value Ref Range    Sodium 139 136 - 145 mmol/L    Potassium 4.3 3.4 - 5.3 mmol/L    Chloride 106 98 - 107 mmol/L    Carbon Dioxide (CO2) 22 22 - 29 mmol/L    Anion Gap 11 7 - 15 mmol/L    Urea Nitrogen 25.1 (H) 6.0 - 20.0 mg/dL    Creatinine 1.29 (H) 0.51 - 0.95 mg/dL    Calcium 8.6 8.6 - 10.0 mg/dL    Glucose 175 (H) 70 - 99 mg/dL    GFR Estimate 48 (L) >60 mL/min/1.73m2   Partial thromboplastin time     Status: Abnormal   Result Value Ref Range    aPTT 76 (H) 22 - 38 Seconds   Asymptomatic Influenza A/B & SARS-CoV2 (COVID-19) Virus PCR Multiplex Nasopharyngeal     Status: Normal    Specimen: Nasopharyngeal; Swab   Result Value Ref Range    Influenza A PCR Negative Negative    Influenza B PCR Negative Negative    RSV PCR Negative Negative    SARS CoV2 PCR Negative Negative    Narrative    Testing was performed using the Xpert Xpress CoV2/Flu/RSV Assay on the [employer] GeneXpert Instrument. This test should be ordered for the detection of SARS-CoV-2 and influenza viruses in individuals who meet clinical and/or epidemiological criteria. Test performance is unknown in asymptomatic patients. This test is for in vitro diagnostic use under the FDA EUA for laboratories certified under CLIA to perform high or moderate complexity testing. This test has not been FDA cleared or approved. A negative result does not rule out the presence of PCR inhibitors in the specimen or target RNA in concentration below the limit of detection for the assay. If only one viral target  is positive but coinfection with multiple targets is suspected, the sample should be re-tested with another FDA cleared, approved, or authorized test, if coinfection would change clinical management. This test was validated by the [facility]. These laboratories are certified under the Clinical Laboratory Improvement Amendments of 1988 (CLIA-88) as qualified to perform high complexity laboratory testing.   CBC with platelets and differential     Status: Abnormal   Result Value Ref Range    WBC Count 8.8 4.0 - 11.0 10e3/uL    RBC Count 4.18 3.80 - 5.20 10e6/uL    Hemoglobin 11.5 (L) 11.7 - 15.7 g/dL    Hematocrit 35.6 35.0 - 47.0 %    MCV 85 78 - 100 fL    MCH 27.5 26.5 - 33.0 pg    MCHC 32.3 31.5 - 36.5 g/dL    RDW 20.1 (H) 10.0 - 15.0 %    Platelet Count 419 150 - 450 10e3/uL    % Neutrophils 70 %    % Lymphocytes 20 %    % Monocytes 9 %    % Eosinophils 0 %    % Basophils 0 %    % Immature Granulocytes 1 %    NRBCs per 100 WBC 0 <1 /100    Absolute Neutrophils 6.2 1.6 - 8.3 10e3/uL    Absolute Lymphocytes 1.7 0.8 - 5.3 10e3/uL    Absolute Monocytes 0.8 0.0 - 1.3 10e3/uL    Absolute Eosinophils 0.0 0.0 - 0.7 10e3/uL    Absolute Basophils 0.0 0.0 - 0.2 10e3/uL    Absolute Immature Granulocytes 0.1 <=0.4 10e3/uL    Absolute NRBCs 0.0 10e3/uL   Basic metabolic panel     Status: Abnormal   Result Value Ref Range    Sodium 141 136 - 145 mmol/L    Potassium 3.5 3.4 - 5.3 mmol/L    Chloride 109 (H) 98 - 107 mmol/L    Carbon Dioxide (CO2) 24 22 - 29 mmol/L    Anion Gap 8 7 - 15 mmol/L    Urea Nitrogen 17.7 6.0 - 20.0 mg/dL    Creatinine 0.85 0.51 - 0.95 mg/dL    Calcium 8.9 8.6 - 10.0 mg/dL    Glucose 116 (H) 70 - 99 mg/dL    GFR Estimate 80 >60 mL/min/1.73m2   CBC with platelets     Status: Abnormal   Result Value Ref Range    WBC Count 10.2 4.0 - 11.0 10e3/uL    RBC Count 4.19 3.80 - 5.20 10e6/uL    Hemoglobin 11.6 (L) 11.7 - 15.7 g/dL    Hematocrit 36.4 35.0 - 47.0 %    MCV 87 78 - 100 fL    MCH 27.7  26.5 - 33.0 pg    MCHC 31.9 31.5 - 36.5 g/dL    RDW 19.9 (H) 10.0 - 15.0 %    Platelet Count 376 150 - 450 10e3/uL   UA with Microscopic reflex to Culture     Status: Abnormal    Specimen: Urine, Clean Catch   Result Value Ref Range    Color Urine Yellow Colorless, Straw, Light Yellow, Yellow    Appearance Urine Clear Clear    Glucose Urine Negative Negative mg/dL    Bilirubin Urine Negative Negative    Ketones Urine Negative Negative mg/dL    Specific Gravity Urine 1.016 1.003 - 1.035    Blood Urine Negative Negative    pH Urine 6.5 5.0 - 7.0    Protein Albumin Urine Negative Negative mg/dL    Urobilinogen Urine Normal Normal, 2.0 mg/dL    Nitrite Urine Negative Negative    Leukocyte Esterase Urine Negative Negative    Mucus Urine Present (A) None Seen /LPF    RBC Urine 1 <=2 /HPF    WBC Urine 1 <=5 /HPF    Squamous Epithelials Urine <1 <=1 /HPF    Narrative    Urine Culture not indicated   Drug abuse screen 1 urine (ED)     Status: Abnormal   Result Value Ref Range    Amphetamines Urine Screen Negative Screen Negative    Barbituates Urine Screen Negative Screen Negative    Benzodiazepine Urine Screen Negative Screen Negative    Cannabinoids Urine Screen Positive (A) Screen Negative    Cocaine Urine Screen Negative Screen Negative    Opiates Urine Screen Negative Screen Negative   Comprehensive metabolic panel     Status: Abnormal   Result Value Ref Range    Sodium 136 136 - 145 mmol/L    Potassium 3.8 3.4 - 5.3 mmol/L    Chloride 102 98 - 107 mmol/L    Carbon Dioxide (CO2) 25 22 - 29 mmol/L    Anion Gap 9 7 - 15 mmol/L    Urea Nitrogen 13.0 6.0 - 20.0 mg/dL    Creatinine 0.68 0.51 - 0.95 mg/dL    Calcium 8.2 (L) 8.6 - 10.0 mg/dL    Glucose 128 (H) 70 - 99 mg/dL    Alkaline Phosphatase 117 (H) 35 - 104 U/L    AST 61 (H) 10 - 35 U/L    ALT 62 (H) 10 - 35 U/L    Protein Total 5.9 (L) 6.4 - 8.3 g/dL    Albumin 3.6 3.5 - 5.2 g/dL    Bilirubin Total 0.4 <=1.2 mg/dL    GFR Estimate >90 >60 mL/min/1.73m2   CBC with  platelets     Status: Abnormal   Result Value Ref Range    WBC Count 12.4 (H) 4.0 - 11.0 10e3/uL    RBC Count 4.62 3.80 - 5.20 10e6/uL    Hemoglobin 12.7 11.7 - 15.7 g/dL    Hematocrit 40.1 35.0 - 47.0 %    MCV 87 78 - 100 fL    MCH 27.5 26.5 - 33.0 pg    MCHC 31.7 31.5 - 36.5 g/dL    RDW 19.9 (H) 10.0 - 15.0 %    Platelet Count 439 150 - 450 10e3/uL   Glucose by meter     Status: Abnormal   Result Value Ref Range    GLUCOSE BY METER POCT 168 (H) 70 - 99 mg/dL   Extra Tube ([place] Draw)     Status: None    Narrative    The following orders were created for panel order Extra Tube ([place] Draw).  Procedure                               Abnormality         Status                     ---------                               -----------         ------                     Extra Purple Top Tube[[id number]]                            Final result                 Please view results for these tests on the individual orders.   Extra Purple Top Tube     Status: None   Result Value Ref Range    Hold Specimen [facility]    Comprehensive metabolic panel     Status: Abnormal   Result Value Ref Range    Sodium 139 136 - 145 mmol/L    Potassium 4.1 3.4 - 5.3 mmol/L    Chloride 105 98 - 107 mmol/L    Carbon Dioxide (CO2) 24 22 - 29 mmol/L    Anion Gap 10 7 - 15 mmol/L    Urea Nitrogen 16.4 6.0 - 20.0 mg/dL    Creatinine 0.79 0.51 - 0.95 mg/dL    Calcium 8.3 (L) 8.6 - 10.0 mg/dL    Glucose 144 (H) 70 - 99 mg/dL    Alkaline Phosphatase 109 (H) 35 - 104 U/L    AST 49 (H) 10 - 35 U/L    ALT 51 (H) 10 - 35 U/L    Protein Total 5.7 (L) 6.4 - 8.3 g/dL    Albumin 3.5 3.5 - 5.2 g/dL    Bilirubin Total 0.4 <=1.2 mg/dL    GFR Estimate 87 >60 mL/min/1.73m2   CBC with platelets     Status: Abnormal   Result Value Ref Range    WBC Count 17.6 (H) 4.0 - 11.0 10e3/uL    RBC Count 4.40 3.80 - 5.20 10e6/uL    Hemoglobin 12.3 11.7 - 15.7 g/dL    Hematocrit 37.6 35.0 - 47.0 %    MCV 86 78 - 100 fL    MCH 28.0 26.5 - 33.0 pg    MCHC 32.7 31.5 - 36.5 g/dL    RDW  20.6 (H) 10.0 - 15.0 %    Platelet Count 455 (H) 150 - 450 10e3/uL   Phosphorus     Status: Abnormal   Result Value Ref Range    Phosphorus 2.3 (L) 2.5 - 4.5 mg/dL   Magnesium     Status: Normal   Result Value Ref Range    Magnesium 2.3 1.7 - 2.3 mg/dL   Comprehensive metabolic panel     Status: Abnormal   Result Value Ref Range    Sodium 139 136 - 145 mmol/L    Potassium 4.1 3.4 - 5.3 mmol/L    Chloride 106 98 - 107 mmol/L    Carbon Dioxide (CO2) 23 22 - 29 mmol/L    Anion Gap 10 7 - 15 mmol/L    Urea Nitrogen 14.4 6.0 - 20.0 mg/dL    Creatinine 0.70 0.51 - 0.95 mg/dL    Calcium 9.1 8.6 - 10.0 mg/dL    Glucose 174 (H) 70 - 99 mg/dL    Alkaline Phosphatase 117 (H) 35 - 104 U/L    AST 46 (H) 10 - 35 U/L    ALT 49 (H) 10 - 35 U/L    Protein Total 6.2 (L) 6.4 - 8.3 g/dL    Albumin 3.7 3.5 - 5.2 g/dL    Bilirubin Total 0.5 <=1.2 mg/dL    GFR Estimate >90 >60 mL/min/1.73m2   CBC with platelets     Status: Abnormal   Result Value Ref Range    WBC Count 16.5 (H) 4.0 - 11.0 10e3/uL    RBC Count 4.51 3.80 - 5.20 10e6/uL    Hemoglobin 12.6 11.7 - 15.7 g/dL    Hematocrit 39.1 35.0 - 47.0 %    MCV 87 78 - 100 fL    MCH 27.9 26.5 - 33.0 pg    MCHC 32.2 31.5 - 36.5 g/dL    RDW 21.2 (H) 10.0 - 15.0 %    Platelet Count 455 (H) 150 - 450 10e3/uL   Phosphorus     Status: Normal   Result Value Ref Range    Phosphorus 2.9 2.5 - 4.5 mg/dL   Magnesium     Status: Normal   Result Value Ref Range    Magnesium 2.1 1.7 - 2.3 mg/dL   Comprehensive metabolic panel     Status: Abnormal   Result Value Ref Range    Sodium 140 136 - 145 mmol/L    Potassium 4.6 3.4 - 5.3 mmol/L    Chloride 104 98 - 107 mmol/L    Carbon Dioxide (CO2) 28 22 - 29 mmol/L    Anion Gap 8 7 - 15 mmol/L    Urea Nitrogen 16.3 6.0 - 20.0 mg/dL    Creatinine 0.74 0.51 - 0.95 mg/dL    Calcium 9.2 8.6 - 10.0 mg/dL    Glucose 173 (H) 70 - 99 mg/dL    Alkaline Phosphatase 114 (H) 35 - 104 U/L    AST 47 (H) 10 - 35 U/L    ALT 51 (H) 10 - 35 U/L    Protein Total 5.9 (L) 6.4 - 8.3  g/dL    Albumin 3.6 3.5 - 5.2 g/dL    Bilirubin Total 0.4 <=1.2 mg/dL    GFR Estimate >90 >60 mL/min/1.73m2   CBC with platelets     Status: Abnormal   Result Value Ref Range    WBC Count 14.4 (H) 4.0 - 11.0 10e3/uL    RBC Count 4.35 3.80 - 5.20 10e6/uL    Hemoglobin 11.9 11.7 - 15.7 g/dL    Hematocrit 38.3 35.0 - 47.0 %    MCV 88 78 - 100 fL    MCH 27.4 26.5 - 33.0 pg    MCHC 31.1 (L) 31.5 - 36.5 g/dL    RDW 20.4 (H) 10.0 - 15.0 %    Platelet Count 387 150 - 450 10e3/uL   Glucose by meter     Status: Abnormal   Result Value Ref Range    GLUCOSE BY METER POCT 227 (H) 70 - 99 mg/dL   Magnesium     Status: Normal   Result Value Ref Range    Magnesium 1.9 1.7 - 2.3 mg/dL   Potassium     Status: Normal   Result Value Ref Range    Potassium 4.4 3.4 - 5.3 mmol/L   Phosphorus     Status: Normal   Result Value Ref Range    Phosphorus 3.3 2.5 - 4.5 mg/dL   EKG 12-lead, complete     Status: None   Result Value Ref Range    Systolic Blood Pressure  mmHg    Diastolic Blood Pressure  mmHg    Ventricular Rate 83 BPM    Atrial Rate 83 BPM    CT Interval 146 ms    QRS Duration 66 ms     ms    QTc 458 ms    P Axis 57 degrees    R AXIS 56 degrees    T Axis 69 degrees    Interpretation ECG       Sinus rhythm  Possible Left atrial enlargement  Borderline ECG  Unconfirmed report - interpretation of this ECG is computer generated - see medical record for final interpretation  Confirmed by - EMERGENCY ROOM, PHYSICIAN ([id number]),  SONNY COBB ([id number]) on 12/8/2022 6:58:39 AM     EKG 12-lead, complete     Status: None (Preliminary result)   Result Value Ref Range    Systolic Blood Pressure  mmHg    Diastolic Blood Pressure  mmHg    Ventricular Rate 74 BPM    Atrial Rate 74 BPM    CT Interval 142 ms    QRS Duration 64 ms     ms    QTc 419 ms    P Axis 58 degrees    R AXIS 43 degrees    T Axis 70 degrees    Interpretation ECG       Sinus rhythm  Possible Left atrial enlargement  Borderline ECG  When compared with ECG of  07-DEC-2022 21:50,  No significant change was found     Adult Type and Screen     Status: None   Result Value Ref Range    ABO/RH(D) A POS     Antibody Screen Negative Negative    SPECIMEN EXPIRATION DATE [id number]    Cytology, non-gynecologic     Status: Abnormal   Result Value Ref Range    Final Diagnosis       Specimen A     Interpretation:      Positive for malignancy  Morphologically consistent with metastatic carcinoma     Adequacy:     Satisfactory for evaluation          Clinical Information       The patient is a 56 year old female with history 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 Brain, :A. Brain, , CSF:  Received 5 ml of hazy, light pink fluid, processed as 1 Pap stained cytospin and 1 Lopez stained cytospin.                 Microscopic Description       Microscopic examination was performed.    A resident/fellow in an ACGME accredited training program was involved in the initial review, preparation, and/or interpretation of this case.  I, as the senior physician, attest that I: (i) reviewed patient clinical records if indicated; (ii) reviewed relevant lab test results; (iii) examined the relevant preparation(s) for the specimen(s); and (iv) agree with the report, diagnosis(es), and interpretation as documented by the resident/fellow and edited/confirmed by me. Reporting resident/fellow: Maryann Aguayo [id number]      Abnormal Result? Yes (A) No    Performing Labs       The technical component of this testing was completed at [facility]     Flow Cytometry Cerebrospinal fluid     Status: None    Specimen: Brain; Cerebrospinal fluid   Result Value Ref Range    Case Report       Flow Cytometry Report                             Case: [id number]                                  Authorizing  Provider:  Adwoa Lea    Collected:           12/12/2022 10:56 AM                                 BONITA Javier CNP                                                               Ordering Location:     [facility]     Received:            12/12/2022 12:53 PM                                 [address] [place]                                                            Pathologist:           Pardeep Cueva MD                                                     Specimen:    Brain                                                                                      Flow Interpretation       A. Cerebrospinal fluid:  - Polytypic B cells  - See comment      Comment       There is no immunophenotypic evidence of CNS involvement by B cell non Hodgkin lymphoma. Final interpretation requires correlation with the results of other ancillary studies and the morphologic and clinical features.       Flow Phenotypic Data       Unless otherwise indicated, percentages reported below are based on the total number of CD45 positive viable leukocytes. If applicable, percentage of plasma cells is from total viable nucleated cells.    1.0% polytypic B cells    A resident/fellow in an ACGME accredited training program was involved in the initial review, preparation, and/or interpretation of this case.  I, as the senior physician, attest that I: (i) reviewed patient clinical records if indicated; (ii) reviewed relevant lab test results; (iii) examined the relevant preparation(s) for the specimen(s); and (iv) agree with the report, diagnosis(es), and interpretation as documented by the resident/fellow and edited/confirmed by me. Reporting resident/fellow: Jonnie Kennedy MD      Flow Processing Information       Multi-color flow analysis is performed for the following markers: CD5, CD10, CD19, CD20, CD34, CD38, CD45,and kappa and lambda immunoglobulin light chains. Cells are gated to isolate populations (CD45 versus side  scatter and forward scatter versus side scatter), to exclude debris (forward scatter versus side scatter) and to exclude cell doublets (forward scatter height versus forward scatter width and side scatter height versus side scatter width). Forward scatter varies with cell size. Side scatter varies with the amount of cytoplasmic granules. Intensity for CD45 usually increases as hematolymphoid cells mature.      Clinical Information       56 year old female with history of metastatic breast CA and leptomeningeal disease      FDA Disclaimer       This test was developed and its performance characteristics determined by the [facility]. It has not been cleared or approved by the US Food and Drug Administration.  FDA does not require this test to go through premarket FDA review. This test is used for clinical purposes and should not be regarded as investigational or for research. This laboratory is certified under the Clinical Laboratory Improvement Amendments (CLIA) as qualified to perform high complexity clinical laboratory testing.      Performing Labs       The technical component of this testing was completed at [facility]     ABO/Rh type and screen     Status: None    Narrative    The following orders were created for panel order ABO/Rh type and screen.  Procedure                               Abnormality         Status                     ---------                               -----------         ------                     Adult Type and Screen[[id number]]                            Final result                 Please view results for these tests on the individual orders.   CBC with platelets differential     Status: Abnormal    Narrative    The following orders were created for panel order CBC with platelets differential.  Procedure                               Abnormality         Status                      ---------                               -----------         ------                     CBC with platelets and d...[[id number]]  Abnormal            Final result                 Please view results for these tests on the individual orders.   Urine Drugs of Abuse Screen     Status: Abnormal    Narrative    The following orders were created for panel order Urine Drugs of Abuse Screen.  Procedure                               Abnormality         Status                     ---------                               -----------         ------                     Drug abuse screen 1 urin...[[id number]]  Abnormal            Final result                 Please view results for these tests on the individual orders.     Medications   calcium carbonate 500 mg (elemental) (OSCAL) tablet 500 mg (500 mg Oral Given 12/12/22 2230)   Vitamin D3 (CHOLECALCIFEROL) tablet 1,000 Units (1,000 Units Oral Given 12/13/22 0814)   metoclopramide (REGLAN) tablet 10 mg ( Oral Unhold 12/9/22 2323)   mirtazapine (REMERON) tablet TABS 7.5 mg (7.5 mg Oral Given 12/12/22 2230)   multivitamin w/minerals (THERA-VIT-M) tablet 1 tablet (1 tablet Oral Given 12/13/22 0814)   OLANZapine (zyPREXA) tablet 5 mg (5 mg Oral Given 12/12/22 2232)   venlafaxine (EFFEXOR) tablet 75 mg (75 mg Oral Given 12/13/22 0815)   lidocaine 1 % 0.1-1 mL ( Other Unhold 12/9/22 2323)   lidocaine (LMX4) cream ( Topical Unhold 12/9/22 2323)   sodium chloride (PF) 0.9% PF flush 3 mL ( Intracatheter Canceled Entry 12/13/22 0816)   sodium chloride (PF) 0.9% PF flush 3 mL ( Intracatheter Unhold 12/9/22 2323)   ondansetron (ZOFRAN ODT) ODT tab 4 mg ( Oral Unhold 12/9/22 2323)   prochlorperazine (COMPAZINE) tablet 10 mg ( Oral Unhold 12/9/22 2323)     Or   prochlorperazine (COMPAZINE) suppository 25 mg ( Rectal Unhold 12/9/22 2323)   dexamethasone (DECADRON) tablet 4 mg (4 mg Oral Given 12/13/22 1024)   hydrALAZINE (APRESOLINE) tablet 25 mg ( Oral Unhold 12/9/22 3470)    acetaminophen (TYLENOL) tablet 650 mg (650 mg Oral Given 12/13/22 0818)   magnesium hydroxide (MILK OF MAGNESIA) suspension 30 mL (has no administration in time range)   bisacodyl (DULCOLAX) suppository 10 mg (has no administration in time range)   sodium chloride (PF) 0.9% PF flush 3 mL (3 mLs Intracatheter Given 12/13/22 0816)   sodium chloride (PF) 0.9% PF flush 3 mL (has no administration in time range)   oxyCODONE (ROXICODONE) tablet 5 mg ( Oral See Alternative 12/11/22 0915)     Or   oxyCODONE IR (ROXICODONE) tablet 10 mg (10 mg Oral Given 12/11/22 0915)   sodium chloride 0.9% infusion ( Intravenous New Bag 12/10/22 0028)   naloxone (NARCAN) injection 0.2 mg (has no administration in time range)     Or   naloxone (NARCAN) injection 0.4 mg (has no administration in time range)     Or   naloxone (NARCAN) injection 0.2 mg (has no administration in time range)     Or   naloxone (NARCAN) injection 0.4 mg (has no administration in time range)   sodium chloride (PF) 0.9% PF flush 10-20 mL (has no administration in time range)   sodium chloride (PF) 0.9% PF flush 10-20 mL (has no administration in time range)   sodium chloride (PF) 0.9% PF flush 10-20 mL (10 mLs Intracatheter Given 12/11/22 0038)   heparin lock flush 10 UNIT/ML injection 5-10 mL (has no administration in time range)   heparin lock flush 10 UNIT/ML injection 5-10 mL (5 mLs Intracatheter Given 12/13/22 0035)   heparin 100 UNIT/ML injection 5-10 mL (5 mLs Intracatheter Not Given 12/11/22 0041)   Lidocaine (LIDOCARE) 4 % Patch 1 patch (1 patch Transdermal Patch/Med Applied 12/13/22 0815)   lidocaine patch in PLACE ( Transdermal Patch Free Period 12/13/22 0504)   traZODone (DESYREL) half-tab 25 mg ( Oral See Alternative 12/12/22 2231)     Or   traZODone (DESYREL) tablet 50 mg (50 mg Oral Given 12/12/22 2231)   melatonin tablet 6 mg (6 mg Oral Given 12/12/22 2232)   polyethylene glycol (MIRALAX) Packet 17 g (17 g Oral Not Given 12/13/22 0813)    senna-docusate (SENOKOT-S/PERICOLACE) 8.6-50 MG per tablet 3 tablet (3 tablets Oral Not Given 12/13/22 0814)     Or   senna-docusate (SENOKOT-S/PERICOLACE) 8.6-50 MG per tablet 2 tablet ( Oral See Alternative 12/13/22 0814)   ceFAZolin (ANCEF) 2 g in 100 mL D5W intermittent infusion (2 g Intravenous Given 12/9/22 1915)   acetaminophen (TYLENOL) tablet 975 mg (975 mg Oral Given 12/9/22 1745)   oxyCODONE (ROXICODONE) tablet 5 mg (5 mg Oral Given 12/8/22 0859)   gadobutrol (GADAVIST) injection 7.5 mL (5.5 mLs Intravenous Given 12/9/22 1207)   acetaminophen (TYLENOL) tablet 975 mg (975 mg Oral Given 12/12/22 1620)   ceFAZolin (ANCEF) 1 g vial to attach to  ml bag for ADULT or 50 ml bag for PEDS (1 g Intravenous New Bag 12/10/22 1904)   hydrALAZINE (APRESOLINE) injection 10 mg (10 mg Intravenous Given 12/9/22 2134)   potassium & sodium phosphates (NEUTRA-PHOS) Packet 1 packet (1 packet Oral or Feeding Tube Given 12/11/22 0428)        Assessments & Plan (with Medical Decision Making)   56-year-old female with metastatic breast cancer now with finding of brain metastatic disease and communicating hydrocephalus.  Patient was evaluated by neurosurgery who plans to do a shunt in the morning.  She will be admitted to the medicine service for preop evaluation.  See no evidence for stroke or sepsis.    I have reviewed the nursing notes. I have reviewed the findings, diagnosis, plan and need for follow up with the patient.    Discharge Medication List as of 12/13/2022 11:56 AM      START taking these medications    Details   acetaminophen (TYLENOL) 325 MG tablet Take 2 tablets (650 mg) by mouth every 4 hours as needed for other (For optimal non-opioid multimodal pain management to improve pain control.), OTC      dexamethasone (DECADRON) 4 MG tablet Take 1 tablet (4 mg) by mouth 2 times daily (with meals) for 7 days, Disp-14 tablet, R-0, E-Prescribe      melatonin 3 MG tablet Take 2 tablets (6 mg) by mouth every evening  for 30 days, Disp-60 tablet, R-0, E-Prescribe      oxyCODONE (ROXICODONE) 5 MG tablet Take 1 tablet (5 mg) by mouth every 4 hours as needed for moderate pain (4-6), Disp-6 tablet, R-0, Local Print      polyethylene glycol (MIRALAX) 17 GM/Dose powder Take 17 g by mouth daily as needed for constipation, Disp-510 g, OTC      traZODone (DESYREL) 50 MG tablet Take 1 tablet (50 mg) by mouth nightly as needed for sleep, Disp-14 tablet, R-0, E-Prescribe             Final diagnoses:   Metastatic breast cancer (H)   Communicating hydrocephalus (H)       --  Deepak Villa MD.  [facility] EMERGENCY DEPARTMENT  12/7/2022     Deepak Villa MD  12/13/22 [id number]

## 2022-12-07 NOTE — PROGRESS NOTES
Dec 7, 2022      REASON FOR VISIT: Follow-up breast cancer     HISTORY OF PRESENT ILLNESS:  Josefina Bennett was self referred to our clinic for clinical trial recommendations for newly diagnosed stage II breast cancer. She had her last mammogram approximately a year prior. She did notice in early January 2015 that she was having some distortion of the right breast, and she went to see her gynecologist and had a mammogram performed. She had a screening mammogram performed on 01/15/2015. Breast tissue was heterogeneously dense, which might compromise the mammography. There was architectural distortion in the right breast approximately 11-12 o'clock position, zone 2, posterior depth, and a CC MLO spot compression was performed and an ultrasound was planned. The diagnostic mammogram from 01/28/2015 showed right breast architectural distortion centered at the 12 o'clock position, zone 2, suspicious for neoplasm. Breast tomosynthesis was used in the interpretation. Ultrasound findings included targeted ultrasound of the right upper breast demonstrating a band-like area of abnormal echogenicity between 11 and 12 o'clock position in the right breast at zone 2. This measures 4 cm transversely x 1.1 cm AP, and there was only a 1.5 cm measurement in the radial direction. There was blood flow suspicious for neoplasm at the 11 o'clock position in zone 2. There is a 1.4 cm hypoechoic nodule slightly  from the larger area of altered echogenicity that is also suspicious. A biopsy was performed. The biopsy showed infiltrating lobular carcinoma, grade 2, and a clip was placed at specimen [id number]. There were a few signet cells present. The tumor was ER-positive, TX low positive, and HER2 was negative by immunohistochemistry. She subsequently underwent an MRI showing a tumor that was 4.8 x 1.7 x 3.2 cm in size. Overall, her clinical stage was T2 NX MX.       She was enrolled in the I-SPY-2 clinical trial, and qualified with  high-risk MammaPrint score. She was randomized to ganetespib and paclitaxel, and she was treated with 12 cycles of treatment, and then started on AC on 05/26/2015. She developed intractable nausea and vomiting after the first cycle, which required hospitalization on the second cycle. She then developed Pneumocystis pneumonia, which resulted in intubation and a 2-week hospitalization during the course of AC. She was discharged on 07/02/2015, and decided she did not want to pursue any further chemotherapy. She had a right lumpectomy and sentinel lymph node procedure 08/12/2015. She had a positive margin, underwent a re-excision, and had a positive margin. Ultimately, she underwent a right mastectomy with clear margins. She began radiation treatment with Dr. Bill Chauhan, and completed radiation therapy on 12/04/2015. Pathologic stage was III-A, ypT3 N1a MX. Of concern, she had what could be considered an RCB3 tumor.  She started adjuvant letrozole on 1/28/16.     4/6/2022 Josefina Verma developed a fever during Reclast.  She was feeling unwell and went to the ED.  CT CAP showed new involvement of abdominal lymph nodes and multiple bone metastases.  She was admitted and I saw her in the hospital 4-7-22 to discuss the plan for biopsy.  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  A mix of TNBC and ER- HER2+.  Clinic conference consensus was for the modified Vandana with paclitaxel weekly to treat both clones.         TREATMENT HISTORY:  A.  Neoadjuvant therapy on I SPY-2 with ganetespib and paclitaxel.  AC x 2 complicated by PCP pneumonia.    B.  Right lumpectomy, followed by right margin resections, followed by right mastectomy.   B.  Oophorectomy 10-16-15.   C.  Tamoxifen after 10-6-15.   D.  Radiation therapy. 5,040 cGy in 180 cGy/fraction to the chest wall and regional lymphatics followed  by 720 cGy axillary lymph node boost and 1000 cGy mastectomy scar boost. Completed on 12/3/2015.  E.   Adjuvant letrozole begun 1-28-16.  Plan was to continue through 2026.  F.  Diagnosis with recurrent/metastatic breast cancer with a sacral mass that was biopsied showing pleiomorphic lobular breast cancer that is ER negative, MD negative, HER2 positive by FISH in 20% of the cells and negative in 80% of cells.  A mix of TNBC and ER- HER2+.  Clinic conference consensus was for the modified Vandana with paclitaxel weekly to treat both clones.   G. 9/30/22 Started Enhertu   H. Was admitted 10/3-10/7, C. Difficile, also had + Galactomanin. Started on voriconazole 10/11/22  I. 10/19 Cycle #2 Enhertu, dose reduced   J. PE-- incidentally found on 11/1, admitted for 48 hours, started on Eliquis          INTERVAL HISTORY:    Today, she is seen in person.  Since she was last seen on Friday, she went home Friday night and fell asleep.  She felt like at that time she was feeling well.  She does note that she had mild headaches at work for a few days but did not think anything of them.  However, she then slept the whole weekend.  When she awoke, her roommate was waking her up.  She was unaware of what day it was.  She had not soiled herself.  She felt very ill when she woke up, nausea, vomiting, dizziness, and was confused.  She was recommended to go to the emergency room, however left both legs feel and [facility] due to long wait times.  Yesterday, she presented to the ER again, at an outside hospital [place] [facility], she was worked up for an infection, which was negative except for a possible UTI.    She is not having any urinary tract infection symptoms, no burning with urination, blood in the urine, urgency, lower abdominal pain, back pain, fevers or chills.  She has not yet started the antibiotics.    Over the last few days, she has noted that she has felt dizzy.  She does not feel well, and vomited 5 times this  morning.  She is not having any belly pain.  Her bowels are moving regularly.  She again denies any fevers body aches or chills.    She denies any vision changes.  She is not having any slurred speech or focal weakness.      No recent falls or trauma      REVIEW OF SYSTEMS:    Patient denies the following except if noted above: fevers, body aches, chills, headaches, vision changes, dizziness, chest pain, shortness of breath, nausea, vomiting, diarrhea, constipation, abdominal pain, rashes, bruising/bleeding, mouth sores, swelling or pain in the legs.      PHYSICAL EXAMINATION  BP (!) 162/92   Pulse 100   Temp 98  F (36.7  C) (Oral)   Wt 61.2 kg (135 lb)   LMP 12/18/2014   SpO2 100%   BMI 23.16 kg/m      Wt Readings from Last 4 Encounters:   12/07/22 61.2 kg (135 lb)   12/02/22 63.8 kg (140 lb 9.6 oz)   11/11/22 60.5 kg (133 lb 6.4 oz)   11/01/22 60.8 kg (134 lb)         VS: Above vitals reviewed  General: Resting comfortably in no acute distress  Head: Normocephalic, atraumatic   Heart: Regular rate and rhythm, no murmurs  Lung: Normal respiratory effort. Clear to auscultation bilaterally. No wheezes, rhonchi, or crackles   Neuro: AA. Oriented x3, however does not know the president. Speech is fluent. Gait is steady. Moving all extremities. Cranial nerves II-IV intact. Strength is 5/5 bilaterally, symmetric.   Extremities: No lower extremity edema  Skin: Warm, dry, intact. No rashes, no suspicious lesions. No petechia or bruising noted           LABORATORY DATA:    Reviewed labs from yesterday in the ER.    Sodium 136 - 145 mmol/L 138     Potassium 3.5 - 5.1 mmol/L 4.0     Chloride 98 - 109 mmol/L 106     CO2 20 - 29 mmol/L 20     Anion Gap 7 - 16 mmol/L 12     Calcium 8.4 - 10.4 mg/dL 9.0     BUN 7 - 26 mg/dL 30 High      Creatinine 0.55 - 1.02 mg/dL 0.73     Glucose 70 - 100 mg/dL 181 High              3.5 - 10.5 x10(9)/L 10.5    RBC 3.90 - 5.03 x10(12)/L 4.99    Hemoglobin 12.0 - 15.5 g/dL 13.9    HCT 34.9  - 44.5 % 42.4    MCV 80.0 - 100.0 fL 85.0    MCH 27.6 - 33.3 pg 27.9    MCHC 31.5 - 35.2 g/dL 32.8    RDW 11.9 - 15.5 % 20.5 High     Platelets 150 - 450 x10(9)/L 567 High     Immature Gran % 0.0 - 0.5 % 0.5    Automated NRBC <=0 /100 WBC 0    Neutrophil Absolute 1.7 - 7.0 10(9)/L 7.1 High     Lymphocyte Absolute 1.0 - 4.8 10(9)/L 2.2    Monocytes Absolute 0.2 - 0.9 10(9)/L 1.1 High     Eosinophil Absolute 0.0 - 0.5 10(9)/L 0.1    Basophil Absolute 0.0 - 0.3 10(9)/L 0.1          ASSESSMENT AND PLAN:   Shelby Bennett is a 56-year-old woman with logy pleomorphic lobular cancer, wa history of a clinical stage II right breast cancer, ER positive, WA positive, HER2 negative by immunohistochemistry, now with recurrent Metastatic breast cancer, lobular. New histohich is negative for estrogen receptor and negative for progesterone receptor, and 20% is HER2 positive by FISH.  - She has a mixture of recurrent metastatic HER2 positive breast cancer that is estrogen receptor negative and 80% triple negative breast cancer.    - Discussed this situation in breast cancer conference on April 22 and the consensus opinion is to treat the minority HER2+ component first with Vandana regimen because that treatment would also cover the triple negative (TNBC) component with the taxane part of the treatment. This would push back the use of pembrolizumab to the second line setting or later if the 22C3 staining is positive, but would allow earlier treatment of the HER2 component.     - Tumor markers had been rising significantly. Dr. Meza has concerns that there are 2 clones ( Biopsy of the sacrum in April showed 2 clones 1 of which was 20% of the tumor and was HER2 amplified remainder of the tumor is HER2 1+.) as indicated by pathology-- one of them being triple negative the other being HER2 positive.   - August 29 PET/CT the site of initial metastasis static disease showed no FDG uptake.  There were no suspicious bony lesions. The  PET did demonstrate LAD on the left neck and axillae. 09/13/22 axillary lymph node biopsy negative for malignancy. Dr. Meza opted to switch to Enhertu given rising markers, see previous notes for details     - S/P three cycles of Enhertu. Last given  11/11  - Tumor markers continue to rise, PET with possible worsening hepatic mets. A liver MRI was ordered, but had to cancel given acute change  - Now with new neuro changes- see below      #Neurologic symptoms  - Acute changes with confusion, dizziness, headaches, nausea and vomiting x3 days. STAT brain MRI today, concerned for brain mets  - 4mg of dex with infusion       #N/V  - 1L of fluids today, add 4mg dexamethasone and 5mg IV compazine        Not specifically addressed today:  #CINV, severe with Enhertu   - Continue IV aloxi with infusions.  Could add IV Emend in the future if needed as she is now off voriconazole  - Continue Zypreza 5mg at bedtime  - Has zofran, compazine, and reglan for PRN as well        # Psych:  - She feels that she is coping very well currently and does not need any additional support at this time  - Continue Effexor 75mg daily          # Bone Mets  - Asymptomatic  - Currently getting xgeva monthly --ON HOLD due to molar issue  - Has met with the dentist, who recommended we continue to hold Xgeva until her dental work gets completed.  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  Recently seen, and voriconazole was discontinued.  - Resolved    # C. Diff 10/5  - Resolved    #PE  - On eliquis,  Breathing greatly improved   - No bruising bleeding          Patient will call in the interim with any questions or concerns. They voice understanding and agree with the above plan.         Sandra Coughlin PA-C      ADDENDUM:   Brain MRI Today:  IMPRESSION:  1. 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  2. Acute communicating hydrocephalus     [Urgent Result: Acute hydrocephalus and leptomeningeal disease.]     Finding was identified on 12/7/2022 2:48 PM.      CINDY Cummins was contacted by Dr. Claire at 12/7/2022 3:43  PM and verbalized understanding of the urgent finding.       I have personally reviewed the examination and initial interpretation  and I agree with the findings.     KATIA CLAIRE MD        Called and updated patient, she will present back to the ED right away. Updated Dr. Meza, primary oncologist who is also going to see her in the ED. Updated ER provider.      Sandra Coughlin PA-C on 12/7/2022 at 4:40 PM

## 2022-12-07 NOTE — ED TRIAGE NOTES
"Patient ambulatory to triage for an abnormal MRI. Patient states she has \"fluid around her brain\". Patient took Tylenol for a headache around 5pm.       "

## 2022-12-07 NOTE — LETTER
12/7/2022         RE: Josefina Bennett  [address]  [facility] [address]        Dear Colleague,    Thank you for referring your patient, Josefina Bennett, to the [facility]. Please see a copy of my visit note below.    Dec 7, 2022      REASON FOR VISIT: Follow-up breast cancer     HISTORY OF PRESENT ILLNESS:  Josefina Bennett was self referred to our clinic for clinical trial recommendations for newly diagnosed stage II breast cancer. She had her last mammogram approximately a year prior. She did notice in early January 2015 that she was having some distortion of the right breast, and she went to see her gynecologist and had a mammogram performed. She had a screening mammogram performed on 01/15/2015. Breast tissue was heterogeneously dense, which might compromise the mammography. There was architectural distortion in the right breast approximately 11-12 o'clock position, zone 2, posterior depth, and a CC MLO spot compression was performed and an ultrasound was planned. The diagnostic mammogram from 01/28/2015 showed right breast architectural distortion centered at the 12 o'clock position, zone 2, suspicious for neoplasm. Breast tomosynthesis was used in the interpretation. Ultrasound findings included targeted ultrasound of the right upper breast demonstrating a band-like area of abnormal echogenicity between 11 and 12 o'clock position in the right breast at zone 2. This measures 4 cm transversely x 1.1 cm AP, and there was only a 1.5 cm measurement in the radial direction. There was blood flow suspicious for neoplasm at the 11 o'clock position in zone 2. There is a 1.4 cm hypoechoic nodule slightly  from the larger area of altered echogenicity that is also suspicious. A biopsy was performed. The biopsy showed infiltrating lobular carcinoma, grade 2, and a clip was placed at specimen [id number]. There were a few signet cells present. The tumor was ER-positive, DC low positive, and HER2 was  negative by immunohistochemistry. She subsequently underwent an MRI showing a tumor that was 4.8 x 1.7 x 3.2 cm in size. Overall, her clinical stage was T2 NX MX.       She was enrolled in the I-SPY-2 clinical trial, and qualified with high-risk MammaPrint score. She was randomized to ganetespib and paclitaxel, and she was treated with 12 cycles of treatment, and then started on AC on 05/26/2015. She developed intractable nausea and vomiting after the first cycle, which required hospitalization on the second cycle. She then developed Pneumocystis pneumonia, which resulted in intubation and a 2-week hospitalization during the course of AC. She was discharged on 07/02/2015, and decided she did not want to pursue any further chemotherapy. She had a right lumpectomy and sentinel lymph node procedure 08/12/2015. She had a positive margin, underwent a re-excision, and had a positive margin. Ultimately, she underwent a right mastectomy with clear margins. She began radiation treatment with Dr. Bill Chauhan, and completed radiation therapy on 12/04/2015. Pathologic stage was III-A, ypT3 N1a MX. Of concern, she had what could be considered an RCB3 tumor.  She started adjuvant letrozole on 1/28/16.     4/6/2022 Josefina Verma developed a fever during Reclast.  She was feeling unwell and went to the ED.  CT CAP showed new involvement of abdominal lymph nodes and multiple bone metastases.  She was admitted and I saw her in the hospital 4-7-22 to discuss the plan for biopsy.  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  A mix of TNBC and ER- HER2+.  Clinic conference consensus was for the modified Vandana with paclitaxel weekly to treat both clones.         TREATMENT HISTORY:  A.  Neoadjuvant therapy on I SPY-2 with ganetespib and paclitaxel.  AC x 2 complicated by PCP pneumonia.    B.  Right  lumpectomy, followed by right margin resections, followed by right mastectomy.   B.  Oophorectomy 10-16-15.   C.  Tamoxifen after 10-6-15.   D.  Radiation therapy. 5,040 cGy in 180 cGy/fraction to the chest wall and regional lymphatics followed by 720 cGy axillary lymph node boost and 1000 cGy mastectomy scar boost. Completed on 12/3/2015.  E.   Adjuvant letrozole begun 1-28-16.  Plan was to continue through 2026.  F.  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  A mix of TNBC and ER- HER2+.  Clinic conference consensus was for the modified Vandana with paclitaxel weekly to treat both clones.   G. 9/30/22 Started Enhertu   H. Was admitted 10/3-10/7, C. Difficile, also had + Galactomanin. Started on voriconazole 10/11/22  I. 10/19 Cycle #2 Enhertu, dose reduced   J. PE-- incidentally found on 11/1, admitted for 48 hours, started on Eliquis          INTERVAL HISTORY:    Today, she is seen in person.  Since she was last seen on Friday, she went home Friday night and fell asleep.  She felt like at that time she was feeling well.  She does note that she had mild headaches at work for a few days but did not think anything of them.  However, she then slept the whole weekend.  When she awoke, her roommate was waking her up.  She was unaware of what day it was.  She had not soiled herself.  She felt very ill when she woke up, nausea, vomiting, dizziness, and was confused.  She was recommended to go to the emergency room, however left both legs feel and [facility] due to long wait times.  Yesterday, she presented to the ER again, at an outside hospital [facility], she was worked up for an infection, which was negative except for a possible UTI.    She is not having any urinary tract infection symptoms, no burning with  urination, blood in the urine, urgency, lower abdominal pain, back pain, fevers or chills.  She has not yet started the antibiotics.    Over the last few days, she has noted that she has felt dizzy.  She does not feel well, and vomited 5 times this morning.  She is not having any belly pain.  Her bowels are moving regularly.  She again denies any fevers body aches or chills.    She denies any vision changes.  She is not having any slurred speech or focal weakness.      No recent falls or trauma      REVIEW OF SYSTEMS:    Patient denies the following except if noted above: fevers, body aches, chills, headaches, vision changes, dizziness, chest pain, shortness of breath, nausea, vomiting, diarrhea, constipation, abdominal pain, rashes, bruising/bleeding, mouth sores, swelling or pain in the legs.      PHYSICAL EXAMINATION  BP (!) 162/92   Pulse 100   Temp 98  F (36.7  C) (Oral)   Wt 61.2 kg (135 lb)   LMP 12/18/2014   SpO2 100%   BMI 23.16 kg/m      Wt Readings from Last 4 Encounters:   12/07/22 61.2 kg (135 lb)   12/02/22 63.8 kg (140 lb 9.6 oz)   11/11/22 60.5 kg (133 lb 6.4 oz)   11/01/22 60.8 kg (134 lb)         VS: Above vitals reviewed  General: Resting comfortably in no acute distress  Head: Normocephalic, atraumatic   Heart: Regular rate and rhythm, no murmurs  Lung: Normal respiratory effort. Clear to auscultation bilaterally. No wheezes, rhonchi, or crackles   Neuro: AA. Oriented x3, however does not know the president. Speech is fluent. Gait is steady. Moving all extremities. Cranial nerves II-IV intact. Strength is 5/5 bilaterally, symmetric.   Extremities: No lower extremity edema  Skin: Warm, dry, intact. No rashes, no suspicious lesions. No petechia or bruising noted           LABORATORY DATA:    Reviewed labs from yesterday in the ER.    Sodium 136 - 145 mmol/L 138     Potassium 3.5 - 5.1 mmol/L 4.0     Chloride 98 - 109 mmol/L 106     CO2 20 - 29 mmol/L 20     Anion Gap 7 - 16 mmol/L 12      Calcium 8.4 - 10.4 mg/dL 9.0     BUN 7 - 26 mg/dL 30 High      Creatinine 0.55 - 1.02 mg/dL 0.73     Glucose 70 - 100 mg/dL 181 High              3.5 - 10.5 x10(9)/L 10.5    RBC 3.90 - 5.03 x10(12)/L 4.99    Hemoglobin 12.0 - 15.5 g/dL 13.9    HCT 34.9 - 44.5 % 42.4    MCV 80.0 - 100.0 fL 85.0    MCH 27.6 - 33.3 pg 27.9    MCHC 31.5 - 35.2 g/dL 32.8    RDW 11.9 - 15.5 % 20.5 High     Platelets 150 - 450 x10(9)/L 567 High     Immature Gran % 0.0 - 0.5 % 0.5    Automated NRBC <=0 /100 WBC 0    Neutrophil Absolute 1.7 - 7.0 10(9)/L 7.1 High     Lymphocyte Absolute 1.0 - 4.8 10(9)/L 2.2    Monocytes Absolute 0.2 - 0.9 10(9)/L 1.1 High     Eosinophil Absolute 0.0 - 0.5 10(9)/L 0.1    Basophil Absolute 0.0 - 0.3 10(9)/L 0.1          ASSESSMENT AND PLAN:   Shelby Bennett is a 56-year-old woman with logy pleomorphic lobular cancer, wa history of a clinical stage II right breast cancer, ER positive, VT positive, HER2 negative by immunohistochemistry, now with recurrent Metastatic breast cancer, lobular. New histohich is negative for estrogen receptor and negative for progesterone receptor, and 20% is HER2 positive by FISH.  - She has a mixture of recurrent metastatic HER2 positive breast cancer that is estrogen receptor negative and 80% triple negative breast cancer.    - Discussed this situation in breast cancer conference on April 22 and the consensus opinion is to treat the minority HER2+ component first with Vandana regimen because that treatment would also cover the triple negative (TNBC) component with the taxane part of the treatment. This would push back the use of pembrolizumab to the second line setting or later if the 22C3 staining is positive, but would allow earlier treatment of the HER2 component.     - Tumor markers had been rising significantly. Dr. Meza has concerns that there are 2 clones ( Biopsy of the sacrum in April showed 2 clones 1 of which was 20% of the tumor and was HER2 amplified  remainder of the tumor is HER2 1+.) as indicated by pathology-- one of them being triple negative the other being HER2 positive.   - August 29 PET/CT the site of initial metastasis static disease showed no FDG uptake.  There were no suspicious bony lesions. The PET did demonstrate LAD on the left neck and axillae. 09/13/22 axillary lymph node biopsy negative for malignancy. Dr. Meza opted to switch to Enhertu given rising markers, see previous notes for details     - S/P three cycles of Enhertu. Last given  11/11  - Tumor markers continue to rise, PET with possible worsening hepatic mets. A liver MRI was ordered, but had to cancel given acute change  - Now with new neuro changes- see below      #Neurologic symptoms  - Acute changes with confusion, dizziness, headaches, nausea and vomiting x3 days. STAT brain MRI today, concerned for brain mets  - 4mg of dex with infusion       #N/V  - 1L of fluids today, add 4mg dexamethasone and 5mg IV compazine        Not specifically addressed today:  #CINV, severe with Enhertu   - Continue IV aloxi with infusions.  Could add IV Emend in the future if needed as she is now off voriconazole  - Continue Zypreza 5mg at bedtime  - Has zofran, compazine, and reglan for PRN as well        # Psych:  - She feels that she is coping very well currently and does not need any additional support at this time  - Continue Effexor 75mg daily          # Bone Mets  - Asymptomatic  - Currently getting xgeva monthly --ON HOLD due to molar issue  - Has met with the dentist, who recommended we continue to hold Xgeva until her dental work gets completed.  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  Recently seen, and voriconazole was discontinued.  - Resolved    # C. Diff 10/5  - Resolved    #PE  - On eliquis,  Breathing greatly improved   - No bruising bleeding          Patient will  call in the interim with any questions or concerns. They voice understanding and agree with the above plan.         Sandra Coughlin PA-C      ADDENDUM:   Brain MRI Today:  IMPRESSION:  1. 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  2. Acute communicating hydrocephalus     [Urgent Result: Acute hydrocephalus and leptomeningeal disease.]     Finding was identified on 12/7/2022 2:48 PM.      CINDY Cummins was contacted by Dr. Claire at 12/7/2022 3:43  PM and verbalized understanding of the urgent finding.       I have personally reviewed the examination and initial interpretation  and I agree with the findings.     KATIA CLAIRE MD        Called and updated patient, she will present back to the ED right away. Updated Dr. Meza, primary oncologist who is also going to see her in the ED. Updated ER provider.      Sandra Coughlin PA-C on 12/7/2022 at 4:40 PM

## 2022-12-07 NOTE — PROGRESS NOTES
Infusion Nursing Note:  Josefina Bennett presents today for IV fluids and antiemetics.    Patient seen by provider today: Yes: Sandra Coughlin   present during visit today: Not Applicable.    Note: Patient was an add on from clinic today.  Arrived to infusion tearful.  Care coordinator met with patient in infusion and offered support. Patient also had multiple phone conversations with friends and family.  Reported headache 7/10 and tyelnol given with pain down to 3/10 prior to discharge.     12/7/2022 1208 TORB ANGELA Cummins/Lesley Georges RN  -ok to give tylenol 650mg PO x1 for headache     Intravenous Access:  Implanted Port accessed in infusion     Treatment Conditions:  Not Applicable.    Post Infusion Assessment:  Patient tolerated infusion without incident.  Blood return noted pre and post infusion.  Site patent and intact, free from redness, edema or discomfort.  No evidence of extravasations.  Access discontinued per protocol.     Discharge Plan:   Patient declined prescription refills.  Discharge instructions reviewed with: Patient.  Patient and/or family verbalized understanding of discharge instructions and all questions answered.  AVS to patient via [employer].  Patient will return 12/15/22 for next appointment.   Patient discharged in stable condition accompanied by: self and friend.  Patient going to MRI at the hospital following infusion today  Departure Mode: Ambulatory.      Lesley Georges RN

## 2022-12-07 NOTE — PATIENT INSTRUCTIONS
[facility] Triage and after hours / weekends / holidays:  [phone]    Please call the triage or after hours line if you experience a temperature greater than or equal to 100.4, shaking chills, have uncontrolled nausea, vomiting and/or diarrhea, dizziness, shortness of breath, chest pain, bleeding, unexplained bruising, or if you have any other new/concerning symptoms, questions or concerns.      If you are having any concerning symptoms or wish to speak to a provider before your next infusion visit, please call your care coordinator or triage to notify them so we can adequately serve you.     If you need a refill on a narcotic prescription or other medication, please call before your infusion appointment.             December 2022 Sunday Monday Tuesday Wednesday Thursday Friday Saturday                       1     2    LAB CENTRAL   1:00 PM   (15 min.)   [facility] DRAW   [facility]    RETURN   1:15 PM   (45 min.)   Sandra Coughlin PA-C   [facility]    ONC INFUSION 2 HR (120 MIN)   2:30 PM   (120 min.)    ONC INFUSION NURSE   [facility] 3       4     5    RETURN   2:45 PM   (45 min.)   Michelle Lin PA-C   [facility] 6     7    RETURN  10:15 AM   (45 min.)   Sandra Coughlin PA-C   [facility]    ONC INFUSION 2 HR (120 MIN)  11:30 AM   (120 min.)    ONC INFUSION NURSE   [facility]    MR BRAIN WWO   1:30 PM   (45 min.)   [facility]   [facility] 8     9     10       11     12     13     14     15    LAB CENTRAL   7:30 AM   (15 min.)   [facility] DRAW   [facility]    RETURN   7:55 AM   (30 min.)   Evangelista Meza MD   [facility] 16     17       18     19     20    RETURN  11:15 AM   (30 min.)   Evangelista Meza MD     [facility]    ONC INFUSION 2 HR (120 MIN)   3:00 PM   (120 min.)    ONC INFUSION NURSE   [facility] 21 22  [birth year] Birth[birth year]!     23     24       25     26     27     28     29     30     31                   January 2023 Sunday Monday Tuesday Wednesday Thursday Friday Saturday   1     2     3     4     5     6     7       8     9     10     11     12     13     14       15     16     17     18     19     20     21       22     23     24     25     26     27     28       29     30     31                                          No results found for this or any previous visit (from the past 24 hour(s)).

## 2022-12-08 NOTE — CONSULTS
[facility]       NEUROSURGERY CONSULTATION NOTE    This consultation was requested by Dr. Villa from the ED service.    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 She endorses 1 week of progressive frontal headaches, worse at 9/10 intensity, triggered by leaning forward, accompanied by nausea/vomiting when headaches are severe. She also reports memory changes, where she noted gaps in several hours or days starting last week. Last Friday, she remembers arriving home after work, followed by being woken up by her roommate on Monday morning, with no recall of events during this time. Per her roommate, she did not leave her room during that time, and she believes she was sleeping throughout the entire time. Throughout Monday and Tuesday, she was intermittently confused. She presented to Oncology clinic today, where she was treated with fluids and workup showed possible UTI and MRI brain with ventriculomegaly.     She lives with a roommate. Her POA is her mother and her close friend (friend is present in the ED). She works at a veterinary clinic. She last took Eliquis yesterday evening.   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) 06/01/2015     PONV (postoperative nausea and vomiting)      PTSD (post-traumatic stress disorder)        PAST SURGICAL HISTORY:   Past Surgical History:   Procedure Laterality Date     APPENDECTOMY  1997     CYSTOSCOPY N/A 10/16/2015    Procedure: CYSTOSCOPY;  Surgeon: Aleksandr Palafox MD;  Location: [facility] OR     HC REMOVAL OF OVARIAN CYST(S)      x 3     INSERT PORT VASCULAR ACCESS Left 5/4/2022    Procedure: INSERTION, VASCULAR ACCESS PORT;  Surgeon: Darren Yates MD;  Location: [facility] OR     IR CHEST PORT PLACEMENT > 5 YRS OF AGE  5/4/2022     IR FLUORO 0-1 HOUR  4/12/2022     LAPAROSCOPIC HYSTERECTOMY TOTAL, BILATERAL SALPINGO-OOPHORECTOMY, COMBINED N/A 10/16/2015    Procedure: COMBINED LAPAROSCOPIC HYSTERECTOMY TOTAL, SALPINGO-OOPHORECTOMY;  Surgeon: Aleksandr Palafox MD;  Location: [facility] OR     LUMPECTOMY BREAST Right 9/2/2015    Procedure: LUMPECTOMY BREAST;  Surgeon: Philip Franco MD;  Location: [facility] OR     LUMPECTOMY BREAST WITH SENTINEL NODE, COMBINED Right 8/12/2015    Procedure: COMBINED LUMPECTOMY BREAST WITH SENTINEL NODE;  Surgeon: Philip Franco MD;  Location: [facility] OR     MASTECTOMY SIMPLE Right 9/21/2015    Procedure: MASTECTOMY SIMPLE;  Surgeon: Philip Franco MD;  Location: [facility] OR     REMOVE PORT VASCULAR ACCESS N/A 8/12/2015    Procedure: REMOVE PORT VASCULAR ACCESS;  Surgeon: Philip Franco MD;  Location: [facility] OR     SURGICAL HISTORY OF -       wisdom teeth     TONSILLECTOMY      at age 30 for tonsillitis       FAMILY HISTORY:   Family History   Problem Relation Age of Onset     Breast Cancer Other         paternal cousin     Diabetes Mother         Borderline diabetic     Alcohol/Drug Mother         alcoholic     Depression Mother      Alcohol/Drug Father         alcoholic     Depression Father      Diabetes Maternal Grandfather      Depression Maternal Grandfather      Depression Paternal Grandfather      Breast Cancer Paternal Aunt         dad's 1/2 sister      Respiratory Sister         Cystic Fibrosis,  age 39     Depression Sister      Cancer Other         maternal grandmothers sisters daughter.       SOCIAL HISTORY:   Social History     Tobacco Use     Smoking status: Former     Packs/day: 0.50     Types: Cigarettes     Quit date: 2015     Years since quittin.8     Smokeless tobacco: Never     Tobacco comments:     5 a day   Substance Use Topics     Alcohol use: No     Alcohol/week: 0.0 standard drinks     Comment: hx of etoh abuse in teens and 20's       MEDICATIONS:  (Not in a hospital admission)      Allergies:  Allergies   Allergen Reactions     Morphine      Rash       ROS: 10 point ROS of systems including Constitutional, Eyes, Respiratory, Cardiovascular, Gastroenterology, Genitourinary, Integumentary, Muscularskeletal, Psychiatric were all negative except for pertinent positives noted in my HPI.    Physical exam:   Blood pressure (!) 166/67, pulse 99, temperature 97.9  F (36.6  C), temperature source Oral, resp. rate 16, last menstrual period 2014, SpO2 95 %, not currently breastfeeding.  CV: BP and HR as noted above  PULM: breathing comfortably on room air  ABD: soft, non-distended  NEUROLOGIC:  -- Awake; Alert; oriented x 3  -- Follows commands briskly  -- +repetition, calculation, and naming  -- Speech fluent, spontaneous. No aphasia or dysarthria.  -- no gaze preference. No apparent hemineglect.  Cranial Nerves:  -- visual fields full to confrontation, PERRL 3-2mm bilat and brisk, extraocular movements intact  -- face symmetrical, tongue midline  -- sensory V1-V3 intact bilaterally  -- palate elevates symmetrically, uvula midline  -- hearing grossly intact bilat  -- Trapezii 5/5 strength bilat symmetric  -- Cerebellar: Finger nose finger without dysmetria, intact rapid alternating motions bilaterally    Motor:  Normal bulk / tone; no tremor, rigidity, or bradykinesia.  No muscle wasting or fasciculations  No Pronator Drift     Delt  Bi Tri Hand Flexion/  Extension Iliopsoas Quadriceps Hamstrings Tibialis Anterior Gastroc    C5 C6 C7 C8/T1 L2 L3 L4-S1 L4 S1   R 5 5 5 5 5 5 5 5 5   L 5 5 5 5 5 5 5 5 5   Sensory:  intact to LT x 4 extremities     Reflexes:     Bi Tri BR Vannesa Pat Ach Bab    C5-6 C7-8 C6 UMN L2-4 S1 UMN   R 2+ 2+ 2+ Norm 2+ 2+ Norm   L 2+ 2+ 2+ Norm 2+ 2+ Norm     Gait: Deferred      LABS:  Last Comprehensive Metabolic Panel:  Sodium   Date Value Ref Range Status   12/07/2022 139 136 - 145 mmol/L Final   01/23/2020 142 133 - 144 mmol/L Final     Potassium   Date Value Ref Range Status   12/07/2022 4.3 3.4 - 5.3 mmol/L Final   08/12/2022 4.0 3.4 - 5.3 mmol/L Final   01/23/2020 4.4 3.4 - 5.3 mmol/L Final     Chloride   Date Value Ref Range Status   12/07/2022 106 98 - 107 mmol/L Final   08/12/2022 107 94 - 109 mmol/L Final   01/23/2020 109 94 - 109 mmol/L Final     Carbon Dioxide   Date Value Ref Range Status   01/23/2020 29 20 - 32 mmol/L Final     Carbon Dioxide (CO2)   Date Value Ref Range Status   12/07/2022 22 22 - 29 mmol/L Final   08/12/2022 26 20 - 32 mmol/L Final     Anion Gap   Date Value Ref Range Status   12/07/2022 11 7 - 15 mmol/L Final   08/12/2022 7 3 - 14 mmol/L Final   01/23/2020 4 3 - 14 mmol/L Final     Glucose   Date Value Ref Range Status   12/07/2022 175 (H) 70 - 99 mg/dL Final   08/12/2022 174 (H) 70 - 99 mg/dL Final   01/23/2020 129 (H) 70 - 99 mg/dL Final     Urea Nitrogen   Date Value Ref Range Status   12/07/2022 25.1 (H) 6.0 - 20.0 mg/dL Final   08/12/2022 19 7 - 30 mg/dL Final   01/23/2020 20 7 - 30 mg/dL Final     Creatinine   Date Value Ref Range Status   12/07/2022 1.29 (H) 0.51 - 0.95 mg/dL Final   01/23/2020 0.90 0.52 - 1.04 mg/dL Final     GFR Estimate   Date Value Ref Range Status   12/07/2022 48 (L) >60 mL/min/1.73m2 Final     Comment:     Effective December 21, 2021 eGFRcr in adults is calculated using the 2021 CKD-EPI creatinine equation which includes age and gender (Dennys et al., NEJM, DOI:  10.1056/WSAZkf7956936)   01/23/2020 72 >60 mL/min/[1.73_m2] Final     Comment:     Non  GFR Calc  Starting 12/18/2018, serum creatinine based estimated GFR (eGFR) will be   calculated using the Chronic Kidney Disease Epidemiology Collaboration   (CKD-EPI) equation.       Calcium   Date Value Ref Range Status   12/07/2022 8.6 8.6 - 10.0 mg/dL Final   01/23/2020 9.6 8.5 - 10.1 mg/dL Final     Lab Results   Component Value Date    WBC 8.8 12/07/2022    WBC 7.8 01/23/2020     Lab Results   Component Value Date    RBC 4.18 12/07/2022    RBC 4.88 01/23/2020     Lab Results   Component Value Date    HGB 11.5 12/07/2022    HGB 13.7 01/23/2020     Lab Results   Component Value Date    HCT 35.6 12/07/2022    HCT 42.4 01/23/2020     Lab Results   Component Value Date    MCV 85 12/07/2022    MCV 87 01/23/2020     Lab Results   Component Value Date    MCH 27.5 12/07/2022    MCH 28.1 01/23/2020     Lab Results   Component Value Date    MCHC 32.3 12/07/2022    MCHC 32.3 01/23/2020     Lab Results   Component Value Date    RDW 20.1 12/07/2022    RDW 15.3 01/23/2020     Lab Results   Component Value Date     12/07/2022     01/23/2020         IMAGING:  MRI brain  IMPRESSION:  1. 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  2. Acute communicating hydrocephalus    ASSESSMENT: Josefina Bennett is a 55 yo female with metastatic breast cancer, with 1 week of headaches and memory loss, symptoms and imaging concerning for leptomeningeal disease with hydrocephalus. Neurologically intact at this time. Will plan for  shunt with capacity for intrathecal chemotherapy. However, patient is on anticoagulation and this will need to be held for 48hrs for surgery.         RECOMMENDATIONS:  Will plan for OR for  shunt placement once medically ready (likely Friday)  HOB > 30 degrees  Q4h neuro exams   Pain control  Hold all anticoagulation (will  wait 48 hrs since last Eliquis dose for surgery)  Preoperative evaluation by medicine team, appreciate assistance  Repeat UA to rule out UTI (patient was told she had a UTI)        The patient was discussed with Dr. Miller, neurosurgery chief resident, and Dr. Lopez, neurosurgery staff, and they agree with the above.    Odessa Gonzalez MD  Neurosurgery Resident, PGY-3        Clinically Significant Risk Factors Present on Admission               # Drug Induced Coagulation Defect: home medication list includes an anticoagulant medication    # Acute Kidney Injury, unspecified: based on a >150% or 0.3 mg/dL increase in last creatinine compared to past 90 day average, will monitor renal function

## 2022-12-08 NOTE — PROGRESS NOTES
[facility], [place]   Neurosurgery Progress Note:    Date of service: 12/8/2022    Assessment: Josefina Bennett is a 55 yo female with metastatic breast cancer, with 1 week of headaches and memory loss, symptoms and imaging concerning for leptomeningeal disease with hydrocephalus.     Clinically Significant Risk Factors Present on Admission               # Drug Induced Coagulation Defect: home medication list includes an anticoagulant medication    # Breast cancer      Recommendations:  Plan for OR for  shunt placement tomorrow with Dr. Lopez  HOB > 30 degrees  Q4h neuro exams   Pain control  Hold all anticoagulation  Preoperative evaluation by medicine team, appreciate assistance  Repeat UA to rule out UTI   Obtain urine Tox screen  NPO at midnight   Pre-op orders to be released by nursing  Head CT in AM (ordered)     BONITA Logan, CNP  Neurosurgery  Pager [phone]      I have spent a total of 25 minutes total time counseling, coordination, and chart review, over 50% of the time was spent in direct patient care.       Interval History:  No acute events overnight.  Patient reports on-going headaches. Plan for  shunt placement tomorrow with Dr. Lopez.  Head CT in AM.  NPO at midnight.       Objective:   Temp:  [97.6  F (36.4  C)-98.6  F (37  C)] 98.6  F (37  C)  Pulse:  [] 82  Resp:  [16-20] 18  BP: (156-168)/(67-95) 156/80  SpO2:  [95 %-100 %] 98 %  No intake/output data recorded.    Gen: Appears comfortable, NAD  Wound:  Neurologic:  - Alert & Oriented to person, and place  - Follows commands   - Speech fluent, spontaneous. No aphasia or dysarthria.  - No gaze preference. No apparent hemineglect.  - PERRL, EOMI  - Strong eye closure, jaw clench, and cheek puff  - Face sensation intact to light touch, mild left facial droop noted  - Palate elevates symmetrically and tongue protrudes midline  - Trapezii and sternocleidomastoid muscles 5/5 bilaterally  - No pronator drift     Del  Tr Bi WE WF Gr   R 5 5 5 5 5 5   L 5 5 5 5 5 5    HF KE KF DF PF EHL   R 5 5 5 5 5 5   L 5 5 5 5 5 5     Reflexes 2+ throughout    Sensation intact and symmetric to light touch throughout    LABS  Recent Labs   Lab Test 12/08/22  0550 12/07/22  1830 12/02/22  1329   WBC 10.2 8.8 8.5   HGB 11.6* 11.5* 11.3*   MCV 87 85 89    419 386       Recent Labs   Lab Test 12/08/22  0550 12/07/22  1830 12/02/22  1329    139 145   POTASSIUM 3.5 4.3 3.8   CHLORIDE 109* 106 106   CO2 24 22 28   BUN 17.7 25.1* 18.5   CR 0.85 1.29* 1.26*   ANIONGAP 8 11 11   EVE 8.9 8.6 9.3   * 175* 174*       IMAGING    Recent Results (from the past 24 hour(s))   MRI Brain w/o & w Contrast   Result Value    Radiologist flags Acute hydrocephalus and leptomeningeal disease. (Urgent)    Narrative    EXAM: MR BRAIN W/O & W CONTRAST  12/7/2022 2:42 PM     HISTORY:  Malignant neoplasm of nipple of right breast in female,  estrogen receptor positive (H); Malignant neoplasm of nipple of right  breast in female, estrogen receptor positive (H)       COMPARISON:  4/6/2022 brain MRI, PET CT 11/21/2022    TECHNIQUE: MR head: Sagittal and axial T1-weighted, axial diffusion,  multiplanar T2 FLAIR with fat saturation images were obtained without  intravenous contrast. Following intravenous gadolinium-based contrast  administration, axial T2-weighted, diffusion, and T1-weighted images  (in multiple planes) were obtained.  MR Perfusion: During and following intravenous gadolinium-based  contrast administration, dynamic susceptibility-based images were  obtained for MR Perfusion evaluation.     CONTRAST: 6.5 mls Gadavist.    FINDINGS:  Bilateral cerebellar sulcal post contrast enhancement with bilateral  nodular enhancing foci (for example series 23, image 4 and series 24  images 41-50). Additional abnormal enhancing intraparenchymal foci  central jude measuring 6 mm in the right caudate head measuring 5 mm  series 23 and 15 respectively.    There  is no mass effect, midline shift, or intracranial hemorrhage.  The ventricles are proportionate to the cerebral sulci. Diffusion and  susceptibility weighted images are negative for acute/focal  abnormality. Subependymal T2 hyperintensities within the ventricular  system and enlargement of the ventricular system since 4/6/2022  suggesting acute hydrocephalus with transependymal flow of CSF.  Moderate to advanced generalized parenchymal volume loss. Major  intracranial vascular structures are within normal limits.    No suspicious abnormality of the skull marrow signal. Clear paranasal  sinuses. Bilateral mastoid effusions. No focal abnormality of the  pituitary gland, sella, skull base and upper cervical spinal  structures on sagittal images. The orbits are normal.      Impression    IMPRESSION:  1. 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  2. Acute communicating hydrocephalus    [Urgent Result: Acute hydrocephalus and leptomeningeal disease.]    Finding was identified on 12/7/2022 2:48 PM.     CINDY Cummins was contacted by Dr. Claire at 12/7/2022 3:43  PM and verbalized understanding of the urgent finding.      I have personally reviewed the examination and initial interpretation  and I agree with the findings.    KATIA CLAIRE MD         SYSTEM ID:  [id number]   XR Chest 2 Views    Narrative    EXAM: XR CHEST 2 VIEWS  LOCATION: [facility]  DATE/TIME: 12/7/2022 10:43 PM    INDICATION: preop evaluation  COMPARISON: 11/2/2022.    FINDINGS: Left chest wall port. No pneumothorax. The heart size is normal. There are again areas of nodularity in left hemithorax which may relate ribs. Scarring at the lung apices. No pleural effusion. No significant interval change.      Impression    IMPRESSION: No acute abnormality.

## 2022-12-08 NOTE — PROGRESS NOTES
"[facility]    Medicine Progress Note - Hospitalist Service, GOLD TEAM 5    Date of Admission:  12/7/2022    Assessment & Plan   Josefina Bennett is a 56 year old female admitted on 12/7/2022. 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 She is admitted to hospital medicine service for further treatment including neurosurgery consultation.    # New Brain Metastases  # Recurrent Breast Cancer with Metastasis to Liver and Bone  She was seen in oncology clinic yesterday for 3-day history of headaches associated with confusion, dizziness, nausea and vomiting and sent to ED for stat brain MRI, which revealed leptomeningeal carcinomatosis and intraparenchymal metastastes to the right caudate and jude with associated acute communicating hydrocephalus. Received dexamethasone at their facility.   -Neurosurgery and Oncology consulted, appreciate assistance  -Plans for  shunt placement tomorrow   -NPO at midnight  -Repeat Head CT tomorrow AM  -Keep HOB >30 degrees   -Neuro exam Q4 H  -Hold ALL anticoagulation       #LISA  Baseline Cr ~0.9, 1.29 yesterday. Suspect prerenal in the setting of N/V. Creatinine now WNLS  -Continue mIVFs  -Repeat UA in setting of possible UTI at OSH     #Recent PE  PE incidentally found on CT A/P 11/1: \"Left lower lobe posteromedial segment nonocclusive thrombi. These could be acute or old\", admitted for 48 hours and started on Eliquis. TTE at that time w/ global and regional LV function " normal with EF 55-60%. No issues with bleeding or bruising since starting Eliquis.   -Holding anticoagulation for above procedure     Normocytic Anemia - Likely chemotherapy induced.  Hgb 11.6, no signs/symptoms of active bleeding  -CBC daily    # Depression  # PTSD  # Insomnia  - Continue PTA mirtazapine and Effexor       Diet: NPO per Anesthesia Guidelines for Procedure/Surgery Except for: Meds    DVT Prophylaxis: Pneumatic Compression Devices  Berger Catheter: Not present  Central Lines: PRESENT     Cardiac Monitoring: None  Code Status: Full Code      Disposition Plan      Expected Discharge Date: 12/09/2022                The patient's care was discussed with the Attending Physician, Dr. Hermes Nicolas .    Ira Gamino PA-C  Hospitalist Service, GOLD TEAM [address] [place] [address]  Securely message with the Vocera Web Console (learn more here)  Text page via [facility] Paging/Directory   Please see signed in provider for up to date coverage information      Clinically Significant Risk Factors Present on Admission               # Drug Induced Coagulation Defect: home medication list includes an anticoagulant medication                ______________________________________________________________________    Interval History   All overnight events reviewed.   Patient endorses ongoing headache.  Denies recurrent episodes of emesis since ED presentation and notes antiemetics are managing her symptoms well.     Data reviewed today: I reviewed all medications, new labs and imaging results over the last 24 hours.     Physical Exam   Vital Signs: Temp: 97.6  F (36.4  C) Temp src: Oral BP: (!) 168/87 Pulse: 74   Resp: 18 SpO2: 98 % O2 Device: None (Room air)    Weight: 0 lbs 0 oz  GENERAL: Alert and oriented, lying in bed. In NAD  HEENT: Anicteric sclera. Mucous membranes moist. NC. AT.   CV: RRR. S1, S2. No murmurs appreciated.   RESPIRATORY: Effort normal on RA. Lungs CTAB with no wheezing,  rales, rhonchi.   GI: Abdomen soft and non distended with normoactive bowel sounds present in all quadrants. No tenderness   NEUROLOGICAL: No focal deficits. Moves all extremities.  CN 2-12 grossly intact.  EXTREMITIES: No peripheral edema. Intact bilateral pedal pulses.   SKIN: No jaundice. No rashes.        Data   Recent Labs   Lab 12/08/22  0550 12/07/22  1830 12/02/22  1329   WBC 10.2 8.8 8.5   HGB 11.6* 11.5* 11.3*   MCV 87 85 89    419 386   INR  --  1.10  --     139 145   POTASSIUM 3.5 4.3 3.8   CHLORIDE 109* 106 106   CO2 24 22 28   BUN 17.7 25.1* 18.5   CR 0.85 1.29* 1.26*   ANIONGAP 8 11 11   VEE 8.9 8.6 9.3   * 175* 174*   ALBUMIN  --   --  3.7   PROTTOTAL  --   --  6.2*   BILITOTAL  --   --  0.2   ALKPHOS  --   --  130*   ALT  --   --  51*   AST  --   --  47*

## 2022-12-08 NOTE — ED NOTES
Sent to neurosurgery resident:    ED,R-11,Mary Bennett. Pt states she will be having surgery tomorrow and has been cleared to eat and drink till midnight. Can you confirm this? Thanks, Suleiman JUDGE

## 2022-12-08 NOTE — H&P
"[facility]    History and Physical - Hospitalist Service, GOLD TEAM        Date of Admission:  12/7/2022    Assessment & Plan      Josefina Bennett is a 56 year old female admitted on 12/7/2022. 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 She is admitted to hospital medicine service for further treatment including neurosurgery consultation.      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 Was given dex 4mg with 1L fluids and 5mg compazine in infusion clinic today.   - Admit to inpatient  - Neurosurgery consulted, appreciate assistance   - NPO at midnight for possible  shunt placement (likely will not be until 12/9 d/t Eliquis)  - Consult oncology    # LISA  Baseline Cr ~0.9, 1.29 today. Suspect prerenal in the setting of N/V.  - Gentle hydration with LR @ 75mL/hr  - Avoid nephrotoxic agents  - Renally dose medications  - BMP in AM    # Recent PE  PE incidentally found on CT A/P 11/1: \"Left lower lobe posteromedial segment nonocclusive thrombi. These could be acute or old\", admitted for 48 hours and started on Eliquis. TTE at that time w/ global and " regional LV function normal with EF 55-60%. RV normal. Did have some ELISE and fevers at that time, however also was being treated for Aspergillus PNA. No issues with bleeding or bruising since starting Eliquis. No recent issues with respiratory function.  - HOLD Eliquis  - IF not going to OR in AM, consider starting heparin gtt   - NSGY team will determine plan in AM, prefer heparin gtt over subcutaneous heparin    # Depression  # PTSD  # Insomnia  - Continue PTA mirtazapine and Effexor    # Preop assessment  Neurosurgery requested to ensure patient was medically optimized in advance of anticipated procedure, noting it is of a semi-urgent nature and current anticipated delay is limited only to recent Eliquis use.  - CXR ordered to assess stability given recent PNA (pending)  - ECG ordered -> NSR, rate 83, possible LAE o/w normal ECG  - Eliquis currently on hold    Cardiovascular Risk. Surgery is urgent and is low risk. Patient does not have any active cardiac conditions. Functional capacity is >4 METs without symptoms. Clinical risk factors include LISA but with Cr <2 and metastatic cancer (high risk surgery - vascular or open intraperitoneal/intrathoracic, ischemic heart disease, HF, cerebrovascular disease, IDDM, Cr > 2) reflecting a 3.9% risk of cardiac death, nonfatal MI, or nonfatal cardiac arrest based on RCRI (Revised Haskins Cardiac Risk Index). Patient may proceed with surgery without further testing.     Pulmonary Risk. usually does not have known underlying pulmonary disease. CXR pending. Recent Aspergillus PNA s/p treatment with voriconazole (completed tx 11/30/2022). Risk for any post op pulmonary complications is 13.3% based on Canet Risk Index (age >50, resp infxn w/in past month, surgical incision site). Other potential risks include recent PE. Pending CXR result, patient may proceed with surgery without further testing. Would encourage pulmonary toilet, IS and resumption of anticoagulation as soon as  deemed safe.          Diet: NPO for Medical/Clinical Reasons Except for: No Exceptions    DVT Prophylaxis: Pneumatic Compression Devices, Anti-embolisim stockings (TEDs), Ambulate every shift, VTE chemoprophylaxis contraindicated due to anticipated surgery and Defer to primary service in AM for further determination of need for heparin drip given recent PE vs defer anticoagulation until after NSGY procedure.  Berger Catheter: Not present  Central Lines: PRESENT     Cardiac Monitoring: None  Code Status:   Full Code    Clinically Significant Risk Factors Present on Admission               # Drug Induced Coagulation Defect: home medication list includes an anticoagulant medication   # Acute Kidney Injury, unspecified: based on a >150% or 0.3 mg/dL increase in last creatinine compared to past 90 day average, will monitor renal function              Disposition Plan      Expected Discharge Date: 12/09/2022                The patient's care was discussed with the Attending Physician, Dr. Pina, Patient and Neurosurgery Consultant.    BRYON AREVALO PA  Hospitalist Service, [facility]  Securely message with the Vocera Web Console (learn more here)  Text page via [facility] Paging/Directory   Please see signed in provider for up to date coverage information      ______________________________________________________________________    Chief Complaint   Brain mets    History is obtained from the patient and electronic health record    History of Present Illness   Josefina Bennett is a 56 year old female who has past medical history is significant for recurrent metastatic breast cancer with mets to bone c/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 At present, patient reports she is feeling some better. States as long as she is lying still and not moving, she has minimal HA. She was able to eat a good meal (pot roast and veggies) with her roommate before coming back to the ED tonight. She is a bit anxious about being able to rest tonight after being given Decadron in the clinic earlier but hopeful her usual home meds (Remeron) will be able to overcome the effect of the steroid. She otherwise denies additional concerns.    Cardiovascular: does not have underlying cardiac disease. At baseline, patient is able to climb stairs without difficulty. Currently, denies any chest pain or palpitations.     Pulmonary: usually does not have underlying lung disease. Currently, denies any cough or SOB. CXR pending. Recent nonocclusive segmental PE 11/1/2022. Recent Aspergillus PNA s/p treatment with voriconazole (completed tx 11/30/2022).    Prior Anesthesia: Yes, without complications.   History of DVT/PE: YES. Date-11/1/2022.    LENA: No  Screening: Snoring - No, Tiredness - No, Observed apneas - No, Blood pressure elevation - Yes, BMI > 35 - No, Age > 50 - Yes, Neck > 40 cm - No, Male - No  (0-2 risk factors = low risk, 3-4= intermediate, 5-8= high risk for underlying sleep apnea)    History of stroke - No, seizure - No, kidney disease - No, liver disease - liver mets, thyroid disease - No, diabetes - No, neck/jaw pain or stiffness - No    Review of Systems    The 10 point Review of Systems is negative other than noted in the HPI or here.     Past Medical History    I have reviewed this patient's medical history and updated it with pertinent information if needed.   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  Past Surgical History:   Procedure Laterality Date     APPENDECTOMY  1997     CYSTOSCOPY N/A 10/16/2015    Procedure: CYSTOSCOPY;  Surgeon: Aleksandr Palafox MD;  Location: [facility] OR     HC REMOVAL OF OVARIAN CYST(S)      x 3     INSERT PORT VASCULAR ACCESS Left 5/4/2022    Procedure: INSERTION, VASCULAR ACCESS PORT;  Surgeon: Darren Yates MD;  Location: [facility] OR     IR CHEST PORT PLACEMENT > 5 YRS OF AGE  5/4/2022     IR FLUORO 0-1 HOUR  4/12/2022     LAPAROSCOPIC HYSTERECTOMY TOTAL, BILATERAL SALPINGO-OOPHORECTOMY, COMBINED N/A 10/16/2015    Procedure: COMBINED LAPAROSCOPIC HYSTERECTOMY TOTAL, SALPINGO-OOPHORECTOMY;  Surgeon: Aleksandr aPlafox MD;  Location: [facility] OR     LUMPECTOMY BREAST Right 9/2/2015    Procedure: LUMPECTOMY BREAST;  Surgeon: Philip Franco MD;  Location: [facility] OR     LUMPECTOMY BREAST WITH SENTINEL NODE, COMBINED Right 8/12/2015    Procedure: COMBINED LUMPECTOMY BREAST WITH SENTINEL NODE;  Surgeon: Philip Franco MD;  Location: [facility] OR     MASTECTOMY SIMPLE Right 9/21/2015    Procedure: MASTECTOMY SIMPLE;  Surgeon: Philip Franco MD;  Location: [facility] OR     REMOVE PORT VASCULAR ACCESS N/A 8/12/2015    Procedure: REMOVE PORT VASCULAR ACCESS;  Surgeon: Philip Franco MD;  Location: [facility] OR     SURGICAL HISTORY OF -       wisdom teeth     TONSILLECTOMY      at age 30 for tonsillitis       Social History   I have reviewed this patient's social history and updated it with pertinent information if needed.  Social History      Tobacco Use     Smoking status: Former     Packs/day: 0.50     Types: Cigarettes     Quit date: 2015     Years since quittin.8     Smokeless tobacco: Never     Tobacco comments:     5 a day   Substance Use Topics     Alcohol use: No     Alcohol/week: 0.0 standard drinks     Comment: hx of etoh abuse in teens and 20's     Drug use: No       Family History   I have reviewed this patient's family history and updated it with pertinent information if needed.  Family History   Problem Relation Age of Onset     Breast Cancer Other         paternal cousin     Diabetes Mother         Borderline diabetic     Alcohol/Drug Mother         alcoholic     Depression Mother      Alcohol/Drug Father         alcoholic     Depression Father      Diabetes Maternal Grandfather      Depression Maternal Grandfather      Depression Paternal Grandfather      Breast Cancer Paternal Aunt         dad's 1/2 sister     Respiratory Sister         Cystic Fibrosis,  age 39     Depression Sister      Cancer Other         maternal grandmothers sisters daughter.       Prior to Admission Medications   Prior to Admission Medications   Prescriptions Last Dose Informant Patient Reported? Taking?   Multiple Vitamins-Minerals (MULTIVITAMIN ADULTS PO)   Yes No   Sig: Take 1 tablet by mouth daily   OLANZapine (ZYPREXA) 5 MG tablet   No No   Sig: Take 1 tablet (5 mg) by mouth At Bedtime   apixaban ANTICOAGULANT (ELIQUIS) 5 MG tablet   No No   Sig: Take 1 tablet (5 mg) by mouth 2 times daily   apixaban ANTICOAGULANT (ELIQUIS) 5 MG tablet   No No   Sig: Take 2 tablets (10 mg) by mouth 2 times daily   apixaban ANTICOAGULANT (ELIQUIS) 5 MG tablet   No No   Sig: Take 1 tablet (5 mg) by mouth 2 times daily   calcium carbonate (OS- MG [place]. [place]) 500 MG tablet  Self Yes No   Sig: Take 500 mg by mouth At Bedtime    cholecalciferol (VITAMIN D3) 1000 UNIT tablet   No No   Sig: Take 1 tablet (1,000 Units) by mouth daily   clindamycin (CLINDAMAX) 1  % topical gel   No No   Sig: Apply topically 2 times daily   lidocaine-prilocaine (EMLA) 2.5-2.5 % external cream   No No   Sig: Apply to port site one hour prior to access may use six days after port placement   metoclopramide (REGLAN) 10 MG tablet   No No   Sig: Take 1 tablet (10 mg) by mouth every 6 hours as needed (nausea/vomiting)   mirtazapine (REMERON) 7.5 MG tablet   No No   Sig: TAKE 1 TABLET BY MOUTH ONCE DAILY AT BEDTIME   ondansetron (ZOFRAN) 8 MG tablet   No No   Sig: Take 1 tablet (8 mg) by mouth every 8 hours as needed for nausea   venlafaxine (EFFEXOR) 75 MG tablet   No No   Sig: Take 1 tablet (75 mg) by mouth daily   voriconazole (VFEND) 200 MG tablet   No No   Sig: Take 1 tablet (200 mg) by mouth 2 times daily      Facility-Administered Medications: None     Allergies   Allergies   Allergen Reactions     Morphine      Rash       Physical Exam   Vital Signs: Temp: 97.9  F (36.6  C) Temp src: Oral BP: (!) 166/67 Pulse: 99   Resp: 16 SpO2: 95 % O2 Device: None (Room air)    Weight: 0 lbs 0 oz    General: Pleasant, nontoxic appearance, no acute distress   Eyes: PERRL, EOMI, sclerae nonicteric   ENT: Oral mucosa pink and moist   Respiratory: Clear throughout, no respiratory distress   Cardiovascular: RRR, no murmurs, extremities without edema   Abdomen: Soft, NT, ND, +BS   Skin: Warm, dry, no pallor, no rash   Musculoskeletal: Moves all extremities equally, no tenderness, no swelling  Neurologic: Alert and oriented x 4, CN II-XII grossly intact, no focal weakness, speech normal   Psychiatric: Appropriate mood and affect. Thoughts logical and goal-oriented       Data   Data reviewed today: I reviewed all medications, new labs and imaging results over the last 24 hours. I personally reviewed the EKG tracing showing NSR, no ST-T changes, no ectopy.    Recent Results (from the past 24 hour(s))   MRI Brain w/o & w Contrast    Collection Time: 12/07/22  2:42 PM   Result Value Ref Range    Radiologist flags  Acute hydrocephalus and leptomeningeal disease. (Urgent)    INR    Collection Time: 12/07/22  6:30 PM   Result Value Ref Range    INR 1.10 0.85 - 1.15   Basic metabolic panel    Collection Time: 12/07/22  6:30 PM   Result Value Ref Range    Sodium 139 136 - 145 mmol/L    Potassium 4.3 3.4 - 5.3 mmol/L    Chloride 106 98 - 107 mmol/L    Carbon Dioxide (CO2) 22 22 - 29 mmol/L    Anion Gap 11 7 - 15 mmol/L    Urea Nitrogen 25.1 (H) 6.0 - 20.0 mg/dL    Creatinine 1.29 (H) 0.51 - 0.95 mg/dL    Calcium 8.6 8.6 - 10.0 mg/dL    Glucose 175 (H) 70 - 99 mg/dL    GFR Estimate 48 (L) >60 mL/min/1.73m2   Partial thromboplastin time    Collection Time: 12/07/22  6:30 PM   Result Value Ref Range    aPTT 76 (H) 22 - 38 Seconds   Adult Type and Screen    Collection Time: 12/07/22  6:30 PM   Result Value Ref Range    ABO/RH(D) A POS     Antibody Screen Negative Negative    SPECIMEN EXPIRATION DATE [id number]    CBC with platelets and differential    Collection Time: 12/07/22  6:30 PM   Result Value Ref Range    WBC Count 8.8 4.0 - 11.0 10e3/uL    RBC Count 4.18 3.80 - 5.20 10e6/uL    Hemoglobin 11.5 (L) 11.7 - 15.7 g/dL    Hematocrit 35.6 35.0 - 47.0 %    MCV 85 78 - 100 fL    MCH 27.5 26.5 - 33.0 pg    MCHC 32.3 31.5 - 36.5 g/dL    RDW 20.1 (H) 10.0 - 15.0 %    Platelet Count 419 150 - 450 10e3/uL    % Neutrophils 70 %    % Lymphocytes 20 %    % Monocytes 9 %    % Eosinophils 0 %    % Basophils 0 %    % Immature Granulocytes 1 %    NRBCs per 100 WBC 0 <1 /100    Absolute Neutrophils 6.2 1.6 - 8.3 10e3/uL    Absolute Lymphocytes 1.7 0.8 - 5.3 10e3/uL    Absolute Monocytes 0.8 0.0 - 1.3 10e3/uL    Absolute Eosinophils 0.0 0.0 - 0.7 10e3/uL    Absolute Basophils 0.0 0.0 - 0.2 10e3/uL    Absolute Immature Granulocytes 0.1 <=0.4 10e3/uL    Absolute NRBCs 0.0 10e3/uL   Asymptomatic Influenza A/B & SARS-CoV2 (COVID-19) Virus PCR Multiplex Nasopharyngeal    Collection Time: 12/07/22  6:45 PM    Specimen: Nasopharyngeal; Swab   Result  Value Ref Range    Influenza A PCR Negative Negative    Influenza B PCR Negative Negative    RSV PCR Negative Negative    SARS CoV2 PCR Negative Negative       Recent Results (from the past 24 hour(s))   MRI Brain w/o & w Contrast   Result Value    Radiologist flags Acute hydrocephalus and leptomeningeal disease. (Urgent)    Narrative    EXAM: MR BRAIN W/O & W CONTRAST  12/7/2022 2:42 PM     HISTORY:  Malignant neoplasm of nipple of right breast in female,  estrogen receptor positive (H); Malignant neoplasm of nipple of right  breast in female, estrogen receptor positive (H)       COMPARISON:  4/6/2022 brain MRI, PET CT 11/21/2022    TECHNIQUE: MR head: Sagittal and axial T1-weighted, axial diffusion,  multiplanar T2 FLAIR with fat saturation images were obtained without  intravenous contrast. Following intravenous gadolinium-based contrast  administration, axial T2-weighted, diffusion, and T1-weighted images  (in multiple planes) were obtained.  MR Perfusion: During and following intravenous gadolinium-based  contrast administration, dynamic susceptibility-based images were  obtained for MR Perfusion evaluation.     CONTRAST: 6.5 mls Gadavist.    FINDINGS:  Bilateral cerebellar sulcal post contrast enhancement with bilateral  nodular enhancing foci (for example series 23, image 4 and series 24  images 41-50). Additional abnormal enhancing intraparenchymal foci  central jude measuring 6 mm in the right caudate head measuring 5 mm  series 23 and 15 respectively.    There is no mass effect, midline shift, or intracranial hemorrhage.  The ventricles are proportionate to the cerebral sulci. Diffusion and  susceptibility weighted images are negative for acute/focal  abnormality. Subependymal T2 hyperintensities within the ventricular  system and enlargement of the ventricular system since 4/6/2022  suggesting acute hydrocephalus with transependymal flow of CSF.  Moderate to advanced generalized parenchymal volume loss.  Major  intracranial vascular structures are within normal limits.    No suspicious abnormality of the skull marrow signal. Clear paranasal  sinuses. Bilateral mastoid effusions. No focal abnormality of the  pituitary gland, sella, skull base and upper cervical spinal  structures on sagittal images. The orbits are normal.      Impression    IMPRESSION:  1. 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  2. Acute communicating hydrocephalus    [Urgent Result: Acute hydrocephalus and leptomeningeal disease.]    Finding was identified on 12/7/2022 2:48 PM.     CINDY Cummins was contacted by Dr. Claire at 12/7/2022 3:43  PM and verbalized understanding of the urgent finding.      I have personally reviewed the examination and initial interpretation  and I agree with the findings.    KATIA CLAIRE MD         SYSTEM ID:  [id number]

## 2022-12-08 NOTE — TELEPHONE ENCOUNTER
I received a call from Sandra Coughlin at 4:30 PM she discussed the finding leptomeningeal disease and parenchymal brain metastases and a communicating hydrocephalus seen on the brain MRI today.  Yuniel had been feeling unwell and had excessive sleeping over the weekend she was having some memory issues when she was in clinic today but was alert and oriented.  She was unable to name the president.  She was asked to go to the emergency department for admission.    I called the emergency department staff Dr. Jara and let them know about Mary's condition.  I also spoke with Dr. Lopez in neurosurgery and with the radiation oncology resident.  I went to see Mary in the emergency department this evening and discussed the findings seen on the brain MRI.  I discussed that there is leptomeningeal disease on the cerebellum as well as some hydrocephalus as well as lesions in the jude and right caudate.  I discussed with Mary that we would recommend neurosurgical evaluation for possible shunt placement.  We will obtain CSF for check of HER2 status the leptomeningeal disease if we can find cells for cell block.  We would agree with placement of a Ommaya reservoir for intrathecal chemotherapy if that is feasible.  I also discussed that whole brain radiation would be a reasonable option for Mary.  This was discussed with the patient.  She is in agreement with the overall plan which will be dictated by her recommendations of neurosurgery and radiation oncology.  I do think that the Enhertu has been unsuccessful in controlling her metastatic breast cancer and she does have rising markers.  She also has liver involvement which I discussed with her.  I had recommended a liver MRI and biopsy.  At this point control of her CNS disease is the most important.    Treatment could include the HER2 climb regimen which would consist of tucatinib and capecitabine along with trastuzumab.  The tucatinib and capecitabine both  penetrate the blood-brain barrier.  Intrathecal chemotherapy also be helpful.  We discussed goals of care and Mary wants a shunt and Ommaya if indicated and also wants chemotherapy if indicated.  She was alert and oriented and her mental status did appear normal although it was not tested in detail.  She was aware of the events of today although she had a poor memory of events last Friday and over the weekend when she was sleeping.  She says that her mental status is much improved today.  All of her questions were answered.    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     Evangelista Meza MD

## 2022-12-08 NOTE — PROVIDER NOTIFICATION
Shift 07:00-15:30    /96, HR 76  PAC- RICO Gamino was notified. Report given to Graciela- [facility]U to give prn hydralazine when meds come up from pharmacy.     Neuro: A&O x4  Cardiac: Hypertensive    Respiratory: Sating >92% on RA.  GI/: Voiding, not saving. No BM   Diet/appetite: Regular-  Nausea and had 1 emesis. Gave zofran and compazine with fair relief   Activity:  Up with SBA  Pain: Headache pain. Gave Oxy with no effect. Dilaudid IV given   Skin: No new deficits noted.  LDA's: Port a cath infusing LR at 75ml/hr    Plan: Continue with POC. Notify primary team with changes.

## 2022-12-08 NOTE — ANESTHESIA PREPROCEDURE EVALUATION
Anesthesia Pre-Procedure Evaluation    Patient: Josefina Bennett   MRN: [id number] : 1965        Procedure : Procedure(s):  INSERTION, SHUNT, VENTRICULOPERITONEAL,right, possible left w/possible laporascopic assistance          Past Medical History:   Diagnosis Date     Breast cancer (H) 2015     depression      Foot fracture      History of blood transfusion 2015     Insomnia      Malignant neoplasm of breast (female), unspecified site 2015     Papanicolaou smear of cervix with low grade squamous intraepithelial lesion (LGSIL) 2010    8/10-colp-benign     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) 2015     PONV (postoperative nausea and vomiting)      PTSD (post-traumatic stress disorder)       Past Surgical History:   Procedure Laterality Date     APPENDECTOMY       CYSTOSCOPY N/A 10/16/2015    Procedure: CYSTOSCOPY;  Surgeon: Aleksandr Palafox MD;  Location: [facility] OR     HC REMOVAL OF OVARIAN CYST(S)      x 3     INSERT PORT VASCULAR ACCESS Left 2022    Procedure: INSERTION, VASCULAR ACCESS PORT;  Surgeon: Darren Yates MD;  Location: [facility] OR     IR CHEST PORT PLACEMENT > 5 YRS OF AGE  2022     IR FLUORO 0-1 HOUR  2022     LAPAROSCOPIC HYSTERECTOMY TOTAL, BILATERAL SALPINGO-OOPHORECTOMY, COMBINED N/A 10/16/2015    Procedure: COMBINED LAPAROSCOPIC HYSTERECTOMY TOTAL, SALPINGO-OOPHORECTOMY;  Surgeon: Aleksandr Palafox MD;  Location: [facility]     LUMPECTOMY BREAST Right 2015    Procedure: LUMPECTOMY BREAST;  Surgeon: Philip Franco MD;  Location: [facility]     LUMPECTOMY BREAST WITH SENTINEL NODE, COMBINED Right 2015    Procedure: COMBINED LUMPECTOMY BREAST WITH SENTINEL NODE;  Surgeon: Philip Franco MD;  Location: [facility]     MASTECTOMY SIMPLE Right 2015    Procedure: MASTECTOMY SIMPLE;  Surgeon: Philip Franco MD;  Location: [facility]     REMOVE PORT VASCULAR ACCESS N/A 2015    Procedure: REMOVE PORT VASCULAR ACCESS;  Surgeon: Philip Franco  MD TOBI;  Location: [facility]     SURGICAL HISTORY OF -       wisdom teeth     TONSILLECTOMY      at age 30 for tonsillitis      Allergies   Allergen Reactions     Morphine      Rash      Social History     Tobacco Use     Smoking status: Former     Packs/day: 0.50     Types: Cigarettes     Quit date: 2015     Years since quittin.8     Smokeless tobacco: Never     Tobacco comments:     5 a day   Substance Use Topics     Alcohol use: No     Alcohol/week: 0.0 standard drinks     Comment: hx of etoh abuse in teens and 20's      Wt Readings from Last 1 Encounters:   22 58.4 kg (128 lb 12.8 oz)        Anesthesia Evaluation            ROS/MED HX  ENT/Pulmonary: Comment: Hx pulmonary aspergillosis s/p tx w/ voriconazole (discontinued 22)    (+) tobacco use, Past use, 10  Pack-Year Hx,  recent URI,     Neurologic: Comment: Presenting with 1 week of headaches and memory loss, c/f leptomeningeal disease with hydrocephalus, so planning for  shunt with capacity for intrathecal chemotherapy      Cardiovascular: Comment: PE nonocclusive left pulmonary artery thrombus 2022 incidentally found on repeat CT chest/abdo/pelvis for cancer screening. On Eliquis    (+) -----Previous cardiac testing   Echo: Date: 22 Results:  Global and regional left ventricular function is normal with an EF of 55-60%.  Right ventricular function, chamber size, wall motion, and thickness are  normal.  Pulmonary artery systolic pressure cannot be assessed.  The inferior vena cava is normal.  No pericardial effusion is present.  Stress Test: Date: Results:    ECG Reviewed: Date: 22 Results:  NSR  Cath: Date: Results:      METS/Exercise Tolerance:     Hematologic: Comments: Hgb 11.5      Musculoskeletal:  - neg musculoskeletal ROS     GI/Hepatic:     (+) GERD, Asymptomatic on medication,     Renal/Genitourinary:  - neg Renal ROS     Endo:  - neg endo ROS     Psychiatric/Substance Use:     (+) psychiatric history depression alcohol  abuse     Infectious Disease: Comment: Hx sepsis      Malignancy: Comment: Hx metastatic stage II breast cancer (s/p R mastectomy, hysterectomy + oophorectomy, chemo/radiation), bone metastases  (+) Malignancy, History of Breast.Breast CA Remission status post Surgery, Chemo and Radiation.        Other:            Physical Exam    Airway        Mallampati: II   TM distance: > 3 FB   Neck ROM: full   Mouth opening: > 3 cm    Respiratory Devices and Support         Dental  no notable dental history         Cardiovascular   cardiovascular exam normal          Pulmonary   pulmonary exam normal                OUTSIDE LABS:  CBC:   Lab Results   Component Value Date    WBC 12.4 (H) 12/09/2022    WBC 10.2 12/08/2022    HGB 12.7 12/09/2022    HGB 11.6 (L) 12/08/2022    HCT 40.1 12/09/2022    HCT 36.4 12/08/2022     12/09/2022     12/08/2022     BMP:   Lab Results   Component Value Date     12/09/2022     12/08/2022    POTASSIUM 3.8 12/09/2022    POTASSIUM 3.5 12/08/2022    CHLORIDE 102 12/09/2022    CHLORIDE 109 (H) 12/08/2022    CO2 25 12/09/2022    CO2 24 12/08/2022    BUN 13.0 12/09/2022    BUN 17.7 12/08/2022    CR 0.68 12/09/2022    CR 0.85 12/08/2022     (H) 12/09/2022     (H) 12/09/2022     COAGS:   Lab Results   Component Value Date    PTT 76 (H) 12/07/2022    INR 1.10 12/07/2022     POC:   Lab Results   Component Value Date     (H) 09/22/2015    HCG  08/02/2010     Negative   This test provides a presumptive diagnosis of pregnancy or non-pregnancy. A   confirmed pregnancy diagnosis should only be made by a physician after all   clinical and laboratory findings have been evaluated.    HCGS Negative 02/26/2015     HEPATIC:   Lab Results   Component Value Date    ALBUMIN 3.6 12/09/2022    PROTTOTAL 5.9 (L) 12/09/2022    ALT 62 (H) 12/09/2022    AST 61 (H) 12/09/2022    ALKPHOS 117 (H) 12/09/2022    BILITOTAL 0.4 12/09/2022     OTHER:   Lab Results   Component Value Date     PH 7.35 06/23/2015    LACT 1.2 10/03/2022    A1C 6.9 (H) 06/27/2015    VEE 8.2 (L) 12/09/2022    PHOS 2.9 10/07/2022    MAG 1.9 10/05/2022    LIPASE 68 (L) 05/31/2015    TSH 0.70 04/15/2013    CRP 7.05 (H) 10/07/2022    SED 30 04/07/2022       Anesthesia Plan    ASA Status:  3   NPO Status:  ELEVATED Aspiration Risk/Unknown    Anesthesia Type: General.     - Airway: ETT   Induction: RSI.   Maintenance: Balanced.   Techniques and Equipment:     - Lines/Monitors: 2nd IV     Consents    Anesthesia Plan(s) and associated risks, benefits, and realistic alternatives discussed. Questions answered and patient/representative(s) expressed understanding.    - Discussed:     - Discussed with:  Patient         Postoperative Care    Pain management: Multi-modal analgesia.   PONV prophylaxis: Ondansetron (or other 5HT-3), Dexamethasone or Solumedrol     Comments:                Donald Leos MD

## 2022-12-08 NOTE — CONSULTS
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 Meza  Current Treatment Plan: Enhertu     Reason for Consult: New brain metastases to right caudate and jude, leptomeningeal carcinomatosis,  with associated acute hydrocephalus      History of Present Illness:    Josefina Bennett is a 56 year old year old female with history of metastatic breast cancer (dx 2015, s/p R mastectomy, hysterectomy and oophorectomy, chemotherapy and radiation, with pleomorphic lobular recurrence noted in sacral lesion 4/12/22, initially ER, OK positive and HER2 negative, with recurrent cancer biclonal with 20% HER2 positive and 80% TNBC), new PE diagnosed 11/1 on Eliquis, hx of suspected pulmonary aspergillosis s/p treatment with voriconazole through 11/30/22 who was admitted 12/7/2 with about one week of progressive headaches, memory loss, nausea/vomiting, dizziness, and confusion found to have new brain metastases and associated acute hydrocephalus on brain MRI.    Mary has had about one week of progressive frontal headaches up to 9/10 in intensity that have been worse with positional changes. She also experienced episodes of new memory loss, not being able to recall events for hours at a time. She called her oncology clinic on 12/2 and was recommended to go to ED, states she went home after work and fell asleep and does not recall actually talking to anyone from the oncology clinic. Also reports going to sleep Friday night and not waking up until her roommate tried to wake her up to check on her on Monday morning, does not recall events between Friday night and Monday morning, thinks she may have slept the entire time. [facility] on Monday 12/5,  however due to long wait times at both locations she left without being seen. Was seen in ED on 12/6 for nausea, vomiting, and weakness, given IV fluids and zofran, discharged after obtaining labs, UA, and blood cultures with concern for UTI and given course of antibiotics which she did not start until 12/7. Followed up in oncology clinic 12/7/22, where she received an infusion with IV fluids and was recommended stat Brain MRI. Brain MRI demonstrated new metastases and hydrocephalus, so patient also received 4mg IV dex and 5mg compazine and was recommended admission to hospital, with presentation at ED prior. Pt seen in ED by neurosurgery, who is planing to take to OR 12/9 for placement of  shunt. She was also seen while in ED by her primary oncologist, Dr. Meza, who recommended consideration of intrathecal chemotherapy and placement of reservoir if possible, and switching treatment regimen from Enhertu to [id number] regimen with tucatinib and capecitabine with trastuzumab.     Today is still experiencing fairly severe headache. Is only able to tolerate pain if she lays still and in same position, with minimal movement. Had severe nausea and one episode of emesis this morning after trying to sit up. Was given compazine with improvement in nausea. Endorses nausea only with severe headache pain. Has had about 4 episodes of emesis in total for past 2-3 days. No nausea when headaches are more manageable. No bowel or bladder changes, no incontinence, no dysuria, no urinary frequency or urgency. No constipation or diarrhea. No other new pains. No rashes. No dyspnea, cough, chest pain. No dysphagia, but does report decreased oral intake due to headache pains and nausea. Has felt some unsteadiness in gait since yesterday, but no difficulties walking and no falls. No numbness, weakness, or tingling in extremities.    Oncologic History:  History of clinical stage II right breast cancer, ER positive, WI positive, HER2  negative by immunohistochemistry, now with recurrent Metastatic breast cancer, lobular. New histohich is negative for estroen receptor and negative for progesterone receptor, and 20% is HER2 positive by FISH.    A.  Neoadjuvant therapy on I SPY-2 with ganetespib and paclitaxel.  AC x 2 complicated by PCP pneumonia.    B.  Right lumpectomy, followed by right margin resections, followed by right mastectomy.   B.  Oophorectomy 10-16-15.   C.  Tamoxifen after 10-6-15.   D.  Radiation therapy. 5,040 cGy in 180 cGy/fraction to the chest wall and regional lymphatics followed by 720 cGy axillary lymph node boost and 1000 cGy mastectomy scar boost. Completed on 12/3/2015.  E.   Adjuvant letrozole begun 1-28-16.  Plan was to continue through 2026.  F.  Diagnosis with recurrent/metastatic breast cancer with a sacral mass that was biopsied showing pleiomorphic lobular breast cancer that is ER negative, DE negative, HER2 positive by FISH in 20% of the cells and negative in 80% of cells.  A mix of TNBC and ER- HER2+.  Clinic conference consensus was for the modified Vandana with paclitaxel weekly to treat both clones.   G. 9/30/22 Started Enhertu   H. Was admitted 10/3-10/7, C. Difficile, also had + Galactomanin. Started on voriconazole 10/11/22  I. 10/19 Cycle #2 Enhertu, dose reduced   J. PE-- incidentally found on 11/1, admitted for 48 hours, started on Eliquis       Review of Systems:  A comprehensive ROS was performed and found to be negative or non-contributory with the exception of that noted in the HPI above.    Past Medical History:  Past Medical History:   Diagnosis Date     Breast cancer (H) 01/01/2015     depression      Foot fracture      History of blood transfusion 06/01/2015     Insomnia      Malignant neoplasm of breast (female), unspecified site 02/05/2015     Papanicolaou smear of cervix with low grade squamous intraepithelial lesion (LGSIL) 06/01/2010    8/10-colp-benign     Pneumonia due to pneumocystis  juancarlos (H) 2015     PONV (postoperative nausea and vomiting)      PTSD (post-traumatic stress disorder)        Past Surgical History:  Past Surgical History:   Procedure Laterality Date     APPENDECTOMY       CYSTOSCOPY N/A 10/16/2015    Procedure: CYSTOSCOPY;  Surgeon: Aleksandr Palafox MD;  Location: [facility]     HC REMOVAL OF OVARIAN CYST(S)      x 3     INSERT PORT VASCULAR ACCESS Left 2022    Procedure: INSERTION, VASCULAR ACCESS PORT;  Surgeon: Darren Yates MD;  Location: [facility] OR     IR CHEST PORT PLACEMENT > 5 YRS OF AGE  2022     IR FLUORO 0-1 HOUR  2022     LAPAROSCOPIC HYSTERECTOMY TOTAL, BILATERAL SALPINGO-OOPHORECTOMY, COMBINED N/A 10/16/2015    Procedure: COMBINED LAPAROSCOPIC HYSTERECTOMY TOTAL, SALPINGO-OOPHORECTOMY;  Surgeon: Aleksandr Palafox MD;  Location: [facility]     LUMPECTOMY BREAST Right 2015    Procedure: LUMPECTOMY BREAST;  Surgeon: Philip Franco MD;  [facility]: [facility]     LUMPECTOMY BREAST WITH SENTINEL NODE, COMBINED Right 2015    Procedure: COMBINED LUMPECTOMY BREAST WITH SENTINEL NODE;  Surgeon: Philip Franco MD;  Location: [facility]     MASTECTOMY SIMPLE Right 2015    Procedure: MASTECTOMY SIMPLE;  Surgeon: Philip Franco MD;  Location: [facility]     REMOVE PORT VASCULAR ACCESS N/A 2015    Procedure: REMOVE PORT VASCULAR ACCESS;  Surgeon: Philip Franco MD;  Location: [facility]     SURGICAL HISTORY OF -       wisdom teeth     TONSILLECTOMY      at age 30 for tonsillitis       Social History:  Social History     Socioeconomic History     Marital status:    Occupational History     Employer: OTHER     Comment: training for    Tobacco Use     Smoking status: Former     Packs/day: 0.50     Types: Cigarettes     Quit date: 2015     Years since quittin.8     Smokeless tobacco: Never     Tobacco comments:     5 a day   Substance and Sexual Activity     Alcohol use: No     Alcohol/week: 0.0 standard drinks      Comment: hx of etoh abuse in teens and 20's     Drug use: No     Sexual activity: Yes     Partners: Male     Birth control/protection: Condom     Comment: condoms   Other Topics Concern     Parent/sibling w/ CABG, MI or angioplasty before 65F 55M? No   Social History Narrative    Josefina currently rents a house in [place], [place].  She  from her  2 years ago and  him 1 year ago.  He was having an affair with a 21-year-old that was living in their house. Apparently, the 21-year-old is still living there.  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  She applied for unemployment and was denied, but recently got a call from a supervisor and is getting unemployment, including back pay.  She has a boyfriend, Nathaniel who she is seeing.  She is active in AA.  She has suffered numerous losses in the last few years including her sister and cousin and a friend.     Social Determinants of Health     Intimate Partner Violence: Not At Risk     Fear of Current or Ex-Partner: No     Emotionally Abused: No     Physically Abused: No     Sexually Abused: No        Family History  Family History   Problem Relation Age of Onset     Breast Cancer Other         paternal cousin     Diabetes Mother         Borderline diabetic     Alcohol/Drug Mother         alcoholic     Depression Mother      Alcohol/Drug Father         alcoholic     Depression Father      Diabetes Maternal Grandfather      Depression Maternal Grandfather      Depression Paternal Grandfather      Breast Cancer Paternal Aunt         dad's 1/2 sister     Respiratory Sister         Cystic Fibrosis,  age 39     Depression Sister      Cancer Other         maternal grandmothers sisters daughter.       Outpatient Medications:  [COMPLETED] 0.9% sodium chloride BOLUS  [COMPLETED] acetaminophen (TYLENOL) tablet 650 mg  [COMPLETED]  dexamethasone (DECADRON) 4 mg in sodium chloride 0.9 % 55.4 mL intermittent infusion  [COMPLETED] gadobutrol (GADAVIST) injection 6.5 mL  [COMPLETED] heparin flush SOLN 5 mL  [COMPLETED] prochlorperazine (COMPAZINE) injection 5 mg    apixaban ANTICOAGULANT (ELIQUIS) 5 MG tablet, Take 1 tablet (5 mg) by mouth 2 times daily  calcium carbonate (OS- MG [place]. [place]) 500 MG tablet, Take 500 mg by mouth At Bedtime   cholecalciferol (VITAMIN D3) 1000 UNIT tablet, Take 1 tablet (1,000 Units) by mouth daily  clindamycin (CLINDAMAX) 1 % topical gel, Apply topically 2 times daily  lidocaine-prilocaine (EMLA) 2.5-2.5 % external cream, Apply to port site one hour prior to access may use six days after port placement  metoclopramide (REGLAN) 10 MG tablet, Take 1 tablet (10 mg) by mouth every 6 hours as needed (nausea/vomiting)  mirtazapine (REMERON) 7.5 MG tablet, TAKE 1 TABLET BY MOUTH ONCE DAILY AT BEDTIME  Multiple Vitamins-Minerals (MULTIVITAMIN ADULTS PO), Take 1 tablet by mouth daily  OLANZapine (ZYPREXA) 5 MG tablet, Take 1 tablet (5 mg) by mouth At Bedtime  ondansetron (ZOFRAN) 8 MG tablet, Take 1 tablet (8 mg) by mouth every 8 hours as needed for nausea  venlafaxine (EFFEXOR) 75 MG tablet, Take 1 tablet (75 mg) by mouth daily         Physical Exam:    Blood pressure (!) 168/87, pulse 74, temperature 97.6  F (36.4  C), temperature source Oral, resp. rate 18, last menstrual period 12/18/2014, SpO2 98 %, not currently breastfeeding.  General: alert and cooperative, lying in bed, appears uncomfortable, NAD  HEENT: sclera anicteric, EOMI, MMM  Neck: supple, normal ROM  CV: RRR, no murmurs  Resp: CTAB, normal respiratory effort on ambient air  GI: soft, non-tender, non-distended, bowel sounds present and normoactive  MSK: warm and well-perfused, normal tone  Skin: no rashes on limited exam, no jaundice  Neuro: Alert and interactive, moves all extremities equally, no focal deficits. Cranial nerves II-XII grossly intact,  tongue midline, speech clear. Oriented to person, place, time, situation. Strength 5/5 in upper and lower extremities and sensation to light touch intact.     Labs & Studies: I personally reviewed the following studies:  ROUTINE LABS (Last four results):  CMP  Recent Labs   Lab 12/08/22  0550 12/07/22  1830 12/02/22  1329    139 145   POTASSIUM 3.5 4.3 3.8   CHLORIDE 109* 106 106   CO2 24 22 28   ANIONGAP 8 11 11   * 175* 174*   BUN 17.7 25.1* 18.5   CR 0.85 1.29* 1.26*   GFRESTIMATED 80 48* 50*   VEE 8.9 8.6 9.3   PROTTOTAL  --   --  6.2*   ALBUMIN  --   --  3.7   BILITOTAL  --   --  0.2   ALKPHOS  --   --  130*   AST  --   --  47*   ALT  --   --  51*     CBC  Recent Labs   Lab 12/08/22  0550 12/07/22  1830 12/02/22  1329   WBC 10.2 8.8 8.5   RBC 4.19 4.18 4.14   HGB 11.6* 11.5* 11.3*   HCT 36.4 35.6 36.9   MCV 87 85 89   MCH 27.7 27.5 27.3   MCHC 31.9 32.3 30.6*   RDW 19.9* 20.1* 20.8*    419 386     INR  Recent Labs   Lab 12/07/22  1830   INR 1.10       Assessment & Plan:   # Metastatic breast cancer to brain with leptomeningeal disease and associated hydrocephalus  # Recurrent metastatic breast cancer with biclonal mixture of triple negative breast cancer (80%) and HER2 positive clone (20%), pleomorphic lobular type, metastatic to liver and bone  # PE, dx 11/1/22  # Chronic normocytic anemia    Josefina Bennett is a 56 year old female with a history of clinical stage II right breast cancer, ER positive, [place] positive, HER2 negative by immunohistochemistry, now with recurrent Metastatic breast cancer, lobular, that is negative for estroen receptor and negative for progesterone receptor, and 20% is HER2 positive by FISH. She was admitted with one week of progressive headaches, nausea, vomiting, and found to have new brain metastases, leptomeningeal carcinomatosis, and associated acute hydrocephalus on brain MRI 12/7.    Given she has had gradually increasing tumor markers over the past few months and  now has new brain metastases, it is likely her therapy with Enhertu is not controlling her disease. Her primary oncologist, Dr. Meza, did see her upon her admission and recommended transitioning to [id number] regimen (tucatinib and capecitabine with trastuzumab) which has better CNS penetration and to consider intrathecal chemotherapy as well. As intrathecal chemotherapy can have significant benefit with leptomeningeal disease and brain metastases with targeted therapies, would follow Dr. Meza's recommendations and consider placement of Ommaya reservoir along with  shunt - will discuss this with neurosurgery. Given biclonal nature of her disease, attempt at collecting CSF to test for HER2 status would help further guide therapy in addressing her new metastases - will discuss with neurosurgery. Additionally, would recommend consultation with radiation oncology to consider whole brain vs targeted radiation therapy to address new metastatic disease that is associated with acute hydrocephalus.     For anticoagulation for PE, addressing CNS concerns take priority and would defer perioperative anticoagulation to neurosurgery. Patient is at high risk for thrombosis with her active cancer and impending surgery so will need to continue anticoagulation therapy when deemed safe by neurosurgery (can initiate with heparin gtt if cautious resumption after neurosurgery preferred and then transition back to Eliquis if tolerating).    Recommendations:   - Appreciate neurosurgical involvement and recommendations to address hydrocephalus  - Will discuss with neurosurgery about placement of Ommaya reservoir for intrathecal chemotherapy if feasible and obtaining sample of CSF to test for HER2 status if able to obtain enough cells for cell block per Dr. Meza's recommendations (cytology ordered for you)  - Would recommend initiating dexamethasone 4mg every 6 hours to help control inflammation with upcoming procedures and targeted  treatment  - Recommend consultation with radiation oncology to address new intracranial disease  - Will defer decision to neurosurgery about when it is safe to resume anticoagulation for PE  - Primary oncologist Dr. Meza is recommending Liver MRI and IR consult for biopsy of liver lesion once CNS concerns addressed and stabilized (pending her clinical course, this could be pursued outpatient)    We will continue to follow this patient. Please do not hesitate to page with any questions or concerns.    Patient was seen and plan of care was discussed with attending physician Dr. Mendez.    Sarika Sandoval MD  Internal Medicine Resident, PGY-2

## 2022-12-08 NOTE — CONSULTS
Department of Radiation Oncology                   [facility] [address] [place]  [address]  Office:  [phone]  Fax:  [phone]   Radiation Oncology Clinic  [address] [place] [address]  Phone:  [phone]  Fax:  [phone]     RE: Josefina Bennett : 1965   MRN: [id number] TEODORO: 2022     OUTPATIENT VISIT NOTE       PROBLEM: Metastatic breast cancer with leptomeningeal disease      was seen for initial consultation in the ED at the request of Medical Oncology.     HISTORY OF PRESENT ILLNESS: Ms. Bennett is a 55 yo female with an invasive lobular carcinoma of the right breast, initially treated with a curative intent who later developed metastatic disease. Briefly, she was diagnosed in early  after noticing dimpling of the right breast skin. Work up ultimately found a 4.8 cm invasive lobular carcinoma, grade 2, ER+/IL+/HER2-. She enrolled in the I-SPY-2 clinical trial, and received 12 weekly cycles of paclitaxel/ganetespib from 3/3/15 - 5/19/15, and then received 2 cycles dose-dense AC (the last 2 cycles not given due to pneumocystic pneumonia). She then underwent right wire localized lumpectomy with sentinel node biopsy on 8/12/15 by Dr. Franco. Pathology showed significant residual tumor, [id number](sn), MDA RCB class III. She required completion mastectomy for margins. She was subsequently referred to our clinic for adjuvant radiotherapy. She received 5040 cGy in 28 fractions to the chest wall and regional lymphatics followed by 720 cGy boost to the axilla and 1000 cGy boost to the mastectomy scar.     She then elected to undergo bilateral oophorectomy and was started on Letrozole with the plan to continue this until .       Ms. Bennett did well until spring this year. She received Zometa injection and developed a reaction which prompted a ED evaluation. CT of the chest done to rule out PE found diffuse bony metastases. Subsequent PET/CT scan  also found scattered lesions in the anterior liver. Brain MRI was negative at that time. Biopsy of a sacral metastasis was consistent with metastatic pleomorphic lobular carcinoma, ER-/DC-, with 20% cells HER2 amplified and 80% not. Given this, Dr. Meza started her on weekly Palitaxel with every 3 week Pertuzumab and Trastuzumab. Her restaging scan showed response in the bone, but her tumor marker increased. Dr. Meza suspected that the regimen is addressing her HER2 amplified disease, but not the triple negative component. In 09/2022, she was switched to Enhertu. She did develop complications with C diff infection and fungal infection. Staging PET/CT on 11/21/2022 showed progression in the liver.     Over the last weekend, Ms. Bennett developed mental status change: she was sleeping excessively and had some memory issues as well. She was advised to go to ED. A brain MRI was ordered which showed leptomeningeal disease as well as intraparenchymal metastases. 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     Ms. Bennett was seen by neurosurgery with the plan for a shunt placement tomorrow. Dr. Meza also requested LP for CSF analysis and placement of Ommaya reservoir for intrathecal therapy. We are asked to see her for possible radiation therapy.    Ms. Valdez was seen in the ED.  She states that she has a very bad headache. She couldn't be engaged due to feeling extremely tired.      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) 06/01/2015     PONV (postoperative nausea and vomiting)      PTSD (post-traumatic stress disorder)       Past Surgical History:   Procedure Laterality Date     APPENDECTOMY  1997     CYSTOSCOPY N/A 10/16/2015    Procedure: CYSTOSCOPY;  Surgeon: Aleksandr Palafox MD;  Location: [facility] OR     HC REMOVAL OF OVARIAN CYST(S)      x 3     INSERT PORT VASCULAR ACCESS Left 5/4/2022    Procedure: INSERTION, VASCULAR ACCESS PORT;  Surgeon: Darren Yates MD;  Location: [facility] OR     IR CHEST PORT PLACEMENT > 5 YRS OF AGE  5/4/2022     IR FLUORO 0-1 HOUR  4/12/2022     LAPAROSCOPIC HYSTERECTOMY TOTAL, BILATERAL SALPINGO-OOPHORECTOMY, COMBINED N/A 10/16/2015    Procedure: COMBINED LAPAROSCOPIC HYSTERECTOMY TOTAL, SALPINGO-OOPHORECTOMY;  Surgeon: Aleksandr Palafox MD;  Location: [facility] OR     LUMPECTOMY BREAST Right 9/2/2015    Procedure: LUMPECTOMY BREAST;  Surgeon: Philip Franco MD;  Location: [facility] OR     LUMPECTOMY BREAST WITH SENTINEL NODE, COMBINED Right 8/12/2015    Procedure: COMBINED LUMPECTOMY BREAST WITH SENTINEL NODE;  Surgeon: Philip Franco MD;  Location: [facility] OR     MASTECTOMY SIMPLE Right 9/21/2015    Procedure: MASTECTOMY SIMPLE;  Surgeon: Philip Franco MD;  Location: [facility] OR     REMOVE PORT VASCULAR ACCESS N/A 8/12/2015    Procedure: REMOVE PORT VASCULAR ACCESS;  Surgeon: Philip Franco MD;  Location: [facility] OR     SURGICAL HISTORY OF -       wisdom teeth     TONSILLECTOMY      at age 30 for tonsillitis       CHEMOTHERAPY HISTORY: See HPI    PAST RADIATION THERAPY HISTORY: Right chest wall and regional lymphatics    MEDICATIONS:   Current Outpatient Medications   Medication Sig Dispense Refill     apixaban ANTICOAGULANT (ELIQUIS) 5 MG tablet Take 1 tablet (5 mg) by mouth 2 times daily 60 tablet 0     calcium carbonate (OS- MG [place]. [place]) 500 MG tablet Take 500 mg by mouth At Bedtime        cholecalciferol (VITAMIN D3) 1000 UNIT tablet Take 1 tablet (1,000 Units) by mouth daily 30 tablet 0     clindamycin (CLINDAMAX) 1 % topical gel Apply topically 2 times daily 60 g 0     lidocaine-prilocaine (EMLA)  2.5-2.5 % external cream Apply to port site one hour prior to access may use six days after port placement 30 g 1     metoclopramide (REGLAN) 10 MG tablet Take 1 tablet (10 mg) by mouth every 6 hours as needed (nausea/vomiting) 60 tablet 0     mirtazapine (REMERON) 7.5 MG tablet TAKE 1 TABLET BY MOUTH ONCE DAILY AT BEDTIME 90 tablet 3     Multiple Vitamins-Minerals (MULTIVITAMIN ADULTS PO) Take 1 tablet by mouth daily       OLANZapine (ZYPREXA) 5 MG tablet Take 1 tablet (5 mg) by mouth At Bedtime 30 tablet 1     ondansetron (ZOFRAN) 8 MG tablet Take 1 tablet (8 mg) by mouth every 8 hours as needed for nausea 30 tablet 3     venlafaxine (EFFEXOR) 75 MG tablet Take 1 tablet (75 mg) by mouth daily 30 tablet 3       ALLERGIES: is allergic to morphine.    SOCIAL HISTORY:   Social History     Tobacco Use     Smoking status: Former     Packs/day: 0.50     Types: Cigarettes     Quit date: 2015     Years since quittin.8     Smokeless tobacco: Never     Tobacco comments:     5 a day   Substance Use Topics     Alcohol use: No     Alcohol/week: 0.0 standard drinks     Comment: hx of etoh abuse in teens and 20's     Drug use: No         FAMILY HISTORY:   family history includes Alcohol/Drug in her father and mother; Breast Cancer in her paternal aunt and another family member; Cancer in an other family member; Depression in her father, maternal grandfather, mother, paternal grandfather, and sister; Diabetes in her maternal grandfather and mother; Respiratory in her sister.    REVIEW OF SYMPTOMS: Could not be completed due to patient being too sleepy.    PHYSICAL EXAMINATION:    BP (!) 169/96 (BP Location: Left arm)   Pulse 76   Temp 98.6  F (37  C) (Oral)   Resp 18   LMP 2014   SpO2 97%   Lying in bed in no acute distress  Sleepy, but could be aroused for simple questions.     IMAGING:          ASSESSMENT AND PLAN: In summary, Ms. Bennett is a 55 yo female with an initial locally advanced pleomorphic  lobular carcinoma of the right breast, ER+/PA+/HER2-, s/p neoadjuvant chemotherapy, s/p adjuvant radiation, who then developed diffusely bony metastases and liver metastases, with biopsy finding of both triple negative and ER-/HER2 amplified subpopulations. She now has developed leptomeningeal disease with hydrocephalus. There were also evidence of intraparenchymal metastases. She has a poor functional status with recent infections, PE, and now the CNS disease.     I agree with starting her on Decadron as recommended by Medical Oncology. She should have a shunt placed due to finding of hydrocephalus. Besides LP to assess CSF, she may benefit from spine imaging.     The role of radiation therapy is limited in the setting of leptomeningeal spread. The prognosis is very poor despite treatment. If her LP is clear and her spine imaging does not show additional disease below the brain, whole brain radiation therapy can be considered. However, if she has positive CSF and/or spine involvement, intrathecal therapy is preferred to address disease from neck down.     We will follow her imaging and lab and continue conversation with medical oncology and neurosurgery.       Thank you for allowing us to participate in this patient's care.  Please feel free to call with any questions or concerns.       Bill Chauhan M.D./Ph.D.  Radiation Oncologist   Department of Radiation Oncology  [facility]  Phone: [phone]

## 2022-12-08 NOTE — PROGRESS NOTES
Tried to connect with Bedside RN (no call back number in page):  Connected with Charge RN. Patient can eat/drink until midnight. NPO at midnight.    Jay Mckeon  Neurosurgery Resident PGY2

## 2022-12-09 NOTE — PROGRESS NOTES
[facility], [place]   Neurosurgery Progress Note:    Date of service: 12/9/2022    Assessment: Josefina Bennett is a 57 yo female with metastatic breast cancer, with 1 week of headaches and memory loss, symptoms and imaging concerning for leptomeningeal disease with hydrocephalus.     Clinically Significant Risk Factors Present on Admission            # Breast cancer      Recommendations:  Plan for OR for  shunt placement todaywith Dr. Lopez  HOB > 30 degrees  Q4h neuro exams   Pain control  Hold all anticoagulation  NPO     Ravinder Farmer, APRN, CNP  Neurosurgery  Pager [phone]      I have spent a total of 25 minutes total time counseling, coordination, and chart review, over 50% of the time was spent in direct patient care.       Interval History:  No acute events overnight.  Patient reports on-going headaches. Plan for  shunt placement today with Dr. Lopez.  Head CT completed this AM.  NPO since midnight.       Objective:   Temp:  [97.3  F (36.3  C)-98.6  F (37  C)] 97.3  F (36.3  C)  Pulse:  [75-98] 98  Resp:  [16-18] 16  BP: (164-188)/(61-96) 175/85  SpO2:  [94 %-100 %] 98 %  I/O last 3 completed shifts:  In: 1655 [P.O.:120; I.V.:1535]  Out: -     Gen: Appears comfortable, NAD  Wound:  Neurologic:  - Alert & Oriented to person, and place  - Follows commands   - Speech fluent, spontaneous. No aphasia or dysarthria.  - No gaze preference. No apparent hemineglect.  - PERRL, EOMI  - Strong eye closure, jaw clench, and cheek puff  - Face sensation intact to light touch, mild left facial droop noted  - Palate elevates symmetrically and tongue protrudes midline  - Trapezii and sternocleidomastoid muscles 5/5 bilaterally  - No pronator drift     Del Tr Bi WE WF Gr   R 5 5 5 5 5 5   L 5 5 5 5 5 5    HF KE KF DF PF EHL   R 5 5 5 5 5 5   L 5 5 5 5 5 5     Reflexes 2+ throughout    Sensation intact and symmetric to light touch throughout    LABS  Recent Labs   Lab Test 12/09/22  0605 12/08/22  0548  12/07/22  1830   WBC 12.4* 10.2 8.8   HGB 12.7 11.6* 11.5*   MCV 87 87 85    376 419       Recent Labs   Lab Test 12/09/22  0605 12/08/22  0550 12/07/22  1830    141 139   POTASSIUM 3.8 3.5 4.3   CHLORIDE 102 109* 106   CO2 25 24 22   BUN 13.0 17.7 25.1*   CR 0.68 0.85 1.29*   ANIONGAP 9 8 11   VEE 8.2* 8.9 8.6   * 116* 175*       IMAGING    Recent Results (from the past 24 hour(s))   MRI Brain w/o & w Contrast   Result Value    Radiologist flags Acute hydrocephalus and leptomeningeal disease. (Urgent)    Narrative    EXAM: MR BRAIN W/O & W CONTRAST  12/7/2022 2:42 PM     HISTORY:  Malignant neoplasm of nipple of right breast in female,  estrogen receptor positive (H); Malignant neoplasm of nipple of right  breast in female, estrogen receptor positive (H)       COMPARISON:  4/6/2022 brain MRI, PET CT 11/21/2022    TECHNIQUE: MR head: Sagittal and axial T1-weighted, axial diffusion,  multiplanar T2 FLAIR with fat saturation images were obtained without  intravenous contrast. Following intravenous gadolinium-based contrast  administration, axial T2-weighted, diffusion, and T1-weighted images  (in multiple planes) were obtained.  MR Perfusion: During and following intravenous gadolinium-based  contrast administration, dynamic susceptibility-based images were  obtained for MR Perfusion evaluation.     CONTRAST: 6.5 mls Gadavist.    FINDINGS:  Bilateral cerebellar sulcal post contrast enhancement with bilateral  nodular enhancing foci (for example series 23, image 4 and series 24  images 41-50). Additional abnormal enhancing intraparenchymal foci  central jude measuring 6 mm in the right caudate head measuring 5 mm  series 23 and 15 respectively.    There is no mass effect, midline shift, or intracranial hemorrhage.  The ventricles are proportionate to the cerebral sulci. Diffusion and  susceptibility weighted images are negative for acute/focal  abnormality. Subependymal T2 hyperintensities within  the ventricular  system and enlargement of the ventricular system since 4/6/2022  suggesting acute hydrocephalus with transependymal flow of CSF.  Moderate to advanced generalized parenchymal volume loss. Major  intracranial vascular structures are within normal limits.    No suspicious abnormality of the skull marrow signal. Clear paranasal  sinuses. Bilateral mastoid effusions. No focal abnormality of the  pituitary gland, sella, skull base and upper cervical spinal  structures on sagittal images. The orbits are normal.      Impression    IMPRESSION:  1. 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  2. Acute communicating hydrocephalus    [Urgent Result: Acute hydrocephalus and leptomeningeal disease.]    Finding was identified on 12/7/2022 2:48 PM.     CINDY Cummins was contacted by Dr. Claire at 12/7/2022 3:43  PM and verbalized understanding of the urgent finding.      I have personally reviewed the examination and initial interpretation  and I agree with the findings.    KATIA CLAIRE MD         SYSTEM ID:  [id number]   XR Chest 2 Views    Narrative    EXAM: XR CHEST 2 VIEWS  LOCATION: [facility]  DATE/TIME: 12/7/2022 10:43 PM    INDICATION: preop evaluation  COMPARISON: 11/2/2022.    FINDINGS: Left chest wall port. No pneumothorax. The heart size is normal. There are again areas of nodularity in left hemithorax which may relate ribs. Scarring at the lung apices. No pleural effusion. No significant interval change.      Impression    IMPRESSION: No acute abnormality.

## 2022-12-09 NOTE — PROGRESS NOTES
"[facility]    Medicine Progress Note - Hospitalist Service, GOLD TEAM 5    Date of Admission:  12/7/2022    Assessment & Plan   Josefina Bennett is a 56 year old female admitted on 12/7/2022. 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 She is admitted to hospital medicine service for further treatment including neurosurgery consultation.     # New Brain Metastases  # Recurrent Breast Cancer with Metastasis to Liver and Bone  She was seen in oncology clinic yesterday for 3-day history of headaches associated with confusion, dizziness, nausea and vomiting and sent to ED for stat brain MRI, which revealed leptomeningeal carcinomatosis and intraparenchymal metastastes to the right caudate and jude with associated acute communicating hydrocephalus. Received dexamethasone at their facility.   -Neurosurgery and Oncology consulted, appreciate assistance  -Plans for  shunt placement today  -Obtain MRI full spine per rad/onc  -NPO   -Keep HOB >30 degrees   -Neuro exam Q4 H  -Hold ALL anticoagulation       #LISA  Baseline Cr ~0.9, 1.29 on 12/7. Suspect prerenal in the setting of N/V. Creatinine now WNLS  -Continue mIVFs  -Repeat UA in setting of possible UTI at OSH was negative for infection      Transaminitis - Alk phos 117, ALT 62, AST 61, around baseline. Likely d/t known liver mets  -CTM     #Recent PE  PE incidentally found on CT A/P 11/1: \"Left lower lobe posteromedial segment nonocclusive thrombi. These could " "be acute or old\", admitted for 48 hours and started on Eliquis. TTE at that time w/ global and regional LV function normal with EF 55-60%. No issues with bleeding or bruising since starting Eliquis.   -Holding anticoagulation for above procedure      Normocytic Anemia - Likely chemotherapy induced.  Hgb 12.7 today, no signs/symptoms of active bleeding  -CBC daily     # Depression  # PTSD  # Insomnia  - Continue PTA mirtazapine and Effexor          Diet: NPO per Anesthesia Guidelines for Procedure/Surgery Except for: Meds    DVT Prophylaxis: Pneumatic Compression Devices  Berger Catheter: Not present  Central Lines: PRESENT       Cardiac Monitoring: None  Code Status: Full Code      Disposition Plan      Expected Discharge Date: 12/15/2022      Destination: home  Discharge Comments: Patient undergoing  shunt placement 12/9        The patient's care was discussed with the Attending Physician, Dr. Hermes Nicolas.    Ira Gamino PA-C  Hospitalist Service, [address]  Securely message with the Vocera Web Console (learn more here)  Text page via [facility] Paging/Directory   Please see signed in provider for up to date coverage information      Clinically Significant Risk Factors                                ______________________________________________________________________    Interval History   All overnight events reviewed.  Patient's main complaint is severe headache with ongoing nausea and vomiting.  She is eager to have her procedure.  She has no other complaints at this time.      Data reviewed today: I reviewed all medications, new labs and imaging results over the last 24 hours.    Physical Exam   Vital Signs: Temp: 97.3  F (36.3  C) Temp src: Oral BP: (!) 175/85 (before administration of prn hydralazine) Pulse: 98   Resp: 16 SpO2: 98 %      Weight:   GENERAL: Alert and oriented x 3. NAD. Ill appearing   HEENT: Anicteric sclera. Mucous membranes moist. " NC. AT.   CV: RRR. S1, S2. 2+ systolic   RESPIRATORY: Effort normal on RA Lungs CTAB with no wheezing, rales, rhonchi.   GI: Abdomen soft and non distended with normoactive bowel sounds present in all quadrants. No tenderness, rebound, guarding. No lesions.   NEUROLOGICAL: No focal deficits. Moves all extremities.  CN 2-12 grossly intact.  EXTREMITIES: No peripheral edema.  SKIN: No jaundice. No rashes.        Data   Recent Labs   Lab 12/09/22  0605 12/08/22  0550 12/07/22  1830 12/02/22  1329   WBC 12.4* 10.2 8.8 8.5   HGB 12.7 11.6* 11.5* 11.3*   MCV 87 87 85 89    376 419 386   INR  --   --  1.10  --     141 139 145   POTASSIUM 3.8 3.5 4.3 3.8   CHLORIDE 102 109* 106 106   CO2 25 24 22 28   BUN 13.0 17.7 25.1* 18.5   CR 0.68 0.85 1.29* 1.26*   ANIONGAP 9 8 11 11   VEE 8.2* 8.9 8.6 9.3   * 116* 175* 174*   ALBUMIN 3.6  --   --  3.7   PROTTOTAL 5.9*  --   --  6.2*   BILITOTAL 0.4  --   --  0.2   ALKPHOS 117*  --   --  130*   ALT 62*  --   --  51*   AST 61*  --   --  47*      DISPLAY PLAN FREE TEXT

## 2022-12-09 NOTE — PROGRESS NOTES
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     Recommendations:   - Patient undergoing codman certa plus (shunt with valve capacity for chemotherapy delivery) placement with neurosurgery today. CSF will be sent intra-procedurally for testing.   - Final treatment plans to come in coming days pending CSF analysis and MRI c/t/l spine.   - With history of recent PE (11/1), would strongly recommend IVC filter placement if NSGY team deems anticoagulation needing to be held for multiple days. Have not heard back from NSGY team re: length of time needing to be held, however communicated recommendation to primary team as well.   - Non-urgent liver MRI pending hospitalization, likely could proceed with outpatient.     # Recurrent, metastatic triple-negative, HER2+ breast cancer  # Leptomeningeal disease, parenchymal brain mets  Follows with Dr. Meza. See treatment history  below - most recently received cycle 3 enhertu (11/11/22). Regarding recent events, she called oncology clinic on 12/2 after due to progressive frontal headaches with episodes of new memory loss, not being able to recall events for hours at a time. She called her oncology clinic on 12/2 and was recommended to go to ED. She subsequently went home and fell asleep on 12/2, does not recall events from 12/2 until she was woken up by a roommate on 12/5. She visited [place] [facility] ED on 12/5, however left before being seen due to long wait times. She was subsequently seen in [place] ED on 12/6 for nausea, vomiting, and weakness, given IV fluids and zofran, discharged after obtaining labs, UA, and blood cultures with concern for UTI and given course of antibiotics which she did not start until 12/7. She followed up in oncology clinic 12/7/22, where she received an infusion with IV fluids and was recommended stat brain MRI. Brain MRI (12/7) with evidence of leptomeningeal carcinomatosis and intraparenchymal metastases to right caudate and jude, as well as acute communicating hydrocephalus. NGSY consulted with plans for placement of codman certa plus ( shunt with valve capacity for chemotherapy delivery) on 12/9 after eliquis held for 48 hours. Dr. Meza called patient to discuss when she was in the ED and recommended further CSF studies to assess HER2 status with shunt placement. Further treatment plans, pending ongoing investigations include consideration of intrathecal chemotherapy and placement of reservoir if possible, and switching treatment regimen from Enhertu to [id number] regimen with tucatinib and capecitabine with trastuzumab.  - Radiation oncology consulted with no plans for XRT at this time, although could consider whole brain if MRI spine negative and CSF negative.   - Agree with MRI c/t/l spine and non-urgent MRI liver when able for complete staging.     # PE (11/1/22)  Noted to have PE on 11/1/22 and  started on eliquis. Held on 12/7 due to anticipated procedure. Gien PE within previous 3 months (5 weeks prior), would strongly recommend IVC filter placement if NSGY team deems anticoagulation needing to be held for multiple days.     Patient was seen and plan of care was discussed with attending physician Dr. Mendez.    I spent 60 minutes in the care of this patient today, which included time necessary for review of interval events, obtaining history and physical exam, ordering medications/tests/procedures as medically indicated, review of pertinent medical literature, counseling of the patient, coordination of care, and documentation time. Over 50% of time was spent counseling the patient and/or coordinating care.    Thank you for the opportunity to partake in this patients plan of care. Please do not hesitate to page with questions. We will continue to follow.     Colette Judge PA-C   Hematology/Oncology   Pager: [phone]   ___________________________________________________________________    Subjective & Interval History:    No acute events noted overnight. Patient noted to have ongoing headaches and nausea, managed supportively with antiemetics and tylenol/opioids. She was seen this morning resting comfortably in a dark room and speaking very quietly. She was aware of her procedure today and I discussed that the shunt will have ability for chemotherapy delivery if need be. Discussed we will continue to monitor these tests and discuss more fully when results are back. She denies other symptoms at this time.     TREATMENT HISTORY:  A.  1/2015 - Biopsy proven infiltrating lobular carcinoma, grade 2. Tumor was ER-positive, OK low positive, and HER2 was negative by immunohistochemistry. She subsequently underwent an MRI showing a tumor that was 4.8 x 1.7 x 3.2 cm in size with clinical stage was T2 NX MX.   Started on neoadjuvant therapy on I SPY-2 with ganetespib and paclitaxel. AC x 2 (92573) complicated by PCP  pneumonia.    B.  Right lumpectomy, followed by right margin resections, followed by right mastectomy.   B.  Oophorectomy 10-16-15.   C.  Tamoxifen after 10-6-15.   D.  Radiation therapy. 5,040 cGy in 180 cGy/fraction to the chest wall and regional lymphatics followed by 720 cGy axillary lymph node boost and 1000 cGy mastectomy scar boost. Completed on 12/3/2015.  E.   Adjuvant letrozole begun 1-28-16.  Plan was to continue through 2026.  F.  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  A mix of TNBC and ER- HER2+.  Clinic conference consensus was for the modified Vandana with paclitaxel weekly to treat both clones.   G. 9/30/22 Started Enhertu   H. Was admitted 10/3-10/7, C. Difficile, also had + Galactomanin. Started on voriconazole 10/11/22  I. 10/19 Cycle #2 Enhertu, dose reduced   J. PE-- incidentally found on 11/1, admitted for 48 hours, started on Eliquis  K. 11/11 - Cycle #3 Enhertu     Physical Exam:    Blood pressure (!) 155/73, pulse 113, temperature 98.1  F (36.7  C), temperature source Oral, resp. rate 16, weight 58.4 kg (128 lb 12.8 oz), last menstrual period 12/18/2014, SpO2 98 %, not currently breastfeeding.    General: lying in bed, no acute distress   HEENT: sclera anicteric, EOMI, MMM  Neck: supple, normal ROM  CV: appears well perfused  Resp: normal respiratory effort on ambient air  GI: non-distended  MSK: warm and well-perfused, normal tone  Skin: no rashes on limited exam, no jaundice  Neuro: Alert and interactive but lethargic, moves all extremities equally on limited examination    Labs & Studies: I personally reviewed the following studies:  ROUTINE LABS (Last four results):  CMP  Recent Labs   Lab 12/09/22  0605 12/08/22  0550 12/07/22  1830 12/02/22  1329    141 139 145   POTASSIUM 3.8 3.5  4.3 3.8   CHLORIDE 102 109* 106 106   CO2 25 24 22 28   ANIONGAP 9 8 11 11   * 116* 175* 174*   BUN 13.0 17.7 25.1* 18.5   CR 0.68 0.85 1.29* 1.26*   GFRESTIMATED >90 80 48* 50*   VEE 8.2* 8.9 8.6 9.3   PROTTOTAL 5.9*  --   --  6.2*   ALBUMIN 3.6  --   --  3.7   BILITOTAL 0.4  --   --  0.2   ALKPHOS 117*  --   --  130*   AST 61*  --   --  47*   ALT 62*  --   --  51*     CBC  Recent Labs   Lab 12/09/22  0605 12/08/22  0550 12/07/22  1830 12/02/22  1329   WBC 12.4* 10.2 8.8 8.5   RBC 4.62 4.19 4.18 4.14   HGB 12.7 11.6* 11.5* 11.3*   HCT 40.1 36.4 35.6 36.9   MCV 87 87 85 89   MCH 27.5 27.7 27.5 27.3   MCHC 31.7 31.9 32.3 30.6*   RDW 19.9* 19.9* 20.1* 20.8*    376 419 386     INR  Recent Labs   Lab 12/07/22  1830   INR 1.10       Medications list for reference:  Current Facility-Administered Medications   Medication     acetaminophen (TYLENOL) tablet 650 mg    Or     acetaminophen (TYLENOL) Suppository 650 mg     calcium carbonate 500 mg (elemental) (OSCAL) tablet 500 mg     dexamethasone (DECADRON) tablet 4 mg     docusate sodium (COLACE) capsule 100 mg     hydrALAZINE (APRESOLINE) tablet 25 mg     HYDROmorphone (PF) (DILAUDID) injection 0.5 mg     lactated ringers infusion     lidocaine (LMX4) cream     lidocaine 1 % 0.1-1 mL     metoclopramide (REGLAN) tablet 10 mg     mirtazapine (REMERON) tablet TABS 7.5 mg     multivitamin w/minerals (THERA-VIT-M) tablet 1 tablet     OLANZapine (zyPREXA) tablet 5 mg     ondansetron (ZOFRAN ODT) ODT tab 4 mg    Or     ondansetron (ZOFRAN) injection 4 mg     ondansetron (ZOFRAN) tablet 8 mg     oxyCODONE (ROXICODONE) tablet 5-10 mg     polyethylene glycol (MIRALAX) Packet 17 g     prochlorperazine (COMPAZINE) injection 10 mg    Or     prochlorperazine (COMPAZINE) tablet 10 mg    Or     prochlorperazine (COMPAZINE) suppository 25 mg     senna-docusate (SENOKOT-S/PERICOLACE) 8.6-50 MG per tablet 1 tablet    Or     senna-docusate (SENOKOT-S/PERICOLACE) 8.6-50 MG per  tablet 2 tablet     sodium chloride (PF) 0.9% PF flush 3 mL     sodium chloride (PF) 0.9% PF flush 3 mL     venlafaxine (EFFEXOR) tablet 75 mg     Vitamin D3 (CHOLECALCIFEROL) tablet 1,000 Units

## 2022-12-09 NOTE — PROGRESS NOTES
"SPIRITUAL HEALTH SERVICES  [facility] ([place]) 7D  REFERRAL SOURCE: Request with admission     Pt had just come back from a procedure and quietly stated \"I have a headache, it really hurts.\"  Helped pt call her nurse and then arranged to visit her tomorrow on Saturday. Pt affirmed the plan.     PLAN: Will follow-up with pt Saturday.     Rev. Malena Pimentel MDiv, [facility]  Staff    Pager [phone]  * [facility] remains available 24/7 for emergent requests/referrals, either by having the switchboard page the on-call  or by entering an ASAP/STAT consult in Epic (this will also page the on-call ).*       "

## 2022-12-09 NOTE — PROVIDER NOTIFICATION
"Provider is being repaged r/t to BP remaining elevated 173/87. pt denies lightheadedness and blurry vision. But pt has dizziness.    Provider responses \"Can give 2 doses of Hydralazine prn if BP still above parameters\"  "

## 2022-12-09 NOTE — PROGRESS NOTES
Goal Outcome Evaluation:  Time: 1816-0171   Activity: Up independently  Pain: c/o headache and nausea. Gave x1 PRN Oxy and tylenol,  x1 Compazine Neuro: A&O x  Cardiac: Hypertensive - within parameters Gave hydralazine and BP came down to 155/73  Respiratory: RA @ 96 stat    GI/: Voiding spontaneusly    Diet: NPO @ midnight  Lines: PIV infusing LR @ 75 ml/hr    Incisions/Drains/Skin: Intact    Lab: Reviewed    Electrolyte Replacements: None  Plan: Continue with POC  New changes this shift: Had MRI of the spine      Will continue to monitor.

## 2022-12-09 NOTE — PROVIDER NOTIFICATION
Provider is being paged related to pt having abnormal /61 pt denies lightheadedness and blurry vision. Prn dilaudid given for headache. Prn hydralazine given    Provider response no response

## 2022-12-09 NOTE — PROGRESS NOTES
8045-8411:    Pt reported nausea and was given PRN IV Compazine x1.    Pt left 7D with patient transport to the OR. Report was given to Pre-op nurse.    Pt will not be returning to 7D after surgery. Her belongings were brought to 6A.

## 2022-12-09 NOTE — PLAN OF CARE
Goal Outcome Evaluation:  Time: 3032-2858    Activity: Up independently  Pain: c/o headache and nausea. Gave x1 PRN Oxy, x1 Zofran, x1 Compazine     Neuro: A&O x  Cardiac: Hypertensive - within parameters  Respiratory: RA @ 95 stat    GI/: Last BM 12/6 per pt. Voiding spontaneusly    Diet: NPO @ midnight  Lines: PIV infusing LR @ 75 ml/hr    Incisions/Drains/Skin: Intact    Lab: Reviewed    Electrolyte Replacements: None  Plan: Continue with POC  New changes this shift: No acute change this shift    Admitted/transferred from: ED   2 RN full   skin assessment completed by Layla Torres, NU and Umu FLOWERS RN.  Skin assessment finding: issues found Old breast cancer scar   Interventions/actions: other No intervention needed     Will continue to monitor.    BP (!) 164/77 (BP Location: Left arm)   Pulse 87   Temp 98.4  F (36.9  C) (Oral)   Resp 16   LMP 12/18/2014   SpO2 95%

## 2022-12-09 NOTE — PLAN OF CARE
Pt is alert and oriented x4, but forgetfull currently on bed alarm. Neuro check completed within baseline. Neuro to be completed q4hrs. Prn hydralazine given r/t to /61 recheck was 173/87, OVSS,afebrile on RA. Pt denies nausea and vomiting. Pt has headache and dizziness but  denies lighheadness and blurry vision prn tylenol x1, dilaudid given x2, oxycodone x1 . LR infusing at 75ml/hr.Currently NPO r/t to surgery in AM. CHG bath completed, pt has stickers on heads that needs to stay on until surgery. Ct of the head completed.Up with assist of 1. No unmet need at this time

## 2022-12-09 NOTE — PROVIDER NOTIFICATION
Provider is being paged to clarify the lactated ringer infusing rate 100 or 75ml/hr.     Provider stated that it should be 75ml/hr

## 2022-12-10 NOTE — ANESTHESIA PROCEDURE NOTES
Airway       Patient location during procedure: OR       Procedure Start/Stop Times: 12/9/2022 7:06 PM  Staff -        Anesthesiologist:  Donald Leos MD       CRNA: Rich Morris APRN CRNA       Performed By: CRNA  Consent for Airway        Urgency: elective  Indications and Patient Condition       Indications for airway management: amber-procedural       Induction type:RSI       Mask difficulty assessment: 0 - not attempted    Final Airway Details       Final airway type: endotracheal airway       Successful airway: ETT - single and Oral  Endotracheal Airway Details        ETT size (mm): 7.0       Cuffed: yes       Successful intubation technique: direct laryngoscopy       DL Blade Type: MAC 3       Grade View of Cords: 1       Adjucts: stylet       Position: Left       Measured from: gums/teeth       Secured at (cm): 21       Bite block used: Soft    Post intubation assessment        Placement verified by: capnometry, equal breath sounds and chest rise        Number of attempts at approach: 1       Number of other approaches attempted: 0       Secured with: pink tape       Ease of procedure: easy       Dentition: Intact    Medication(s) Administered   Medication Administration Time: 12/9/2022 7:06 PM

## 2022-12-10 NOTE — PLAN OF CARE
Status: Post op #1 for  shunt on 12/9/22. History of metastatic breast CA; mets to bone, liver, brain.   Vitals: VSS on continuous pulse ox  Neuros: A/Ox4, strengths 5/5, denies N/T  IV: PIV SL, Port running NS @ 50mL/hr per MAR  Resp/trach: RA  Diet: Advance to regular   Bowel status: passing gas  : Voiding spontaneously   Skin: head incision approximated and HALLIE   Pain: Incisional abdomen pain managed with IV Dilaudid  Activity: SBA w/ GB  Plan: Continue to monitor

## 2022-12-10 NOTE — CONSULTS
Care Management Initial Consult    General Information  Assessment completed with: VM-chart review,    Type of CM/SW Visit: Chart Assessment    Primary Care Provider verified and updated as needed: Yes   Readmission within the last 30 days: no previous admission in last 30 days      Reason for Consult: discharge planning  Advance Care Planning: Advance Care Planning Reviewed: no concerns identified          Communication Assessment  Patient's communication style: spoken language (English or Bilingual)    Hearing Difficulty or Deaf: no   Wear Glasses or Blind: no    Cognitive  Cognitive/Neuro/Behavioral: WDL  Level of Consciousness: alert  Arousal Level: opens eyes spontaneously  Orientation: oriented x 4  Mood/Behavior: calm, cooperative  Best Language: 0 - No aphasia  Speech: clear, spontaneous, logical    Living Environment:   People in home: other (see comments) (roommate)     Current living Arrangements:        Able to return to prior arrangements: yes       Family/Social Support:  Care provided by: self  Provides care for:    Marital Status:              Description of Support System: Supportive    Support Assessment: Adequate family and caregiver support    Current Resources:   Patient receiving home care services: No     Community Resources: None  Equipment currently used at home: none  Supplies currently used at home:      Employment/Financial:  Employment Status:  (employed at [employer])        Financial Concerns: No concerns identified           Lifestyle & Psychosocial Needs:  Social Determinants of Health     Tobacco Use: Medium Risk     Smoking Tobacco Use: Former     Smokeless Tobacco Use: Never     Passive Exposure: Not on file   Alcohol Use: Not on file   Financial Resource Strain: Not on file   Food Insecurity: Not on file   Transportation Needs: Not on file   Physical Activity: Not on file   Stress: Not on file   Social Connections: Not on file   Intimate Partner Violence: Not At Risk      Fear of Current or Ex-Partner: No     Emotionally Abused: No     Physically Abused: No     Sexually Abused: No   Depression: Not at risk     PHQ-2 Score: 0   Housing Stability: Not on file       Mental Health Status:  Mental Health Status: No Current Concerns       Chemical Dependency Status:  Chemical Dependency Status: No Current Concerns             Values/Beliefs:  Spiritual, Cultural Beliefs, Temple Practices, Values that affect care: no               Additional Information:  Assessment above completed due to elevated risk score. No new needs identified at this time. Care coordination will continue to follow.     Farnaz Shen RN   Casual Care Coordinator

## 2022-12-10 NOTE — ANESTHESIA POSTPROCEDURE EVALUATION
Patient: Josefina Bennett    Procedure: Procedure(s):  INSERTION, SHUNT, VENTRICULOPERITONEAL, right side       Anesthesia Type:  General    Note:  Disposition: Inpatient   Postop Pain Control: Uneventful            Sign Out: Well controlled pain   PONV: No   Neuro/Psych: Uneventful            Sign Out: Acceptable/Baseline neuro status   Airway/Respiratory: Uneventful            Sign Out: Acceptable/Baseline resp. status   CV/Hemodynamics: Uneventful            Sign Out: Acceptable CV status; No obvious hypovolemia; No obvious fluid overload   Other NRE: NONE   DID A NON-ROUTINE EVENT OCCUR? No           Last vitals:  Vitals Value Taken Time   /57 12/09/22 2155   Temp 36.3  C (97.4  F) 12/09/22 2155   Pulse 93 12/09/22 2159   Resp 9 12/09/22 2159   SpO2 100 % 12/09/22 2159   Vitals shown include unvalidated device data.    Electronically Signed By: Donald Leos MD  December 9, 2022  10:59 PM

## 2022-12-10 NOTE — PROGRESS NOTES
[facility]    Medicine Progress Note - Hospitalist Service, GOLD TEAM 5    Date of Admission:  12/7/2022    Assessment & Plan   Josefina Bennett is a 56 year old female admitted on 12/7/2022. 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 She is admitted to hospital medicine service for further treatment including neurosurgery consultation.     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 She is now s/p  shunt placement by Neurosurgery 12/9 with marked improvement in patient's presenting symptoms.   -Neurosurgery and Oncology consulted, appreciate assistance  -Neurosurgery recommending holding anticoagulation for one week   -Continue decadron 4mg Q6H   -ADAT  -Keep HOB >30 degrees   -Neuro exam Q4 H    Leukocytosis - Wbc 17.6. Likely secondary to steroids and recent surgery.  Afebrile, incision site w/o erythema or drainage.  No other signs/symptoms of infection  -CBC daily       #LISA, resolved  Baseline Cr ~0.9, 1.29 on 12/7. Suspect prerenal in the setting of N/V. Creatinine now WNLS. Repeat UA in setting of possible  "UTI at OSH was negative for infection   -CTM   -BMP daily      Transaminitis - Mild elevation on CMP since admission. Patient w/o abdominal pain or fever. Likely d/t known liver mets.   -Outpatient Liver MRI per oncology recs  -CTM      #Recent PE  PE incidentally found on CT A/P 11/1: \"Left lower lobe posteromedial segment nonocclusive thrombi. These could be acute or old\", admitted for 48 hours and started on Eliquis. TTE at that time w/ global and regional LV function normal with EF 55-60%. No issues with bleeding or bruising since starting Eliquis.   -Neurosurgery recommending holding anticoagulation for one week.      Normocytic Anemia, resolved - Likely chemotherapy induced.  Hgb 12.3  today, no signs/symptoms of active bleeding  -CBC daily     # Depression  # PTSD  # Insomnia  - Continue PTA mirtazapine and Effexor       Diet: Advance Diet as Tolerated: Regular Diet Adult; Regular Diet Adult    DVT Prophylaxis: Pneumatic Compression Devices and Ambulate every shift  Berger Catheter: Not present  Central Lines: PRESENT     Cardiac Monitoring: None  Code Status: Full Code      Disposition Plan     Expected Discharge Date: 12/15/2022      Destination: home  Discharge Comments: Patient undergoing  shunt placement 12/9        The patient's care was discussed with the Attending Physician, Dr. Hermes Nicolas.    Ira Gamino PA-C  Hospitalist Service, [address]  Securely message with the Vocera Web Console (learn more here)  Text page via [facility] Paging/Directory   Please see signed in provider for up to date coverage information      Clinically Significant Risk Factors          # Hypocalcemia: Lowest Ca = 8.2 mg/dL in last 2 days, will monitor and replace as appropriate                       ______________________________________________________________________    Interval History   All overnight events reviewed.  Patient with almost complete resolution of " headache.  She is sitting up with friends who are visiting her from [place].  She is slowly advancing her diet and knows she needs to have a bowel movement. She denies nausea, vomiting, dyspnea, chest pain.     Data reviewed today: I reviewed all medications, new labs and imaging results over the last 24 hours.     Physical Exam   Vital Signs: Temp: (!) 96.7  F (35.9  C) Temp src: Oral BP: 130/61 Pulse: 79   Resp: 18 SpO2: 97 % O2 Device: None (Room air) Oxygen Delivery: 2 LPM  Weight: 128 lbs 12.8 oz  GENERAL: Alert and oriented x 3. NAD. Ambulatory. Cooperative.   HEENT: Anicteric sclera. Mucous membranes moist. NC. AT. Right parietal incision with staples w/o erythema  CV: RRR. S1, S2. No murmurs appreciated.   RESPIRATORY: Effort normal on RA. Lungs CTAB with no wheezing, rales, rhonchi.   GI: Abdomen soft and non distended with normoactive bowel sounds present in all quadrants. Some TTP from shunt  NEUROLOGICAL: No focal deficits. Moves all extremities.    EXTREMITIES: No peripheral edema.   SKIN: No jaundice. No rashes.      Data    Latest Reference Range & Units 12/10/22 07:16   Sodium 136 - 145 mmol/L 139   Potassium 3.4 - 5.3 mmol/L 4.1   Chloride 98 - 107 mmol/L 105   Carbon Dioxide (CO2) 22 - 29 mmol/L 24   Urea Nitrogen 6.0 - 20.0 mg/dL 16.4   Creatinine 0.51 - 0.95 mg/dL 0.79   GFR Estimate >60 mL/min/1.73m2 87   Calcium 8.6 - 10.0 mg/dL 8.3 (L)   Anion Gap 7 - 15 mmol/L 10   Albumin 3.5 - 5.2 g/dL 3.5   Protein Total 6.4 - 8.3 g/dL 5.7 (L)   Alkaline Phosphatase 35 - 104 U/L 109 (H)   ALT 10 - 35 U/L 51 (H)   AST 10 - 35 U/L 49 (H)   Bilirubin Total <=1.2 mg/dL 0.4   Glucose 70 - 99 mg/dL 144 (H)   WBC 4.0 - 11.0 10e3/uL 17.6 (H)   Hemoglobin 11.7 - 15.7 g/dL 12.3   Hematocrit 35.0 - 47.0 % 37.6   Platelet Count 150 - 450 10e3/uL 455 (H)   RBC Count 3.80 - 5.20 10e6/uL 4.40   MCV 78 - 100 fL 86   MCH 26.5 - 33.0 pg 28.0   MCHC 31.5 - 36.5 g/dL 32.7   RDW 10.0 - 15.0 % 20.6 (H)

## 2022-12-10 NOTE — ANESTHESIA CARE TRANSFER NOTE
Patient: Josefina Bennett    Procedure: Procedure(s):  INSERTION, SHUNT, VENTRICULOPERITONEAL, right side       Diagnosis: Communicating hydrocephalus (H) [G91.0]  Diagnosis Additional Information: No value filed.    Anesthesia Type:   General     Note:    Oropharynx: oropharynx clear of all foreign objects and spontaneously breathing  Level of Consciousness: awake  Oxygen Supplementation: nasal cannula  Level of Supplemental Oxygen (L/min / FiO2): 4  Independent Airway: airway patency satisfactory and stable    Vital Signs Stable: post-procedure vital signs reviewed and stable  Report to RN Given: handoff report given  Patient transferred to: PACU    Handoff Report: Identifed the Patient, Identified the Reponsible Provider, Reviewed the pertinent medical history, Discussed the surgical course, Reviewed Intra-OP anesthesia mangement and issues during anesthesia, Set expectations for post-procedure period and Allowed opportunity for questions and acknowledgement of understanding      Vitals:  Vitals Value Taken Time   /67 12/09/22 2109   Temp     Pulse 86 12/09/22 2115   Resp 11 12/09/22 2115   SpO2 100 % 12/09/22 2115   Vitals shown include unvalidated device data.    Electronically Signed By: BONITA Corral CRNA  December 9, 2022  9:16 PM

## 2022-12-10 NOTE — PROGRESS NOTES
"SPIRITUAL HEALTH SERVICES  [facility]A  ON-CALL VISIT     REFERRAL SOURCE: Follow-up     Pt was awake, smiling and talking with a friend and son who had flown in from [place] to be with pt.  Pt states \"I am so thankful for my friends, I have another one coming from [place] too.\" Pt states \"I have a lot brittany,\" adding that she does not follow a particular brittany tradition. Pt states \"However, I've done a lot through AA over the years. God is here looking out for me.  And I have great support from family and friends.\"     PLAN: Chaplains remain available per pt/family/staff request     Rev. Malena Pimentel MDiv, [facility]  Staff    Pager [phone]  * [facility] remains available 24/7 for emergent requests/referrals, either by having the switchboard page the on-call  or by entering an ASAP/STAT consult in Epic (this will also page the on-call ).*      "

## 2022-12-10 NOTE — PLAN OF CARE
Arrived from: PACU  Belongings/meds: rings on hands  2 RN Skin Assessment Completed by: Aziza García    Non-intact findings documented (yes/no/NA): R head incision with sutures, abdominal incision closed with glue, redness and blanchable to bilateral toes, ears, shoulders, old R mastectomy scar    Status: admitted s/p  R  shunt insertion for communicating hydrocephalus after presenting to ED with HA, memory loss, dizziness, confusion, and n/v. MRI found new mets. Hx metastatic breast cancer and PE 11/1  Vitals: HTN intermittently, parameters <140. OVSS  Neuros: AOx4, intact, denies n/t  IV: PIV SL, R chest port infusing LR @ 100ml/hr needs to be restarted after transfer  Labs/Electrolytes: see flowhseets  Resp/trach: LSC  Diet: advance as tolerated  Bowel status: LBM 12/6  : voids spont  Skin: see above  Pain: denies  Activity: A1 GB  Updates this shift: MRI and CT done today, hold all anticoags

## 2022-12-10 NOTE — PROGRESS NOTES
[facility], [place]   Neurosurgery Progress Note:    Date of service: 12/10/2022    Assessment: Josefina Bennett is a 57 yo female with metastatic breast cancer, with 1 week of headaches and memory loss, symptoms and imaging concerning for leptomeningeal disease with hydrocephalus.     Patient is postop day 1 status post right ventriculoperitoneal shunt placement, Certas at 3.      Clinically Significant Risk Factors Present on Admission            # Breast cancer    Recommendations:  -We will discuss with oncology/medicine team for plan for CSF analysis/flow cytometry/further management.    -HOB > 30 degrees  Q4h neuro exams   Pain control  To hold off on any anticoagulation for at least 1 week  - Please inform neurosurgery if there is any change in neurological examination    Jay Mckeon  Neurosurgery Resident PGY2    Interval History:  No acute events overnight after placement of the shunt.  Denies headache.    Objective:   Temp:  [96.5  F (35.8  C)-98.1  F (36.7  C)] 96.7  F (35.9  C)  Pulse:  [78-92] 79  Resp:  [9-18] 18  BP: (124-175)/(56-79) 130/61  SpO2:  [96 %-100 %] 97 %  I/O last 3 completed shifts:  In: 500 [I.V.:500]  Out: 955 [Urine:950; Blood:5]    Gen: Appears comfortable, NAD  Wound:  Neurologic:  - Alert & Oriented to person, and place  - Follows commands   - Speech fluent, spontaneous. No aphasia or dysarthria.  - No gaze preference. No apparent hemineglect.  - PERRL, EOMI  - Strong eye closure, jaw clench, and cheek puff  - Face sensation intact to light touch, mild left facial droop noted  - Palate elevates symmetrically and tongue protrudes midline  - Trapezii and sternocleidomastoid muscles 5/5 bilaterally  - No pronator drift     Del Tr Bi WE WF Gr   R 5 5 5 5 5 5   L 5 5 5 5 5 5    HF KE KF DF PF EHL   R 5 5 5 5 5 5   L 5 5 5 5 5 5     Reflexes 2+ throughout    Sensation intact and symmetric to light touch throughout    LABS  Recent Labs   Lab Test 12/10/22  0716  12/09/22  0605 12/08/22  0550   WBC 17.6* 12.4* 10.2   HGB 12.3 12.7 11.6*   MCV 86 87 87   * 439 376       Recent Labs   Lab Test 12/10/22  0716 12/09/22  1626 12/09/22  0605 12/08/22  0550     --  136 141   POTASSIUM 4.1  --  3.8 3.5   CHLORIDE 105  --  102 109*   CO2 24  --  25 24   BUN 16.4  --  13.0 17.7   CR 0.79  --  0.68 0.85   ANIONGAP 10  --  9 8   VEE 8.3*  --  8.2* 8.9   * 168* 128* 116*       IMAGING    Recent Results (from the past 24 hour(s))   MRI Brain w/o & w Contrast   Result Value    Radiologist flags Acute hydrocephalus and leptomeningeal disease. (Urgent)    Narrative    EXAM: MR BRAIN W/O & W CONTRAST  12/7/2022 2:42 PM     HISTORY:  Malignant neoplasm of nipple of right breast in female,  estrogen receptor positive (H); Malignant neoplasm of nipple of right  breast in female, estrogen receptor positive (H)       COMPARISON:  4/6/2022 brain MRI, PET CT 11/21/2022    TECHNIQUE: MR head: Sagittal and axial T1-weighted, axial diffusion,  multiplanar T2 FLAIR with fat saturation images were obtained without  intravenous contrast. Following intravenous gadolinium-based contrast  administration, axial T2-weighted, diffusion, and T1-weighted images  (in multiple planes) were obtained.  MR Perfusion: During and following intravenous gadolinium-based  contrast administration, dynamic susceptibility-based images were  obtained for MR Perfusion evaluation.     CONTRAST: 6.5 mls Gadavist.    FINDINGS:  Bilateral cerebellar sulcal post contrast enhancement with bilateral  nodular enhancing foci (for example series 23, image 4 and series 24  images 41-50). Additional abnormal enhancing intraparenchymal foci  central jude measuring 6 mm in the right caudate head measuring 5 mm  series 23 and 15 respectively.    There is no mass effect, midline shift, or intracranial hemorrhage.  The ventricles are proportionate to the cerebral sulci. Diffusion and  susceptibility weighted images are  negative for acute/focal  abnormality. Subependymal T2 hyperintensities within the ventricular  system and enlargement of the ventricular system since 4/6/2022  suggesting acute hydrocephalus with transependymal flow of CSF.  Moderate to advanced generalized parenchymal volume loss. Major  intracranial vascular structures are within normal limits.    No suspicious abnormality of the skull marrow signal. Clear paranasal  sinuses. Bilateral mastoid effusions. No focal abnormality of the  pituitary gland, sella, skull base and upper cervical spinal  structures on sagittal images. The orbits are normal.      Impression    IMPRESSION:  1. 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  2. Acute communicating hydrocephalus    [Urgent Result: Acute hydrocephalus and leptomeningeal disease.]    Finding was identified on 12/7/2022 2:48 PM.     CINDY Cummins was contacted by Dr. Claire at 12/7/2022 3:43  PM and verbalized understanding of the urgent finding.      I have personally reviewed the examination and initial interpretation  and I agree with the findings.    KATIA CLAIRE MD         SYSTEM ID:  [id number]   XR Chest 2 Views    Narrative    EXAM: XR CHEST 2 VIEWS  LOCATION: [facility]  DATE/TIME: 12/7/2022 10:43 PM    INDICATION: preop evaluation  COMPARISON: 11/2/2022.    FINDINGS: Left chest wall port. No pneumothorax. The heart size is normal. There are again areas of nodularity in left hemithorax which may relate ribs. Scarring at the lung apices. No pleural effusion. No significant interval change.      Impression    IMPRESSION: No acute abnormality.

## 2022-12-10 NOTE — BRIEF OP NOTE
[facility]    Brief Operative Note    Pre-operative diagnosis: Communicating hydrocephalus (H) [G91.0]  Post-operative diagnosis Same as pre-operative diagnosis    Procedure: Procedure(s):  INSERTION, SHUNT, VENTRICULOPERITONEAL, right side  Surgeon: Surgeon(s) and Role:     * Evangelista Lopez MD - Primary     * Carmela Chan MD - Resident - Assisting  Anesthesia: General   Estimated Blood Loss: 5 mL    Drains: None  Specimens: * No specimens in log *  Findings:   Codman Certas valve set at 3. .  Complications: None.  Implants:   Implant Name Type Inv. Item Serial No.  Lot No. LRB No. Used Action   SHUNT CATH VENTRICULAR ANTIBIOTIC-IMPREG CHAD 23CM [id number] - [id number] Shunt SHUNT CATH VENTRICULAR ANTIBIOTIC-IMPREG CHAD 23CM [id number]  [employer] [id number] Right 1 Implanted         Odessa Gonzalez MD  Neurosurgery PGY3    Please contact neurosurgery resident on call with questions.    Dial * * [phone], enter [phone] when prompted.

## 2022-12-10 NOTE — PLAN OF CARE
Status: Post op for  shunt on 12/9/22. History of metastatic breast CA; mets to bone, liver, brain.   Vitals: mildly hypertensive x1, managed with 10mg IV labetalol x1  Neuros: A/Ox4, strengths 5/5, denies N/T  IV: PIV SL, Port running NS @ 50mL/hr per MAR  Resp/trach: RA  Diet: Advance to regular   Bowel status: No BM overnight   : Voiding spontaneously   Skin: head incision approximated and HALLIE   Pain: denies   Activity: SBA w/ GB  Plan: Continue to monitor

## 2022-12-11 NOTE — PLAN OF CARE
"               Status: Post op #1 for  shunt on 12/9/22. History of metastatic breast CA; mets to bone, liver, brain.   Vitals: VSS on continuous pulse ox  Neuros: A/Ox4, strengths 5/5, denies N/T  IV: PIV SL, Port running NS @ 50mL/hr   Resp/trach: RA  Diet: Advance to regular   Bowel status: passing gas  : Voiding spontaneously   Skin: head incision approximated and HALLIE   Pain: Incisional abdomen pain managed with Oxycodone this shift. Pt requesting IV Dilaudid \"to sleep\" but RN requested she attempt trying Oxycodone first, which she did and found helpful.  Activity: SBA w/ GB  Plan: Continue to monitor                    "

## 2022-12-11 NOTE — PROGRESS NOTES
Feels dramatically better after place of shunt.    On exam cheerful and well appearing. Surgical site unremarkable    Awaiting cytology on CSF. No further sites of disease found in reminder of spinal axis.    Breast cancer with new CNS disease, details of therapy pending above.  Recent PE, now off anticoagulation for shunt placement. She would likely benefit from an IVC filter to prevent her high risk of PE. I discussed this directly with the medicine team.

## 2022-12-11 NOTE — PROGRESS NOTES
[facility], [place]   Neurosurgery Progress Note:    Date of service: 12/11/2022    Assessment: Josefina Bennett is a 57 yo female with metastatic breast cancer, with 1 week of headaches and memory loss, symptoms and imaging concerning for leptomeningeal disease with hydrocephalus.     Patient is postop day 2 status post right ventriculoperitoneal shunt placement, Certas at 3.      Clinically Significant Risk Factors Present on Admission            # Breast cancer    Recommendations:  - We will discuss with oncology/medicine team for plan for CSF analysis/flow cytometry/further management   - Plan to send CSF today or tomorrow for flow cytometry when lab able to process sample   - Will coordinate teaching for Oncology to use shunt for any IT chemotherapy needs (via shunt virtual-off setting)  - HOB > 30 degrees  - Q4h neuro exams   - Pain control  - Hold off on any anticoagulation for at least 1 week following surgery  - Please inform neurosurgery if there is any change in neurological examination    Samir Parrish M.D.  Neurosurgery Resident, PGY-4    Please contact neurosurgery resident on call with questions.    Dial * * [phone], enter [phone] when prompted.     Interval History:  Doing well from neurologic perspective. Shunt incisions c/d/i.    Objective:   Temp:  [96.3  F (35.7  C)-96.7  F (35.9  C)] 96.3  F (35.7  C)  Pulse:  [] 99  Resp:  [18] 18  BP: (121-154)/(57-66) 123/58  SpO2:  [97 %] 97 %  I/O last 3 completed shifts:  In: 640 [P.O.:640]  Out: 2600 [Urine:2600]    Gen: Appears comfortable, NAD  Wound:  Neurologic:  - Alert & Oriented to person, and place  - Follows commands   - Speech fluent, spontaneous. No aphasia or dysarthria.  - No gaze preference. No apparent hemineglect.  - PERRL, EOMI  - Strong eye closure, jaw clench, and cheek puff  - Face sensation intact to light touch, mild left facial droop noted  - Palate elevates symmetrically and tongue protrudes midline  -  Trapezii and sternocleidomastoid muscles 5/5 bilaterally  - No pronator drift     Del Tr Bi WE WF Gr   R 5 5 5 5 5 5   L 5 5 5 5 5 5    HF KE KF DF PF EHL   R 5 5 5 5 5 5   L 5 5 5 5 5 5     Reflexes 2+ throughout    Sensation intact and symmetric to light touch throughout    LABS  Recent Labs   Lab Test 12/10/22  0716 12/09/22  0605 12/08/22  0550   WBC 17.6* 12.4* 10.2   HGB 12.3 12.7 11.6*   MCV 86 87 87   * 439 376       Recent Labs   Lab Test 12/10/22  0716 12/09/22  1626 12/09/22  0605 12/08/22  0550     --  136 141   POTASSIUM 4.1  --  3.8 3.5   CHLORIDE 105  --  102 109*   CO2 24  --  25 24   BUN 16.4  --  13.0 17.7   CR 0.79  --  0.68 0.85   ANIONGAP 10  --  9 8   VEE 8.3*  --  8.2* 8.9   * 168* 128* 116*       IMAGING    Recent Results (from the past 24 hour(s))   MRI Brain w/o & w Contrast   Result Value    Radiologist flags Acute hydrocephalus and leptomeningeal disease. (Urgent)    Narrative    EXAM: MR BRAIN W/O & W CONTRAST  12/7/2022 2:42 PM     HISTORY:  Malignant neoplasm of nipple of right breast in female,  estrogen receptor positive (H); Malignant neoplasm of nipple of right  breast in female, estrogen receptor positive (H)       COMPARISON:  4/6/2022 brain MRI, PET CT 11/21/2022    TECHNIQUE: MR head: Sagittal and axial T1-weighted, axial diffusion,  multiplanar T2 FLAIR with fat saturation images were obtained without  intravenous contrast. Following intravenous gadolinium-based contrast  administration, axial T2-weighted, diffusion, and T1-weighted images  (in multiple planes) were obtained.  MR Perfusion: During and following intravenous gadolinium-based  contrast administration, dynamic susceptibility-based images were  obtained for MR Perfusion evaluation.     CONTRAST: 6.5 mls Gadavist.    FINDINGS:  Bilateral cerebellar sulcal post contrast enhancement with bilateral  nodular enhancing foci (for example series 23, image 4 and series 24  images 41-50). Additional  abnormal enhancing intraparenchymal foci  central jude measuring 6 mm in the right caudate head measuring 5 mm  series 23 and 15 respectively.    There is no mass effect, midline shift, or intracranial hemorrhage.  The ventricles are proportionate to the cerebral sulci. Diffusion and  susceptibility weighted images are negative for acute/focal  abnormality. Subependymal T2 hyperintensities within the ventricular  system and enlargement of the ventricular system since 4/6/2022  suggesting acute hydrocephalus with transependymal flow of CSF.  Moderate to advanced generalized parenchymal volume loss. Major  intracranial vascular structures are within normal limits.    No suspicious abnormality of the skull marrow signal. Clear paranasal  sinuses. Bilateral mastoid effusions. No focal abnormality of the  pituitary gland, sella, skull base and upper cervical spinal  structures on sagittal images. The orbits are normal.      Impression    IMPRESSION:  1. 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  2. Acute communicating hydrocephalus    [Urgent Result: Acute hydrocephalus and leptomeningeal disease.]    Finding was identified on 12/7/2022 2:48 PM.     CINDY Cummins was contacted by Dr. Claire at 12/7/2022 3:43  PM and verbalized understanding of the urgent finding.      I have personally reviewed the examination and initial interpretation  and I agree with the findings.    KATIA CLAIRE MD         SYSTEM ID:  [id number]   XR Chest 2 Views    Narrative    EXAM: XR CHEST 2 VIEWS  LOCATION: [facility]  DATE/TIME: 12/7/2022 10:43 PM    INDICATION: preop evaluation  COMPARISON: 11/2/2022.    FINDINGS: Left chest wall port. No pneumothorax. The heart size is normal. There are again areas of nodularity in left hemithorax which may relate ribs. Scarring at the lung apices. No pleural effusion.  No significant interval change.      Impression    IMPRESSION: No acute abnormality.

## 2022-12-11 NOTE — PROGRESS NOTES
[facility]    Medicine Progress Note - Hospitalist Service, GOLD TEAM 5    Date of Admission:  12/7/2022    Assessment & Plan   Josefina Bennett is a 56 year old female admitted on 12/7/2022. 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 She is admitted to hospital medicine service for further treatment including neurosurgery consultation.     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 She is now s/p  shunt placement by Neurosurgery 12/9 with marked improvement in patient's presenting symptoms.   -Neurosurgery and Oncology consulted, appreciate assistance  -Neurosurgery and oncology to discuss CSF analysis and flow cytometry   -Neurosurgery recommending holding anticoagulation for one week   -Continue decadron 4mg Q6H   -ADAT  -Keep HOB >30 degrees   -Neuro exam Q4 H     Leukocytosis - Wbc 16.5. Likely secondary to steroids and recent surgery.  Afebrile, incision sites w/o erythema or drainage.  No other signs/symptoms of infection  -CBC daily       #LISA, resolved  Baseline Cr ~0.9, 1.29 on 12/7. Suspect prerenal in  "the setting of N/V. Creatinine now WNLS. Repeat UA in setting of possible UTI at OSH was negative for infection   -CTM   -BMP daily      Transaminitis - Mild elevation on CMP since admission. Patient w/o abdominal pain or fever. Likely d/t known liver mets.   -Outpatient Liver MRI per oncology recs  -CTM      #Recent PE  PE incidentally found on CT A/P 11/1: \"Left lower lobe posteromedial segment nonocclusive thrombi. These could be acute or old\", admitted for 48 hours and started on Eliquis. TTE at that time w/ global and regional LV function normal with EF 55-60%. No issues with bleeding or bruising since starting Eliquis.   -Neurosurgery recommending holding anticoagulation for one week.   -Bilateral lower extremity US negative for DVT     Normocytic Anemia, resolved - Likely chemotherapy induced.  Hgb 12.6 no signs/symptoms of active bleeding  -CBC daily    Thrombocytosis - Platelets 455, suspect elevation in setting of recent surgery and malignancy.   -CBC daily      # Depression  # PTSD  # Insomnia  - Continue PTA mirtazapine and Effexor       Diet: Advance Diet as Tolerated: Regular Diet Adult; Regular Diet Adult    DVT Prophylaxis: Pneumatic Compression Devices and Ambulate every shift  Berger Catheter: Not present  Central Lines: PRESENT       Cardiac Monitoring: None  Code Status: Full Code      Disposition Plan      Expected Discharge Date: 12/12/2022      Destination: home  Discharge Comments: Patient s/p  shunt placement 12/9. Neurosurgery/oncology to obtain CSF sample today or Monday.        The patient's care was discussed with the Attending Physician, Dr. Hermes Nicolas.    Ira Gamino PA-C  Hospitalist Service, GOLD TEAM [address] [place] [address]  Securely message with the Vocera Web Console (learn more here)  Text page via [facility] Paging/Directory   Please see signed in provider for up to date coverage information      Clinically Significant Risk Factors          # " Hypocalcemia: Lowest Ca = 8.3 mg/dL in last 2 days, will monitor and replace as appropriate                       ______________________________________________________________________    Interval History   All overnight events reviewed. Patient remains in good spirits.  She admits to some frustrations yesterday after her friends left. Notes some abdominal pain at incision site but otherwise is feeling good..      Data reviewed today: I reviewed all medications, new labs and imaging results over the last 24 hours.     Physical Exam   Vital Signs: Temp: 97.5  F (36.4  C) Temp src: Oral BP: 138/64 Pulse: 91   Resp: 16 SpO2: 98 % O2 Device: None (Room air)    Weight: 128 lbs 12.8 oz  GENERAL: Alert and oriented x 3. NAD. Ambulatory. Cooperative.   HEENT: Anicteric sclera. Mucous membranes moist. NC. AT.  shunt incision w/o erythema or drainage.   CV: RRR. S1, S2. No murmurs appreciated.   RESPIRATORY: Effort normal on RA. Lungs CTAB with no wheezing, rales, rhonchi.   GI: Abdomen soft and non distended with normoactive bowel sounds present in all quadrants. Abdominal incision w/o erythema or drainage. Some TTP at site   NEUROLOGICAL: No focal deficits. Moves all extremities.   EXTREMITIES: No peripheral edema.  SKIN: No jaundice. No rashes.        Data   Recent Labs   Lab 12/11/22  0817 12/10/22  0716 12/09/22  1626 12/09/22  0605 12/08/22  0550 12/07/22  1830   WBC 16.5* 17.6*  --  12.4*   < > 8.8   HGB 12.6 12.3  --  12.7   < > 11.5*   MCV 87 86  --  87   < > 85   * 455*  --  439   < > 419   INR  --   --   --   --   --  1.10    139  --  136   < > 139   POTASSIUM 4.1 4.1  --  3.8   < > 4.3   CHLORIDE 106 105  --  102   < > 106   CO2 23 24  --  25   < > 22   BUN 14.4 16.4  --  13.0   < > 25.1*   CR 0.70 0.79  --  0.68   < > 1.29*   ANIONGAP 10 10  --  9   < > 11   VEE 9.1 8.3*  --  8.2*   < > 8.6   * 144* 168* 128*   < > 175*   ALBUMIN 3.7 3.5  --  3.6   < >  --    PROTTOTAL 6.2* 5.7*  --  5.9*    < >  --    BILITOTAL 0.5 0.4  --  0.4   < >  --    ALKPHOS 117* 109*  --  117*   < >  --    ALT 49* 51*  --  62*   < >  --    AST 46* 49*  --  61*   < >  --     < > = values in this interval not displayed.

## 2022-12-11 NOTE — PROGRESS NOTES
Neuro: A&O 4,5/5 strengths throughout,denies numbness and tingling.  GI: +BS +flatius no Bm.  : Voiding without saving.  Resp: R.A.Denies SOB,chest pain.  Mobility: Independent with bathroom.   Cardiac: WDL  Skin: Incision on head open to air and right abdominal incision liquid bandaged.  Lines/Drains: PIV SL  Pain: Constant pain,dilaudid and oxycodone manage the pain.   Behavior: calm   VS: Blood pressure 138/64, pulse 91, temperature 97.5  F (36.4  C), temperature source Oral, resp. rate 16, weight 58.4 kg (128 lb 12.8 oz), last menstrual period 12/18/2014, SpO2 98 %, not currently breastfeeding.      Plan of Care: Possible discharge tomorrow.

## 2022-12-11 NOTE — PLAN OF CARE
Goal Outcome Evaluation:      Plan of Care Reviewed With: patient    Overall Patient Progress: no change    Time: 1623-8718  Neuros: A&O x4, pleasant. 5/5 strengths throughout, denies numbness/tingling.  Cardiac: WDL  Respiratory: WDL, stable on room air.  GI/: Voids spontaneously. Last bowel movement 12/6, passing flatus.   Diet/Nausea: Tolerating regular diet, denies nausea.   Skin: Head incision well approximated and open to air. Abdominal incision closed with liquid bandage.   LDA: Left chest port heparin locked. Left PIV saline locked.  Labs: Reviewed  Pain: Pt reports constant pain at abdominal incision site, managed well with PRN oxycodone and dilaudid.  Activity: Stand-by assist.   New changes this shift: Pt reports not sleeping well overnight due to steroid and pain.  Plan: Continue with bowel regimen and pain management.

## 2022-12-11 NOTE — OP NOTE
Procedure Date: 12/09/2022    PREOPERATIVE DIAGNOSIS:  Communicating hydrocephalus.    POSTOPERATIVE DIAGNOSIS:  Communicating hydrocephalus.    PROCEDURE PERFORMED:    Right-sided placement ventriculoperitoneal shunt via luz hole  Stealth guidance and neuronavigation  Programming and Interrogation of shunt    SURGEON:    1.  Evangelista Lopez MD  2.  Carmela Chan MD  3.  Odessa Gonzalez MD    ANESTHESIA:  General.    ESTIMATED BLOOD LOSS:  5 mL    DRAINS:  None.    SPECIMENS:  None.    FINDINGS:  Codman Certas valve set at 3.    COMPLICATIONS:  None.    IMPLANTS:  Shunt catheter ventricular antibiotic impregnated lot number [id number], Codman Certas Plus programmable valve at a setting of 3.    INDICATIONS FOR PROCEDURE:  Josefina Bennett is a 56-year-old female with a prior medical history of metastatic breast cancer, who presented with 1 week of headaches and memory loss and MRI brain obtained demonstrated findings concerning for 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  Due to the symptomatic nature of the hydrocephalus as well as the upcoming need for likely intrathecal chemotherapy, the indication for placement of a programmable ventriculoperitoneal shunt was discussed with the patient.  The risks and benefits were discussed at length with the patient and she has provided consent for the above-mentioned procedure.    DESCRIPTION OF PROCEDURE:  The patient was brought to the operating room by our anesthesia colleagues.  She underwent general anesthesia and endotracheal intubation.  She was placed supine on the operating room bed.  Her head was placed on a horseshoe headholder with her head turned to the left side.  The AxiEM Stealth neuronavigation system was registered to the patient's anatomy with satisfactory accuracy.  We intended a trajectory for the ventricular catheter entering approximately at Kocher's point  and terminating at the foramen of Monro.  The patient's prior MRI was merged with the most recent CT scan to allow precise planning of the ventricular catheter placement in order to avoid any cortical veins and sulci.  Once we confirmed the planned entry point in the patient's anatomy was marked our entry point and we marked a curvilinear incision for the right frontal placement.  We also marked an area in the right upper quadrant of the abdomen for our intended peritoneal placement of the catheter.  At this point, we proceeded to shave the area with surgical clippers and patient was prepped and draped in standard surgical fashion.    A timeout was held confirming the patient's identity, site of procedure, and planned procedure as well as that all pertinent images were available in the room.  A total of 6 mL of local anesthetic, lidocaine 1% with epinephrine were infiltrated into the frontal and abdominal incisions.  Next, a #10 blade was used to make an incision in the right frontal area down to the periosteum.  The scalp flap was then reflected posterolaterally in a subgaleal dissection with care taken to preserve the periosteum over the bone.  Next, hemostasis was obtained and we proceeded to cauterize the periosteum in a circular fashion in the area where the intended luz hole would be.  Next, a precision drill bit was used to make a small bur hole down to the dura.  The dura was cauterized with bipolar cautery and the bony edges were waxed with bone wax.  At this point, we turned our attention to the abdominal incision, which was made with a 10 blade down to subcutaneous tissue and dissection was completed to identify the external fascia of the rectus abdominis muscle.  Next, we brought the shunt tunneler into the field and we tunneled from the abdominal incision up to the right frontal incision with care taken to stay above the ribs, clavicle and there was no need for a skip incision.  Once the tunneler was  passed we proceeded to assemble of the Codman Certas Plus valve with the peritoneal catheter and this was primed with normal saline. The peritoneal catheter was secured to the valve with a 0-silk tie. At this point, the peritoneal catheter was passed within the tunneling sheath and this was subsequently removed to allow the remaining peritoneal catheter to be tunneled in its entirety.  The peritoneal catheter was gently tugged to ensure there were no kinks and to ensure that the Certas valve was fully tunneled under the scalp.  At this point, we proceeded to place the ventricular catheter of our shunt system.  The dura was opened sharply with a 15 blade and bipolar cautery as well as a small corticectomy made with bipolar cautery.  We advanced the ventricular catheter with the use of the Stealth AxiEM neuronavigation stylet with a satisfactory placement in 1 pass and brisk CSF flow noted.  At this point, the ventricular catheter was trimmed to a length of 8 cm to allow connection to the shunt valve.  The ventricular catheter was attached to the shunt valve and secured with an 0 silk suture.  At this point, the entirety of the shunt was assembled and we confirmed spontaneous flow at the peritoneal catheter tip.  Next, we proceeded to access the peritoneal cavity with the use of a split trocar.  The trocar was slowly advanced until loss of resistance was noted and we confirmed that we were in the peritoneal space.  The peritoneal catheter was advanced and fully inserted within the peritoneum.    At this point, we proceeded with our closure.  Both incisions were copiously irrigated with vancomycin irrigation and hemostasis was confirmed.  The galea was approximated with inverted interrupted 3-0 Vicryl sutures and the skin was approximated with a running 3-0 Monocryl suture.  The abdominal incision deep subcutaneous tissue was approximated with interrupted 3-0 Vicryl sutures and skin was approximated with a running  subcuticular 4-0 Monocryl suture.  Both incisions were covered with skin glue.    At the end of the procedure, all counts of cautery tips, needles and sponges were correct x 2.    Dr. Evangelista Lopez was present for the entirety of the procedure.    Evangelista Lopez MD    As Dictated by TIM KELLY MD        D: 2022   T: 2022   MT: DAIN    Name:     MOISÉS DELGADOEkta  MRN:      [id number]        Account:        [id number]   :      1965           Procedure Date: 2022     Document: [id number]

## 2022-12-12 NOTE — PLAN OF CARE
Status: POD #3   for  shunt on 12/9/22. History of metastatic breast CA; mets to bone, liver, brain.   Vitals: VSS with slight HTN noted. Continuous pulse ox.   Neuros: A&Ox4. Strengths 4/5 throughout.   IV: PIV SL. L chest port HL.   Labs/Electrolytes: pending  Resp/trach: WNL, denies sob   Diet: NPO this am awaiting possible IVC filter placement. Procedure cancelled and pt ate well for a late lunch.  Bowel status:  12/6 - denies feelings of constipation. Bowel meds given.   : voiding spontaneously   Skin: Head incision approximated and HALLIE. Abdominal incision approximated and closed with liquid bandage.   Pain: incisional abdominal pain being managed with scheduled tylenol and Oxycodone. IV Dilaudid discontinued this shift.  Activity: up ad hermelindo. Walking halls frequently, steady on feet.  Social: family at bedside this shift   Plan: Continue with POC

## 2022-12-12 NOTE — PROGRESS NOTES
[facility], [place]   Neurosurgery Progress Note:    Date of service: 12/12/2022    Assessment: Josefina Bennett is a 55 yo female with metastatic breast cancer, with 1 week of headaches and memory loss, symptoms and imaging concerning for leptomeningeal disease with hydrocephalus.     Patient is postop day 3 status post right ventriculoperitoneal shunt placement, Certas at 3.      Clinically Significant Risk Factors Present on Admission             # Breast cancer    Recommendations:  - We will discuss with oncology/medicine team for plan for CSF analysis/flow cytometry/further management   - Plan to send CSF today for flow cytometry when lab able to process sample   - Will coordinate teaching for Oncology to use shunt for any IT chemotherapy needs (via shunt virtual-off setting)  - HOB > 30 degrees  - Q4h neuro exams   - Pain control  - Hold off on any anticoagulation for at least 1 week following surgery  - Please inform neurosurgery if there is any change in neurological examination    Samir Parrish M.D.  Neurosurgery Resident, PGY-4    Please contact neurosurgery resident on call with questions.    Dial * * [phone], enter [phone] when prompted.     Interval History:  Doing well from neurologic perspective. Shunt incisions c/d/i.    Objective:   Temp:  [96.6  F (35.9  C)-97.3  F (36.3  C)] 97.2  F (36.2  C)  Pulse:  [] 79  Resp:  [16-18] 16  BP: (121-155)/(57-73) 147/64  SpO2:  [97 %-99 %] 99 %  No intake/output data recorded.    Gen: Appears comfortable, NAD  Wound:  Neurologic:  - Alert & Oriented to person, and place  - Follows commands   - Speech fluent, spontaneous. No aphasia or dysarthria.  - No gaze preference. No apparent hemineglect.  - PERRL, EOMI  - Strong eye closure, jaw clench, and cheek puff  - Face sensation intact to light touch, mild left facial droop noted  - Palate elevates symmetrically and tongue protrudes midline  - Trapezii and sternocleidomastoid muscles 5/5  bilaterally  - No pronator drift     Del Tr Bi WE WF Gr   R 5 5 5 5 5 5   L 5 5 5 5 5 5    HF KE KF DF PF EHL   R 5 5 5 5 5 5   L 5 5 5 5 5 5     Reflexes 2+ throughout    Sensation intact and symmetric to light touch throughout    LABS  Recent Labs   Lab Test 12/12/22  0657 12/11/22  0817 12/10/22  0716   WBC 14.4* 16.5* 17.6*   HGB 11.9 12.6 12.3   MCV 88 87 86    455* 455*       Recent Labs   Lab Test 12/12/22  0657 12/11/22  0817 12/10/22  0716    139 139   POTASSIUM 4.6 4.1 4.1   CHLORIDE 104 106 105   CO2 28 23 24   BUN 16.3 14.4 16.4   CR 0.74 0.70 0.79   ANIONGAP 8 10 10   VEE 9.2 9.1 8.3*   * 174* 144*       IMAGING    Recent Results (from the past 24 hour(s))   MRI Brain w/o & w Contrast   Result Value    Radiologist flags Acute hydrocephalus and leptomeningeal disease. (Urgent)    Narrative    EXAM: MR BRAIN W/O & W CONTRAST  12/7/2022 2:42 PM     HISTORY:  Malignant neoplasm of nipple of right breast in female,  estrogen receptor positive (H); Malignant neoplasm of nipple of right  breast in female, estrogen receptor positive (H)       COMPARISON:  4/6/2022 brain MRI, PET CT 11/21/2022    TECHNIQUE: MR head: Sagittal and axial T1-weighted, axial diffusion,  multiplanar T2 FLAIR with fat saturation images were obtained without  intravenous contrast. Following intravenous gadolinium-based contrast  administration, axial T2-weighted, diffusion, and T1-weighted images  (in multiple planes) were obtained.  MR Perfusion: During and following intravenous gadolinium-based  contrast administration, dynamic susceptibility-based images were  obtained for MR Perfusion evaluation.     CONTRAST: 6.5 mls Gadavist.    FINDINGS:  Bilateral cerebellar sulcal post contrast enhancement with bilateral  nodular enhancing foci (for example series 23, image 4 and series 24  images 41-50). Additional abnormal enhancing intraparenchymal foci  central jude measuring 6 mm in the right caudate head measuring 5  mm  series 23 and 15 respectively.    There is no mass effect, midline shift, or intracranial hemorrhage.  The ventricles are proportionate to the cerebral sulci. Diffusion and  susceptibility weighted images are negative for acute/focal  abnormality. Subependymal T2 hyperintensities within the ventricular  system and enlargement of the ventricular system since 4/6/2022  suggesting acute hydrocephalus with transependymal flow of CSF.  Moderate to advanced generalized parenchymal volume loss. Major  intracranial vascular structures are within normal limits.    No suspicious abnormality of the skull marrow signal. Clear paranasal  sinuses. Bilateral mastoid effusions. No focal abnormality of the  pituitary gland, sella, skull base and upper cervical spinal  structures on sagittal images. The orbits are normal.      Impression    IMPRESSION:  1. 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  2. Acute communicating hydrocephalus    [Urgent Result: Acute hydrocephalus and leptomeningeal disease.]    Finding was identified on 12/7/2022 2:48 PM.     CINDY Cummins was contacted by Dr. Claire at 12/7/2022 3:43  PM and verbalized understanding of the urgent finding.      I have personally reviewed the examination and initial interpretation  and I agree with the findings.    KATIA CLAIRE MD         SYSTEM ID:  [id number]   XR Chest 2 Views    Narrative    EXAM: XR CHEST 2 VIEWS  LOCATION: [facility]  DATE/TIME: 12/7/2022 10:43 PM    INDICATION: preop evaluation  COMPARISON: 11/2/2022.    FINDINGS: Left chest wall port. No pneumothorax. The heart size is normal. There are again areas of nodularity in left hemithorax which may relate ribs. Scarring at the lung apices. No pleural effusion. No significant interval change.      Impression    IMPRESSION: No acute abnormality.

## 2022-12-12 NOTE — PROGRESS NOTES
Solid Tumor Oncology  Daily Progress Note   Date of Service: 12/12/2022  Patient: Josefina Bennett  MRN: [id number]  Admission Date: 12/7/2022  Hospital Day # 5  Cancer Diagnosis: Metastatic HER2+ breast cancer, metastases to bone and brain  Primary Outpatient Oncologist: Dr. 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 She underwent  shunt placement on 12/9/22 with subsequent marked improvement in her symptoms and currently remains inpatient for post-op recovery and further work-up of new brain metastases.    # Metastatic HER2+ breast cancer (to bones and brain)  # Leptomeningeal carcinomatosis  # Communicating hydrocephalus s/p  shunt placement (12/9/22)  Followed by Dr. Meza. Please see formal consult note dated 12/8/22 for additional details. Briefly, patient was diagnosed in 2015 with ER/MT+, HER2- disease. She initially underwent neoadjuvant therapy in [facility] with ganetespib and paclitaxel, followed by right lumpectomy/mastectomy, oophorectomy, tamoxifen, and radiation therapy in late 2015. She started adjuvant letrozole in 01/2016. 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 Interestingly, 2 closes were seen: ER-/MT- throughout, though HER2 was positive by  FISH in 20% of the cells and negative in 80% of cells; therefore, a mix of TNBC and ER- HER2+. Clinic conference consensus was for the modified Vandana with paclitaxel weekly to treat both clones. She was started on Enhertu on 9/30/22. C2 was given on 10/19, required dose-reduction given infectious issues. She was admitted through the ED on 12/7/22 after ~3 days of headaches, confusion, N/V and was found on brain MRI (12/7/22) to have leptomeningeal carcinomatosis and intraparenchymal metastases to the right caudate and jude with associated communicating hydrocephalus. She underwent  shunt placement on 12/9/22.  - Clinically much improved s/p  shunt placement  - Radiation oncology consulted; appreciate input. Please see Dr. Chauhan's consult note from 12/8/22 for additional details. MRI C-T-L spine with diffuse bony metastases, but no evidence of leptomeningeal carcinomatosis. CSF collected x12/12 and cytology pending. Per Rad/Onc, if spinal imaging negative (it is) and CSF negative (currently pending), could consider WBRT. If CSF is positive, however, intrathecal therapy would be the preferred treatment modality. Will need to follow-up results of CSF cytology and determine plan accordingly.  - Longer-term plan is for treatment with the [id number] regimen (tucatinib, capecitabine, trastuzumab) as an outpatient; will help to arrange appropriate follow-up once timing of discharge is a bit more clear    # Non-occlusive LLL pulmonary artery thrombosis  Diagnosed incidentally on 11/1/22 on routine restaging imaging. Patient was briefly admitted and started on Eliquis. There was no hemodynamic instability or evidence of right heart strain. She has continued Eliquis on an outpatient. In the setting of urgent neurosurgery as above, Eliquis was placed on hold x12/7/22 prior to  shunt placement (x12/9/22). Lower extremity venous dopplers (12/11/22) were negative for DVT bilaterally. Over the weekend, before it was clear  when anticoagulation could be resumed in the post-op period, there was discussion of possible IVC filter placement. However, as of today, we have verified with neurosurgery that they would be amenable to resumption of therapeutic anticoagulation on POD #7 (12/16/22). Today is already POD #3; thus, AC would only be held for ~3 more days. Given this very short time interval, coupled with the absence of acute lower extremity DVT on recent U/S, it is our sense that the risks of prophylactic IVC filter placement likely outweigh the benefits at this time. This is supported by guidelines from the ACC/AJIR as well.   - Lengthy discussions had with IR, primary internal medicine team, neurosurgery, patient, and family. At this time, consensus determination made to hold off on prophylactic IVC filter placement.   - Recommend short-interval U/S follow-up (repeat bilateral LE dopplers) on Wednesday, 12/14/22  - Recommend resuming therapeutic AC at soonest possible time point (per neurosurgery, okay to resume on 12/16/22)    Recommendations:   - Follow-up pending CSF cytology (sent 12/12/22).  - Okay from an oncologic standpoint to hold off on IVC filter at this time, as detailed above.  - Please repeat venous dopplers of the bilateral lower extremities on Wednesday, 12/14 for surveillance.  - Would resume therapeutic AC as soon as possible (per neurosurgery, on 12/16/22).  - Remainder of cares per primary team.    Staffed with Dr. Sainz.    Thank you for the opportunity to participate in this patient's care. Please do not hesitate to page with questions. We will continue to follow.     Yessenia Oakes PA-C  Hematology/Oncology  Pager: #[phone]     I spent 55 minutes in the care of this patient today, which included time necessary for review of interval events, obtaining history and physical exam, ordering medication(s)/test(s) as medically indicated, discussion with interdisciplinary/consult team(s), and documentation time.  Over 50% of time was spent face-to-face and/or coordinating care.  ___________________________________________________________________    Subjective & Interval History:    No acute events noted overnight. Josefina is feeling well today. She had a mild headache this AM, now resolved. No N/V. No fevers, chills, confusion, extremity weakness/paresthesias, other complaints. Many family members at bedside. Lengthy discussion had re: IVC filter placement, risks/benefits. Reviewed recent imaging and indications. Patient would prefer to hold off unless this is strongly recommended/necessary. Will discuss with primary, NSG, IR teams.    Physical Exam:    Blood pressure (!) 142/63, pulse 80, temperature 97.5  F (36.4  C), temperature source Oral, resp. rate 16, weight 58.4 kg (128 lb 12.8 oz), last menstrual period 12/18/2014, SpO2 98 %, not currently breastfeeding.    General: lying in bed, no acute distress  HEENT: sclera anicteric, EOMI, MMM  CV: extremities warm and well-perfused  Resp: no increased work of breathing, normal respiratory effort on ambient air  GI: soft, non-distended  MSK: thin extremities, no gross deformities  Skin: no rashes on limited exam, no jaundice  Neuro: alert and interactive, moves all extremities spontaneously, normal speech without dysarthria  Psych: normal mood and affect    Labs & Studies: I personally reviewed the following studies:  ROUTINE LABS (Last four results):  CMP  Recent Labs   Lab 12/12/22  0657 12/11/22  0817 12/10/22  0716 12/09/22  1626 12/09/22  0605    139 139  --  136   POTASSIUM 4.6 4.1 4.1  --  3.8   CHLORIDE 104 106 105  --  102   CO2 28 23 24  --  25   ANIONGAP 8 10 10  --  9   * 174* 144* 168* 128*   BUN 16.3 14.4 16.4  --  13.0   CR 0.74 0.70 0.79  --  0.68   GFRESTIMATED >90 >90 87  --  >90   VEE 9.2 9.1 8.3*  --  8.2*   MAG  --  2.1 2.3  --   --    PHOS  --  2.9 2.3*  --   --    PROTTOTAL 5.9* 6.2* 5.7*  --  5.9*   ALBUMIN 3.6 3.7 3.5  --  3.6   BILITOTAL 0.4  0.5 0.4  --  0.4   ALKPHOS 114* 117* 109*  --  117*   AST 47* 46* 49*  --  61*   ALT 51* 49* 51*  --  62*     CBC  Recent Labs   Lab 12/12/22  0657 12/11/22  0817 12/10/22  0716 12/09/22  0605   WBC 14.4* 16.5* 17.6* 12.4*   RBC 4.35 4.51 4.40 4.62   HGB 11.9 12.6 12.3 12.7   HCT 38.3 39.1 37.6 40.1   MCV 88 87 86 87   MCH 27.4 27.9 28.0 27.5   MCHC 31.1* 32.2 32.7 31.7   RDW 20.4* 21.2* 20.6* 19.9*    455* 455* 439     INR  Recent Labs   Lab 12/07/22  1830   INR 1.10       Medications list for reference:  Current Facility-Administered Medications   Medication     acetaminophen (TYLENOL) tablet 650 mg     acetaminophen (TYLENOL) tablet 975 mg     bisacodyl (DULCOLAX) suppository 10 mg     calcium carbonate 500 mg (elemental) (OSCAL) tablet 500 mg     dexamethasone (DECADRON) tablet 4 mg     heparin 100 UNIT/ML injection 5-10 mL     heparin lock flush 10 UNIT/ML injection 5-10 mL     heparin lock flush 10 UNIT/ML injection 5-10 mL     hydrALAZINE (APRESOLINE) injection 10-20 mg     hydrALAZINE (APRESOLINE) tablet 25 mg     HYDROmorphone (PF) (DILAUDID) injection 0.5 mg     labetalol (NORMODYNE/TRANDATE) injection 10-40 mg     Lidocaine (LIDOCARE) 4 % Patch 1 patch     lidocaine (LMX4) cream     lidocaine 1 % 0.1-1 mL     lidocaine patch in PLACE     magnesium hydroxide (MILK OF MAGNESIA) suspension 30 mL     melatonin tablet 6 mg     metoclopramide (REGLAN) tablet 10 mg     mirtazapine (REMERON) tablet TABS 7.5 mg     multivitamin w/minerals (THERA-VIT-M) tablet 1 tablet     naloxone (NARCAN) injection 0.2 mg    Or     naloxone (NARCAN) injection 0.4 mg    Or     naloxone (NARCAN) injection 0.2 mg    Or     naloxone (NARCAN) injection 0.4 mg     OLANZapine (zyPREXA) tablet 5 mg     ondansetron (ZOFRAN ODT) ODT tab 4 mg    Or     ondansetron (ZOFRAN) injection 4 mg     ondansetron (ZOFRAN) tablet 8 mg     oxyCODONE (ROXICODONE) tablet 5 mg    Or     oxyCODONE IR (ROXICODONE) tablet 10 mg     polyethylene glycol  (MIRALAX) Packet 17 g     prochlorperazine (COMPAZINE) injection 10 mg    Or     prochlorperazine (COMPAZINE) tablet 10 mg    Or     prochlorperazine (COMPAZINE) suppository 25 mg     senna-docusate (SENOKOT-S/PERICOLACE) 8.6-50 MG per tablet 1 tablet    Or     senna-docusate (SENOKOT-S/PERICOLACE) 8.6-50 MG per tablet 2 tablet     senna-docusate (SENOKOT-S/PERICOLACE) 8.6-50 MG per tablet 2 tablet     sodium chloride (PF) 0.9% PF flush 10-20 mL     sodium chloride (PF) 0.9% PF flush 10-20 mL     sodium chloride (PF) 0.9% PF flush 10-20 mL     sodium chloride (PF) 0.9% PF flush 3 mL     sodium chloride (PF) 0.9% PF flush 3 mL     sodium chloride (PF) 0.9% PF flush 3 mL     sodium chloride (PF) 0.9% PF flush 3 mL     traZODone (DESYREL) half-tab 25 mg    Or     traZODone (DESYREL) tablet 50 mg     venlafaxine (EFFEXOR) tablet 75 mg     Vitamin D3 (CHOLECALCIFEROL) tablet 1,000 Units

## 2022-12-12 NOTE — PROGRESS NOTES
Per Patient's request,  completed and securely emailed [employer] [place] request for lodging dates 12/10/22 - 12/21/22. [facility] [place] will contact Patient for confirmation of reservation.  will continue to provide support as needed.    Vita HORNER, [facility]  - Oncology  Phone : [phone]  Pager: [phone]

## 2022-12-12 NOTE — PROCEDURES
PROCEDURE: right sided  Shunt tap.    Preprocedure Diagnosis: CSF collection for cytology  Postprocedure Diagnosis: As above.  Performed by: Adwoa Lea CNP  Attending: Evangelista Lopez        Details of the procedure:    Patient was placed in a right lateral position. Using sterile technique area over the shunt valve was prepped thoroughly with Chlorprep and allowed to dry. The area was draped off. CSF was obtained and sent for cytology .    Patient tolerated the procedure well.  Results are pending at the time of this note  Dr Evangelista Lopez to be notified of results       BONITA Topete CNP  Department of Neurosurgery  Pager: [phone]

## 2022-12-12 NOTE — PROGRESS NOTES
IR follow-up note.    Call from oncology- EDIL, Yessenia Oakes. They do not want an IVC filter placed at this time. Ok to restart anticoagulation Friday 12/16 per neurosurgery.    Order and IR procedure cancelled.    Beatriz Lakhani DNP, APRN  Interventional Radiology   IR on-call pager: [phone]

## 2022-12-12 NOTE — PROGRESS NOTES
Goal outcome evaluation:  Time: 2300 - 0700  Plan of care reviewed with: Patient  Overall patient progress: No change  VS BP (!) 155/73 (BP Location: Left arm, Patient Position: Sitting, Cuff Size: Adult Regular)   Pulse 82   Temp 96.9  F (36.1  C) (Oral)   Resp 16   Wt 58.4 kg (128 lb 12.8 oz)   LMP 12/18/2014   SpO2 98%   BMI 22.10 kg/m      Activity: Up ad hermelindo. Ambulated to the bathroom, to chair, and back to bed.  Neuros: A&O x4. Calls appropriately. Able to make needs known. Clear and appropriate speech. Follows instructions. Strength 4/5 throughout.  Respiratory: WDL. SpO2 98% on RA. Denies SOB. RR 16, unlabored and regular.  GI/: Voids spontaneously. No BM this shift.  Diet: NPO  Skin/Incisions: Head incision approximated and HALLIE. Abdominal incision approximated and closed with liquid bandage.   Lines/Drains: PIV SL. L chest port HL.   Labs: Reviewed  Pain/Nausea: incisional abdominal pain being managed with PRN dilaudid and scheduled tylenol  Plan: CSF sample in the AM.

## 2022-12-12 NOTE — CONSULTS
Interventional Radiology Consult Service Note    Patient is on IR schedule 12/12 for a Temporary IVC filter placement.   Labs WNL for procedure. COVID neg.    Orders for NPO are in the chart.  Consent will be done prior to procedure.     Please contact the IR charge RN at [phone] for estimated time of procedure.     Case discussed with Dr. Crawley and Dr. Marinelli from IR, Yoly Velasquez PA-C and Dr. Mendez (directly by Dr. Marinelli). 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 She was seen in oncology clinic for 3-day history of headaches associated with confusion, dizziness, nausea and vomiting and sent to ED 12/7 for stat brain MRI, which showed leptomeningeal carcinomatosis and intraparenchymal metastastes to the right caudate and jude with associated acute communicating hydrocephalus. She is admitted to hospital medicine service for further treatment including neurosurgery consultation now s/p  shunt placement 12/9. IR is consulted for IVC filter placement due to inability to anticoagulate x1 week.     Pt has no lower extremity DVT on US from 12/11. Small PE was diagnosed incidentally 11/1 on staging study. This could certainly be chronic. Given surgery was on 12/9 and AC only needs to be held x 1 week, there is a very short period of time left without AC. Dr. Mendez from oncology requesting IVC filter placement despite these findings given concern for pro-thrombotic state. There are risks with IVC filter placement (thrombosis, migration, strut penetration) and IR recommends IVC filter be removed as soon as clinically possible but ideally in <60 days.    Expected date of discharge: TBD    Vitals:   BP (!) 147/64 (BP Location: Left arm)   Pulse 79   Temp 97.2  F (36.2  C) (Oral)   Resp 16   Wt 58.4 kg (128 lb 12.8  oz)   LMP 12/18/2014   SpO2 99%   BMI 22.10 kg/m      Pertinent Labs:     Lab Results   Component Value Date    WBC 14.4 (H) 12/12/2022    WBC 16.5 (H) 12/11/2022    WBC 17.6 (H) 12/10/2022    WBC 7.8 01/23/2020    WBC 9.7 07/23/2019    WBC 9.4 07/25/2018       Lab Results   Component Value Date    HGB 11.9 12/12/2022    HGB 12.6 12/11/2022    HGB 12.3 12/10/2022    HGB 13.7 01/23/2020    HGB 14.1 07/23/2019    HGB 14.1 07/25/2018       Lab Results   Component Value Date     12/12/2022     12/11/2022     12/10/2022     01/23/2020     07/23/2019     07/25/2018       Lab Results   Component Value Date    INR 1.10 12/07/2022    INR 1.21 (H) 06/21/2015    PTT 76 (H) 12/07/2022    PTT 29 02/05/2015       Lab Results   Component Value Date    POTASSIUM 4.6 12/12/2022    POTASSIUM 4.0 08/12/2022    POTASSIUM 4.4 01/23/2020        BONITA Banks CNP  Interventional Radiology  Pager: [phone]

## 2022-12-12 NOTE — PLAN OF CARE
Status: POD #2  for  shunt on 12/9/22. History of metastatic breast CA; mets to bone, liver, brain.   Vitals: VSS ex hypertensive > 140 - labetalol given x1. Continuous pulse ox.   Neuros: A&Ox4. Strengths 4/5 throughout.   IV: PIV SL. L chest port HL.   Labs/Electrolytes: redraws in AM  Resp/trach: WNL, denies sob   Diet: regular diet - fair intake.   Bowel status:  12/6 - denies feelings of constipation. Bowel meds given.   : voiding spontaneously   Skin: Head incision approximated and HALLIE. Abdominal incision approximated and closed with liquid bandage.   Pain: incisional abdominal pain being managed with PRN dilaudid and scheduled tylenol   Activity: up ad hermelindo. Walking halls frequently   Social: family at bedside this shift   Plan: plan for CSF sample on 12/12

## 2022-12-12 NOTE — PROGRESS NOTES
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 NSGY consulted now s/p  shunt placement on 12/9.      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 She is now s/p  shunt placement by Neurosurgery 12/9 with marked improvement in patient's presenting symptoms.   - Neurosurgery and Oncology consulted, appreciate assistance   - Follow up CSF analysis and flow cytometry obtained on 12/12   - Coordinate teaching for oncology to use shunt for any IT chemo  - Neurosurgery recommending holding anticoagulation for one week   - Continue decadron 4mg Q6H   - Pain management: Tylenol 975 mg TID, oxycodone 5-10 mg Q4H PRN. Stop IV dilaudid.   - Bowel regimen: Miralax BID, sennakot 2 tabs BID, MOM PRN, and bisacodyl supp PRN   - ADAT  - Keep HOB >30 degrees   - Neuro exam Q4H    # Recent PE - LLL segment  non-occlusive PE incidentally found on CT A/P 11/1/22 started on Eliquis. TTE at that time w/ global and regional LV function normal with EF 55-60%. No issues with bleeding or bruising since starting Eliquis. Neurosurgery recommending holding anticoagulation for one week after placement of  shunt. Bilateral lower extremity US negative for DVT. Discussion with prior Oncology team recommended IVC, but after discussion today, plan to hold off on IVC filter with small possibly old PE and no DVT on recent US.   - Hold PTA Eliquis, with plan to resume on 12/16   - Repeat venous dopplers of bilateral lower extremities on 12/14 for surveillance       # Leukocytosis - WBC 16.5 -->  14.4. Likely secondary to steroids and recent surgery.  Afebrile, incision sites w/o erythema or drainage.  No other signs/symptoms of infection  - Trend CBC daily, monitor for fevers or infectious symptoms       # LISA, resolved - Baseline Cr ~0.9, Cr peaked at 1.29 on 12/7. Suspect prerenal in the setting of N/V, and normalized after IVF.   - BMP daily      # Transaminitis - Mild elevation of alk phos, ALT, and AST on since admission. Patient w/o abdominal pain or fever. Likely due to known liver mets and bony mets.   - Outpatient Liver MRI per oncology recs     # Thrombocytosis, resolved - Platelets 455, suspect elevation in setting of recent surgery and malignancy. Normalized on repeat   - Trend CBC daily      # Depression  # PTSD  # Insomnia  - Continue PTA zyprexa 5 mg at bedtime, mirtazapine 7.5 mg QHS and Effexor 75 mg daily   - Add trazodone 50 mg at bedtime and melatonin 6 mg at bedtime        Diet: Advance Diet as Tolerated: Regular Diet Adult; Regular Diet Adult  NPO per Anesthesia Guidelines for Procedure/Surgery Except for: Meds, Ice Chips    DVT Prophylaxis: Pneumatic Compression Devices and Ambulate every shift  Berger Catheter: Not present  Central Lines: PRESENT     Cardiac Monitoring: None  Code Status: Full Code       Disposition Plan      Expected Discharge Date: 12/12/2022      Destination: home  Discharge Comments: Patient s/p  shunt placement 12/9. Neurosurgery/oncology to obtain CSF sample today or Monday.        The patient's care was discussed with the Attending Physician, Dr. Hermes Nicolas, Bedside Nurse, Patient, Patient's Family and Oncology and IR Consultant.    Yoly Velasquez PA-C  Hospitalist Service, [address]  Securely message with the Vocera Web Console (learn more here)  Text page via [facility] Paging/Directory   Please see signed in provider for up to date coverage information      Clinically Significant Risk Factors          # Hypocalcemia: Lowest Ca = 8.3 mg/dL in last 2 days, will monitor and replace as appropriate                       ______________________________________________________________________    Interval History   Patient reports feeling better. Feels her HA has overall improved, currently at 1/10 in severity. Reports abdominal pain over incision site, taking tylenol and lidocaine patch as needed for pain. Reports insomnia with steroids, and has been taking IV dilaudid to help with sleep. Denies any N/V, urinary symptoms, chest pain, SOB, numbness, tingling, vision changes, fevers, or chills. Patient did report some neck stiffness this morning which is new. Reports no BM in nearly a week.     Data reviewed today: I reviewed all medications, new labs and imaging results over the last 24 hours.     Physical Exam   Vital Signs: Temp: (!) 96.6  F (35.9  C) Temp src: Oral BP: 121/57 Pulse: 56   Resp: 18 SpO2: 98 % O2 Device: None (Room air)    Weight: 128 lbs 12.8 oz  GENERAL: Alert and awake. Answering questions appropriately. NAD. Pleasant and conversational.   HEENT: Incision to R fronto-temporal area well approximated. No discharge or surrounding erythema.  Anicteric sclera. EOMI. Mucous membranes moist. No meningeal signs.    CARDIOVASCULAR: RRR. S1, S2. No murmurs, rubs, or gallops.   RESPIRATORY: Effort normal on RA. Clear to auscultation bilaterally, no rales, rhonchi or wheezes  GI: Abdomen soft, mild tenderness over RLQ incision site which is well approximated, without any surrounding erythema or warmth. No rebound or guarding, normoactive bowel sounds present  EXTREMITIES: No peripheral edema. No calf asymmetry, erythema, or tenderness.   NEUROLOGICAL: CN II-XII grossly intact. Moving all extremities symmetrically.   SKIN: Intact. Warm and dry. No jaundice.     Data   Recent Labs   Lab 12/12/22  0657 12/11/22  0817 12/10/22  0716 12/08/22  0550 12/07/22  1830   WBC 14.4* 16.5* 17.6*   < > 8.8   HGB 11.9 12.6 12.3   < > 11.5*   MCV 88 87 86   < > 85    455* 455*   < > 419   INR  --   --   --   --  1.10    139 139   < > 139   POTASSIUM 4.6 4.1 4.1   < > 4.3   CHLORIDE 104 106 105   < > 106   CO2 28 23 24   < > 22   BUN 16.3 14.4 16.4   < > 25.1*   CR 0.74 0.70 0.79   < > 1.29*   ANIONGAP 8 10 10   < > 11   VEE 9.2 9.1 8.3*   < > 8.6   * 174* 144*   < > 175*   ALBUMIN 3.6 3.7 3.5   < >  --    PROTTOTAL 5.9* 6.2* 5.7*   < >  --    BILITOTAL 0.4 0.5 0.4   < >  --    ALKPHOS 114* 117* 109*   < >  --    ALT 51* 49* 51*   < >  --    AST 47* 46* 49*   < >  --     < > = values in this interval not displayed.     No results found for this or any previous visit (from the past 24 hour(s)).  Medications       acetaminophen  975 mg Oral Q8H     calcium carbonate 500 mg (elemental)  500 mg Oral At Bedtime     dexamethasone  4 mg Oral Q6H MIMA     heparin  5-10 mL Intracatheter Q28 Days     heparin lock flush  5-10 mL Intracatheter Q24H     lidocaine  1 patch Transdermal Q24H     lidocaine   Transdermal Q8H MIMA     melatonin  6 mg Oral QPM     mirtazapine  7.5 mg Oral At Bedtime     multivitamin w/minerals  1 tablet Oral Daily     OLANZapine  5 mg Oral At Bedtime     polyethylene glycol  17 g Oral BID     senna-docusate  3  tablet Oral BID    Or     senna-docusate  2 tablet Oral BID     sodium chloride (PF)  10-20 mL Intracatheter Q28 Days     sodium chloride (PF)  3 mL Intracatheter Q8H     sodium chloride (PF)  3 mL Intracatheter Q8H     traZODone  25 mg Oral At Bedtime    Or     traZODone  50 mg Oral At Bedtime     venlafaxine  75 mg Oral Daily     vitamin D3  1,000 Units Oral Daily

## 2022-12-13 NOTE — PLAN OF CARE
[id number]  Status: POD #4   for  shunt on 12/9/22. History of metastatic breast CA; mets to bone, liver, brain  Vitals: VSS.  Continuous pulse ox on.  Neuros: A&Ox4.  Strength 4/5 throughout  IV: PIV SL.  L chest port HL  Labs/Electrolytes: WNL. Redraw ordered for am  Resp/trach: WNL, on RA  Diet: regular diet, good PO  Bowel status: LBM 12/12.  Bowel meds given  : voiding spontaneously  Skin: head incision approximated, HALLIE. Abdomen incision approximated and closed with liquid bandage.   Pain: incisional pain managed with schedule meds  Activity: up ad hermelindo.    Plan: continue with POC.  Plans to discharge to home today.

## 2022-12-13 NOTE — PLAN OF CARE
Goal Outcome Evaluation:               Status: POD #4   for  shunt on 12/9/22. History of metastatic breast CA; mets to bone, liver, brain  Vitals: VSS.   Neuros: A&Ox4.  Strength 4/5 throughout  IV: PIV SL removed. L chest port flushed with Heparin and then needle was removed per policy. Covered site with bandaid.  Labs/Electrolytes: WNL.  Resp/trach: WNL, on RA  Diet: regular diet, good PO  Bowel status: LBM 12/12.  Bowel meds given  : voiding spontaneously  Skin: head incision approximated, HALLIE. Abdomen incision approximated and closed with liquid bandage.   Pain: incisional pain managed with schedule meds  Activity: up ad hermelindo.    Plan: Discharged via wheelchair with family. Pt and mother report understanding follow up POC. Pt states has all belongings and will stop at [facility]PP on way to car. Pt and family spoke with SW and had questions answered prior to discharge.

## 2022-12-13 NOTE — PROGRESS NOTES
[facility], [place]   Neurosurgery Progress Note:    Date of service: 12/13/2022    Assessment: Josefina Bennett is a 55 yo female with metastatic breast cancer, with 1 week of headaches and memory loss, symptoms and imaging concerning for leptomeningeal disease with hydrocephalus.     Patient is postop day 4 status post right ventriculoperitoneal shunt placement, Certas at 3.      Clinically Significant Risk Factors Present on Admission     # Breast cancer    Recommendations:  - Will coordinate teaching for Oncology to use shunt for any IT chemotherapy needs (via shunt virtual-off setting)  - HOB > 30 degrees  - Q4h neuro exams   - Pain control  - Hold off on any anticoagulation for at least 1 week following surgery.  Okay to resume on 12/16/2022.   - Please inform neurosurgery if there is any change in neurological examination  - Follow-up with Neurosurgery in 2 weeks for wound check.     Interval History:  No acute events overnight.  Reports mild incisional pain. Neurologically intact. Shunt incisions c/d/i. Plan to follow-up with Neurosurgery in 2 weeks.     Objective:   Temp:  [96.2  F (35.7  C)-97.5  F (36.4  C)] 97.2  F (36.2  C)  Pulse:  [] 103  Resp:  [16] 16  BP: (129-160)/(63-72) 160/72  SpO2:  [97 %-100 %] 97 %  I/O last 3 completed shifts:  In: 550 [P.O.:550]  Out: -     Gen: Appears comfortable, NAD  Wound:  Neurologic:  - Alert & Oriented to person, place, time and situation  - Follows commands   - Speech fluent, spontaneous. No aphasia or dysarthria.  - No gaze preference. No apparent hemineglect.  - PERRL, EOMI  - Strong eye closure, jaw clench, and cheek puff  - Face sensation intact to light touch, mild left facial droop noted  - Palate elevates symmetrically and tongue protrudes midline  - Trapezii and sternocleidomastoid muscles 5/5 bilaterally  - No pronator drift     Del Tr Bi WE WF Gr   R 5 5 5 5 5 5   L 5 5 5 5 5 5    HF KE KF DF PF EHL   R 5 5 5 5 5 5   L 5 5 5  5 5 5     Reflexes 2+ throughout    Sensation intact and symmetric to light touch throughout    LABS  Recent Labs   Lab Test 12/12/22  0657 12/11/22  0817 12/10/22  0716   WBC 14.4* 16.5* 17.6*   HGB 11.9 12.6 12.3   MCV 88 87 86    455* 455*       Recent Labs   Lab Test 12/12/22  2148 12/12/22  0657 12/11/22  0817 12/10/22  0716   NA  --  140 139 139   POTASSIUM  --  4.6 4.1 4.1   CHLORIDE  --  104 106 105   CO2  --  28 23 24   BUN  --  16.3 14.4 16.4   CR  --  0.74 0.70 0.79   ANIONGAP  --  8 10 10   VEE  --  9.2 9.1 8.3*   * 173* 174* 144*       IMAGING    Recent Results (from the past 24 hour(s))   MRI Brain w/o & w Contrast   Result Value    Radiologist flags Acute hydrocephalus and leptomeningeal disease. (Urgent)    Narrative    EXAM: MR BRAIN W/O & W CONTRAST  12/7/2022 2:42 PM     HISTORY:  Malignant neoplasm of nipple of right breast in female,  estrogen receptor positive (H); Malignant neoplasm of nipple of right  breast in female, estrogen receptor positive (H)       COMPARISON:  4/6/2022 brain MRI, PET CT 11/21/2022    TECHNIQUE: MR head: Sagittal and axial T1-weighted, axial diffusion,  multiplanar T2 FLAIR with fat saturation images were obtained without  intravenous contrast. Following intravenous gadolinium-based contrast  administration, axial T2-weighted, diffusion, and T1-weighted images  (in multiple planes) were obtained.  MR Perfusion: During and following intravenous gadolinium-based  contrast administration, dynamic susceptibility-based images were  obtained for MR Perfusion evaluation.     CONTRAST: 6.5 mls Gadavist.    FINDINGS:  Bilateral cerebellar sulcal post contrast enhancement with bilateral  nodular enhancing foci (for example series 23, image 4 and series 24  images 41-50). Additional abnormal enhancing intraparenchymal foci  central jude measuring 6 mm in the right caudate head measuring 5 mm  series 23 and 15 respectively.    There is no mass effect, midline shift,  or intracranial hemorrhage.  The ventricles are proportionate to the cerebral sulci. Diffusion and  susceptibility weighted images are negative for acute/focal  abnormality. Subependymal T2 hyperintensities within the ventricular  system and enlargement of the ventricular system since 4/6/2022  suggesting acute hydrocephalus with transependymal flow of CSF.  Moderate to advanced generalized parenchymal volume loss. Major  intracranial vascular structures are within normal limits.    No suspicious abnormality of the skull marrow signal. Clear paranasal  sinuses. Bilateral mastoid effusions. No focal abnormality of the  pituitary gland, sella, skull base and upper cervical spinal  structures on sagittal images. The orbits are normal.      Impression    IMPRESSION:  1. 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  2. Acute communicating hydrocephalus    [Urgent Result: Acute hydrocephalus and leptomeningeal disease.]    Finding was identified on 12/7/2022 2:48 PM.     CINDY Cummins was contacted by Dr. Claire at 12/7/2022 3:43  PM and verbalized understanding of the urgent finding.      I have personally reviewed the examination and initial interpretation  and I agree with the findings.    KATIA CLAIRE MD         SYSTEM ID:  [id number]   XR Chest 2 Views    Narrative    EXAM: XR CHEST 2 VIEWS  LOCATION: [facility]  DATE/TIME: 12/7/2022 10:43 PM    INDICATION: preop evaluation  COMPARISON: 11/2/2022.    FINDINGS: Left chest wall port. No pneumothorax. The heart size is normal. There are again areas of nodularity in left hemithorax which may relate ribs. Scarring at the lung apices. No pleural effusion. No significant interval change.      Impression    IMPRESSION: No acute abnormality.

## 2022-12-13 NOTE — PROGRESS NOTES
Care Management     Length of Stay (days): 6    Expected Discharge Date: 12/13/2022     Concerns to be Addressed:  Patient/family request to meet with SW prior to discharge    Patient plan of care discussed at interdisciplinary rounds: Yes    Anticipated Discharge Disposition: Home         Additional Information:  EMILY was alerted by pt's floor SW (Lesley) that pt was leaving [facility] for discharge to home and pt/friend/family requested to speak with this SW prior to discharge.  Per Lesley, this SW was requested by name and pt/friend/family were reportedly insistent that they had previously worked with this SW.   This SW has not previously met pt/friend or family.   EMILY initially met with pt's friend (Carolina) who asked generic question.  Carolina asked how to apply for Social Security Disability.  EMILY explained that SSD could be applied for on line or by phone.  Carolina states that she will assist pt to apply for SSD on line.  Carolina asked how it is determined when pt's are no longer able to make health care decisions for themselves.  EMILY explained that an MD typically makes this assessment.  EMILY met with pt, pt's mother and friend (Carolina).    Per discussion, pt is intending on applying for SSD and Carolina plans to assist pt with this task.  EMILY informed pt that the Social Security Administration will request Medical Records that pt will need to obtain from [facility].  Pt signed a Release of Information form and this SW provided records (from current hospitalization) so that pt does not have to return to [facility] to try to obtain these records.   EMILY provided (in writing) the name and phone number of the outpt  [facility] (Vita Romero) as pt/family/friends requested the name of a outpt SW whom they can contact in the future if they have questions.    EMILY notes that pt has a validated health care directive on file which lists pt's health care agents.   Pt, pt's mother and friend all voiced appreciation for the above intervention.   No further SW  intervention planned as pt is discharging from the hospital to home today.      ARLETH Rain  Social Work, 6A  Phone:  [phone]  Pager:  [phone]  12/13/2022          LINETTE Farrell

## 2022-12-13 NOTE — DISCHARGE SUMMARY
[facility]  Hospitalist Discharge Summary      Date of Admission:  12/7/2022  Date of Discharge:  12/13/2022  Discharging Provider: Dawn Vaughn PA-C and Haley Ramirez MD  Discharge Service: Hospitalist Service, GOLD TEAM 5    Discharge Diagnoses   # New Brain Metastases now s/p  Shunt Placement 12/9  # Recurrent Breast Cancer with Metastasis to Liver and Bone  # Recent PE  # Leukocytosis  # LISA, resolved  # Transaminitis  # Thrombocytosis    Follow-ups Needed After Discharge   Follow-up Appointments     Adult [facility] Follow-up and recommended labs and tests      Please get the following tests done:  - Please check BMP and CBC in 1 week with PCP  - Please obtain an Liver MRI in outpatient with oncology     Please set up an appointment with:  - Follow up with PCP in 1 week   - Follow up with Oncology on 12/15/22  - Follow up with Neurosurgery in 2 weeks     Appointments on [place] and/or [facility] (with [facility] or [facility]   provider or service). Call [phone] if you haven't heard regarding   these appointments within 7 days of discharge.             Unresulted Labs Ordered in the Past 30 Days of this Admission     Date and Time Order Name Status Description    12/12/2022 12:34 PM Flow Cytometry Cerebrospinal fluid In process       These results will be followed up by oncology and neurosurgery.    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 Both oncology and neurosurgery were consulted on admission. Patient underwent  shunt placement on 12/9. Remainder of hospital course was uncomplicated as noted below and patient discharged home 12/13 in stable condition.        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 Oncology and neurosurgery both involved on admission. Underwent  shunt placement by Neurosurgery 12/9 with marked improvement in patient's presenting symptoms. Tolerated procedure well with minimal headache and neck pain post-operatively that was adequately treated with oxycodone. Also started on decadron, which patient will continue twice daily until oncology visit where a further taper plan will be decided. Patient to follow up with oncology on 12/15 and neurosurgery in two weeks. Provided small amount of oral oxycodone to use as needed for pain. Patient hemodynamically stable and appropriate for discharge to home.      # Recent PE - LLL segment non-occlusive PE incidentally found on CT A/P 11/1/22 started on Eliquis. TTE at that time w/ global and regional LV function normal with EF 55-60%. No issues with bleeding or bruising since starting Eliquis. Neurosurgery recommended holding anticoagulation for one week after placement of  shunt.  Out of concern for Eliquis being held,  prior Oncology team recommended IVC, but after discussion today, plan to hold off on IVC filter with small possibly old PE and no DVT on recent US. Bilateral lower extremity US negative for DVT on both 12/11 and 12/13. Plan to resume Eliquis on 12/16, one week following  shunt placement.       # Leukocytosis  - WBC peak of 17.6  during admission. Likely secondary to steroids and recent surgery.  Throughout admission, patient was afebrile, incision sites w/o erythema or drainage, and no other signs/symptoms of infection. Plan for CBC recheck within one week to ensure resolution and patient instructed to return to the ED if any signs or symptoms of infection arise.    # LISA, resolved - Baseline Cr ~0.9, Cr peaked at 1.29 on 12/7. Likely prerenal in the setting of N/V, and normalized after IVF.      # Transaminitis - Mild elevation of alk phos, ALT, and AST on since admission. Patient w/o abdominal pain or fever. Likely due to known liver mets and bony mets. Plan for outpatient liver MRI per oncology recommendations.     # Thrombocytosis, resolved - Peak platelets of 455, suspect elevation in setting of recent surgery and malignancy. Normalized on repeat.      # Depression  # PTSD  # Insomnia  PTA zyprexa 5 mg at bedtime, mirtazapine 7.5 mg QHS and Effexor 75 mg daily were continued on admission. Trazodone 50 mg at bedtime and melatonin 6 mg at bedtime both added and were helpful. On discharge, QTc of 419.       Consultations This Hospital Stay   NEUROSURGERY ADULT IP CONSULT  ONCOLOGY ADULT IP CONSULT  RADIATION ONCOLOGY IP CONSULT  CARE MANAGEMENT / SOCIAL WORK IP CONSULT  INTERVENTIONAL RADIOLOGY ADULT/PEDS IP CONSULT  INTERVENTIONAL RADIOLOGY ADULT/PEDS IP CONSULT    Code Status   Full Code    Time Spent on this Encounter   IDawn PA-C, personally saw the patient today and spent greater than 30 minutes discharging this patient.       Dawn Vaughn PA-C  [facility] [address] [address]  Phone: [phone]  ______________________________________________________________________    Physical Exam   Vital Signs: Temp: 97.2  F (36.2  C) Temp src: Oral BP: (!) 160/72 Pulse: 103   Resp: 16 SpO2: 97 % O2 Device: None (Room air)    Weight: 128 lbs 12.8 oz  General  Appearance: Comfortable, nontoxic appearing female seen laying in bed.  Eyes: PERRLA.  No conjunctival icterus.  HEENT: Well healed surgical incision on right side of head. No drainage, erythema, or other evidence of infection.  Respiratory: Breathing comfortably on room air.   Cardiovascular: RRR.  No murmurs, rubs, gallops.  GI: Bowel sounds present throughout.  Abdomen soft, nontender. Well healed surgical scare on right side of abdomen without any erythema, drainage or obvious infection. Lidocaine patch in place below abdomen.   Lymph/Hematologic: No bruising on exposed skin.  Skin: No lesions or rashes noted on exposed skin.  Musculoskeletal: Moving all extremities spontaneously.  Neurologic: Cranial nerves II through XII grossly intact.  Psychiatric: Mood appropriate.       Primary Care Physician   Camille Araujo    Discharge Orders      Neurosurgery Referral      Activity    Your activity upon discharge: activity as tolerated     When to contact your care team    Call your PCP or return to ED for temperatures > 100.4 degrees, worsening or changing pain, uncontrolled vomiting or inability to tolerate oral intake, new or worsening diarrhea, new or worsening shortness of breath, decreased urine output, yellowing of the eyes or skin, confusion, weakness, numbness, vision changes, worsening headache, neck pain or stiffness, blood in urine or stools, or any other concerning symptoms.     Activity    Your activity upon discharge: no driving while on analgesics     Adult [facility] Follow-up and recommended labs and tests    Please get the following tests done:  - Please check BMP and CBC in 1 week with PCP  - Please obtain an Liver MRI in outpatient with oncology     Please set up an appointment with:  - Follow up with PCP in 1 week   - Follow up with Oncology on 12/15/22  - Follow up with Neurosurgery in 2 weeks     Appointments on [place] and/or [facility] (with [facility] or [facility] provider or service). Call  [phone] if you haven't heard regarding these appointments within 7 days of discharge.     Reason for your hospital stay    Dear Josefina Bennett    You were hospitalized at [facility] with severe headache found to have hydrocephalus (water on the brain), and new cancer in the lining of the brain and treated with a new  shunt placed by neurosurgery.  Over your hospitalization your symptoms improved and today you are ready to be discharged home.  If you continue medication therapy you should continue to improve but if you develop fever, shortness of breath, light headedness, chest pain, worsening headache, neck pain or stiffness, confusion, or any other concerning symptoms please seek medical attention.    We are suggesting the following medication changes:  - Dexamethasone 4 mg, one tablet twice daily until you follow up with oncology who will determine further tapering of steroids   - Oxycodone 5 mg every 4 hours as needed for severe pain  - Tylenol 650 mg every 4 hours as needed for mild to moderate pain (do not exceed over 4000 mg in 24 hours)   - Trazodone 50 mg every evening as needed for sleep  - Melatonin 6 mg every evening as needed for sleep  - Miralax 17 grams daily as needed for constipation   - Hold Eliquis for one week after  shunt placement, and resume on 12/16/22     Please get the following tests done:  - Please check BMP and CBC in 1 week with PCP   - Please obtain an Liver MRI in outpatient with oncology     Please set up an appointment with:  - Follow up with PCP in 1 week   - Follow up with Oncology on 12/15/22  - Follow up with Neurosurgery in 2 weeks     It was a pleasure meeting with you today. Thank you for allowing me and my team the privilege of caring for you today. You are the reason we are here, and I truly hope we provided you with the excellent service you deserve. Please let us know if there is anything else we can do for you so that we can be sure you  are leaving completely satisfied with your care experience.    Take care!  [facility] Hospitalist Service     Diet    Follow this diet upon discharge: Orders Placed This Encounter      Regular Diet Adult       Significant Results and Procedures   Most Recent 3 CBC's:Recent Labs   Lab Test 12/12/22  0657 12/11/22  0817 12/10/22  0716   WBC 14.4* 16.5* 17.6*   HGB 11.9 12.6 12.3   MCV 88 87 86    455* 455*     Most Recent 3 BMP's:Recent Labs   Lab Test 12/13/22  1005 12/12/22  2148 12/12/22  0657 12/11/22  0817 12/10/22  0716   NA  --   --  140 139 139   POTASSIUM 4.4  --  4.6 4.1 4.1   CHLORIDE  --   --  104 106 105   CO2  --   --  28 23 24   BUN  --   --  16.3 14.4 16.4   CR  --   --  0.74 0.70 0.79   ANIONGAP  --   --  8 10 10   VEE  --   --  9.2 9.1 8.3*   GLC  --  227* 173* 174* 144*     Most Recent 2 LFT's:Recent Labs   Lab Test 12/12/22  0657 12/11/22  0817   AST 47* 46*   ALT 51* 49*   ALKPHOS 114* 117*   BILITOTAL 0.4 0.5   ,   Results for orders placed or performed during the hospital encounter of 12/07/22   XR Chest 2 Views    Narrative    EXAM: XR CHEST 2 VIEWS  LOCATION: [facility]  DATE/TIME: 12/7/2022 10:43 PM    INDICATION: preop evaluation  COMPARISON: 11/2/2022.    FINDINGS: Left chest wall port. No pneumothorax. The heart size is normal. There are again areas of nodularity in left hemithorax which may relate ribs. Scarring at the lung apices. No pleural effusion. No significant interval change.      Impression    IMPRESSION: No acute abnormality.   MR Lumbar Spine w/o & w Contrast    Narrative    EXAM: MR CERVICAL SPINE W/O & W CONTRAST, MR LUMBAR SPINE W/O  & W CONTRAST, MR THORACIC SPINE W/O & W CONTRAST   12/9/2022 1:42 PM     HISTORY:  r/o spinal/leptomeningeal disease (metastatic breast ca) in  prep for whole brain radiation       COMPARISON:  MRI 12/7/2022    TECHNIQUE: Cervical spine: Sagittal T1-weighted, sagittal T2-weighted,  sagittal STIR,  sagittal diffusion-weighted, axial T1-weighted, and  axial T2-weighted images of the cervical spine were obtained without  the administration of intravenous contrast. After the administration  of intravenous contrast, fat saturated axial and sagittal T1-weighted  images of the cervical spine were obtained.    Thoracic spine: Sagittal T1-weighted, sagittal T2-weighted, sagittal  STIR, sagittal diffusion weighted, axial T1-weighted, and axial  T2-weighted images of the thoracic spine were obtained without the  administration of intravenous contrast. After the administration of  intravenous contrast, fat saturated axial and sagittal T1-weighted  images of the thoracic spine were obtained.    Lumbar spine: Sagittal T1-weighted, sagittal T2-weighted, sagittal  STIR, axial T1-weighted, and axial T2-weighted images of the lumbar  spine were obtained without the administration of intravenous  contrast. After the administration of intravenous contrast, axial and  sagittal fat-saturated T1-weighted images of the lumbar spine were  obtained.    CONTRAST: 5.5 mL gadavist.    FINDINGS:    Cervical: Diffusely heterogeneous T1/T2 marrow signal with multiple  foci of postcontrast enhancement in the dens, C6, C7, T2 and T5  vertebral bodies. No abnormal foci of intra-axial enhancement. Normal  vertebral alignment. No loss of disc height. Normal marrow signal. No  abnormal cord signal. Diffusion-weighted images are negative for focal  abnormality.    No spinal canal or neural foraminal stenosis; or substantial  degenerative change at any level.    Visualized skull base, posterior fossa and paraspinal soft tissue  structures demonstrates partially visualized leptomeningeal  enhancement.    Thoracic Spine: Diffusely heterogeneous marrow T1/T2 signal. Abnormal  foci of postcontrast enhancement within the T2, T5, T8, T9, T12  vertebral bodies. Abnormal signal and postcontrast enhancement is seen  in the transverse processes at the level  of T8. There are also areas  of abnormal enhancement in multiple posterior ribs. No abnormal cord  signal. No loss of disc height. No high grade thoracic spinal canal or  neuroforaminal stenosis. No focal abnormality on diffusion-weighted  images.    Lumbar Spine: There are 5 lumbar-type vertebrae used for the purposes  of this dictation. Tip of the conus is at approximately L1. Normal  alignment. No loss of disc height. Diffusely heterogeneous marrow  T1/T2 signal. T2 hyperintense expansile postcontrast enhancing lesion  in the left L2 pedicle. Heterogeneous enhancement within the L3, S1  vertebral bodies are normally no abnormal intrathecal enhancement. No  focal abnormality on diffusion-weighted images.    On a level by level basis, the findings are as follows:    T12-L1: No spinal canal or neural foraminal stenosis.    L1-2: No spinal canal or neural foraminal stenosis.    L2-3: No spinal canal or neural foraminal stenosis.    L3-4: No spinal canal or neural foraminal stenosis.    L4-5: No spinal canal or neural foraminal stenosis.    L5-S1: No spinal canal or neural foraminal stenosis.    The visualized paraspinous tissues are without focal abnormality.       Impression    IMPRESSION:   1. No evidence of leptomeningeal disease in the cervical, thoracic  and/or lumbar spine.  2. Innumerable mostly sclerotic metastases throughout the cervical,  thoracic and lumbar spine. Multifocal areas of enhancing metastases as  detailed in the body of the report. No MRI evidence of pathologic  fracture.  3. No significant spinal canal or neural foraminal stenosis in the  cervical, thoracic and or lumbar spine.    I have personally reviewed the examination and initial interpretation  and I agree with the findings.    MAGDALENA CORTES MD         SYSTEM ID:  [id number]   MR Cervical Spine w/o & w Contrast    Narrative    EXAM: MR CERVICAL SPINE W/O & W CONTRAST, MR LUMBAR SPINE W/O  & W CONTRAST, MR THORACIC SPINE W/O & W CONTRAST    12/9/2022 1:42 PM     HISTORY:  r/o spinal/leptomeningeal disease (metastatic breast ca) in  prep for whole brain radiation       COMPARISON:  MRI 12/7/2022    TECHNIQUE: Cervical spine: Sagittal T1-weighted, sagittal T2-weighted,  sagittal STIR, sagittal diffusion-weighted, axial T1-weighted, and  axial T2-weighted images of the cervical spine were obtained without  the administration of intravenous contrast. After the administration  of intravenous contrast, fat saturated axial and sagittal T1-weighted  images of the cervical spine were obtained.    Thoracic spine: Sagittal T1-weighted, sagittal T2-weighted, sagittal  STIR, sagittal diffusion weighted, axial T1-weighted, and axial  T2-weighted images of the thoracic spine were obtained without the  administration of intravenous contrast. After the administration of  intravenous contrast, fat saturated axial and sagittal T1-weighted  images of the thoracic spine were obtained.    Lumbar spine: Sagittal T1-weighted, sagittal T2-weighted, sagittal  STIR, axial T1-weighted, and axial T2-weighted images of the lumbar  spine were obtained without the administration of intravenous  contrast. After the administration of intravenous contrast, axial and  sagittal fat-saturated T1-weighted images of the lumbar spine were  obtained.    CONTRAST: 5.5 mL gadavist.    FINDINGS:    Cervical: Diffusely heterogeneous T1/T2 marrow signal with multiple  foci of postcontrast enhancement in the dens, C6, C7, T2 and T5  vertebral bodies. No abnormal foci of intra-axial enhancement. Normal  vertebral alignment. No loss of disc height. Normal marrow signal. No  abnormal cord signal. Diffusion-weighted images are negative for focal  abnormality.    No spinal canal or neural foraminal stenosis; or substantial  degenerative change at any level.    Visualized skull base, posterior fossa and paraspinal soft tissue  structures demonstrates partially visualized  leptomeningeal  enhancement.    Thoracic Spine: Diffusely heterogeneous marrow T1/T2 signal. Abnormal  foci of postcontrast enhancement within the T2, T5, T8, T9, T12  vertebral bodies. Abnormal signal and postcontrast enhancement is seen  in the transverse processes at the level of T8. There are also areas  of abnormal enhancement in multiple posterior ribs. No abnormal cord  signal. No loss of disc height. No high grade thoracic spinal canal or  neuroforaminal stenosis. No focal abnormality on diffusion-weighted  images.    Lumbar Spine: There are 5 lumbar-type vertebrae used for the purposes  of this dictation. Tip of the conus is at approximately L1. Normal  alignment. No loss of disc height. Diffusely heterogeneous marrow  T1/T2 signal. T2 hyperintense expansile postcontrast enhancing lesion  in the left L2 pedicle. Heterogeneous enhancement within the L3, S1  vertebral bodies are normally no abnormal intrathecal enhancement. No  focal abnormality on diffusion-weighted images.    On a level by level basis, the findings are as follows:    T12-L1: No spinal canal or neural foraminal stenosis.    L1-2: No spinal canal or neural foraminal stenosis.    L2-3: No spinal canal or neural foraminal stenosis.    L3-4: No spinal canal or neural foraminal stenosis.    L4-5: No spinal canal or neural foraminal stenosis.    L5-S1: No spinal canal or neural foraminal stenosis.    The visualized paraspinous tissues are without focal abnormality.       Impression    IMPRESSION:   1. No evidence of leptomeningeal disease in the cervical, thoracic  and/or lumbar spine.  2. Innumerable mostly sclerotic metastases throughout the cervical,  thoracic and lumbar spine. Multifocal areas of enhancing metastases as  detailed in the body of the report. No MRI evidence of pathologic  fracture.  3. No significant spinal canal or neural foraminal stenosis in the  cervical, thoracic and or lumbar spine.    I have personally reviewed the  examination and initial interpretation  and I agree with the findings.    MAGDALENA CORTES MD         SYSTEM ID:  [id number]   MR Thoracic Spine w/o & w Contrast    Narrative    EXAM: MR CERVICAL SPINE W/O & W CONTRAST, MR LUMBAR SPINE W/O  & W CONTRAST, MR THORACIC SPINE W/O & W CONTRAST   12/9/2022 1:42 PM     HISTORY:  r/o spinal/leptomeningeal disease (metastatic breast ca) in  prep for whole brain radiation       COMPARISON:  MRI 12/7/2022    TECHNIQUE: Cervical spine: Sagittal T1-weighted, sagittal T2-weighted,  sagittal STIR, sagittal diffusion-weighted, axial T1-weighted, and  axial T2-weighted images of the cervical spine were obtained without  the administration of intravenous contrast. After the administration  of intravenous contrast, fat saturated axial and sagittal T1-weighted  images of the cervical spine were obtained.    Thoracic spine: Sagittal T1-weighted, sagittal T2-weighted, sagittal  STIR, sagittal diffusion weighted, axial T1-weighted, and axial  T2-weighted images of the thoracic spine were obtained without the  administration of intravenous contrast. After the administration of  intravenous contrast, fat saturated axial and sagittal T1-weighted  images of the thoracic spine were obtained.    Lumbar spine: Sagittal T1-weighted, sagittal T2-weighted, sagittal  STIR, axial T1-weighted, and axial T2-weighted images of the lumbar  spine were obtained without the administration of intravenous  contrast. After the administration of intravenous contrast, axial and  sagittal fat-saturated T1-weighted images of the lumbar spine were  obtained.    CONTRAST: 5.5 mL gadavist.    FINDINGS:    Cervical: Diffusely heterogeneous T1/T2 marrow signal with multiple  foci of postcontrast enhancement in the dens, C6, C7, T2 and T5  vertebral bodies. No abnormal foci of intra-axial enhancement. Normal  vertebral alignment. No loss of disc height. Normal marrow signal. No  abnormal cord signal. Diffusion-weighted  images are negative for focal  abnormality.    No spinal canal or neural foraminal stenosis; or substantial  degenerative change at any level.    Visualized skull base, posterior fossa and paraspinal soft tissue  structures demonstrates partially visualized leptomeningeal  enhancement.    Thoracic Spine: Diffusely heterogeneous marrow T1/T2 signal. Abnormal  foci of postcontrast enhancement within the T2, T5, T8, T9, T12  vertebral bodies. Abnormal signal and postcontrast enhancement is seen  in the transverse processes at the level of T8. There are also areas  of abnormal enhancement in multiple posterior ribs. No abnormal cord  signal. No loss of disc height. No high grade thoracic spinal canal or  neuroforaminal stenosis. No focal abnormality on diffusion-weighted  images.    Lumbar Spine: There are 5 lumbar-type vertebrae used for the purposes  of this dictation. Tip of the conus is at approximately L1. Normal  alignment. No loss of disc height. Diffusely heterogeneous marrow  T1/T2 signal. T2 hyperintense expansile postcontrast enhancing lesion  in the left L2 pedicle. Heterogeneous enhancement within the L3, S1  vertebral bodies are normally no abnormal intrathecal enhancement. No  focal abnormality on diffusion-weighted images.    On a level by level basis, the findings are as follows:    T12-L1: No spinal canal or neural foraminal stenosis.    L1-2: No spinal canal or neural foraminal stenosis.    L2-3: No spinal canal or neural foraminal stenosis.    L3-4: No spinal canal or neural foraminal stenosis.    L4-5: No spinal canal or neural foraminal stenosis.    L5-S1: No spinal canal or neural foraminal stenosis.    The visualized paraspinous tissues are without focal abnormality.       Impression    IMPRESSION:   1. No evidence of leptomeningeal disease in the cervical, thoracic  and/or lumbar spine.  2. Innumerable mostly sclerotic metastases throughout the cervical,  thoracic and lumbar spine. Multifocal  areas of enhancing metastases as  detailed in the body of the report. No MRI evidence of pathologic  fracture.  3. No significant spinal canal or neural foraminal stenosis in the  cervical, thoracic and or lumbar spine.    I have personally reviewed the examination and initial interpretation  and I agree with the findings.    MAGDALENA CORTES MD         SYSTEM ID:  [id number]   CT Head w/o Contrast    Narrative    Stealth CT imaging for purposes of stereotactic evaluation    Provided History: pre-op planing  ICD-10:  Comparison: MR 12/7/2022    Technique: CT imaging performed with axial, sagittal, and coronal  reconstructed images obtained without intravenous contrast.    Contrast: None    Findings: Limited imaging for stereotactic localization demonstrates  no acute hemorrhage, mass effect or midline shift. The ventricles are  proportionate to the sulci. No acute loss of gray-white  differentiation. Basal cisterns are clear.. Calcifications in the  bilateral basal ganglia. Calcifications along the right  periventricular white matter. The metastatic lesions in the right  caudate and jude seen on prior MRI are not well appreciated on today's  exam. Moderate diffuse cerebral volume loss and patchy periventricular  and subcortical white matter hypodensity    Mucosal thickening in the left maxillary sinus. Bilateral mastoid  effusions. The remainder of the paranasal sinuses are clear.      Impression    Impression: No acute intracranial pathology. Moderate diffuse cerebral  volume loss with leukoaraiosis. The known lesions in the right basal  ganglia and jude are not well visualized on today's exam. Bilateral  mastoid effusions. Limited imaging performed primarily for the  purposes of stereotactic localization.     I have personally reviewed the examination and initial interpretation  and I agree with the findings.    ELIF THOMAS MD         SYSTEM ID:  [id number]   CT Head w/o Contrast    Narrative    CT HEAD W/O CONTRAST  12/9/2022 10:21 PM    History: postop  shunt   ICD-10:    Comparison: CT head 12/9/2022    Technique: Using multidetector thin collimation helical acquisition  technique, axial, coronal and sagittal CT images from the skull base  to the vertex were obtained without intravenous contrast.   (topogram) image(s) also obtained and reviewed.    Findings: Interval placement of right frontal approach ventricular  shunt with tip in the third ventricle. Gas overlying the right frontal  lobe and within the right anterior horn. Stable size of the  ventricular system and unchanged confluent periventricular white  matter hypoattenuation. There is no intracranial hemorrhage, mass  effect, or midline shift. Gray/white matter differentiation in both  cerebral hemispheres is preserved. The basal cisterns are clear.  Unchanged mineralization of the bilateral basal ganglia and along the  right frontal periventricular white matter. Generalized parenchymal  volume loss. Moderate leukoaraiosis. Again, metastatic lesions in the  right caudate and left parasagittal jude seen on prior MRI are not  appreciated on this exam.    Other than the luz hole, the bony calvaria and the bones of the skull  base are normal. The visualized portions of the paranasal sinuses are  clear. Bilateral mastoid effusions.      Impression    Impression:  1. New right frontal approach ventricular shunt terminates in the  third ventricle. Unchanged hydrocephalus and transependymal edema.  2. No acute intracranial pathology.    I have personally reviewed the examination and initial interpretation  and I agree with the findings.    NORI ALEXANDER MD         SYSTEM ID:  [id number]   XR Shunt Malfunction Surgery Survey    Narrative    Radiographic shunt survey 12/9/2022.    History:  Postop shunt.  ICD-10:    Comparison:  CT head same day.    Findings: There is a high right frontal approach ventriculoperitoneal  shunt in the skull with tip in the vicinity of the  third ventricle,  potassium valve, and coursing down the right cervical soft tissue. The  shunt catheter traverses the right thorax and is looped within the  right flank with tip in the right upper quadrant.    Left chest wall Port-A-Cath with tip in the lower IVC. The  cardiomediastinal silhouette is within normal limits. No pneumothorax  or focal airspace opacity. No acute osseous abnormality.    Nonobstructive bowel gas pattern. No pneumatosis or portal venous gas.      Impression    Impression:   New ventriculoperitoneal shunt catheter is continuous throughout its  course in the head, neck, chest and abdomen.    I have personally reviewed the examination and initial interpretation  and I agree with the findings.    NORI ALEXANDER MD         SYSTEM ID:  [id number]   US Lower Extremity Venous Duplex Bilateral    Narrative    EXAMINATION: US LOWER EXTREMITY VENOUS DUPLEX BILATERAL  12/11/2022  12:09 PM      CLINICAL HISTORY: recent PE, rule out DVT    COMPARISON: PET/CT 11/21/2022    PROCEDURE COMMENTS: Ultrasound was performed of the deep venous system  of the right and left lower extremity using grayscale, color, and  spectral Doppler.    FINDINGS:  The bilateral common femoral, greater saphenous origin, femoral,  popliteal, and deep calf veins are visualized and are patent. Venous  waveforms are normal. There is normal response to compression.      Impression    IMPRESSION:.  No deep vein thrombosis in the right or left lower extremity.    I have personally reviewed the examination and initial interpretation  and I agree with the findings.    MARICRUZ RASMUSSEN MD         SYSTEM ID:  [id number]   US Lower Extremity Venous Duplex Bilateral    Narrative    EXAMINATION: DOPPLER VENOUS ULTRASOUND OF BILATERAL LOWER EXTREMITIES,  12/13/2022 8:53 AM     COMPARISON: Ultrasound 12/11/2022    HISTORY: high risk of DVT off anticoagulation. Evaluate for underlying  DVT, prior to discharge    TECHNIQUE:  Gray-scale evaluation with  compression, spectral flow and  color Doppler assessment of the deep venous system of both legs from  groin to knee, and then at the ankles.    FINDINGS:  In both lower extremities, the common femoral, femoral, popliteal and  posterior tibial veins demonstrate normal compressibility and blood  flow.      Impression    IMPRESSION:  1.  No evidence of deep venous thrombosis in either lower extremity.       I have personally reviewed the examination and initial interpretation  and I agree with the findings.    NORI FRASER MD         SYSTEM ID:  [id number]       Discharge Medications   Current Discharge Medication List      START taking these medications    Details   acetaminophen (TYLENOL) 325 MG tablet Take 2 tablets (650 mg) by mouth every 4 hours as needed for other (For optimal non-opioid multimodal pain management to improve pain control.)    Associated Diagnoses: Bone metastasis (H)      dexamethasone (DECADRON) 4 MG tablet Take 1 tablet (4 mg) by mouth 2 times daily (with meals) for 7 days  Qty: 14 tablet, Refills: 0    Associated Diagnoses: Bone metastasis (H)      melatonin 3 MG tablet Take 2 tablets (6 mg) by mouth every evening for 30 days  Qty: 60 tablet, Refills: 0    Associated Diagnoses: Insomnia, unspecified type      oxyCODONE (ROXICODONE) 5 MG tablet Take 1 tablet (5 mg) by mouth every 4 hours as needed for moderate pain (4-6)  Qty: 6 tablet, Refills: 0    Associated Diagnoses: Bone metastasis (H)      polyethylene glycol (MIRALAX) 17 GM/Dose powder Take 17 g by mouth daily as needed for constipation  Qty: 510 g    Associated Diagnoses: Constipation, unspecified constipation type      traZODone (DESYREL) 50 MG tablet Take 1 tablet (50 mg) by mouth nightly as needed for sleep  Qty: 14 tablet, Refills: 0    Associated Diagnoses: Insomnia, unspecified type         CONTINUE these medications which have CHANGED    Details   apixaban ANTICOAGULANT (ELIQUIS) 5 MG tablet Take 1 tablet (5 mg) by mouth 2  times daily Hold this medication and resume on 12/16/22  Qty: 60 tablet, Refills: 0    Associated Diagnoses: Pulmonary embolism, unspecified chronicity, unspecified pulmonary embolism type, unspecified whether acute cor pulmonale present (H)         CONTINUE these medications which have NOT CHANGED    Details   calcium carbonate (OS- MG [place]. [place]) 500 MG tablet Take 500 mg by mouth At Bedtime       cholecalciferol (VITAMIN D3) 1000 UNIT tablet Take 1 tablet (1,000 Units) by mouth daily  Qty: 30 tablet, Refills: 0    Associated Diagnoses: Breast cancer (H)      metoclopramide (REGLAN) 10 MG tablet Take 1 tablet (10 mg) by mouth every 6 hours as needed (nausea/vomiting)  Qty: 60 tablet, Refills: 0    Associated Diagnoses: Nausea; Vomiting and diarrhea      mirtazapine (REMERON) 7.5 MG tablet TAKE 1 TABLET BY MOUTH ONCE DAILY AT BEDTIME  Qty: 90 tablet, Refills: 3    Associated Diagnoses: Malignant neoplasm of upper-outer quadrant of right female breast (H)      Multiple Vitamins-Minerals (MULTIVITAMIN ADULTS PO) Take 1 tablet by mouth daily      OLANZapine (ZYPREXA) 5 MG tablet Take 1 tablet (5 mg) by mouth At Bedtime  Qty: 30 tablet, Refills: 1    Associated Diagnoses: Malignant neoplasm of nipple of right breast in female, estrogen receptor positive (H)      ondansetron (ZOFRAN) 8 MG tablet Take 1 tablet (8 mg) by mouth every 8 hours as needed for nausea  Qty: 30 tablet, Refills: 3    Associated Diagnoses: Nausea      venlafaxine (EFFEXOR) 75 MG tablet Take 1 tablet (75 mg) by mouth daily  Qty: 30 tablet, Refills: 3    Associated Diagnoses: Adjustment disorder with depressed mood           Allergies   Allergies   Allergen Reactions     Morphine      Rash

## 2022-12-13 NOTE — TELEPHONE ENCOUNTER
Writer DYLAN for pt to call back and schedule a follow up    Please schedule a return, in person, visit with Dr. Lopez or Adwoa Lea in 2 weeks (from 12/9).    Michelle Chavez

## 2022-12-14 NOTE — PROGRESS NOTES
Jamar Mr(s) Josefina Bennett,  I am your nurse care coordinator working for the [facility] and  with Dr. Meza.  I am following up on your  recent hospitalization and we wanted to check in with you. Do you have time for a few questions? Yes    How are you feeling?    Notes: Patient is fatigued and having very little post surgical discomfort relieved with Tylenol as prescribed.     Can you get the hospital discharge form so I can go over it with you?  Yes   If no: Other Patient aware of discharge instructions     Were you able to fill all your prescriptions medications after discharge? No   Notes: Patient requesting the prescription for apixaban be [facility]  12/15/2022 after appointment with Dr Meza. Patient is alexander    Can you read the medications listed on your discharge instructions? (If patient does not have discharge instruction form then ask-  Can you read  your medication bottles  and tell me how you are taking each medication? ) Compare to EPIC list/discharge list   Notes: She is aware of her medications and how to take them.    Are you having any side effects from your medications? No   Notes: No side effects  Do you have any questions or concerns about your medications?                                  No   Notes: No concerns  Are you expecting any visiting nurse/physical therapy or equipment? No   Notes: No concerns    Do you have a follow up appointment? Yes  With who? Dr Meza and lab work 12/15/2022  When and where is that follow up appointment?  Notes: Patient is aware and is coming with her Mother    Are you planning/able to come to that appointment? Do you have transportation to this appointment? Yes   Notes: Mother is driving her to the appointment.    Do you have any questions for me? No questions.22    Do you know who to call if you have questions/concerns/or symptoms prior to that  Appointment?             Yes   Notes: Patient knows to call Nurse Triage and has the  telephone number if needs assistance.    Thank you for taking the time to talk to me. We want to make sure you are doing well following hospitalization. We want  you to  know we are here to help you if needed. If you have any questions, please call me or call our clinic at [phone] option 5 then option 2. I look forward to meeting you in person.  Again, please call if you have any concerns.

## 2022-12-14 NOTE — PROGRESS NOTES
[facility]    Background: Transitional Care Management program identified per system criteria and reviewed by [facility] team for possible outreach.    Assessment: Upon chart review, CCR Team member will not proceed with patient outreach related to this episode of Transitional Care Management program due to reason below:    Patient has active communication with a nurse, provider or care team for reason of post-hospital follow up plan.  Outreach call by [facility] team not indicated to minimize duplicative efforts.     Plan: Transitional Care Management episode addressed appropriately per reason noted above.      Radha Astorga MA  [facility] Care Resource [facility]    *Connected Care Resource Team does NOT follow patient ongoing. Referrals are identified based on internal discharge reports and the outreach is to ensure patient has an understanding of their discharge instructions.

## 2022-12-15 NOTE — TELEPHONE ENCOUNTER
PA Initiation    Medication: Tukysa- Pa initiated   Insurance Company: Other (see comments)  Pharmacy Filling the Rx: [facility] - [place], [place] - [address]  Filling Pharmacy Phone:    Filling Pharmacy Fax:    Start Date: 12/15/2022

## 2022-12-15 NOTE — TELEPHONE ENCOUNTER
PA Initiation    Medication: Capecitabine - pa initiated   Insurance Company: Other (see comments)  Pharmacy Filling the Rx: [facility] - [place], [place] - [address]  Filling Pharmacy Phone:    Filling Pharmacy Fax:    Start Date: 12/15/2022

## 2022-12-15 NOTE — LETTER
12/15/2022         RE: Josefina Bennett  [address]  [facility] [address]        Dear Colleague,    Thank you for referring your patient, Josefina Bennett, to the [facility]. Please see a copy of my visit note below.    REASON FOR VISIT: Follow-up breast cancer     HISTORY OF PRESENT ILLNESS:  Josefina Bennett was self referred to our clinic in 2014 for clinical trial recommendations for newly diagnosed stage II breast cancer. She had her last mammogram approximately a year prior. She did notice in early January 2015 that she was having some distortion of the right breast, and she went to see her gynecologist and had a mammogram performed. She had a screening mammogram performed on 01/15/2015. Breast tissue was heterogeneously dense, which might compromise the mammography. There was architectural distortion in the right breast approximately 11-12 o'clock position, zone 2, posterior depth, and a CC MLO spot compression was performed and an ultrasound was planned. The diagnostic mammogram from 01/28/2015 showed right breast architectural distortion centered at the 12 o'clock position, zone 2, suspicious for neoplasm. Breast tomosynthesis was used in the interpretation. Ultrasound findings included targeted ultrasound of the right upper breast demonstrating a band-like area of abnormal echogenicity between 11 and 12 o'clock position in the right breast at zone 2. This measures 4 cm transversely x 1.1 cm AP, and there was only a 1.5 cm measurement in the radial direction. There was blood flow suspicious for neoplasm at the 11 o'clock position in zone 2. There is a 1.4 cm hypoechoic nodule slightly  from the larger area of altered echogenicity that is also suspicious. A biopsy was performed. The biopsy showed infiltrating lobular carcinoma, grade 2, and a clip was placed at specimen [id number]. There were a few signet cells present. The tumor was ER-positive, NJ low positive, and HER2 was  negative by immunohistochemistry. She subsequently underwent an MRI showing a tumor that was 4.8 x 1.7 x 3.2 cm in size. Overall, her clinical stage was T2 NX MX.       She was enrolled in the I-SPY-2 clinical trial, and qualified with high-risk MammaPrint score. She was randomized to ganetespib and paclitaxel, and she was treated with 12 cycles of treatment, and then started on AC on 05/26/2015. She developed intractable nausea and vomiting after the first cycle, which required hospitalization on the second cycle. She then developed Pneumocystis pneumonia, which resulted in intubation and a 2-week hospitalization during the course of AC. She was discharged on 07/02/2015, and decided she did not want to pursue any further chemotherapy. She had a right lumpectomy and sentinel lymph node procedure 08/12/2015. She had a positive margin, underwent a re-excision, and had a positive margin. Ultimately, she underwent a right mastectomy with clear margins. She began radiation treatment with Dr. Bill Chauhan, and completed radiation therapy on 12/04/2015. Pathologic stage was III-A, ypT3 N1a MX. Of concern, she had what could be considered an RCB3 tumor.  She started adjuvant letrozole on 1/28/16.     TREATMENT HISTORY:  A.  Neoadjuvant therapy on I SPY-2 with ganetespib and paclitaxel.  AC x 2 complicated by PCP pneumonia.  She had intractable nausea and vomiting the first cycle of AC, and the second cycle of AC was complicated by Pneumocystis pneumonia requiring intubation and a 2-week hospitalization.  She did not complete the AC treatment.  She did undergo a right lumpectomy and sentinel node procedure.  She had a positive margin and underwent resection.  She still had a positive margin.  She underwent a right mastecomy with clear margins.  Pathologic stage was IIIA  [id number] and of concern, she had an RCB3 histology meaning significant risk of recurrence of her breast cancer during the 5-year period following primary  therapy.   She completed radiation in 12/2015.      B.  Right lumpectomy, followed by right margin resections, followed by rigth mastectomy.   B.  Oophorectomy 10-16-15.   C.  Tamoxifen after 10-6-15.   D.  Radiation therapy. 5,040 cGy in 180 cGy/fraction to the chest wall and regional lymphatics followed by 720 cGy axillary lymph node boost and 1000 cGy mastectomy scar boost. Completed on 12/3/2015.  E.   Adjuvant letrozole begun 1-28-16.  Plan was to continue through 2026.  F.  Diagnosis with recurrent/metastatic breast cancer with a sacral mass that was biopsied showing pleiomorphic lobular breast cancer that is ER negative, IL negative, HER2 positive by FISH in 20% of the cells and negative in 80% of cells.  A mix of TNBC and ER- HER2+.  Clinic conference consensus was for the modified Vandana with paclitaxel weekly to treat both clones. T-DXd could be a next regimen.  G.  Metastatic carcinoma is HER2 negative (score 1+) by immunohistochemistry.  She is also a candidate for T-DXd.  H.  Recurrent tumor with 22C3 antibody for PD-L1 and hold this in reserve for a Keynote 355 metastatic TNBC approach if the Vnadana regimen does not control her metastatic disease.  Keynote 355 could also be an active regimen.       INTERVAL HISTORY:     Josefina Bennett had a diagnosis of leptomeningeal disease and normal pressure hydrocephalus and had a communicating hydrocephalus requiring a shunt, which was placed by Dr. Lopez earlier this week.  An Ommaya reservoir has not yet been placed.  She did have CSF that was positive for malignant cells, and we need to consult with Dr. Steven Bahena regarding immunohistochemistry for HER-2 for the cell block.  She has headaches, but does have pain medicine for that and has mild nausea and vomiting and has been taking Zofran and olanzapine with reasonable control of the symptoms.  She has moderate fatigue, but not nearly as fatigued as she was before the shunt was placed.  She denies  depression or anxiety.  She was seen today with her mother, Brandi, and her roommate, Karl, and a friend in [place], [place], joined us on the call as well.         REVIEW OF SYSTEMS:  She has improved alertness, but she does have chronic headaches and some mild coordination issues and does need a walker.  She has mild nausea and vomiting.  She denies fevers or chills, cough, chest pain, shortness of breath, constipation or diarrhea, bone pain, back pain, abdominal pain.  The remainder of a 10 point review of systems is negative.    She is eating reasonably well.  She is able to walk, but is a little bit unsteady.  She is taking vitamin D and calcium.    PHYSICAL EXAMINATION:    /71   Pulse 97   Temp 97.8  F (36.6  C) (Oral)   Resp 16   Wt 59.9 kg (132 lb)   LMP 12/18/2014   SpO2 99%   BMI 22.65 kg/m    GENERAL:  Josefina Verma appears generally well.  HEENT:  She has alopecia.  Examination of the oropharynx is without lesions.  There is a reservoir under the skin in the anterior right frontal area. Well healed incision.   LYMPH:  No palpable cervical, supraclavicular, subclavicular or axillary lymphadenopathy.  BREASTS:  Exam not performed today.      LUNGS:  Clear to percussion and auscultation.  HEART:  Regular rate and rhythm.  S1, S2.  ABDOMEN:  Soft, nontender, without hepatosplenomegaly.  The port site is without erythema or tenderness.  EXTREMITIES:  Without edema.  PSYCHIATRIC:  Mood and affect are normal.  NEUROLOGIC:  Alert, oriented. Motor 5/5 strength in upper and lower extremities bilaterally.  Cerebellar exam showed bilateral unsteadiness. Cranial nerves 2-12 intact.  DTR 0 in upper and lower extremities bilaterally.     LABORATORY DATA:  The CMP was remarkable for total protein of 6.1, alkaline phosphatase 177, , AST 95, glucose 180.  CBC showed WBC of 17.0, hemoglobin 12.7, platelets 410,000. Absolute neutrophil count was 11,600.        ASSESSMENT AND PLAN:   1.  Mary Bennett is a  56-year-old woman with a history of a clinical stage II right breast cancer, ER positive, NC positive, HER2 negative by immunohistochemistry.  She had a lobular histology.  She was on the I-SPY 2 clinical trial and was randomized to ganetespib and paclitaxel, then went on to AC. Pathologic stage was IIIA  [id number] and of concern, she had an RCB3 histology meaning significant risk of recurrence of her breast cancer during the 5-year period following primary therapy.  There were 2 lymph nodes involved with mammary carcinoma with extracapsular extension.  She went on to have radiation therapy and then began hormonal therapy with tamoxifen in October 2015.  She had oophorectomy and started letrozole in January 2016.   2.  Breast conference recommended starting with HER2 directed therapy.  Biphenotypic cancer with majority TNBC component and a HER2 component.  I discussed the 22C3 PD-L1 CPS of 40 while 20% of the tumor is HER2+ by FISH and the overall ASCO  CAP assignment is negative for the bulk tumor with the qualifier that there is a significant HER2 clonal component that would be best treated with HER2 directed therapy.  The Tumor Board consensus was to continue with HER2 directed therapy to treat the new and HER2+ clone till best response.    3. Leptomeningeal progression with communicating hydrocephalus requiring placement of a shunt last week by Dr. Evangelista Lopez.  There is a valve that allows IT chemotherapy (methotrexate) by magnetic closure of the valve for 2 hours after administration of methotrexate.  This is to be done with a neurosurgery EDIL and an oncology EDIL approved for IT chemotherapy administration. The methotrexate will be dosed at 10 mg IT total dose twice weekly for 3 weeks. We have requested IHC for HER2 on the CSF cell block.    4.  Discussion of systemic treatment with the HER2 CLIMB regimen, which is compatible with concomitant IT methotrexate.  I had an extensive discussion with Josefina Verma, her  mother Brandi, roommate Karl and friend [place] about the [id number] regimen.  I think that this regimen would be a good one for her because the regimen consists of tucatinib, capecitabine and Herceptin.  Tucatinib and capecitabine both cross the blood brain barrier.  The tucatinib could potentially control CNS HER-2 positive breast cancer and the Xeloda the triple negative if present.  The cell block needs to be checked for HER-2 by IHC, although if it were negative, it would not rule out HER-2 positive breast cancer behind blood brain barrier. The oral pharmacy group is meeting with her.  5. There are some coordination and headache issues.  I do think that intrathecal methotrexate twice weekly would be reasonable to initiate, and we could give this in parallel with tucatinib, Xeloda and Herceptin.  6.  Discussion with Dr. Lopez about whether an Ommaya reservoir could be placed.  Much appreciated.  7.  Radiation Oncology appointment with Dr. Bill Chauhan is 01/04/2023.  Agree with starting IT treatment first.   8.  Need for a walker at home.  I discussed that she is on 1 level.  She can walk at home with a walker.  I told them to be careful about loose rugs.  Script given.  9.  Xgeva was held because of need for a dental procedure.  Although there is no FDG uptake in bones, bones are involved.   10. Potential interactions with tucatinib requiring a dose reduction of other drugs.  Eliquis dose reduction by half.  Trazadone dose reduction by half.  Discussed with Aziza in the oral chemotherapy program.   Intrathecal methotrexate will be given after adjustment of the valve for 2 hours in clinic, allowing circulation of methotrexate.  10 mg of IT methotrexate will be given for 2 times per week for the first month starting on Tuesday December  We will use preservative free formulation. We will coordinate with neurosurgery switching the valve to closed before IT methotrexate and then to open 2 hours after.  CSF will be sent  for cytology each time.   11.  Follow up plan.   Follow up December 20 with EDIL visit and intrathecal methotrexate with neurosurgery adjustment of shunt valve and send CSF for cytology. Continue the IT methotrexate twice weekly next December 23, December 27, December 30. Begin trastuzumab every 3 weeks C1 on December 20, C2 on January 10 with me, C2 January 31 with Sandra Coughlin and C3 February 21 with me with CT CAP and brain MRI February 20. CBC, CMP,  and CEA each visit. Radiation oncology visit January 4.   Thank you for allowing us to continue to participate in Josefina Bennett' care.      Sincerely,       Evangelista Meza MD  Professor  [facility]  [phone]      I spent 50 minutes with the patient more than 50% of which was in counseling and coordination of care and then 30 minutes reviewing results from her inpatient hospitalization, 45 minutes total time.

## 2022-12-15 NOTE — NURSING NOTE
"Oncology Rooming Note    December 15, 2022 8:26 AM   Josefina Bennett is a 56 year old female who presents for:    Chief Complaint   Patient presents with     Port Draw     Port accessed with 20g gripper needle by RN, no blood return, line flushed with saline and heparin.  Vitals taken. Pt checked in for appointment(s). Labs collected from venipuncture by RN.      Oncology Clinic Visit     Metastatic breast cancer      Initial Vitals: /71   Pulse 97   Temp 97.8  F (36.6  C) (Oral)   Resp 16   Wt 59.9 kg (132 lb)   LMP 12/18/2014   SpO2 99%   BMI 22.65 kg/m   Estimated body mass index is 22.65 kg/m  as calculated from the following:    Height as of 6/10/22: 1.626 m (5' 4.02\").    Weight as of this encounter: 59.9 kg (132 lb). Body surface area is 1.64 meters squared.  Severe Pain (7) Comment: Data Unavailable   Patient's last menstrual period was 12/18/2014.  Allergies reviewed: Yes  Medications reviewed: Yes    Medications: MEDICATION REFILLS NEEDED TODAY. Provider was notified.  Pharmacy name entered into [employer]:    [facility] - [place], [place] - [address] [facility]  [facility] - A MAIL ORDER [facility] - [place], [place] - [address] [facility] [facility] - [place], [place] - [address]    Clinical concerns: Patient reports a headache, rates it 8/10. Patient takes Tylenol to alleviate pain. Patient requesting a medication refill for Oxycodone.      Cami Dubon ([place]), LPN December 15, 2022 8:28 AM              "

## 2022-12-15 NOTE — NURSING NOTE
Chief Complaint   Patient presents with     Port Draw     Port accessed with 20g gripper needle by RN, no blood return, line flushed with saline and heparin.  Vitals taken. Pt checked in for appointment(s). Labs collected from venipuncture by NU.      Gabi GARCIA RN PHN BSN  BMT/Oncology Lab

## 2022-12-15 NOTE — TELEPHONE ENCOUNTER
"Oral Chemotherapy Monitoring Program    Lab Monitoring Plan  q3wks with Herceptin infusions  Labs drawn outside of [place]: No  Subjective/Objective:  Josefina Bennett is a 56 year old female seen in clinic for an initial visit for oral chemotherapy education.      Drug Interactions:  Tucatinib and Eliquis (category D): increased exposure to apixaban. Recommend to decrease apixaban dose by 50% down to 2.5mg BID    Tucatinib and trazodone (category D): increased effects of trazodone, consider decreased dose. Dr. Meza is to send updated rx at 50% of the dose.    ORAL CHEMOTHERAPY 12/15/2022 12/15/2022   Assessment Type Initial Work up;New Teach Initial Work up;New Teach   Diagnosis Code Breast Cancer Breast Cancer   Providers Dr. Susana Meza   [facility]   Drug Name Xeloda (capecitabine) Tukysa (tucatinib)   Dose 1,500 mg 300 mg   Current Schedule BID BID   Cycle Details 2 weeks on, 1 week off Continuous   Any new drug interactions? No Yes   Pharmacist Intervention? No Yes   Intervention(s) - Dose changed (non-chemo)   Is the dose as ordered appropriate for the patient? Yes Yes   Since the last time we talked, have you been hospitalized or used the emergency room? No No       Last PHQ-2 Score on record:   PHQ-2 ( [facility]) 11/30/2022 1/18/2018   Q1: Little interest or pleasure in doing things 0 0   Q2: Feeling down, depressed or hopeless 0 0   PHQ-2 Score 0 0       Vitals:  BP:   BP Readings from Last 1 Encounters:   12/15/22 119/71     Wt Readings from Last 1 Encounters:   12/15/22 59.9 kg (132 lb)     Estimated body surface area is 1.64 meters squared as calculated from the following:    Height as of 6/10/22: 1.626 m (5' 4.02\").    Weight as of an earlier encounter on 12/15/22: 59.9 kg (132 lb).    Labs:  _  Result Component Current Result Ref Range   Sodium 138 (12/15/2022) 136 - 145 mmol/L     _  Result Component Current Result Ref Range   Potassium 4.5 (12/15/2022) 3.4 - 5.3 " mmol/L     _  Result Component Current Result Ref Range   Calcium 9.4 (12/15/2022) 8.6 - 10.0 mg/dL     _  Result Component Current Result Ref Range   Magnesium 1.9 (12/13/2022) 1.7 - 2.3 mg/dL     _  Result Component Current Result Ref Range   Phosphorus 3.3 (12/13/2022) 2.5 - 4.5 mg/dL     _  Result Component Current Result Ref Range   Albumin 3.6 (12/15/2022) 3.5 - 5.2 g/dL     _  Result Component Current Result Ref Range   Urea Nitrogen 13.9 (12/15/2022) 6.0 - 20.0 mg/dL     _  Result Component Current Result Ref Range   Creatinine 0.89 (12/15/2022) 0.51 - 0.95 mg/dL     _  Result Component Current Result Ref Range   AST 95 (H) (12/15/2022) 10 - 35 U/L     _  Result Component Current Result Ref Range    (H) (12/15/2022) 10 - 35 U/L     _  Result Component Current Result Ref Range   Bilirubin Total 0.4 (12/15/2022) <=1.2 mg/dL     _  Result Component Current Result Ref Range   WBC Count 17.0 (H) (12/15/2022) 4.0 - 11.0 10e3/uL     _  Result Component Current Result Ref Range   Hemoglobin 12.7 (12/15/2022) 11.7 - 15.7 g/dL     _  Result Component Current Result Ref Range   Platelet Count 410 (12/15/2022) 150 - 450 10e3/uL     No results found for ANC within last 30 days.     _  Result Component Current Result Ref Range   Absolute Neutrophils 11.6 (H) (12/15/2022) 1.6 - 8.3 10e3/uL        Assessment:  Patient is appropriate to start therapy, pending insurance investigation and scheduled Herceptin.    Plan:  Basic chemotherapy teaching was reviewed with the patient including indication, start date of therapy, dose, administration, adverse effects, missed doses, food and drug interactions, monitoring, side effect management, office contact information, and safe handling. Written materials were provided and all questions answered.    Follow-Up:  1 week after start of therapy     Grace Myhre, PharmD  Hematology/Oncology Pharmacist  [facility]  [phone]

## 2022-12-15 NOTE — PROGRESS NOTES
REASON FOR VISIT: Follow-up breast cancer     HISTORY OF PRESENT ILLNESS:  Josefina Bennett was self referred to our clinic in 2014 for clinical trial recommendations for newly diagnosed stage II breast cancer. She had her last mammogram approximately a year prior. She did notice in early January 2015 that she was having some distortion of the right breast, and she went to see her gynecologist and had a mammogram performed. She had a screening mammogram performed on 01/15/2015. Breast tissue was heterogeneously dense, which might compromise the mammography. There was architectural distortion in the right breast approximately 11-12 o'clock position, zone 2, posterior depth, and a CC MLO spot compression was performed and an ultrasound was planned. The diagnostic mammogram from 01/28/2015 showed right breast architectural distortion centered at the 12 o'clock position, zone 2, suspicious for neoplasm. Breast tomosynthesis was used in the interpretation. Ultrasound findings included targeted ultrasound of the right upper breast demonstrating a band-like area of abnormal echogenicity between 11 and 12 o'clock position in the right breast at zone 2. This measures 4 cm transversely x 1.1 cm AP, and there was only a 1.5 cm measurement in the radial direction. There was blood flow suspicious for neoplasm at the 11 o'clock position in zone 2. There is a 1.4 cm hypoechoic nodule slightly  from the larger area of altered echogenicity that is also suspicious. A biopsy was performed. The biopsy showed infiltrating lobular carcinoma, grade 2, and a clip was placed at specimen [id number]. There were a few signet cells present. The tumor was ER-positive, MT low positive, and HER2 was negative by immunohistochemistry. She subsequently underwent an MRI showing a tumor that was 4.8 x 1.7 x 3.2 cm in size. Overall, her clinical stage was T2 NX MX.       She was enrolled in the I-SPY-2 clinical trial, and qualified with high-risk  MammaPrint score. She was randomized to ganetespib and paclitaxel, and she was treated with 12 cycles of treatment, and then started on AC on 05/26/2015. She developed intractable nausea and vomiting after the first cycle, which required hospitalization on the second cycle. She then developed Pneumocystis pneumonia, which resulted in intubation and a 2-week hospitalization during the course of AC. She was discharged on 07/02/2015, and decided she did not want to pursue any further chemotherapy. She had a right lumpectomy and sentinel lymph node procedure 08/12/2015. She had a positive margin, underwent a re-excision, and had a positive margin. Ultimately, she underwent a right mastectomy with clear margins. She began radiation treatment with Dr. Bill Chauhan, and completed radiation therapy on 12/04/2015. Pathologic stage was III-A, ypT3 N1a MX. Of concern, she had what could be considered an RCB3 tumor.  She started adjuvant letrozole on 1/28/16.     TREATMENT HISTORY:  A.  Neoadjuvant therapy on I SPY-2 with ganetespib and paclitaxel.  AC x 2 complicated by PCP pneumonia.  She had intractable nausea and vomiting the first cycle of AC, and the second cycle of AC was complicated by Pneumocystis pneumonia requiring intubation and a 2-week hospitalization.  She did not complete the AC treatment.  She did undergo a right lumpectomy and sentinel node procedure.  She had a positive margin and underwent resection.  She still had a positive margin.  She underwent a right mastecomy with clear margins.  Pathologic stage was IIIA  [id number] and of concern, she had an RCB3 histology meaning significant risk of recurrence of her breast cancer during the 5-year period following primary therapy.   She completed radiation in 12/2015.      B.  Right lumpectomy, followed by right margin resections, followed by rigth mastectomy.   B.  Oophorectomy 10-16-15.   C.  Tamoxifen after 10-6-15.   D.  Radiation therapy. 5,040 cGy in 180  cGy/fraction to the chest wall and regional lymphatics followed by 720 cGy axillary lymph node boost and 1000 cGy mastectomy scar boost. Completed on 12/3/2015.  E.   Adjuvant letrozole begun 1-28-16.  Plan was to continue through 2026.  F.  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  A mix of TNBC and ER- HER2+.  Clinic conference consensus was for the modified Vandana with paclitaxel weekly to treat both clones. T-DXd could be a next regimen.  G.  Metastatic carcinoma is HER2 negative (score 1+) by immunohistochemistry.  She is also a candidate for T-DXd.  H.  Recurrent tumor with 22C3 antibody for PD-L1 and hold this in reserve for a Keynote 355 metastatic TNBC approach if the Vandana regimen does not control her metastatic disease.  Keynote 355 could also be an active regimen.       INTERVAL HISTORY:     Josefina Bennett had a diagnosis of leptomeningeal disease and normal pressure hydrocephalus and had a communicating hydrocephalus requiring a shunt, which was placed by Dr. Lopez earlier this week.  An Ommaya reservoir has not yet been placed.  She did have CSF that was positive for malignant cells, and we need to consult with Dr. Steven Bahena regarding immunohistochemistry for HER-2 for the cell block.  She has headaches, but does have pain medicine for that and has mild nausea and vomiting and has been taking Zofran and olanzapine with reasonable control of the symptoms.  She has moderate fatigue, but not nearly as fatigued as she was before the shunt was placed.  She denies depression or anxiety.  She was seen today with her mother, Brandi, and her roommate, Karl, and a friend in [place], [place], joined us on the call as well.         REVIEW OF SYSTEMS:  She has improved alertness, but she does have chronic headaches and  some mild coordination issues and does need a walker.  She has mild nausea and vomiting.  She denies fevers or chills, cough, chest pain, shortness of breath, constipation or diarrhea, bone pain, back pain, abdominal pain.  The remainder of a 10 point review of systems is negative.    She is eating reasonably well.  She is able to walk, but is a little bit unsteady.  She is taking vitamin D and calcium.    PHYSICAL EXAMINATION:    /71   Pulse 97   Temp 97.8  F (36.6  C) (Oral)   Resp 16   Wt 59.9 kg (132 lb)   LMP 12/18/2014   SpO2 99%   BMI 22.65 kg/m    GENERAL:  Josefina Verma appears generally well.  HEENT:  She has alopecia.  Examination of the oropharynx is without lesions.  There is a reservoir under the skin in the anterior right frontal area. Well healed incision.   LYMPH:  No palpable cervical, supraclavicular, subclavicular or axillary lymphadenopathy.  BREASTS:  Exam not performed today.      LUNGS:  Clear to percussion and auscultation.  HEART:  Regular rate and rhythm.  S1, S2.  ABDOMEN:  Soft, nontender, without hepatosplenomegaly.  The port site is without erythema or tenderness.  EXTREMITIES:  Without edema.  PSYCHIATRIC:  Mood and affect are normal.  NEUROLOGIC:  Alert, oriented. Motor 5/5 strength in upper and lower extremities bilaterally.  Cerebellar exam showed bilateral unsteadiness. Cranial nerves 2-12 intact.  DTR 0 in upper and lower extremities bilaterally.     LABORATORY DATA:  The CMP was remarkable for total protein of 6.1, alkaline phosphatase 177, , AST 95, glucose 180.  CBC showed WBC of 17.0, hemoglobin 12.7, platelets 410,000. Absolute neutrophil count was 11,600.        ASSESSMENT AND PLAN:   1.  Mary Bennett is a 56-year-old woman with a history of a clinical stage II right breast cancer, ER positive, VT positive, HER2 negative by immunohistochemistry.  She had a lobular histology.  She was on the I-SPY 2 clinical trial and was randomized to ganetespib and  paclitaxel, then went on to AC. Pathologic stage was IIIA  [id number] and of concern, she had an RCB3 histology meaning significant risk of recurrence of her breast cancer during the 5-year period following primary therapy.  There were 2 lymph nodes involved with mammary carcinoma with extracapsular extension.  She went on to have radiation therapy and then began hormonal therapy with tamoxifen in October 2015.  She had oophorectomy and started letrozole in January 2016.   2.  Breast conference recommended starting with HER2 directed therapy.  Biphenotypic cancer with majority TNBC component and a HER2 component.  I discussed the 22C3 PD-L1 CPS of 40 while 20% of the tumor is HER2+ by FISH and the overall ASCO  CAP assignment is negative for the bulk tumor with the qualifier that there is a significant HER2 clonal component that would be best treated with HER2 directed therapy.  The Tumor Board consensus was to continue with HER2 directed therapy to treat the new and HER2+ clone till best response.    3. Leptomeningeal progression with communicating hydrocephalus requiring placement of a shunt last week by Dr. Evangelista Lopez.  There is a valve that allows IT chemotherapy (methotrexate) by magnetic closure of the valve for 2 hours after administration of methotrexate.  This is to be done with a neurosurgery EDIL and an oncology EDIL approved for IT chemotherapy administration. The methotrexate will be dosed at 10 mg IT total dose twice weekly for 3 weeks. We have requested IHC for HER2 on the CSF cell block.    4.  Discussion of systemic treatment with the HER2 CLIMB regimen, which is compatible with concomitant IT methotrexate.  I had an extensive discussion with Josefina Verma, her mother Brandi, roommate Karl and friend [place] about the [id number] regimen.  I think that this regimen would be a good one for her because the regimen consists of tucatinib, capecitabine and Herceptin.  Tucatinib and capecitabine both cross the blood  brain barrier.  The tucatinib could potentially control CNS HER-2 positive breast cancer and the Xeloda the triple negative if present.  The cell block needs to be checked for HER-2 by IHC, although if it were negative, it would not rule out HER-2 positive breast cancer behind blood brain barrier. The oral pharmacy group is meeting with her.  5. There are some coordination and headache issues.  I do think that intrathecal methotrexate twice weekly would be reasonable to initiate, and we could give this in parallel with tucatinib, Xeloda and Herceptin.  6.  Discussion with Dr. Lopez about whether an Ommaya reservoir could be placed.  Much appreciated.  7.  Radiation Oncology appointment with Dr. Bill Chauhan is 01/04/2023.  Agree with starting IT treatment first.   8.  Need for a walker at home.  I discussed that she is on 1 level.  She can walk at home with a walker.  I told them to be careful about loose rugs.  Script given.  9.  Xgeva was held because of need for a dental procedure.  Although there is no FDG uptake in bones, bones are involved.   10. Potential interactions with tucatinib requiring a dose reduction of other drugs.  Eliquis dose reduction by half.  Trazadone dose reduction by half.  Discussed with Aziza in the oral chemotherapy program.   Intrathecal methotrexate will be given after adjustment of the valve for 2 hours in clinic, allowing circulation of methotrexate.  10 mg of IT methotrexate will be given for 2 times per week for the first month starting on Tuesday December  We will use preservative free formulation. We will coordinate with neurosurgery switching the valve to closed before IT methotrexate and then to open 2 hours after.  CSF will be sent for cytology each time.   11.  Follow up plan.   Follow up December 20 with EDIL visit and intrathecal methotrexate with neurosurgery adjustment of shunt valve and send CSF for cytology. Continue the IT methotrexate twice weekly next December 23,  December 27, December 30. Begin trastuzumab every 3 weeks C1 on December 20, C2 on January 10 with me, C2 January 31 with Sandra Coughlin and C3 February 21 with me with CT CAP and brain MRI February 20. CBC, CMP,  and CEA each visit. Radiation oncology visit January 4.   Thank you for allowing us to continue to participate in Josefina Bennett' care.      Sincerely,       Evangelista Meza MD  Professor  [facility]  [phone]      I spent 50 minutes with the patient more than 50% of which was in counseling and coordination of care and then 30 minutes reviewing results from her inpatient hospitalization, 45 minutes total time.

## 2022-12-19 NOTE — PROGRESS NOTES
Patient's Mother called requesting for her and patient to stay at the [facility] to be able to make all of her appointments w/o a car at this time. Brandi has a room until tomorrow and will need it extended. Urgent message sent to Vita Romero to assist with obtaining an extended reservation at [facility] for Mary and her Mother Brandi.  Answered all patient and Mother's questions and verbalized understanding. Teresa Ashton RN, BSN.

## 2022-12-19 NOTE — PROGRESS NOTES
Spoke to patient with driving to the [facility] to stay with her Mother with all the appointments she has. Her Mother does not have a car at this point so having a shuttle ride will help with these appointments. Patient has had one Pfizer COVID vaccine earlier this year when she was diagnosed with metastatic breast cancer and is required by the [facility] to have another COVID vaccine. She has an appointment earlier tomorrow with Sandra Coughlin and writer will send this message with Mary's requirement at the [facility].  Answered all patient's questions and verbalized understanding. Teresa Ashton RN, BSN.

## 2022-12-19 NOTE — PROGRESS NOTES
Per patient's request, completed and faxed Hope Ewell (Ph. [phone], F. [phone]) extended current referral until January 19th, 2023. Genesis Ewell will contact patient for confirmation of reservation.  will continue to provide support as needed.    EVENS Mcclellan,[facility]  Hematology/Oncology Social Worker  Phone:[phone] Pager: [phone]

## 2022-12-20 NOTE — PROGRESS NOTES
Infusion Nursing Note:  Joesfina Bennett presents today for C1D1 trastuzumab-qyyp (TRAZIMERA)-COVID-19 mRNA Vac-Felisha (PFIZER)-1 L NS.    Patient seen by provider today: Yes: Sandra Coughlin NP   present during visit today: Not Applicable.    Note: Pt saw provider prior to infusion, ok for treatment.      Intravenous Access:  Implanted Port.    Treatment Conditions:  Lab Results   Component Value Date    HGB 12.2 12/20/2022    WBC 16.3 (H) 12/20/2022    ANEU 7.3 10/03/2022    ANEUTAUTO 12.4 (H) 12/20/2022     12/20/2022      Lab Results   Component Value Date     (L) 12/20/2022    POTASSIUM 4.5 12/20/2022    MAG 1.9 12/13/2022    CR 0.78 12/20/2022    VEE 8.7 12/20/2022    BILITOTAL 0.4 12/20/2022    ALBUMIN 3.5 12/20/2022     (H) 12/20/2022    AST 49 (H) 12/20/2022     Results reviewed, labs MET treatment parameters, ok to proceed with treatment.  ECHO/MUGA completed 11/1  EF 55-60%.      Post Infusion Assessment:  Patient tolerated infusion without incident.  Patient observed for 60 minutes post IT chemo per protocol.  Blood return noted pre and post infusion.  Site patent and intact, free from redness, edema or discomfort.  No evidence of extravasations.  Access discontinued per protocol.       Discharge Plan:   Prescription refills given for Imodium, Compazine, per pharmacy they will work on Xeloda and Tucatinib coverage.  Discharge instructions reviewed with: Patient and Family.  Patient and/or family verbalized understanding of discharge instructions and all questions answered.  AVS to patient via Sojeans[employer].  Patient will return 12/23 for IT chemo.   Patient discharged in stable condition accompanied by: self and mother.  Departure Mode: Ambulatory.    Sera Bender RN

## 2022-12-20 NOTE — NURSING NOTE
"Oncology Rooming Note    December 20, 2022 7:47 AM   Josefina Bennett is a 56 year old female who presents for:    Chief Complaint   Patient presents with     Port Draw     Labs drawn via port by RN in lab.  VS taken      Oncology Clinic Visit     Breast Ca     Initial Vitals: /77   Pulse 99   Temp 97.7  F (36.5  C) (Oral)   Resp 16   Wt 59.9 kg (132 lb 1.6 oz)   LMP 12/18/2014   SpO2 100%   BMI 22.66 kg/m   Estimated body mass index is 22.66 kg/m  as calculated from the following:    Height as of 6/10/22: 1.626 m (5' 4.02\").    Weight as of this encounter: 59.9 kg (132 lb 1.6 oz). Body surface area is 1.64 meters squared.  No Pain (0) Comment: Data Unavailable   Patient's last menstrual period was 12/18/2014.  Allergies reviewed: Yes  Medications reviewed: Yes    Medications: Medication refills not needed today.  Pharmacy name entered into [email or web address]:    [facility] - [place], [place] - [address] [facility] - [place], [place] - [address] [facility] - [place], [place] - [address]    Clinical concerns:        Leandra Lizarraga CMA              "

## 2022-12-20 NOTE — PATIENT INSTRUCTIONS
For high blood sugar:  - Start Lantus, 10 units, tonight at bedtime.   - Push fluids. Check your sugar a few times tomorrow. Ideal Range for sugars:     Be aware of symptoms of Low Blood Sugar:  What are the symptoms of low blood sugar?  Symptoms of low blood sugar can include:  ?Sweating or shaking  ?Feeling hungry  ?Feeling worried  If low blood sugar levels are not treated, severe symptoms can occur. These can include:  ?A headache, blurry vision, or feeling dizzy  ?Feeling weak or having trouble walking  ?Acting confused or not thinking clearly  ?Passing out  Some people have symptoms within a few hours of eating a meal. Other people have symptoms when they haven't eaten for many hours.    How is low blood sugar treated?  Treatment for low blood sugar involves both raising your blood sugar and treating the cause of your low blood sugar.  Your doctor or nurse will teach you how you can raise your blood sugar when it gets low. People usually raise their blood sugar by eating or drinking quick sources of sugar (orange juice, hard candy, bernardino crackers). Depending on your condition, your doctor might recommend that you carry a quick source of sugar with you at all times in case you need it.  If your blood sugar is so low that you are confused or passing out, and you are not able to eat anything, you will need to be treated with glucagon. Glucagon is a hormone that can quickly raise blood sugar levels and stop severe symptoms. It comes as a shot (figure 2) or a nose spray. If your doctor recommends that you carry glucagon with you, they will tell you when and how to use it. If possible, it's also a good idea to have a family member, friend, or roommate learn how to give you glucagon. That way they can give it to you if you can't do it yourself.  Your doctor will also treat the condition that's causing your low blood sugar, if it can be treated. For example, if a medicine is causing your low blood sugar, your  doctor can change or stop your medicine.       For brain Mets:   - Continue dexamethasone, dose reduced to 4mg daily

## 2022-12-20 NOTE — TELEPHONE ENCOUNTER
Prior Authorization Not Needed per Insurance    Medication: Capecitabine - pa not needed   Insurance Company: Other (see comments)  Expected CoPay:      Pharmacy Filling the Rx: [facility], [facility] - [place], [place] - [address]  Pharmacy Notified: No  Patient Notified: No

## 2022-12-20 NOTE — LETTER
2022       RE: Josefina Bennett  [address]  [facility] [facility] [address]     Dear Colleague,    Thank you for referring your patient, Josefina Bennett, to the [facility] [place] at [facility]. Please see a copy of my visit note below.    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  Due to the symptomatic nature of the hydrocephalus as well as the upcoming need for likely intrathecal chemotherapy, the indication for placement of a programmable ventriculoperitoneal shunt was discussed with the patient. Patient underwent this procedure on 2022 by Dr. Lopez and she was discharged from the hospital on 2022 in stable condition.     Patient is scheduled to start intrathecal Methotrexate treatment today and is supposed to have the  shunt turned off for 2 hours after the IT chemotherapy. I met the patient on the 2 nd floor t the infusion bay. Overall she has been doing well since hospital discharge.     Review of Systems:   Pertinent items are noted in HPI or as in patient entered ROS below, remainder of complete ROS is negative.   No flowsheet data found.     Physical Exam:   LMP 2014     General: No acute distress.    Neuro: The patient is fully oriented. Speech is normal.     Procedures:  With [employer] shunt , setting was verified and is at 3.  shunt was turned off by changing the setting to 8. Verified x 2.     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 oLpez's recommendation,  shunt turned off for IT Methotrexate injection. Will plan to re open it 2 hours after the treatment.        I spent 15 minutes on patient care activities related to this encounter on the date of service, including time spent reviewing the chart, obtaining history and examination and in counseling the patient, and in documentation in the electronic medical record.    Claudia MESA, CNP  Department of Neurosurgery

## 2022-12-20 NOTE — PROGRESS NOTES
BMT ONC Adult Ommaya Access and Intrathecal Chemotherapy Administration Procedure Note  Dec 20, 2022    Procedure: Codman Certas Plus access to obtain CSF and give intrathecal chemotherapy (IT)    Diagnosis: breast cancer, leptomeningeal disease    Learning needs assessment complete within 12 months: YES     Vitals reviewed:  YES    Informed Consent: Procedure, benefits, risks, and alternatives explained to the patient who voiced understanding of the information and agreed to proceed with Ommaya access with IT chemotherapy. Risks include bleeding, nausea, and/or infection.   Patient safety checklist completed.    Labs: Reviewed:   INR   Date Value Ref Range Status   12/07/2022 1.10 0.85 - 1.15 Final   06/21/2015 1.21 (H) 0.86 - 1.14 Final      Platelet Count   Date Value Ref Range Status   12/20/2022 370 150 - 450 10e3/uL Final   01/23/2020 283 150 - 450 10e9/L Final     Note: Shunt was turned off by neuro NP. Ommaya was pumped initially, then cleaned with betadine + alcohol swab. Butterfly needle was attached to a 10 cc syringe and inserted into the ommaya port.  5 cc of CSF was withdrawn slowly over about 3 minutes.  10 mg IT methotrexate was checked with nursing and in EPIC and instilled over 3 minutes.   Patient tolerated the procedure well.    Specimen appears clear pink. CSF was sent for cytology, cell count, protein and glucose.  Ommaya was pumped 6 times after the procedure and the patient was encouraged to pump when she returns home and again this evening.     Post-procedure pain assessment: 0 out of 10 on the numeric pain rating scale  Interventions: No    Complications: No     Disposition: Patient will remain on back for 1 hour post procedure. Call if develops headaches, fevers and or chills. Shunt will be turned back on 2 hours after IT chemo given by neuro NP.    Performed by:   Adwoa Wilson PA-C  [facility]  [address] [place] [address] [phone]

## 2022-12-20 NOTE — PROGRESS NOTES
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  Due to the symptomatic nature of the hydrocephalus as well as the upcoming need for likely intrathecal chemotherapy, the indication for placement of a programmable ventriculoperitoneal shunt was discussed with the patient. Patient underwent this procedure on 2022 by Dr. Lopez and she was discharged from the hospital on 2022 in stable condition.     I met with the patient in the Oncology clinic. Her  shunt was turned off this morning and she received IT Methotrexate injection 2 hours ago. Based on Oncology and Dr. Lopez's recommendations, Codman Certas  shunt is now turned back on at 3. Right sided  shunt incision CDI. Abdominal incision with skin glue in place, healing well. Patient denies any fever or chills since hospital discharge. No headaches or new neurological symptoms.     She restarted a lower dose of Eliquis on 2022.       Review of Systems:   Pertinent items are noted in HPI or as in patient entered ROS below, remainder of complete ROS is negative.   No flowsheet data found.     Physical Exam:   [facility] 2014    General: No acute distress.    Neuro: The patient is fully oriented. Speech is normal. Extraocular movements are intact without nystagmus. Facial sensation  is intact in V1, V2, V3 distributions. Facial nerve function is normal, rated as a House Brackmann 1.  Shoulders are full strength. There is no pronator drift. Full strength in all extremities. Sensation intact throughout. No dysmetria with finger-nose-finger testing. Gait deferred.   Psych: Normal mood and affect. Behavior is normal.    Incision: Right frontal crani site and abdominal incision from  shunt placement healing well. Skin glue removed from abdominal incision.     Imaging:  No new imaging.     Procedures:  With Certas / magnet,  shunt turned back ON and set at 3.0. Verified x 2.     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 She is scheduled with Dr. Schmitt to have the shunt setting changes.        I spent 30 minutes on patient care activities related to this encounter on the date of service, including time spent reviewing the chart, obtaining history and examination and in counseling the patient, and in documentation in the electronic medical record.      Claudia MESA, CNP  Department of Neurosurgery

## 2022-12-20 NOTE — LETTER
2022       RE: Josefina Bennett  [address]  [facility] [address]     Dear Colleague,    Thank you for referring your patient, Josefina Bennett, to the [facility] NEUROSURGERY [facility]INIC [place] at [facility]. Please see a copy of my visit note below.    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  Due to the symptomatic nature of the hydrocephalus as well as the upcoming need for likely intrathecal chemotherapy, the indication for placement of a programmable ventriculoperitoneal shunt was discussed with the patient. Patient underwent this procedure on 2022 by Dr. Lopez and she was discharged from the hospital on 2022 in stable condition.     I met with the patient in the Oncology clinic. Her  shunt was turned off this morning and she received IT Methotrexate injection 2 hours ago. Based on Oncology and Dr. Lopez's recommendations, Codman Certas  shunt is now turned back on at 3. Right sided  shunt incision CDI. Abdominal incision with skin glue in place, healing well. Patient denies any fever or chills since hospital discharge. No headaches or new neurological symptoms.     She restarted a lower dose of Eliquis on 2022.       Review of Systems:   Pertinent items are  noted in HPI or as in patient entered ROS below, remainder of complete ROS is negative.   No flowsheet data found.     Physical Exam:   LMP 12/18/2014    General: No acute distress.    Neuro: The patient is fully oriented. Speech is normal. Extraocular movements are intact without nystagmus. Facial sensation is intact in V1, V2, V3 distributions. Facial nerve function is normal, rated as a House Brackmann 1.  Shoulders are full strength. There is no pronator drift. Full strength in all extremities. Sensation intact throughout. No dysmetria with finger-nose-finger testing. Gait deferred.   Psych: Normal mood and affect. Behavior is normal.    Incision: Right frontal crani site and abdominal incision from  shunt placement healing well. Skin glue removed from abdominal incision.     Imaging:  No new imaging.     Procedures:  With Certas / magnet,  shunt turned back ON and set at 3.0. Verified x 2.     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 She is scheduled with Dr. Schmitt to have the shunt setting changes.        I spent 30 minutes on patient care activities related to this encounter on the date of service, including time spent reviewing the chart, obtaining history and examination and in counseling the patient, and in documentation in the electronic medical record.      Claudia MESA CNP  Department of Neurosurgery

## 2022-12-20 NOTE — NURSING NOTE
"Chief Complaint   Patient presents with     Port Draw     Labs drawn via port by RN in lab.  VS taken        Port accessed with 20 gauge 3/4\" gripper needle by RN, labs collected, line flushed with saline and heparin.  Vitals taken. Pt checked in for appointment(s).    Deana Jarrell RN    "

## 2022-12-20 NOTE — PROGRESS NOTES
12/20/2022      REASON FOR VISIT: Follow-up breast cancer     HISTORY OF PRESENT ILLNESS:  Josefina Bennett was self referred to our clinic for clinical trial recommendations for newly diagnosed stage II breast cancer. She had her last mammogram approximately a year prior. She did notice in early January 2015 that she was having some distortion of the right breast, and she went to see her gynecologist and had a mammogram performed. She had a screening mammogram performed on 01/15/2015. Breast tissue was heterogeneously dense, which might compromise the mammography. There was architectural distortion in the right breast approximately 11-12 o'clock position, zone 2, posterior depth, and a CC MLO spot compression was performed and an ultrasound was planned. The diagnostic mammogram from 01/28/2015 showed right breast architectural distortion centered at the 12 o'clock position, zone 2, suspicious for neoplasm. Breast tomosynthesis was used in the interpretation. Ultrasound findings included targeted ultrasound of the right upper breast demonstrating a band-like area of abnormal echogenicity between 11 and 12 o'clock position in the right breast at zone 2. This measures 4 cm transversely x 1.1 cm AP, and there was only a 1.5 cm measurement in the radial direction. There was blood flow suspicious for neoplasm at the 11 o'clock position in zone 2. There is a 1.4 cm hypoechoic nodule slightly  from the larger area of altered echogenicity that is also suspicious. A biopsy was performed. The biopsy showed infiltrating lobular carcinoma, grade 2, and a clip was placed at specimen [id number]. There were a few signet cells present. The tumor was ER-positive, NM low positive, and HER2 was negative by immunohistochemistry. She subsequently underwent an MRI showing a tumor that was 4.8 x 1.7 x 3.2 cm in size. Overall, her clinical stage was T2 NX MX.       She was enrolled in the I-SPY-2 clinical trial, and qualified with  high-risk MammaPrint score. She was randomized to ganetespib and paclitaxel, and she was treated with 12 cycles of treatment, and then started on AC on 05/26/2015. She developed intractable nausea and vomiting after the first cycle, which required hospitalization on the second cycle. She then developed Pneumocystis pneumonia, which resulted in intubation and a 2-week hospitalization during the course of AC. She was discharged on 07/02/2015, and decided she did not want to pursue any further chemotherapy. She had a right lumpectomy and sentinel lymph node procedure 08/12/2015. She had a positive margin, underwent a re-excision, and had a positive margin. Ultimately, she underwent a right mastectomy with clear margins. She began radiation treatment with Dr. Bill Chauhan, and completed radiation therapy on 12/04/2015. Pathologic stage was III-A, ypT3 N1a MX. Of concern, she had what could be considered an RCB3 tumor.  She started adjuvant letrozole on 1/28/16.     4/6/2022 Josefina Verma developed a fever during Reclast.  She was feeling unwell and went to the ED.  CT CAP showed new involvement of abdominal lymph nodes and multiple bone metastases.  She was admitted and I saw her in the hospital 4-7-22 to discuss the plan for biopsy.  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  A mix of TNBC and ER- HER2+.  Clinic conference consensus was for the modified Vandana with paclitaxel weekly to treat both clones.     9/30/2022 started Enhertu, rising tumor marker and concern for progression    PET 11/21 shows new hypermetabolic lesions of the liver    12/7 Brain MRI for confusion and lethargy- This showed acute communicating hydrocephalus, new brain mets and leptomeningeal carcinomatosis     Admitted 12/7- 12/13. Shunt placed 12/9 by neurosurgery. Started on dexamethasone.     Met with Dr. Meza  12/15. See his note for further details. Recommended IT methotrexate, HER-2 climb regimen         TREATMENT HISTORY:  A.  Neoadjuvant therapy on I SPY-2 with ganetespib and paclitaxel.  AC x 2 complicated by PCP pneumonia.    B.  Right lumpectomy, followed by right margin resections, followed by right mastectomy.   B.  Oophorectomy 10-16-15.   C.  Tamoxifen after 10-6-15.   D.  Radiation therapy. 5,040 cGy in 180 cGy/fraction to the chest wall and regional lymphatics followed by 720 cGy axillary lymph node boost and 1000 cGy mastectomy scar boost. Completed on 12/3/2015.  E.   Adjuvant letrozole begun 1-28-16.  Plan was to continue through 2026.  F.  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  A mix of TNBC and ER- HER2+.  Clinic conference consensus was for the modified Vandana with paclitaxel weekly to treat both clones.   G. 9/30/22 Started Enhertu   H. Was admitted 10/3-10/7, C. Difficile, also had + Galactomanin. Started on voriconazole 10/11/22  I. 10/19 Cycle #2 Enhertu, dose reduced   J. PE-- incidentally found on 11/1, admitted for 48 hours, started on Eliquis   K. Progression in the liver, also found to have brain mets and leptomeningeal carcinomatosis. Started Herceptin 12/20. IT Methotrexate 12/20. Plan to start tucatinib and xeloda as soon as prior auth is obtained          INTERVAL HISTORY:    Today, she is seen in person with her mom.     She states that she is feeling much better today since her shunt was placed.  She is not having any ongoing lethargy or somnolence.  She is not having any significant headaches, she does have a morning that has been ongoing developed worsening.  No vision changes.  No confusion.  No slurred speech.  She is currently staying with her mom at the [facility].  She has been  breathing regularly, no shortness of breath and cough.  She denies any abdominal pain.  Her bowels have been moving regularly.  She does not have any chest pain or swelling in her lower extremities.  She has dose reduced her Eliquis per Dr. Meza in the pharmacist recommendations, she has not had any bruising or bleeding.  She feels that she is coping generally well and does not feel like she needs any additional resources at this time.      REVIEW OF SYSTEMS:    Patient denies the following except if noted above: fevers, body aches, chills, headaches, vision changes, dizziness, chest pain, shortness of breath, nausea, vomiting, diarrhea, constipation, abdominal pain, rashes, bruising/bleeding, mouth sores, swelling or pain in the legs.      PHYSICAL EXAMINATION  /77   Pulse 99   Temp 97.7  F (36.5  C) (Oral)   Resp 16   Wt 59.9 kg (132 lb 1.6 oz)   LMP 12/18/2014   SpO2 100%   BMI 22.66 kg/m      Wt Readings from Last 4 Encounters:   12/20/22 59.9 kg (132 lb 1.6 oz)   12/15/22 59.9 kg (132 lb)   12/09/22 58.4 kg (128 lb 12.8 oz)   12/07/22 61.2 kg (135 lb)         VS: Above vitals reviewed  General: Resting comfortably in no acute distress  Head:  EENT: +thrush on the right side of mouth. No oral lesions   Heart: Regular rate and rhythm, no murmurs  Lung: Normal respiratory effort. Clear to auscultation bilaterally. No wheezes, rhonchi, or crackles   Neuro: AAOx3. Speech is fluent. Gait is steady. Moving all extremities. Cranial nerves II-IV intact. Strength is 5/5 bilaterally, symmetric.   Extremities: No lower extremity edema  Skin: Warm, dry, intact. No rashes, no suspicious lesions. No petechia or bruising noted           LABORATORY DATA:         ASSESSMENT AND PLAN:   Shelby Bennett is a 56-year-old woman with logy pleomorphic lobular cancer, wa history of a clinical stage II right breast cancer, ER positive, NY positive, HER2 negative by immunohistochemistry, now with recurrent Metastatic  breast cancer, lobular. New histohich is negative for estrogen receptor and negative for progesterone receptor, and 20% is HER2 positive by FISH. S/P Vandana regimen, switched to Enhertu. Now with progression with new liver mets, brain mets, leptomeningeal disease. Here today to start new treatment with IT methotrexate, herceptin, tucatinib, and Xeloda     - Starting IT methotrexate today, twice a week for three weeks. Reviewed side effects in detail and patient agrees to move forward  - Start with herceptin today- reviewed side effects   - Repeat ECHO due 2/2023  - Start Xeloda and Tucatinib once prior auth is approved. Reviewed side effects in detail today. Chemo teach also provided by [facility]  - Trend tumor markers   - Plan for repeat brain MRI and CT CAP in February as scheduled   - We have requested IHC for HER2 on the CSF cell block.        #Brain Mets  - On 4mg Dex BID -- will decrease to 4mg daily and monitor neuro status closely   - Seen by rad onc inpatient, see note. Recommended IT chemo prior to RT in this setting with leptomeningeal disease.   - Seeing radiation oncology 1/4    #Hydrocephalus  - S/P shunt placement with Dr. Lopez       #Steroid induced hyperglycemia  - 1L NS today   - Urgent endocrine referral   - Discussed with endocrinologist on call today. Per their recs, will start lantus 10 units at bedtime. Sent for glucometer, test strips. Educated on hypoglycemia and hyperglycemia   - Monitor twice daily tomorrow, parameters given   - Check A1C today, pending     #PE, diagnosed 11/1/2022  - On eliquis, now with dose reduction to 2.5mg BID per pharmacy due to tucatinib interaction   - No bruising bleeding     #Vaccines  - Second COVID primary series today, discussed with pharmacy      # Psych:  - She feels that she is coping very well currently and does not need any additional support at this time  - Continue Effexor 75mg daily   - Prognosis is poor. Not sure that patient fully grasps the gravity  of her diagnosis. Ongoing discussions with each visit   - Seeing palliative tomorrow as well.          # Bone Mets  - Asymptomatic  - Currently getting xgeva monthly --ON HOLD due to molar issue  - Has met with the dentist, who recommended we continue to hold Xgeva until her dental work gets completed.  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  Recently seen, and voriconazole was discontinued.  - Resolved    # C. Diff 10/5  - Resolved        Discussed the entire above plan with Dr. Meza       Patient will call in the interim with any questions or concerns. They voice understanding and agree with the above plan.         Sandra Coughlin PA-C

## 2022-12-20 NOTE — TELEPHONE ENCOUNTER
Received page from Oncology clinic regarding patient having steroid induced hyperglycemia  On discussion with Sandra , patient has brain mets and she is on high dose dexamethasone 8 mg daily now. As per the discussion with her, patient will continue to be on dexamethasone 4 mg from tomorrow indefinitely for now.  Her BG today in the clinic was at 422.  Patient rest of the labs seems to be normal and patient received 1000 ml NS bolus.    Patient had A1c done in 2015 and was at 6.9 . She never had any follow up after that labs  Not sure of patient has DM underlying or is it just steroid induced hyperglycemia    For now will start her on Lantus for basal coverage  Will start her at Lantus 10 units for now.  Recommended to have a soon follow up in endocrinology clinic.    Patient was discussed with Dr Villegas.      Kelechi Rodriguez  Endocrinology Fellow  [phone]

## 2022-12-20 NOTE — TELEPHONE ENCOUNTER
RECORDS RECEIVED FROM: internal /ce    DATE RECEIVED: 12.28.22   NOTES (FOR ALL VISITS) STATUS DETAILS   OFFICE NOTES from referring provider internal  Sandra Coughlin     MEDICATION LIST internal     IMAGING        MRI (BRAIN) internal  Lumbar- 12.9.22,      CT (HEAD/NECK/CHEST/ABDOMEN) internal    12.9.22     LABS     DIABETES: HBGA1C, CREATININE, FASTING LIPIDS, MICROALBUMIN URINE, POTASSIUM, TSH, T4    THYROID: TSH, T4, CBC, THYRODLONULIN, TOTAL T3, FREE T4, CALCITONIN, CEA internal /ce

## 2022-12-20 NOTE — PROGRESS NOTES
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  Due to the symptomatic nature of the hydrocephalus as well as the upcoming need for likely intrathecal chemotherapy, the indication for placement of a programmable ventriculoperitoneal shunt was discussed with the patient. Patient underwent this procedure on 2022 by Dr. Lopez and she was discharged from the hospital on 2022 in stable condition.     Patient is scheduled to start intrathecal Methotrexate treatment today and is supposed to have the  shunt turned off for 2 hours after the IT chemotherapy. I met the patient on the 2 nd floor t the infusion bay. Overall she has been doing well since hospital discharge.     Review of Systems:   Pertinent items are noted in HPI or as in patient entered ROS below, remainder of complete ROS is negative.   No flowsheet data found.     Physical Exam:   [facility] 2014    General: No acute distress.    Neuro: The patient is fully oriented. Speech is normal.       Procedures:  With [employer] shunt , setting was verified and is at 3.  shunt was turned off by changing the setting to 8. Verified x 2.     Assessment:  Communicating hydrocephalus with leptomeningeal  carcinomatosis  Right-sided placement ventriculoperitoneal shunt via luz hole on 12/9/2022  IT Methotrexate injection on 12/20/2022    Plan:  Based on discussion with Oncology team and Dr. Lopez's recommendation,  shunt turned off for IT Methotrexate injection. Will plan to re open it 2 hours after the treatment.        I spent 15 minutes on patient care activities related to this encounter on the date of service, including time spent reviewing the chart, obtaining history and examination and in counseling the patient, and in documentation in the electronic medical record.      Claudia MESA CNP  Department of Neurosurgery

## 2022-12-21 NOTE — PROGRESS NOTES
12/21/2022      REASON FOR VISIT: Follow-up breast cancer     HISTORY OF PRESENT ILLNESS:  Josefina Bennett was self referred to our clinic for clinical trial recommendations for newly diagnosed stage II breast cancer. She had her last mammogram approximately a year prior. She did notice in early January 2015 that she was having some distortion of the right breast, and she went to see her gynecologist and had a mammogram performed. She had a screening mammogram performed on 01/15/2015. Breast tissue was heterogeneously dense, which might compromise the mammography. There was architectural distortion in the right breast approximately 11-12 o'clock position, zone 2, posterior depth, and a CC MLO spot compression was performed and an ultrasound was planned. The diagnostic mammogram from 01/28/2015 showed right breast architectural distortion centered at the 12 o'clock position, zone 2, suspicious for neoplasm. Breast tomosynthesis was used in the interpretation. Ultrasound findings included targeted ultrasound of the right upper breast demonstrating a band-like area of abnormal echogenicity between 11 and 12 o'clock position in the right breast at zone 2. This measures 4 cm transversely x 1.1 cm AP, and there was only a 1.5 cm measurement in the radial direction. There was blood flow suspicious for neoplasm at the 11 o'clock position in zone 2. There is a 1.4 cm hypoechoic nodule slightly  from the larger area of altered echogenicity that is also suspicious. A biopsy was performed. The biopsy showed infiltrating lobular carcinoma, grade 2, and a clip was placed at specimen [id number]. There were a few signet cells present. The tumor was ER-positive, VA low positive, and HER2 was negative by immunohistochemistry. She subsequently underwent an MRI showing a tumor that was 4.8 x 1.7 x 3.2 cm in size. Overall, her clinical stage was T2 NX MX.       She was enrolled in the I-SPY-2 clinical trial, and qualified with  high-risk MammaPrint score. She was randomized to ganetespib and paclitaxel, and she was treated with 12 cycles of treatment, and then started on AC on 05/26/2015. She developed intractable nausea and vomiting after the first cycle, which required hospitalization on the second cycle. She then developed Pneumocystis pneumonia, which resulted in intubation and a 2-week hospitalization during the course of AC. She was discharged on 07/02/2015, and decided she did not want to pursue any further chemotherapy. She had a right lumpectomy and sentinel lymph node procedure 08/12/2015. She had a positive margin, underwent a re-excision, and had a positive margin. Ultimately, she underwent a right mastectomy with clear margins. She began radiation treatment with Dr. Bill Chauhan, and completed radiation therapy on 12/04/2015. Pathologic stage was III-A, ypT3 N1a MX. Of concern, she had what could be considered an RCB3 tumor.  She started adjuvant letrozole on 1/28/16.     4/6/2022 Josefina Verma developed a fever during Reclast.  She was feeling unwell and went to the ED.  CT CAP showed new involvement of abdominal lymph nodes and multiple bone metastases.  She was admitted and I saw her in the hospital 4-7-22 to discuss the plan for biopsy.  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  A mix of TNBC and ER- HER2+.  Clinic conference consensus was for the modified Vandana with paclitaxel weekly to treat both clones.     9/30/2022 started Enhertu, rising tumor marker and concern for progression    PET 11/21 shows new hypermetabolic lesions of the liver    12/7 Brain MRI for confusion and lethargy- This showed acute communicating hydrocephalus, new brain mets and leptomeningeal carcinomatosis     Admitted 12/7- 12/13. Shunt placed 12/9 by neurosurgery. Started on dexamethasone.     Met with Dr. Meza  12/15. See his note for further details. Recommended IT methotrexate, HER-2 climb regimen         TREATMENT HISTORY:  A.  Neoadjuvant therapy on I SPY-2 with ganetespib and paclitaxel.  AC x 2 complicated by PCP pneumonia.    B.  Right lumpectomy, followed by right margin resections, followed by right mastectomy.   B.  Oophorectomy 10-16-15.   C.  Tamoxifen after 10-6-15.   D.  Radiation therapy. 5,040 cGy in 180 cGy/fraction to the chest wall and regional lymphatics followed by 720 cGy axillary lymph node boost and 1000 cGy mastectomy scar boost. Completed on 12/3/2015.  E.   Adjuvant letrozole begun 1-28-16.  Plan was to continue through 2026.  F.  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  A mix of TNBC and ER- HER2+.  Clinic conference consensus was for the modified Vandana with paclitaxel weekly to treat both clones.   G. 9/30/22 Started Enhertu   H. Was admitted 10/3-10/7, C. Difficile, also had + Galactomanin. Started on voriconazole 10/11/22  I. 10/19 Cycle #2 Enhertu, dose reduced   J. PE-- incidentally found on 11/1, admitted for 48 hours, started on Eliquis   K. Progression in the liver, also found to have brain mets and leptomeningeal carcinomatosis. Started Herceptin 12/20. IT Methotrexate 12/20. Plan to start tucatinib and xeloda as soon as prior auth is obtained          INTERVAL HISTORY:    Today, she is seen in person with her mom. She is being seen for an acute add on. Mary reports that she has been having headaches since the leptomeningeal disease finding. She reports her headache increased from 9/10 to 10/10 yesterday after IT chemo along with nausea. She has vomited twice, once last night after dinner and once this morning after breakfast. She took her dexamethasone last night and vomited  immediately afterwards. The plan was to decrease to 4 mg daily and she was able to keep down her AM 4 mg dex dose this morning. For nausea, she took Zyprexa last night. She also took both compazine and Zofran this morning around 8 am. She has not had any further nausea today since taking her antiemetics. The headache is primarily frontal and at the base of the skull/neck. She has been having neck pain/stiffness since the finding of her leptomeningeal disease and this continues. She has been taking tylenol 1,000 mg every 6 hours for headaches. Last tylenol dose was this morning around 8 am. She denies dizziness. No vision changes. No abdominal pain. No constipation or diarrhea. No AMS or confusion. No rashes.     Endorses feeling weak and anorexia. She was able to keep down water today but has not had much to eat otherwise. She is not drinking protein shakes. She does not have a thermometer with her at [facility] where she is staying. Home glucose this morning was 175 since starting Lantus last night.     REVIEW OF SYSTEMS:     ROS: 12 point ROS neg other than the symptoms noted above in the HPI.     PHYSICAL EXAMINATION  BP (!) 145/85   Pulse 111   Temp 98.1  F (36.7  C) (Oral)   LMP 12/18/2014   SpO2 97%     Wt Readings from Last 4 Encounters:   12/20/22 59.9 kg (132 lb 1.6 oz)   12/15/22 59.9 kg (132 lb)   12/09/22 58.4 kg (128 lb 12.8 oz)   12/07/22 61.2 kg (135 lb)     General: No acute distress  HEENT: Sclera anicteric. Has full range of motion of neck.   Heart: Regular, rate, and rhythm  Lungs: Clear to ascultation bilaterally  Abdomen: Positive bowel sounds. Soft, non-distended, non-tender. No organomegaly or mass.   Extremities: no lower extremity edema, moves all extremities equally.   Neuro: Cranial nerves grossly intact. Oriented to person, place, and time/date.   Rash: none    LABORATORY DATA:    CBC and CMP pending today.      ASSESSMENT AND PLAN:   Shelby Bennett is a 56-year-old woman with  bruno pleomorphic lobular cancer, wa history of a clinical stage II right breast cancer, ER positive, WV positive, HER2 negative by immunohistochemistry, now with recurrent Metastatic breast cancer, lobular. New histohich is negative for estrogen receptor and negative for progesterone receptor, and 20% is HER2 positive by FISH. S/P Vandana regimen, switched to Enhertu. Now with progression with new liver mets, brain mets, leptomeningeal disease. Started new treatment with IT methotrexate, herceptin, tucatinib, and Xeloda yesterday on 12/20/22.     - Worsening headache after starting IT methotrexate yesterday 12/20/22 and herception 12/20/22.   - Will administer 1 L NS today along with IV caffeine for headache post IT chemo. Neurologically intact. CBC and CMP pending.   - Repeat ECHO due 2/2023  - Start Xeloda and Tucatinib once prior auth is approved. Reviewed side effects in detail today. Chemo teach also provided by [facility]  - Trend tumor markers   - Plan for repeat brain MRI and CT CAP in February as scheduled   - We have requested IHC for HER2 on the CSF cell block.      #Brain Mets  - On 4mg Dex BID -- plan was to decrease to 4 mg daily. However, given worsening headache and nausea will plan to keep at 4mg BID and continue to closely monitor neuro status.   - Seen by rad onc inpatient, see note. Recommended IT chemo prior to RT in this setting with leptomeningeal disease.   - Seeing radiation oncology 1/4    #Hydrocephalus  - S/P shunt placement with Dr. Lopez     #Steroid induced hyperglycemia  - Urgent endocrine referral   - Discussed with endocrinologist on call. Per their recs, will start lantus 10 units at bedtime. Sent for glucometer, test strips. Educated on hypoglycemia and hyperglycemia.   - Started lantus 10 units last night and per patient BG has decreased to 175.   - continue Monitor twice daily, parameters given   - Check A1C 12/20/22, 7.0.     #Headache  - secondary to IT chemo and leptomeningeal  disease. IV fluids today as above along with IV caffeine. Continue 1,000 mg of tylenol q 6 hours.   - patient has oxycodone prn but has not used it.     #Nausea  - discussed scheduling compazine and zofran along with spacing them from each other. Continue zyprexa at bedtime.    #FEN  - CMP pending today.   - Start protein shakes such as boost or ensure, 1-2 shakes per day.   - Trial small frequent meals.   - continue to push fluids.   - 1 L NS today as above. Will add on IV fluids for Friday prior to IT chemo.     #PE, diagnosed 11/1/2022  - On eliquis, now with dose reduction to 2.5mg BID per pharmacy due to tucatinib interaction   - No bruising bleeding     #Vaccines  - Second COVID primary series today, discussed with pharmacy      # Psych:  - She feels that she is coping very well currently and does not need any additional support at this time  - Continue Effexor 75mg daily   - Prognosis is poor. Not sure that patient fully grasps the gravity of her diagnosis. Ongoing discussions with each visit   - Missed call from palliative care this morning. Patient plans to reschedule.     # Bone Mets  - Asymptomatic  - Currently getting xgeva monthly --ON HOLD due to molar issue  - Has met with the dentist, who recommended we continue to hold Xgeva until her dental work gets completed.  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  Recently seen, and voriconazole was discontinued.  - Resolved.    # C. Diff 10/5  - Resolved.    #Follow up  - Add IVF prior to IT chemo on Friday 12/23/22 and Tuesday 12/27/22.   - Add on labs, EDIL visit with Sandra, and IVF on Wednesday 12/28/22.    Patient was counseled extensively on reason's to follow up sooner/present to the ED. Patient and mother express understanding and all questions were answered to patient satisfaction.     40 minutes spent on the date of the encounter doing  chart review, review of test results, interpretation of tests, patient visit, documentation and discussion with other provider(s)     Michelle Lin PA-C     Addendum   Reviewed labs, CBC WNL with the exception of a WBC 13.8 today (down from 16.3), ANC stable at 12.6. CMP overall stable from yesterday with the exception of corrected calcium of 8.4. Sodium is mildly low, but improved, to 134. LFT's are elevated but stable. I will ask [facility] to confirm patient is taking PO calcium. If not, will ask that she starts. Will recheck labs on Friday as above.

## 2022-12-21 NOTE — LETTER
12/21/2022         RE: Josefina Bennett  [address]  [place] [address]        Dear Colleague,    Thank you for referring your patient, Josefina Bennett, to the [facility]. Please see a copy of my visit note below.    Nursing Note  Josefina Bennett presents today to [facility] for:   Chief Complaint   Patient presents with     Infusion     IVF     During today's [facility] appointment, orders from Sandra Coughlin were completed.  Frequency: as needed    Progress note:  Patient identification verified by name and date of birth.  Assessment completed.  Vitals recorded in Doc Flowsheets.  Patient was provided with education regarding medication/procedure and possible side effects.  Patient verbalized understanding.     present during visit today: Not Applicable.    Treatment Conditions: Pt seen in infusion by Michelle Lin. Pt reporting 10/10 headache upon arrival. Provider ordered Caffeine infusion along with IVF. Pt tolerated without difficulty.        Infusion length and rate:  infusion given over approximately 2 hours    Labs: were drawn per orders.     Vascular access: port accessed today.    Is the next appt scheduled? yes  Asymptomatic COVID test completed? no    Post Infusion Assessment:  Patient tolerated infusion without incident.     Discharge Plan:   Follow up plan of care with: ongoing infusions at [facility].  Discharge instructions were reviewed with patient.  Patient/representative verbalized understanding of discharge instructions and all questions answered.  Patient discharged from [facility] in stable condition.    Adwoa Arizmendi RN    Administrations This Visit     0.9% sodium chloride BOLUS     Admin Date  12/21/2022 Action  New Bag Dose  1,000 mL Route  Intravenous Administered By  Adwoa Arizmendi RN          caffeine-sodium  benzoate 500 mg in sodium chloride 0.9 % 552 mL intermittent infusion     Admin Date  12/21/2022 Action  New Bag Dose  500 mg Rate  552 mL/hr Route  Intravenous Administered By  Adwoa Arizmendi RN                BP (!) 145/85 (BP Location: Left arm, Patient Position: Sitting, Cuff Size: Adult Regular)   Pulse 111   Temp 98  F (36.7  C) (Oral)   Resp 16   LMP 12/18/2014   SpO2 97%         Again, thank you for allowing me to participate in the care of your patient.        Sincerely,        Specialty Infusion Nurse

## 2022-12-21 NOTE — LETTER
12/21/2022         RE: Josefina Bennett  [address]  [facility] [address]        12/21/2022      REASON FOR VISIT: Follow-up breast cancer     HISTORY OF PRESENT ILLNESS:  Josefina Bennett was self referred to our clinic for clinical trial recommendations for newly diagnosed stage II breast cancer. She had her last mammogram approximately a year prior. She did notice in early January 2015 that she was having some distortion of the right breast, and she went to see her gynecologist and had a mammogram performed. She had a screening mammogram performed on 01/15/2015. Breast tissue was heterogeneously dense, which might compromise the mammography. There was architectural distortion in the right breast approximately 11-12 o'clock position, zone 2, posterior depth, and a CC MLO spot compression was performed and an ultrasound was planned. The diagnostic mammogram from 01/28/2015 showed right breast architectural distortion centered at the 12 o'clock position, zone 2, suspicious for neoplasm. Breast tomosynthesis was used in the interpretation. Ultrasound findings included targeted ultrasound of the right upper breast demonstrating a band-like area of abnormal echogenicity between 11 and 12 o'clock position in the right breast at zone 2. This measures 4 cm transversely x 1.1 cm AP, and there was only a 1.5 cm measurement in the radial direction. There was blood flow suspicious for neoplasm at the 11 o'clock position in zone 2. There is a 1.4 cm hypoechoic nodule slightly  from the larger area of altered echogenicity that is also suspicious. A biopsy was performed. The biopsy showed infiltrating lobular carcinoma, grade 2, and a clip was placed at specimen [id number]. There were a few signet cells present. The tumor was ER-positive, IL low positive, and HER2 was negative by immunohistochemistry. She subsequently underwent an MRI showing a tumor that was 4.8 x 1.7 x 3.2 cm in size. Overall, her clinical stage was T2 NX MX.        She was enrolled in the I-SPY-2 clinical trial, and qualified with high-risk MammaPrint score. She was randomized to ganetespib and paclitaxel, and she was treated with 12 cycles of treatment, and then started on AC on 05/26/2015. She developed intractable nausea and vomiting after the first cycle, which required hospitalization on the second cycle. She then developed Pneumocystis pneumonia, which resulted in intubation and a 2-week hospitalization during the course of AC. She was discharged on 07/02/2015, and decided she did not want to pursue any further chemotherapy. She had a right lumpectomy and sentinel lymph node procedure 08/12/2015. She had a positive margin, underwent a re-excision, and had a positive margin. Ultimately, she underwent a right mastectomy with clear margins. She began radiation treatment with Dr. Bill Chauhan, and completed radiation therapy on 12/04/2015. Pathologic stage was III-A, ypT3 N1a MX. Of concern, she had what could be considered an RCB3 tumor.  She started adjuvant letrozole on 1/28/16.     4/6/2022 Josefina Verma developed a fever during Reclast.  She was feeling unwell and went to the ED.  CT CAP showed new involvement of abdominal lymph nodes and multiple bone metastases.  She was admitted and I saw her in the hospital 4-7-22 to discuss the plan for biopsy.  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  A mix of TNBC and ER- HER2+.  Clinic conference consensus was for the modified Vandana with paclitaxel weekly to treat both clones.     9/30/2022 started Enhertu, rising tumor marker and concern for progression    PET 11/21 shows new hypermetabolic lesions of the liver    12/7 Brain MRI for confusion and lethargy- This showed acute communicating hydrocephalus, new brain mets and leptomeningeal carcinomatosis     Admitted 12/7- 12/13. Shunt placed  12/9 by neurosurgery. Started on dexamethasone.     Met with Dr. Meza 12/15. See his note for further details. Recommended IT methotrexate, HER-2 climb regimen         TREATMENT HISTORY:  A.  Neoadjuvant therapy on I SPY-2 with ganetespib and paclitaxel.  AC x 2 complicated by PCP pneumonia.    B.  Right lumpectomy, followed by right margin resections, followed by right mastectomy.   B.  Oophorectomy 10-16-15.   C.  Tamoxifen after 10-6-15.   D.  Radiation therapy. 5,040 cGy in 180 cGy/fraction to the chest wall and regional lymphatics followed by 720 cGy axillary lymph node boost and 1000 cGy mastectomy scar boost. Completed on 12/3/2015.  E.   Adjuvant letrozole begun 1-28-16.  Plan was to continue through 2026.  F.  Diagnosis with recurrent/metastatic breast cancer with a sacral mass that was biopsied showing pleiomorphic lobular breast cancer that is ER negative, LA negative, HER2 positive by FISH in 20% of the cells and negative in 80% of cells.  A mix of TNBC and ER- HER2+.  Clinic conference consensus was for the modified Vandana with paclitaxel weekly to treat both clones.   G. 9/30/22 Started Enhertu   H. Was admitted 10/3-10/7, C. Difficile, also had + Galactomanin. Started on voriconazole 10/11/22  I. 10/19 Cycle #2 Enhertu, dose reduced   J. PE-- incidentally found on 11/1, admitted for 48 hours, started on Eliquis   K. Progression in the liver, also found to have brain mets and leptomeningeal carcinomatosis. Started Herceptin 12/20. IT Methotrexate 12/20. Plan to start tucatinib and xeloda as soon as prior auth is obtained          INTERVAL HISTORY:    Today, she is seen in person with her mom. She is being seen for an acute add on. Mary reports that she has been having headaches since the leptomeningeal disease finding. She reports her headache increased from 9/10 to 10/10 yesterday after IT chemo along with nausea. She has vomited twice, once last night after dinner and once this morning after  breakfast. She took her dexamethasone last night and vomited immediately afterwards. The plan was to decrease to 4 mg daily and she was able to keep down her AM 4 mg dex dose this morning. For nausea, she took Zyprexa last night. She also took both compazine and Zofran this morning around 8 am. She has not had any further nausea today since taking her antiemetics. The headache is primarily frontal and at the base of the skull/neck. She has been having neck pain/stiffness since the finding of her leptomeningeal disease and this continues. She has been taking tylenol 1,000 mg every 6 hours for headaches. Last tylenol dose was this morning around 8 am. She denies dizziness. No vision changes. No abdominal pain. No constipation or diarrhea. No AMS or confusion. No rashes.     Endorses feeling weak and anorexia. She was able to keep down water today but has not had much to eat otherwise. She is not drinking protein shakes. She does not have a thermometer with her at [facility] where she is staying. Home glucose this morning was 175 since starting Lantus last night.     REVIEW OF SYSTEMS:     ROS: 12 point ROS neg other than the symptoms noted above in the HPI.     PHYSICAL EXAMINATION  BP (!) 145/85   Pulse 111   Temp 98.1  F (36.7  C) (Oral)   LMP 12/18/2014   SpO2 97%     Wt Readings from Last 4 Encounters:   12/20/22 59.9 kg (132 lb 1.6 oz)   12/15/22 59.9 kg (132 lb)   12/09/22 58.4 kg (128 lb 12.8 oz)   12/07/22 61.2 kg (135 lb)     General: No acute distress  HEENT: Sclera anicteric. Has full range of motion of neck.   Heart: Regular, rate, and rhythm  Lungs: Clear to ascultation bilaterally  Abdomen: Positive bowel sounds. Soft, non-distended, non-tender. No organomegaly or mass.   Extremities: no lower extremity edema, moves all extremities equally.   Neuro: Cranial nerves grossly intact. Oriented to person, place, and time/date.   Rash: none    LABORATORY DATA:    CBC and CMP pending today.      ASSESSMENT  AND PLAN:   Shelby Bennett is a 56-year-old woman with logy pleomorphic lobular cancer, wa history of a clinical stage II right breast cancer, ER positive, HI positive, HER2 negative by immunohistochemistry, now with recurrent Metastatic breast cancer, lobular. New histohich is negative for estrogen receptor and negative for progesterone receptor, and 20% is HER2 positive by FISH. S/P Vandana regimen, switched to Enhertu. Now with progression with new liver mets, brain mets, leptomeningeal disease. Started new treatment with IT methotrexate, herceptin, tucatinib, and Xeloda yesterday on 12/20/22.     - Worsening headache after starting IT methotrexate yesterday 12/20/22 and herception 12/20/22.   - Will administer 1 L NS today along with IV caffeine for headache post IT chemo. Neurologically intact. CBC and CMP pending.   - Repeat ECHO due 2/2023  - Start Xeloda and Tucatinib once prior auth is approved. Reviewed side effects in detail today. Chemo teach also provided by [facility]  - Trend tumor markers   - Plan for repeat brain MRI and CT CAP in February as scheduled   - We have requested IHC for HER2 on the CSF cell block.      #Brain Mets  - On 4mg Dex BID -- plan was to decrease to 4 mg daily. However, given worsening headache and nausea will plan to keep at 4mg BID and continue to closely monitor neuro status.   - Seen by rad onc inpatient, see note. Recommended IT chemo prior to RT in this setting with leptomeningeal disease.   - Seeing radiation oncology 1/4    #Hydrocephalus  - S/P shunt placement with Dr. Lopez     #Steroid induced hyperglycemia  - Urgent endocrine referral   - Discussed with endocrinologist on call. Per their recs, will start lantus 10 units at bedtime. Sent for glucometer, test strips. Educated on hypoglycemia and hyperglycemia.   - Started lantus 10 units last night and per patient BG has decreased to 175.   - continue Monitor twice daily, parameters given   - Check A1C 12/20/22, 7.0.      #Headache  - secondary to IT chemo and leptomeningeal disease. IV fluids today as above along with IV caffeine. Continue 1,000 mg of tylenol q 6 hours.   - patient has oxycodone prn but has not used it.     #Nausea  - discussed scheduling compazine and zofran along with spacing them from each other. Continue zyprexa at bedtime.    #FEN  - CMP pending today.   - Start protein shakes such as boost or ensure, 1-2 shakes per day.   - Trial small frequent meals.   - continue to push fluids.   - 1 L NS today as above. Will add on IV fluids for Friday prior to IT chemo.     #PE, diagnosed 11/1/2022  - On eliquis, now with dose reduction to 2.5mg BID per pharmacy due to tucatinib interaction   - No bruising bleeding     #Vaccines  - Second COVID primary series today, discussed with pharmacy      # Psych:  - She feels that she is coping very well currently and does not need any additional support at this time  - Continue Effexor 75mg daily   - Prognosis is poor. Not sure that patient fully grasps the gravity of her diagnosis. Ongoing discussions with each visit   - Missed call from palliative care this morning. Patient plans to reschedule.     # Bone Mets  - Asymptomatic  - Currently getting xgeva monthly --ON HOLD due to molar issue  - Has met with the dentist, who recommended we continue to hold Xgeva until her dental work gets completed.  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  Recently seen, and voriconazole was discontinued.  - Resolved.    # C. Diff 10/5  - Resolved.    #Follow up  - Add IVF prior to IT chemo on Friday 12/23/22 and Tuesday 12/27/22.   - Add on labs, EDIL visit with Sandra, and IVF on Wednesday 12/28/22.    Patient was counseled extensively on reason's to follow up sooner/present to the ED. Patient and mother express understanding and all questions were answered to patient satisfaction.      40 minutes spent on the date of the encounter doing chart review, review of test results, interpretation of tests, patient visit, documentation and discussion with other provider(s)     Michelle Lin PA-C     Addendum   Reviewed labs, CBC WNL with the exception of a WBC 13.8 today (down from 16.3), ANC stable at 12.6. CMP overall stable from yesterday with the exception of corrected calcium of 8.4. Sodium is mildly low, but improved, to 134. LFT's are elevated but stable. I will ask [facility] to confirm patient is taking PO calcium. If not, will ask that she starts. Will recheck labs on Friday as above.

## 2022-12-21 NOTE — PROGRESS NOTES
Nursing Note  Josefina Bennett presents today to [facility] for:   Chief Complaint   Patient presents with     Infusion     IVF     During today's [facility] appointment, orders from Sandra Coughlin were completed.  Frequency: as needed    Progress note:  Patient identification verified by name and date of birth.  Assessment completed.  Vitals recorded in Doc Flowsheets.  Patient was provided with education regarding medication/procedure and possible side effects.  Patient verbalized understanding.     present during visit today: Not Applicable.    Treatment Conditions: Pt seen in infusion by Michelle Lin. Pt reporting 10/10 headache upon arrival. Provider ordered Caffeine infusion along with IVF. Pt tolerated without difficulty.        Infusion length and rate:  infusion given over approximately 2 hours    Labs: were drawn per orders.     Vascular access: port accessed today.    Is the next appt scheduled? yes  Asymptomatic COVID test completed? no    Post Infusion Assessment:  Patient tolerated infusion without incident.     Discharge Plan:   Follow up plan of care with: ongoing infusions at [facility].  Discharge instructions were reviewed with patient.  Patient/representative verbalized understanding of discharge instructions and all questions answered.  Patient discharged from [facility] in stable condition.    Adwoa Arizmendi RN    Administrations This Visit     0.9% sodium chloride BOLUS     Admin Date  12/21/2022 Action  New Bag Dose  1,000 mL Route  Intravenous Administered By  Adwoa Arizmendi RN          caffeine-sodium benzoate 500 mg in sodium chloride 0.9 % 552 mL intermittent infusion     Admin Date  12/21/2022 Action  New Bag Dose  500 mg Rate  552 mL/hr Route  Intravenous Administered By  Adwoa Arizmendi RN                BP (!) 145/85 (BP Location: Left arm, Patient  Position: Sitting, Cuff Size: Adult Regular)   Pulse 111   Temp 98  F (36.7  C) (Oral)   Resp 16   LMP 12/18/2014   SpO2 97%

## 2022-12-21 NOTE — PROGRESS NOTES
Called patient and her Mother Brandi with Michelle Lin's recommendations to take calcium as prescribed (500 mg daily) with low calcium level drawn today. Will schedule an appointment for IV fluids 12/23/2022. Writer requested a return call to discuss Michelle's recommendations. Teresa Ashton RN, BSN Breast Center Nurse Coordinator  Patient's mother Brandi returned call and verified patient is taking Calcium 500 mg at bedtime. Michelle recommended increasing Calcium to two 500 mg tablets now and every morning.  Answered all patient's Mother's questions and verbalized understanding. Teresa Ashton RN, BSN.

## 2022-12-21 NOTE — PROGRESS NOTES
"Patient's Mother Brandi called with patient vomiting and this AM she took Zofran 4mg and prochlorperazine as well. Patient is \"sweaty\" and in bed with her coat on under her bedding. Is alert and able to do a blood sugar while on telephone that is 179. Is unable to keep 1/2 a bowl of cereal down. They do not have a thermometer with them, but her forehead feels \"cool.\" Patient is alert and no c/o dizziness. Currently she able to sip water and keeping down. Discussed with Sandra Coughlin and Michelle Lin and will come in to [facility] for IV fluids, labs and see Michelle Lin at 12:45 today. Message sent to scheduling for appointments. Patient did not receive a call for virtual visit this AM with Dr Sanchez and will work to get patient re-scheduled.  Answered all patient's Mother's questions and verbalized understanding. Teresa Ashton RN, BSN.     "

## 2022-12-21 NOTE — LETTER
Date:December 22, 2022      Provider requested that no letter be sent. Do not send.       [facility]

## 2022-12-22 NOTE — PROGRESS NOTES
BMT ONC Adult Ommaya Access and Intrathecal Chemotherapy Administration Procedure Note  Dec 23, 2022    Procedure: Codman Certas Plus access to obtain CSF and give intrathecal chemotherapy (IT)          Diagnosis: breast cancer, leptomeningeal disease    Learning needs assessment complete within 12 months: YES     Vitals reviewed:  YES    Informed Consent: Procedure, benefits, risks, and alternatives explained to the patient who voiced understanding of the information and agreed to proceed with Ommaya access with IT chemotherapy. Risks include bleeding, nausea, and/or infection.   Patient safety checklist completed.    Labs: Reviewed:   INR   Date Value Ref Range Status   12/07/2022 1.10 0.85 - 1.15 Final   06/21/2015 1.21 (H) 0.86 - 1.14 Final      Platelet Count   Date Value Ref Range Status   12/21/2022 352 150 - 450 10e3/uL Final   01/23/2020 283 150 - 450 10e9/L Final     Note: Shunt was turned off by neuro NP. Ommaya was pumped initially, then cleaned with betadine + alcohol swab. Butterfly needle was attached to a 10 cc syringe and inserted into the ommaya port.  7 cc of CSF was withdrawn slowly over about 3 minutes.  10 mg IT methotrexate was checked with nursing and in EPIC and instilled over 3 minutes.   Patient tolerated the procedure well.    Specimen appears clear yellow. CSF was sent for cytology, cell count, protein and glucose.  Ommaya was pumped 6 times after the procedure and the patient was encouraged to pump when she returns home and again this evening.     Post-procedure pain assessment: 0 out of 10 on the numeric pain rating scale  Interventions: No    Complications: No     Disposition: Patient will remain on back for 1 hour post procedure. Call if develops headaches, fevers and or chills. Shunt will be turned back on 2 hours after IT chemo given by neuro NP.    Talked with Dr. Schmitt for 10 mins today best coordination for shunt. In patient's best interest have shunt turned off and on in clinic  to avoid moving a sick oncology patient all over the building. Will make appts 3 hours apart. If procedure takes less time than that, should call clinic number ([phone]) or page provider to communicate.     Performed by:   Ana Salcedo PA-C  [facility] [id number] [place], [place] [address] [phone]

## 2022-12-23 PROBLEM — E11.9 DIABETES MELLITUS, TYPE 2 (H): Status: ACTIVE | Noted: 2022-01-01

## 2022-12-23 NOTE — LETTER
"12/23/2022       RE: Josefina Bennett  [address]  [facility] [address]     Dear Colleague,    Thank you for referring your patient, Josefina Bennett, to the [facility] [place] [facility]. Please see a copy of my visit note below.    NEUROSURGERY    Ms. 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  She is s/p ventriculoperitoneal shunt placement with Dr. Jessica dietz in 12/9/2022.  She has a Codman Certas  Shunt which is programmed at 3.  It is on the right side.    She is undergoing intrathecal methotrexate therapy two times per week for several weeks.  For her treatment, the plan is to have her Certas shunt valve dialed or turned to the setting of 8, which is \"virtual off\" setting.  Then, she will have intrathecal injection of the methotrexate.  She will stay flat for two hours for the treatment.  Then, her shunt Certas shunt valve will be turned back to her therapeutic setting of 3.    She was seen in person at the [facility].  Special request was made by the oncology team to have the shunt programmed at the cancer center, as the patient is severely ill.    Her shunt was checked using both manual and electronic .  The shunt setting was found to be at 3.  This was checked three times.    Her shunt was then programmed using the manual  to the setting of 8.    Her shunt was then checked again using both manual and electronic .  The shunt setting was found to be at 8.  This was checked three times.    She will now have her intrathecal methotrexate therapy.  I will return at around noon today to program her Certas valve back to the setting of 3.      HAROON LITTLE MD, PHD      5 minutes were spent face to face with the patient of which " more than 50% of the time was spent counseling and discussing the above issues regarding diagnosis, differential, treatment options, and steps for further evaluation, including the shunt programming.  5 minutes were spent reviewing patient's chart.  2 minutes were spent discussing the patient's care plan with other providers at the [facility].  5 minutes were spent on documentation for this encounter.  17 minutes total were spent on this encounter today.        Sincerely,    Jorge Schmitt MD

## 2022-12-23 NOTE — PATIENT INSTRUCTIONS
[facility] Triage and after hours / weekends / holidays:  [phone]    Please call the triage or after hours line if you experience a temperature greater than or equal to 100.4, shaking chills, have uncontrolled nausea, vomiting and/or diarrhea, dizziness, shortness of breath, chest pain, bleeding, unexplained bruising, or if you have any other new/concerning symptoms, questions or concerns.      If you are having any concerning symptoms or wish to speak to a provider before your next infusion visit, please call your care coordinator or triage to notify them so we can adequately serve you.     If you need a refill on a narcotic prescription or other medication, please call before your infusion appointment.

## 2022-12-23 NOTE — PROGRESS NOTES
Social Work Follow-Up Encounter Visit  Oncology Clinic    Data/Intervention:  Patient Name:  Josefina Bennett  /Age:  1965 (57 year old)    Reason for Follow-Up:  Social supports    Collaborated With:    -Patient    Assessment:  SW went down to infusion clinic to meet with patient it was reported that pateint had completed infusion early. SW called patient to see if patient was still at the clinic and available to meet. There was no answer SW left a message  Regarding their attempt to meet with patient and provided contact information for patient to reach SW.     Plan:  Previously provided patient/family with writer's contact information and availability.   SW will await patient return call and make another attempt to reach patient later in the week if there is no response for patient.     Vita HORNER, [facility]  - Oncology  Phone : [phone]  Pager: [phone]

## 2022-12-23 NOTE — PROGRESS NOTES
Social Work Follow-Up   Oncology Clinic    Data/Intervention:  Patient Name:  Josefina Bennett  /Age:  1965 (57 year old)    Reason for Follow-Up:  Social supports    Collaborated With:    -Patient  patient's mother    Assessment:  SW called patient regarding request for supportive resources. There was no answer. SW left a message with reason for call and provided contact information for patient to reach SW.     SW called patient's mother how there is a consent on file to communicate with. Patient's mother answered with patient present. SW apologized for missing patient during last clinic visit. SW asked how patient would like to proceed with connecting to resources. SW offered to discuss resource by phone and assist with completing applications. Patient stated they would prefer to meet in person. SW agreed to meet with patient during their next appointment visit of . Patient and patient's mother had no further questions at this time.     Plan:  Previously provided patient/family with writer's contact information and availability.   SW will meet with patient on .     [facility] HORNER, [facility]  - [facility]cology  Phone : [phone]  Pager: [phone]

## 2022-12-23 NOTE — NURSING NOTE
Chief Complaint   Patient presents with     Port Draw     Labs drawn via port accessed by RN. VS taken.     Port accessed with 20 g 3/4 inch power needle by RN, labs collected, line flushed with saline and heparin.  Vitals taken. Pt checked in for appointment(s).    Hakeem Aragon RN

## 2022-12-23 NOTE — LETTER
"12/23/2022       RE: Josefina Bennett  [address]  [facility] [address]     Dear Colleague,    Thank you for referring your patient, Josefina Bennett, to the [facility] NEUROSURGERY CLINIC [place] at [facility]. Please see a copy of my visit note below.    NEUROSURGERY    Ms. 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  She is s/p ventriculoperitoneal shunt placement with Dr. Jessica dietz in 12/9/2022.  She has a Codman Certas  Shunt which is programmed at 3.  It is on the right side.    She is undergoing intrathecal methotrexate therapy two times per week for several weeks.  For her treatment, the plan is to have her Certas shunt valve dialed or turned to the setting of 8, which is \"virtual off\" setting.  Then, she will have intrathecal injection of the methotrexate.  She will stay flat for two hours for the treatment.  Then, her shunt Certas shunt valve will be turned back to her therapeutic setting of 3.    She was seen again in person at the [facility].  I initially saw her as scheduled at 12:00 pm but her therapy was still ongoing.  I returned to see her at 1:00 pm.    Her shunt was checked using both manual and electronic .  The shunt setting was found to be at 8.  This was checked three times.    Her shunt was then programmed using the manual  to the setting of 3.    Her shunt was then checked again using both manual and electronic .  The shunt setting was found to be at 3.  This was checked three times.    I also cleaned her right frontal incision.  I removed the remaining skin glue dressing using a hair clipper and remaining Monocryl sutures were also removed without difficulty.  Her incision was cleaned with betadine and I put antibiotic ointment " over it.      Her right abdominal incision is healing well and the glue dressing had come off already.    She will return next week for another round of intrathecal methotrexate, on 12/27/2022.        15 minutes were spent face to face with the patient of which more than 50% of the time was spent counseling and discussing the above issues regarding diagnosis, differential, treatment options, and steps for further evaluation, including the shunt programming, and wound care.  5 minutes were spent reviewing patient's chart.  2 minutes were spent discussing the patient's care plan with other providers at the [facility].  5 minutes were spent on documentation for this encounter.  27 minutes total were spent on this encounter today.          Again, thank you for allowing me to participate in the care of your patient.      Sincerely,    Jorge Schmitt MD

## 2022-12-23 NOTE — PROGRESS NOTES
Infusion Nursing Note:  Josefina Bennett presents today for IV fluids.    Patient seen by provider today: No   present during visit today: Not Applicable.    Note: Josefina Verma presents today feeling okay. Denies pain or nausea/vomiting. Denies fevers/chills overnight. Offers no concerns or changes since visit with Michelle Lin on 12/21.    Josefina Verma remained lying flat for 2 hours prior to discharge after Ommaya access and IT chemo. Neurosurgery adjusted shunt prior to discharge. She reported no headaches or dizziness/lightheadedness prior to discharge.     Intravenous Access:  Peripheral IV placed.  Implanted Port. No blood return noted upon arrival to infusion; TPA instilled. Brisk blood return noted after ~60 minutes of dwell time.    Treatment Conditions:     Latest Reference Range & Units 12/23/22 09:59   Sodium 136 - 145 mmol/L 134 (L)   Potassium 3.4 - 5.3 mmol/L 4.2   Chloride 98 - 107 mmol/L 99   Carbon Dioxide (CO2) 22 - 29 mmol/L 24   Urea Nitrogen 6.0 - 20.0 mg/dL 18.5   Creatinine 0.51 - 0.95 mg/dL 0.95   GFR Estimate >60 mL/min/1.73m2 70   Calcium 8.6 - 10.0 mg/dL 8.4 (L)   Anion Gap 7 - 15 mmol/L 11   Albumin 3.5 - 5.2 g/dL 3.3 (L)   Protein Total 6.4 - 8.3 g/dL 5.7 (L)   Alkaline Phosphatase 35 - 104 U/L 219 (H)   ALT 10 - 35 U/L 119 (H)   AST 10 - 35 U/L 59 (H)   Bilirubin Total <=1.2 mg/dL 0.6   Glucose 70 - 99 mg/dL 361 (H)   WBC 4.0 - 11.0 10e3/uL 14.4 (H)   Hemoglobin 11.7 - 15.7 g/dL 12.9   Hematocrit 35.0 - 47.0 % 40.4   Platelet Count 150 - 450 10e3/uL 360   RBC Count 3.80 - 5.20 10e6/uL 4.70   MCV 78 - 100 fL 86   MCH 26.5 - 33.0 pg 27.4   MCHC 31.5 - 36.5 g/dL 31.9   RDW 10.0 - 15.0 % 18.6 (H)   % Neutrophils % 78   % Lymphocytes % 14   % Monocytes % 6   % Eosinophils % 1   % Basophils % 0   Absolute Basophils 0.0 - 0.2 10e3/uL 0.0   Absolute Eosinophils 0.0 - 0.7 10e3/uL 0.2   Absolute Immature Granulocytes <=0.4 10e3/uL 0.1   Absolute Lymphocytes 0.8 - 5.3 10e3/uL 2.0   Absolute  Monocytes 0.0 - 1.3 10e3/uL 0.9   % Immature Granulocytes % 1   Absolute Neutrophils 1.6 - 8.3 10e3/uL 11.2 (H)   Absolute NRBCs 10e3/uL 0.0   NRBCs per 100 WBC <1 /100 0       Post Infusion Assessment:  Patient tolerated infusion without incident.  Blood return noted pre and post infusion.  Site patent and intact, free from redness, edema or discomfort.  No evidence of extravasations.  Access discontinued per protocol.     Discharge Plan:   Patient declined prescription refills.  Discharge instructions reviewed with: Patient.  Patient and/or family verbalized understanding of discharge instructions and all questions answered.  AVS to patient via [employer].  Patient will return 12/27 for next infusion appointment.   Patient discharged in stable condition accompanied by: friend.  Departure Mode: Ambulatory.      Lucía Estrada RN

## 2022-12-23 NOTE — PROGRESS NOTES
"NEUROSURGERY    Ms. 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  She is s/p ventriculoperitoneal shunt placement with Dr. Jessica dietz in 12/9/2022.  She has a Codman Certas  Shunt which is programmed at 3.  It is on the right side.    She is undergoing intrathecal methotrexate therapy two times per week for several weeks.  For her treatment, the plan is to have her Certas shunt valve dialed or turned to the setting of 8, which is \"virtual off\" setting.  Then, she will have intrathecal injection of the methotrexate.  She will stay flat for two hours for the treatment.  Then, her shunt Certas shunt valve will be turned back to her therapeutic setting of 3.    She was seen in person at the [facility].  Special request was made by the oncology team to have the shunt programmed at the cancer [place], as the patient is severely ill.    Her shunt was checked using both manual and electronic .  The shunt setting was found to be at 3.  This was checked three times.    Her shunt was then programmed using the manual  to the setting of 8.    Her shunt was then checked again using both manual and electronic .  The shunt setting was found to be at 8.  This was checked three times.    She will now have her intrathecal methotrexate therapy.  I will return at around noon today to program her Certas valve back to the setting of 3.      HAROON LITTLE MD, PHD      5 minutes were spent face to face with the patient of which more than 50% of the time was spent counseling and discussing the above issues regarding diagnosis, differential, treatment options, and steps for further evaluation, including the shunt programming.  5 minutes were spent reviewing patient's chart.  2 minutes were spent discussing the patient's care plan " with other providers at the [facility].  5 minutes were spent on documentation for this encounter.  17 minutes total were spent on this encounter today.

## 2022-12-23 NOTE — LETTER
12/23/2022         RE: Josefina Bennett  [address]  [facility] [address]        Dear Colleague,    Thank you for referring your patient, Josefina Bennett, to the [facility] CANCER [facility]INIC. Please see a copy of my visit note below.    BMT ONC Adult Ommaya Access and Intrathecal Chemotherapy Administration Procedure Note  Dec 23, 2022    Procedure: Codman Certas Plus access to obtain CSF and give intrathecal chemotherapy (IT)          Diagnosis: breast cancer, leptomeningeal disease    Learning needs assessment complete within 12 months: YES     Vitals reviewed:  YES    Informed Consent: Procedure, benefits, risks, and alternatives explained to the patient who voiced understanding of the information and agreed to proceed with Ommaya access with IT chemotherapy. Risks include bleeding, nausea, and/or infection.   Patient safety checklist completed.    Labs: Reviewed:   INR   Date Value Ref Range Status   12/07/2022 1.10 0.85 - 1.15 Final   06/21/2015 1.21 (H) 0.86 - 1.14 Final      Platelet Count   Date Value Ref Range Status   12/21/2022 352 150 - 450 10e3/uL Final   01/23/2020 283 150 - 450 10e9/L Final     Note: Shunt was turned off by neuro NP. Ommaya was pumped initially, then cleaned with betadine + alcohol swab. Butterfly needle was attached to a 10 cc syringe and inserted into the ommaya port.  7 cc of CSF was withdrawn slowly over about 3 minutes.  10 mg IT methotrexate was checked with nursing and in EPIC and instilled over 3 minutes.   Patient tolerated the procedure well.    Specimen appears clear yellow. CSF was sent for cytology, cell count, protein and glucose.  Ommaya was pumped 6 times after the procedure and the patient was encouraged to pump when she returns home and again this evening.     Post-procedure pain assessment: 0 out of 10 on the numeric pain rating scale  Interventions: No    Complications: No     Disposition: Patient will remain on back for 1 hour post procedure. Call if  develops headaches, fevers and or chills. Shunt will be turned back on 2 hours after IT chemo given by neuro NP.    Talked with Dr. Schmitt for 10 mins today best coordination for shunt. In patient's best interest have shunt turned off and on in clinic to avoid moving a sick oncology patient all over the building. Will make appts 3 hours apart. If procedure takes less time than that, should call clinic number ([phone]) or page provider to communicate.     Performed by:   Ana Salcedo PA-C  [facility] [place] [address] [place] [address]  [phone]

## 2022-12-23 NOTE — PROGRESS NOTES
Social Work Follow-Up   Oncology Clinic    Data/Intervention:  Patient Name:  Josefina Bennett  /Age:  1965 (57 year old)    Reason for Follow-Up:  Social supports    Collaborated With:    -Patient   -Patient's mother and roommate and friend      Assessment:  SW following up on referral regarding patient's request for supportive resources. Sw spoke with patient , their mother, friend and roommate via phone. Patient identified they had questions about applying for disability and leave benefits and that they were interested in being connected to other resources as well. Patient expressed interest in [employer] meal delivery and alvina resources. SW offered to discuss these resources by phone as well assist with alvina applications and [employer] application by phone. Patient stated their preference for meeting in person to discuss resources and complete any applications. SW and patient agreed to meet during their next infusion visit on 22.     Plan:  Previously provided patient/family with writer's contact information and availability.   SW will plan to meet with patient on  to discuss and connect patient with supportive resources.     Vita HORNER, [facility]  - Oncology  Phone : [phone]  Pager: [phone]

## 2022-12-24 NOTE — PROGRESS NOTES
"NEUROSURGERY    Ms. 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  She is s/p ventriculoperitoneal shunt placement with Dr. Lopez back in 12/9/2022.  She has a Codman Certas  Shunt which is programmed at 3.  It is on the right side.    She is undergoing intrathecal methotrexate therapy two times per week for several weeks.  For her treatment, the plan is to have her Certas shunt valve dialed or turned to the setting of 8, which is \"virtual off\" setting.  Then, she will have intrathecal injection of the methotrexate.  She will stay flat for two hours for the treatment.  Then, her shunt Certas shunt valve will be turned back to her therapeutic setting of 3.    She was seen again in person at the [facility].  I initially saw her as scheduled at 12:00 pm but her therapy was still ongoing.  I returned to see her at 1:00 pm.    Her shunt was checked using both manual and electronic .  The shunt setting was found to be at 8.  This was checked three times.    Her shunt was then programmed using the manual  to the setting of 3.    Her shunt was then checked again using both manual and electronic .  The shunt setting was found to be at 3.  This was checked three times.    I also cleaned her right frontal incision.  I removed the remaining skin glue dressing using a hair clipper and remaining Monocryl sutures were also removed without difficulty.  Her incision was cleaned with betadine and I put antibiotic ointment over it.      Her right abdominal incision is healing well and the glue dressing had come off already.    She will return next week for another round of intrathecal methotrexate, on 12/27/2022.        HAROON LITTLE MD, PHD      15 minutes were spent face to face with the patient of which more than 50% " of the time was spent counseling and discussing the above issues regarding diagnosis, differential, treatment options, and steps for further evaluation, including the shunt programming, and wound care.  5 minutes were spent reviewing patient's chart.  2 minutes were spent discussing the patient's care plan with other providers at the [facility].  5 minutes were spent on documentation for this encounter.  27 minutes total were spent on this encounter today.

## 2022-12-25 NOTE — TELEPHONE ENCOUNTER
I called Josefina Verma and went over the tolerance of intrathecal methotrexate this past week.  She said that she tolerated the treatments reasonably well but has had significant fatigue.  She also has taken caffeine pills for headache which seems to be working very well.  She has the shunt closed for 2 hours with the placement of intrathecal methotrexate for each treatment.  Currently the treatment plan is to continue twice weekly for the next couple of weeks.  These treatments have been scheduled.    She has also started the HER2 climb regimen with trastuzumab capecitabine and Herceptin.  She has had no diarrhea no hand-foot syndrome so far.  She is tolerating the treatment quite well. She says that she is not gotten sick from the treatment but does feel tired.    I went over the plan to continue with HER2 climb regimen and intrathecal methotrexate for the next 2 weeks at least.  All her questions were answered.      Evangelista Meza MD

## 2022-12-26 NOTE — PROGRESS NOTES
Outcome for 12/26/22 12:25 PM: [employer] message sent  Audrey Jag,    Outcome for 12/26/22 4:04 PM: Data obtained via phone and located below  Audrey Rebecamawindy     12/22/2022  AM: 197  PM: 512  12/23/2022  AM: 190  PM: 383  12/24/2022  AM: 146  PM: 502  12/25/2022  AM: 145  PM: 554  12/26/2022  AM: 164

## 2022-12-26 NOTE — PROGRESS NOTES
12/26/2022      REASON FOR VISIT: Follow-up breast cancer     HISTORY OF PRESENT ILLNESS:  Josefina Bennett was self referred to our clinic for clinical trial recommendations for newly diagnosed stage II breast cancer. She had her last mammogram approximately a year prior. She did notice in early January 2015 that she was having some distortion of the right breast, and she went to see her gynecologist and had a mammogram performed. She had a screening mammogram performed on 01/15/2015. Breast tissue was heterogeneously dense, which might compromise the mammography. There was architectural distortion in the right breast approximately 11-12 o'clock position, zone 2, posterior depth, and a CC MLO spot compression was performed and an ultrasound was planned. The diagnostic mammogram from 01/28/2015 showed right breast architectural distortion centered at the 12 o'clock position, zone 2, suspicious for neoplasm. Breast tomosynthesis was used in the interpretation. Ultrasound findings included targeted ultrasound of the right upper breast demonstrating a band-like area of abnormal echogenicity between 11 and 12 o'clock position in the right breast at zone 2. This measures 4 cm transversely x 1.1 cm AP, and there was only a 1.5 cm measurement in the radial direction. There was blood flow suspicious for neoplasm at the 11 o'clock position in zone 2. There is a 1.4 cm hypoechoic nodule slightly  from the larger area of altered echogenicity that is also suspicious. A biopsy was performed. The biopsy showed infiltrating lobular carcinoma, grade 2, and a clip was placed at specimen [id number]. There were a few signet cells present. The tumor was ER-positive, NV low positive, and HER2 was negative by immunohistochemistry. She subsequently underwent an MRI showing a tumor that was 4.8 x 1.7 x 3.2 cm in size. Overall, her clinical stage was T2 NX MX.       She was enrolled in the I-SPY-2 clinical trial, and qualified with  high-risk MammaPrint score. She was randomized to ganetespib and paclitaxel, and she was treated with 12 cycles of treatment, and then started on AC on 05/26/2015. She developed intractable nausea and vomiting after the first cycle, which required hospitalization on the second cycle. She then developed Pneumocystis pneumonia, which resulted in intubation and a 2-week hospitalization during the course of AC. She was discharged on 07/02/2015, and decided she did not want to pursue any further chemotherapy. She had a right lumpectomy and sentinel lymph node procedure 08/12/2015. She had a positive margin, underwent a re-excision, and had a positive margin. Ultimately, she underwent a right mastectomy with clear margins. She began radiation treatment with Dr. Bill Chauhan, and completed radiation therapy on 12/04/2015. Pathologic stage was III-A, ypT3 N1a MX. Of concern, she had what could be considered an RCB3 tumor.  She started adjuvant letrozole on 1/28/16.     4/6/2022 Josefina Verma developed a fever during Reclast.  She was feeling unwell and went to the ED.  CT CAP showed new involvement of abdominal lymph nodes and multiple bone metastases.  She was admitted and I saw her in the hospital 4-7-22 to discuss the plan for biopsy.  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  A mix of TNBC and ER- HER2+.  Clinic conference consensus was for the modified Vandana with paclitaxel weekly to treat both clones.     9/30/2022 started Enhertu, rising tumor marker and concern for progression    PET 11/21 shows new hypermetabolic lesions of the liver    12/7 Brain MRI for confusion and lethargy- This showed acute communicating hydrocephalus, new brain mets and leptomeningeal carcinomatosis     Admitted 12/7- 12/13. Shunt placed 12/9 by neurosurgery. Started on dexamethasone.     Met with Dr. Meza  12/15. See his note for further details. Recommended IT methotrexate, HER-2 climb regimen         TREATMENT HISTORY:  A.  Neoadjuvant therapy on I SPY-2 with ganetespib and paclitaxel.  AC x 2 complicated by PCP pneumonia.    B.  Right lumpectomy, followed by right margin resections, followed by right mastectomy.   B.  Oophorectomy 10-16-15.   C.  Tamoxifen after 10-6-15.   D.  Radiation therapy. 5,040 cGy in 180 cGy/fraction to the chest wall and regional lymphatics followed by 720 cGy axillary lymph node boost and 1000 cGy mastectomy scar boost. Completed on 12/3/2015.  E.   Adjuvant letrozole begun 1-28-16.  Plan was to continue through 2026.  F.  Diagnosis with recurrent/metastatic breast cancer with a sacral mass that was biopsied showing pleiomorphic lobular breast cancer that is ER negative, AL negative, HER2 positive by FISH in 20% of the cells and negative in 80% of cells.  A mix of TNBC and ER- HER2+.  Clinic conference consensus was for the modified Vandana with paclitaxel weekly to treat both clones.   G. 9/30/22 Started Enhertu   H. Was admitted 10/3-10/7, C. Difficile, also had + Galactomanin. Started on voriconazole 10/11/22  I. 10/19 Cycle #2 Enhertu, dose reduced   J. PE-- incidentally found on 11/1, admitted for 48 hours, started on Eliquis   K. Progression in the liver, also found to have brain mets and leptomeningeal carcinomatosis. Started Herceptin 12/20. IT Methotrexate 12/20. Plan to start tucatinib and xeloda as soon as prior auth is obtained          INTERVAL HISTORY:    Today, she is seen in person with her mother.  She is still having with her lumbar punctures, the fluids and the caffeine seem to greatly help.  She denies any worsening vision changes, dizziness, lightheadedness.  She continues to take 8 mg of Dex a day, and her blood sugars have been on the higher side.  She saw endocrine this morning and they have a new plan.  She is not having any nausea or vomiting currently.  Her  bowels are moving regularly.  Abdominal pain.  No fevers body aches or chills.    Notes that she feels that she is coping generally well.  States that she does not wish to know her prognosis, and would just like to take it day by day/week by week.    REVIEW OF SYSTEMS:    Patient denies the following except if noted above: fevers, body aches, chills, headaches, vision changes, dizziness, chest pain, shortness of breath, nausea, vomiting, diarrhea, constipation, abdominal pain, rashes, bruising/bleeding, mouth sores, swelling or pain in the legs.      PHYSICAL EXAMINATION  LMP 12/18/2014     Wt Readings from Last 4 Encounters:   12/23/22 59.9 kg (132 lb 1.6 oz)   12/20/22 59.9 kg (132 lb 1.6 oz)   12/15/22 59.9 kg (132 lb)   12/09/22 58.4 kg (128 lb 12.8 oz)         VS: Above vitals reviewed  General: Resting comfortably in no acute distress  Heart: Regular rate and rhythm, no murmurs  Lung: Normal respiratory effort. Clear to auscultation bilaterally. No wheezes, rhonchi, or crackles   Neuro: AAOx3. Speech is fluent. Gait is steady. Moving all extremities. Cranial nerves II-IV intact. Strength is 5/5 bilaterally, symmetric.   Extremities: No lower extremity edema  Skin: Warm, dry, intact. No rashes, no suspicious lesions. No petechia or bruising noted           LABORATORY DATA:         ASSESSMENT AND PLAN:   Shelby Bennett is a 56-year-old woman with logy pleomorphic lobular cancer, wa history of a clinical stage II right breast cancer, ER positive, MO positive, HER2 negative by immunohistochemistry, now with recurrent Metastatic breast cancer, lobular. New histohich is negative for estrogen receptor and negative for progesterone receptor, and 20% is HER2 positive by FISH. S/P Vandana regimen, switched to Enhertu. Now with progression with new liver mets, brain mets, leptomeningeal disease. Here today to start new treatment with IT methotrexate, herceptin, tucatinib, and Xeloda     - On IT methotrexate,  tolerating decently well, does require IVF with caffeine. Plan for twice a week for three weeks.   - Started herceptin 12/20/22  - Repeat ECHO due 2/2023  - Start Xeloda, discussed with Dr. Meza today. Still appealing tucatinib but can at least start Xeloda. Previously reviewed side effects in detail. Pharmacy to reach out to get her the drug   - Trend tumor markers   - Plan for repeat brain MRI and CT CAP in February as scheduled   - We have requested IHC for HER2 on the CSF cell block.        #Brain Mets  - On 4mg Dex BID -- tried to decrease but patient did not tolerate   - Seen by rad onc inpatient, see note. Recommended IT chemo prior to RT in this setting with leptomeningeal disease.   - Seeing radiation oncology 1/4    #Hydrocephalus  - S/P shunt placement with Dr. Lopez       #Steroid induced hyperglycemia  - Seen by endocrine, starting NPH, greatly appreciate assistance     #PE, diagnosed 11/1/2022  - On eliquis, now with dose reduction to 2.5mg BID per pharmacy due to tucatinib interaction   - No bruising bleeding     #Vaccines  - Second COVID primary series today, discussed with pharmacy      # Psych:  - She feels that she is coping very well currently and does not need any additional support at this time  - Continue Effexor 75mg daily   - Prognosis is poor. Discussion today, patient does not wish to know her prognosis, however we did clarify that this is an incurable, aggressive malignancy. We will need to continue to discuss  - Needs to see palliative care -- missed appointment          # Bone Mets  - Asymptomatic  - Currently getting xgeva monthly --ON HOLD due to molar issue  - Has met with the dentist, who recommended we continue to hold Xgeva until her dental work gets completed.  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  Recently seen, and  voriconazole was discontinued.  - Resolved    # C. Diff 10/5  - Resolved      #Thursh   - On nystatin swish and spit        RTC in one week for tox check     Patient will call in the interim with any questions or concerns. They voice understanding and agree with the above plan.         Sandra Coughlin PA-C

## 2022-12-27 NOTE — PROGRESS NOTES
BMT ONC Adult Ommaya Access and Intrathecal Chemotherapy Administration Procedure Note  Dec 27, 2022    Procedure: Codman Certas Plus access to obtain CSF and give intrathecal chemotherapy (IT)          Diagnosis: breast cancer, leptomeningeal disease    Learning needs assessment complete within 12 months: YES     Vitals reviewed:  YES    Informed Consent: Procedure, benefits, risks, and alternatives explained to the patient who voiced understanding of the information and agreed to proceed with CSF reservoir access with IT chemotherapy. Risks include bleeding, nausea, headaches, and/or infection.   Patient safety checklist completed.    Labs: Reviewed:   INR   Date Value Ref Range Status   12/07/2022 1.10 0.85 - 1.15 Final   06/21/2015 1.21 (H) 0.86 - 1.14 Final      Platelet Count   Date Value Ref Range Status   12/27/2022 356 150 - 450 10e3/uL Final   01/23/2020 283 150 - 450 10e9/L Final     Note: Shunt was turned off by neuro MD. Ommaya was pumped initially, then cleaned with betadine + alcohol swab. Butterfly needle was attached to a 10 cc syringe and inserted into the CSF reservoir.  6 cc of CSF was withdrawn slowly over about 3 minutes.  10 mg IT methotrexate was double checked in EPIC and instilled over 3 minutes.   Patient tolerated the procedure well.    Specimen appears clear very light pink. CSF was sent for cytology, cell count, protein and glucose.  CSF reservoir was pumped 6 times after the procedure.  Patient was given IV fluids and IV caffeine for headaches. Given 0.5 mg IV Ativan for anxiety.    Post-procedure pain assessment: 0 out of 10 on the numeric pain rating scale  Interventions: No    Complications: No     Disposition: Patient will remain on back for 2 hour post procedure. Call if develops headaches, fevers and or chills. Shunt will be turned back on 2 hours after IT chemo given by neuro MD.    Performed by:   Adwoa Wilson PA-C, assisted by Madelin Mclean, NITZA  [facility]  [address]  [place] [address]  [phone]

## 2022-12-27 NOTE — PROGRESS NOTES
Infusion Nursing Note:  Josefina Bennett presents today for IVF prior to IT chemo.    Patient seen by provider today: Yes: Adwoa Wilson, NP for abelardo tap    present during visit today: Not Applicable.    Note: Mary presents to infusion today feeling ok. She states she has an 8/10 headache. IV caffeine infusion given per therapy plan/patient request. Patient also requesting ativan for anxiety. Denies any infectious symptoms or other concerns.    Intravenous Access:  Implanted Port.    Treatment Conditions:  Lab Results   Component Value Date    HGB 12.3 12/27/2022    WBC 15.0 (H) 12/27/2022    ANEU 7.3 10/03/2022    ANEUTAUTO 10.7 (H) 12/27/2022     12/27/2022      Lab Results   Component Value Date     (L) 12/27/2022    POTASSIUM 4.3 12/27/2022    MAG 1.9 12/13/2022    CR 0.99 (H) 12/27/2022    VEE 8.9 12/27/2022    BILITOTAL 0.5 12/27/2022    ALBUMIN 3.5 12/27/2022     (H) 12/27/2022    AST 62 (H) 12/27/2022     Results reviewed, labs MET treatment parameters, ok to proceed with treatment.    Post Infusion Assessment:  Patient tolerated infusion without incident.  Blood return noted pre and post infusion.  Site patent and intact, free from redness, edema or discomfort.  No evidence of extravasations.  Access discontinued per protocol.     Discharge Plan:   Patient declined prescription refills.  Discharge instructions reviewed with: Patient and Family.  Patient and/or family verbalized understanding of discharge instructions and all questions answered.  AVS to patient via TweetUpT.  Patient will return 12/28 for next appointment.   Patient discharged in stable condition accompanied by: mother.  Departure Mode: Ambulatory.      Irene Husain RN

## 2022-12-27 NOTE — LETTER
"12/27/2022       RE: Josefina Bennett  [address]  [facility] [address]     Dear Colleague,    Thank you for referring your patient, Josefina Bennett, to the [facility] [place] at [facility]. Please see a copy of my visit note below.    Neurosurgery Attending Progress Note     Josefina Bennett is a 56 year old woman with a history of metastatic breast cancer and communicating hydrocephalus with leptomeningeal carcinomatosis. She is s/p ventriculoperitoneal shunt placement with Dr. Lopez on 12/9/2022.  She has a Codman Certas  Shunt which is programmed at 3.  It is on the right side.     She is undergoing intrathecal methotrexate therapy two times per week for several weeks.  For her treatment, the plan is to have her Certas shunt valve dialed or turned to the setting of 8, which is the \"virtual off\" setting.  Then, she will have intrathecal injection of the methotrexate.  She will stay flat for two hours for the treatment.  Then, her shunt Certas shunt valve will be turned back to her therapeutic setting of 3.     I saw her in person at the [facility] just before 9:00 am and checked her shunt with the electronic . The shunt was set at 3. I changed the setting to 8, rechecked the setting and left the room. Her IT methotrexate was given at 9:20 am and I thus returned at approximately 11:30 to reset her shunt at 3. Again, using the electronic , I checked the setting, found it at 8 and then reset the shunt to a setting of 3. I then again rechecked the setting to ensure it was set at 3. We will see her again for future shunt changes during her treatment process.       Sincerely,    Deepak Jules MD  "

## 2022-12-27 NOTE — NURSING NOTE
"Chief Complaint   Patient presents with     Port Draw     Labs drawn via port by RN in lab.  VS taken        Port accessed with 20 gauge 3/4\" Power needle by RN, labs collected, line flushed with saline and heparin.  Vitals taken. Pt checked in for appointment(s).    Deaan Jarrell RN    "

## 2022-12-28 NOTE — TELEPHONE ENCOUNTER
Oral Chemotherapy Monitoring Program     Placed call to Josefina Bennett to discuss initiating Xeloda oral chemotherapy. She has already been taught on the medication, but wanted to confirm no questions before starting. Also detailed that Sandra Ambrizsureshnatalio sent her a Cardback message with the plan to start Xeloda while free drug for Tukysa is pending.     Left a message requesting a call back. No drug names were mentioned in the voicemail. Will update when response is received.      Nadine Bennett, PharmD, BCACP  Oral Chemotherapy Monitoring Program  [facility]  [phone]  December 28, 2022

## 2022-12-28 NOTE — PROGRESS NOTES
Josefina Bennett is being evaluated via a billable video visit.        How would you like to obtain your AVS? [employer]mal[employer] Diabetes  For the video visit, send the invitation by: Text to cell phone: [phone]  Will anyone else be joining your video visit? No

## 2022-12-28 NOTE — PROGRESS NOTES
Video visit    Start time 8:27, stop time 8:57; additional 16 minutes spent on the date of the encounter doing chart review, documentation and further activities as noted.     [facility] [address] [place] [address]    Patient location: patient home    Mode of transmission: video     Subjective:    New patient, no prior Endocrine notes including Care Everywhere     Josefina Bennett is a 57 year old female who presents for DM.    She has metastatic breast cancer (initially diagnosed in 2015) and metastases include bone, liver, and brain. She is currently receiving Herceptin, Tucatinib, Xeloda, and IT methotrexate (no GC are given concomitantly during these infusions) for brain metastases. She has not received immunotherapy. Oncology is also treating with Xgeva but this is on hold in the setting of dental work.     She is receiving Dexamethasone 4 mg daily in the setting of brain metastases. She has been on dexamethasone for 3 weeks or so (confirmed via Epic chart review - although she has been on it intermittently over the years as well) and it was initially 4 mg BID then about 1 week ago reduced to 4 mg daily.  She has had polydipsia for a few weeks.    Diagnosed with DM: the first time she was informed of hyperglycemia was 12/2022     Glycemic control over the years:     -2015: HbA1c 5.3%, 6.9%  -2016 - 2020: random glucose elevated but <150 mg/dL  -April - August 2022: random glucose elevated but <180 mg/dL   -12/20/2022: HbA1c 7.0%    She does not suspect she had her HbA1c checked between 2015 and 12/022    History of DKA/HHS: no    FH of DM: no    Personal or FH of autoimmune disease: no personal history, her mother has hypothyroidism (likely Hashimoto's)     Current DM therapy:  -Glargine 0-0-0-10 started 12/21/2022    Prior DM therapy:  -Only Glargine and this was started for the first time 12/21/2022    Current glycemic control: she has had a glucometer for about 1  week and she checks her glucose BID, fasting in the morning and at bedtime which is a few hours postprandial.  See the nursing note from today for glucose values.  Fasting today her glucose was 156 mg/deciliter and fasting yesterday was 120 mg/deciliter.  Last night her glucose was 287 mg/deciliter and on 12/26 her nightly glucose was 405 mg/deciliter.    Diet: she has a good appetite, generally 3 meals/day, snacks as well, she drinks milk, cranberry juice, she does consume carbohydrates     Physical activity: limited      Tobacco/alcohol use: no     Complications noted in the A/P section.     Objective:    Virtual visit.     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  Of her current chemotherapy regimen the only medication associated with drug-induced diabetes mellitus is Xeloda (at least via case reports) and this is also associated with severe hypertriglyceridemia in some case reports. Her BMP from yesterday shows a normal anion gap, which is reassuring.  I did add on a lipid panel re her triglycerides.     My preference is to treat her hyperglycemia as simply as possible with her glycemic target liberalized given her breast cancer to a goal glucose <180 mg/dL. We could use glargine and rapid acting prandial insulin but this would be 4 injections/day.  We could use twice daily mixed insulin (ex. 70/30) but with her ongoing chemotherapy and potential for nausea and poor appetite I would like to avoid this if possible, although note currently her appetite has been good and she has not missed any meals.  For now we will start simple with twice daily NPH.  I  recommend she inject 20 units when she wakes up in the morning and 10 units at bedtime. We reviewed we are intentionally starting a lower dose to avoid hypoglycemia and anticipate that we will need to increase the dose over the coming weeks.    She does prefer a CGM over a glucometer and I prescribed freestyle manuela 3.  We will have her meet with a CDE to review insulin injection technique and use of the freestyle manuela 3.    She will check in with me in a few days via [employer] for insulin dose adjustment.

## 2022-12-28 NOTE — TELEPHONE ENCOUNTER
"Hello,    I did a test claim on both Humulin N Kwikpen/Novolin N Flexpen and the patient's insurance reject stating the both products are not covered \"Plan Exclusion\". I can try to pursue a Prior Authorization but chances would be slim being that it's excluded.    I also did a test claim on Freestyle [place] [employer] and the insurance requires a PA as well. This one I can initiate a Prior Authorization for.    Please let me know what directions you'd like to go with these.    Thank You!    Arjun Cooper [facility] Pharmacy Liaison  [facility]  [email or web address]  Phone: [phone]  Fax: [phone]    "

## 2022-12-28 NOTE — LETTER
12/28/2022       RE: Josefina Bennett  [address]  [facility] [address]     Dear Colleague,    Thank you for referring your patient, Josefina Bennett, to the [facility] ENDOCRINOLOGY CLINIC [place] at [facility]. Please see a copy of my visit note below.    Outcome for 12/26/22 12:25 PM: Impriv[employer] message sent  KAIT Nuñez   Outcome for 12/26/22 4:04 PM: Data obtained via phone and located below  KAIT Nuñez    12/22/2022  AM: 197  PM: 512  12/23/2022  AM: 190  PM: 383  12/24/2022  AM: 146  PM: 502  12/25/2022  AM: 145  PM: 554  12/26/2022  AM: 164    Video visit    Start time 8:27, stop time 8:57; additional 16 minutes spent on the date of the encounter doing chart review, documentation and further activities as noted.     Provider [place] [place] [address]    Patient location: patient home    Mode of transmission: video     Subjective:    New patient, no prior Endocrine notes including Care Everywhere     Josefina Bennett is a 57 year old female who presents for DM.    She has metastatic breast cancer (initially diagnosed in 2015) and metastases include bone, liver, and brain. She is currently receiving Herceptin, Tucatinib, Xeloda, and IT methotrexate (no GC are given concomitantly during these infusions) for brain metastases. She has not received immunotherapy. Oncology is also treating with Xgeva but this is on hold in the setting of dental work.     She is receiving Dexamethasone 4 mg daily in the setting of brain metastases. She has been on dexamethasone for 3 weeks or so (confirmed via Epic chart review - although she has been on it intermittently over the years as well) and it was initially 4 mg BID then about 1 week ago reduced to 4 mg daily.  She has had polydipsia for a few weeks.    Diagnosed with DM: the first time she was informed of hyperglycemia was 12/2022     Glycemic control over the  years:     -2015: HbA1c 5.3%, 6.9%  -2016 - 2020: random glucose elevated but <150 mg/dL  -April - August 2022: random glucose elevated but <180 mg/dL   -12/20/2022: HbA1c 7.0%    She does not suspect she had her HbA1c checked between 2015 and 12/022    History of DKA/HHS: no    FH of DM: no    Personal or FH of autoimmune disease: no personal history, her mother has hypothyroidism (likely Hashimoto's)     Current DM therapy:  -Glargine 0-0-0-10 started 12/21/2022    Prior DM therapy:  -Only Glargine and this was started for the first time 12/21/2022    Current glycemic control: she has had a glucometer for about 1 week and she checks her glucose BID, fasting in the morning and at bedtime which is a few hours postprandial.  See the nursing note from today for glucose values.  Fasting today her glucose was 156 mg/deciliter and fasting yesterday was 120 mg/deciliter.  Last night her glucose was 287 mg/deciliter and on 12/26 her nightly glucose was 405 mg/deciliter.    Diet: she has a good appetite, generally 3 meals/day, snacks as well, she drinks milk, cranberry juice, she does consume carbohydrates     Physical activity: limited      Tobacco/alcohol use: no     Complications noted in the A/P section.     Objective:    Virtual visit.     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  Of her current chemotherapy regimen the only medication associated with drug-induced diabetes mellitus is Xeloda (at least via case reports) and this is also associated with severe hypertriglyceridemia in some case reports. Her BMP from yesterday shows a normal anion gap, which is  reassuring.  I did add on a lipid panel re her triglycerides.     My preference is to treat her hyperglycemia as simply as possible with her glycemic target liberalized given her breast cancer to a goal glucose <180 mg/dL. We could use glargine and rapid acting prandial insulin but this would be 4 injections/day.  We could use twice daily mixed insulin (ex. 70/30) but with her ongoing chemotherapy and potential for nausea and poor appetite I would like to avoid this if possible, although note currently her appetite has been good and she has not missed any meals.  For now we will start simple with twice daily NPH.  I recommend she inject 20 units when she wakes up in the morning and 10 units at bedtime. We reviewed we are intentionally starting a lower dose to avoid hypoglycemia and anticipate that we will need to increase the dose over the coming weeks.    She does prefer a CGM over a glucometer and I prescribed freestyle manuela 3.  We will have her meet with a CDE to review insulin injection technique and use of the freestyle manuela 3.    She will check in with me in a few days via [facility] for insulin dose adjustment.     Josefina Bennett is being evaluated via a billable video visit.    How would you like to obtain your AVS? [facility]  For the video visit, send the invitation by: Text to cell phone: [phone]  Will anyone else be joining your video visit? No      Germain Farah MD

## 2022-12-28 NOTE — PATIENT INSTRUCTIONS
[facility] Triage and after hours / weekends / holidays:  [phone]    Please call the triage or after hours line if you experience a temperature greater than or equal to 100.4, shaking chills, have uncontrolled nausea, vomiting and/or diarrhea, dizziness, shortness of breath, chest pain, bleeding, unexplained bruising, or if you have any other new/concerning symptoms, questions or concerns.      If you are having any concerning symptoms or wish to speak to a provider before your next infusion visit, please call your care coordinator or triage to notify them so we can adequately serve you.     If you need a refill on a narcotic prescription or other medication, please call before your infusion appointment.

## 2022-12-29 NOTE — TELEPHONE ENCOUNTER
Free Drug Application Initiated  Medication: Tukysa  Sponsor: [employer]  Phone #: [phone]  Fax #: [phone]  Additional Information: approved til 12/29/23    Please verify the rx on the application for free medication for this patient.  Thank  you!           ADDENDUM:  I have reviewed and agree to the information submitted for this Free Drug Application.   Nadine Bennett PharmD, BCACP  Oral Chemotherapy Monitoring Program  [facility]  [phone]

## 2022-12-29 NOTE — TELEPHONE ENCOUNTER
Hello,    Both the Humulin N 100Unit/ML and Novolin N 100Unit/ML are rejected by the patient's insurance:      Did you want me to pursue the PA for the Freestyle Bay 3 Sensor?    Thank You!    Arjun Cooper [facility] Pharmacy Liaison  [facility]  [email or web address]  Phone: [phone]  Fax: [phone]

## 2022-12-30 NOTE — PROGRESS NOTES
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 She is receiving intrathecal methotrexate therapy and I turned her shunt to \"virtual off\" earlier today. She is now done with today's methotrexate treatment and needs her shunt turned back on.     Physical Exam:   General: No acute distress.    Neuro: The patient is fully oriented. Speech is normal.      Procedure:  With the patient's verbal permission her Certas valve was interrogated, found to be set to 8. Using the manual  first, this was dialed back on to a setting of 3. Rechecked x 2. Then also checked with the electronic  and confirmed her shunt setting of 3, rechecked twice.      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        Brittany Mandel PA-C  Department of Neurosurgery  "

## 2022-12-30 NOTE — LETTER
"2022       RE: Josefina Bennett  [address]  [facility] [address]     Dear Colleague,    Thank you for referring your patient, Josefina Bennett, to the [facility] NEUROSURGERY CLINIC [place] at [facility]. Please see a copy of my visit note below.    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 She is receiving intrathecal methotrexate therapy and I turned her shunt to \"virtual off\" earlier today. She is now done with today's methotrexate treatment and needs her shunt turned back on.     Physical Exam:   General: No acute distress.    Neuro: The patient is fully oriented. Speech is normal.      Procedure:  With the patient's verbal permission her Certas valve was interrogated, found to be set to 8. Using the manual  first, this was dialed back on to a setting of 3. Rechecked x 2. Then also checked with the electronic  and confirmed her shunt setting of 3, rechecked twice.      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        Brittany Mandel PA-C  Department of Neurosurgery  "

## 2022-12-30 NOTE — PROGRESS NOTES
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 She is receiving intrathecal methotrexate therapy and needs her shunt to be turned to \"virtual off\" during infusion.     Physical Exam:   General: No acute distress.    Neuro: The patient is fully oriented. Speech is normal.     Procedure:  With the patient's verbal permission her Certas valve was interrogated, found to be set to 3. Using the manual  first, this was dialed to 8. Rechecked x 2. Then also rechecked with the electronic  and confirmed x 2.      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 I will return in a couple of hours to program her Certas valve back to a setting of 3.        Brittany Mandel PA-C  Department of Neurosurgery      "

## 2022-12-30 NOTE — PROGRESS NOTES
BMT ONC Adult Ommaya Access and Intrathecal Chemotherapy Administration Procedure Note  Dec 30, 2022    Procedure: Codman Certas Plus access to obtain CSF and give intrathecal chemotherapy (IT)          Diagnosis: breast cancer, leptomeningeal disease    Learning needs assessment complete within 12 months: YES     Vitals reviewed:  YES    Informed Consent: Procedure, benefits, risks, and alternatives explained to the patient who voiced understanding of the information and agreed to proceed with CSF reservoir access with IT chemotherapy. Risks include bleeding, nausea, headaches, and/or infection.   Patient safety checklist completed.    Labs: Reviewed:   INR   Date Value Ref Range Status   12/07/2022 1.10 0.85 - 1.15 Final   06/21/2015 1.21 (H) 0.86 - 1.14 Final      Platelet Count   Date Value Ref Range Status   12/30/2022 301 150 - 450 10e3/uL Final   01/23/2020 283 150 - 450 10e9/L Final     Note: Shunt was turned off by neurosurgery team. Ommaya was pumped initially, then cleaned with betadine + alcohol swab. Butterfly needle was attached to a 10 cc syringe and inserted into the CSF reservoir.  6 cc of CSF was withdrawn slowly over about 3 minutes.  10 mg IT methotrexate was double checked in EPIC and instilled over 3 minutes.   Patient tolerated the procedure well.    Specimen appears clear very light pink. CSF was sent for cytology, cell count, protein and glucose.  CSF reservoir was pumped 6 times after the procedure.  Patient was given IV fluids and IV caffeine for headaches. Given 0.5 mg IV Ativan for anxiety.    Post-procedure pain assessment: 0 out of 10 on the numeric pain rating scale  Interventions: No    Complications: No     Disposition: Patient will remain on back for 2 hour post procedure. Call if develops headaches, fevers and or chills. Shunt will be turned back on 2 hours after IT chemo given by neurosurgery team.    Performed by:   Adwoa Wilson PA-C  [facility] [address] [place] [address]  [phone]

## 2022-12-30 NOTE — PROGRESS NOTES
Infusion Nursing Note:  Josefina Bennett presents today for IV fluids, caffeine sodium-benzoate prior to LP. Cycle 1 day 11 methotrexte via Ommaya with Adwoa Wilson PA-C in infusion.  Patient seen by provider today: Yes: Adwoa Wilson PA-C   present during visit today: Not Applicable.    Note: Pt complains of 9/10 headache upon arrival today. 1L NS bolus administered per therapy plan. Pain resolved to 2/10 after caffeine infusion and ativan administered.    TORB Adwoa Wilson PA-C/Emily Meese, RN - 12/30/22 - 1002  - Okay for pt to have caffeine infusion prior to IT chemo  - Order for ativan placed in therapy plan    Brittany Mandel PA-C from neurosurgery turned off pt's shunt prior to Ommaya treatment and turned it back on at 2:15pm. Pt supine until shunt turned on again. No new s/s of discomfort. Pt feels comfortable discharging.    Intravenous Access:  Implanted Port.    Treatment Conditions:  Lab Results   Component Value Date    HGB 12.4 12/30/2022    WBC 16.3 (H) 12/30/2022    ANEU 7.3 10/03/2022    ANEUTAUTO 12.1 (H) 12/30/2022     12/30/2022       Post Infusion Assessment:  Patient tolerated infusion without incident.  Blood return noted pre and post infusion.  Site patent and intact, free from redness, edema or discomfort.  No evidence of extravasations.  Access discontinued per protocol.     Discharge Plan:   Patient declined prescription refills.  Discharge instructions reviewed with: Patient and Family.  Patient and/or family verbalized understanding of discharge instructions and all questions answered.  AVS to patient via [facility].  Patient will return 01/0323 for next appointment.   Patient discharged in stable condition accompanied by: self.  Departure Mode: Ambulatory.      Emily Meese, NU

## 2022-12-30 NOTE — PATIENT INSTRUCTIONS
[facility] Triage and after hours / weekends / holidays:  [phone]    Please call the triage or after hours line if you experience a temperature greater than or equal to 100.4, shaking chills, have uncontrolled nausea, vomiting and/or diarrhea, dizziness, shortness of breath, chest pain, bleeding, unexplained bruising, or if you have any other new/concerning symptoms, questions or concerns.      If you are having any concerning symptoms or wish to speak to a provider before your next infusion visit, please call your care coordinator or triage to notify them so we can adequately serve you.     If you need a refill on a narcotic prescription or other medication, please call before your infusion appointment.                December 2022 Sunday Monday Tuesday Wednesday Thursday Friday Saturday                       1     2    LAB CENTRAL   1:00 PM   (15 min.)   [facility]   [facility]    RETURN   1:15 PM   (45 min.)   Sandra Coughlin PA-C   [facility]    ONC INFUSION 2 HR (120 MIN)   2:30 PM   (120 min.)    ONC INFUSION NURSE   [facility] 3       4     5    RETURN   2:45 PM   (45 min.)   Michelle Lin PA-C   [facility] 6     7    RETURN  10:15 AM   (45 min.)   Sandra Coughlin PA-C   [facility]    ONC INFUSION 2 HR (120 MIN)  11:30 AM   (120 min.)    ONC INFUSION NURSE   [facility]    MR BRAIN WWO   1:30 PM   (45 min.)   UUMR2   [facility]    Admission   5:43 PM   Addison Pina DO   [facility] [address] [place]   (Discharge: 12/13/2022)    XR CHEST 2 VIEWS  10:15 PM   (20 min.)   UUXR3   [facility] 8     9    CT HEAD WO   4:15 AM   (20 min.)   UUCT1   [facility]    MR CERVICAL SPINE WWO  11:30 AM   (45 min.)   [address]  [facility]    MR THORACIC SPINE WWO  12:30 PM   (45 min.)   UUMR2   [facility]    INSERTION, SHUNT, VENTRICULOPERITONEAL   1:25 PM   Evangelista Lopez MD   [facility] OR    MR LUMBAR SPINE WWO   1:30 PM   (45 min.)   UUMR2   [facility]    CT HEAD WO   9:40 PM   (20 min.)   UUCT1   [facility]    XR SHUNT MALFUNCTSURG SURV  10:05 PM   (20 min.)   UUXR1   [facility]     10       11    US LWR EXT VENOUS DUPLEX BILAT  11:25 AM   (40 min.)   UUUS1   [facility] 12     13    US LWR EXT VENOUS DUPLEX BILAT   7:55 AM   (40 min.)   UUUS1   [facility] 14     15    LAB CENTRAL   7:30 AM   (15 min.)    [facility]   [facility]    RETURN   7:55 AM   (30 min.)   Evangelista Meza MD   [facility] 16     17       18     19     20    LAB CENTRAL   7:15 AM   (15 min.)    [facility]   [facility]    RETURN   7:45 AM   (45 min.)   Sandra Coughlin PA-C   [facility] RETURN   8:30 AM   (15 min.)   Claudia Nava APRN CNP   [facility]    LUMBAR PUNCTURE   8:45 AM   (45 min.)   Adwoa Wilson PA-C   [facility] RETURN  11:00 AM   (15 min.)   Claudia Nava APRN CNP   [facility]    ONC INFUSION 2 HR (120 MIN)   3:00 PM   (120 min.)    ONC INFUSION NURSE   [facility] 21    SPEC INFUSION 2 HR (120 MIN)  12:00 PM   (120 min.)    SIPC INFUSION NURSE   [facility]    LAB CENTRAL  12:15 PM   (15 min.)    [facility]   [facility]    RETURN  12:30 PM   (45 min.)   Michelle Lin PA-C   [facility] 22  Happy  Birthday!     23    LAB CENTRAL   7:30 AM   (15 min.)    [facility]   [facility]    ONC INFUSION 2 HR (120 MIN)   8:00 AM   (120 min.)   [facility] ONC INFUSION NURSE   [facility] RETURN   9:45 AM   (30 min.)   Jorge Schmitt MD   [facility]    LUMBAR PUNCTURE   9:45 AM   (45 min.)   Ana Salcedo PA-C   [facility] RETURN  11:45 AM   (30 min.)   Jorge Schmitt MD   [facility] 24       25     26     27    LAB CENTRAL   6:30 AM   (15 min.)    [facility]   [facility]    ONC INFUSION 2 HR (120 MIN)   7:00 AM   (120 min.)   UC ONC INFUSION NURSE   [facility] RETURN   7:45 AM   (15 min.)   Deepak Jules MD   [facility]    LUMBAR PUNCTURE   8:45 AM   (45 min.)   Adwoa Wilson PA-C   [facility] RETURN  11:45 AM   (15 min.)   Evangelista Lopez MD   [facility] Neurosurgery [facility] 28    NEW DIABETES   8:15 AM   (60 min.)   Germain Farah MD   [facility] Endocrinology [facility]    RETURN   9:15 AM   (45 min.)   Sandra Coughlin PA-C   [facility]    ONC INFUSION 2 HR (120 MIN)  10:30 AM   (120 min.)   UC ONC INFUSION NURSE   [facility] 29     30    LAB CENTRAL   9:15 AM   (15 min.)    [facility]   [facility]    ONC INFUSION 2 HR (120 MIN)  10:00 AM   (120 min.)   UC ONC INFUSION NURSE   [facility] RETURN  11:30 AM   (15 min.)   Brittany Mandel PA-C   [facility]    LUMBAR PUNCTURE  11:45 AM   (45 min.)   Adwoa Wilson PA-C   [facility]  Cancer Clinic    [facility] RETURN   1:45 PM   (30 min.)   Brittany Mandel PA-C   [facility] 31 January 2023 Sunday Monday Tuesday Wednesday Thursday Friday Saturday   1     2     3    LAB CENTRAL   7:30 AM   (15 min.)    [facility] DRAW   [facility]    ONC INFUSION 2 HR (120 MIN)   8:00 AM   (120 min.)   UC ONC INFUSION NURSE   [facility]    POST-OP SPINE      9:15 AM   (30 min.)   Mendy Bailey PA-C   [facility]    LUMBAR PUNCTURE   9:45 AM   (45 min.)   Adwoa Wilson PA-C   [facility]    POST-OP SPINE     12:15 PM   (30 min.)   Mendy Bailey PA-C   [facility] Neurosurgery [facility] 4    VIDEO VISIT NEW   6:45 AM   (80 min.)   Dharmesh Sanchez MD   [facility]   2:00 PM   (90 min.)   Bill Chauhan MD   [facility]    RETURN   5:15 PM   (45 min.)   Sandra Coughlin PA-C   [facility] 5     6    LAB CENTRAL   7:30 AM   (15 min.)   [facility]   [facility] RETURN   7:45 AM   (60 min.)   Claudia Nava APRN CNP   [facility]    ONC INFUSION 2 HR (120 MIN)   8:00 AM   (120 min.)    ONC INFUSION NURSE   [facility]    LUMBAR PUNCTURE   9:00 AM   (45 min.)   Adwoa Wilson PA-C   [facility] RETURN  12:00 PM   (15 min.)   Brittany Mandel PA-C   [facility] 7       8     9     10    ONC INFUSION 2 HR (120 MIN)   7:30 AM   (60 min.)   UC ONC INFUSION NURSE   [facility] RETURN   8:15 AM   (30 min.)   Deepak Jules MD   [facility]    LAB  CENTRAL   8:15 AM   (15 min.)   [facility]   [facility]    LUMBAR PUNCTURE   8:45 AM   (45 min.)   Adwoa Wilson PA-C   [facility]    POST-OP SPINE     10:45 AM   (30 min.)   Mendy Bailey PA-C   [facility]    RETURN  10:45 AM   (45 min.)   Sandra Coughlin PA-C   [facility] 11     12     13    LAB CENTRAL   7:00 AM   (15 min.)   [facility] LAB DRAW   [facility]    ONC INFUSION 2 HR (120 MIN)   7:30 AM   (120 min.)    ONC INFUSION NURSE   [facility] RETURN   9:30 AM   (30 min.)   Brittany Mandel PA-C   [facility]    LUMBAR PUNCTURE  10:00 AM   (45 min.)   Adwoa Wilson PA-C   [facility] RETURN  12:15 PM   (30 min.)   Brittany Mandel PA-C   [facility] 14       15     16     17     18     19     20     21       22     23     24     25     26     27     28       29     30     31    POST-OP SPINE     11:15 AM   (30 min.)   Mendy Bailey PA-C   [facility]    LAB CENTRAL  11:45 AM   (15 min.)   [facility] LAB DRAW   [facility]    LUMBAR PUNCTURE  12:00 PM   (45 min.)   Adwoa Wilson PA-C   [facility]    RETURN   1:45 PM   (30 min.)   Evangelista Meza MD   [facility]    ONC INFUSION 1 HR (60 MIN)   2:30 PM   (60 min.)    ONC INFUSION NURSE   [facility] RETURN   3:15 PM   (60 min.)   Claudia Nava APRN CNP   [facility]                                       Lab Results:  Recent Results (from the past 12 hour(s))   CEA    Collection Time: 12/30/22  9:34 AM   Result  Value Ref Range    CEA 1,000.0 ng/mL   CBC with platelets and differential    Collection Time: 12/30/22  9:34 AM   Result Value Ref Range    WBC Count 16.3 (H) 4.0 - 11.0 10e3/uL    RBC Count 4.44 3.80 - 5.20 10e6/uL    Hemoglobin 12.4 11.7 - 15.7 g/dL    Hematocrit 38.2 35.0 - 47.0 %    MCV 86 78 - 100 fL    MCH 27.9 26.5 - 33.0 pg    MCHC 32.5 31.5 - 36.5 g/dL    RDW 18.7 (H) 10.0 - 15.0 %    Platelet Count 301 150 - 450 10e3/uL    % Neutrophils 74 %    % Lymphocytes 20 %    % Monocytes 4 %    % Eosinophils 1 %    % Basophils 0 %    % Immature Granulocytes 1 %    NRBCs per 100 WBC 0 <1 /100    Absolute Neutrophils 12.1 (H) 1.6 - 8.3 10e3/uL    Absolute Lymphocytes 3.2 0.8 - 5.3 10e3/uL    Absolute Monocytes 0.7 0.0 - 1.3 10e3/uL    Absolute Eosinophils 0.2 0.0 - 0.7 10e3/uL    Absolute Basophils 0.1 0.0 - 0.2 10e3/uL    Absolute Immature Granulocytes 0.2 <=0.4 10e3/uL    Absolute NRBCs 0.0 10e3/uL

## 2022-12-30 NOTE — NURSING NOTE
Chief Complaint   Patient presents with     Port Draw     Vitals taken, port accessed, labs drawn, heparin locked, checked into next appt     /62 (BP Location: Left arm, Patient Position: Sitting, Cuff Size: Adult Regular)   Pulse 108   Temp 98.1  F (36.7  C) (Oral)   Resp 16   Wt 58.7 kg (129 lb 6.4 oz)   LMP 12/18/2014   SpO2 95%   BMI 22.20 kg/m    Levar Terrazas RN on 12/30/2022 at 9:38 AM

## 2022-12-30 NOTE — LETTER
"2022       RE: Josefina Bennett  [address]  [facility] [address]     Dear Colleague,    Thank you for referring your patient, Josefina Bennett, to the [facility] NEUROSURGERY CLINIC [place] at [facility]. Please see a copy of my visit note below.      [facility]  Department of Neurosurgery  Center for Skull Base and Pituitary Surgery    Name: Josefina Bennett  MRN: [id number]  Age: 57 year old  : 1965      Chief Complaint:   Leptomeningeal carcinomatosis s/p  shunt placement (Certas set to 3), shunt programming visit    History of Present Illness:   Josefina Bennett is a 57 year old female with a history of metastatic breast cancer and communicating hydrocephalus s/p  shunt placeent 2022 who is seen today in the Cancer Center for shunt programming. She is receiving intrathecal methotrexate therapy and needs her shunt to be turned to \"virtual off\" during infusion.     Physical Exam:   General: No acute distress.    Neuro: The patient is fully oriented. Speech is normal.     Procedure:  With the patient's verbal permission her Certas valve was interrogated, found to be set to 3. Using the manual  first, this was dialed to 8. Rechecked x 2. Then also rechecked with the electronic  and confirmed x 2.      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 I will return in a couple of hours to program her Certas valve back to a setting of 3.        Brittany Mandel PA-C  Department of Neurosurgery  "

## 2022-12-30 NOTE — TELEPHONE ENCOUNTER
M to please check [facility] for insulin update per Dr Farah. NPH coverage noted in vm. Relion information provided. Clinic number provided or questions.   Marcia Salcedo RN on 2022 at 1:44 PM       Re    This message is to inform you that the patient has not yet read the following message. (Notification date: 2022)     Insulin coverage and Over the Counter insulin    From   Marcia Salcedo RN To   Mary Bennett Sent and Delivered   2022 10:31 AM       Jamar Hernandez,      Your insurance provider has notified us that they do not cover the cost for any brand of NPH insulin that Dr Farah ordered. There is an Over the counter alternative called Relion NPH vials OTC at [facility]. You do not need an order for this, however, I am including Dr Farah's order below if you would like to refer to it when(if) purchasing.     Please let us know if you have any questions.      Thank you.      NU Kennedy               Both the Humulin N 100Unit/ML and Novolin N 100Unit/ML are rejected by the patient's insurance:        The Order:      Panel Detail for  NPH INSULIN PANEL     Outpatient Medication Detail       Disp Refills Start End MARII   insulin  UNIT/ML injection 15 mL 3 2022   --   Si units before breakfast and 10 units at bedtime   Class: E-Prescribe         [address]    MyChart User Last Read On   Mary Bennett Not Read

## 2023-01-01 ENCOUNTER — OFFICE VISIT (OUTPATIENT)
Dept: RADIATION ONCOLOGY | Facility: CLINIC | Age: 58
End: 2023-01-01
Attending: INTERNAL MEDICINE
Payer: COMMERCIAL

## 2023-01-01 ENCOUNTER — INFUSION THERAPY VISIT (OUTPATIENT)
Dept: ONCOLOGY | Facility: CLINIC | Age: 58
End: 2023-01-01
Attending: INTERNAL MEDICINE
Payer: COMMERCIAL

## 2023-01-01 ENCOUNTER — PATIENT OUTREACH (OUTPATIENT)
Dept: CARE COORDINATION | Facility: CLINIC | Age: 58
End: 2023-01-01
Payer: COMMERCIAL

## 2023-01-01 ENCOUNTER — INFUSION THERAPY VISIT (OUTPATIENT)
Dept: ONCOLOGY | Facility: CLINIC | Age: 58
End: 2023-01-01
Attending: PHYSICIAN ASSISTANT
Payer: COMMERCIAL

## 2023-01-01 ENCOUNTER — ANCILLARY PROCEDURE (OUTPATIENT)
Dept: MRI IMAGING | Facility: CLINIC | Age: 58
End: 2023-01-01
Attending: INTERNAL MEDICINE
Payer: COMMERCIAL

## 2023-01-01 ENCOUNTER — OFFICE VISIT (OUTPATIENT)
Dept: NEUROSURGERY | Facility: CLINIC | Age: 58
End: 2023-01-01
Payer: COMMERCIAL

## 2023-01-01 ENCOUNTER — APPOINTMENT (OUTPATIENT)
Dept: LAB | Facility: CLINIC | Age: 58
End: 2023-01-01
Attending: INTERNAL MEDICINE
Payer: COMMERCIAL

## 2023-01-01 ENCOUNTER — PATIENT OUTREACH (OUTPATIENT)
Dept: ONCOLOGY | Facility: CLINIC | Age: 58
End: 2023-01-01

## 2023-01-01 ENCOUNTER — DOCUMENTATION ONLY (OUTPATIENT)
Dept: ONCOLOGY | Facility: CLINIC | Age: 58
End: 2023-01-01

## 2023-01-01 ENCOUNTER — PRE VISIT (OUTPATIENT)
Dept: RADIATION ONCOLOGY | Facility: CLINIC | Age: 58
End: 2023-01-01

## 2023-01-01 ENCOUNTER — APPOINTMENT (OUTPATIENT)
Dept: LAB | Facility: CLINIC | Age: 58
End: 2023-01-01
Attending: PHYSICIAN ASSISTANT
Payer: COMMERCIAL

## 2023-01-01 ENCOUNTER — PATIENT OUTREACH (OUTPATIENT)
Dept: CARE COORDINATION | Facility: CLINIC | Age: 58
End: 2023-01-01

## 2023-01-01 ENCOUNTER — TELEPHONE (OUTPATIENT)
Dept: FAMILY MEDICINE | Facility: CLINIC | Age: 58
End: 2023-01-01
Payer: COMMERCIAL

## 2023-01-01 ENCOUNTER — TELEPHONE (OUTPATIENT)
Dept: ONCOLOGY | Facility: CLINIC | Age: 58
End: 2023-01-01

## 2023-01-01 ENCOUNTER — APPOINTMENT (OUTPATIENT)
Dept: RADIATION ONCOLOGY | Facility: CLINIC | Age: 58
End: 2023-01-01
Attending: RADIOLOGY
Payer: COMMERCIAL

## 2023-01-01 ENCOUNTER — PATIENT OUTREACH (OUTPATIENT)
Dept: ONCOLOGY | Facility: CLINIC | Age: 58
End: 2023-01-01
Payer: COMMERCIAL

## 2023-01-01 ENCOUNTER — LAB (OUTPATIENT)
Dept: LAB | Facility: CLINIC | Age: 58
End: 2023-01-01
Payer: COMMERCIAL

## 2023-01-01 ENCOUNTER — DOCUMENTATION ONLY (OUTPATIENT)
Dept: OTHER | Facility: CLINIC | Age: 58
End: 2023-01-01

## 2023-01-01 ENCOUNTER — HOSPITAL ENCOUNTER (INPATIENT)
Facility: CLINIC | Age: 58
LOS: 2 days | Discharge: HOSPICE | DRG: 951 | End: 2023-01-27
Attending: HOSPITALIST | Admitting: HOSPITALIST
Payer: COMMERCIAL

## 2023-01-01 ENCOUNTER — MEDICAL CORRESPONDENCE (OUTPATIENT)
Dept: HEALTH INFORMATION MANAGEMENT | Facility: CLINIC | Age: 58
End: 2023-01-01

## 2023-01-01 ENCOUNTER — VIRTUAL VISIT (OUTPATIENT)
Dept: PALLIATIVE CARE | Facility: CLINIC | Age: 58
End: 2023-01-01
Attending: INTERNAL MEDICINE
Payer: COMMERCIAL

## 2023-01-01 ENCOUNTER — ONCOLOGY VISIT (OUTPATIENT)
Dept: RADIATION ONCOLOGY | Facility: CLINIC | Age: 58
End: 2023-01-01

## 2023-01-01 ENCOUNTER — HOSPITAL ENCOUNTER (INPATIENT)
Facility: CLINIC | Age: 58
LOS: 4 days | Discharge: HOSPICE | End: 2023-01-31
Attending: INTERNAL MEDICINE | Admitting: INTERNAL MEDICINE
Payer: COMMERCIAL

## 2023-01-01 VITALS
RESPIRATION RATE: 16 BRPM | SYSTOLIC BLOOD PRESSURE: 108 MMHG | HEART RATE: 84 BPM | DIASTOLIC BLOOD PRESSURE: 70 MMHG | TEMPERATURE: 98.4 F | OXYGEN SATURATION: 97 %

## 2023-01-01 VITALS
OXYGEN SATURATION: 94 % | HEART RATE: 105 BPM | RESPIRATION RATE: 16 BRPM | DIASTOLIC BLOOD PRESSURE: 73 MMHG | SYSTOLIC BLOOD PRESSURE: 115 MMHG | TEMPERATURE: 99.1 F

## 2023-01-01 VITALS
RESPIRATION RATE: 18 BRPM | SYSTOLIC BLOOD PRESSURE: 126 MMHG | BODY MASS INDEX: 21.98 KG/M2 | WEIGHT: 128.1 LBS | TEMPERATURE: 97.8 F | HEART RATE: 94 BPM | OXYGEN SATURATION: 100 % | DIASTOLIC BLOOD PRESSURE: 84 MMHG

## 2023-01-01 VITALS
DIASTOLIC BLOOD PRESSURE: 63 MMHG | SYSTOLIC BLOOD PRESSURE: 109 MMHG | WEIGHT: 134.4 LBS | OXYGEN SATURATION: 98 % | BODY MASS INDEX: 23.06 KG/M2 | TEMPERATURE: 98.1 F | RESPIRATION RATE: 14 BRPM | HEART RATE: 103 BPM

## 2023-01-01 VITALS
HEART RATE: 93 BPM | DIASTOLIC BLOOD PRESSURE: 66 MMHG | SYSTOLIC BLOOD PRESSURE: 96 MMHG | RESPIRATION RATE: 18 BRPM | OXYGEN SATURATION: 98 % | WEIGHT: 130 LBS | BODY MASS INDEX: 22.3 KG/M2 | TEMPERATURE: 97 F

## 2023-01-01 VITALS
HEART RATE: 102 BPM | TEMPERATURE: 98.1 F | RESPIRATION RATE: 16 BRPM | DIASTOLIC BLOOD PRESSURE: 71 MMHG | OXYGEN SATURATION: 100 % | SYSTOLIC BLOOD PRESSURE: 105 MMHG | WEIGHT: 128 LBS | BODY MASS INDEX: 21.96 KG/M2

## 2023-01-01 VITALS
RESPIRATION RATE: 16 BRPM | WEIGHT: 133.2 LBS | OXYGEN SATURATION: 98 % | SYSTOLIC BLOOD PRESSURE: 96 MMHG | TEMPERATURE: 98 F | BODY MASS INDEX: 22.85 KG/M2 | DIASTOLIC BLOOD PRESSURE: 62 MMHG | HEART RATE: 103 BPM

## 2023-01-01 VITALS
HEART RATE: 98 BPM | SYSTOLIC BLOOD PRESSURE: 99 MMHG | RESPIRATION RATE: 16 BRPM | BODY MASS INDEX: 22.13 KG/M2 | OXYGEN SATURATION: 98 % | DIASTOLIC BLOOD PRESSURE: 59 MMHG | WEIGHT: 129 LBS | TEMPERATURE: 97.6 F

## 2023-01-01 VITALS — DIASTOLIC BLOOD PRESSURE: 53 MMHG | HEART RATE: 98 BPM | SYSTOLIC BLOOD PRESSURE: 88 MMHG | OXYGEN SATURATION: 98 %

## 2023-01-01 VITALS
DIASTOLIC BLOOD PRESSURE: 74 MMHG | WEIGHT: 129.4 LBS | BODY MASS INDEX: 22.2 KG/M2 | TEMPERATURE: 97.2 F | HEART RATE: 82 BPM | OXYGEN SATURATION: 99 % | SYSTOLIC BLOOD PRESSURE: 115 MMHG | RESPIRATION RATE: 18 BRPM

## 2023-01-01 VITALS — SYSTOLIC BLOOD PRESSURE: 101 MMHG | OXYGEN SATURATION: 97 % | HEART RATE: 88 BPM | DIASTOLIC BLOOD PRESSURE: 64 MMHG

## 2023-01-01 VITALS
WEIGHT: 128.3 LBS | HEART RATE: 68 BPM | DIASTOLIC BLOOD PRESSURE: 74 MMHG | BODY MASS INDEX: 22.01 KG/M2 | SYSTOLIC BLOOD PRESSURE: 104 MMHG

## 2023-01-01 DIAGNOSIS — C50.411 MALIGNANT NEOPLASM OF UPPER-OUTER QUADRANT OF RIGHT BREAST IN FEMALE, ESTROGEN RECEPTOR NEGATIVE (H): Primary | ICD-10-CM

## 2023-01-01 DIAGNOSIS — L29.9 ITCHING: ICD-10-CM

## 2023-01-01 DIAGNOSIS — C50.919 METASTATIC BREAST CANCER: Primary | ICD-10-CM

## 2023-01-01 DIAGNOSIS — C50.911 BILATERAL MALIGNANT NEOPLASM OF BREAST IN FEMALE, ESTROGEN RECEPTOR POSITIVE, UNSPECIFIED SITE OF BREAST (H): ICD-10-CM

## 2023-01-01 DIAGNOSIS — C50.919 METASTATIC BREAST CANCER: ICD-10-CM

## 2023-01-01 DIAGNOSIS — C50.411 MALIGNANT NEOPLASM OF UPPER-OUTER QUADRANT OF RIGHT BREAST IN FEMALE, ESTROGEN RECEPTOR NEGATIVE (H): ICD-10-CM

## 2023-01-01 DIAGNOSIS — E86.0 DEHYDRATION: ICD-10-CM

## 2023-01-01 DIAGNOSIS — G47.00 INSOMNIA, UNSPECIFIED TYPE: ICD-10-CM

## 2023-01-01 DIAGNOSIS — C50.411 MALIGNANT NEOPLASM OF UPPER-OUTER QUADRANT OF RIGHT BREAST IN FEMALE, ESTROGEN RECEPTOR POSITIVE (H): ICD-10-CM

## 2023-01-01 DIAGNOSIS — C79.49 LEPTOMENINGEAL METASTASES: Primary | ICD-10-CM

## 2023-01-01 DIAGNOSIS — Z17.1 MALIGNANT NEOPLASM OF UPPER-OUTER QUADRANT OF RIGHT BREAST IN FEMALE, ESTROGEN RECEPTOR NEGATIVE (H): Primary | ICD-10-CM

## 2023-01-01 DIAGNOSIS — Z17.0 MALIGNANT NEOPLASM OF NIPPLE OF RIGHT BREAST IN FEMALE, ESTROGEN RECEPTOR POSITIVE (H): ICD-10-CM

## 2023-01-01 DIAGNOSIS — Z17.0 MALIGNANT NEOPLASM OF UPPER-OUTER QUADRANT OF RIGHT BREAST IN FEMALE, ESTROGEN RECEPTOR POSITIVE (H): Primary | ICD-10-CM

## 2023-01-01 DIAGNOSIS — C50.011 MALIGNANT NEOPLASM OF AREOLA OF RIGHT BREAST IN FEMALE, UNSPECIFIED ESTROGEN RECEPTOR STATUS (H): ICD-10-CM

## 2023-01-01 DIAGNOSIS — R19.7 VOMITING AND DIARRHEA: ICD-10-CM

## 2023-01-01 DIAGNOSIS — C50.912 BILATERAL MALIGNANT NEOPLASM OF BREAST IN FEMALE, ESTROGEN RECEPTOR POSITIVE, UNSPECIFIED SITE OF BREAST (H): ICD-10-CM

## 2023-01-01 DIAGNOSIS — C50.011 MALIGNANT NEOPLASM OF NIPPLE OF RIGHT BREAST IN FEMALE, ESTROGEN RECEPTOR POSITIVE (H): ICD-10-CM

## 2023-01-01 DIAGNOSIS — G47.01 INSOMNIA DUE TO MEDICAL CONDITION: ICD-10-CM

## 2023-01-01 DIAGNOSIS — Z98.2 VP (VENTRICULOPERITONEAL) SHUNT STATUS: Primary | ICD-10-CM

## 2023-01-01 DIAGNOSIS — G91.0 COMMUNICATING HYDROCEPHALUS (H): Primary | ICD-10-CM

## 2023-01-01 DIAGNOSIS — G91.0 COMMUNICATING HYDROCEPHALUS (H): ICD-10-CM

## 2023-01-01 DIAGNOSIS — Z17.0 MALIGNANT NEOPLASM OF UPPER-OUTER QUADRANT OF RIGHT BREAST IN FEMALE, ESTROGEN RECEPTOR POSITIVE (H): ICD-10-CM

## 2023-01-01 DIAGNOSIS — Z71.89 ADVANCE CARE PLANNING: ICD-10-CM

## 2023-01-01 DIAGNOSIS — R11.10 VOMITING AND DIARRHEA: ICD-10-CM

## 2023-01-01 DIAGNOSIS — Z98.2 VP (VENTRICULOPERITONEAL) SHUNT STATUS: ICD-10-CM

## 2023-01-01 DIAGNOSIS — C50.011 MALIGNANT NEOPLASM OF AREOLA OF RIGHT BREAST IN FEMALE, UNSPECIFIED ESTROGEN RECEPTOR STATUS (H): Primary | ICD-10-CM

## 2023-01-01 DIAGNOSIS — C79.51 BONE METASTASIS: Primary | ICD-10-CM

## 2023-01-01 DIAGNOSIS — C79.51 BONE METASTASIS: ICD-10-CM

## 2023-01-01 DIAGNOSIS — E86.0 DEHYDRATION: Primary | ICD-10-CM

## 2023-01-01 DIAGNOSIS — Z17.1 MALIGNANT NEOPLASM OF UPPER-OUTER QUADRANT OF RIGHT BREAST IN FEMALE, ESTROGEN RECEPTOR NEGATIVE (H): ICD-10-CM

## 2023-01-01 DIAGNOSIS — Z17.0 BILATERAL MALIGNANT NEOPLASM OF BREAST IN FEMALE, ESTROGEN RECEPTOR POSITIVE, UNSPECIFIED SITE OF BREAST (H): ICD-10-CM

## 2023-01-01 DIAGNOSIS — F43.21 ADJUSTMENT DISORDER WITH DEPRESSED MOOD: ICD-10-CM

## 2023-01-01 DIAGNOSIS — R73.9 HYPERGLYCEMIA: ICD-10-CM

## 2023-01-01 DIAGNOSIS — R11.0 NAUSEA: ICD-10-CM

## 2023-01-01 DIAGNOSIS — L29.9 PRURITIC DISORDER: ICD-10-CM

## 2023-01-01 DIAGNOSIS — C50.411 MALIGNANT NEOPLASM OF UPPER-OUTER QUADRANT OF RIGHT FEMALE BREAST (H): ICD-10-CM

## 2023-01-01 DIAGNOSIS — C50.411 MALIGNANT NEOPLASM OF UPPER-OUTER QUADRANT OF RIGHT BREAST IN FEMALE, ESTROGEN RECEPTOR POSITIVE (H): Primary | ICD-10-CM

## 2023-01-01 LAB
ALBUMIN SERPL BCG-MCNC: 3.6 G/DL (ref 3.5–5.2)
ALBUMIN SERPL BCG-MCNC: 3.6 G/DL (ref 3.5–5.2)
ALBUMIN SERPL BCG-MCNC: NORMAL G/DL
ALP SERPL-CCNC: 160 U/L (ref 35–104)
ALP SERPL-CCNC: 190 U/L (ref 35–104)
ALP SERPL-CCNC: NORMAL U/L
ALT SERPL W P-5'-P-CCNC: 131 U/L (ref 10–35)
ALT SERPL W P-5'-P-CCNC: 77 U/L (ref 10–35)
ALT SERPL W P-5'-P-CCNC: NORMAL U/L
ANION GAP SERPL CALCULATED.3IONS-SCNC: 10 MMOL/L (ref 7–15)
ANION GAP SERPL CALCULATED.3IONS-SCNC: 9 MMOL/L (ref 7–15)
ANION GAP SERPL CALCULATED.3IONS-SCNC: NORMAL MMOL/L
APPEARANCE CSF: CLEAR
AST SERPL W P-5'-P-CCNC: 21 U/L (ref 10–35)
AST SERPL W P-5'-P-CCNC: 42 U/L (ref 10–35)
AST SERPL W P-5'-P-CCNC: NORMAL U/L
BASOPHILS # BLD AUTO: 0 10E3/UL (ref 0–0.2)
BASOPHILS # BLD AUTO: 0.1 10E3/UL (ref 0–0.2)
BASOPHILS NFR BLD AUTO: 0 %
BILIRUB SERPL-MCNC: 0.6 MG/DL
BILIRUB SERPL-MCNC: 0.6 MG/DL
BILIRUB SERPL-MCNC: NORMAL MG/DL
BUN SERPL-MCNC: 23.8 MG/DL (ref 6–20)
BUN SERPL-MCNC: 27.9 MG/DL (ref 6–20)
BUN SERPL-MCNC: NORMAL MG/DL
CALCIUM SERPL-MCNC: 8.4 MG/DL (ref 8.6–10)
CALCIUM SERPL-MCNC: 8.7 MG/DL (ref 8.6–10)
CALCIUM SERPL-MCNC: NORMAL MG/DL
CEA SERPL-MCNC: 2504 NG/ML
CEA SERPL-MCNC: 2576 NG/ML
CEA SERPL-MCNC: 772 NG/ML
CHLORIDE SERPL-SCNC: 101 MMOL/L (ref 98–107)
CHLORIDE SERPL-SCNC: 101 MMOL/L (ref 98–107)
CHLORIDE SERPL-SCNC: NORMAL MMOL/L
COLOR CSF: COLORLESS
CREAT SERPL-MCNC: 0.78 MG/DL (ref 0.51–0.95)
CREAT SERPL-MCNC: 0.93 MG/DL (ref 0.51–0.95)
CREAT SERPL-MCNC: NORMAL MG/DL
DEPRECATED HCO3 PLAS-SCNC: 23 MMOL/L (ref 22–29)
DEPRECATED HCO3 PLAS-SCNC: 26 MMOL/L (ref 22–29)
DEPRECATED HCO3 PLAS-SCNC: NORMAL MMOL/L
EOSINOPHIL # BLD AUTO: 0 10E3/UL (ref 0–0.7)
EOSINOPHIL # BLD AUTO: 0.1 10E3/UL (ref 0–0.7)
EOSINOPHIL NFR BLD AUTO: 0 %
EOSINOPHIL NFR BLD AUTO: 1 %
ERYTHROCYTE [DISTWIDTH] IN BLOOD BY AUTOMATED COUNT: 19 % (ref 10–15)
ERYTHROCYTE [DISTWIDTH] IN BLOOD BY AUTOMATED COUNT: 19.3 % (ref 10–15)
ERYTHROCYTE [DISTWIDTH] IN BLOOD BY AUTOMATED COUNT: 19.5 % (ref 10–15)
ERYTHROCYTE [DISTWIDTH] IN BLOOD BY AUTOMATED COUNT: 19.8 % (ref 10–15)
ERYTHROCYTE [DISTWIDTH] IN BLOOD BY AUTOMATED COUNT: 19.9 % (ref 10–15)
ERYTHROCYTE [DISTWIDTH] IN BLOOD BY AUTOMATED COUNT: 21.2 % (ref 10–15)
ERYTHROCYTE [DISTWIDTH] IN BLOOD BY AUTOMATED COUNT: 21.3 % (ref 10–15)
GFR SERPL CREATININE-BSD FRML MDRD: 71 ML/MIN/1.73M2
GFR SERPL CREATININE-BSD FRML MDRD: 88 ML/MIN/1.73M2
GFR SERPL CREATININE-BSD FRML MDRD: NORMAL ML/MIN/{1.73_M2}
GLUCOSE CSF-MCNC: 152 MG/DL (ref 40–70)
GLUCOSE CSF-MCNC: 168 MG/DL (ref 40–70)
GLUCOSE CSF-MCNC: 175 MG/DL (ref 40–70)
GLUCOSE SERPL-MCNC: 285 MG/DL (ref 70–99)
GLUCOSE SERPL-MCNC: 340 MG/DL (ref 70–99)
GLUCOSE SERPL-MCNC: NORMAL MG/DL
HCT VFR BLD AUTO: 34.1 % (ref 35–47)
HCT VFR BLD AUTO: 34.6 % (ref 35–47)
HCT VFR BLD AUTO: 36.1 % (ref 35–47)
HCT VFR BLD AUTO: 36.2 % (ref 35–47)
HCT VFR BLD AUTO: 36.2 % (ref 35–47)
HCT VFR BLD AUTO: 36.8 % (ref 35–47)
HCT VFR BLD AUTO: 38.2 % (ref 35–47)
HGB BLD-MCNC: 11.2 G/DL (ref 11.7–15.7)
HGB BLD-MCNC: 11.3 G/DL (ref 11.7–15.7)
HGB BLD-MCNC: 11.4 G/DL (ref 11.7–15.7)
HGB BLD-MCNC: 11.7 G/DL (ref 11.7–15.7)
HGB BLD-MCNC: 11.8 G/DL (ref 11.7–15.7)
HGB BLD-MCNC: 11.9 G/DL (ref 11.7–15.7)
HGB BLD-MCNC: 12.3 G/DL (ref 11.7–15.7)
IMM GRANULOCYTES # BLD: 0.1 10E3/UL
IMM GRANULOCYTES # BLD: 0.2 10E3/UL
IMM GRANULOCYTES # BLD: 0.3 10E3/UL
IMM GRANULOCYTES # BLD: 0.3 10E3/UL
IMM GRANULOCYTES NFR BLD: 1 %
IMM GRANULOCYTES NFR BLD: 2 %
IMM GRANULOCYTES NFR BLD: 3 %
IMM GRANULOCYTES NFR BLD: 4 %
INTERPRETATION: NORMAL
LYMPH ABN NFR CSF MANUAL: 50 %
LYMPHOCYTES # BLD AUTO: 0.6 10E3/UL (ref 0.8–5.3)
LYMPHOCYTES # BLD AUTO: 0.7 10E3/UL (ref 0.8–5.3)
LYMPHOCYTES # BLD AUTO: 1 10E3/UL (ref 0.8–5.3)
LYMPHOCYTES # BLD AUTO: 1.5 10E3/UL (ref 0.8–5.3)
LYMPHOCYTES # BLD AUTO: 1.7 10E3/UL (ref 0.8–5.3)
LYMPHOCYTES # BLD AUTO: 2.1 10E3/UL (ref 0.8–5.3)
LYMPHOCYTES # BLD AUTO: 2.6 10E3/UL (ref 0.8–5.3)
LYMPHOCYTES NFR BLD AUTO: 12 %
LYMPHOCYTES NFR BLD AUTO: 12 %
LYMPHOCYTES NFR BLD AUTO: 14 %
LYMPHOCYTES NFR BLD AUTO: 18 %
LYMPHOCYTES NFR BLD AUTO: 19 %
LYMPHOCYTES NFR BLD AUTO: 7 %
LYMPHOCYTES NFR BLD AUTO: 9 %
MCH RBC QN AUTO: 28.1 PG (ref 26.5–33)
MCH RBC QN AUTO: 28.2 PG (ref 26.5–33)
MCH RBC QN AUTO: 28.4 PG (ref 26.5–33)
MCH RBC QN AUTO: 28.5 PG (ref 26.5–33)
MCH RBC QN AUTO: 28.8 PG (ref 26.5–33)
MCH RBC QN AUTO: 29.5 PG (ref 26.5–33)
MCH RBC QN AUTO: 29.9 PG (ref 26.5–33)
MCHC RBC AUTO-ENTMCNC: 31.6 G/DL (ref 31.5–36.5)
MCHC RBC AUTO-ENTMCNC: 32.1 G/DL (ref 31.5–36.5)
MCHC RBC AUTO-ENTMCNC: 32.2 G/DL (ref 31.5–36.5)
MCHC RBC AUTO-ENTMCNC: 32.3 G/DL (ref 31.5–36.5)
MCHC RBC AUTO-ENTMCNC: 32.4 G/DL (ref 31.5–36.5)
MCHC RBC AUTO-ENTMCNC: 32.9 G/DL (ref 31.5–36.5)
MCHC RBC AUTO-ENTMCNC: 33.1 G/DL (ref 31.5–36.5)
MCV RBC AUTO: 88 FL (ref 78–100)
MCV RBC AUTO: 89 FL (ref 78–100)
MCV RBC AUTO: 89 FL (ref 78–100)
MCV RBC AUTO: 90 FL (ref 78–100)
MCV RBC AUTO: 91 FL (ref 78–100)
MONOCYTES # BLD AUTO: 0.2 10E3/UL (ref 0–1.3)
MONOCYTES # BLD AUTO: 0.3 10E3/UL (ref 0–1.3)
MONOCYTES # BLD AUTO: 0.4 10E3/UL (ref 0–1.3)
MONOCYTES # BLD AUTO: 0.5 10E3/UL (ref 0–1.3)
MONOCYTES # BLD AUTO: 0.6 10E3/UL (ref 0–1.3)
MONOCYTES NFR BLD AUTO: 1 %
MONOCYTES NFR BLD AUTO: 3 %
MONOCYTES NFR BLD AUTO: 4 %
MONOCYTES NFR BLD AUTO: 6 %
MONOCYTES NFR BLD AUTO: 6 %
MONOS+MACROS NFR CSF MANUAL: 35 %
NEUTROPHILS # BLD AUTO: 10.1 10E3/UL (ref 1.6–8.3)
NEUTROPHILS # BLD AUTO: 10.6 10E3/UL (ref 1.6–8.3)
NEUTROPHILS # BLD AUTO: 11.9 10E3/UL (ref 1.6–8.3)
NEUTROPHILS # BLD AUTO: 5.3 10E3/UL (ref 1.6–8.3)
NEUTROPHILS # BLD AUTO: 6 10E3/UL (ref 1.6–8.3)
NEUTROPHILS # BLD AUTO: 8.2 10E3/UL (ref 1.6–8.3)
NEUTROPHILS # BLD AUTO: 9.4 10E3/UL (ref 1.6–8.3)
NEUTROPHILS NFR BLD AUTO: 75 %
NEUTROPHILS NFR BLD AUTO: 78 %
NEUTROPHILS NFR BLD AUTO: 79 %
NEUTROPHILS NFR BLD AUTO: 81 %
NEUTROPHILS NFR BLD AUTO: 81 %
NEUTROPHILS NFR BLD AUTO: 82 %
NEUTROPHILS NFR BLD AUTO: 89 %
NEUTROPHILS NFR CSF MANUAL: 4 %
NRBC # BLD AUTO: 0 10E3/UL
NRBC # BLD AUTO: 0.1 10E3/UL
NRBC BLD AUTO-RTO: 0 /100
NRBC BLD AUTO-RTO: 1 /100
OTHER CELLS CSF: 11 %
PATH REPORT.COMMENTS IMP SPEC: ABNORMAL
PATH REPORT.COMMENTS IMP SPEC: YES
PATH REPORT.FINAL DX SPEC: ABNORMAL
PATH REPORT.GROSS SPEC: ABNORMAL
PATH REPORT.MICROSCOPIC SPEC OTHER STN: ABNORMAL
PATH REPORT.RELEVANT HX SPEC: ABNORMAL
PATH REV: ABNORMAL
PATH REV: ABNORMAL
PLATELET # BLD AUTO: 148 10E3/UL (ref 150–450)
PLATELET # BLD AUTO: 169 10E3/UL (ref 150–450)
PLATELET # BLD AUTO: 278 10E3/UL (ref 150–450)
PLATELET # BLD AUTO: 286 10E3/UL (ref 150–450)
PLATELET # BLD AUTO: 289 10E3/UL (ref 150–450)
PLATELET # BLD AUTO: 296 10E3/UL (ref 150–450)
PLATELET # BLD AUTO: 349 10E3/UL (ref 150–450)
POTASSIUM SERPL-SCNC: 4.3 MMOL/L (ref 3.4–5.3)
POTASSIUM SERPL-SCNC: 4.4 MMOL/L (ref 3.4–5.3)
POTASSIUM SERPL-SCNC: NORMAL MMOL/L
PROT CSF-MCNC: 17.7 MG/DL (ref 15–45)
PROT CSF-MCNC: 30.4 MG/DL (ref 15–45)
PROT CSF-MCNC: 43.1 MG/DL (ref 15–45)
PROT SERPL-MCNC: 5.6 G/DL (ref 6.4–8.3)
PROT SERPL-MCNC: 5.8 G/DL (ref 6.4–8.3)
PROT SERPL-MCNC: NORMAL G/DL
RBC # BLD AUTO: 3.78 10E6/UL (ref 3.8–5.2)
RBC # BLD AUTO: 3.8 10E6/UL (ref 3.8–5.2)
RBC # BLD AUTO: 4.04 10E6/UL (ref 3.8–5.2)
RBC # BLD AUTO: 4.12 10E6/UL (ref 3.8–5.2)
RBC # BLD AUTO: 4.13 10E6/UL (ref 3.8–5.2)
RBC # BLD AUTO: 4.2 10E6/UL (ref 3.8–5.2)
RBC # BLD AUTO: 4.31 10E6/UL (ref 3.8–5.2)
RBC # CSF MANUAL: 14 /UL (ref 0–2)
RBC # CSF MANUAL: 26 /UL (ref 0–2)
RBC # CSF MANUAL: 4 /UL (ref 0–2)
RBC # CSF MANUAL: 6 /UL (ref 0–2)
SARS-COV-2 RNA RESP QL NAA+PROBE: NEGATIVE
SODIUM SERPL-SCNC: 134 MMOL/L (ref 136–145)
SODIUM SERPL-SCNC: 136 MMOL/L (ref 136–145)
SODIUM SERPL-SCNC: NORMAL MMOL/L
TUBE # CSF: 3
WBC # BLD AUTO: 11.9 10E3/UL (ref 4–11)
WBC # BLD AUTO: 12.6 10E3/UL (ref 4–11)
WBC # BLD AUTO: 14 10E3/UL (ref 4–11)
WBC # BLD AUTO: 14.4 10E3/UL (ref 4–11)
WBC # BLD AUTO: 6.9 10E3/UL (ref 4–11)
WBC # BLD AUTO: 7.5 10E3/UL (ref 4–11)
WBC # BLD AUTO: 9.1 10E3/UL (ref 4–11)
WBC # CSF MANUAL: 0 /UL (ref 0–5)
WBC # CSF MANUAL: 12 /UL (ref 0–5)
WBC # CSF MANUAL: 4 /UL (ref 0–5)
WBC # CSF MANUAL: 5 /UL (ref 0–5)

## 2023-01-01 PROCEDURE — 99207 PR NO CHARGE LOS: CPT | Performed by: PHYSICIAN ASSISTANT

## 2023-01-01 PROCEDURE — 77290 THER RAD SIMULAJ FIELD CPLX: CPT | Performed by: RADIOLOGY

## 2023-01-01 PROCEDURE — 85025 COMPLETE CBC W/AUTO DIFF WBC: CPT

## 2023-01-01 PROCEDURE — G0463 HOSPITAL OUTPT CLINIC VISIT: HCPCS | Mod: 25 | Performed by: PHYSICIAN ASSISTANT

## 2023-01-01 PROCEDURE — 77334 RADIATION TREATMENT AID(S): CPT | Performed by: RADIOLOGY

## 2023-01-01 PROCEDURE — 88108 CYTOPATH CONCENTRATE TECH: CPT | Mod: 26 | Performed by: PATHOLOGY

## 2023-01-01 PROCEDURE — 96542 CHEMOTHERAPY INJECTION: CPT | Performed by: PHYSICIAN ASSISTANT

## 2023-01-01 PROCEDURE — G0463 HOSPITAL OUTPT CLINIC VISIT: HCPCS

## 2023-01-01 PROCEDURE — 62252 CSF SHUNT REPROGRAM: CPT | Performed by: PHYSICIAN ASSISTANT

## 2023-01-01 PROCEDURE — 250N000013 HC RX MED GY IP 250 OP 250 PS 637: Performed by: NURSE PRACTITIONER

## 2023-01-01 PROCEDURE — 82378 CARCINOEMBRYONIC ANTIGEN: CPT | Performed by: INTERNAL MEDICINE

## 2023-01-01 PROCEDURE — 89050 BODY FLUID CELL COUNT: CPT | Performed by: PHYSICIAN ASSISTANT

## 2023-01-01 PROCEDURE — 250N000011 HC RX IP 250 OP 636: Performed by: PHYSICIAN ASSISTANT

## 2023-01-01 PROCEDURE — 99215 OFFICE O/P EST HI 40 MIN: CPT | Performed by: INTERNAL MEDICINE

## 2023-01-01 PROCEDURE — 258N000003 HC RX IP 258 OP 636: Performed by: PHYSICIAN ASSISTANT

## 2023-01-01 PROCEDURE — 96361 HYDRATE IV INFUSION ADD-ON: CPT

## 2023-01-01 PROCEDURE — 96374 THER/PROPH/DIAG INJ IV PUSH: CPT

## 2023-01-01 PROCEDURE — 99205 OFFICE O/P NEW HI 60 MIN: CPT | Mod: 95 | Performed by: INTERNAL MEDICINE

## 2023-01-01 PROCEDURE — 84157 ASSAY OF PROTEIN OTHER: CPT | Performed by: PHYSICIAN ASSISTANT

## 2023-01-01 PROCEDURE — 77280 THER RAD SIMULAJ FIELD SMPL: CPT | Mod: 26 | Performed by: RADIOLOGY

## 2023-01-01 PROCEDURE — 99214 OFFICE O/P EST MOD 30 MIN: CPT | Mod: 95 | Performed by: INTERNAL MEDICINE

## 2023-01-01 PROCEDURE — 120N000001 HC R&B MED SURG/OB

## 2023-01-01 PROCEDURE — 36591 DRAW BLOOD OFF VENOUS DEVICE: CPT

## 2023-01-01 PROCEDURE — 96413 CHEMO IV INFUSION 1 HR: CPT

## 2023-01-01 PROCEDURE — 99231 SBSQ HOSP IP/OBS SF/LOW 25: CPT | Mod: GV | Performed by: INTERNAL MEDICINE

## 2023-01-01 PROCEDURE — 99207 PR NO BILLABLE SERVICE THIS VISIT: CPT | Performed by: NURSE PRACTITIONER

## 2023-01-01 PROCEDURE — 258N000003 HC RX IP 258 OP 636: Performed by: INTERNAL MEDICINE

## 2023-01-01 PROCEDURE — 250N000011 HC RX IP 250 OP 636: Performed by: NURSE PRACTITIONER

## 2023-01-01 PROCEDURE — 77417 THER RADIOLOGY PORT IMAGE(S): CPT | Performed by: RADIOLOGY

## 2023-01-01 PROCEDURE — 250N000009 HC RX 250

## 2023-01-01 PROCEDURE — 62252 CSF SHUNT REPROGRAM: CPT | Mod: 25 | Performed by: PHYSICIAN ASSISTANT

## 2023-01-01 PROCEDURE — 77427 RADIATION TX MANAGEMENT X5: CPT | Performed by: RADIOLOGY

## 2023-01-01 PROCEDURE — 88108 CYTOPATH CONCENTRATE TECH: CPT | Mod: 26 | Performed by: STUDENT IN AN ORGANIZED HEALTH CARE EDUCATION/TRAINING PROGRAM

## 2023-01-01 PROCEDURE — 250N000011 HC RX IP 250 OP 636: Performed by: HOSPITALIST

## 2023-01-01 PROCEDURE — G0463 HOSPITAL OUTPT CLINIC VISIT: HCPCS | Performed by: INTERNAL MEDICINE

## 2023-01-01 PROCEDURE — 82945 GLUCOSE OTHER FLUID: CPT | Performed by: PHYSICIAN ASSISTANT

## 2023-01-01 PROCEDURE — 82378 CARCINOEMBRYONIC ANTIGEN: CPT

## 2023-01-01 PROCEDURE — 84157 ASSAY OF PROTEIN OTHER: CPT

## 2023-01-01 PROCEDURE — 250N000013 HC RX MED GY IP 250 OP 250 PS 637: Performed by: INTERNAL MEDICINE

## 2023-01-01 PROCEDURE — 96365 THER/PROPH/DIAG IV INF INIT: CPT | Mod: 59

## 2023-01-01 PROCEDURE — 96375 TX/PRO/DX INJ NEW DRUG ADDON: CPT

## 2023-01-01 PROCEDURE — 77412 RADIATION TX DELIVERY LVL 3: CPT | Performed by: RADIOLOGY

## 2023-01-01 PROCEDURE — 99231 SBSQ HOSP IP/OBS SF/LOW 25: CPT | Performed by: NURSE PRACTITIONER

## 2023-01-01 PROCEDURE — 80053 COMPREHEN METABOLIC PANEL: CPT | Performed by: INTERNAL MEDICINE

## 2023-01-01 PROCEDURE — 258N000003 HC RX IP 258 OP 636

## 2023-01-01 PROCEDURE — 36591 DRAW BLOOD OFF VENOUS DEVICE: CPT | Performed by: INTERNAL MEDICINE

## 2023-01-01 PROCEDURE — 88108 CYTOPATH CONCENTRATE TECH: CPT | Mod: TC | Performed by: PHYSICIAN ASSISTANT

## 2023-01-01 PROCEDURE — 99215 OFFICE O/P EST HI 40 MIN: CPT | Performed by: PHYSICIAN ASSISTANT

## 2023-01-01 PROCEDURE — 62252 CSF SHUNT REPROGRAM: CPT | Performed by: NURSE PRACTITIONER

## 2023-01-01 PROCEDURE — 77280 THER RAD SIMULAJ FIELD SMPL: CPT | Performed by: RADIOLOGY

## 2023-01-01 PROCEDURE — 99214 OFFICE O/P EST MOD 30 MIN: CPT | Performed by: INTERNAL MEDICINE

## 2023-01-01 PROCEDURE — 110N000005 HC R&B HOSPICE, ACCENT

## 2023-01-01 PROCEDURE — 96375 TX/PRO/DX INJ NEW DRUG ADDON: CPT | Mod: 59

## 2023-01-01 PROCEDURE — 80053 COMPREHEN METABOLIC PANEL: CPT

## 2023-01-01 PROCEDURE — 77307 TELETHX ISODOSE PLAN CPLX: CPT | Performed by: RADIOLOGY

## 2023-01-01 PROCEDURE — 99207 PR NO BILLABLE SERVICE THIS VISIT: CPT | Performed by: INTERNAL MEDICINE

## 2023-01-01 PROCEDURE — A9585 GADOBUTROL INJECTION: HCPCS | Performed by: RADIOLOGY

## 2023-01-01 PROCEDURE — 77307 TELETHX ISODOSE PLAN CPLX: CPT | Mod: 26 | Performed by: RADIOLOGY

## 2023-01-01 PROCEDURE — 99239 HOSP IP/OBS DSCHRG MGMT >30: CPT | Performed by: NURSE PRACTITIONER

## 2023-01-01 PROCEDURE — 89050 BODY FLUID CELL COUNT: CPT

## 2023-01-01 PROCEDURE — 85004 AUTOMATED DIFF WBC COUNT: CPT | Performed by: INTERNAL MEDICINE

## 2023-01-01 PROCEDURE — 88108 CYTOPATH CONCENTRATE TECH: CPT | Mod: TC | Performed by: INTERNAL MEDICINE

## 2023-01-01 PROCEDURE — 99205 OFFICE O/P NEW HI 60 MIN: CPT | Performed by: RADIOLOGY

## 2023-01-01 PROCEDURE — 77336 RADIATION PHYSICS CONSULT: CPT | Performed by: RADIOLOGY

## 2023-01-01 PROCEDURE — 250N000011 HC RX IP 250 OP 636: Performed by: INTERNAL MEDICINE

## 2023-01-01 PROCEDURE — U0005 INFEC AGEN DETEC AMPLI PROBE: HCPCS | Performed by: INTERNAL MEDICINE

## 2023-01-01 PROCEDURE — 70553 MRI BRAIN STEM W/O & W/DYE: CPT | Mod: GC | Performed by: RADIOLOGY

## 2023-01-01 PROCEDURE — 250N000011 HC RX IP 250 OP 636: Mod: JW | Performed by: INTERNAL MEDICINE

## 2023-01-01 PROCEDURE — 82945 GLUCOSE OTHER FLUID: CPT

## 2023-01-01 PROCEDURE — G0463 HOSPITAL OUTPT CLINIC VISIT: HCPCS | Mod: 25 | Performed by: INTERNAL MEDICINE

## 2023-01-01 PROCEDURE — 77263 THER RADIOLOGY TX PLNG CPLX: CPT | Performed by: RADIOLOGY

## 2023-01-01 PROCEDURE — 77290 THER RAD SIMULAJ FIELD CPLX: CPT | Mod: 26 | Performed by: RADIOLOGY

## 2023-01-01 PROCEDURE — 88108 CYTOPATH CONCENTRATE TECH: CPT | Mod: TC

## 2023-01-01 PROCEDURE — G0463 HOSPITAL OUTPT CLINIC VISIT: HCPCS | Mod: 25 | Performed by: RADIOLOGY

## 2023-01-01 PROCEDURE — 99222 1ST HOSP IP/OBS MODERATE 55: CPT | Mod: AI | Performed by: NURSE PRACTITIONER

## 2023-01-01 PROCEDURE — 36415 COLL VENOUS BLD VENIPUNCTURE: CPT

## 2023-01-01 PROCEDURE — 89051 BODY FLUID CELL COUNT: CPT | Performed by: PHYSICIAN ASSISTANT

## 2023-01-01 PROCEDURE — 77334 RADIATION TREATMENT AID(S): CPT | Mod: 26 | Performed by: RADIOLOGY

## 2023-01-01 PROCEDURE — 99238 HOSP IP/OBS DSCHRG MGMT 30/<: CPT | Mod: GV | Performed by: INTERNAL MEDICINE

## 2023-01-01 RX ORDER — DIPHENHYDRAMINE HYDROCHLORIDE 50 MG/ML
50 INJECTION INTRAMUSCULAR; INTRAVENOUS
Status: CANCELLED
Start: 2023-01-01

## 2023-01-01 RX ORDER — EPINEPHRINE 1 MG/ML
0.3 INJECTION, SOLUTION INTRAMUSCULAR; SUBCUTANEOUS EVERY 5 MIN PRN
Status: CANCELLED | OUTPATIENT
Start: 2023-01-01

## 2023-01-01 RX ORDER — PROCHLORPERAZINE 25 MG
25 SUPPOSITORY, RECTAL RECTAL EVERY 12 HOURS PRN
Status: CANCELLED | OUTPATIENT
Start: 2023-01-01

## 2023-01-01 RX ORDER — VENLAFAXINE 75 MG/1
75 TABLET ORAL DAILY
Qty: 30 TABLET | Refills: 3 | Status: SHIPPED | OUTPATIENT
Start: 2023-01-01

## 2023-01-01 RX ORDER — METHYLPREDNISOLONE SODIUM SUCCINATE 125 MG/2ML
125 INJECTION, POWDER, LYOPHILIZED, FOR SOLUTION INTRAMUSCULAR; INTRAVENOUS
Status: CANCELLED
Start: 2023-01-01

## 2023-01-01 RX ORDER — HEPARIN SODIUM,PORCINE 10 UNIT/ML
5 VIAL (ML) INTRAVENOUS
Status: CANCELLED | OUTPATIENT
Start: 2023-01-01

## 2023-01-01 RX ORDER — DOXEPIN HYDROCHLORIDE 10 MG/ML
3-6 SOLUTION ORAL
Qty: 118 ML | Refills: 0 | Status: SHIPPED | OUTPATIENT
Start: 2023-01-01

## 2023-01-01 RX ORDER — LORAZEPAM 2 MG/ML
1 INJECTION INTRAMUSCULAR
Status: DISCONTINUED | OUTPATIENT
Start: 2023-01-01 | End: 2023-01-01 | Stop reason: HOSPADM

## 2023-01-01 RX ORDER — LORAZEPAM 2 MG/ML
0.5 INJECTION INTRAMUSCULAR DAILY PRN
Status: DISCONTINUED | OUTPATIENT
Start: 2023-01-01 | End: 2023-01-01 | Stop reason: HOSPADM

## 2023-01-01 RX ORDER — LORAZEPAM 2 MG/ML
0.5 INJECTION INTRAMUSCULAR EVERY 4 HOURS PRN
Status: CANCELLED | OUTPATIENT
Start: 2023-01-01

## 2023-01-01 RX ORDER — HYDROMORPHONE HYDROCHLORIDE 1 MG/ML
4 SOLUTION ORAL
Status: DISCONTINUED | OUTPATIENT
Start: 2023-01-01 | End: 2023-01-01

## 2023-01-01 RX ORDER — HEPARIN SODIUM (PORCINE) LOCK FLUSH IV SOLN 100 UNIT/ML 100 UNIT/ML
5 SOLUTION INTRAVENOUS
Status: DISCONTINUED | OUTPATIENT
Start: 2023-01-01 | End: 2023-01-01 | Stop reason: HOSPADM

## 2023-01-01 RX ORDER — LORAZEPAM 2 MG/ML
0.5 INJECTION INTRAMUSCULAR DAILY PRN
Status: CANCELLED
Start: 2023-01-01

## 2023-01-01 RX ORDER — NALOXONE HYDROCHLORIDE 0.4 MG/ML
0.1 INJECTION, SOLUTION INTRAMUSCULAR; INTRAVENOUS; SUBCUTANEOUS
Status: CANCELLED | OUTPATIENT
Start: 2023-01-01

## 2023-01-01 RX ORDER — HEPARIN SODIUM,PORCINE 10 UNIT/ML
5 VIAL (ML) INTRAVENOUS
Status: DISCONTINUED | OUTPATIENT
Start: 2023-01-01 | End: 2023-01-01 | Stop reason: HOSPADM

## 2023-01-01 RX ORDER — ALBUTEROL SULFATE 0.83 MG/ML
2.5 SOLUTION RESPIRATORY (INHALATION)
Status: CANCELLED | OUTPATIENT
Start: 2023-01-01

## 2023-01-01 RX ORDER — ALBUTEROL SULFATE 90 UG/1
1-2 AEROSOL, METERED RESPIRATORY (INHALATION)
Status: CANCELLED
Start: 2023-01-01

## 2023-01-01 RX ORDER — METOCLOPRAMIDE 10 MG/1
10 TABLET ORAL EVERY 6 HOURS PRN
Qty: 60 TABLET | Refills: 0 | Status: SHIPPED | OUTPATIENT
Start: 2023-01-01 | End: 2023-01-01

## 2023-01-01 RX ORDER — METOCLOPRAMIDE 10 MG/1
10 TABLET ORAL EVERY 6 HOURS PRN
Qty: 15 TABLET | Refills: 0 | Status: SHIPPED | OUTPATIENT
Start: 2023-01-01 | End: 2023-01-01

## 2023-01-01 RX ORDER — ONDANSETRON 2 MG/ML
4 INJECTION INTRAMUSCULAR; INTRAVENOUS EVERY 6 HOURS PRN
Status: DISCONTINUED | OUTPATIENT
Start: 2023-01-01 | End: 2023-01-01 | Stop reason: HOSPADM

## 2023-01-01 RX ORDER — MEPERIDINE HYDROCHLORIDE 25 MG/ML
25 INJECTION INTRAMUSCULAR; INTRAVENOUS; SUBCUTANEOUS EVERY 30 MIN PRN
Status: CANCELLED | OUTPATIENT
Start: 2023-01-01

## 2023-01-01 RX ORDER — DOXEPIN HYDROCHLORIDE 10 MG/ML
3-6 SOLUTION ORAL
Status: DISCONTINUED | OUTPATIENT
Start: 2023-01-01 | End: 2023-01-01 | Stop reason: HOSPADM

## 2023-01-01 RX ORDER — METOCLOPRAMIDE 5 MG/1
10 TABLET ORAL EVERY 6 HOURS PRN
Status: CANCELLED | OUTPATIENT
Start: 2023-01-01

## 2023-01-01 RX ORDER — ONDANSETRON 8 MG/1
8 TABLET, FILM COATED ORAL EVERY 8 HOURS PRN
Qty: 30 TABLET | Refills: 3 | Status: ON HOLD | OUTPATIENT
Start: 2023-01-01 | End: 2023-01-01

## 2023-01-01 RX ORDER — HYDROMORPHONE HYDROCHLORIDE 2 MG/1
2 TABLET ORAL
Status: DISCONTINUED | OUTPATIENT
Start: 2023-01-01 | End: 2023-01-01

## 2023-01-01 RX ORDER — AMOXICILLIN 250 MG
1 CAPSULE ORAL 2 TIMES DAILY PRN
Qty: 15 TABLET | Refills: 0 | Status: SHIPPED | OUTPATIENT
Start: 2023-01-01

## 2023-01-01 RX ORDER — HYDROMORPHONE HYDROCHLORIDE 2 MG/1
4 TABLET ORAL
Status: DISCONTINUED | OUTPATIENT
Start: 2023-01-01 | End: 2023-01-01

## 2023-01-01 RX ORDER — OLANZAPINE 5 MG/1
5 TABLET, ORALLY DISINTEGRATING ORAL EVERY 6 HOURS PRN
Status: CANCELLED | OUTPATIENT
Start: 2023-01-01

## 2023-01-01 RX ORDER — DIPHENHYDRAMINE HCL 25 MG
25 CAPSULE ORAL EVERY 6 HOURS PRN
Qty: 30 CAPSULE | Refills: 0 | Status: SHIPPED | OUTPATIENT
Start: 2023-01-01

## 2023-01-01 RX ORDER — LIDOCAINE 40 MG/G
CREAM TOPICAL
Status: DISCONTINUED | OUTPATIENT
Start: 2023-01-01 | End: 2023-01-01 | Stop reason: HOSPADM

## 2023-01-01 RX ORDER — HEPARIN SODIUM,PORCINE 10 UNIT/ML
5-10 VIAL (ML) INTRAVENOUS EVERY 24 HOURS
Status: DISCONTINUED | OUTPATIENT
Start: 2023-01-01 | End: 2023-01-01 | Stop reason: HOSPADM

## 2023-01-01 RX ORDER — HEPARIN SODIUM (PORCINE) LOCK FLUSH IV SOLN 100 UNIT/ML 100 UNIT/ML
5-10 SOLUTION INTRAVENOUS
Status: DISCONTINUED | OUTPATIENT
Start: 2023-01-01 | End: 2023-01-01 | Stop reason: HOSPADM

## 2023-01-01 RX ORDER — OLANZAPINE 5 MG/1
5 TABLET ORAL AT BEDTIME
Status: CANCELLED | OUTPATIENT
Start: 2023-01-01

## 2023-01-01 RX ORDER — HEPARIN SODIUM (PORCINE) LOCK FLUSH IV SOLN 100 UNIT/ML 100 UNIT/ML
5 SOLUTION INTRAVENOUS ONCE
Status: COMPLETED | OUTPATIENT
Start: 2023-01-01 | End: 2023-01-01

## 2023-01-01 RX ORDER — LIDOCAINE 40 MG/G
CREAM TOPICAL
Status: CANCELLED | OUTPATIENT
Start: 2023-01-01

## 2023-01-01 RX ORDER — LORAZEPAM 0.5 MG/1
1 TABLET ORAL
Status: DISCONTINUED | OUTPATIENT
Start: 2023-01-01 | End: 2023-01-01 | Stop reason: HOSPADM

## 2023-01-01 RX ORDER — LORAZEPAM 2 MG/ML
0.5 INJECTION INTRAMUSCULAR EVERY 4 HOURS PRN
Status: DISCONTINUED | OUTPATIENT
Start: 2023-01-01 | End: 2023-01-01 | Stop reason: HOSPADM

## 2023-01-01 RX ORDER — SULFAMETHOXAZOLE/TRIMETHOPRIM 800-160 MG
1 TABLET ORAL
Qty: 24 TABLET | Refills: 3 | Status: ON HOLD | OUTPATIENT
Start: 2023-01-01 | End: 2023-01-01

## 2023-01-01 RX ORDER — CARBOXYMETHYLCELLULOSE SODIUM 5 MG/ML
1-2 SOLUTION/ DROPS OPHTHALMIC
Status: DISCONTINUED | OUTPATIENT
Start: 2023-01-01 | End: 2023-01-01 | Stop reason: HOSPADM

## 2023-01-01 RX ORDER — HEPARIN SODIUM (PORCINE) LOCK FLUSH IV SOLN 100 UNIT/ML 100 UNIT/ML
5 SOLUTION INTRAVENOUS
Status: CANCELLED | OUTPATIENT
Start: 2023-01-01

## 2023-01-01 RX ORDER — NALOXONE HYDROCHLORIDE 0.4 MG/ML
0.1 INJECTION, SOLUTION INTRAMUSCULAR; INTRAVENOUS; SUBCUTANEOUS
Status: DISCONTINUED | OUTPATIENT
Start: 2023-01-01 | End: 2023-01-01 | Stop reason: HOSPADM

## 2023-01-01 RX ORDER — BISACODYL 10 MG
10 SUPPOSITORY, RECTAL RECTAL DAILY PRN
Qty: 2 SUPPOSITORY | Refills: 0 | Status: SHIPPED | OUTPATIENT
Start: 2023-01-01

## 2023-01-01 RX ORDER — HYDROMORPHONE HYDROCHLORIDE 1 MG/ML
0.3 INJECTION, SOLUTION INTRAMUSCULAR; INTRAVENOUS; SUBCUTANEOUS
Status: DISCONTINUED | OUTPATIENT
Start: 2023-01-01 | End: 2023-01-01 | Stop reason: HOSPADM

## 2023-01-01 RX ORDER — OXYCODONE HYDROCHLORIDE 5 MG/1
10 TABLET ORAL EVERY 4 HOURS PRN
Status: DISCONTINUED | OUTPATIENT
Start: 2023-01-01 | End: 2023-01-01

## 2023-01-01 RX ORDER — GLYCOPYRROLATE 0.2 MG/ML
0.2 INJECTION, SOLUTION INTRAMUSCULAR; INTRAVENOUS EVERY 4 HOURS PRN
Status: DISCONTINUED | OUTPATIENT
Start: 2023-01-01 | End: 2023-01-01 | Stop reason: HOSPADM

## 2023-01-01 RX ORDER — NALOXONE HYDROCHLORIDE 0.4 MG/ML
0.2 INJECTION, SOLUTION INTRAMUSCULAR; INTRAVENOUS; SUBCUTANEOUS
Status: CANCELLED | OUTPATIENT
Start: 2023-01-01

## 2023-01-01 RX ORDER — GLYCOPYRROLATE 0.2 MG/ML
0.2 INJECTION, SOLUTION INTRAMUSCULAR; INTRAVENOUS EVERY 4 HOURS PRN
Status: CANCELLED | OUTPATIENT
Start: 2023-01-01

## 2023-01-01 RX ORDER — OLANZAPINE 5 MG/1
5 TABLET, ORALLY DISINTEGRATING ORAL EVERY 6 HOURS PRN
Qty: 30 TABLET | Refills: 0 | Status: SHIPPED | OUTPATIENT
Start: 2023-01-01 | End: 2023-01-01

## 2023-01-01 RX ORDER — ONDANSETRON 4 MG/1
4 TABLET, ORALLY DISINTEGRATING ORAL EVERY 6 HOURS PRN
Status: CANCELLED | OUTPATIENT
Start: 2023-01-01

## 2023-01-01 RX ORDER — LORAZEPAM 2 MG/ML
1 INJECTION INTRAMUSCULAR
Status: CANCELLED | OUTPATIENT
Start: 2023-01-01

## 2023-01-01 RX ORDER — OLANZAPINE 5 MG/1
5 TABLET, ORALLY DISINTEGRATING ORAL EVERY 6 HOURS PRN
Status: DISCONTINUED | OUTPATIENT
Start: 2023-01-01 | End: 2023-01-01 | Stop reason: HOSPADM

## 2023-01-01 RX ORDER — ONDANSETRON 4 MG/1
4 TABLET, ORALLY DISINTEGRATING ORAL EVERY 6 HOURS PRN
Qty: 30 TABLET | Refills: 0 | Status: SHIPPED | OUTPATIENT
Start: 2023-01-01 | End: 2023-01-01

## 2023-01-01 RX ORDER — MIRTAZAPINE 7.5 MG/1
TABLET, FILM COATED ORAL
Qty: 90 TABLET | Refills: 3 | Status: SHIPPED | OUTPATIENT
Start: 2023-01-01 | End: 2023-01-01

## 2023-01-01 RX ORDER — HYDROMORPHONE HYDROCHLORIDE 1 MG/ML
0.5 INJECTION, SOLUTION INTRAMUSCULAR; INTRAVENOUS; SUBCUTANEOUS
Status: CANCELLED | OUTPATIENT
Start: 2023-01-01

## 2023-01-01 RX ORDER — NALOXONE HYDROCHLORIDE 0.4 MG/ML
0.2 INJECTION, SOLUTION INTRAMUSCULAR; INTRAVENOUS; SUBCUTANEOUS
Status: DISCONTINUED | OUTPATIENT
Start: 2023-01-01 | End: 2023-01-01

## 2023-01-01 RX ORDER — OXYCODONE HYDROCHLORIDE 5 MG/1
10 TABLET ORAL EVERY 4 HOURS PRN
Status: DISCONTINUED | OUTPATIENT
Start: 2023-01-01 | End: 2023-01-01 | Stop reason: HOSPADM

## 2023-01-01 RX ORDER — OXYCODONE HYDROCHLORIDE 5 MG/1
10 TABLET ORAL EVERY 4 HOURS PRN
Qty: 90 TABLET | Refills: 0 | Status: ON HOLD | OUTPATIENT
Start: 2023-01-01 | End: 2023-01-01

## 2023-01-01 RX ORDER — ACETAMINOPHEN 325 MG/1
650 TABLET ORAL
Status: CANCELLED
Start: 2023-01-01

## 2023-01-01 RX ORDER — AMOXICILLIN 250 MG
1 CAPSULE ORAL 2 TIMES DAILY PRN
Status: DISCONTINUED | OUTPATIENT
Start: 2023-01-01 | End: 2023-01-01 | Stop reason: HOSPADM

## 2023-01-01 RX ORDER — HEPARIN SODIUM,PORCINE 10 UNIT/ML
5-10 VIAL (ML) INTRAVENOUS EVERY 24 HOURS
Status: CANCELLED | OUTPATIENT
Start: 2023-01-01

## 2023-01-01 RX ORDER — HYDROMORPHONE HYDROCHLORIDE 1 MG/ML
1 SOLUTION ORAL
Status: DISCONTINUED | OUTPATIENT
Start: 2023-01-01 | End: 2023-01-01

## 2023-01-01 RX ORDER — HEPARIN SODIUM (PORCINE) LOCK FLUSH IV SOLN 100 UNIT/ML 100 UNIT/ML
500 SOLUTION INTRAVENOUS ONCE
Status: COMPLETED | OUTPATIENT
Start: 2023-01-01 | End: 2023-01-01

## 2023-01-01 RX ORDER — DOXEPIN HYDROCHLORIDE 10 MG/ML
3-6 SOLUTION ORAL
Qty: 118 ML | Refills: 0 | Status: ON HOLD | OUTPATIENT
Start: 2023-01-01 | End: 2023-01-01

## 2023-01-01 RX ORDER — ONDANSETRON 4 MG/1
4 TABLET, ORALLY DISINTEGRATING ORAL EVERY 6 HOURS PRN
Status: DISCONTINUED | OUTPATIENT
Start: 2023-01-01 | End: 2023-01-01 | Stop reason: HOSPADM

## 2023-01-01 RX ORDER — OXYCODONE HYDROCHLORIDE 10 MG/1
10 TABLET ORAL EVERY 4 HOURS PRN
Qty: 30 TABLET | Refills: 0 | Status: SHIPPED | OUTPATIENT
Start: 2023-01-01

## 2023-01-01 RX ORDER — CARBOXYMETHYLCELLULOSE SODIUM 5 MG/ML
1-2 SOLUTION/ DROPS OPHTHALMIC
Status: CANCELLED | OUTPATIENT
Start: 2023-01-01

## 2023-01-01 RX ORDER — HYDROMORPHONE HYDROCHLORIDE 1 MG/ML
2 SOLUTION ORAL
Status: DISCONTINUED | OUTPATIENT
Start: 2023-01-01 | End: 2023-01-01

## 2023-01-01 RX ORDER — HYDROMORPHONE HYDROCHLORIDE 1 MG/ML
0.5 INJECTION, SOLUTION INTRAMUSCULAR; INTRAVENOUS; SUBCUTANEOUS
Status: DISCONTINUED | OUTPATIENT
Start: 2023-01-01 | End: 2023-01-01 | Stop reason: HOSPADM

## 2023-01-01 RX ORDER — LORAZEPAM 0.5 MG/1
0.5 TABLET ORAL EVERY 4 HOURS PRN
Qty: 15 TABLET | Refills: 0 | Status: SHIPPED | OUTPATIENT
Start: 2023-01-01

## 2023-01-01 RX ORDER — OLANZAPINE 5 MG/1
5 TABLET ORAL AT BEDTIME
Status: DISCONTINUED | OUTPATIENT
Start: 2023-01-01 | End: 2023-01-01 | Stop reason: HOSPADM

## 2023-01-01 RX ORDER — OLANZAPINE 5 MG/1
5 TABLET, ORALLY DISINTEGRATING ORAL EVERY 6 HOURS PRN
Qty: 15 TABLET | Refills: 0 | Status: SHIPPED | OUTPATIENT
Start: 2023-01-01

## 2023-01-01 RX ORDER — METOCLOPRAMIDE 5 MG/1
10 TABLET ORAL EVERY 6 HOURS PRN
Status: DISCONTINUED | OUTPATIENT
Start: 2023-01-01 | End: 2023-01-01 | Stop reason: HOSPADM

## 2023-01-01 RX ORDER — ACETAMINOPHEN 325 MG/1
975 TABLET ORAL EVERY 6 HOURS PRN
Status: DISCONTINUED | OUTPATIENT
Start: 2023-01-01 | End: 2023-01-01 | Stop reason: HOSPADM

## 2023-01-01 RX ORDER — DOXEPIN HYDROCHLORIDE 10 MG/ML
3-6 SOLUTION ORAL
Status: CANCELLED | OUTPATIENT
Start: 2023-01-01

## 2023-01-01 RX ORDER — ONDANSETRON 4 MG/1
4 TABLET, ORALLY DISINTEGRATING ORAL EVERY 6 HOURS PRN
Qty: 15 TABLET | Refills: 0 | Status: SHIPPED | OUTPATIENT
Start: 2023-01-01

## 2023-01-01 RX ORDER — LOPERAMIDE HCL 2 MG
2 CAPSULE ORAL 4 TIMES DAILY PRN
Status: DISCONTINUED | OUTPATIENT
Start: 2023-01-01 | End: 2023-01-01 | Stop reason: HOSPADM

## 2023-01-01 RX ORDER — PROCHLORPERAZINE MALEATE 10 MG
10 TABLET ORAL EVERY 6 HOURS PRN
Status: CANCELLED | OUTPATIENT
Start: 2023-01-01

## 2023-01-01 RX ORDER — HEPARIN SODIUM,PORCINE 10 UNIT/ML
5-10 VIAL (ML) INTRAVENOUS
Status: CANCELLED | OUTPATIENT
Start: 2023-01-01

## 2023-01-01 RX ORDER — DIPHENHYDRAMINE HCL 25 MG
50 CAPSULE ORAL
Status: CANCELLED
Start: 2023-01-01

## 2023-01-01 RX ORDER — DEXAMETHASONE 4 MG/1
4 TABLET ORAL
Qty: 30 TABLET | Refills: 0 | Status: ON HOLD | OUTPATIENT
Start: 2023-01-01 | End: 2023-01-01

## 2023-01-01 RX ORDER — PROCHLORPERAZINE 25 MG
25 SUPPOSITORY, RECTAL RECTAL EVERY 12 HOURS PRN
Status: DISCONTINUED | OUTPATIENT
Start: 2023-01-01 | End: 2023-01-01 | Stop reason: HOSPADM

## 2023-01-01 RX ORDER — HEPARIN SODIUM (PORCINE) LOCK FLUSH IV SOLN 100 UNIT/ML 100 UNIT/ML
5 SOLUTION INTRAVENOUS DAILY PRN
Status: DISCONTINUED | OUTPATIENT
Start: 2023-01-01 | End: 2023-01-01 | Stop reason: HOSPADM

## 2023-01-01 RX ORDER — HYDROMORPHONE HYDROCHLORIDE 1 MG/ML
0.5 INJECTION, SOLUTION INTRAMUSCULAR; INTRAVENOUS; SUBCUTANEOUS
Status: DISCONTINUED | OUTPATIENT
Start: 2023-01-01 | End: 2023-01-01

## 2023-01-01 RX ORDER — LORAZEPAM 0.5 MG/1
1 TABLET ORAL
Status: CANCELLED | OUTPATIENT
Start: 2023-01-01

## 2023-01-01 RX ORDER — ACETAMINOPHEN 325 MG/1
975 TABLET ORAL EVERY 6 HOURS PRN
Status: CANCELLED | OUTPATIENT
Start: 2023-01-01

## 2023-01-01 RX ORDER — ACETAMINOPHEN 500 MG
1000 TABLET ORAL EVERY 6 HOURS PRN
Qty: 30 TABLET | Refills: 0 | Status: SHIPPED | OUTPATIENT
Start: 2023-01-01

## 2023-01-01 RX ORDER — NALOXONE HYDROCHLORIDE 0.4 MG/ML
0.2 INJECTION, SOLUTION INTRAMUSCULAR; INTRAVENOUS; SUBCUTANEOUS
Status: DISCONTINUED | OUTPATIENT
Start: 2023-01-01 | End: 2023-01-01 | Stop reason: HOSPADM

## 2023-01-01 RX ORDER — HEPARIN SODIUM (PORCINE) LOCK FLUSH IV SOLN 100 UNIT/ML 100 UNIT/ML
5-10 SOLUTION INTRAVENOUS
Status: CANCELLED | OUTPATIENT
Start: 2023-01-01

## 2023-01-01 RX ORDER — LOPERAMIDE HCL 2 MG
2 CAPSULE ORAL 4 TIMES DAILY PRN
Status: CANCELLED | OUTPATIENT
Start: 2023-01-01

## 2023-01-01 RX ORDER — HEPARIN SODIUM,PORCINE 10 UNIT/ML
5-10 VIAL (ML) INTRAVENOUS
Status: DISCONTINUED | OUTPATIENT
Start: 2023-01-01 | End: 2023-01-01 | Stop reason: HOSPADM

## 2023-01-01 RX ORDER — DIPHENHYDRAMINE HCL 25 MG
25 CAPSULE ORAL EVERY 6 HOURS PRN
Status: DISCONTINUED | OUTPATIENT
Start: 2023-01-01 | End: 2023-01-01 | Stop reason: HOSPADM

## 2023-01-01 RX ORDER — PROCHLORPERAZINE MALEATE 10 MG
10 TABLET ORAL EVERY 6 HOURS PRN
Status: DISCONTINUED | OUTPATIENT
Start: 2023-01-01 | End: 2023-01-01 | Stop reason: HOSPADM

## 2023-01-01 RX ORDER — LANOLIN ALCOHOL/MO/W.PET/CERES
6 CREAM (GRAM) TOPICAL EVERY EVENING
Qty: 60 TABLET | Refills: 0 | Status: SHIPPED | OUTPATIENT
Start: 2023-01-01 | End: 2023-01-01

## 2023-01-01 RX ORDER — NALOXONE HYDROCHLORIDE 0.4 MG/ML
0.1 INJECTION, SOLUTION INTRAMUSCULAR; INTRAVENOUS; SUBCUTANEOUS
Status: DISCONTINUED | OUTPATIENT
Start: 2023-01-01 | End: 2023-01-01

## 2023-01-01 RX ORDER — OLANZAPINE 5 MG/1
5 TABLET ORAL AT BEDTIME
Qty: 30 TABLET | Refills: 1 | Status: ON HOLD | OUTPATIENT
Start: 2023-01-01 | End: 2023-01-01

## 2023-01-01 RX ORDER — AMOXICILLIN 250 MG
1 CAPSULE ORAL 2 TIMES DAILY PRN
Status: CANCELLED | OUTPATIENT
Start: 2023-01-01

## 2023-01-01 RX ORDER — LORAZEPAM 0.5 MG/1
0.5 TABLET ORAL EVERY 6 HOURS PRN
Qty: 30 TABLET | Refills: 0 | Status: ON HOLD | OUTPATIENT
Start: 2023-01-01 | End: 2023-01-01

## 2023-01-01 RX ORDER — ONDANSETRON 2 MG/ML
4 INJECTION INTRAMUSCULAR; INTRAVENOUS EVERY 6 HOURS PRN
Status: CANCELLED | OUTPATIENT
Start: 2023-01-01

## 2023-01-01 RX ORDER — OXYCODONE HYDROCHLORIDE 5 MG/1
5 TABLET ORAL EVERY 4 HOURS PRN
Status: DISCONTINUED | OUTPATIENT
Start: 2023-01-01 | End: 2023-01-01 | Stop reason: HOSPADM

## 2023-01-01 RX ORDER — HYDROMORPHONE HYDROCHLORIDE 1 MG/ML
0.3 INJECTION, SOLUTION INTRAMUSCULAR; INTRAVENOUS; SUBCUTANEOUS
Status: DISCONTINUED | OUTPATIENT
Start: 2023-01-01 | End: 2023-01-01

## 2023-01-01 RX ORDER — TRAZODONE HYDROCHLORIDE 50 MG/1
25 TABLET, FILM COATED ORAL
Qty: 15 TABLET | Refills: 3 | Status: SHIPPED | OUTPATIENT
Start: 2023-01-01 | End: 2023-01-01

## 2023-01-01 RX ORDER — DOXEPIN HYDROCHLORIDE 10 MG/ML
3-6 SOLUTION ORAL
Status: DISCONTINUED | OUTPATIENT
Start: 2023-01-01 | End: 2023-01-01

## 2023-01-01 RX ORDER — GADOBUTROL 604.72 MG/ML
7.5 INJECTION INTRAVENOUS ONCE
Status: COMPLETED | OUTPATIENT
Start: 2023-01-01 | End: 2023-01-01

## 2023-01-01 RX ORDER — HYDROMORPHONE HYDROCHLORIDE 1 MG/ML
0.3 INJECTION, SOLUTION INTRAMUSCULAR; INTRAVENOUS; SUBCUTANEOUS
Status: CANCELLED | OUTPATIENT
Start: 2023-01-01

## 2023-01-01 RX ORDER — SODIUM CHLORIDE 9 MG/ML
INJECTION, SOLUTION INTRAVENOUS CONTINUOUS
Status: DISCONTINUED | OUTPATIENT
Start: 2023-01-01 | End: 2023-01-01 | Stop reason: HOSPADM

## 2023-01-01 RX ORDER — ACETAMINOPHEN 500 MG
1000 TABLET ORAL EVERY 6 HOURS PRN
Qty: 60 TABLET | Refills: 0 | Status: SHIPPED | OUTPATIENT
Start: 2023-01-01 | End: 2023-01-01

## 2023-01-01 RX ORDER — OXYCODONE HYDROCHLORIDE 5 MG/1
10 TABLET ORAL EVERY 4 HOURS PRN
Status: CANCELLED | OUTPATIENT
Start: 2023-01-01

## 2023-01-01 RX ORDER — ATROPINE SULFATE 10 MG/ML
1 SOLUTION/ DROPS OPHTHALMIC EVERY 4 HOURS PRN
Qty: 5 ML | Refills: 0 | Status: SHIPPED | OUTPATIENT
Start: 2023-01-01

## 2023-01-01 RX ADMIN — Medication 5 ML: at 09:38

## 2023-01-01 RX ADMIN — SODIUM CHLORIDE, PRESERVATIVE FREE 5 ML: 5 INJECTION INTRAVENOUS at 18:15

## 2023-01-01 RX ADMIN — SODIUM CHLORIDE 1000 ML: 9 INJECTION, SOLUTION INTRAVENOUS at 07:52

## 2023-01-01 RX ADMIN — DIPHENHYDRAMINE HYDROCHLORIDE 25 MG: 25 CAPSULE ORAL at 17:45

## 2023-01-01 RX ADMIN — ACETAMINOPHEN 975 MG: 325 TABLET, FILM COATED ORAL at 08:49

## 2023-01-01 RX ADMIN — OLANZAPINE 5 MG: 5 TABLET, FILM COATED ORAL at 22:05

## 2023-01-01 RX ADMIN — Medication 5 ML: at 07:36

## 2023-01-01 RX ADMIN — LORAZEPAM 0.5 MG: 2 INJECTION INTRAMUSCULAR; INTRAVENOUS at 09:19

## 2023-01-01 RX ADMIN — ACETAMINOPHEN 975 MG: 325 TABLET, FILM COATED ORAL at 07:40

## 2023-01-01 RX ADMIN — CAFFEINE AND SODIUM BENZOATE 500 MG: 125 INJECTION, SOLUTION INTRAMUSCULAR; INTRAVENOUS at 08:13

## 2023-01-01 RX ADMIN — HYDROMORPHONE HYDROCHLORIDE 0.5 MG: 1 INJECTION, SOLUTION INTRAMUSCULAR; INTRAVENOUS; SUBCUTANEOUS at 12:51

## 2023-01-01 RX ADMIN — ONDANSETRON 4 MG: 4 TABLET, ORALLY DISINTEGRATING ORAL at 04:00

## 2023-01-01 RX ADMIN — HYDROMORPHONE HYDROCHLORIDE 0.5 MG: 1 INJECTION, SOLUTION INTRAMUSCULAR; INTRAVENOUS; SUBCUTANEOUS at 08:10

## 2023-01-01 RX ADMIN — SODIUM CHLORIDE, PRESERVATIVE FREE 5 ML: 5 INJECTION INTRAVENOUS at 15:51

## 2023-01-01 RX ADMIN — PROCHLORPERAZINE EDISYLATE 10 MG: 5 INJECTION INTRAMUSCULAR; INTRAVENOUS at 15:46

## 2023-01-01 RX ADMIN — Medication 5 ML: at 18:10

## 2023-01-01 RX ADMIN — SODIUM CHLORIDE, PRESERVATIVE FREE 5 ML: 5 INJECTION INTRAVENOUS at 14:45

## 2023-01-01 RX ADMIN — SENNOSIDES AND DOCUSATE SODIUM 1 TABLET: 50; 8.6 TABLET ORAL at 12:12

## 2023-01-01 RX ADMIN — OLANZAPINE 5 MG: 5 TABLET, FILM COATED ORAL at 22:02

## 2023-01-01 RX ADMIN — LORAZEPAM 1 MG: 0.5 TABLET ORAL at 05:17

## 2023-01-01 RX ADMIN — HYDROMORPHONE HYDROCHLORIDE 0.5 MG: 1 INJECTION, SOLUTION INTRAMUSCULAR; INTRAVENOUS; SUBCUTANEOUS at 06:45

## 2023-01-01 RX ADMIN — OLANZAPINE 5 MG: 5 TABLET, FILM COATED ORAL at 22:01

## 2023-01-01 RX ADMIN — HYDROMORPHONE HYDROCHLORIDE 0.3 MG: 1 INJECTION, SOLUTION INTRAMUSCULAR; INTRAVENOUS; SUBCUTANEOUS at 15:46

## 2023-01-01 RX ADMIN — SENNOSIDES AND DOCUSATE SODIUM 1 TABLET: 50; 8.6 TABLET ORAL at 17:13

## 2023-01-01 RX ADMIN — PROCHLORPERAZINE EDISYLATE 10 MG: 5 INJECTION INTRAMUSCULAR; INTRAVENOUS at 18:15

## 2023-01-01 RX ADMIN — METHOTREXATE: 25 INJECTION INTRA-ARTERIAL; INTRAMUSCULAR; INTRATHECAL; INTRAVENOUS at 09:50

## 2023-01-01 RX ADMIN — OXYCODONE HYDROCHLORIDE 10 MG: 5 TABLET ORAL at 04:00

## 2023-01-01 RX ADMIN — SODIUM CHLORIDE 360 MG: 9 INJECTION, SOLUTION INTRAVENOUS at 16:58

## 2023-01-01 RX ADMIN — SODIUM CHLORIDE 1000 ML: 9 INJECTION, SOLUTION INTRAVENOUS at 16:36

## 2023-01-01 RX ADMIN — ACETAMINOPHEN 975 MG: 325 TABLET, FILM COATED ORAL at 10:29

## 2023-01-01 RX ADMIN — LORAZEPAM 0.5 MG: 2 INJECTION INTRAMUSCULAR; INTRAVENOUS at 08:02

## 2023-01-01 RX ADMIN — HYDROMORPHONE HYDROCHLORIDE 0.5 MG: 1 INJECTION, SOLUTION INTRAMUSCULAR; INTRAVENOUS; SUBCUTANEOUS at 08:42

## 2023-01-01 RX ADMIN — HYDROMORPHONE HYDROCHLORIDE 0.5 MG: 1 INJECTION, SOLUTION INTRAMUSCULAR; INTRAVENOUS; SUBCUTANEOUS at 02:42

## 2023-01-01 RX ADMIN — Medication 5 ML: at 11:02

## 2023-01-01 RX ADMIN — SODIUM CHLORIDE 1000 ML: 9 INJECTION, SOLUTION INTRAVENOUS at 08:12

## 2023-01-01 RX ADMIN — SODIUM CHLORIDE, PRESERVATIVE FREE 5 ML: 5 INJECTION INTRAVENOUS at 08:50

## 2023-01-01 RX ADMIN — Medication 5 ML: at 12:13

## 2023-01-01 RX ADMIN — SODIUM CHLORIDE 1000 ML: 9 INJECTION, SOLUTION INTRAVENOUS at 08:02

## 2023-01-01 RX ADMIN — Medication 5 ML: at 16:29

## 2023-01-01 RX ADMIN — OXYCODONE HYDROCHLORIDE 10 MG: 5 TABLET ORAL at 20:00

## 2023-01-01 RX ADMIN — LORAZEPAM 0.5 MG: 2 INJECTION INTRAMUSCULAR; INTRAVENOUS at 12:27

## 2023-01-01 RX ADMIN — LORAZEPAM 1 MG: 2 INJECTION INTRAMUSCULAR; INTRAVENOUS at 12:54

## 2023-01-01 RX ADMIN — LORAZEPAM 1 MG: 2 INJECTION INTRAMUSCULAR; INTRAVENOUS at 08:43

## 2023-01-01 RX ADMIN — Medication 5 ML: at 18:01

## 2023-01-01 RX ADMIN — Medication 5 ML: at 18:07

## 2023-01-01 RX ADMIN — OLANZAPINE 5 MG: 5 TABLET, FILM COATED ORAL at 21:31

## 2023-01-01 RX ADMIN — SODIUM CHLORIDE, PRESERVATIVE FREE 5 ML: 5 INJECTION INTRAVENOUS at 04:52

## 2023-01-01 RX ADMIN — LORAZEPAM 0.5 MG: 2 INJECTION INTRAMUSCULAR; INTRAVENOUS at 08:18

## 2023-01-01 RX ADMIN — GADOBUTROL 6 ML: 604.72 INJECTION INTRAVENOUS at 11:18

## 2023-01-01 RX ADMIN — OXYCODONE HYDROCHLORIDE 10 MG: 5 TABLET ORAL at 16:22

## 2023-01-01 RX ADMIN — OXYCODONE HYDROCHLORIDE 5 MG: 5 TABLET ORAL at 13:46

## 2023-01-01 RX ADMIN — ACETAMINOPHEN 975 MG: 325 TABLET, FILM COATED ORAL at 15:15

## 2023-01-01 RX ADMIN — SODIUM CHLORIDE, PRESERVATIVE FREE 5 ML: 5 INJECTION INTRAVENOUS at 02:43

## 2023-01-01 RX ADMIN — ONDANSETRON 4 MG: 4 TABLET, ORALLY DISINTEGRATING ORAL at 10:53

## 2023-01-01 RX ADMIN — CAFFEINE AND SODIUM BENZOATE 500 MG: 125 INJECTION, SOLUTION INTRAMUSCULAR; INTRAVENOUS at 08:26

## 2023-01-01 RX ADMIN — LORAZEPAM 0.5 MG: 2 INJECTION INTRAMUSCULAR; INTRAVENOUS at 08:07

## 2023-01-01 RX ADMIN — Medication 500 UNITS: at 12:32

## 2023-01-01 RX ADMIN — SODIUM CHLORIDE, PRESERVATIVE FREE 5 ML: 5 INJECTION INTRAVENOUS at 22:08

## 2023-01-01 RX ADMIN — OXYCODONE HYDROCHLORIDE 10 MG: 5 TABLET ORAL at 12:16

## 2023-01-01 RX ADMIN — SODIUM CHLORIDE, PRESERVATIVE FREE 5 ML: 5 INJECTION INTRAVENOUS at 12:52

## 2023-01-01 RX ADMIN — OXYCODONE HYDROCHLORIDE 10 MG: 5 TABLET ORAL at 10:29

## 2023-01-01 RX ADMIN — Medication 5 ML: at 14:30

## 2023-01-01 RX ADMIN — SODIUM CHLORIDE, PRESERVATIVE FREE 5 ML: 5 INJECTION INTRAVENOUS at 11:45

## 2023-01-01 RX ADMIN — Medication 5 ML: at 07:46

## 2023-01-01 RX ADMIN — DOXEPIN HYDROCHLORIDE 6 MG: 10 SOLUTION ORAL at 21:08

## 2023-01-01 RX ADMIN — METHOTREXATE: 25 INJECTION INTRA-ARTERIAL; INTRAMUSCULAR; INTRATHECAL; INTRAVENOUS at 09:48

## 2023-01-01 RX ADMIN — ACETAMINOPHEN 975 MG: 325 TABLET, FILM COATED ORAL at 04:09

## 2023-01-01 RX ADMIN — SODIUM CHLORIDE, PRESERVATIVE FREE 5 ML: 5 INJECTION INTRAVENOUS at 15:15

## 2023-01-01 RX ADMIN — SODIUM CHLORIDE 1000 ML: 9 INJECTION, SOLUTION INTRAVENOUS at 12:11

## 2023-01-01 RX ADMIN — METHOTREXATE: 25 INJECTION INTRA-ARTERIAL; INTRAMUSCULAR; INTRATHECAL; INTRAVENOUS at 09:19

## 2023-01-01 RX ADMIN — HYDROMORPHONE HYDROCHLORIDE 0.5 MG: 1 INJECTION, SOLUTION INTRAMUSCULAR; INTRAVENOUS; SUBCUTANEOUS at 18:09

## 2023-01-01 RX ADMIN — PROCHLORPERAZINE MALEATE 10 MG: 10 TABLET ORAL at 09:12

## 2023-01-01 RX ADMIN — Medication 5 ML: at 11:41

## 2023-01-01 RX ADMIN — Medication 5 ML: at 17:14

## 2023-01-01 RX ADMIN — HYDROMORPHONE HYDROCHLORIDE 0.5 MG: 1 INJECTION, SOLUTION INTRAMUSCULAR; INTRAVENOUS; SUBCUTANEOUS at 04:51

## 2023-01-01 RX ADMIN — DIPHENHYDRAMINE HYDROCHLORIDE 25 MG: 25 CAPSULE ORAL at 00:03

## 2023-01-01 RX ADMIN — SODIUM CHLORIDE 1000 ML: 9 INJECTION, SOLUTION INTRAVENOUS at 16:28

## 2023-01-01 RX ADMIN — LORAZEPAM 1 MG: 2 INJECTION INTRAMUSCULAR; INTRAVENOUS at 15:15

## 2023-01-01 RX ADMIN — HYDROMORPHONE HYDROCHLORIDE 0.5 MG: 1 INJECTION, SOLUTION INTRAMUSCULAR; INTRAVENOUS; SUBCUTANEOUS at 13:58

## 2023-01-01 RX ADMIN — OXYCODONE HYDROCHLORIDE 10 MG: 5 TABLET ORAL at 07:40

## 2023-01-01 RX ADMIN — LORAZEPAM 0.5 MG: 2 INJECTION, SOLUTION INTRAMUSCULAR; INTRAVENOUS at 16:36

## 2023-01-01 RX ADMIN — HYDROMORPHONE HYDROCHLORIDE 0.5 MG: 1 INJECTION, SOLUTION INTRAMUSCULAR; INTRAVENOUS; SUBCUTANEOUS at 18:04

## 2023-01-01 RX ADMIN — LORAZEPAM 1 MG: 0.5 TABLET ORAL at 20:36

## 2023-01-01 RX ADMIN — Medication 5 ML: at 18:23

## 2023-01-01 RX ADMIN — SODIUM CHLORIDE, PRESERVATIVE FREE 5 ML: 5 INJECTION INTRAVENOUS at 06:46

## 2023-01-01 RX ADMIN — ONDANSETRON 4 MG: 4 TABLET, ORALLY DISINTEGRATING ORAL at 05:17

## 2023-01-01 RX ADMIN — OXYCODONE HYDROCHLORIDE 10 MG: 5 TABLET ORAL at 00:01

## 2023-01-01 RX ADMIN — Medication 5 ML: at 07:27

## 2023-01-01 RX ADMIN — HYDROMORPHONE HYDROCHLORIDE 0.5 MG: 1 INJECTION, SOLUTION INTRAMUSCULAR; INTRAVENOUS; SUBCUTANEOUS at 20:12

## 2023-01-01 RX ADMIN — SODIUM CHLORIDE 1000 ML: 9 INJECTION, SOLUTION INTRAVENOUS at 13:38

## 2023-01-01 RX ADMIN — HYDROMORPHONE HYDROCHLORIDE 1 MG: 1 INJECTION, SOLUTION INTRAMUSCULAR; INTRAVENOUS; SUBCUTANEOUS at 12:36

## 2023-01-01 RX ADMIN — Medication 5 ML: at 16:27

## 2023-01-01 RX ADMIN — OXYCODONE HYDROCHLORIDE 10 MG: 5 TABLET ORAL at 00:00

## 2023-01-01 RX ADMIN — HYDROMORPHONE HYDROCHLORIDE 0.5 MG: 1 INJECTION, SOLUTION INTRAMUSCULAR; INTRAVENOUS; SUBCUTANEOUS at 22:27

## 2023-01-01 RX ADMIN — LORAZEPAM 1 MG: 2 INJECTION INTRAMUSCULAR; INTRAVENOUS at 11:45

## 2023-01-01 RX ADMIN — ACETAMINOPHEN 975 MG: 325 TABLET, FILM COATED ORAL at 11:21

## 2023-01-01 RX ADMIN — HYDROMORPHONE HYDROCHLORIDE 2 MG: 2 TABLET ORAL at 12:07

## 2023-01-01 RX ADMIN — SODIUM CHLORIDE 1000 ML: 9 INJECTION, SOLUTION INTRAVENOUS at 08:04

## 2023-01-01 RX ADMIN — LORAZEPAM 0.5 MG: 2 INJECTION INTRAMUSCULAR; INTRAVENOUS at 16:50

## 2023-01-01 RX ADMIN — DIPHENHYDRAMINE HYDROCHLORIDE 25 MG: 25 CAPSULE ORAL at 08:15

## 2023-01-01 RX ADMIN — HYDROMORPHONE HYDROCHLORIDE 0.5 MG: 1 INJECTION, SOLUTION INTRAMUSCULAR; INTRAVENOUS; SUBCUTANEOUS at 08:49

## 2023-01-01 RX ADMIN — HYDROMORPHONE HYDROCHLORIDE 0.5 MG: 1 INJECTION, SOLUTION INTRAMUSCULAR; INTRAVENOUS; SUBCUTANEOUS at 14:44

## 2023-01-01 RX ADMIN — SODIUM CHLORIDE: 9 INJECTION, SOLUTION INTRAVENOUS at 09:48

## 2023-01-01 RX ADMIN — SODIUM CHLORIDE, PRESERVATIVE FREE 5 ML: 5 INJECTION INTRAVENOUS at 22:27

## 2023-01-01 RX ADMIN — HYDROMORPHONE HYDROCHLORIDE 1 MG: 1 INJECTION, SOLUTION INTRAMUSCULAR; INTRAVENOUS; SUBCUTANEOUS at 08:23

## 2023-01-01 RX ADMIN — Medication 5 ML: at 08:45

## 2023-01-01 RX ADMIN — Medication 5 ML: at 18:19

## 2023-01-01 RX ADMIN — ONDANSETRON 4 MG: 4 TABLET, ORALLY DISINTEGRATING ORAL at 17:31

## 2023-01-01 RX ADMIN — Medication 5 ML: at 12:01

## 2023-01-01 RX ADMIN — LORAZEPAM 1 MG: 0.5 TABLET ORAL at 12:12

## 2023-01-01 RX ADMIN — LORAZEPAM 1 MG: 0.5 TABLET ORAL at 13:46

## 2023-01-01 RX ADMIN — OLANZAPINE 5 MG: 5 TABLET, FILM COATED ORAL at 21:08

## 2023-01-01 RX ADMIN — LORAZEPAM 1 MG: 0.5 TABLET ORAL at 09:47

## 2023-01-01 RX ADMIN — OXYCODONE HYDROCHLORIDE 10 MG: 5 TABLET ORAL at 07:47

## 2023-01-01 RX ADMIN — ONDANSETRON 4 MG: 4 TABLET, ORALLY DISINTEGRATING ORAL at 22:02

## 2023-01-01 ASSESSMENT — ACTIVITIES OF DAILY LIVING (ADL)
ADLS_ACUITY_SCORE: 37
CONCENTRATING,_REMEMBERING_OR_MAKING_DECISIONS_DIFFICULTY: NO
ADLS_ACUITY_SCORE: 37
ADLS_ACUITY_SCORE: 38
ADLS_ACUITY_SCORE: 37
ADLS_ACUITY_SCORE: 37
ADLS_ACUITY_SCORE: 22
ADLS_ACUITY_SCORE: 37
DIFFICULTY_COMMUNICATING: NO
ADLS_ACUITY_SCORE: 37
CHANGE_IN_FUNCTIONAL_STATUS_SINCE_ONSET_OF_CURRENT_ILLNESS/INJURY: YES
ADLS_ACUITY_SCORE: 37
ADLS_ACUITY_SCORE: 38
ADLS_ACUITY_SCORE: 22
ADLS_ACUITY_SCORE: 37
ADLS_ACUITY_SCORE: 37
FALL_HISTORY_WITHIN_LAST_SIX_MONTHS: NO
ADLS_ACUITY_SCORE: 38
ADLS_ACUITY_SCORE: 37
ADLS_ACUITY_SCORE: 20
ADLS_ACUITY_SCORE: 35
ADLS_ACUITY_SCORE: 37
ADLS_ACUITY_SCORE: 37
ADLS_ACUITY_SCORE: 26
ADLS_ACUITY_SCORE: 26
ADLS_ACUITY_SCORE: 37
ADLS_ACUITY_SCORE: 37
ADLS_ACUITY_SCORE: 20
ADLS_ACUITY_SCORE: 37
ADLS_ACUITY_SCORE: 37
ADLS_ACUITY_SCORE: 20
ADLS_ACUITY_SCORE: 37
DRESSING/BATHING_DIFFICULTY: NO
ADLS_ACUITY_SCORE: 38
ADLS_ACUITY_SCORE: 37
ADLS_ACUITY_SCORE: 38
ADLS_ACUITY_SCORE: 22
ADLS_ACUITY_SCORE: 35
ADLS_ACUITY_SCORE: 37
ADLS_ACUITY_SCORE: 37
ADLS_ACUITY_SCORE: 26
ADLS_ACUITY_SCORE: 37
WEAR_GLASSES_OR_BLIND: NO
ADLS_ACUITY_SCORE: 37
WALKING_OR_CLIMBING_STAIRS_DIFFICULTY: NO
ADLS_ACUITY_SCORE: 22
ADLS_ACUITY_SCORE: 37
DIFFICULTY_EATING/SWALLOWING: NO
ADLS_ACUITY_SCORE: 22
ADLS_ACUITY_SCORE: 37
ADLS_ACUITY_SCORE: 37
TOILETING_ISSUES: NO
ADLS_ACUITY_SCORE: 38
ADLS_ACUITY_SCORE: 37
DOING_ERRANDS_INDEPENDENTLY_DIFFICULTY: NO
ADLS_ACUITY_SCORE: 37

## 2023-01-01 ASSESSMENT — PAIN SCALES - GENERAL
PAINLEVEL: NO PAIN (0)
PAINLEVEL: EXTREME PAIN (8)
PAINLEVEL: NO PAIN (0)
PAINLEVEL: SEVERE PAIN (7)

## 2023-01-01 ASSESSMENT — ENCOUNTER SYMPTOMS
INSOMNIA: 1
DYSURIA: 0
HEMATURIA: 0
DOUBLE VISION: 0
BRUISES/BLEEDS EASILY: 0
DIARRHEA: 0
DIAPHORESIS: 0
SHORTNESS OF BREATH: 0
FALLS: 0
SEIZURES: 0
DIZZINESS: 1
COUGH: 0
CONSTIPATION: 0
HEADACHES: 1
NAUSEA: 0
VOMITING: 0
NERVOUS/ANXIOUS: 0
WEIGHT LOSS: 0
FEVER: 0
CHILLS: 0
SORE THROAT: 0
FREQUENCY: 0
DEPRESSION: 1
EYE PAIN: 0
TINGLING: 1
BLURRED VISION: 1
BLOOD IN STOOL: 0

## 2023-01-03 NOTE — PROGRESS NOTES
MEDICAL RECORDS REQUEST   Radiation Oncology  [address] [place] [address]  PHONE: [phone]  Fax: [phone]        FUTURE VISIT INFORMATION                                                   Josefina Bennett, : 1965 scheduled for future visit at [facility] Radiation Oncology    APPOINTMENT INFORMATION:    Date: 2023    Provider:  Dr. Chauhan    Reason for Visit/Diagnosis: Malignant neoplasm of upper-outer quadrant of right breast in female, estrogen receptor positive (H)    REFERRAL INFORMATION:    Referring provider:  Evangelista Meza MD    RECORDS REQUESTED FOR VISIT                                                     Action    Action Taken 1/3/2023 11:50AM KEB   I called pt Mary - a male answered the phone and said she is busy with medical care right now.     3:23pm KEB   I called pt Mary again - she had radiation oncology treatment at [facility] Nov-Dec. [facility].      SOFT TISSUE & BREAST     CT, MRI, PET/CT AND REPORTS yes   ANY RECENT LABS yes   SURGICAL REPORTS yes- 2022   A. LYMPH NODE, LEFT AXILLA, BIOPSY:  - Lymphoid tissue, negative for malignancy    2022   Bone, sacrum, CT guided core biopsy:  -METASTATIC CARCINOMA, consistent with metastatic breast pleomorphic lobular carcinoma  -Metastatic carcinoma is estrogen receptor negative and progesterone receptor negative by immunohistochemistry  -A subpopulation of metastatic carcinoma tumor cells are HER2 amplified by FISH (20% HER2 amplified (group 1); 80% HER2 equivocal (group 4)), see cytogenetics report for details of HER2 FISH results)  -Metastatic carcinoma is HER2 negative (score 1+) by immunohistochemistry  -HER2 FISH results will be reported separately by cytogenetics  -See comment and microscopic description   PATHOLOGY REPORTS yes   CHEMO & MEDICAL ONCOLOGY NOTES yes   CURRENT MEDICATION LIST yes   PREVIOUS RADIATION  DATE REQUESTED DATE RECEIVED   WHAT HEALTHCARE FACILITY 2015 Radiation  Oncology recs are viewable in Epic. All records are internal.    INITIAL HEALTH PROGRESS INTAKE     RADIATION ONCOLOGIST NOTES     RADIATION TREATMENT SUMMARY NOTES     RAD-TREATMENT PLAN     DVH = DOSE VOLUME HISTOGRAM     DICOM CD (TX PLANNING CT, TX PLAN, STRUCTURE SET)     *If no DICOM avail ask for DRRs          *Send all radiation Prior radiation records for both [facility] and [facility] Radiation Oncology patients to [facility] with  ATTN: CHAYA/Cooper Dosi/Physics

## 2023-01-03 NOTE — PATIENT INSTRUCTIONS
Contact Numbers  [facility]: [phone] (for symptom and scheduling needs)    Please call the [facility] Triage line if you experience a temperature greater than or equal to 100.4, shaking chills, have uncontrolled nausea, vomiting and/or diarrhea, dizziness, shortness of breath, chest pain, bleeding, unexplained bruising, or if you have any other new/concerning symptoms, questions or concerns.     If you are having any concerning symptoms or wish to speak to a provider before your next infusion visit, please call your care coordinator or triage to notify them so we can adequately serve you.     If you need a refill on a narcotic prescription or other medication, please call triage before your infusion appointment.           January 2023 Sunday Monday Tuesday Wednesday Thursday Friday Saturday   1     2     3    LAB CENTRAL   7:30 AM   (15 min.)   [facility] LAB DRAW   [facility]    ONC INFUSION 2 HR (120 MIN)   8:00 AM   (120 min.)    ONC INFUSION NURSE   [facility]    POST-OP SPINE      9:15 AM   (30 min.)   Mendy Bailey PA-C   [facility]    LUMBAR PUNCTURE   9:45 AM   (45 min.)   Adwoa Wilson PA-C   [facility] RETURN  12:15 PM   (60 min.)   Claudia Nava APRN CNP   [facility] 4    VIDEO VISIT NEW   6:45 AM   (80 min.)   Dharmesh Sanchez MD   [facility]    NEW   2:00 PM   (90 min.)   Bill Chauhan MD   [facility] Radiation Oncology    RETURN   3:45 PM   (45 min.)   Sandra Coughlin PA-C   [facility] 5     6    LAB CENTRAL   7:30 AM   (15 min.)    [facility] DRAW   [facility] RETURN   7:45 AM   (60 min.)   Claudia Nava APRN CNP   [facility]    ONC  INFUSION 2 HR (120 MIN)   8:00 AM   (120 min.)    ONC INFUSION NURSE   [facility]    LUMBAR PUNCTURE   9:00 AM   (45 min.)   Adwoa Wilson PA-C   [facility] RETURN  12:00 PM   (15 min.)   Brittany Mandel PA-C   [facility] 7       8     9     10    ONC INFUSION 2 HR (120 MIN)   7:30 AM   (60 min.)    ONC INFUSION NURSE   [facility] RETURN   8:15 AM   (30 min.)   Deepak Jules MD   [facility]    LAB CENTRAL   8:15 AM   (15 min.)   [facility]   [facility]    LUMBAR PUNCTURE   8:45 AM   (45 min.)   Adwoa Wilson PA-C   [facility]    POST-OP SPINE     10:45 AM   (30 min.)   Mendy Bailey PA-C   [facility]    RETURN  10:45 AM   (45 min.)   Sandra Coughlin PA-C   [facility] 11     12     13    LAB CENTRAL   7:00 AM   (15 min.)   [facility]   [facility]    ONC INFUSION 2 HR (120 MIN)   7:30 AM   (120 min.)    ONC INFUSION NURSE   [facility] RETURN   9:30 AM   (30 min.)   Brittany Mandel PA-C   [facility]    LUMBAR PUNCTURE  10:00 AM   (45 min.)   Adwoa Wilson PA-C   [facility] RETURN  12:15 PM   (30 min.)   Brittany Mandel PA-C   [facility] 14       15     16     17     18     19     20     21       22     23     24     25     26     27     28       29     30     31    POST-OP SPINE     11:15 AM   (30 min.)   Mendy Bailey PA-C   [facility]    LAB CENTRAL  11:45 AM   (15 min.)   [facility]   [facility]  Clinic    LUMBAR PUNCTURE  12:00 PM   (45 min.)   Adwoa Wilson PA-C   [facility]    RETURN   1:45 PM   (30 min.)   Evangelista Meza MD   [facility]    ONC INFUSION 1 HR (60 MIN)   2:30 PM   (60 min.)   UC ONC INFUSION NURSE   [facility]    [facility] RETURN   3:15 PM   (60 min.)   Claudia Nava APRN CNP   [facility] Neurosurgery [facility]                                   February 2023 Sunday Monday Tuesday Wednesday Thursday Friday Saturday                  1     2     3     4       5     6     7     8     9     10     11       12     13     14     15     16     17     18       19     20    MR LIVER WO & W CONTRAST  10:30 AM   (45 min.)   [id number]   [facility] [place] [facility] [place]    CT CHEST/ABDOMEN/PELVIS W  11:30 AM   (20 min.)   [id number]   [facility] [place] 21    POST-OP SPINE      9:15 AM   (30 min.)   Mendy Bailey PA-C   [facility] Neuro[facility]    LUMBAR PUNCTURE   9:45 AM   (45 min.)   Adwoa Wilson PA-C   [facility]    ECHO COMPLETE  11:00 AM   (60 min.)   [email or web address]   [facility]    LAB CENTRAL  12:15 PM   (15 min.)    [facility] DRAW   [facility]    RETURN  12:15 PM   (30 min.)   Evangelista Meza MD   [facility]    ONC INFUSION 1 HR (60 MIN)   1:00 PM   (60 min.)    ONC INFUSION NURSE   [facility]    POST-OP SPINE      1:45 PM   (30 min.)   Mendy Bailey PA-C   [facility] Neuro[facility] 22     23     24     25       26     27     28                                           Lab Results:  Recent Results (from the past 12 hour(s))   CBC with platelets and differential    Collection Time: 01/03/23  7:41 AM   Result Value Ref  Range    WBC Count 14.0 (H) 4.0 - 11.0 10e3/uL    RBC Count 4.12 3.80 - 5.20 10e6/uL    Hemoglobin 11.7 11.7 - 15.7 g/dL    Hematocrit 36.2 35.0 - 47.0 %    MCV 88 78 - 100 fL    MCH 28.4 26.5 - 33.0 pg    MCHC 32.3 31.5 - 36.5 g/dL    RDW 19.3 (H) 10.0 - 15.0 %    Platelet Count 289 150 - 450 10e3/uL    % Neutrophils 75 %    % Lymphocytes 19 %    % Monocytes 4 %    % Eosinophils 1 %    % Basophils 0 %    % Immature Granulocytes 1 %    NRBCs per 100 WBC 0 <1 /100    Absolute Neutrophils 10.6 (H) 1.6 - 8.3 10e3/uL    Absolute Lymphocytes 2.6 0.8 - 5.3 10e3/uL    Absolute Monocytes 0.6 0.0 - 1.3 10e3/uL    Absolute Eosinophils 0.1 0.0 - 0.7 10e3/uL    Absolute Basophils 0.1 0.0 - 0.2 10e3/uL    Absolute Immature Granulocytes 0.1 <=0.4 10e3/uL    Absolute NRBCs 0.0 10e3/uL

## 2023-01-03 NOTE — PROGRESS NOTES
"[facility]  Department of Neurosurgery  Center for Skull Base and Pituitary Surgery     Name: Josefina Bennett  MRN: [id number]  Age: 57 year old  : 1965    DOS: 1/3/2023     Chief Complaint:   Leptomeningeal carcinomatosis s/p  shunt placement (Certas set to 3), shunt programming visit     History of Present Illness:   Josefina Bennett is a 57 year old female with a history of metastatic breast cancer and communicating hydrocephalus s/p  shunt placeent 2022 who is seen today in the Cancer Center for shunt programming. She is receiving intrathecal methotrexate therapy and needs her shunt to be turned to \"virtual off\" during infusion.      Physical Exam:   General: No acute distress.    Neuro: The patient is fully oriented. Speech is normal.      Procedure:  With the patient's verbal permission her Certas valve was interrogated, found to be set to 3. Using the manual  first, this was dialed to 8. Rechecked x 2. Then also rechecked with the electronic  and confirmed x 2.      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 EDIL Claudia Naav will be available in 2 hours to reprogram it to the previous 3 setting.          Mendy Bailey PA-C  Department of Neurosurgery  Office: [phone]     "

## 2023-01-03 NOTE — PROGRESS NOTES
Infusion Nursing Note:  Josefina Bennett presents today for IV Fluids    Patient seen by provider today: Yes: ANGELA Butt for ommaya tap   present during visit today: Not Applicable.    Note: Patient presents to infusion today doing okay. States she has been feeling pretty fatigued lately. No fevers, chills, SOB, chest pains or cough. No headache at this time. Reports mild dizziness this morning but has since resolved. Feels she is eating and drinking well at home. Otherwise, no new changes or concerns at this time.    IV ativan given per patient's request for anxiety. Patient declined needing IV caffeine today.     Patient remained lying flat for 2 hours after Ommaya access and IT chemo. Neurosurgery adjusted shunt prior to discharge. She reported no headaches or dizziness/lightheadedness prior to discharge.    Intravenous Access:  Implanted Port.    Treatment Conditions:  Lab Results   Component Value Date    HGB 11.7 01/03/2023    WBC 14.0 (H) 01/03/2023    ANEU 7.3 10/03/2022    ANEUTAUTO 10.6 (H) 01/03/2023     01/03/2023      Results reviewed, labs MET treatment parameters, ok to proceed with treatment.    Post Infusion Assessment:  Patient tolerated infusion without incident.  Blood return noted pre and post infusion.  Site patent and intact, free from redness, edema or discomfort.  No evidence of extravasations.  Access discontinued per protocol.     Discharge Plan:   Patient declined prescription refills.  Discharge instructions reviewed with: Patient.  Patient and/or family verbalized understanding of discharge instructions and all questions answered.  AVS to patient via [employer].  Patient will return 1/4 for next appointment with ANGELA Cummins.   Patient discharged in stable condition accompanied by: mother.  Departure Mode: Wheelchair.      Tierra Gardner RN

## 2023-01-03 NOTE — PROGRESS NOTES
BMT ONC Adult Ommaya Access and Intrathecal Chemotherapy Administration Procedure Note  Doc 3, 2023    Procedure: Codman Certas Plus access to obtain CSF and give intrathecal chemotherapy (IT)          Diagnosis: breast cancer, leptomeningeal disease    Learning needs assessment complete within 12 months: YES     Vitals reviewed:  YES    Informed Consent: Procedure, benefits, risks, and alternatives explained to the patient who voiced understanding of the information and agreed to proceed with CSF reservoir access with IT chemotherapy. Risks include bleeding, nausea, headaches, and/or infection.   Patient safety checklist completed.    Labs: Reviewed:   INR   Date Value Ref Range Status   12/07/2022 1.10 0.85 - 1.15 Final   06/21/2015 1.21 (H) 0.86 - 1.14 Final      Platelet Count   Date Value Ref Range Status   01/03/2023 289 150 - 450 10e3/uL Final   01/23/2020 283 150 - 450 10e9/L Final     Note: Shunt was turned off by neurosurgery team. Ommaya was pumped initially, then cleaned with betadine + alcohol swab. Butterfly needle was attached to a 10 cc syringe and inserted into the CSF reservoir.  6 cc of CSF was withdrawn slowly over about 3 minutes.  10 mg IT methotrexate was double checked in EPIC and instilled over 3 minutes.   Patient tolerated the procedure well.    Specimen appears clear. CSF was sent for cytology, cell count, protein and glucose.  CSF reservoir was pumped 6 times after the procedure.  Patient was given IV fluids. Given 0.5 mg IV Ativan for anxiety.    Post-procedure pain assessment: 0 out of 10 on the numeric pain rating scale  Interventions: No    Complications: No     Disposition: Patient will remain on back for 2 hour post procedure. Call if develops headaches, fevers and or chills. Shunt will be turned back on 2 hours after IT chemo given by neurosurgery team.    Performed by:   Adwoa Wilson PA-C  [facility]  [address] [place] [address] [phone]

## 2023-01-03 NOTE — LETTER
"1/3/2023       RE: Josefina Bennett  [address]  [facility] [address]     Dear Colleague,    Thank you for referring your patient, Josefina Bennett, to the [facility] NEUROSURGERY CLINIC [place] at [facility]. Please see a copy of my visit note below.    [facility]  Department of Neurosurgery  Center for Skull Base and Pituitary Surgery     Name: Josefina Bennett  MRN: [id number]  Age: 57 year old  : 1965    DOS: 1/3/2023     Chief Complaint:   Leptomeningeal carcinomatosis s/p  shunt placement (Certas set to 3), shunt programming visit     History of Present Illness:   Josefina Bennett is a 57 year old female with a history of metastatic breast cancer and communicating hydrocephalus s/p  shunt placeent 2022 who is seen today in the Cancer Center for shunt programming. She is receiving intrathecal methotrexate therapy and needs her shunt to be turned to \"virtual off\" during infusion.      Physical Exam:   General: No acute distress.    Neuro: The patient is fully oriented. Speech is normal.      Procedure:  With the patient's verbal permission her Certas valve was interrogated, found to be set to 3. Using the manual  first, this was dialed to 8. Rechecked x 2. Then also rechecked with the electronic  and confirmed x 2.      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 EDIL Claudia Nava will be available in 2 hours to reprogram it to the previous 3 setting.          Mendy Bailey PA-C  Department of Neurosurgery  Office: [phone]  "

## 2023-01-03 NOTE — TELEPHONE ENCOUNTER
Venlafaxine Refill   Last prescribing provider: Michelle Lin     Last clinic visit date: 12/28/22 Sandra Coughlin      Recommendations for requested medication (if none, N/A): Copied from chart note 12/28/22 Sandra Coughlin   She feels that she is coping very well currently and does not need any additional support at this time  - Continue Effexor 75mg daily   - Prognosis is poor. Discussion today, patient does not wish to know her prognosis, however we did clarify that this is an incurable, aggressive malignancy. We will need to continue to discuss      Any other pertinent information (if none, N/A): N/A    Refilled: Y/N, if NO, why?

## 2023-01-03 NOTE — LETTER
"1/3/2023       RE: Josefina Bennett  [address]  [place] [facility] [address]     Dear Colleague,    Thank you for referring your patient, Josefina Bennett, to the [facility] NEUROSURGERY CLINIC [place] at [facility]. Please see a copy of my visit note below.    [facility]  Department of Neurosurgery       Name: Josefina Bennett  MRN: [id number]  Age: 57 year old  : 1965  1/3/2023     Chief Complaint:   Leptomeningeal carcinomatosis s/p  shunt placement (Certas set to 3), shunt programming visit     History of Present Illness:   Josefina Bennett is a 57 year old female with a history of metastatic breast cancer and communicating hydrocephalus s/p  shunt placeent 2022 who is seen today in the Cancer Center for shunt programming. She is receiving intrathecal methotrexate therapy and her shunt was turned off to \"virtual off\" / setting of 8 earlier today by Ms. Mendy PATEL PA-C. She is now done with today's methotrexate treatment and needs her shunt turned back on.     Physical Exam:   General: No acute distress.    Neuro: The patient is fully oriented. Speech is normal.      Procedure:  With the patient's verbal permission her Certas valve was interrogated, found to be set to 8. Using the manual  first, this was dialed back on to a setting of 3. Rechecked x 2. Then also checked with the electronic  and confirmed her shunt setting of 3, rechecked twice.      Assessment:  Leptomeningeal carcinomatosis s/p  shunt placement (Certas set to 3), shunt programming visit     Plan:  Follow up on Friday for shunt programming as scheduled.       Claudia Nava CNP  Dept of Neurosurgery  "

## 2023-01-03 NOTE — PROGRESS NOTES
"[facility]  Department of Neurosurgery       Name: Josefina Bennett  MRN: [id number]  Age: 57 year old  : 1965  1/3/2023     Chief Complaint:   Leptomeningeal carcinomatosis s/p  shunt placement (Certas set to 3), shunt programming visit     History of Present Illness:   Josefina Bennett is a 57 year old female with a history of metastatic breast cancer and communicating hydrocephalus s/p  shunt placeent 2022 who is seen today in the Cancer Center for shunt programming. She is receiving intrathecal methotrexate therapy and her shunt was turned off to \"virtual off\" / setting of 8 earlier today by Ms. Mendy PATEL PA-C. She is now done with today's methotrexate treatment and needs her shunt turned back on.     Physical Exam:   General: No acute distress.    Neuro: The patient is fully oriented. Speech is normal.      Procedure:  With the patient's verbal permission her Certas valve was interrogated, found to be set to 8. Using the manual  first, this was dialed back on to a setting of 3. Rechecked x 2. Then also checked with the electronic  and confirmed her shunt setting of 3, rechecked twice.      Assessment:  Leptomeningeal carcinomatosis s/p  shunt placement (Certas set to 3), shunt programming visit     Plan:  Follow up on Friday for shunt programming as scheduled.       Claudia Nava CNP  Dept of Neurosurgery     "

## 2023-01-03 NOTE — PROGRESS NOTES
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 Meza    HISTORY OF PRESENT ILLNESS:  Ms. Bennett is a 55 yo female with a history of an invasive lobular carcinoma of the right breast, initially treated with a curative intent who later developed metastatic disease, and most recently found to have leptomeningeal disease.     Briefly, she was diagnosed in early  after noticing dimpling of the right breast skin. Work up ultimately found a 4.8 cm invasive lobular carcinoma, grade 2, ER+/LA+/HER2-. She enrolled in the I-SPY-2 clinical trial, and received 12 weekly cycles of paclitaxel/ganetespib from 3/3/15 - 5/19/15, and then received 2 cycles dose-dense AC (the last 2 cycles not given due to pneumocystic pneumonia). She then underwent right wire localized lumpectomy with sentinel node biopsy on 8/12/15 by Dr. Franco. Pathology showed significant residual tumor, [id number](sn), MDA RCB class III. She required completion mastectomy for margins. She was subsequently referred to our clinic for adjuvant radiotherapy. She received 5040 cGy in 28 fractions to the chest wall and regional lymphatics followed by 720 cGy boost to the axilla and 1000 cGy boost to the mastectomy scar.      She then elected to undergo bilateral oophorectomy and was started on Letrozole with the plan to continue this until .        Ms. Bennett did well until spring this year. She received Zometa injection and developed a reaction which prompted  a ED evaluation. CT of the chest done to rule out PE found diffuse bony metastases. Subsequent PET/CT scan also found scattered lesions in the anterior liver. Brain MRI was negative at that time. Biopsy of a sacral metastasis was consistent with metastatic pleomorphic lobular carcinoma, ER-/MS-, with 20% cells HER2 amplified and 80% not. Given this, Dr. Meza started her on weekly Palitaxel with every 3 week Pertuzumab and Trastuzumab. Her restaging scan showed response in the bone, but her tumor marker increased. Dr. Meza suspected that the regimen was addressing her HER2 amplified disease, but not the triple negative component. In 09/2022, she was switched to Enhertu. She did develop complications with C diff infection and fungal infection. Staging PET/CT on 11/21/2022 showed progression in the liver.      Early December, Ms. Bennett developed mental status change: she was sleeping excessively and had some memory issues as well. She was advised to go to ED. A brain MRI on 12/7/2022 showed leptomeningeal disease as well as intraparenchymal metastases. 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     She was admitted for additional workup and management. We were also consulted at that time. She underwent  shunt placement on 12/9/2022. The shunt has a valve that allows IT chemotherapy delivery. Additional Neuroaxis imaging showed no evidence of leptomeningeal disease in the cervical, thoracic, and lumbar spine. Again seen were innumerable sclerotic metastases. CSF was positive for malignancy, consistent with metastatic carcinoma. She was discharged on 12/13/2022 with Decadron 4 mg BID.     Ms. Bennett then followed up with Dr. Meza on 12/15/2022. She was recommended to start IT Methotrexate twice weekly for 3 weeks along with Tucatinib, Xeloda and Herceptin. She began this regimen on 12/20 although she has yet to start Tucatinib and Xeloda, which has not been mailed to her yet. Cycle 2 and Cycle 3  are scheduled for 1/10/2023 and 2/21/2022 respectively. A brain MRI and CT of C/A/P is planned for 2/20/2022. HER2 from CSF has been requested and pending at this time. Last LP from 1/3/2023 remains positive for malignant cells. CEA has decreased by 1/2 from 2192 on 12/20/2022 to 1000 on 1/3//2022.     Mary is here today to discuss the role of adjuvant radiation therapy in the management of her leptomeningeal disease. She is accompanied by her mom. On interview, she states that she is very fatigued. She denies focal weakness/numbness. She notes occasional blurring of the vision when she looks to the right. She has no trouble with hearing. She is still on Decadron at 4 mg BID and taking Tylenol at 1000 mg twice daily for headache. She denies nausea or vomiting.        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) 06/01/2015     PONV (postoperative nausea and vomiting)      PTSD (post-traumatic stress disorder)      Past Surgical History:   Procedure Laterality Date     APPENDECTOMY  1997     CYSTOSCOPY N/A 10/16/2015    Procedure: CYSTOSCOPY;  Surgeon: Aleksandr Palafox MD;  Location: [facility] OR     HC REMOVAL OF OVARIAN CYST(S)      x 3     IMPLANT SHUNT VENTRICULOPERITONEAL Right 12/9/2022    Procedure: INSERTION, SHUNT, VENTRICULOPERITONEAL, right side;  Surgeon: Evangelista Lopez MD;  Location: [facility] OR     INSERT PORT VASCULAR ACCESS Left 5/4/2022    Procedure: INSERTION, VASCULAR ACCESS PORT;  Surgeon: Darren Yates MD;  Location: [facility] OR     IR CHEST PORT PLACEMENT > 5 YRS OF AGE  5/4/2022     IR FLUORO 0-1 HOUR  4/12/2022      LAPAROSCOPIC HYSTERECTOMY TOTAL, BILATERAL SALPINGO-OOPHORECTOMY, COMBINED N/A 10/16/2015    Procedure: COMBINED LAPAROSCOPIC HYSTERECTOMY TOTAL, SALPINGO-OOPHORECTOMY;  Surgeon: Aleksandr Palafox MD;  Location: [facility] OR     LUMPECTOMY BREAST Right 9/2/2015    Procedure: LUMPECTOMY BREAST;  Surgeon: Philip Franco MD;  Location: [facility] OR     LUMPECTOMY BREAST WITH SENTINEL NODE, COMBINED Right 8/12/2015    Procedure: COMBINED LUMPECTOMY BREAST WITH SENTINEL NODE;  Surgeon: Philip Franco MD;  Location: [facility] OR     MASTECTOMY SIMPLE Right 9/21/2015    Procedure: MASTECTOMY SIMPLE;  Surgeon: Philip Franco MD;  Location: [facility] OR     REMOVE PORT VASCULAR ACCESS N/A 8/12/2015    Procedure: REMOVE PORT VASCULAR ACCESS;  Surgeon: Philip Franco MD;  Location: [facility] OR     SURGICAL HISTORY OF -       wisdom teeth     TONSILLECTOMY      at age 30 for tonsillitis       CHEMOTHERAPY HISTORY: Multiple lines. Her medical oncologist is Dr. Evangelista Meza.      PAST RADIATION THERAPY HISTORY:   Chest wall and george irradiation completed in 2015    MEDICATIONS:   Current Outpatient Medications   Medication Sig Dispense Refill     acetaminophen (TYLENOL) 325 MG tablet Take 2 tablets (650 mg) by mouth every 4 hours as needed for other (For optimal non-opioid multimodal pain management to improve pain control.)       alcohol swab prep pads Use to swab area of injection/maya as directed. 100 each 3     apixaban ANTICOAGULANT (ELIQUIS) 2.5 MG tablet Take 1 tablet (2.5 mg) by mouth 2 times daily Hold this medication and resume on 12/16/22 180 tablet 3     blood glucose (NO BRAND SPECIFIED) lancets standard Use to test blood sugar 1 times daily or as directed. 30 lancet 0     blood glucose (NO BRAND SPECIFIED) test strip Use to test blood sugar 1 times daily or as directed. 100 strip 3     blood glucose monitoring (NO BRAND SPECIFIED) meter device kit Use to test blood sugar 1 times daily or as directed. 1 kit 0     calcium carbonate  (OS- MG [place]. CA) 500 MG tablet Take 500 mg by mouth At Bedtime        capecitabine (XELODA) 500 MG tablet Take 3 tablets (1,500 mg) by mouth 2 times daily Days 1 through 14, then off for 7 days.Take with water within 30 minutes after a meal. 84 tablet 0     cholecalciferol (VITAMIN D3) 1000 UNIT tablet Take 1 tablet (1,000 Units) by mouth daily 30 tablet 0     Continuous Blood Gluc Sensor (FREESTYLE MARQUES 3 SENSOR) MISC 1 each every 14 days 8 each 11     dexamethasone (DECADRON) 4 MG tablet Take 1 tablet (4 mg) by mouth daily (with breakfast) 30 tablet 0     Ginger (Zingiber officinalis) (GINGER ROOT) 550 MG CAPS capsule Take 550 mg by mouth daily       insulin glargine (LANTUS PEN) 100 UNIT/ML pen Inject 10 Units Subcutaneous At Bedtime 15 mL 0     insulin  UNIT/ML injection 20 units before breakfast and 10 units at bedtime 15 mL 3     loperamide (IMODIUM) 2 MG capsule Start with 2 caps (4 mg), then take one cap (2 mg) after each diarrheal stool as needed. Do not use more than 8 caps (16 mg) per day. 30 capsule 2     melatonin 3 MG tablet Take 2 tablets (6 mg) by mouth every evening for 30 days 60 tablet 0     metoclopramide (REGLAN) 10 MG tablet Take 1 tablet (10 mg) by mouth every 6 hours as needed (nausea/vomiting) 60 tablet 0     mirtazapine (REMERON) 7.5 MG tablet TAKE 1 TABLET BY MOUTH ONCE DAILY AT BEDTIME 90 tablet 3     Multiple Vitamins-Minerals (MULTIVITAMIN ADULTS PO) Take 1 tablet by mouth daily       nystatin (MYCOSTATIN) 466793 UNIT/ML suspension Swish and spit 5mL 4 times daily for one week 200 mL 0     OLANZapine (ZYPREXA) 5 MG tablet Take 1 tablet (5 mg) by mouth At Bedtime 30 tablet 1     ondansetron (ZOFRAN) 8 MG tablet Take 1 tablet (8 mg) by mouth every 8 hours as needed for nausea 30 tablet 3     oxyCODONE (ROXICODONE) 5 MG tablet Take 1 tablet (5 mg) by mouth every 4 hours as needed for moderate pain (4-6) 30 tablet 0     prochlorperazine (COMPAZINE) 10 MG tablet Take 1 tablet  (10 mg) by mouth every 6 hours as needed for nausea or vomiting 30 tablet 2     traZODone (DESYREL) 50 MG tablet Take 0.5 tablets (25 mg) by mouth nightly as needed for sleep 15 tablet 3     tucatinib (TUKYSA) 150 MG tablet Take 2 tablets (300 mg) by mouth 2 times daily (Patient not taking: Reported on 1/3/2023) 120 tablet 11     venlafaxine (EFFEXOR) 75 MG tablet Take 1 tablet (75 mg) by mouth daily 30 tablet 3       ALLERGIES: is allergic to morphine.    SOCIAL HISTORY:   Former smoker, 1/2 PPD until 2015  Does not drink alcohol    FAMILY HISTORY:   family history includes Alcohol/Drug in her father and mother; Breast Cancer in her paternal aunt and another family member; Cancer in an other family member; Depression in her father, maternal grandfather, mother, paternal grandfather, and sister; Diabetes in her maternal grandfather and mother; Respiratory in her sister.    REVIEW OF SYMPTOMS:  A full 14-point review of systems was performed. See HPI for details.     PHYSICAL EXAMINATION:    LMP 12/18/2014   BP (!) (P) 88/53   Pulse 98   LMP 12/18/2014   SpO2 98%    Gen: sitting in a wheel chair, very fatigued.   HEENT: EOMI. Face symmetric; facial muscle movement intact. Denies blurriness.  Muscle strength: 5/5  Sensation to light touch: intact    IMAGING:      ASSESSMENT AND PLAN: In summary, Ms. Bennett is a 57 yo female with an initial locally advanced pleomorphic lobular carcinoma of the right breast, ER+/ND+/HER2-, s/p neoadjuvant chemotherapy, s/p adjuvant radiation, who then developed diffusely bony metastases and liver metastases, with biopsy finding of both triple negative and ER-/HER2 amplified subpopulations. She now has developed leptomeningeal disease with hydrocephalus. There were also evidence of intraparenchymal metastases. She has a poor functional status with recent infections, PE, and now the CNS disease.     I explained the significance of leptomeningeal involvement. We reviewed her  imaging together. The most aggressive treatment would be craniospinal irradiation. However, given her overall disease burden and poor performance status, I do not recommend CSI due to the toxicity, especially with regard to the bone marrow. Her spine MRI did not show gross disease in the spine. As such, whole brain radiation therapy may be an option for local control.     Ms. Bennett has started on intrathecal methotrexate, which I support. She has positive CSF, and IT therapy may have a chance of clearing her CSF. I will discuss with Dr. Meza the timing of follow up brain MRI. If she is not responding to IT therapy and or cannot tolerate due to toxicity, we can consider switching her to whole brain radiation therapy. This may also help disrupt the blood brain barrier and allow better penetration of the systemic therapy.     Ms. Bennett is agreeable with the above plan. She will continue IT therapy for now and come back to us for a simulation when the time is right.         Thank you for allowing us to participate in this patient's care.  Please feel free to call with any questions or concerns.       Bill Chauhan M.D./Ph.D.  Radiation Oncologist   Department of Radiation Oncology  [facility]  Phone: [phone]            I reviewed patient's chart, internal/external medical records, imaging studies (including actual images), labs and pathology reports.  I interviewed and counseled the patient face to face.  I additionally ordered tests and discussed the case with patient's referring physicians and care team (Dr. Meza).               Bill Chauhan MD

## 2023-01-03 NOTE — TELEPHONE ENCOUNTER
I called Josefina Verma and left a message to call Teresa Ashton tomorrow so we can check on the status of her prescription.    Evangelista Meza MD

## 2023-01-04 NOTE — NURSING NOTE
"Oncology Rooming Note    January 4, 2023 3:36 PM   Josefina Bennett is a 57 year old female who presents for:    Chief Complaint   Patient presents with     Oncology Clinic Visit     Breast cancer      Initial Vitals: /64 (BP Location: Left arm, Patient Position: Supine, Cuff Size: Adult Regular)   Pulse 88   LMP 12/18/2014   SpO2 97%  Estimated body mass index is 23.06 kg/m  as calculated from the following:    Height as of 6/10/22: 1.626 m (5' 4.02\").    Weight as of 1/3/23: 61 kg (134 lb 6.4 oz). There is no height or weight on file to calculate BSA.  Data Unavailable Comment: Data Unavailable   Patient's last menstrual period was 12/18/2014.  Allergies reviewed: Yes  Medications reviewed: Yes    Medications: Medication refills not needed today.  Pharmacy name entered into [employer]:    [facility] - [place], [place] - [address] [address] - [place], [place] - [address] [facility] - [place], [place] - [address] [address] [facility] - [place], [place] - [address] [place], [place] [id number] - [place], [place] - [address]    Clinical concerns: Patient states that she has been unsteady on her feet, feeling dizzy and week. Low BP in clinic today.        Cecelia Jiménez LPN January 4, 2023 3:37 PM              "

## 2023-01-04 NOTE — NURSING NOTE
"Chief Complaint   Patient presents with     Oncology Clinic Visit     Breast cancer      Port accessed with 20g 3/4\" power needle and labs drawn by rn.  Port flushed with NS and heparin.  Pt tolerated well. Pt was brought to infusion after the labs were collected.    Dez Chapman RN  "

## 2023-01-04 NOTE — PROGRESS NOTES
Infusion Nursing Note:  Josefina Bennett presents today for 1 L NS.    Patient seen by provider today: Yes:Sandra Coughlin NP   present during visit today: Not Applicable.    Note: Pt saw provider prior to infusion, ok for IVF.      Intravenous Access:  Implanted Port.    Treatment Conditions:  Not Applicable.      Post Infusion Assessment:  Patient tolerated infusion without incident.  Blood return noted pre and post infusion.  Site patent and intact, free from redness, edema or discomfort.  No evidence of extravasations.  Access discontinued per protocol.       Discharge Plan:   Patient declined prescription refills.  Discharge instructions reviewed with: Patient.  Patient and/or family verbalized understanding of discharge instructions and all questions answered.  AVS to patient via Backtrace I/OHART.  Patient will return 1/6 for next appointment.   Patient discharged in stable condition accompanied by: self and mother.  Departure Mode: Wheelchair.    Sera Bender RN

## 2023-01-04 NOTE — PROGRESS NOTES
01/04/2023      REASON FOR VISIT: Follow-up breast cancer     HISTORY OF PRESENT ILLNESS:  Josefina Bennett was self referred to our clinic for clinical trial recommendations for newly diagnosed stage II breast cancer. She had her last mammogram approximately a year prior. She did notice in early January 2015 that she was having some distortion of the right breast, and she went to see her gynecologist and had a mammogram performed. She had a screening mammogram performed on 01/15/2015. Breast tissue was heterogeneously dense, which might compromise the mammography. There was architectural distortion in the right breast approximately 11-12 o'clock position, zone 2, posterior depth, and a CC MLO spot compression was performed and an ultrasound was planned. The diagnostic mammogram from 01/28/2015 showed right breast architectural distortion centered at the 12 o'clock position, zone 2, suspicious for neoplasm. Breast tomosynthesis was used in the interpretation. Ultrasound findings included targeted ultrasound of the right upper breast demonstrating a band-like area of abnormal echogenicity between 11 and 12 o'clock position in the right breast at zone 2. This measures 4 cm transversely x 1.1 cm AP, and there was only a 1.5 cm measurement in the radial direction. There was blood flow suspicious for neoplasm at the 11 o'clock position in zone 2. There is a 1.4 cm hypoechoic nodule slightly  from the larger area of altered echogenicity that is also suspicious. A biopsy was performed. The biopsy showed infiltrating lobular carcinoma, grade 2, and a clip was placed at specimen [id number]. There were a few signet cells present. The tumor was ER-positive, MI low positive, and HER2 was negative by immunohistochemistry. She subsequently underwent an MRI showing a tumor that was 4.8 x 1.7 x 3.2 cm in size. Overall, her clinical stage was T2 NX MX.       She was enrolled in the I-SPY-2 clinical trial, and qualified with  high-risk MammaPrint score. She was randomized to ganetespib and paclitaxel, and she was treated with 12 cycles of treatment, and then started on AC on 05/26/2015. She developed intractable nausea and vomiting after the first cycle, which required hospitalization on the second cycle. She then developed Pneumocystis pneumonia, which resulted in intubation and a 2-week hospitalization during the course of AC. She was discharged on 07/02/2015, and decided she did not want to pursue any further chemotherapy. She had a right lumpectomy and sentinel lymph node procedure 08/12/2015. She had a positive margin, underwent a re-excision, and had a positive margin. Ultimately, she underwent a right mastectomy with clear margins. She began radiation treatment with Dr. Bill Chauhan, and completed radiation therapy on 12/04/2015. Pathologic stage was III-A, ypT3 N1a MX. Of concern, she had what could be considered an RCB3 tumor.  She started adjuvant letrozole on 1/28/16.     4/6/2022 Josefina Verma developed a fever during Reclast.  She was feeling unwell and went to the ED.  CT CAP showed new involvement of abdominal lymph nodes and multiple bone metastases.  She was admitted and I saw her in the hospital 4-7-22 to discuss the plan for biopsy.  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  A mix of TNBC and ER- HER2+.  Clinic conference consensus was for the modified Vandana with paclitaxel weekly to treat both clones.     9/30/2022 started Enhertu, rising tumor marker and concern for progression    PET 11/21 shows new hypermetabolic lesions of the liver    12/7 Brain MRI for confusion and lethargy- This showed acute communicating hydrocephalus, new brain mets and leptomeningeal carcinomatosis     Admitted 12/7- 12/13. Shunt placed 12/9 by neurosurgery. Started on dexamethasone.     Met with Dr. Meza  12/15. See his note for further details. Recommended IT methotrexate, HER-2 climb regimen         TREATMENT HISTORY:  A.  Neoadjuvant therapy on I SPY-2 with ganetespib and paclitaxel.  AC x 2 complicated by PCP pneumonia.    B.  Right lumpectomy, followed by right margin resections, followed by right mastectomy.   B.  Oophorectomy 10-16-15.   C.  Tamoxifen after 10-6-15.   D.  Radiation therapy. 5,040 cGy in 180 cGy/fraction to the chest wall and regional lymphatics followed by 720 cGy axillary lymph node boost and 1000 cGy mastectomy scar boost. Completed on 12/3/2015.  E.   Adjuvant letrozole begun 1-28-16.  Plan was to continue through 2026.  F.  Diagnosis with recurrent/metastatic breast cancer with a sacral mass that was biopsied showing pleiomorphic lobular breast cancer that is ER negative, FL negative, HER2 positive by FISH in 20% of the cells and negative in 80% of cells.  A mix of TNBC and ER- HER2+.  Clinic conference consensus was for the modified Vandana with paclitaxel weekly to treat both clones.   G. 9/30/22 Started Enhertu   H. Was admitted 10/3-10/7, C. Difficile, also had + Galactomanin. Started on voriconazole 10/11/22  I. 10/19 Cycle #2 Enhertu, dose reduced   J. PE-- incidentally found on 11/1, admitted for 48 hours, started on Eliquis   K. Progression in the liver, also found to have brain mets and leptomeningeal carcinomatosis. Started Herceptin 12/20. IT Methotrexate 12/20. Plan to start tucatinib and xeloda as soon as prior auth is obtained          INTERVAL HISTORY:    Today, she is seen in person with her mother. She has been having ongoing fatigue, which has been worse over the last few days. She is not sleeping at night very well. Laying awake for hours each night -- mind is racing. She rests but can't sleep. She had sleep concerns prior to her recurrence, but they have gotten worse as of late     She denies any worsening headaches or vision changes. No confusion, focal weakness, or  slurred speech.     Appetite is strong, sugars have been up in the evening but back down in the morning    No issues with breathing. Bowels are moving well with metamucil     No fevers, no chills. No urinary symptoms, no abdominal pain.       REVIEW OF SYSTEMS:    Patient denies the following except if noted above: fevers, body aches, chills, headaches, vision changes, dizziness, chest pain, shortness of breath, nausea, vomiting, diarrhea, constipation, abdominal pain, rashes, bruising/bleeding, mouth sores, swelling or pain in the legs.      PHYSICAL EXAMINATION  LMP 12/18/2014     Wt Readings from Last 4 Encounters:   01/03/23 61 kg (134 lb 6.4 oz)   12/30/22 58.7 kg (129 lb 6.4 oz)   12/28/22 59.8 kg (131 lb 12.8 oz)   12/27/22 59.6 kg (131 lb 8 oz)         General: Resting comfortably in no acute distress  Heart: Regular rate and rhythm, no murmurs  Lung: Normal respiratory effort. Clear to auscultation bilaterally. No wheezes, rhonchi, or crackles   Neuro: AAOx3. Speech is fluent. Gait is steady. Moving all extremities. Cranial nerves II-IV intact. Strength is 5/5 bilaterally, symmetric.   Extremities: No lower extremity edema  Skin: Warm, dry, intact. No rashes, no suspicious lesions. No petechia or bruising noted           LABORATORY DATA:    Most Recent 3 CBC's:Recent Labs   Lab Test 01/04/23  1618 01/03/23  0741 12/30/22  0934   WBC 9.1 14.0* 16.3*   HGB 11.8 11.7 12.4   MCV 88 88 86    289 301    Most Recent 3 BMP's:  Recent Labs   Lab Test 12/27/22  0731 12/23/22  0959 12/21/22  1343   * 134* 134*   POTASSIUM 4.3 4.2 4.3   CHLORIDE 98 99 103   CO2 26 24 21*   BUN 18.9 18.5 15.0   CR 0.99* 0.95 0.69   ANIONGAP 11 11 10   VEE 8.9 8.4* 7.8*   * 361* 198*    Most Recent 2 LFT's:  Recent Labs   Lab Test 12/27/22  0731 12/23/22  0959   AST 62* 59*   * 119*   ALKPHOS 235* 219*   BILITOTAL 0.5 0.6      I reviewed the above labs today.       ASSESSMENT AND PLAN:   Shelby Bennett is a  56-year-old woman with logy pleomorphic lobular cancer, wa history of a clinical stage II right breast cancer, ER positive, IL positive, HER2 negative by immunohistochemistry, now with recurrent Metastatic breast cancer, lobular. New histohich is negative for estrogen receptor and negative for progesterone receptor, and 20% is HER2 positive by FISH. S/P Vandana regimen, switched to Enhertu. Now with progression with new liver mets, brain mets, leptomeningeal disease. Here today to start new treatment with IT methotrexate, herceptin, tucatinib, and Xeloda     - On IT methotrexate, tolerating decently well, does require IVF with caffeine. Plan for twice a week for five weeks, 10 doses total. Confirmed with Dr. Meza today    - Started herceptin 12/20/22  - Repeat ECHO due 2/2023  - Trend tumor markers   - Plan for repeat brain MRI and CT CAP in February as scheduled   - We have requested IHC for HER2 on the CSF cell block-- pending       #Brain Mets  - On 4mg Dex BID -- tried to decrease but patient did not tolerate   - Seen by rad onc inpatient, see note. Recommended IT chemo prior to RT in this setting with leptomeningeal disease.   - Saw rad Onc today, could consider whole brain RT    #Hydrocephalus  - S/P shunt placement with Dr. Lopez       #Steroid induced hyperglycemia  - Seen by endocrine, starting NPH, greatly appreciate assistance     #PE, diagnosed 11/1/2022  - On eliquis, now with dose reduction to 2.5mg BID per pharmacy due to tucatinib interaction   - No bruising bleeding     #Vaccines  - Second COVID primary series today, discussed with pharmacy      # Psych:  - She feels that she is coping very well currently and does not need any additional support at this time  - Continue Effexor 75mg daily   - Prognosis is poor. Patient does not wish to know her prognosis, however we did clarify that this is an incurable, aggressive malignancy.   - Needs to see palliative care -- missed appointment. Has follow  up scheduled     #Insomnia  - Longstanding issue for her, has been on trazodone prior to recurrence. However much worse now  - Laying awake with anxious thoughts most of the night  - Ativan 0.5mg every 6 hours as needed. Reviewed risk of sedation       # Bone Mets  - Asymptomatic  - Currently getting xgeva monthly --ON HOLD due to molar issue  - Has met with the dentist, who recommended we continue to hold Xgeva until her dental work gets completed.  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  Recently seen, and voriconazole was discontinued.  - Resolved    # C. Diff 10/5  - Resolved        #Hypotension  - BP over at radiation today was 88/53. Per patient they retook it right away and it was 115/60. Upon arrival here it is 101/64, and 112/69. Will give 1L NS in clinic, recheck. No localizing infectious symptoms or concerns for sepsis     Advanced care planning:  CODE STATUS: DNR/DNI. POLST filled out today  - Discussed hospice and its philosophy and what that entails today  - Provided with new ACP document, she will fill out and return     Patient will call in the interim with any questions or concerns. They voice understanding and agree with the above plan.         Sandra Coughlin PA-C

## 2023-01-04 NOTE — LETTER
2023         RE: Josefina Bennett  [address]  [facility] [address]        Dear Colleague,    Thank you for referring your patient, Josefina Bennett, to the [facility] RADIATION ONCOLOGY. Please see a copy of my visit note below.    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 Meza    HISTORY OF PRESENT ILLNESS:  Ms. Bennett is a 55 yo female with a history of an invasive lobular carcinoma of the right breast, initially treated with a curative intent who later developed metastatic disease, and most recently found to have leptomeningeal disease.     Briefly, she was diagnosed in early  after noticing dimpling of the right breast skin. Work up ultimately found a 4.8 cm invasive lobular carcinoma, grade 2, ER+/MD+/HER2-. She enrolled in the I-SPY-2 clinical trial, and received 12 weekly cycles of paclitaxel/ganetespib from 3/3/15 - 5/19/15, and then received 2 cycles dose-dense AC (the last 2 cycles not given due to pneumocystic pneumonia). She then underwent right wire localized lumpectomy with sentinel node biopsy on 8/12/15 by Dr. Franco. Pathology showed significant residual tumor, [id number](sn), MDA RCB class III. She required completion mastectomy for margins. She was subsequently referred to our clinic for adjuvant radiotherapy. She received 5040 cGy in 28 fractions to the chest wall and regional lymphatics followed by 720 cGy boost to the axilla and 1000 cGy boost to the mastectomy scar.       She then elected to undergo bilateral oophorectomy and was started on Letrozole with the plan to continue this until 2026.        Ms. Bennett did well until spring this year. She received Zometa injection and developed a reaction which prompted a ED evaluation. CT of the chest done to rule out PE found diffuse bony metastases. Subsequent PET/CT scan also found scattered lesions in the anterior liver. Brain MRI was negative at that time. Biopsy of a sacral metastasis was consistent with metastatic pleomorphic lobular carcinoma, ER-/VA-, with 20% cells HER2 amplified and 80% not. Given this, Dr. Meza started her on weekly Palitaxel with every 3 week Pertuzumab and Trastuzumab. Her restaging scan showed response in the bone, but her tumor marker increased. Dr. eMza suspected that the regimen was addressing her HER2 amplified disease, but not the triple negative component. In 09/2022, she was switched to Enhertu. She did develop complications with C diff infection and fungal infection. Staging PET/CT on 11/21/2022 showed progression in the liver.      Early December, Ms. Bennett developed mental status change: she was sleeping excessively and had some memory issues as well. She was advised to go to ED. A brain MRI on 12/7/2022 showed leptomeningeal disease as well as intraparenchymal metastases. 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     She was admitted for additional workup and management. We were also consulted at that time. She underwent  shunt placement on 12/9/2022. The shunt has a valve that allows IT chemotherapy delivery. Additional Neuroaxis imaging showed no evidence of leptomeningeal disease in the cervical, thoracic, and lumbar spine. Again seen were innumerable sclerotic metastases. CSF was positive for malignancy, consistent with metastatic carcinoma. She was discharged on 12/13/2022 with Decadron 4 mg BID.     Ms. Bennett then followed up with Dr. Meza on 12/15/2022. She was  recommended to start IT Methotrexate twice weekly for 3 weeks along with Tucatinib, Xeloda and Herceptin. She began this regimen on 12/20 although she has yet to start Tucatinib and Xeloda, which has not been mailed to her yet. Cycle 2 and Cycle 3 are scheduled for 1/10/2023 and 2/21/2022 respectively. A brain MRI and CT of C/A/P is planned for 2/20/2022. HER2 from CSF has been requested and pending at this time. Last LP from 1/3/2023 remains positive for malignant cells. CEA has decreased by 1/2 from 2192 on 12/20/2022 to 1000 on 1/3//2022.     Mary is here today to discuss the role of adjuvant radiation therapy in the management of her leptomeningeal disease. She is accompanied by her mom. On interview, she states that she is very fatigued. She denies focal weakness/numbness. She notes occasional blurring of the vision when she looks to the right. She has no trouble with hearing. She is still on Decadron at 4 mg BID and taking Tylenol at 1000 mg twice daily for headache. She denies nausea or vomiting.        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) 06/01/2015     PONV (postoperative nausea and vomiting)      PTSD (post-traumatic stress disorder)      Past Surgical History:   Procedure Laterality Date     APPENDECTOMY  1997     CYSTOSCOPY N/A 10/16/2015    Procedure: CYSTOSCOPY;  Surgeon: Aleksandr Palafox MD;  Location: [facility] OR      REMOVAL OF OVARIAN CYST(S)      x 3     IMPLANT SHUNT VENTRICULOPERITONEAL Right 12/9/2022    Procedure: INSERTION, SHUNT, VENTRICULOPERITONEAL, right side;  Surgeon: Evangelista Lopez MD;   Location: [facility] OR     INSERT PORT VASCULAR ACCESS Left 5/4/2022    Procedure: INSERTION, VASCULAR ACCESS PORT;  Surgeon: Darren Yates MD;  Location: [facility] OR     IR CHEST PORT PLACEMENT > 5 YRS OF AGE  5/4/2022     IR FLUORO 0-1 HOUR  4/12/2022     LAPAROSCOPIC HYSTERECTOMY TOTAL, BILATERAL SALPINGO-OOPHORECTOMY, COMBINED N/A 10/16/2015    Procedure: COMBINED LAPAROSCOPIC HYSTERECTOMY TOTAL, SALPINGO-OOPHORECTOMY;  Surgeon: Aleksandr Palafox MD;  Location: [facility] OR     LUMPECTOMY BREAST Right 9/2/2015    Procedure: LUMPECTOMY BREAST;  Surgeon: Philip Franco MD;  Location: [facility] OR     LUMPECTOMY BREAST WITH SENTINEL NODE, COMBINED Right 8/12/2015    Procedure: COMBINED LUMPECTOMY BREAST WITH SENTINEL NODE;  Surgeon: Philip Franco MD;  Location: [facility] OR     MASTECTOMY SIMPLE Right 9/21/2015    Procedure: MASTECTOMY SIMPLE;  Surgeon: Philip Franco MD;  Location: [facility] OR     REMOVE PORT VASCULAR ACCESS N/A 8/12/2015    Procedure: REMOVE PORT VASCULAR ACCESS;  Surgeon: Philip Franco MD;  Location: [facility] OR     SURGICAL HISTORY OF -       wisdom teeth     TONSILLECTOMY      at age 30 for tonsillitis       CHEMOTHERAPY HISTORY: Multiple lines. Her medical oncologist is Dr. Evangelista Meza.      PAST RADIATION THERAPY HISTORY:   Chest wall and george irradiation completed in 2015    MEDICATIONS:   Current Outpatient Medications   Medication Sig Dispense Refill     acetaminophen (TYLENOL) 325 MG tablet Take 2 tablets (650 mg) by mouth every 4 hours as needed for other (For optimal non-opioid multimodal pain management to improve pain control.)       alcohol swab prep pads Use to swab area of injection/maya as directed. 100 each 3     apixaban ANTICOAGULANT (ELIQUIS) 2.5 MG tablet Take 1 tablet (2.5 mg) by mouth 2 times daily Hold this medication and resume on 12/16/22 180 tablet 3     blood glucose (NO BRAND SPECIFIED) lancets standard Use to test blood sugar 1 times daily or as directed. 30 lancet 0     blood glucose (NO  BRAND SPECIFIED) test strip Use to test blood sugar 1 times daily or as directed. 100 strip 3     blood glucose monitoring (NO BRAND SPECIFIED) meter device kit Use to test blood sugar 1 times daily or as directed. 1 kit 0     calcium carbonate (OS- MG [place]. CA) 500 MG tablet Take 500 mg by mouth At Bedtime        capecitabine (XELODA) 500 MG tablet Take 3 tablets (1,500 mg) by mouth 2 times daily Days 1 through 14, then off for 7 days.Take with water within 30 minutes after a meal. 84 tablet 0     cholecalciferol (VITAMIN D3) 1000 UNIT tablet Take 1 tablet (1,000 Units) by mouth daily 30 tablet 0     Continuous Blood Gluc Sensor (FREESTYLE MARQUES 3 SENSOR) MISC 1 each every 14 days 8 each 11     dexamethasone (DECADRON) 4 MG tablet Take 1 tablet (4 mg) by mouth daily (with breakfast) 30 tablet 0     Ginger (Zingiber officinalis) (GINGER ROOT) 550 MG CAPS capsule Take 550 mg by mouth daily       insulin glargine (LANTUS PEN) 100 UNIT/ML pen Inject 10 Units Subcutaneous At Bedtime 15 mL 0     insulin  UNIT/ML injection 20 units before breakfast and 10 units at bedtime 15 mL 3     loperamide (IMODIUM) 2 MG capsule Start with 2 caps (4 mg), then take one cap (2 mg) after each diarrheal stool as needed. Do not use more than 8 caps (16 mg) per day. 30 capsule 2     melatonin 3 MG tablet Take 2 tablets (6 mg) by mouth every evening for 30 days 60 tablet 0     metoclopramide (REGLAN) 10 MG tablet Take 1 tablet (10 mg) by mouth every 6 hours as needed (nausea/vomiting) 60 tablet 0     mirtazapine (REMERON) 7.5 MG tablet TAKE 1 TABLET BY MOUTH ONCE DAILY AT BEDTIME 90 tablet 3     Multiple Vitamins-Minerals (MULTIVITAMIN ADULTS PO) Take 1 tablet by mouth daily       nystatin (MYCOSTATIN) 799309 UNIT/ML suspension Swish and spit 5mL 4 times daily for one week 200 mL 0     OLANZapine (ZYPREXA) 5 MG tablet Take 1 tablet (5 mg) by mouth At Bedtime 30 tablet 1     ondansetron (ZOFRAN) 8 MG tablet Take 1 tablet (8 mg)  by mouth every 8 hours as needed for nausea 30 tablet 3     oxyCODONE (ROXICODONE) 5 MG tablet Take 1 tablet (5 mg) by mouth every 4 hours as needed for moderate pain (4-6) 30 tablet 0     prochlorperazine (COMPAZINE) 10 MG tablet Take 1 tablet (10 mg) by mouth every 6 hours as needed for nausea or vomiting 30 tablet 2     traZODone (DESYREL) 50 MG tablet Take 0.5 tablets (25 mg) by mouth nightly as needed for sleep 15 tablet 3     tucatinib (TUKYSA) 150 MG tablet Take 2 tablets (300 mg) by mouth 2 times daily (Patient not taking: Reported on 1/3/2023) 120 tablet 11     venlafaxine (EFFEXOR) 75 MG tablet Take 1 tablet (75 mg) by mouth daily 30 tablet 3       ALLERGIES: is allergic to morphine.    SOCIAL HISTORY:   Former smoker, 1/2 PPD until 2015  Does not drink alcohol    FAMILY HISTORY:   family history includes Alcohol/Drug in her father and mother; Breast Cancer in her paternal aunt and another family member; Cancer in an other family member; Depression in her father, maternal grandfather, mother, paternal grandfather, and sister; Diabetes in her maternal grandfather and mother; Respiratory in her sister.    REVIEW OF SYMPTOMS:  A full 14-point review of systems was performed. See HPI for details.     PHYSICAL EXAMINATION:    LMP 12/18/2014   BP (!) (P) 88/53   Pulse 98   LMP 12/18/2014   SpO2 98%    Gen: sitting in a wheel chair, very fatigued.   HEENT: EOMI. Face symmetric; facial muscle movement intact. Denies blurriness.  Muscle strength: 5/5  Sensation to light touch: intact    IMAGING:      ASSESSMENT AND PLAN: In summary, Ms. 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 She now has developed leptomeningeal disease  with hydrocephalus. There were also evidence of intraparenchymal metastases. She has a poor functional status with recent infections, PE, and now the CNS disease.     I explained the significance of leptomeningeal involvement. We reviewed her imaging together. The most aggressive treatment would be craniospinal irradiation. However, given her overall disease burden and poor performance status, I do not recommend CSI due to the toxicity, especially with regard to the bone marrow. Her spine MRI did not show gross disease in the spine. As such, whole brain radiation therapy may be an option for local control.     Ms. eBnnett has started on intrathecal methotrexate, which I support. She has positive CSF, and IT therapy may have a chance of clearing her CSF. I will discuss with Dr. Meza the timing of follow up brain MRI. If she is not responding to IT therapy and or cannot tolerate due to toxicity, we can consider switching her to whole brain radiation therapy. This may also help disrupt the blood brain barrier and allow better penetration of the systemic therapy.     Ms. Bennett is agreeable with the above plan. She will continue IT therapy for now and come back to us for a simulation when the time is right.         Thank you for allowing us to participate in this patient's care.  Please feel free to call with any questions or concerns.       Bill Chauhan M.D./Ph.D.  Radiation Oncologist   Department of Radiation Oncology  [facility]  Phone: [phone]            I reviewed patient's chart, internal/external medical records, imaging studies (including actual images), labs and pathology reports.  I interviewed and counseled the patient face to face.  I additionally ordered tests and discussed the case with patient's referring physicians and care team (Dr. Meza).               Bill Chauhan MD        [facility] [place]  FOLLOW-UP VISIT    Patient Name: Josefina Bennett      : 1965     Age: 57 year  old        ______________________________________________________________________________     Chief Complaint   Patient presents with     Breast Cancer     Radiation consult     BP (!) 88/53   Pulse 98   LMP 12/18/2014   SpO2 98%      Date Radiation Completed: Breast cancer: Rt chest and SCV 5760 cGy completed 12/3/15  Leptominigial disease,  shuunt 12/09/22. intrathecal methotrexate    Pain  Denies    Labs  Other Labs: No    Imaging  None    Other Appointments: Yes    MD Name: PROSPER Appointment Date: today   MD Name: Appointment Date:   MD Name: Appointment Date:   Other Appointment Notes:     Residual Radiation side effect: Pt states she healed well from her radiation in 2015     Additional Instructions: Here to discuss leptominigial disease    Nurse face-to-face time: Level 4:  15 min face to face time          Review of Systems   Constitutional: Positive for malaise/fatigue (Has been feeling tired, even more so today with low B/P). Negative for chills, diaphoresis, fever and weight loss.   HENT: Negative for ear pain, nosebleeds and sore throat.    Eyes: Positive for blurred vision (Blurred vision today, mostly Rt eye). Negative for double vision and pain.   Respiratory: Negative for cough and shortness of breath.    Cardiovascular: Negative for chest pain and leg swelling.   Gastrointestinal: Negative for blood in stool, constipation, diarrhea, nausea and vomiting.   Genitourinary: Negative for dysuria, frequency, hematuria and urgency.   Musculoskeletal: Negative for falls (WC for distance).   Skin: Negative for rash.   Neurological: Positive for dizziness (Feeling dizzy today), tingling (Bilateral toes and fingers) and headaches (Mild HA about every other day). Negative for seizures.   Endo/Heme/Allergies: Does not bruise/bleed easily (Taking Elaquis).   Psychiatric/Behavioral: Positive for depression (Hx of depression. On effexor with good results). Negative for suicidal ideas. The patient has insomnia  (Trazadone and melatoin. Wakes frequently then goes back to sleep). The patient is not nervous/anxious.                    Again, thank you for allowing me to participate in the care of your patient.        Sincerely,        Bill Chauhan MD

## 2023-01-04 NOTE — PROGRESS NOTES
HPI  FOLLOW-UP VISIT    Patient Name: Josefina Bennett      : 1965     Age: 57 year old        ______________________________________________________________________________     Chief Complaint   Patient presents with     Breast Cancer     Radiation consult     BP (!) 88/53   Pulse 98   LMP 2014   SpO2 98%      Date Radiation Completed: Breast cancer: Rt chest and SCV 5760 cGy completed 12/3/15  Leptominigial disease,  shuunt 22. intrathecal methotrexate    Pain  Denies    Labs  Other Labs: No    Imaging  None    Other Appointments: Yes    MD Name: [facility] Appointment Date: today   MD Name: Appointment Date:   MD Name: Appointment Date:   Other Appointment Notes:     Residual Radiation side effect: Pt states she healed well from her radiation in      Additional Instructions: Here to discuss leptominigial disease    Nurse face-to-face time: Level 4:  15 min face to face time          Review of Systems   Constitutional: Positive for malaise/fatigue (Has been feeling tired, even more so today with low B/P). Negative for chills, diaphoresis, fever and weight loss.   HENT: Negative for ear pain, nosebleeds and sore throat.    Eyes: Positive for blurred vision (Blurred vision today, mostly Rt eye). Negative for double vision and pain.   Respiratory: Negative for cough and shortness of breath.    Cardiovascular: Negative for chest pain and leg swelling.   Gastrointestinal: Negative for blood in stool, constipation, diarrhea, nausea and vomiting.   Genitourinary: Negative for dysuria, frequency, hematuria and urgency.   Musculoskeletal: Negative for falls (WC for distance).   Skin: Negative for rash.   Neurological: Positive for dizziness (Feeling dizzy today), tingling (Bilateral toes and fingers) and headaches (Mild HA about every other day). Negative for seizures.   Endo/Heme/Allergies: Does not bruise/bleed easily (Taking Elaquis).   Psychiatric/Behavioral: Positive for depression (Hx of  depression. On effexor with good results). Negative for suicidal ideas. The patient has insomnia (Trazadone and melatoin. Wakes frequently then goes back to sleep). The patient is not nervous/anxious.

## 2023-01-05 NOTE — LETTER
1/5/2023         RE: Josefina Bennett  [address]  [facility] [address]        Dear Colleague,    Thank you for referring your patient, Josefina Bennett, to the [facility]. Please see a copy of my visit note below.    Mary is a 57 year old who is being evaluated via a billable video visit.      How would you like to obtain your AVS? MyChart  If the video visit is dropped, the invitation should be resent by: Text to cell phone: [phone]  Will anyone else be joining your video visit? No      Jayson CARBALLO    Video-Visit Details    Type of service:  Video Visit     Originating Location (pt. Location): [facility]    Distant Location (provider location):  [facility]  Platform used for Video Visit: Yerdle    REASON FOR VISIT: Follow-up breast cancer     HISTORY OF PRESENT ILLNESS:  Josefina Bennett was self referred to our clinic in 2014 for clinical trial recommendations for newly diagnosed stage II breast cancer. She had her last mammogram approximately a year prior. She did notice in early January 2015 that she was having some distortion of the right breast, and she went to see her gynecologist and had a mammogram performed. She had a screening mammogram performed on 01/15/2015. Breast tissue was heterogeneously dense, which might compromise the mammography. There was architectural distortion in the right breast approximately 11-12 o'clock position, zone 2, posterior depth, and a CC MLO spot compression was performed and an ultrasound was planned. The diagnostic mammogram from 01/28/2015 showed right breast architectural distortion centered at the 12 o'clock position, zone 2, suspicious for neoplasm. Breast tomosynthesis was used in the interpretation. Ultrasound findings included targeted ultrasound of the right upper breast demonstrating a band-like area of abnormal echogenicity between 11 and 12 o'clock position in the right breast at zone 2. This measures 4 cm transversely x 1.1 cm AP, and  there was only a 1.5 cm measurement in the radial direction. There was blood flow suspicious for neoplasm at the 11 o'clock position in zone 2. There is a 1.4 cm hypoechoic nodule slightly  from the larger area of altered echogenicity that is also suspicious. A biopsy was performed. The biopsy showed infiltrating lobular carcinoma, grade 2, and a clip was placed at specimen [id number]. There were a few signet cells present. The tumor was ER-positive, OH low positive, and HER2 was negative by immunohistochemistry. She subsequently underwent an MRI showing a tumor that was 4.8 x 1.7 x 3.2 cm in size. Overall, her clinical stage was T2 NX MX.       She was enrolled in the I-SPY-2 clinical trial, and qualified with high-risk MammaPrint score. She was randomized to ganetespib and paclitaxel, and she was treated with 12 cycles of treatment, and then started on AC on 05/26/2015. She developed intractable nausea and vomiting after the first cycle, which required hospitalization on the second cycle. She then developed Pneumocystis pneumonia, which resulted in intubation and a 2-week hospitalization during the course of AC. She was discharged on 07/02/2015, and decided she did not want to pursue any further chemotherapy. She had a right lumpectomy and sentinel lymph node procedure 08/12/2015. She had a positive margin, underwent a re-excision, and had a positive margin. Ultimately, she underwent a right mastectomy with clear margins. She began radiation treatment with Dr. Bill Chauhan, and completed radiation therapy on 12/04/2015. Pathologic stage was III-A, ypT3 N1a MX. Of concern, she had what could be considered an RCB3 tumor.  She started adjuvant letrozole on 1/28/16.     TREATMENT HISTORY:  A.  Neoadjuvant therapy on I SPY-2 with ganetespib and paclitaxel.  AC x 2 complicated by PCP pneumonia.  She had intractable nausea and vomiting the first cycle of AC, and the second cycle of AC was complicated by  Pneumocystis pneumonia requiring intubation and a 2-week hospitalization.  She did not complete the AC treatment.  She did undergo a right lumpectomy and sentinel node procedure.  She had a positive margin and underwent resection.  She still had a positive margin.  She underwent a right mastecomy with clear margins.  Pathologic stage was IIIA  [id number] and of concern, she had an RCB3 histology meaning significant risk of recurrence of her breast cancer during the 5-year period following primary therapy.   She completed radiation in 12/2015.      B.  Right lumpectomy, followed by right margin resections, followed by rigth mastectomy.   B.  Oophorectomy 10-16-15.   C.  Tamoxifen after 10-6-15.   D.  Radiation therapy. 5,040 cGy in 180 cGy/fraction to the chest wall and regional lymphatics followed by 720 cGy axillary lymph node boost and 1000 cGy mastectomy scar boost. Completed on 12/3/2015.  E.   Adjuvant letrozole begun 1-28-16.  Plan was to continue through 2026.  F.  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  A mix of TNBC and ER- HER2+.  Clinic conference consensus was for the modified Vandana with paclitaxel weekly to treat both clones. T-DXd could be a next regimen.  G.  Metastatic carcinoma is HER2 negative (score 1+) by immunohistochemistry.  She is also a candidate for T-DXd.  H.  Recurrent tumor with 22C3 antibody for PD-L1 and hold this in reserve for a Keynote 355 metastatic TNBC approach if the Vandana regimen does not control her metastatic disease.  Keynote 355 could also be an active regimen.       INTERVAL HISTORY:    Rebeka was seen today in a video visit.  She has had severe significant fatigue.  She has been in bed half of the day or more.  She has not had headache, however.  She has no complaints  of problems with thinking or any cognitive issues at all that she can report.  She denies depression or anxiety.  She is staying at the [facility] near in our [facility] building, so that she can get frequent treatments with methotrexate intrathecally twice weekly.  Hopefully, her CSF will clear, but as of now she has had 5 treatments without clearance of her CSF.  We are adding tucatinib beginning today.  She did receive this medication program from [facility].  She is waiting for the delivery of the Xeloda, which was supposed to be there last Saturday, but should be delivered today.  I went over the dosing of the tucatinib 300 mg twice daily and Xeloda 500 mg twice daily, 14 days on and 7 days off.  Eliquis has been dose reduced to 2.5 mg.    She has no pain except for mild headache.  Significant fatigue.  No depression or anxiety. No problems with thinking, speech per her report.       REVIEW OF SYSTEMS:      A 10-point review of systems is otherwise negative.       PHYSICAL EXAMINATION:    Speech normal.  Movements appear normal.  Mood and affect normal.  Alopecia.      LABORATORY DATA:   None today.  CSF positive for malignancy 12-12-22.  CSF has remained positive for malignancy with sampling and IT MTX 12-20, 12-23, 12-27, 12-30.         ASSESSMENT AND PLAN:   1.  Mary Bennett is a 57-year-old woman with a history of a clinical stage II right breast cancer, ER positive, MI positive, HER2 negative by immunohistochemistry.  She had a lobular histology.  She was on the I-SPY 2 clinical trial and was randomized to ganetespib and paclitaxel, then went on to AC. Pathologic stage was IIIA  [id number] and of concern, she had an RCB3 histology meaning significant risk of recurrence of her breast cancer during the 5-year period following primary therapy.  There were 2 lymph nodes involved with mammary carcinoma with extracapsular extension.  She went on to have radiation therapy and then began hormonal  therapy with tamoxifen in October 2015.  She had oophorectomy and started letrozole in January 2016.   2.  After recurrence was diagnosed with a sacral mass, breast conference recommended starting with HER2 directed therapy.  Biphenotypic cancer with majority TNBC component and a HER2 component.  I discussed the 22C3 PD-L1 CPS of 40 while 20% of the tumor is HER2+ by FISH and the overall ASCO CAP assignment is negative for the bulk tumor with the qualifier that there is a significant HER2 clonal component that would be best treated with HER2 directed therapy.  The Tumor Board consensus was to continue with HER2 directed therapy to treat the new and HER2+ clone till best response.    3. Leptomeningeal progression with communicating hydrocephalus requiring placement of a shunt last week by Dr. Evangelista Lopez.  There is a valve that allows IT chemotherapy (methotrexate) by magnetic closure of the valve for 2 hours after administration of methotrexate.  This is to be done with a neurosurgery EDIL and an oncology EDIL approved for IT chemotherapy administration. The methotrexate has been dosed at 10 mg IT total dose twice weekly for 3 weeks.   We have the plan of continuing with intrathecal methotrexate twice weekly, having Neurosurgery adjust the valve for treatment.  She will have cytology checked and have IV fluids with each treatment.  Restaging will be performed with a CT and brain MRI 02/20/2023.  All of her questions were answered. CSF remains positive after 4 treatments. We will continue IT methotrexate preservative free twice weekly.  Start of the HER2 Climb regimen may help  Clear the CSF.  If not response in 1 week, we will proceed with radiation with Dr. Chauhan.    4.  Discussion of systemic treatment with the HER2 CLIMB regimen, which is compatible with concomitant IT methotrexate.  I had an extensive discussion with Josefina Verma, her mother Brandi, roommate Karl and friend Carolina about the [id number] regimen.  I think  that this regimen would be a good one for her because the regimen consists of tucatinib, capecitabine and Herceptin.  Tucatinib and capecitabine both cross the blood brain barrier.  The tucatinib could potentially control CNS HER-2 positive breast cancer and the Xeloda the triple negative if present.  The cell block needs to be checked for HER-2 by IHC, although if it were negative, it would not rule out HER-2 positive breast cancer behind blood brain barrier. The oral pharmacy group is meeting with her. Eliquis is reduced to half dose of 2.5 mg daily because of potential interaction with tucatinib.  Capecitabine crosses the blood brain barrier and can treat the triple negative component as well. WBR planned. If CNS does not clear with tucatiinib/capecitabine consider Keynote 355 with gemcitabine/carboplatin.   5.  Radiation Oncology appointment with Dr. Bill Chauhan is 01/04/2023.    6.  Xgeva was held because of need for a dental procedure.  Although there is no FDG uptake in bones, bones are involved.   7. Potential interactions with tucatinib requiring a dose reduction of other drugs.  Eliquis dose reduction by half. Trazadone dose reduction by half.  Discussed with Aziza in the oral chemotherapy program.   8.  Follow up plan.   January 6 with Milli Wilson with LP methotrexate CSF for cell count IVF, January 10 with Milli Coughlin with LP methotrexate CSF for cell count, CBC, CMP, Ca27.29, CEA, Herceptin, IVF. January 13 IV fluids with Milli Wilson with LP methotrexate CSF for cell count, January 17 with Milli Wilson with LP methotrexate CSF for cell count IVF. January 20 with Ana Salcedo with LP methotrexate CSF for cell count, IVF. Neurosurgery needs to adjust valve each LP. January 31 with Milli bautista with LP methotrexate CSF for cell count, Herceptin IVF, CBC, CMP, CA27.29 and CEA. Brain MRI CT CAP February 20. February 21 with Milli Wilson with LP methotrexate CSF for cell count, CBC, CMP,  echocardiogram.    Thank you for allowing us to continue to participate in Josefina Bennett' care.      Sincerely,       Evangelista Meza MD  Professor  [facility]  [phone]      11:46-12:01  I spent 15 minutes with the patient more than 50% of which was in counseling and coordination of care and then 30 minutes reviewing results from her inpatient hospitalization, 45 minutes total time.

## 2023-01-05 NOTE — PROGRESS NOTES
Mary is a 57 year old who is being evaluated via a billable video visit.      How would you like to obtain your AVS? MyChart  If the video visit is dropped, the invitation should be resent by: Text to cell phone: [phone]  Will anyone else be joining your video visit? No      Jayson CARBALLO    Video-Visit Details    Type of service:  Video Visit     Originating Location (pt. Location): [facility]    Distant Location (provider location):  [facility] [place]  Platform used for Video Visit: Mimiboard    REASON FOR VISIT: Follow-up breast cancer     HISTORY OF PRESENT ILLNESS:  Josefina Bennett was self referred to our clinic in 2014 for clinical trial recommendations for newly diagnosed stage II breast cancer. She had her last mammogram approximately a year prior. She did notice in early January 2015 that she was having some distortion of the right breast, and she went to see her gynecologist and had a mammogram performed. She had a screening mammogram performed on 01/15/2015. Breast tissue was heterogeneously dense, which might compromise the mammography. There was architectural distortion in the right breast approximately 11-12 o'clock position, zone 2, posterior depth, and a CC MLO spot compression was performed and an ultrasound was planned. The diagnostic mammogram from 01/28/2015 showed right breast architectural distortion centered at the 12 o'clock position, zone 2, suspicious for neoplasm. Breast tomosynthesis was used in the interpretation. Ultrasound findings included targeted ultrasound of the right upper breast demonstrating a band-like area of abnormal echogenicity between 11 and 12 o'clock position in the right breast at zone 2. This measures 4 cm transversely x 1.1 cm AP, and there was only a 1.5 cm measurement in the radial direction. There was blood flow suspicious for neoplasm at the 11 o'clock position in zone 2. There is a 1.4 cm hypoechoic nodule slightly  from the larger area of altered  echogenicity that is also suspicious. A biopsy was performed. The biopsy showed infiltrating lobular carcinoma, grade 2, and a clip was placed at specimen [id number]. There were a few signet cells present. The tumor was ER-positive, WI low positive, and HER2 was negative by immunohistochemistry. She subsequently underwent an MRI showing a tumor that was 4.8 x 1.7 x 3.2 cm in size. Overall, her clinical stage was T2 NX MX.       She was enrolled in the I-SPY-2 clinical trial, and qualified with high-risk MammaPrint score. She was randomized to ganetespib and paclitaxel, and she was treated with 12 cycles of treatment, and then started on AC on 05/26/2015. She developed intractable nausea and vomiting after the first cycle, which required hospitalization on the second cycle. She then developed Pneumocystis pneumonia, which resulted in intubation and a 2-week hospitalization during the course of AC. She was discharged on 07/02/2015, and decided she did not want to pursue any further chemotherapy. She had a right lumpectomy and sentinel lymph node procedure 08/12/2015. She had a positive margin, underwent a re-excision, and had a positive margin. Ultimately, she underwent a right mastectomy with clear margins. She began radiation treatment with Dr. Bill Chauhan, and completed radiation therapy on 12/04/2015. Pathologic stage was III-A, ypT3 N1a MX. Of concern, she had what could be considered an RCB3 tumor.  She started adjuvant letrozole on 1/28/16.     TREATMENT HISTORY:  A.  Neoadjuvant therapy on I SPY-2 with ganetespib and paclitaxel.  AC x 2 complicated by PCP pneumonia.  She had intractable nausea and vomiting the first cycle of AC, and the second cycle of AC was complicated by Pneumocystis pneumonia requiring intubation and a 2-week hospitalization.  She did not complete the AC treatment.  She did undergo a right lumpectomy and sentinel node procedure.  She had a positive margin and underwent resection.  She  still had a positive margin.  She underwent a right mastecomy with clear margins.  Pathologic stage was IIIA  [id number] and of concern, she had an RCB3 histology meaning significant risk of recurrence of her breast cancer during the 5-year period following primary therapy.   She completed radiation in 12/2015.      B.  Right lumpectomy, followed by right margin resections, followed by rigth mastectomy.   B.  Oophorectomy 10-16-15.   C.  Tamoxifen after 10-6-15.   D.  Radiation therapy. 5,040 cGy in 180 cGy/fraction to the chest wall and regional lymphatics followed by 720 cGy axillary lymph node boost and 1000 cGy mastectomy scar boost. Completed on 12/3/2015.  E.   Adjuvant letrozole begun 1-28-16.  Plan was to continue through 2026.  F.  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  A mix of TNBC and ER- HER2+.  Clinic conference consensus was for the modified Vandana with paclitaxel weekly to treat both clones. T-DXd could be a next regimen.  G.  Metastatic carcinoma is HER2 negative (score 1+) by immunohistochemistry.  She is also a candidate for T-DXd.  H.  Recurrent tumor with 22C3 antibody for PD-L1 and hold this in reserve for a Keynote 355 metastatic TNBC approach if the Vandana regimen does not control her metastatic disease.  Keynote 355 could also be an active regimen.       INTERVAL HISTORY:    Rebeka was seen today in a video visit.  She has had severe significant fatigue.  She has been in bed half of the day or more.  She has not had headache, however.  She has no complaints of problems with thinking or any cognitive issues at all that she can report.  She denies depression or anxiety.  She is staying at the [place] [facility] [place], so that she can get frequent treatments with methotrexate  intrathecally twice weekly.  Hopefully, her CSF will clear, but as of now she has had 5 treatments without clearance of her CSF.  We are adding tucatinib beginning today.  She did receive this medication program from [facility].  She is waiting for the delivery of the Xeloda, which was supposed to be there last Saturday, but should be delivered today.  I went over the dosing of the tucatinib 300 mg twice daily and Xeloda 500 mg twice daily, 14 days on and 7 days off.  Eliquis has been dose reduced to 2.5 mg.    She has no pain except for mild headache.  Significant fatigue.  No depression or anxiety. No problems with thinking, speech per her report.       REVIEW OF SYSTEMS:      A 10-point review of systems is otherwise negative.       PHYSICAL EXAMINATION:    Speech normal.  Movements appear normal.  Mood and affect normal.  Alopecia.      LABORATORY DATA:   None today.  CSF positive for malignancy 12-12-22.  CSF has remained positive for malignancy with sampling and IT MTX 12-20, 12-23, 12-27, 12-30.         ASSESSMENT AND PLAN:   1.  Mary Bennett is a 57-year-old woman with a history of a clinical stage II right breast cancer, ER positive, TX positive, HER2 negative by immunohistochemistry.  She had a lobular histology.  She was on the I-SPY 2 clinical trial and was randomized to ganetespib and paclitaxel, then went on to AC. Pathologic stage was IIIA  [id number] and of concern, she had an RCB3 histology meaning significant risk of recurrence of her breast cancer during the 5-year period following primary therapy.  There were 2 lymph nodes involved with mammary carcinoma with extracapsular extension.  She went on to have radiation therapy and then began hormonal therapy with tamoxifen in October 2015.  She had oophorectomy and started letrozole in January 2016.   2.  After recurrence was diagnosed with a sacral mass, breast conference recommended starting with HER2 directed therapy.  Biphenotypic  cancer with majority TNBC component and a HER2 component.  I discussed the 22C3 PD-L1 CPS of 40 while 20% of the tumor is HER2+ by FISH and the overall ASCO CAP assignment is negative for the bulk tumor with the qualifier that there is a significant HER2 clonal component that would be best treated with HER2 directed therapy.  The Tumor Board consensus was to continue with HER2 directed therapy to treat the new and HER2+ clone till best response.    3. Leptomeningeal progression with communicating hydrocephalus requiring placement of a shunt last week by Dr. Evangelista Lopez.  There is a valve that allows IT chemotherapy (methotrexate) by magnetic closure of the valve for 2 hours after administration of methotrexate.  This is to be done with a neurosurgery EDIL and an oncology EDIL approved for IT chemotherapy administration. The methotrexate has been dosed at 10 mg IT total dose twice weekly for 3 weeks.   We have the plan of continuing with intrathecal methotrexate twice weekly, having Neurosurgery adjust the valve for treatment.  She will have cytology checked and have IV fluids with each treatment.  Restaging will be performed with a CT and brain MRI 02/20/2023.  All of her questions were answered. CSF remains positive after 4 treatments. We will continue IT methotrexate preservative free twice weekly.  Start of the HER2 Climb regimen may help  Clear the CSF.  If not response in 1 week, we will proceed with radiation with Dr. Chauhan.    4.  Discussion of systemic treatment with the HER2 CLIMB regimen, which is compatible with concomitant IT methotrexate.  I had an extensive discussion with Josefina Verma, her mother Brandi, roommate Karl and friend Carolina about the [id number] regimen.  I think that this regimen would be a good one for her because the regimen consists of tucatinib, capecitabine and Herceptin.  Tucatinib and capecitabine both cross the blood brain barrier.  The tucatinib could potentially control CNS HER-2 positive  breast cancer and the Xeloda the triple negative if present.  The cell block needs to be checked for HER-2 by IHC, although if it were negative, it would not rule out HER-2 positive breast cancer behind blood brain barrier. The oral pharmacy group is meeting with her. Eliquis is reduced to half dose of 2.5 mg daily because of potential interaction with tucatinib.  Capecitabine crosses the blood brain barrier and can treat the triple negative component as well. WBR planned. If CNS does not clear with tucatiinib/capecitabine consider Keynote 355 with gemcitabine/carboplatin.   5.  Radiation Oncology appointment with Dr. Bill Chauhan is 01/04/2023.    6.  Xgeva was held because of need for a dental procedure.  Although there is no FDG uptake in bones, bones are involved.   7. Potential interactions with tucatinib requiring a dose reduction of other drugs.  Eliquis dose reduction by half. Trazadone dose reduction by half.  Discussed with Aziza in the oral chemotherapy program.   8.  Follow up plan.   January 6 with Milli Wilson with LP methotrexate CSF for cell count IVF, January 10 with Milli Coughlin with LP methotrexate CSF for cell count, CBC, CMP, Ca27.29, CEA, Herceptin, IVF. January 13 IV fluids with Milli Wilson with LP methotrexate CSF for cell count, January 17 with Milli Wilson with LP methotrexate CSF for cell count IVF. January 20 with Ana Salcedo with LP methotrexate CSF for cell count, IVF. Neurosurgery needs to adjust valve each LP. January 31 with Milli bautista with LP methotrexate CSF for cell count, Herceptin IVF, CBC, CMP, CA27.29 and CEA. Brain MRI CT CAP February 20. February 21 with Milli Wilson with LP methotrexate CSF for cell count, CBC, CMP, echocardiogram.    Thank you for allowing us to continue to participate in Josefina Bennett' care.      Sincerely,       Evangelista Meza MD  Professor  [facility]  [phone]      11:46-12:01  I spent 15 minutes with the  patient more than 50% of which was in counseling and coordination of care and then 30 minutes reviewing results from her inpatient hospitalization, 45 minutes total time.

## 2023-01-05 NOTE — NURSING NOTE
Patient declined individual allergy and medication review by support staff because pt states nothing has changed since last reviewed yesterday January 4th 2022.    Jayson CARBALLO

## 2023-01-06 NOTE — PROGRESS NOTES
Infusion Nursing Note:  Josefina Bennett presents today for IV fluids, caffeine-sodium benzoate and ativan. Cycle 1 day 18 IT methotrexate administered via Ommaya by Adwoa Wilson PA-C in infusion.    Patient seen by provider today: Yes: Adwoa Wilson PA-C in clinic   present during visit today: Not Applicable.    Note: Pt presents today generally feeling well. She complains of fatigue today which she states is about the same as usual. She has 4/10 headache pain today and received IV caffeine. Pt  has been afebrile and denies signs and symptoms of infection including: cough, SOB, sore throat, diarrhea, vomiting, rash, or pain with urination. Pt wishes to proceed with today's planned treatment.    Neuro turned pt's shunt off at 0800. Adwoa Wilson PA-C completed Ommaya infusion at 0919. Pt lied flat for two hours post procedure. Neuro turned shunt on at 11:40a. Pt states she feels well with no new symptoms. Feels comfortable discharging at this time.     Intravenous Access:  Implanted Port.    Treatment Conditions:  Lab Results   Component Value Date    HGB 11.4 (L) 01/06/2023    WBC 14.4 (H) 01/06/2023    ANEU 7.3 10/03/2022    ANEUTAUTO 11.9 (H) 01/06/2023     01/06/2023        Post Infusion Assessment:  Patient tolerated infusion without incident.  Blood return noted pre and post infusion.  Site patent and intact, free from redness, edema or discomfort.  No evidence of extravasations.  Access discontinued per protocol.     Discharge Plan:   Patient declined prescription refills.  Discharge instructions reviewed with: Patient and Family.  Patient and/or family verbalized understanding of discharge instructions and all questions answered.  AVS to patient via [employer].  Patient will return 1/10/23 for next appointment.   Patient discharged in stable condition accompanied by: self and mother.  Departure Mode: Ambulatory.      Emily Meese, RN

## 2023-01-06 NOTE — LETTER
"2023       RE: Josefina Bennett  [address]  [place] [address]     Dear Colleague,    Thank you for referring your patient, Josefina Bennett, to the [facility] NEUROSURGERY CLINIC [place] at [facility]. Please see a copy of my visit note below.      [facility]  Department of Neurosurgery       Name: Josefina Bennett  MRN: [id number]  Age: 57 year old  : 1965     DOS: 2023     Chief Complaint:   Leptomeningeal carcinomatosis s/p  shunt placement (Certas set to 3), shunt programming visit     History of Present Illness:   Josefina Bennett is a 57 year old female with a history of metastatic breast cancer and communicating hydrocephalus s/p  shunt placeent 2022 who is seen today in the Cancer Center for shunt programming. She is receiving intrathecal methotrexate therapy and needs her shunt to be turned to \"virtual off\" during infusion.      Physical Exam:   General: No acute distress.    Neuro: The patient is fully oriented. Speech is normal.      Procedure:  With the patient's verbal permission her Certas valve was interrogated, found to be set to 3. Using the manual  first, this was dialed to 8. Rechecked x 2. Then also rechecked with the electronic  and confirmed x 2.      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 EDIL Brittany Mandel will be available in 2 hours to reprogram it to the previous 3 setting.        Claudia Nava, CNP  Department of Neurosurgery  "

## 2023-01-06 NOTE — LETTER
"2023       RE: Josefina Bennett  [address]  [place] [address]     Dear Colleague,    Thank you for referring your patient, Josefina Bennett, to the [facility] NEUROSURGERY CLINIC [place] at [facility]. Please see a copy of my visit note below.      [facility]  Department of Neurosurgery  Center for Skull Base and Pituitary Surgery    Name: Josefina Bennett  MRN: [id number]  Age: 57 year old  : 1965      Chief Complaint:   Leptomeningeal carcinomatosis s/p  shunt placement (Certas set to 3), shunt programming visit    History of Present Illness:   Josefina Bennett is a 57 year old female with a history of metastatic breast cancer and communicating hydrocephalus s/p  shunt placeent 2022 who is seen today in the Cancer Center for shunt programming. She is receiving intrathecal methotrexate therapy. She just finished today's therapy, her  shunt was turned to the \"off\" position of 8 this morning by my colleague. She is done with her therapy now and needs her shunt turned back to 3.       Physical Exam:   General: No acute distress.    Neuro: The patient is fully oriented. Speech is normal.    Procedure:  With the patient's verbal permission her right frontal  shunt was interrogated, found to be in the \"off\" position of 8. Rechecked x 2. With the magnet, this was dialed down to her previous setting of 3. Rechecked x 2. This was also checked with the electronic device and found to be at 3. Rechecked x 2. Patient tolerated this well.     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         Brittany Mandel PA-C  Department of Neurosurgery  "

## 2023-01-06 NOTE — PROGRESS NOTES
"Neurosurgery Attending Progress Note     Josefina Bennett is a 56 year old woman with a history of metastatic breast cancer and communicating hydrocephalus with leptomeningeal carcinomatosis. She is s/p ventriculoperitoneal shunt placement with Dr. Lopez on 12/9/2022.  She has a Codman Certas  Shunt which is programmed at 3.  It is on the right side.     She is undergoing intrathecal methotrexate therapy two times per week for several weeks.  For her treatment, the plan is to have her Certas shunt valve dialed or turned to the setting of 8, which is the \"virtual off\" setting.  Then, she will have intrathecal injection of the methotrexate.  She will stay flat for two hours for the treatment.  Then, her shunt Certas shunt valve will be turned back to her therapeutic setting of 3.     I saw her in person at the [facility] just before 9:00 am and checked her shunt with the electronic . The shunt was set at 3. I changed the setting to 8, rechecked the setting and left the room. Her IT methotrexate was given at 9:20 am and I thus returned at approximately 11:30 to reset her shunt at 3. Again, using the electronic , I checked the setting, found it at 8 and then reset the shunt to a setting of 3. I then again rechecked the setting to ensure it was set at 3. We will see her again for future shunt changes during her treatment process.   "

## 2023-01-06 NOTE — PROGRESS NOTES
"  [facility]  Department of Neurosurgery  Center for Skull Base and Pituitary Surgery    Name: Josefina Bennett  MRN: [id number]  Age: 57 year old  : 1965      Chief Complaint:   Leptomeningeal carcinomatosis s/p  shunt placement (Certas set to 3), shunt programming visit    History of Present Illness:   Josefina Bennett is a 57 year old female with a history of metastatic breast cancer and communicating hydrocephalus s/p  shunt placeent 2022 who is seen today in the Cancer Center for shunt programming. She is receiving intrathecal methotrexate therapy. She just finished today's therapy, her  shunt was turned to the \"off\" position of 8 this morning by my colleague. She is done with her therapy now and needs her shunt turned back to 3.       Physical Exam:   General: No acute distress.    Neuro: The patient is fully oriented. Speech is normal.    Procedure:  With the patient's verbal permission her right frontal  shunt was interrogated, found to be in the \"off\" position of 8. Rechecked x 2. With the magnet, this was dialed down to her previous setting of 3. Rechecked x 2. This was also checked with the electronic device and found to be at 3. Rechecked x 2. Patient tolerated this well.     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       Brittany Mandel PA-C  Department of Neurosurgery      "

## 2023-01-06 NOTE — PROGRESS NOTES
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 She is receiving intrathecal methotrexate therapy and needs her shunt to be turned to \"virtual off\" during infusion.      Physical Exam:   General: No acute distress.    Neuro: The patient is fully oriented. Speech is normal.      Procedure:  With the patient's verbal permission her Certas valve was interrogated, found to be set to 3. Using the manual  first, this was dialed to 8. Rechecked x 2. Then also rechecked with the electronic  and confirmed x 2.      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 EDIL Brittany Mandel will be available in 2 hours to reprogram it to the previous 3 setting.        Claudia Nava CNP  Department of Neurosurgery        "

## 2023-01-06 NOTE — PROGRESS NOTES
BMT ONC Adult Ommaya Access and Intrathecal Chemotherapy Administration Procedure Note  Jan 6, 2023    Procedure: Codman Certas Plus access to obtain CSF and give intrathecal chemotherapy (IT)          Diagnosis: breast cancer, leptomeningeal disease    Learning needs assessment complete within 12 months: YES     Vitals reviewed:  YES    Informed Consent: Procedure, benefits, risks, and alternatives explained to the patient who voiced understanding of the information and agreed to proceed with CSF reservoir access with IT chemotherapy. Risks include bleeding, nausea, headaches, and/or infection.   Patient safety checklist completed.    Labs: Reviewed:   INR   Date Value Ref Range Status   12/07/2022 1.10 0.85 - 1.15 Final   06/21/2015 1.21 (H) 0.86 - 1.14 Final      Platelet Count   Date Value Ref Range Status   01/06/2023 296 150 - 450 10e3/uL Final   01/23/2020 283 150 - 450 10e9/L Final     Note: Shunt was turned off by neurosurgery team. Ommaya was pumped initially, then cleaned with betadine + alcohol swab. Butterfly needle was attached to a 10 cc syringe and inserted into the CSF reservoir.  6 cc of CSF was withdrawn slowly over about 3 minutes.  10 mg IT methotrexate was double checked in EPIC and instilled over 3 minutes.   Patient tolerated the procedure well.    Specimen appears clear. CSF was sent for cytology, cell count, protein and glucose.  CSF reservoir was pumped 6 times after the procedure.  Patient was given IV fluids and IV caffeine for headaches. Given 0.5 mg IV Ativan for anxiety.    Post-procedure pain assessment: 0 out of 10 on the numeric pain rating scale  Interventions: No    Complications: No     Disposition: Patient will remain on back for 2 hour post procedure. Call if develops headaches, fevers and or chills. Shunt will be turned back on 2 hours after IT chemo given by neurosurgery team.    Performed by:   Adwoa Wilson PA-C, assisted by Amber Scheierl, CNP   [facility]  [address]  [place] [address]  [phone]

## 2023-01-06 NOTE — PATIENT INSTRUCTIONS
[facility] Triage and after hours / weekends / holidays:  [phone]    Please call the triage or after hours line if you experience a temperature greater than or equal to 100.4, shaking chills, have uncontrolled nausea, vomiting and/or diarrhea, dizziness, shortness of breath, chest pain, bleeding, unexplained bruising, or if you have any other new/concerning symptoms, questions or concerns.      If you are having any concerning symptoms or wish to speak to a provider before your next infusion visit, please call your care coordinator or triage to notify them so we can adequately serve you.     If you need a refill on a narcotic prescription or other medication, please call before your infusion appointment.                January 2023 Sunday Monday Tuesday Wednesday Thursday Friday Saturday   1     2     3    LAB CENTRAL   7:30 AM   (15 min.)   [facility] LAB DRAW   [facility]    ONC INFUSION 2 HR (120 MIN)   8:00 AM   (120 min.)    ONC INFUSION NURSE   [facility]    POST-OP SPINE      9:15 AM   (30 min.)   Mendy Bailey PA-C   [facility]    LUMBAR PUNCTURE   9:45 AM   (45 min.)   Adwoa Wilson PA-C   [facility] RETURN  12:15 PM   (60 min.)   Claudia Nava APRN CNP   [facility] 4    NEW   2:00 PM   (90 min.)   Bill Chauhan MD   [facility] Radiation Oncology    RETURN   3:45 PM   (45 min.)   Sandra Coughlin PA-C   [facility]    ONC INFUSION 2 HR (120 MIN)   4:00 PM   (120 min.)    ONC INFUSION NURSE   [facility] 5    VIDEO VISIT RETURN  10:25 AM   (30 min.)   Evangelista Meza MD   [facility] 6    LAB CENTRAL   7:30 AM   (15 min.)   [facility] DRAW   [facility]  RETURN   7:45 AM   (60 min.)   Claudia Nava APRN CNP   [facility] Neurosurgery [facility]    ONC INFUSION 2 HR (120 MIN)   8:00 AM   (120 min.)    ONC INFUSION NURSE   [facility]    LUMBAR PUNCTURE   9:00 AM   (45 min.)   Adwoa Wilson PA-C   [facility]    [facility] RETURN  12:00 PM   (15 min.)   Brittany Mandel PA-C   [facility] 7       8     9     10    ONC INFUSION 2 HR (120 MIN)   7:30 AM   (60 min.)   [facility] ONC INFUSION NURSE   [facility]    [facility] RETURN   8:15 AM   (30 min.)   Deepak Jules MD   [facility]    LAB CENTRAL   8:15 AM   (15 min.)   [facility] DRAW   [facility]    LUMBAR PUNCTURE   8:45 AM   (45 min.)   Adwoa Wilson PA-C   [facility]    POST-OP SPINE     10:45 AM   (30 min.)   Mendy Bailey PA-C   [facility]    VIDEO VISIT RETURN  10:45 AM   (45 min.)   Sandra Coughlin PA-C   [facility] 11    VIDEO VISIT NEW   8:05 AM   (80 min.)   Dharmesh Sanchez MD   [facility] 12     13    LAB CENTRAL   7:00 AM   (15 min.)   [facility] LAB DRAW   [facility]    ONC INFUSION 2 HR (120 MIN)   7:30 AM   (120 min.)    ONC INFUSION NURSE   [facility]    [facility] RETURN   9:30 AM   (30 min.)   Brittany Mandel PA-C   [facility]    LUMBAR PUNCTURE  10:00 AM   (45 min.)   Adwoa Wilson PA-C   [facility]    [facility] RETURN  12:15 PM   (30 min.)   Brittany Mandel PA-C   [facility] 14       15     16     17    LAB CENTRAL  10:00 AM   (15 min.)   [facility] DRAW   [facility]    UMP RETURN  12:15 PM   (60 min.)   Claudia Nava APRN CNP   [facility]    LUMBAR PUNCTURE   1:00 PM   (45 min.)   Adwoa Wilson PA-C   [facility]    ONC INFUSION 2 HR (120 MIN)   2:00 PM   (120 min.)   [facility] ONC INFUSION NURSE   [facility]    UMP RETURN   3:15 PM   (60 min.)   Claudia Nava APRN CNP   [facility] Neurosurgery [facility] 18     19     20    UMP RETURN   9:45 AM   (60 min.)   Claudia Nava APRN CNP   [facility] Neurosurgery [facility]    LAB CENTRAL  10:00 AM   (15 min.)   [facility] DRAW   [facility]    LUMBAR PUNCTURE  10:45 AM   (45 min.)   Ana Salcedo PA-C   [facility]    ONC INFUSION 2 HR (120 MIN)  12:00 PM   (120 min.)   [facility] ONC INFUSION NURSE   [facility]    [facility] RETURN   1:45 PM   (60 min.)   Claudia Nava APRN CNP   [facility] Neurosurgery [facility] 21       22     23     24     25     26     27     28       29     30     31    POST-OP SPINE     11:15 AM   (30 min.)   Mendy Bailey PA-C   [facility] Neurosurgery [facility]    LAB CENTRAL  11:45 AM   (15 min.)    [facility] LAB DRAW   [facility]    LUMBAR PUNCTURE  12:00 PM   (45 min.)   Adwoa Wilson PA-C   [facility]    RETURN   1:45 PM   (30 min.)   Evangelista Meza MD   [facility]    ONC INFUSION 1 HR (60 MIN)   2:30 PM   (60 min.)   [facility] ONC INFUSION NURSE   [facility]    UMP RETURN   3:15 PM   (60 min.)   Claudia Nava APRN CNP   [facility] Neurosurgery [facility]                                   February 2023 Sunday Monday Tuesday Wednesday Thursday Friday Saturday 1      2     3     4       5     6     7     8     9     10     11       12     13     14     15     16     17     18       19     20    MR BRAIN WWO  10:30 AM   (45 min.)   [id number]   [facility] [place] [facility] [place]    CT CHEST/ABDOMEN/PELVIS W  11:30 AM   (20 min.)   [id number]   [facility] [place] 21    POST-OP SPINE      9:15 AM   (30 min.)   Mendy Bailey PA-C   [facility] Neurosurgery [facility]    LUMBAR PUNCTURE   9:45 AM   (45 min.)   Adwoa Wilson PA-C   [facility]    ECHO COMPLETE  11:00 AM   (60 min.)   [id number]   [facility] [place]    LAB CENTRAL  12:15 PM   (15 min.)   [facility] DRAW   [facility]    RETURN  12:15 PM   (30 min.)   Evangelista Meza MD   [facility]    ONC INFUSION 1 HR (60 MIN)   1:00 PM   (60 min.)    ONC INFUSION NURSE   [facility]    POST-OP SPINE      1:45 PM   (30 min.)   Mendy Bailey PA-C   [facility] Neurosurgery [facility] 22     23     24     25       26     27     28                                           Lab Results:  Recent Results (from the past 12 hour(s))   CBC with platelets and differential    Collection Time: 01/06/23  7:52 AM   Result Value Ref Range    WBC Count 14.4 (H) 4.0 - 11.0 10e3/uL    RBC Count 4.04 3.80 - 5.20 10e6/uL    Hemoglobin 11.4 (L) 11.7 - 15.7 g/dL    Hematocrit 36.1 35.0 - 47.0 %    MCV 89 78 - 100 fL    MCH 28.2 26.5 - 33.0 pg    MCHC 31.6 31.5 - 36.5 g/dL    RDW 19.0 (H) 10.0 - 15.0 %    Platelet Count 296 150 - 450 10e3/uL    % Neutrophils 82 %    % Lymphocytes 12 %    % Monocytes 4 %    % Eosinophils 1 %    % Basophils 0 %    % Immature Granulocytes 1 %    NRBCs per 100 WBC 0 <1 /100    Absolute Neutrophils 11.9 (H) 1.6 - 8.3 10e3/uL    Absolute Lymphocytes 1.7 0.8 - 5.3 10e3/uL    Absolute Monocytes 0.5 0.0 - 1.3  10e3/uL    Absolute Eosinophils 0.1 0.0 - 0.7 10e3/uL    Absolute Basophils 0.0 0.0 - 0.2 10e3/uL    Absolute Immature Granulocytes 0.1 <=0.4 10e3/uL    Absolute NRBCs 0.0 10e3/uL   Glucose CSF:    Collection Time: 01/06/23 10:52 AM   Result Value Ref Range    Glucose  (H) 40 - 70 mg/dL   Protein total CSF:    Collection Time: 01/06/23 10:52 AM   Result Value Ref Range    Protein total CSF 30.4 15.0 - 45.0 mg/dL

## 2023-01-06 NOTE — NURSING NOTE
"Chief Complaint   Patient presents with     Port Draw     Port accessed and labs drawn by rn in lab. Vital signs taken.     Port accessed by RN in lab with 20g 3/4\" gripper needle, labs drawn, port flushed with saline and heparin, vitals checked, pt checked in for next appointment.    Josefina Brown RN      "

## 2023-01-10 NOTE — CONFIDENTIAL NOTE
I showed up at infusion center to turn patient's shunt back on at the appointment time at 11 a.m.  Patient was not in the infusion center.  I was told by her nurse that Dr. Meza had cancelled her treatments and she was sent for a STAT MRI.  Patient was currently down in MRI at the time of our appointment.  The nurse was sure that someone from neurosurgery was informed of the change of plans.  I checked with a couple of my colleagues Claudia Nava, Ingris Seay and nurse Cedeno.  Nobody had any knowledge of the change in her treatment plan today.  The nurse indicated the patient would not be back up to the infusion center after the MRI.      Patient has a Certas valve, which is MRI safe so does not need to to be checked afterward.  It is common, however, for patients to get their shunt setting checked after MRI for peace of mind.  We are happy to do so, but patient will need to schedule an appointment in neurosurgery clinic to do this.      Mendy Bailey PA-C on 1/10/2023 at 11:35 AM      Patient did come to neurosurgery clinic after MRI and her shunt was verified to be at 3.     Mendy Bailey PA-C on 1/10/2023 at 1:04 PM

## 2023-01-10 NOTE — PROGRESS NOTES
Infusion Nursing Note:  Josefina Bennett presents today for IVF(given)/Herceptin(held).    Patient seen by provider today: No   present during visit today: Not Applicable.    Note: Patient reported to clinic today with no new complaints or concerns. Patient was scheduled for IVF/Ativan/Caffeine/Herceptin at 0730, LP with Adwoa Wilson PA-C at 0900 and Sandra Coughlin PA-C at 1100. At 0817 Sandra called to change plan. Dr Meza would like stat brain MRI.    Sandra Coughlin PA-C/Levar Terrazas RN  -Hold Caffeine and Herceptin today  -No LP today  -Stat brain MRI at 1000  -See Dr Meza this afternoon  -Leave accessed will deaccess in clinic later today    Intravenous Access:  Labs drawn without difficulty.  Implanted Port.  By lab staff.    Treatment Conditions:  Lab Results   Component Value Date    HGB 12.3 01/10/2023    WBC 11.9 (H) 01/10/2023    ANEU 7.3 10/03/2022    ANEUTAUTO 9.4 (H) 01/10/2023     01/10/2023      Lab Results   Component Value Date     01/10/2023    POTASSIUM 4.3 01/10/2023    MAG 1.9 12/13/2022    CR 0.93 01/10/2023    VEE 8.7 01/10/2023    BILITOTAL 0.6 01/10/2023    ALBUMIN 3.6 01/10/2023     (H) 01/10/2023    AST 42 (H) 01/10/2023       Post Infusion Assessment:  Patient tolerated infusion without incident.  Blood return noted pre and post infusion.  Site patent and intact, free from redness, edema or discomfort.  No evidence of extravasations.  Left accessed per provider orders. Heparin locked.     Discharge Plan:   Prescription refills given, pt and her mother will  after MRI.  Discharge instructions reviewed with: Patient and Family.  Patient and/or family verbalized understanding of discharge instructions and all questions answered.  AVS to patient via Isonas[employer].  Patient will return 1/13/23 for next appointment.   Patient discharged in stable condition accompanied by: mother.  Departure Mode: Wheelchair.  Face to Face time: 20+ minutes.    Levar Terrazas  RN

## 2023-01-10 NOTE — TELEPHONE ENCOUNTER
PRIOR AUTHORIZATION DENIED    Medication: T[place]ys[place]- [place] denied     Denial Date: 1/9/2023    Denial Rational:     Appeal Information: this is actually the appeal denial

## 2023-01-10 NOTE — NURSING NOTE
"Oncology Rooming Note    January 10, 2023 12:55 PM   Josefina Bennett is a 57 year old female who presents for:    Chief Complaint   Patient presents with     Oncology Clinic Visit     Malignant neoplasm of female breast     Initial Vitals: LMP 12/18/2014  Estimated body mass index is 22.3 kg/m  as calculated from the following:    Height as of 6/10/22: 1.626 m (5' 4.02\").    Weight as of an earlier encounter on 1/10/23: 59 kg (130 lb). There is no height or weight on file to calculate BSA.  Data Unavailable Comment: Data Unavailable   Patient's last menstrual period was 12/18/2014.  Allergies reviewed: Yes  Medications reviewed: Yes    Medications: MEDICATION REFILLS NEEDED TODAY. Provider was notified.  Pharmacy name entered into [employer]:    [facility] - [place], [place] - [address] [facility]  [employer] [facility] - A MAIL ORDER [facility]  [facility] [place] - [address] [facility] [place], [place] [address]  [facility] [place] - [address] [place] [address]  [facility], [facility] - [place], [place] - [address]  [facility] [place], [place] - [address]    Clinical concerns: needs refills for test strips Susana was notified.      Stef Correia"

## 2023-01-10 NOTE — PROGRESS NOTES
Neurosurgery Clinic Note     Name: Josefina Bennett  MRN: [id number]  Age: 57 year old  : 1965      Chief Complaint:   Leptomeningeal carcinomatosis s/p  shunt placement (Certas set to 3), shunt programming visit     History of Present Illness:   Josefina Bennett is a 57 year old female with a history of metastatic breast cancer and communicating hydrocephalus s/p  shunt placement 2022 who is being seen today in neurosurgery clinic for shunt setting check after MRI.      Physical Exam:   General: No acute distress.    Neuro: The patient is fully oriented. Speech is normal.     Procedure:  With the patient's verbal permission her right frontal  shunt was interrogated with the manual interrogatory and found to be at 3. Rechecked x 2. This was also checked with the electronic device and found to be at 3. Rechecked x 2. Patient tolerated this well.     Assessment:  Leptomeningeal carcinomatosis s/p  shunt placement (Certas set to 3), shunt programming visit     Plan:  She has upcoming IT methotrexate treatments scheduled for , , and .  Neurosurgery will be available for shutting valve off and turning it back on for those treatments.             Mendy Bailey PA-C  Department of Neurosurgery  Office: [phone]

## 2023-01-10 NOTE — PROGRESS NOTES
"Neurosurgery Attending Progress Note    Josefina Bennett is a 56 year old woman with a history of metastatic breast cancer and communicating hydrocephalus with leptomeningeal carcinomatosis. She is s/p ventriculoperitoneal shunt placement with Dr. Lopez on 12/9/2022.  She has a Codman Certas  Shunt which is programmed at 3.  It is on the right side.     She is undergoing intrathecal methotrexate therapy two times per week for several weeks.  For her treatment, the plan is to have her Certas shunt valve dialed or turned to the setting of 8, which is the \"virtual off\" setting.  Then, she will have intrathecal injection of the methotrexate.  She will stay flat for two hours for the treatment.  Then, her shunt Certas shunt valve will be turned back to her therapeutic setting of 3.    Today I saw her in person at the [facility] clinic in the [facility] building around 8:45 am. At this point, she was on the phone with her team who informed us that the plan had changed and that she would be getting an MRI brain today while holding off on her IT methotrexate treatment today. She has an appointment later this morning with ANGELA Pickens who will check her VPS setting after her MRI to ensure that it remains at 3. After discussing with her and her team, I then let them know that I, or a member of our team, can be available for setting changes in the future.   "

## 2023-01-10 NOTE — PROGRESS NOTES
Social Work Follow-Up Encounter Visit  Oncology Clinic    Data/Intervention:  Patient Name:  Josefina Bennett  /Age:  1965 (57 year old)    Reason for Follow-Up:  Supportive resources    Collaborated With:    -Patient  -Patient's mother  -    Resources Provided:    Disability Resources  social security website iMusica.gov   Social security disability phone number [phone]     Tools to help with applying:      Disability application checklist:     https://www.ssa.gov/hlp/radr/10/lms012-nfgjvmijf.pdf   Disability linkage line- 1-228.371.9442     Other helpful resources:     Cancer and Careers www.cancerandcareers.org     Cancer Triage- https://triagecancer.org/cancer-disability-insurance-resources        Legal Assistance:      Cancer legal Line:  [phone]     Disability Rights Legal Kzmxdy-2-3991-963.539.5992 or www.disabilityrightslegalcenter.org     [place] disability Law Center 630-964-5004 or www.[employer]..org/mdlc     [place]  Society 090-617-2106 or www.Bruder Healthcarelegalaid.org     Alvina information:     SW applied on patient behalf - pay it forward alvina, and general alvina and jaylen's alvina through [facility].     Cancer care: pro[employer]TO [employer]ALE online supports groups, and an online community for support.  https://www.cancercare.org/support_groups      Hope [employer] (535)-230-3759   https://www.[employer]/get-help/emergency-assistance-grants      [employer] Treatment Assistance 1-619.578.8056   https://www.komen.org/treatment-assistance-program/      [employer] 1-387.272.2846   https://Shoplocal.org/get-help/      [place] [employer]    https://www.[employer]/patient-assistance-program/?v=76b82v6kv75z        Assessment:  SW met with patient and patient's mother regarding supportive resources. Patient stated their main concern and questions surrounded work leave benefits, how to use them, and when to apply for social security disability. Patient shared they have both short term and long term disability  benefits through their employer. SW discussed with patient about contacting their HR and the process of beginning their leave. SW reviewed with patient the process of applying for SSDI, and when they could begin that process with regards to also utilizing their employee leave benefits. SW reviewed other supportive resources available to patient and family.     Patient expressed interest in applying for grants available to them. SW applied for [facility] general alvina and [employer] alvina as well as pay it forward alvina.     Patient expressed interest in open arms meal delivery program. Patient and SW completed application with the exception of delivery address.     Patient and patient's mother agreed to discuss what address patient would like to have meals delivered to as patient is outside of the meal delivery route. Patient or patient's mother agreed to follow up with this information and SW would then submit the application.       Plan:  Previously provided patient/family with writer's contact information and availability.   SW will check back in with patient or patient's mother regarding open arms application and remain available as needed for ongoing supports.     Vita HORNER, [facility] Center[facility]  - Oncology  Phone : [phone]  Pager: [phone]

## 2023-01-10 NOTE — PATIENT INSTRUCTIONS
[facility] [place] Main Line: [phone]    Call triage nurse with chills and/or temperature greater than or equal to 100.4, uncontrolled nausea/vomiting, diarrhea, constipation, dizziness, shortness of breath, chest pain, bleeding, unexplained bruising, or any new/concerning symptoms, questions/concerns.   If you are having any concerning symptoms or wish to speak to a provider before your next infusion visit, please call your care coordinator or triage to notify them so we can adequately serve you.   Nurse Triage line:  [phone]    If after hours, weekends, or holidays, call main hospital  and ask for Oncology doctor on call @ [phone]      Latest Reference Range & Units 01/10/23 07:29   Sodium 136 - 145 mmol/L 136   Potassium 3.4 - 5.3 mmol/L 4.3   Chloride 98 - 107 mmol/L 101   Carbon Dioxide (CO2) 22 - 29 mmol/L 26   Urea Nitrogen 6.0 - 20.0 mg/dL 23.8 (H)   Creatinine 0.51 - 0.95 mg/dL 0.93   GFR Estimate >60 mL/min/1.73m2 71   Calcium 8.6 - 10.0 mg/dL 8.7   Anion Gap 7 - 15 mmol/L 9   Albumin 3.5 - 5.2 g/dL 3.6   Protein Total 6.4 - 8.3 g/dL 5.8 (L)   Alkaline Phosphatase 35 - 104 U/L 190 (H)   ALT 10 - 35 U/L 131 (H)   AST 10 - 35 U/L 42 (H)   Bilirubin Total <=1.2 mg/dL 0.6   Glucose 70 - 99 mg/dL 285 (H)   WBC 4.0 - 11.0 10e3/uL 11.9 (H)   Hemoglobin 11.7 - 15.7 g/dL 12.3   Hematocrit 35.0 - 47.0 % 38.2   Platelet Count 150 - 450 10e3/uL 349   RBC Count 3.80 - 5.20 10e6/uL 4.31   MCV 78 - 100 fL 89   MCH 26.5 - 33.0 pg 28.5   MCHC 31.5 - 36.5 g/dL 32.2   RDW 10.0 - 15.0 % 19.5 (H)   % Neutrophils % 79   % Lymphocytes % 18   % Monocytes % 1   % Eosinophils % 1   % Basophils % 0   Absolute Basophils 0.0 - 0.2 10e3/uL 0.0   Absolute Eosinophils 0.0 - 0.7 10e3/uL 0.1   Absolute Immature Granulocytes <=0.4 10e3/uL 0.2   Absolute Lymphocytes 0.8 - 5.3 10e3/uL 2.1   Absolute Monocytes 0.0 - 1.3 10e3/uL 0.2   % Immature Granulocytes % 1   Absolute Neutrophils 1.6 - 8.3 10e3/uL 9.4 (H)    Absolute NRBCs 10e3/uL 0.1   NRBCs per 100 WBC <1 /100 1 (H)   (H): Data is abnormally high  (L): Data is abnormally low

## 2023-01-10 NOTE — LETTER
"1/10/2023       RE: Josefina Bennett  [address]  [facility] [address]     Dear Colleague,    Thank you for referring your patient, Josefina Bennett, to the [facility] NEUROSURGERY CLINIC [place] at [facility]. Please see a copy of my visit note below.    Neurosurgery Attending Progress Note    Josefina Bennett is a 56 year old woman with a history of metastatic breast cancer and communicating hydrocephalus with leptomeningeal carcinomatosis. She is s/p ventriculoperitoneal shunt placement with Dr. Lopez on 12/9/2022.  She has a Codman Certas  Shunt which is programmed at 3.  It is on the right side.     She is undergoing intrathecal methotrexate therapy two times per week for several weeks.  For her treatment, the plan is to have her Certas shunt valve dialed or turned to the setting of 8, which is the \"virtual off\" setting.  Then, she will have intrathecal injection of the methotrexate.  She will stay flat for two hours for the treatment.  Then, her shunt Certas shunt valve will be turned back to her therapeutic setting of 3.    Today I saw her in person at the [facility] clinic in [facility] [place] around 8:45 am. At this point, she was on the phone with her team who informed us that the plan had changed and that she would be getting an MRI brain today while holding off on her IT methotrexate treatment today. She has an appointment later this morning with ANGELA Pickens who will check her VPS setting after her MRI to ensure that it remains at 3. After discussing with her and her team, I then let them know that I, or a member of our team, can be available for setting changes in the future.       Sincerely,    Deepak Jules MD  "

## 2023-01-10 NOTE — LETTER
1/10/2023         RE: Josefina Bennett  [address]  [facility] [address]        Dear Colleague,    Thank you for referring your patient, Josefina Bennett, to the [facility]. Please see a copy of my visit note below.    REASON FOR VISIT: Follow-up breast cancer     HISTORY OF PRESENT ILLNESS:  Josefina Bennett was self referred to our clinic in 2014 for clinical trial recommendations for newly diagnosed stage II breast cancer. She had her last mammogram approximately a year prior. She did notice in early January 2015 that she was having some distortion of the right breast, and she went to see her gynecologist and had a mammogram performed. She had a screening mammogram performed on 01/15/2015. Breast tissue was heterogeneously dense, which might compromise the mammography. There was architectural distortion in the right breast approximately 11-12 o'clock position, zone 2, posterior depth, and a CC MLO spot compression was performed and an ultrasound was planned. The diagnostic mammogram from 01/28/2015 showed right breast architectural distortion centered at the 12 o'clock position, zone 2, suspicious for neoplasm. Breast tomosynthesis was used in the interpretation. Ultrasound findings included targeted ultrasound of the right upper breast demonstrating a band-like area of abnormal echogenicity between 11 and 12 o'clock position in the right breast at zone 2. This measures 4 cm transversely x 1.1 cm AP, and there was only a 1.5 cm measurement in the radial direction. There was blood flow suspicious for neoplasm at the 11 o'clock position in zone 2. There is a 1.4 cm hypoechoic nodule slightly  from the larger area of altered echogenicity that is also suspicious. A biopsy was performed. The biopsy showed infiltrating lobular carcinoma, grade 2, and a clip was placed at specimen [id number]. There were a few signet cells present. The tumor was ER-positive, WY low positive, and HER2 was negative  by immunohistochemistry. She subsequently underwent an MRI showing a tumor that was 4.8 x 1.7 x 3.2 cm in size. Overall, her clinical stage was T2 NX MX.       She was enrolled in the I-SPY-2 clinical trial, and qualified with high-risk MammaPrint score. She was randomized to ganetespib and paclitaxel, and she was treated with 12 cycles of treatment, and then started on AC on 05/26/2015. She developed intractable nausea and vomiting after the first cycle, which required hospitalization on the second cycle. She then developed Pneumocystis pneumonia, which resulted in intubation and a 2-week hospitalization during the course of AC. She was discharged on 07/02/2015, and decided she did not want to pursue any further chemotherapy. She had a right lumpectomy and sentinel lymph node procedure 08/12/2015. She had a positive margin, underwent a re-excision, and had a positive margin. Ultimately, she underwent a right mastectomy with clear margins. She began radiation treatment with Dr. Bill Chauhan, and completed radiation therapy on 12/04/2015. Pathologic stage was III-A, ypT3 N1a MX. Of concern, she had what could be considered an RCB3 tumor.  She started adjuvant letrozole on 1/28/16.     TREATMENT HISTORY:  A.  Neoadjuvant therapy on I SPY-2 with ganetespib and paclitaxel.  AC x 2 complicated by PCP pneumonia.  She had intractable nausea and vomiting the first cycle of AC, and the second cycle of AC was complicated by Pneumocystis pneumonia requiring intubation and a 2-week hospitalization.  She did not complete the AC treatment.  She did undergo a right lumpectomy and sentinel node procedure.  She had a positive margin and underwent resection.  She still had a positive margin.  She underwent a right mastecomy with clear margins.  Pathologic stage was IIIA  [id number] and of concern, she had an RCB3 histology meaning significant risk of recurrence of her breast cancer during the 5-year period following primary therapy.    She completed radiation in 12/2015.      B.  Right lumpectomy, followed by right margin resections, followed by rigth mastectomy.   B.  Oophorectomy 10-16-15.   C.  Tamoxifen after 10-6-15.   D.  Radiation therapy. 5,040 cGy in 180 cGy/fraction to the chest wall and regional lymphatics followed by 720 cGy axillary lymph node boost and 1000 cGy mastectomy scar boost. Completed on 12/3/2015.  E.   Adjuvant letrozole begun 1-28-16.  Plan was to continue through 2026.  F.  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  A mix of TNBC and ER- HER2+.  Clinic conference consensus was for the modified Vandana with paclitaxel weekly to treat both clones. T-DXd could be a next regimen.  G.  Metastatic carcinoma is HER2 negative (score 1+) by immunohistochemistry.  She is also a candidate for T-DXd.  H.  Recurrent tumor with 22C3 antibody for PD-L1 and hold this in reserve for a Keynote 355 metastatic TNBC approach if the Vandana regimen does not control her metastatic disease.  Keynote 355 could also be an active regimen.       INTERVAL HISTORY:    We saw Josefina Bennett in followup today.  She is currently receiving intrathecal methotrexate for her brain metastases as well as leptomeningeal disease.  She reports that her headache has improved since starting soon Xeloda and tucatinib on Saturday.  She has been on these medicines for only 3 days.  I discussed that we are hopeful that these agents will cross the blood brain barrier and help her symptoms.  She has had no nausea or vomiting, and overall, the headaches are getting better.  Also, her appetite is better.  She denies pain elsewhere.  She has severe fatigue. ECOG 3 performance status.  She denies depression but does have some anxiety.    REVIEW OF SYSTEMS:  The remainder of a  10-point review of systems is negative.          PHYSICAL EXAMINATION:    GENERAL:  Josefina Verma appeared quite tired.  HEENT:  She has alopecia.  Examination of oropharynx reveals thrush.  LYMPH:  There is no cervical, supraclavicular, subclavicular or axillary lymphadenopathy.  BREASTS:  Exam not performed today.  LUNGS:  Clear to percussion and auscultation.  HEART:  There is a regular rate and rhythm.  S1, S2.  ABDOMEN:  Soft, nontender, without hepatosplenomegaly.  EXTREMITIES:  Without edema.  PSYCH:  Mood was somewhat depressed and affect was appropriate.  NEUROLOGIC:  Cranial nerves II through XII were grossly intact.  Finger-to-nose was intact bilaterally.  Strength was 5/5 in the upper and lower extremities bilaterally.     LABORATORY DATA:   None today.  CSF positive for malignancy 12-12-22.  CSF has remained positive for malignancy with sampling and IT MTX 12-20, 12-23, 12-27, 12-30.      BRAIN MRI 1-10-23  FINDINGS:  Stable right frontal approach ventricular shunt with tip in the third  ventricle. Small amount of air within the anterior horns of the  lateral ventricles. Decreased size of the ventricular system compared  to 12/9/2022. Increased nodular leptomeningeal contrast enhancement  along the cerebellar folia. Unchanged leptomeningeal contrast  enhancement over bilateral cerebral sulci left greater than right.  Diffuse pachymeningeal contrast enhancement findings.     Increased nodular parenchymal enhancing lesions in supratentorial and  infratentorial brain. Representative lesions may include 1.3 cm right  cerebellar lesion (16, image 47), 5 mm lesion within the inferior  right parietal lobe (series 16, image 113) previously measuring 3 mm.  Unchanged DVA cavernoma complex within the jude.      There is no mass effect, midline shift, or intracranial hemorrhage.  The ventricles are proportionate to the cerebral sulci. Diffusion and  susceptibility weighted images are negative for  acute/focal  abnormality. Major intracranial vascular structures are within normal  limits.     No suspicious abnormality of the skull marrow signal. Scattered  paranasal sinus mucosal thickening. Bilateral mastoid effusions  similar to previous exams. No focal abnormality of the pituitary  gland, sella, skull base and upper cervical spinal structures on  sagittal images. The orbits are normal.                                                                      IMPRESSION:  1. Progression of leptomeningeal and intraparenchymal metastatic  disease compared to 12/7/2022.  2. Stable right frontal approach ventricular shunt with decreased  caliber of the ventricular system compared to 12/9/2022. New   pachymeningeal contrast enhancement, question over shunting.     I have personally reviewed the examination and initial interpretation  and I agree with the findings.     SMITHA RODRIGUEZ MD         ASSESSMENT AND PLAN:   1.  Mary Bennett is a 57-year-old woman with a history of a clinical stage II right breast cancer, ER positive, IL positive, HER2 negative by immunohistochemistry.  She had a lobular histology.  She was on the I-SPY 2 clinical trial and was randomized to ganetespib and paclitaxel, then went on to AC. Pathologic stage was IIIA  [id number] and of concern, she had an RCB3 histology meaning significant risk of recurrence of her breast cancer during the 5-year period following primary therapy.  There were 2 lymph nodes involved with mammary carcinoma with extracapsular extension.  She went on to have radiation therapy and then began hormonal therapy with tamoxifen in October 2015.  She had oophorectomy and started letrozole in January 2016.   2.  After recurrence was diagnosed with a sacral mass, breast conference recommended starting with HER2 directed therapy.  Biphenotypic cancer with majority TNBC component and a HER2 component.  I discussed the 22C3 PD-L1 CPS of 40 while 20% of the tumor is HER2+ by  FISH and the overall ASCO CAP assignment is negative for the bulk tumor with the qualifier that there is a significant HER2 clonal component that would be best treated with HER2 directed therapy.  The Tumor Board consensus was to continue with HER2 directed therapy to treat the new and HER2+ clone till best response.    3. Leptomeningeal progression.  Josefina Verma has metastatic triple negative breast cancer as well as metastatic HER2 positive breast cancer which is a less predominant clone.  She has been undergoing intrathecal treatments with intrathecal methotrexate twice weekly, 10 mg preservative free.  She has tolerated this treatment reasonably well but with no improvement of symptoms.  She has had 5 treatments to date.  She did begin tucatinib and Xeloda on Saturday and has had some improvement in her headaches and appetite.  We are hopeful that this treatment which crosses the blood brain barrier may be of help for her.  She did have a brain MRI performed at this morning, which showed improvement of ventricular dilatation but progression of leptomeningeal involvement with at least 2 new lesions, 1 in the right cerebral hemisphere and there is a 6 mm lesion in the cerebellum.  I did discuss Josefina Verma with Dr. Bill Chauhan and the plan is to begin radiation on Monday.  Our plan would be to hold the tucatinib and Xeloda at the time of radiation.  We will also hold intrathecal methotrexate after Friday.  4.  I discussed with Josefina Verma and her mother that my impression is that the intrathecal methotrexate has not been helping very much, although we will continue with 1 more treatment on Friday and then stop.  I discussed that tucatinib and Xeloda are more likely to have some activity and also the whole brain radiation is likely to have activity.  All of Josefina Verma's questions and her mother's questions were answered.  5.  Discussion of systemic treatment with the HER2 CLIMB regimen, which is compatible with  concomitant IT methotrexate.  I had an extensive discussion with Josefina Verma, her mother Brandi, roommate Karl and friend Carolina about the [id number] regimen.  I think that this regimen would be a good one for her because the regimen consists of tucatinib, capecitabine and Herceptin.  Tucatinib and capecitabine both cross the blood brain barrier.  The tucatinib could potentially control CNS HER-2 positive breast cancer and the Xeloda the triple negative if present.  The cell block needs to be checked for HER-2 by IHC, although if it were negative, it would not rule out HER-2 positive breast cancer behind blood brain barrier. The oral pharmacy group is meeting with her. Eliquis is reduced to half dose of 2.5 mg daily because of potential interaction with tucatinib.  Capecitabine crosses the blood brain barrier and can treat the triple negative component as well. WBR planned. If CNS does not clear with tucatiinib/capecitabine consider Keynote 355 with gemcitabine/carboplatin.   6.  Radiation Oncology appointment with Dr. Bill Chauhan is 01/04/2023.    7.  Xgeva was held because of need for a dental procedure.  Although there is no FDG uptake in bones, bones are involved.   8. Potential interactions with tucatinib requiring a dose reduction of other drugs.  Eliquis dose reduction by half. Trazadone dose reduction by half.  Discussed with Aziza in the oral chemotherapy program.   9.  Follow up plan.   January 11 palliative care. January 13 with IT methotrexate with Adwoa Wilson. January 16 simulation with Dr. Chauhan in radiation oncology stop tucatinib and Xeloda on January 15. January 17 IVF cancel the IT methotrexate. Follow up with me January 19 with CBC, CMP. Cancel IT methotrexate January 20, 31. Follow up with me January 31 with Herceptin, CBC, CMP. Cancel February 20 brain MRI. Keep CT CAP on February 20. February 21 echocardiogram with Herceptin and visit with me.     Thank you for allowing us to continue to participate  in Josefina Luci Bennett care.      Sincerely,       Evangelista Meza MD  Professor  [facility]  [phone]       ADDENDUM:  1-13-23 CSF  Interpretation:                  Positive for malignancy              Rare cells morphologically consistent with metastatic carcinoma                 Adequacy:                 Satisfactory for evaluation    I spent 40 minutes with the patient more than 50% of which was in counseling and coordination of care.

## 2023-01-10 NOTE — PROGRESS NOTES
Social Work Follow-Up   Oncology Clinic    Data/Intervention:  Patient Name:  Josefina Bennett  /Age:  1965 (57 year old)    Reason for Follow-Up:  [employer]    Collaborated With:    -Patient  -Patient's mother      Assessment:  SW called patient to follow up on open arms application. There was no answer. Sw left a message with reason for call.     SW called and spoke with patient's mother (release on file), regarding open arms application. Patient's mother stated they were assisting patient in following up on employment leave and they had not yet figured out where they would want open arms meals delivered to. Patient's mother stated that some family and friends brought them lots of meals at the [facility]. Patient's mother felt they did not need the service currently while at [facility] and asked for the application to be put on hold for now. Patient's mother stated that when patient is ready to leave the [facility] they would check in with SW then about getting the service started. SW informed patient's mother of the alvina they applied for on patient's behalf and asked if there were any other supports patient or patient's family needed at this time. Patient's mother discussed how patient was doing and how they were doing. Patient's mother stated they are stressed but feel supported by patient's care team, family and friends. Patein's mother stated patient's care team is working on employment leave paperwork and they were focused on getting that turned in for the patient as well as focused on getting patient to their appointments. Patient's mother expressed they were thankful they and patient are able to stay at the [facility] during this time. Patient's mother expressed no further questions or supportive needs at this time. SW encouraged patient to reach out as needed.     Plan:  Previously provided patient/family with writer's contact information and availability.   SW will continue to remain available as  needed for ongoing supports.     [facility] HORNER, [facility]  - Oncology  Phone : [phone]  Pager: [phone]

## 2023-01-10 NOTE — PROGRESS NOTES
REASON FOR VISIT: Follow-up breast cancer     HISTORY OF PRESENT ILLNESS:  Josefina Bennett was self referred to our clinic in 2014 for clinical trial recommendations for newly diagnosed stage II breast cancer. She had her last mammogram approximately a year prior. She did notice in early January 2015 that she was having some distortion of the right breast, and she went to see her gynecologist and had a mammogram performed. She had a screening mammogram performed on 01/15/2015. Breast tissue was heterogeneously dense, which might compromise the mammography. There was architectural distortion in the right breast approximately 11-12 o'clock position, zone 2, posterior depth, and a CC MLO spot compression was performed and an ultrasound was planned. The diagnostic mammogram from 01/28/2015 showed right breast architectural distortion centered at the 12 o'clock position, zone 2, suspicious for neoplasm. Breast tomosynthesis was used in the interpretation. Ultrasound findings included targeted ultrasound of the right upper breast demonstrating a band-like area of abnormal echogenicity between 11 and 12 o'clock position in the right breast at zone 2. This measures 4 cm transversely x 1.1 cm AP, and there was only a 1.5 cm measurement in the radial direction. There was blood flow suspicious for neoplasm at the 11 o'clock position in zone 2. There is a 1.4 cm hypoechoic nodule slightly  from the larger area of altered echogenicity that is also suspicious. A biopsy was performed. The biopsy showed infiltrating lobular carcinoma, grade 2, and a clip was placed at specimen [id number]. There were a few signet cells present. The tumor was ER-positive, NC low positive, and HER2 was negative by immunohistochemistry. She subsequently underwent an MRI showing a tumor that was 4.8 x 1.7 x 3.2 cm in size. Overall, her clinical stage was T2 NX MX.       She was enrolled in the I-SPY-2 clinical trial, and qualified with high-risk  MammaPrint score. She was randomized to ganetespib and paclitaxel, and she was treated with 12 cycles of treatment, and then started on AC on 05/26/2015. She developed intractable nausea and vomiting after the first cycle, which required hospitalization on the second cycle. She then developed Pneumocystis pneumonia, which resulted in intubation and a 2-week hospitalization during the course of AC. She was discharged on 07/02/2015, and decided she did not want to pursue any further chemotherapy. She had a right lumpectomy and sentinel lymph node procedure 08/12/2015. She had a positive margin, underwent a re-excision, and had a positive margin. Ultimately, she underwent a right mastectomy with clear margins. She began radiation treatment with Dr. Bill Chauhan, and completed radiation therapy on 12/04/2015. Pathologic stage was III-A, ypT3 N1a MX. Of concern, she had what could be considered an RCB3 tumor.  She started adjuvant letrozole on 1/28/16.     TREATMENT HISTORY:  A.  Neoadjuvant therapy on I SPY-2 with ganetespib and paclitaxel.  AC x 2 complicated by PCP pneumonia.  She had intractable nausea and vomiting the first cycle of AC, and the second cycle of AC was complicated by Pneumocystis pneumonia requiring intubation and a 2-week hospitalization.  She did not complete the AC treatment.  She did undergo a right lumpectomy and sentinel node procedure.  She had a positive margin and underwent resection.  She still had a positive margin.  She underwent a right mastecomy with clear margins.  Pathologic stage was IIIA  [id number] and of concern, she had an RCB3 histology meaning significant risk of recurrence of her breast cancer during the 5-year period following primary therapy.   She completed radiation in 12/2015.      B.  Right lumpectomy, followed by right margin resections, followed by rigth mastectomy.   B.  Oophorectomy 10-16-15.   C.  Tamoxifen after 10-6-15.   D.  Radiation therapy. 5,040 cGy in 180  cGy/fraction to the chest wall and regional lymphatics followed by 720 cGy axillary lymph node boost and 1000 cGy mastectomy scar boost. Completed on 12/3/2015.  E.   Adjuvant letrozole begun 1-28-16.  Plan was to continue through 2026.  F.  Diagnosis with recurrent/metastatic breast cancer with a sacral mass that was biopsied showing pleiomorphic lobular breast cancer that is ER negative, KY negative, HER2 positive by FISH in 20% of the cells and negative in 80% of cells.  A mix of TNBC and ER- HER2+.  Clinic conference consensus was for the modified Vandana with paclitaxel weekly to treat both clones. T-DXd could be a next regimen.  G.  Metastatic carcinoma is HER2 negative (score 1+) by immunohistochemistry.  She is also a candidate for T-DXd.  H.  Recurrent tumor with 22C3 antibody for PD-L1 and hold this in reserve for a Keynote 355 metastatic TNBC approach if the Vandana regimen does not control her metastatic disease.  Keynote 355 could also be an active regimen.       INTERVAL HISTORY:    We saw Josefina Bennett in followup today.  She is currently receiving intrathecal methotrexate for her brain metastases as well as leptomeningeal disease.  She reports that her headache has improved since starting soon Xeloda and tucatinib on Saturday.  She has been on these medicines for only 3 days.  I discussed that we are hopeful that these agents will cross the blood brain barrier and help her symptoms.  She has had no nausea or vomiting, and overall, the headaches are getting better.  Also, her appetite is better.  She denies pain elsewhere.  She has severe fatigue. ECOG 3 performance status.  She denies depression but does have some anxiety.    REVIEW OF SYSTEMS:  The remainder of a 10-point review of systems is negative.          PHYSICAL EXAMINATION:    GENERAL:  Josefina Verma appeared quite tired.  HEENT:  She has alopecia.  Examination of oropharynx reveals thrush.  LYMPH:  There is no cervical, supraclavicular,  subclavicular or axillary lymphadenopathy.  BREASTS:  Exam not performed today.  LUNGS:  Clear to percussion and auscultation.  HEART:  There is a regular rate and rhythm.  S1, S2.  ABDOMEN:  Soft, nontender, without hepatosplenomegaly.  EXTREMITIES:  Without edema.  PSYCH:  Mood was somewhat depressed and affect was appropriate.  NEUROLOGIC:  Cranial nerves II through XII were grossly intact.  Finger-to-nose was intact bilaterally.  Strength was 5/5 in the upper and lower extremities bilaterally.     LABORATORY DATA:   None today.  CSF positive for malignancy 12-12-22.  CSF has remained positive for malignancy with sampling and IT MTX 12-20, 12-23, 12-27, 12-30.      BRAIN MRI 1-10-23  FINDINGS:  Stable right frontal approach ventricular shunt with tip in the third  ventricle. Small amount of air within the anterior horns of the  lateral ventricles. Decreased size of the ventricular system compared  to 12/9/2022. Increased nodular leptomeningeal contrast enhancement  along the cerebellar folia. Unchanged leptomeningeal contrast  enhancement over bilateral cerebral sulci left greater than right.  Diffuse pachymeningeal contrast enhancement findings.     Increased nodular parenchymal enhancing lesions in supratentorial and  infratentorial brain. Representative lesions may include 1.3 cm right  cerebellar lesion (16, image 47), 5 mm lesion within the inferior  right parietal lobe (series 16, image 113) previously measuring 3 mm.  Unchanged DVA cavernoma complex within the jude.      There is no mass effect, midline shift, or intracranial hemorrhage.  The ventricles are proportionate to the cerebral sulci. Diffusion and  susceptibility weighted images are negative for acute/focal  abnormality. Major intracranial vascular structures are within normal  limits.     No suspicious abnormality of the skull marrow signal. Scattered  paranasal sinus mucosal thickening. Bilateral mastoid effusions  similar to previous exams. No  focal abnormality of the pituitary  gland, sella, skull base and upper cervical spinal structures on  sagittal images. The orbits are normal.                                                                      IMPRESSION:  1. Progression of leptomeningeal and intraparenchymal metastatic  disease compared to 12/7/2022.  2. Stable right frontal approach ventricular shunt with decreased  caliber of the ventricular system compared to 12/9/2022. New   pachymeningeal contrast enhancement, question over shunting.     I have personally reviewed the examination and initial interpretation  and I agree with the findings.     SMITHA RODRIGUEZ MD         ASSESSMENT AND PLAN:   1.  Mary Bennett is a 57-year-old woman with a history of a clinical stage II right breast cancer, ER positive, MO positive, HER2 negative by immunohistochemistry.  She had a lobular histology.  She was on the I-SPY 2 clinical trial and was randomized to ganetespib and paclitaxel, then went on to AC. Pathologic stage was IIIA  [id number] and of concern, she had an RCB3 histology meaning significant risk of recurrence of her breast cancer during the 5-year period following primary therapy.  There were 2 lymph nodes involved with mammary carcinoma with extracapsular extension.  She went on to have radiation therapy and then began hormonal therapy with tamoxifen in October 2015.  She had oophorectomy and started letrozole in January 2016.   2.  After recurrence was diagnosed with a sacral mass, breast conference recommended starting with HER2 directed therapy.  Biphenotypic cancer with majority TNBC component and a HER2 component.  I discussed the 22C3 PD-L1 CPS of 40 while 20% of the tumor is HER2+ by FISH and the overall ASCO CAP assignment is negative for the bulk tumor with the qualifier that there is a significant HER2 clonal component that would be best treated with HER2 directed therapy.  The Tumor Board consensus was to continue with HER2 directed  therapy to treat the new and HER2+ clone till best response.    3. Leptomeningeal progression.  Josefina Verma has metastatic triple negative breast cancer as well as metastatic HER2 positive breast cancer which is a less predominant clone.  She has been undergoing intrathecal treatments with intrathecal methotrexate twice weekly, 10 mg preservative free.  She has tolerated this treatment reasonably well but with no improvement of symptoms.  She has had 5 treatments to date.  She did begin tucatinib and Xeloda on Saturday and has had some improvement in her headaches and appetite.  We are hopeful that this treatment which crosses the blood brain barrier may be of help for her.  She did have a brain MRI performed at this morning, which showed improvement of ventricular dilatation but progression of leptomeningeal involvement with at least 2 new lesions, 1 in the right cerebral hemisphere and there is a 6 mm lesion in the cerebellum.  I did discuss Josefina Verma with Dr. Bill Chauhan and the plan is to begin radiation on Monday.  Our plan would be to hold the tucatinib and Xeloda at the time of radiation.  We will also hold intrathecal methotrexate after Friday.  4.  I discussed with Josefina Verma and her mother that my impression is that the intrathecal methotrexate has not been helping very much, although we will continue with 1 more treatment on Friday and then stop.  I discussed that tucatinib and Xeloda are more likely to have some activity and also the whole brain radiation is likely to have activity.  All of Josefina Verma's questions and her mother's questions were answered.  5.  Discussion of systemic treatment with the HER2 CLIMB regimen, which is compatible with concomitant IT methotrexate.  I had an extensive discussion with Josefina Verma, her mother Brandi, roommate Karl and friend Carolina about the [id number] regimen.  I think that this regimen would be a good one for her because the regimen consists of tucatinib, capecitabine and  Herceptin.  Tucatinib and capecitabine both cross the blood brain barrier.  The tucatinib could potentially control CNS HER-2 positive breast cancer and the Xeloda the triple negative if present.  The cell block needs to be checked for HER-2 by IHC, although if it were negative, it would not rule out HER-2 positive breast cancer behind blood brain barrier. The oral pharmacy group is meeting with her. Eliquis is reduced to half dose of 2.5 mg daily because of potential interaction with tucatinib.  Capecitabine crosses the blood brain barrier and can treat the triple negative component as well. WBR planned. If CNS does not clear with tucatiinib/capecitabine consider Keynote 355 with gemcitabine/carboplatin.   6.  Radiation Oncology appointment with Dr. Bill Chauhan is 01/04/2023.    7.  Xgeva was held because of need for a dental procedure.  Although there is no FDG uptake in bones, bones are involved.   8. Potential interactions with tucatinib requiring a dose reduction of other drugs.  Eliquis dose reduction by half. Trazadone dose reduction by half.  Discussed with Aziza in the oral chemotherapy program.   9.  Follow up plan.   January 11 palliative care. January 13 with IT methotrexate with Adwoa Wilson. January 16 simulation with Dr. Chauhan in radiation oncology stop tucatinib and Xeloda on January 15. January 17 IVF cancel the IT methotrexate. Follow up with me January 19 with CBC, CMP. Cancel IT methotrexate January 20, 31. Follow up with me January 31 with Herceptin, CBC, CMP. Cancel February 20 brain MRI. Keep CT CAP on February 20. February 21 echocardiogram with Herceptin and visit with me.     Thank you for allowing us to continue to participate in Josefina Bennett' care.      Sincerely,       Evangelista Meza MD  Professor  [facility]  [phone]       ADDENDUM:  1-13-23 CSF  Interpretation:                  Positive for malignancy              Rare cells morphologically consistent with  metastatic carcinoma                 Adequacy:                 Satisfactory for evaluation    I spent 40 minutes with the patient more than 50% of which was in counseling and coordination of care.    06-Jun-2022 23:02

## 2023-01-11 NOTE — PATIENT INSTRUCTIONS
Thank you for meeting with us in the [facility].    How to get a hold of us:  For non-urgent matters, MyChart works best.    For more urgent matters, or if you prefer not to use MyChart, call our clinic nurse coordinator Samantha Trotter RN at [phone].    We have an on-call number for evenings and weekends. Please call this only if you are having uncontrolled symptoms or serious side effects from your medicines: [phone].     For refills, please give us a week (5 working days) notice. We don't always have providers available everyday to do refills. If you call the day you run out of your medicine, we may not be able to refill it in time, so call 5 days in advance!

## 2023-01-11 NOTE — PROGRESS NOTES
Mary is a 57 year old who is being evaluated via a billable video visit.      How would you like to obtain your AVS? MyChart  If the video visit is dropped, the invitation should be resent by: Text to cell phone: [phone]  Will anyone else be joining your video visit? No      Palliative Care Outpatient Clinic      Patient ID:  Medical - She has metastatic breast cancer, originally dx stage IIIa in 2015 and underwent definitive tx involving chemo, R mastectomy, radiation followed by letrozole. 4/2022 recurred with a mixture of triple negative and ER-/HER2 amplified clones-->abdominal LAD and multiple bone mets-->paclitaxel, then Enhertu.  12/2022 found to have progressive dz including liver and brain and leptomeningeal mets s/p  shunt; IT methotrexate and Herceptin  1/2023 starting tucatinib + capecitabine; WBRT    Social - Lives alone; stays at [employer] when here. Mom Brandi helps out. Friends Carolina and Huong help a lot. 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    Care Planning - +[facility], names Huong Lars her friend as her primary agent: patient is changing it to her Mom. DNR/DNI/Comfort care POLST signed 1/2023. 1/2023 tells me she knows prognosis may be weeks to months. Planning with family for hospice care in her home.     History:  History gathered today from: patient, medical chart  She is with her Mom Brandi who adds to the history    Reviewed with her the roles of our program.  On IT chemo for a couple more weeks.  Radiotherapy about to start.  Now on oral chemo    Major c/o is physical exhaustion.  Not somnolence. Feels sleep is generally poor.   Wakes up a lot at night for no obvious reason.   On steroids  Was just started on trazodone and melatonin; not clearly helping  Was on mirtazapine; ran out; didn't help at all; hasn't missed it.  No hx prior to this of needing prescription meds    Independent in ADLs    Having HAs; getting caffeine drips with chemo. Tylenol helps in between.  No  "n/v.  Vision is blurry last couple months.  Balance is poor when tired.    Mood  \"It's been ok.\"  No anhedonia; enjoys company of her Mom.  On venlafaxine long term for depression; doesn't currently feel actively depressed.  No panic    Worried about her health, future  Worried about her Mom the effects of her illness and death on her  Knows cancer is terminal and prognosis could be 'weeks to months'  Hard to integrate this and make sense of it; feels unreal  Happy to be aggressively treating the cancer to buy her time.  Her dad  at home with hospice & they know about it; we discussed hospice today  Her hope will be to die at home; talking with Brandi about receiving hospice care there at her home but checking with insurance since Brandi lives in [place]      PE: LMP 2014    Wt Readings from Last 3 Encounters:   01/10/23 59 kg (130 lb)   01/10/23 59.3 kg (130 lb 12.8 oz)   23 60.4 kg (133 lb 3.2 oz)     Alert NAD  Clear sensorium      Data reviewed:  I reviewed recent labs and imaging, my comments:  Cr 0.93  LfTs up    MRI  Stable right frontal approach ventricular shunt with tip in the third  ventricle. Small amount of air within the anterior horns of the  lateral ventricles. Decreased size of the ventricular system compared  to 2022. Increased nodular leptomeningeal contrast enhancement  along the cerebellar folia. Unchanged leptomeningeal contrast  enhancement over bilateral cerebral sulci left greater than right.  Diffuse pachymeningeal contrast enhancement findings.     Increased nodular parenchymal enhancing lesions in supratentorial and  infratentorial brain. Representative lesions may include 1.3 cm right  cerebellar lesion (16, image 47), 5 mm lesion within the inferior  right parietal lobe (series 16, image 113) previously measuring 3 mm.  Unchanged DVA cavernoma complex within the jude.       database reviewed: y      Impression & Recommendations:  58 yo with metastatic breast cancer, newly " found to have brain and leptomeningeal mets, on IT methotrexate and Herceptin, just starting WBRT, tucatinib, capecitabine    We discussed a variety of supportive care issues as above.  Her major concern is exhaustion which is multifactorial and will be tough to treat. However she also has severe insomnia from illness related stress and steroids, which undoubtedly is worsening exhaustion. She's experienced no benefit from agents which are sometimes used for sleep but don't have a lot of data to support them for such (mirtaz, trazodone, melatonin). In that context I think it best to try FDA approved hypnotics. I'm hoping her dex can be tapered soon as she's initiating WBRT soon and this may help.    Will Rx doxepin for sleep--3-6 mg; if no help will push to 10-15 mg. Asked her to contact me next week if no help.  If not help will do Belsomra, then likely zolpidem, lorazepam.  Stop melatonin trazodone and mirtazapine as she's had no benefit from them and has a lot of CNS polypharmacy.  Would have her continue olanzapine, and venlafaxine which she's on long term for depression which she feels is well controlled.      Discussed disease understanding, prognosis, and care planning as above. She presents as well informed about her cancer prognosis.  Her father  with home hospice care and they have a good sense of what's involved with that. She is updating her HCD and recently completed a DNR/DNI/Comfort POLST with oncology.  She is grieving her situation but does not seem to have a major mood disturbance.     Follow-up 2-3 weeks  63 minutes spent on the date of the encounter doing chart review, history and exam, patient education & counseling, documentation and other activities as noted above.    Thank you for involving us in the patient's care.   Dharmesh Sanchez MD / Palliative Medicine / Text me via [facility].    Video-Visit Details    Type of service:  Video Visit   Originating Location (pt. Location): Home    Distant  Location (provider location):  Off-site  Platform used for Video Visit: Zak Reece

## 2023-01-11 NOTE — TELEPHONE ENCOUNTER
P.S. paperwork received via pt from [facility]. Will be placed in provider folder for signature upon completion.     Fax: [phone]      Rikki Carbajal

## 2023-01-11 NOTE — LETTER
1/11/2023       RE: Josefina Bennett  [address]  [facility] [address]     Dear Colleague,    Thank you for referring your patient, Josefina Bennett, [facility]. Please see a copy of my visit note below.      Palliative Care Outpatient Clinic      Patient ID:  Medical - She has metastatic breast cancer, originally dx stage IIIa in 2015 and underwent definitive tx involving chemo, R mastectomy, radiation followed by letrozole. 4/2022 recurred with a mixture of triple negative and ER-/HER2 amplified clones-->abdominal LAD and multiple bone mets-->paclitaxel, then Enhertu.  12/2022 found to have progressive dz including liver and brain and leptomeningeal mets s/p  shunt; IT methotrexate and Herceptin  1/2023 starting tucatinib + capecitabine; WBRT    Social - Lives alone; stays at [employer] [facility] when here. Mom Brandi helps out. Friends Carolina and Huong help a lot. 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    Care Planning - +[facility], names Huong Sousa her friend as her primary agent: patient is changing it to her Mom. DNR/DNI/Comfort care POLST signed 1/2023. 1/2023 tells me she knows prognosis may be weeks to months. Planning with family for hospice care in her home.     History:  History gathered today from: patient, medical chart  She is with her Mom Brandi who adds to the history    Reviewed with her the roles of our program.  On IT chemo for a couple more weeks.  Radiotherapy about to start.  Now on oral chemo    Major c/o is physical exhaustion.  Not somnolence. Feels sleep is generally poor.   Wakes up a lot at night for no obvious reason.   On steroids  Was just started on trazodone and melatonin; not clearly helping  Was on mirtazapine; ran out; didn't help at all; hasn't missed it.  No hx prior to this of needing prescription meds    Independent in ADLs    Having HAs; getting caffeine drips with chemo. Tylenol helps in  "between.  No n/v.  Vision is blurry last couple months.  Balance is poor when tired.    Mood  \"It's been ok.\"  No anhedonia; enjoys company of her Mom.  On venlafaxine long term for depression; doesn't currently feel actively depressed.  No panic    Worried about her health, future  Worried about her Mom the effects of her illness and death on her  Knows cancer is terminal and prognosis could be 'weeks to months'  Hard to integrate this and make sense of it; feels unreal  Happy to be aggressively treating the cancer to buy her time.  Her dad  at home with hospice & they know about it; we discussed hospice today  Her hope will be to die at home; talking with Brandi about receiving hospice care there at her home but checking with insurance since Brandi lives in [place]      PE: LMP 2014    Wt Readings from Last 3 Encounters:   01/10/23 59 kg (130 lb)   01/10/23 59.3 kg (130 lb 12.8 oz)   23 60.4 kg (133 lb 3.2 oz)     Alert NAD  Clear sensorium      Data reviewed:  I reviewed recent labs and imaging, my comments:  Cr 0.93  LfTs up    MRI  Stable right frontal approach ventricular shunt with tip in the third  ventricle. Small amount of air within the anterior horns of the  lateral ventricles. Decreased size of the ventricular system compared  to 2022. Increased nodular leptomeningeal contrast enhancement  along the cerebellar folia. Unchanged leptomeningeal contrast  enhancement over bilateral cerebral sulci left greater than right.  Diffuse pachymeningeal contrast enhancement findings.     Increased nodular parenchymal enhancing lesions in supratentorial and  infratentorial brain. Representative lesions may include 1.3 cm right  cerebellar lesion (16, image 47), 5 mm lesion within the inferior  right parietal lobe (series 16, image 113) previously measuring 3 mm.  Unchanged DVA cavernoma complex within the jude.       database reviewed: y      Impression & Recommendations:  56 yo with metastatic breast " cancer, newly found to have brain and leptomeningeal mets, on IT methotrexate and Herceptin, just starting WBRT, tucatinib, capecitabine    We discussed a variety of supportive care issues as above.  Her major concern is exhaustion which is multifactorial and will be tough to treat. However she also has severe insomnia from illness related stress and steroids, which undoubtedly is worsening exhaustion. She's experienced no benefit from agents which are sometimes used for sleep but don't have a lot of data to support them for such (mirtaz, trazodone, melatonin). In that context I think it best to try FDA approved hypnotics. I'm hoping her dex can be tapered soon as she's initiating WBRT soon and this may help.    Will Rx doxepin for sleep--3-6 mg; if no help will push to 10-15 mg. Asked her to contact me next week if no help.  If not help will do Belsomra, then likely zolpidem, lorazepam.  Stop melatonin trazodone and mirtazapine as she's had no benefit from them and has a lot of CNS polypharmacy.  Would have her continue olanzapine, and venlafaxine which she's on long term for depression which she feels is well controlled.      Discussed disease understanding, prognosis, and care planning as above. She presents as well informed about her cancer prognosis.  Her father  with home hospice care and they have a good sense of what's involved with that. She is updating her HCD and recently completed a DNR/DNI/Comfort POLST with oncology.  She is grieving her situation but does not seem to have a major mood disturbance.     Follow-up 2-3 weeks  63 minutes spent on the date of the encounter doing chart review, history and exam, patient education & counseling, documentation and other activities as noted above.    Thank you for involving us in the patient's care.     Dharmesh Sanchez MD / Palliative Medicine / Text me via [facility].

## 2023-01-11 NOTE — NURSING NOTE
Patient declined individual allergy and medication review by support staff because patient states they are all current.      Josephine Reece

## 2023-01-13 NOTE — PROGRESS NOTES
"Josefina is back to day to discuss whole brain radiation therapy.   Her IT methotrexate unfortunately has not been able to clear her CSF. Repeat brain MRI on 1/9/202 progression of both the leptomeningeal and intraparenchymal disease.         As such, Dr. Meza reached out to us and asked us to initiate whole brain radiation therapy. She is accompanied by her mom. She states that since last seen, she feels even more fatigued. She has increased \"trouble\" with her right eye, seeing double vision sometimes. She feels increased dizziness. She denies loss of hearing, trouble with chewing or swallowing. She is weak overall.     I again discussed the logistics and the side effects. Josefina is willing to proceed. She signed the consent form.     Given her overall poor performance status, I plan to deliver 2000 cGy ami 5 fractions instead of a more protracted 3000 cGy in 10 fractions. She will be simulated today. I offered to start her treatment today, but she really does not feel like coming back in the afternoon due to profound fatigue. She will thus start treatment tomorrow. I offered her a Saturday treatment to complete her 5th treatment this weekend. Unfortunately, there is no weekend shuttle from the [facility]. As such, she will have her last treatment on Monday 1/23. I sent a message to Dr. Meza regarding the above plan.       Bill Chauhan MD      I reviewed patient's chart, internal/external medical records, imaging studies (including actual images), labs and pathology reports.  I interviewed and counseled the patient face to face.  I additionally ordered tests and discussed the case with patient's referring physicians and care team (Dr. Meza).      30 minutes were spent on the date of the encounter doing chart review, history and exam, documentation and further activities as noted above.           Bill Chauhan MD  "

## 2023-01-13 NOTE — LETTER
"2023       RE: Josefina Benentt  [address]  [facility] [address]     Dear Colleague,    Thank you for referring your patient, Josefina Bennett, to the [facility] NEUROSURGERY CLINIC [place] at [facility]. Please see a copy of my visit note below.      [facility]  Department of Neurosurgery  Center for Skull Base and Pituitary Surgery    Name: Josefina Bennett  MRN: [id number]  Age: 57 year old  : 1965      Chief Complaint:   Leptomeningeal carcinomatosis s/p  shunt placement (Certas set to 3), shunt programming visit    History of Present Illness:   Josefina Bennett is a 57 year old female with a history of metastatic breast CA and communicating hydrocephalus s/p VPS placement 2022 who is seen today in the [facility] to have her shunt turned back on after receiving her intrathecal methotrexate therapy earlier today. We turned her  shunt to the \"virtual off\" setting this morning prior to therapy. She feels fine.      Physical Exam:   General: No acute distress.    Neuro: The patient is fully oriented. Speech is normal.     Procedure:  With the patient's verbal permission her right frontal valve was interrogated and found to be set to 8. Using the magnet, this was dialed back to 3. Rechecked x 2 and then also checked with the electronic Certas Plus device.     Assessment:  Leptomeningeal carcinomatosis s/p  shunt placement (Certas set to 3), shunt programming visit    Plan:  Follow up with Neurosurgery as needed.       Brittany Mandel PA-C  Department of Neurosurgery  "

## 2023-01-13 NOTE — PROGRESS NOTES
"  [facility]  Department of Neurosurgery  Center for Skull Base and Pituitary Surgery    Name: Josefina Bennett  MRN: [id number]  Age: 57 year old  : 1965      Chief Complaint:   Leptomeningeal carcinomatosis s/p  shunt placement (Certas set to 3), shunt programming visit    History of Present Illness:   Josefina Bennett is a 57 year old female with a history of metastatic breast CA and communicating hydrocephalus s/p VPS placement 2022 who is seen today in the [facility] to have her shunt turned back on after receiving her intrathecal methotrexate therapy earlier today. We turned her  shunt to the \"virtual off\" setting this morning prior to therapy. She feels fine.      Physical Exam:   General: No acute distress.    Neuro: The patient is fully oriented. Speech is normal.     Procedure:  With the patient's verbal permission her right frontal valve was interrogated and found to be set to 8. Using the magnet, this was dialed back to 3. Rechecked x 2 and then also checked with the electronic Certas Plus device.     Assessment:  Leptomeningeal carcinomatosis s/p  shunt placement (Certas set to 3), shunt programming visit    Plan:  Follow up with Neurosurgery as needed.       Brittany Mandel PA-C  Department of Neurosurgery      "

## 2023-01-13 NOTE — PATIENT INSTRUCTIONS
[facility] Triage and after hours / weekends / holidays:  [phone]    Please call the triage or after hours line if you experience a temperature greater than or equal to 100.4, shaking chills, have uncontrolled nausea, vomiting and/or diarrhea, dizziness, shortness of breath, chest pain, bleeding, unexplained bruising, or if you have any other new/concerning symptoms, questions or concerns.      If you are having any concerning symptoms or wish to speak to a provider before your next infusion visit, please call your care coordinator or triage to notify them so we can adequately serve you.     If you need a refill on a narcotic prescription or other medication, please call before your infusion appointment.

## 2023-01-13 NOTE — PROGRESS NOTES
"  [facility]  Department of Neurosurgery  Center for Skull Base and Pituitary Surgery    Name: Josefina Bennett  MRN: [id number]  Age: 57 year old  : 1965      Chief Complaint:   Leptomeningeal carcinomatosis s/p  shunt placement (Certas set to 3), shunt programming visit    History of Present Illness:   Josefina Bennett is a 57 year old female with a history of metastatic breast CA and communicating hydrocephalus s/p VPS placement 2022 who is seen today in the [facility] to have her shunt turned to \"virtual off\". She is receiving her last intrathecal methotrexate therapy today.     Physical Exam:   General: No acute distress.    Neuro: The patient is fully oriented. Speech is normal.     Procedure:  With the patient's verbal permission her right frontal valve was interrogated and found to be set to 3. Using the magnet, this was dialed to 8. Rechecked x 3. This was also double checked with the electronic device and confirmed to be in the virtual off position of 8.     Assessment:  Leptomeningeal carcinomatosis s/p  shunt placement (Certas set to 3), shunt programming visit    Plan:  We will return later today to turn Mary's shunt back \"on\" to a setting of 3. Nurse notified that we were done.        Brittany Mandel PA-C  Department of Neurosurgery      "

## 2023-01-13 NOTE — PROGRESS NOTES
Infusion Nursing Note:  Josefina Bennett presents today for IV fluids, Caffeine, and Ativan prior to LP.    Pt had a virtual visit with Dr Meza on 1/10/22    Josefina has no questions or concerns since her visit with Dr Meza on 1/10/23.  She has a headache she rates at a 7/10 today.  Caffeine given with relief.     Pt's shunt turned off by neuro at 0940.  Adwoa Steve gave Methotrexate via ommaya at 1000.  Pt liked flate for two hours post procedure.  Neuro turned shunt back on at 1200.    Intravenous Access:  Implanted Port accessed in lab    Treatment Conditions:  Component      Latest Ref Rng & Units 1/13/2023   WBC      4.0 - 11.0 10e3/uL 12.6 (H)   RBC Count      3.80 - 5.20 10e6/uL 4.13   Hemoglobin      11.7 - 15.7 g/dL 11.9   Hematocrit      35.0 - 47.0 % 36.2   MCV      78 - 100 fL 88   MCH      26.5 - 33.0 pg 28.8   MCHC      31.5 - 36.5 g/dL 32.9   RDW      10.0 - 15.0 % 19.9 (H)   Platelet Count      150 - 450 10e3/uL 286   % Neutrophils      % 81   % Lymphocytes      % 12   % Monocytes      % 4   % Eosinophils      % 0   % Basophils      % 0   % Immature Granulocytes      % 3   NRBCs per 100 WBC      <1 /100 0   Absolute Neutrophils      1.6 - 8.3 10e3/uL 10.1 (H)   Absolute Lymphocytes      0.8 - 5.3 10e3/uL 1.5   Absolute Monocytes      0.0 - 1.3 10e3/uL 0.5   Absolute Eosinophils      0.0 - 0.7 10e3/uL 0.0   Absolute Basophils      0.0 - 0.2 10e3/uL 0.0   Absolute Immature Granulocytes      <=0.4 10e3/uL 0.3   Absolute NRBCs      10e3/uL 0.0   Sodium      136 - 145 mmol/L 134 (L)   Potassium      3.4 - 5.3 mmol/L 4.4   Chloride      98 - 107 mmol/L 101   Carbon Dioxide (CO2)      22 - 29 mmol/L 23   Anion Gap      7 - 15 mmol/L 10   Urea Nitrogen      6.0 - 20.0 mg/dL 27.9 (H)   Creatinine      0.51 - 0.95 mg/dL 0.78   Calcium      8.6 - 10.0 mg/dL 8.4 (L)   Glucose      70 - 99 mg/dL 340 (H)   Alkaline Phosphatase      35 - 104 U/L 160 (H)   AST      10 - 35 U/L 21   ALT      10 - 35 U/L 77 (H)   Protein  Total      6.4 - 8.3 g/dL 5.6 (L)   Albumin      3.5 - 5.2 g/dL 3.6   Bilirubin Total      <=1.2 mg/dL 0.6   GFR Estimate      >60 mL/min/1.73m2 88         Post Infusion Assessment:  Patient tolerated infusion without incident.     Discharge Plan:   Prescription refills given for eliquis and olanzapine.  Copy of AVS reviewed with patient and/or family. Patient will return 1/17/23 for IV fluid  Patient discharged in stable condition accompanied by: mother.  Departure Mode: Wheelchair.      Hailey Nathan RN

## 2023-01-13 NOTE — PROGRESS NOTES
BMT ONC Adult Ommaya Access and Intrathecal Chemotherapy Administration Procedure Note  Jan 13, 2023    Procedure: Codman Certas Plus access to obtain CSF and give intrathecal chemotherapy (IT)          Diagnosis: breast cancer, leptomeningeal disease    Learning needs assessment complete within 12 months: YES     Vitals reviewed:  YES    Informed Consent: Procedure, benefits, risks, and alternatives explained to the patient who voiced understanding of the information and agreed to proceed with CSF reservoir access with IT chemotherapy. Risks include bleeding, nausea, headaches, and/or infection.   Patient safety checklist completed.    Labs: Reviewed:   INR   Date Value Ref Range Status   12/07/2022 1.10 0.85 - 1.15 Final   06/21/2015 1.21 (H) 0.86 - 1.14 Final      Platelet Count   Date Value Ref Range Status   01/13/2023 286 150 - 450 10e3/uL Final   01/23/2020 283 150 - 450 10e9/L Final     Note: Shunt was turned off by neurosurgery team. Ommaya was pumped initially, then cleaned with betadine + alcohol swab. Butterfly needle was attached to a 10 cc syringe and inserted into the CSF reservoir.  6 cc of CSF was withdrawn slowly over about 3 minutes.  10 mg IT methotrexate was double checked in EPIC and instilled over 3 minutes.   Patient tolerated the procedure well.    Specimen appears clear. CSF was sent for cytology, cell count, protein and glucose.  CSF reservoir was pumped 6 times after the procedure.  Patient was given IV fluids and IV caffeine for headaches. Given 0.5 mg IV Ativan for anxiety.    Post-procedure pain assessment: 0 out of 10 on the numeric pain rating scale  Interventions: No    Complications: No     Disposition: Patient will remain on back for 2 hour post procedure. Call if develops headaches, fevers and or chills. Shunt will be turned back on 2 hours after IT chemo given by neurosurgery team.    Performed by:   Adwoa Wilson PA-C  [facility]  [address] [place]  [address]  [phone]

## 2023-01-13 NOTE — LETTER
"2023       RE: Josefina Bennett  [address]  [facility] [address]     Dear Colleague,    Thank you for referring your patient, Josefina Bennett, to the [facility] NEUROSURGERY CLINIC [place] at [facility]. Please see a copy of my visit note below.      [facility]  Department of Neurosurgery  Center for Skull Base and Pituitary Surgery    Name: Josefina Bennett  MRN: [id number]  Age: 57 year old  : 1965      Chief Complaint:   Leptomeningeal carcinomatosis s/p  shunt placement (Certas set to 3), shunt programming visit    History of Present Illness:   Josefina Bennett is a 57 year old female with a history of metastatic breast CA and communicating hydrocephalus s/p VPS placement 2022 who is seen today in the [facility] to have her shunt turned to \"virtual off\". She is receiving her last intrathecal methotrexate therapy today.     Physical Exam:   General: No acute distress.    Neuro: The patient is fully oriented. Speech is normal.     Procedure:  With the patient's verbal permission her right frontal valve was interrogated and found to be set to 3. Using the magnet, this was dialed to 8. Rechecked x 3. This was also double checked with the electronic device and confirmed to be in the virtual off position of 8.     Assessment:  Leptomeningeal carcinomatosis s/p  shunt placement (Certas set to 3), shunt programming visit    Plan:  We will return later today to turn Mary's shunt back \"on\" to a setting of 3. Nurse notified that we were done.        Brittany Mandel PA-C  Department of Neurosurgery  "

## 2023-01-16 NOTE — LETTER
"  1/16/2023     RE: Josefina Bennett  [address]  [facility] [address]        Dear Colleague,    Thank you for referring your patient, Josefina Bennett, to the [facility] RADIATION ONCOLOGY. Please see a copy of my visit note below.    Josefina is back to day to discuss whole brain radiation therapy.   Her IT methotrexate unfortunately has not been able to clear her CSF. Repeat brain MRI on 1/9/202 progression of both the leptomeningeal and intraparenchymal disease.         As such, Dr. Meza reached out to us and asked us to initiate whole brain radiation therapy. She is accompanied by her mom. She states that since last seen, she feels even more fatigued. She has increased \"trouble\" with her right eye, seeing double vision sometimes. She feels increased dizziness. She denies loss of hearing, trouble with chewing or swallowing. She is weak overall.     I again discussed the logistics and the side effects. Josefina is willing to proceed. She signed the consent form.     Given her overall poor performance status, I plan to deliver 2000 cGy ami 5 fractions instead of a more protracted 3000 cGy in 10 fractions. She will be simulated today. I offered to start her treatment today, but she really does not feel like coming back in the afternoon due to profound fatigue. She will thus start treatment tomorrow. I offered her a Saturday treatment to complete her 5th treatment this weekend. Unfortunately, there is no weekend shuttle from the [facility]. As such, she will have her last treatment on Monday 1/23. I sent a message to Dr. Meza regarding the above plan.       Bill Chauhan MD      I reviewed patient's chart, internal/external medical records, imaging studies (including actual images), labs and pathology reports.  I interviewed and counseled the patient face to face.  I additionally ordered tests and discussed the case with patient's referring physicians and care team (Dr. Meza).      30 minutes were spent on the date " of the encounter doing chart review, history and exam, documentation and further activities as noted above.       Bill Chauhan MD

## 2023-01-16 NOTE — PROGRESS NOTES
Left message to discuss appointments cancelled with Dr Meza's recommendations to discontinue IT Methotrexate. Will keep IV infusion to discuss if Mary needs IV fluids tomorrow. Teresa Ashton RN, BSN  [facility] Nurse Coordinator

## 2023-01-16 NOTE — LETTER
1/16/2023     [facility]  [address] [facility] [facility] [address]    Dear Colleague,    Thank you for referring your patient, Josefina Bennett, to the [facility] RADIATION ONCOLOGY. Please see a copy of my visit note below.    Simulation appointment completed.       Again, thank you for allowing me to participate in the care of your patient.        Sincerely,        Bill Chauhan MD

## 2023-01-17 NOTE — TELEPHONE ENCOUNTER
Signed form received and faxed to [facility], fax # [phone]. Successful transmission verified via rightfax. Copy of forms sent to scanning, original forms mailed to patient's home address on file.    Rikki Carbajal on 1/17/2023 at 12:05 PM

## 2023-01-17 NOTE — PROGRESS NOTES
Social Work Follow-Up  [facility]    Data/Intervention:  Patient Name:  Josefina Bennett  /Age:  1965 (57 year old)    Reason for Follow-Up:  , covering for EMILY Romero, received a call from RNSHASTA Ashton requesting extension of [place] accommodations.    Collaborated With:    -LIZABETH Ashton  -[facility]    Intervention/Education/Resources Provided:   contacted the [facility] and extended Patient's stay until 2022.     Future SW requests will be addressed by MEILY Romero upon her return to the office.      Sandra Torres [facility]  Clinical , Outpatient Specialty Clinics  Direct Phone: [phone]

## 2023-01-17 NOTE — PATIENT INSTRUCTIONS
[facility] Triage and after hours / weekends / holidays:  [phone]    Please call the triage or after hours line if you experience a temperature greater than or equal to 100.5, shaking chills, have uncontrolled nausea, vomiting and/or diarrhea, dizziness, shortness of breath, chest pain, bleeding, unexplained bruising, or if you have any other new/concerning symptoms, questions or concerns.      If you are having any concerning symptoms or wish to speak to a provider before your next infusion visit, please call your care coordinator or triage to notify them so we can adequately serve you.     If you need a refill on a narcotic prescription or other medication, please call before your infusion appointment.

## 2023-01-17 NOTE — PROGRESS NOTES
Infusion Nursing Note:  Josefina Bennett presents today for cycle 2 day 1 trastuzumab-qyyp, IVF.    Patient seen by provider today: No   present during visit today: Not Applicable.    Note:   Mary reports ongoing fatigue. She continues having dizziness with position changes. She reports her nausea is controlled with medication at home. She denies fevers, chills, SOB, chest pain, diarrhea, and pain.    Intravenous Access:  Implanted Port.    Treatment Conditions:  Lab Results   Component Value Date    HGB 12.0 01/17/2023    WBC 11.5 (H) 01/17/2023    ANEU 7.3 10/03/2022    ANEUTAUTO 9.7 (H) 01/17/2023     01/17/2023      Results reviewed, labs MET treatment parameters, ok to proceed with treatment.  ECHO/MUGA completed 11/2/22  EF 55-60%.    Post Infusion Assessment:  Patient tolerated infusion without incident.  Blood return noted pre and post infusion.  Site patent and intact, free from redness, edema or discomfort.  No evidence of extravasations.  Access discontinued per protocol.     Discharge Plan:   Patient declined prescription refills.  Discharge instructions reviewed with: Patient and Family.  Patient and/or family verbalized understanding of discharge instructions and all questions answered.  AVS to patient via [employer].  Patient will return 1/20 for next appointment.   Patient discharged in stable condition accompanied by: mother.  Departure Mode: Wheelchair.      Yadira Jay RN

## 2023-01-18 NOTE — PROGRESS NOTES
Returned call to patient and discussed her request to go to hospice. Patient is wanting either inpatient or a hospice admission. Patient is also wanting her Mother to be able to stay with her. Writer reached out to  to assist with finding hospice for patient. Patient will keep her radiation appointment today and see Dr Meza tomorrow and bring paper work to writer to get completed. Dr Meza updated on patient's decision and will meet with her tomorrow as currently scheduled.  Answered all patient's questions and verbalized understanding. Teresa Ashton RN, BSN.

## 2023-01-19 NOTE — LETTER
1/19/2023         RE: Josefina Bennett  [address]  [facility] [address]        Dear Colleague,    Thank you for referring your patient, Josefina Bennett, to the [facility]. Please see a copy of my visit note below.    REASON FOR VISIT: Follow-up breast cancer     HISTORY OF PRESENT ILLNESS:  Josefina Bennett was self referred to our clinic in 2014 for clinical trial recommendations for newly diagnosed stage II breast cancer. She had her last mammogram approximately a year prior. She did notice in early January 2015 that she was having some distortion of the right breast, and she went to see her gynecologist and had a mammogram performed. She had a screening mammogram performed on 01/15/2015. Breast tissue was heterogeneously dense, which might compromise the mammography. There was architectural distortion in the right breast approximately 11-12 o'clock position, zone 2, posterior depth, and a CC MLO spot compression was performed and an ultrasound was planned. The diagnostic mammogram from 01/28/2015 showed right breast architectural distortion centered at the 12 o'clock position, zone 2, suspicious for neoplasm. Breast tomosynthesis was used in the interpretation. Ultrasound findings included targeted ultrasound of the right upper breast demonstrating a band-like area of abnormal echogenicity between 11 and 12 o'clock position in the right breast at zone 2. This measures 4 cm transversely x 1.1 cm AP, and there was only a 1.5 cm measurement in the radial direction. There was blood flow suspicious for neoplasm at the 11 o'clock position in zone 2. There is a 1.4 cm hypoechoic nodule slightly  from the larger area of altered echogenicity that is also suspicious. A biopsy was performed. The biopsy showed infiltrating lobular carcinoma, grade 2, and a clip was placed at specimen [id number]. There were a few signet cells present. The tumor was ER-positive, ID low positive, and HER2 was negative  by immunohistochemistry. She subsequently underwent an MRI showing a tumor that was 4.8 x 1.7 x 3.2 cm in size. Overall, her clinical stage was T2 NX MX.       She was enrolled in the I-SPY-2 clinical trial, and qualified with high-risk MammaPrint score. She was randomized to ganetespib and paclitaxel, and she was treated with 12 cycles of treatment, and then started on AC on 05/26/2015. She developed intractable nausea and vomiting after the first cycle, which required hospitalization on the second cycle. She then developed Pneumocystis pneumonia, which resulted in intubation and a 2-week hospitalization during the course of AC. She was discharged on 07/02/2015, and decided she did not want to pursue any further chemotherapy. She had a right lumpectomy and sentinel lymph node procedure 08/12/2015. She had a positive margin, underwent a re-excision, and had a positive margin. Ultimately, she underwent a right mastectomy with clear margins. She began radiation treatment with Dr. Bill Chauhan, and completed radiation therapy on 12/04/2015. Pathologic stage was III-A, ypT3 N1a MX. Of concern, she had what could be considered an RCB 3 tumor.  She started adjuvant letrozole on 1/28/16.     TREATMENT HISTORY:  A.  Neoadjuvant therapy on I SPY-2 with ganetespib and paclitaxel.  AC x 2 complicated by PCP pneumonia.  She had intractable nausea and vomiting the first cycle of AC, and the second cycle of AC was complicated by Pneumocystis pneumonia requiring intubation and a 2-week hospitalization.  She did not complete the AC treatment.  She did undergo a right lumpectomy and sentinel node procedure.  She had a positive margin and underwent resection.  She still had a positive margin.  She underwent a right mastecomy with clear margins.  Pathologic stage was IIIA  [id number] and of concern, she had an RCB3 histology meaning significant risk of recurrence of her breast cancer during the 5-year period following primary therapy.    She completed radiation in 12/2015.      B.  Right lumpectomy, followed by right margin resections, followed by rigth mastectomy.   B.  Oophorectomy 10-16-15.   C.  Tamoxifen after 10-6-15.   D.  Radiation therapy. 5,040 cGy in 180 cGy/fraction to the chest wall and regional lymphatics followed by 720 cGy axillary lymph node boost and 1000 cGy mastectomy scar boost. Completed on 12/3/2015.  E.   Adjuvant letrozole begun 1-28-16.  Plan was to continue through 2026.  F.  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  A mix of TNBC and ER- HER2+.  Clinic conference consensus was for the modified Vandana with paclitaxel weekly to treat both clones. T-DXd could be a next regimen.  G.  Metastatic carcinoma is HER2 negative (score 1+) by immunohistochemistry.  She is also a candidate for T-DXd.  H.  Recurrent tumor with 22C3 antibody for PD-L1 and hold this in reserve for a Keynote 355 metastatic TNBC approach if the Vandana regimen does not control her metastatic disease.  Keynote 355 could also be an active regimen.   I.  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  J. IT methotrexate begun 12-20-22 and continued 12-23, 12-27, 12-30, 1-3-23, 1-6-23, 1-13-23.  Discontinued after 1-13-23.  J.  Capecitabine 1500 mg twice daily begun 12-23-22 14 days on followed by 7 days off.   K. Tucatinib begun 12-29-22.  L.  Trastuzumab begun 12-20-22 and continued every 3 weeks.   M.  Dr. Chauhan visit 1-17-23.  Josefina is back to day to discuss whole brain radiation therapy.   Her IT methotrexate unfortunately has not been able to clear her CSF. Repeat brain MRI on 1/9/202 progression of both the leptomeningeal and intraparenchymal disease. WBR begun 1--23 and continue through 1-23-23.  2000 cGy  in 5 fractions because of poor PS.   N. Resume capecitabine and tucatinib 1-24-23.    Drug approval process for HER2 Climb regimen.   Note that tucatinib access requested 12-15-22, denial took 12 days and received 12-27-22.  Capecitabine allowed 12-20-22.  [employer] provision of free tucatinib approved 12-27-22.       INTERVAL HISTORY:    Josefina Verma returns to clinic. Today is day 3 of whole brain radiation.  She did have a headache after her radiation yesterday, but overall she feels that her thinking and mental processing are a little bit improved. Other than the headache, she has no other pain.  She has very significant fatigue.  She has no depression, but does have significant anxiety, but declined a referral to Palliative Care or Social Work at this time.  She is seen in a wheelchair today, but is able to walk.    REVIEW OF SYSTEMS:  A 10 point review of systems is negative except for the fatigue and headache, which have been responsive to Tylenol.  The remainder of a 10 point review of systems is negative.     PHYSICAL EXAMINATION:    VITAL SIGNS:  /71   Pulse 102   Temp 98.1  F (36.7  C) (Oral)   Resp 16   Wt 58.1 kg (128 lb)   LMP 12/18/2014   SpO2 100%   BMI 21.96 kg/m    GENERAL:  Josefina Verma appears generally well, but quite fatigued.  She is wearing a hat.  HEENT:  No lesions in the oropharynx and specifically no thrush.  LYMPH:  She has no cervical, supraclavicular, subclavicular or axillary lymphadenopathy.  BREASTS:  Exam not performed today.  LUNGS:  Clear to percussion and auscultation.  HEART:  Regular rate and rhythm.  S1, S2.  ABDOMEN:  Soft, nontender, without hepatosplenomegaly.  EXTREMITIES:  Without edema.  NEUROLOGIC:  Cranial nerves II-XII are grossly intact.  Finger-to-nose is intact bilaterally.  Motor exam is grossly intact in the upper and lower extremities bilaterally.  Mood and affect are normal.  Answers to questions are appropriate.     BRAIN MRI 1-10-23  FINDINGS:  Stable  right frontal approach ventricular shunt with tip in the third  ventricle. Small amount of air within the anterior horns of the  lateral ventricles. Decreased size of the ventricular system compared  to 12/9/2022. Increased nodular leptomeningeal contrast enhancement  along the cerebellar folia. Unchanged leptomeningeal contrast  enhancement over bilateral cerebral sulci left greater than right.  Diffuse pachymeningeal contrast enhancement findings.     Increased nodular parenchymal enhancing lesions in supratentorial and  infratentorial brain. Representative lesions may include 1.3 cm right  cerebellar lesion (16, image 47), 5 mm lesion within the inferior  right parietal lobe (series 16, image 113) previously measuring 3 mm.  Unchanged DVA cavernoma complex within the jude.      There is no mass effect, midline shift, or intracranial hemorrhage.  The ventricles are proportionate to the cerebral sulci. Diffusion and  susceptibility weighted images are negative for acute/focal  abnormality. Major intracranial vascular structures are within normal  limits.     No suspicious abnormality of the skull marrow signal. Scattered  paranasal sinus mucosal thickening. Bilateral mastoid effusions  similar to previous exams. No focal abnormality of the pituitary  gland, sella, skull base and upper cervical spinal structures on  sagittal images. The orbits are normal.                                                                      IMPRESSION:  1. Progression of leptomeningeal and intraparenchymal metastatic  disease compared to 12/7/2022.  2. Stable right frontal approach ventricular shunt with decreased  caliber of the ventricular system compared to 12/9/2022. New   pachymeningeal contrast enhancement, question over shunting.     I have personally reviewed the examination and initial interpretation  and I agree with the findings.     SMITHA RODRIGUEZ MD         ASSESSMENT AND PLAN:   1.  Mary Bennett is a 57-year-old  woman with a history of a clinical stage II right breast cancer, ER positive, IA positive, HER2 negative by immunohistochemistry.  She had a lobular histology.  She was on the I-SPY 2 clinical trial and was randomized to ganetespib and paclitaxel, then went on to AC. Pathologic stage was IIIA  [id number] and of concern, she had an RCB3 histology meaning significant risk of recurrence of her breast cancer during the 5-year period following primary therapy.  There were 2 lymph nodes involved with mammary carcinoma with extracapsular extension.  She went on to have radiation therapy and then began hormonal therapy with tamoxifen in October 2015.  She had oophorectomy and started letrozole in January 2016.   2.  After recurrence was diagnosed with a sacral mass, breast conference recommended starting with HER2 directed therapy.  Biphenotypic cancer with majority TNBC component and a HER2 component.  I discussed the 22C3 PD-L1 CPS of 40 while 20% of the tumor is HER2+ by FISH and the overall ASCO CAP assignment is negative for the bulk tumor with the qualifier that there is a significant HER2 clonal component that would be best treated with HER2 directed therapy.  The Tumor Board consensus was to continue with HER2 directed therapy to treat the new and HER2+ clone till best response.    3. Viry  has metastatic, biphenotypic mixed, triple-negative, ER-negative, HER-2 positive breast cancer, which is a 5% component based on the sacral biopsy.  She did receive intrathecal shunt placement for normal pressure hydrocephalus, which was relieved nonetheless with twice-weekly intrathecal methotrexate.  There was no improvement in terms of cell counts and followup MRI showing progression of brain lesions, both leptomeningeal and intraparenchymal. She went on to start whole brain radiation, and today is day 3. She has tolerated the whole brain radiation reasonably well with improvement of mental processing, but at the expense of  having some headaches, which are responsive to Tylenol.   4.  Brain metastases which are intraparenchymal and leptomeningeal.  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  IT methotrexate begun 12-20-22 and continued 12-23, 12-27, 12-30, 1-3-23, 1-6-23, 1-13-23.  Discontinued after 1-13-23.  HER2 Climb regimen recommended and [employer] apparently approved the capecitabine and trastuzumab but denied the tucatinib, which [employer] provided with a drug request.  Capecitabine 1500 mg twice daily begun 12-23-22 14 days on followed by 7 days off. Tucatinib begun 12-29-22. Trastuzumab begun 12-20-22 and continued every 3 weeks.   5.  Drug approval process for HER2 Climb regimen.   Note that tucatinib access requested 12-15-22, denial took 12 days and received 12-27-22.  Capecitabine allowed 12-20-22.  [employer] provision of free tucatinib approved 12-27-22.   6.  Radiation.  Radiation oncology is starting whole brain RT tomorrow. Five total treatments. Her last treatment will be Monday 1/23.   7.  Discussion of hospice versus continuing treatment.  Josefina Verma would like to continue with treatment at this time.  She understands that our treatment is palliative.  She plans to complete the 10 day course of whole brain radiation, and I discussed that we do recommend Xeloda and capecitabine afterwards and continuing the IV Herceptin every 3 weeks.  We think it would be reasonable to continue the therapy as long as Josefina Verma feels it is reasonable to do so and she is tolerating the treatment well.  All of her questions were answered, and she is aware of the hospice option.  She is DNR/DNI.  8.  Xgeva was held because of need for a dental procedure.  Although there is no FDG uptake in bones, bones are involved.   9. Potential interactions with tucatinib requiring a dose reduction of other drugs.  Eliquis dose reduction by half. Trazadone dose reduction by half.  Discussed with Aziza  in the oral chemotherapy program.   10.  Followup.  Follow up with me 1-31 with CBC, CMP. Follow up with Palliative care 2-1. Herceptin February 6 with Sandra CBC, CMP, CEA, CA27.29. Echo on 2-20 and CT on 2-20. Follow up with me 2-28 with Herceptin. Brain MRI March 27 and follow up with me March 28 with CBC, CMP, CA27.29 and CEA.     Thank you for allowing us to continue to participate in Josefina Bennett' care.      I spent 40 minutes with the patient more than 50% of which was in counseling and coordination of care.         Again, thank you for allowing me to participate in the care of your patient.      Sincerely,    Evangelista Meza MD

## 2023-01-19 NOTE — NURSING NOTE
"Oncology Rooming Note    January 19, 2023 9:13 AM   Josefina Bennett is a 57 year old female who presents for:    Chief Complaint   Patient presents with     Oncology Clinic Visit     Breast Ca     Initial Vitals: /71   Pulse 102   Temp 98.1  F (36.7  C) (Oral)   Resp 16   Wt 58.1 kg (128 lb)   LMP 12/18/2014   SpO2 100%   BMI 21.96 kg/m   Estimated body mass index is 21.96 kg/m  as calculated from the following:    Height as of 6/10/22: 1.626 m (5' 4.02\").    Weight as of this encounter: 58.1 kg (128 lb). Body surface area is 1.62 meters squared.  No Pain (0) Comment: Data Unavailable   Patient's last menstrual period was 12/18/2014.  Allergies reviewed: Yes  Medications reviewed: Yes    Medications: Medication refills not needed today.  Pharmacy name entered into [employer]:    [facility] - [place], [place] - [address] [place] [address] AT [facility]  [facility]  [facility] [place] - [address] [facility] [place] [address]  [facility] [place] [address] [place] [address] [facility], [facility] - [place], [place] - [address]  [facility] [place], [place] - [address] [facility] [phone]    Clinical concerns: Unable to complete rooming process, RNCC came in.       Leandra Lizarraga CMA              "

## 2023-01-19 NOTE — PROGRESS NOTES
REASON FOR VISIT: Follow-up breast cancer     HISTORY OF PRESENT ILLNESS:  Josefina Bennett was self referred to our clinic in 2014 for clinical trial recommendations for newly diagnosed stage II breast cancer. She had her last mammogram approximately a year prior. She did notice in early January 2015 that she was having some distortion of the right breast, and she went to see her gynecologist and had a mammogram performed. She had a screening mammogram performed on 01/15/2015. Breast tissue was heterogeneously dense, which might compromise the mammography. There was architectural distortion in the right breast approximately 11-12 o'clock position, zone 2, posterior depth, and a CC MLO spot compression was performed and an ultrasound was planned. The diagnostic mammogram from 01/28/2015 showed right breast architectural distortion centered at the 12 o'clock position, zone 2, suspicious for neoplasm. Breast tomosynthesis was used in the interpretation. Ultrasound findings included targeted ultrasound of the right upper breast demonstrating a band-like area of abnormal echogenicity between 11 and 12 o'clock position in the right breast at zone 2. This measures 4 cm transversely x 1.1 cm AP, and there was only a 1.5 cm measurement in the radial direction. There was blood flow suspicious for neoplasm at the 11 o'clock position in zone 2. There is a 1.4 cm hypoechoic nodule slightly  from the larger area of altered echogenicity that is also suspicious. A biopsy was performed. The biopsy showed infiltrating lobular carcinoma, grade 2, and a clip was placed at specimen [id number]. There were a few signet cells present. The tumor was ER-positive, HI low positive, and HER2 was negative by immunohistochemistry. She subsequently underwent an MRI showing a tumor that was 4.8 x 1.7 x 3.2 cm in size. Overall, her clinical stage was T2 NX MX.       She was enrolled in the I-SPY-2 clinical trial, and qualified with high-risk  MammaPrint score. She was randomized to ganetespib and paclitaxel, and she was treated with 12 cycles of treatment, and then started on AC on 05/26/2015. She developed intractable nausea and vomiting after the first cycle, which required hospitalization on the second cycle. She then developed Pneumocystis pneumonia, which resulted in intubation and a 2-week hospitalization during the course of AC. She was discharged on 07/02/2015, and decided she did not want to pursue any further chemotherapy. She had a right lumpectomy and sentinel lymph node procedure 08/12/2015. She had a positive margin, underwent a re-excision, and had a positive margin. Ultimately, she underwent a right mastectomy with clear margins. She began radiation treatment with Dr. Bill Chauhan, and completed radiation therapy on 12/04/2015. Pathologic stage was III-A, ypT3 N1a MX. Of concern, she had what could be considered an RCB 3 tumor.  She started adjuvant letrozole on 1/28/16.     TREATMENT HISTORY:  A.  Neoadjuvant therapy on I SPY-2 with ganetespib and paclitaxel.  AC x 2 complicated by PCP pneumonia.  She had intractable nausea and vomiting the first cycle of AC, and the second cycle of AC was complicated by Pneumocystis pneumonia requiring intubation and a 2-week hospitalization.  She did not complete the AC treatment.  She did undergo a right lumpectomy and sentinel node procedure.  She had a positive margin and underwent resection.  She still had a positive margin.  She underwent a right mastecomy with clear margins.  Pathologic stage was IIIA  [id number] and of concern, she had an RCB3 histology meaning significant risk of recurrence of her breast cancer during the 5-year period following primary therapy.   She completed radiation in 12/2015.      B.  Right lumpectomy, followed by right margin resections, followed by rigth mastectomy.   B.  Oophorectomy 10-16-15.   C.  Tamoxifen after 10-6-15.   D.  Radiation therapy. 5,040 cGy in 180  cGy/fraction to the chest wall and regional lymphatics followed by 720 cGy axillary lymph node boost and 1000 cGy mastectomy scar boost. Completed on 12/3/2015.  E.   Adjuvant letrozole begun 1-28-16.  Plan was to continue through 2026.  F.  Diagnosis with recurrent/metastatic breast cancer with a sacral mass that was biopsied showing pleiomorphic lobular breast cancer that is ER negative, SD negative, HER2 positive by FISH in 20% of the cells and negative in 80% of cells.  A mix of TNBC and ER- HER2+.  Clinic conference consensus was for the modified Vandana with paclitaxel weekly to treat both clones. T-DXd could be a next regimen.  G.  Metastatic carcinoma is HER2 negative (score 1+) by immunohistochemistry.  She is also a candidate for T-DXd.  H.  Recurrent tumor with 22C3 antibody for PD-L1 and hold this in reserve for a Keynote 355 metastatic TNBC approach if the Vandana regimen does not control her metastatic disease.  Keynote 355 could also be an active regimen.   I.  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  J. IT methotrexate begun 12-20-22 and continued 12-23, 12-27, 12-30, 1-3-23, 1-6-23, 1-13-23.  Discontinued after 1-13-23.  J.  Capecitabine 1500 mg twice daily begun 12-23-22 14 days on followed by 7 days off.   K. Tucatinib begun 12-29-22.  L.  Trastuzumab begun 12-20-22 and continued every 3 weeks.   M.  Dr. Chauhan visit 1-17-23.  Josefina is back to day to discuss whole brain radiation therapy.   Her IT methotrexate unfortunately has not been able to clear her CSF. Repeat brain MRI on 1/9/202 progression of both the leptomeningeal and intraparenchymal disease. WBR begun 1--23 and continue through 1-23-23.  2000 cGy in 5 fractions because of poor PS.   N. Resume capecitabine and tucatinib 1-24-23.    Drug approval process for HER2 Climb regimen.   Note that tucatinib access requested 12-15-22, denial took 12 days and received 12-27-22.   Capecitabine allowed 12-20-22.  [employer] provision of free tucatinib approved 12-27-22.       INTERVAL HISTORY:    Josefina Verma returns to clinic. Today is day 3 of whole brain radiation.  She did have a headache after her radiation yesterday, but overall she feels that her thinking and mental processing are a little bit improved. Other than the headache, she has no other pain.  She has very significant fatigue.  She has no depression, but does have significant anxiety, but declined a referral to Palliative Care or Social Work at this time.  She is seen in a wheelchair today, but is able to walk.    REVIEW OF SYSTEMS:  A 10 point review of systems is negative except for the fatigue and headache, which have been responsive to Tylenol.  The remainder of a 10 point review of systems is negative.     PHYSICAL EXAMINATION:    VITAL SIGNS:  /71   Pulse 102   Temp 98.1  F (36.7  C) (Oral)   Resp 16   Wt 58.1 kg (128 lb)   LMP 12/18/2014   SpO2 100%   BMI 21.96 kg/m    GENERAL:  Josefina Verma appears generally well, but quite fatigued.  She is wearing a hat.  HEENT:  No lesions in the oropharynx and specifically no thrush.  LYMPH:  She has no cervical, supraclavicular, subclavicular or axillary lymphadenopathy.  BREASTS:  Exam not performed today.  LUNGS:  Clear to percussion and auscultation.  HEART:  Regular rate and rhythm.  S1, S2.  ABDOMEN:  Soft, nontender, without hepatosplenomegaly.  EXTREMITIES:  Without edema.  NEUROLOGIC:  Cranial nerves II-XII are grossly intact.  Finger-to-nose is intact bilaterally.  Motor exam is grossly intact in the upper and lower extremities bilaterally.  Mood and affect are normal.  Answers to questions are appropriate.     BRAIN MRI 1-10-23  FINDINGS:  Stable right frontal approach ventricular shunt with tip in the third  ventricle. Small amount of air within the anterior horns of the  lateral ventricles. Decreased size of the ventricular system compared  to 12/9/2022. Increased  nodular leptomeningeal contrast enhancement  along the cerebellar folia. Unchanged leptomeningeal contrast  enhancement over bilateral cerebral sulci left greater than right.  Diffuse pachymeningeal contrast enhancement findings.     Increased nodular parenchymal enhancing lesions in supratentorial and  infratentorial brain. Representative lesions may include 1.3 cm right  cerebellar lesion (16, image 47), 5 mm lesion within the inferior  right parietal lobe (series 16, image 113) previously measuring 3 mm.  Unchanged DVA cavernoma complex within the jude.      There is no mass effect, midline shift, or intracranial hemorrhage.  The ventricles are proportionate to the cerebral sulci. Diffusion and  susceptibility weighted images are negative for acute/focal  abnormality. Major intracranial vascular structures are within normal  limits.     No suspicious abnormality of the skull marrow signal. Scattered  paranasal sinus mucosal thickening. Bilateral mastoid effusions  similar to previous exams. No focal abnormality of the pituitary  gland, sella, skull base and upper cervical spinal structures on  sagittal images. The orbits are normal.                                                                      IMPRESSION:  1. Progression of leptomeningeal and intraparenchymal metastatic  disease compared to 12/7/2022.  2. Stable right frontal approach ventricular shunt with decreased  caliber of the ventricular system compared to 12/9/2022. New   pachymeningeal contrast enhancement, question over shunting.     I have personally reviewed the examination and initial interpretation  and I agree with the findings.     SMITHA RODRIGUEZ MD         ASSESSMENT AND PLAN:   1.  Mary Bennett is a 57-year-old woman with a history of a clinical stage II right breast cancer, ER positive, MI positive, HER2 negative by immunohistochemistry.  She had a lobular histology.  She was on the I-SPY 2 clinical trial and was randomized to  ganetespib and paclitaxel, then went on to AC. Pathologic stage was IIIA  [id number] and of concern, she had an RCB3 histology meaning significant risk of recurrence of her breast cancer during the 5-year period following primary therapy.  There were 2 lymph nodes involved with mammary carcinoma with extracapsular extension.  She went on to have radiation therapy and then began hormonal therapy with tamoxifen in October 2015.  She had oophorectomy and started letrozole in January 2016.   2.  After recurrence was diagnosed with a sacral mass, breast conference recommended starting with HER2 directed therapy.  Biphenotypic cancer with majority TNBC component and a HER2 component.  I discussed the 22C3 PD-L1 CPS of 40 while 20% of the tumor is HER2+ by FISH and the overall ASCO CAP assignment is negative for the bulk tumor with the qualifier that there is a significant HER2 clonal component that would be best treated with HER2 directed therapy.  The Tumor Board consensus was to continue with HER2 directed therapy to treat the new and HER2+ clone till best response.    3. Viry  has metastatic, biphenotypic mixed, triple-negative, ER-negative, HER-2 positive breast cancer, which is a 5% component based on the sacral biopsy.  She did receive intrathecal shunt placement for normal pressure hydrocephalus, which was relieved nonetheless with twice-weekly intrathecal methotrexate.  There was no improvement in terms of cell counts and followup MRI showing progression of brain lesions, both leptomeningeal and intraparenchymal. She went on to start whole brain radiation, and today is day 3. She has tolerated the whole brain radiation reasonably well with improvement of mental processing, but at the expense of having some headaches, which are responsive to Tylenol.   4.  Brain metastases which are intraparenchymal and leptomeningeal.  Shunt placed for metastatic breast CA and communicating hydrocephalus s/p VPS placement  12/09/2022.  IT methotrexate begun 12-20-22 and continued 12-23, 12-27, 12-30, 1-3-23, 1-6-23, 1-13-23.  Discontinued after 1-13-23.  HER2 Climb regimen recommended and [employer] apparently approved the capecitabine and trastuzumab but denied the tucatinib, which [employer] provided with a drug request.  Capecitabine 1500 mg twice daily begun 12-23-22 14 days on followed by 7 days off. Tucatinib begun 12-29-22. Trastuzumab begun 12-20-22 and continued every 3 weeks.   5.  Drug approval process for HER2 Climb regimen.   Note that tucatinib access requested 12-15-22, denial took 12 days and received 12-27-22.  Capecitabine allowed 12-20-22.  [employer] provision of free tucatinib approved 12-27-22.   6.  Radiation.  Radiation oncology is starting whole brain RT tomorrow. Five total treatments. Her last treatment will be Monday 1/23.   7.  Discussion of hospice versus continuing treatment.  Josefina Verma would like to continue with treatment at this time.  She understands that our treatment is palliative.  She plans to complete the 10 day course of whole brain radiation, and I discussed that we do recommend Xeloda and capecitabine afterwards and continuing the IV Herceptin every 3 weeks.  We think it would be reasonable to continue the therapy as long as Josefina Verma feels it is reasonable to do so and she is tolerating the treatment well.  All of her questions were answered, and she is aware of the hospice option.  She is DNR/DNI.  8.  Xgeva was held because of need for a dental procedure.  Although there is no FDG uptake in bones, bones are involved.   9. Potential interactions with tucatinib requiring a dose reduction of other drugs.  Eliquis dose reduction by half. Trazadone dose reduction by half.  Discussed with Aziza in the oral chemotherapy program.   10.  Followup.  Follow up with me 1-31 with CBC, CMP. Follow up with Palliative care 2-1. Herceptin February 6 with Sandra CBC, CMP, CEA, CA27.29. Echo on 2-20 and CT on 2-20.  Follow up with me 2-28 with Herceptin. Brain MRI March 27 and follow up with me March 28 with CBC, CMP, CA27.29 and CEA.     Thank you for allowing us to continue to participate in Josefina Bennett' care.      Sincerely,       Evangelista Meza MD  Professor  [facility]  [phone]          I spent 40 minutes with the patient more than 50% of which was in counseling and coordination of care.

## 2023-01-20 NOTE — PROGRESS NOTES
Infusion Nursing Note:  Josefina Bennett presents today for IV fluids.    Patient seen by provider today: No   present during visit today: Not Applicable.    Note: Pt presents today with complaints of fatigue which is unchanged from her previous appointments this week. She has intermittent nausea that is well controlled with PRN antiemetics. She c/o some dizziness with sudden movement. Pt wishes to proceed with IV fluids and requests a dose of ativan for nausea.    Teresa Ashton RNCC saw pt in infusion today.    Intravenous Access:  Implanted Port.    Treatment Conditions:  Lab Results   Component Value Date    HGB 11.3 (L) 01/20/2023    WBC 7.5 01/20/2023    ANEU 7.3 10/03/2022    ANEUTAUTO 6.0 01/20/2023     01/20/2023        Post Infusion Assessment:  Patient tolerated infusion without incident.  Blood return noted pre and post infusion.  Site patent and intact, free from redness, edema or discomfort.  No evidence of extravasations.  Access discontinued per protocol.     Discharge Plan:  Patient declined prescription refills.  Discharge instructions reviewed with: Patient and Family.  Patient and/or family verbalized understanding of discharge instructions and all questions answered.  AVS to patient via [employer].  Patient will return 01/31/23 for next appointment.   Patient discharged in stable condition accompanied by: self.  Departure Mode: Wheelchair.      Emily Meese, RN

## 2023-01-20 NOTE — PATIENT INSTRUCTIONS
[facility] Triage and after hours / weekends / holidays:  [phone]    Please call the triage or after hours line if you experience a temperature greater than or equal to 100.4, shaking chills, have uncontrolled nausea, vomiting and/or diarrhea, dizziness, shortness of breath, chest pain, bleeding, unexplained bruising, or if you have any other new/concerning symptoms, questions or concerns.      If you are having any concerning symptoms or wish to speak to a provider before your next infusion visit, please call your care coordinator or triage to notify them so we can adequately serve you.     If you need a refill on a narcotic prescription or other medication, please call before your infusion appointment.                January 2023 Sunday Monday Tuesday Wednesday Thursday Friday Saturday   1     2     3    LAB CENTRAL   7:30 AM   (15 min.)   [facility] DRAW   [facility]    ONC INFUSION 2 HR (120 MIN)   8:00 AM   (120 min.)    ONC INFUSION NURSE   [facility]    POST-OP SPINE      9:15 AM   (30 min.)   Mendy Bailey PA-C   [facility]    LUMBAR PUNCTURE   9:45 AM   (45 min.)   Adwoa Wilson PA-C   [facility] RETURN  12:15 PM   (60 min.)   Claudia Nava APRN CNP   [facility] 4    NEW   2:00 PM   (90 min.)   Bill Chauhan MD   [facility]    RETURN   3:45 PM   (45 min.)   Sandra Coughlin PA-C   [facility]    ONC INFUSION 2 HR (120 MIN)   4:00 PM   (120 min.)    ONC INFUSION NURSE   [facility] 5    VIDEO VISIT RETURN  10:25 AM   (30 min.)   Evangelista Meza MD   [facility] 6    LAB CENTRAL   7:30 AM   (15 min.)   [facility]   [facility]  RETURN   7:45 AM   (60 min.)   Claudia Nava APRN CNP   [facility]    ONC INFUSION 2 HR (120 MIN)   8:00 AM   (120 min.)    ONC INFUSION NURSE   [facility]    LUMBAR PUNCTURE   9:00 AM   (45 min.)   Adwoa Wilson PA-C   [facility]    UM RETURN  12:00 PM   (15 min.)   Brittany Mandel PA-C   [facility] 7       8     9     10    LAB CENTRAL   6:45 AM   (15 min.)   [facility] LAB DRAW   [facility]    ONC INFUSION 2 HR (120 MIN)   7:30 AM   (60 min.)    ONC INFUSION NURSE   [facility] RETURN   8:15 AM   (30 min.)   Deepak Jules MD   [facility]    MR BRAIN WWO   9:45 AM   (45 min.)   [id number]   [facility] [place]    POST-OP SPINE     10:45 AM   (30 min.)   Mendy Bailey PA-C   [facility]    RETURN  12:45 PM   (30 min.)   Evangelista Meza MD   [facility] 11    VIDEO VISIT NEW   8:05 AM   (80 min.)   Dharmesh Sanchez MD   [facility] 12     13    LAB CENTRAL   7:00 AM   (15 min.)   [facility] LAB DRAW   [facility]    ONC INFUSION 2 HR (120 MIN)   7:30 AM   (120 min.)    ONC INFUSION NURSE   [facility] RETURN   9:30 AM   (30 min.)   Brittany Mandel PA-C   [facility]    LUMBAR PUNCTURE  10:00 AM   (45 min.)   Adwoa Wilson PA-C   [facility] RETURN  12:15 PM   (30 min.)   Brittany Mandel PA-C   [facility] 14       15     16    RETURN   9:30 AM   (30 min.)   Bill Chauhan MD   [facility] Radiation Oncology    CT SIM  10:00  AM   (60 min.)   Bill Chauhan MD   [facility] 17    ADVANCED TREATMENT   2:30 PM   (15 min.)   Bill Chauhan MD   [facility]    LAB CENTRAL   4:00 PM   (15 min.)   [facility]   [facility]    ONC INFUSION 2 HR (120 MIN)   4:00 PM   (120 min.)   [facility] ONC INFUSION NURSE   [facility] 18    TREATMENT   1:30 PM   (15 min.)   [facility] ONC ELEKTA   [facility] 19    RETURN   8:55 AM   (30 min.)   Evangelista Meza MD   [facility]    TREATMENT   1:30 PM   (15 min.)   [facility] ONC ELEKTA   [facility] 20    LAB CENTRAL  11:30 AM   (15 min.)   [facility]   [facility]    ONC INFUSION 2 HR (120 MIN)  12:00 PM   (120 min.)   [facility] ONC INFUSION NURSE   [facility]    TREATMENT   1:30 PM   (15 min.)   [facility] ONC ELEKTA   [facility] 21       22     23    TREATMENT   1:30 PM   (15 min.)   [facility] ONC ELEKTA   [facility]    OTV   1:45 PM   (15 min.)   Bill Chauhan MD   [facility] 24     25     26     27     28       29     30     31    LAB CENTRAL   1:30 PM   (15 min.)    [facility] DRAW   [facility]    RETURN   1:45 PM   (30 min.)   Evangelista Meza MD   [facility]                                   February 2023 Sunday Monday Tuesday Wednesday Thursday Friday Saturday                  1    VIDEO VISIT RETURN   9:25 AM   (40 min.)   Dharmesh Sanchez MD   [facility] 2     3     4       5     6    LAB CENTRAL   8:30 AM   (15 min.)   [facility]   [facility]    RETURN   8:45 AM   (45 min.)   Sandra Coughlin,  ROCCO   [facility]    ONC INFUSION 1 HR (60 MIN)  10:00 AM   (60 min.)   [facility] ONC INFUSION NURSE   [facility] 7     8     9     10     11       12     13     14     15     16     17     18       19     20    ECHO COMPLETE  10:00 AM   (60 min.)   [id number]   [facility]    CT CHEST/ABDOMEN/PELVIS W  11:30 AM   (20 min.)   [email or web address]   [facility] 21     22     23     24     25       26     27     28    LAB CENTRAL  10:00 AM   (15 min.)   [facility]   [facility]    RETURN  10:15 AM   (30 min.)   Evangelista Meza MD   [facility]    ONC INFUSION 1 HR (60 MIN)  11:00 AM   (60 min.)    ONC INFUSION NURSE   [facility]                                       Lab Results:  Recent Results (from the past 12 hour(s))   CBC with platelets and differential    Collection Time: 01/20/23 11:49 AM   Result Value Ref Range    WBC Count 7.5 4.0 - 11.0 10e3/uL    RBC Count 3.78 (L) 3.80 - 5.20 10e6/uL    Hemoglobin 11.3 (L) 11.7 - 15.7 g/dL    Hematocrit 34.1 (L) 35.0 - 47.0 %    MCV 90 78 - 100 fL    MCH 29.9 26.5 - 33.0 pg    MCHC 33.1 31.5 - 36.5 g/dL    RDW 21.3 (H) 10.0 - 15.0 %    Platelet Count 169 150 - 450 10e3/uL    % Neutrophils 81 %    % Lymphocytes 9 %    % Monocytes 6 %    % Eosinophils 0 %    % Basophils 0 %    % Immature Granulocytes 4 %    NRBCs per 100 WBC 0 <1 /100    Absolute Neutrophils 6.0 1.6 - 8.3 10e3/uL    Absolute Lymphocytes 0.7 (L) 0.8 - 5.3 10e3/uL    Absolute Monocytes 0.5 0.0 - 1.3 10e3/uL    Absolute Eosinophils 0.0 0.0 - 0.7 10e3/uL    Absolute Basophils 0.0 0.0 - 0.2 10e3/uL    Absolute Immature Granulocytes 0.3 <=0.4 10e3/uL    Absolute NRBCs 0.0 10e3/uL

## 2023-01-20 NOTE — ORAL ONC MGMT
Oral Chemotherapy Monitoring Program     I spoke with Mary while she was receiving IV fluids in our infusion suite. Prior to stopping over, [facility] sent us a fax stating they have been unable to reach Mary regarding her Xeloda. I confirmed her contact info with them as well as her PHI authorized representatives per 2015 Consent to Communicate form. I told Mary that they are trying to call her, Mary tells me that she has already set up a delivery of the Xeloda for Tuesday. I emphasized to her the need to call [facility] to clarify their info and delivery to [place] [facility], she told me she did not need their number and already had it on file.    Mary was also asking for clarification of the oral chemo plan while she is undergoing radiation. Per Dr. Meza's note from 1/20/23, he would like Mary to hold both Tukysa and Xeloda while she is receiving radiation (for 10 days), and to resume when it is done. Mary and her family member were under the impression she was supposed to hold Xeloda (which hasn't arrived yet) and continue Tukysa. Teresa Poli was present and she confirmed she was in the room when Dr. Meza relayed the plan, which was to hold both.     Mary denies any further questions at this time.    Kristopher Hannon, PharmD, BCPS, BCOP  Hematology/Oncology Clinical Pharmacist  [facility]  [phone]

## 2023-01-20 NOTE — PROGRESS NOTES
Brought over to patient and her Mother gift cards for [employer] and [employer] from [employer]. Brought [employer] [place] disability forms from patient to [facility] to complete as they requested.  Answered all patient and her Mother's questions and verbalized understanding. Teresa Ashton RN, BSN.

## 2023-01-20 NOTE — ORAL ONC MGMT
Oral Chemotherapy Monitoring Program     Placed a call to Mary at the request of Teresa Ashton as Mary had questions relating to her oral chemotherapy. Left message requesting call back. No drug names were mentioned. Will update when response received.     Kristopher Hannon, PharmD, BCPS, BCOP  Hematology/Oncology Clinical Pharmacist  [facility]  [phone]

## 2023-01-23 NOTE — PROGRESS NOTES
Patient's Mother Brandi called that her daughter's radiation is completed. Dr Meza recommended re-starting Tucatinib and Xeloda tomorrow and have lab work tomorrow. Message sent to  to arrange the lab appointment. Patient and Mother are aware of the re-start of the above medications and lab appointment. Message sent to the Oral Chemotherapists with Dr Meza's recommendations to re-start Tucatinib, Xeloda and lab work tomorrow. Follow up appointment with Dr Meza 1/31/23 as previously arranged. Instructed patient's Mother to call with any neurological changes or temp >100.4.  Answered all patient's questions and verbalized understanding. Teresa Ashton RN, BSN.

## 2023-01-23 NOTE — LETTER
2023     RE: Josefina Bennett  [address]  [facility] [address]      Dear Colleague,    Thank you for referring your patient, Josefina Bennett, to the [facility] RADIATION ONCOLOGY. Please see a copy of my visit note below.    [facility] PHYSICIANS  SPECIALIZING IN BREAKTHROUGHS  Radiation Oncology    On Treatment Visit Note      Josefina Bennett      Date: 2023   MRN: [id number]   : 1965  Diagnosis: Breast Cancer      Reason for Visit:  On Radiation Treatment Visit     Treatment Summary to Date  Treatment Site: Whole brain Current Dose: 2000/2000 cGy Fractions: 5/5      Chemotherapy  Chemo concurrent with radx?: No    ED Visit/Hosiptal Admission: None    Treatment Breaks: None      Subjective:   Mary has completed the radiation therapy today. She feels better since starting the treatment. Primarily, she feels more alert and less tired. She feels that her vision is still intermittently blurry. She denies headaches and nausea. She has not notice any hair loss and denies scalp irritation. She states that she is undecided about whether to go on hospice.     Nursing ROS:   Nutrition Alteration  Diet Type: Patient's Preference  Skin  Skin Reaction: 0 - No changes        Cardiovascular  Respiratory effort: 1 - Normal - without distress  Gastrointestinal  Nausea: 0 - None        Pain Assessment  0-10 Pain Scale: 0      Objective:   /74   Pulse 68   Wt 58.2 kg (128 lb 4.8 oz)   LMP 2014   BMI 22.01 kg/m    Gen:  in no acute distress, more alert and less fatigued than last week  Skin: No erythema    Labs:  CBC RESULTS: Recent Labs   Lab Test 23  1149   WBC 7.5   RBC 3.78*   HGB 11.3*   HCT 34.1*   MCV 90   MCH 29.9   MCHC 33.1   RDW 21.3*        ELECTROLYTES:  Recent Labs   Lab Test 23  0727   *   POTASSIUM 4.4   CHLORIDE 101   VEE 8.4*   CO2 23   BUN 27.9*   CR 0.78   *       Assessment:    Tolerating radiation therapy well.  All  questions and concerns addressed.    Toxicities:  Fatigue: Grade 0: No toxicity  Headache: Grade 0: No toxicity  Nausea: Grade 0: No toxicity  Dermatitis: Grade 0: No toxicity    Plan:   1. Mary completed whole brain radiation therapy. We discussed possible radiation-induced alopecia  2. She will follow up with Dr. Meza.   3. She can follow up with our clinic as needed.       Mosaiq chart and setup information reviewed  Ports checked    Medication Review  Med list reviewed with patient?: Yes  Med list printed and given: Yes             Bill Chauhan MD/PhD  [phone] clinic  Pager [phone]    Please do not send letter to referring physician.        Again, thank you for allowing me to participate in the care of your patient.        Sincerely,        Bill Chauhan MD

## 2023-01-23 NOTE — PROGRESS NOTES
[facility] PHYSICIANS  SPECIALIZING IN BREAKTHROUGHS  Radiation Oncology    On Treatment Visit Note      Josefina Bennett      Date: 2023   MRN: [id number]   : 1965  Diagnosis: Breast Cancer      Reason for Visit:  On Radiation Treatment Visit     Treatment Summary to Date  Treatment Site: Whole brain Current Dose: 2000/2000 cGy Fractions: 5/5      Chemotherapy  Chemo concurrent with radx?: No    ED Visit/Hosiptal Admission: None    Treatment Breaks: None      Subjective:   Mary has completed the radiation therapy today. She feels better since starting the treatment. Primarily, she feels more alert and less tired. She feels that her vision is still intermittently blurry. She denies headaches and nausea. She has not notice any hair loss and denies scalp irritation. She states that she is undecided about whether to go on hospice.     Nursing ROS:   Nutrition Alteration  Diet Type: Patient's Preference  Skin  Skin Reaction: 0 - No changes        Cardiovascular  Respiratory effort: 1 - Normal - without distress  Gastrointestinal  Nausea: 0 - None        Pain Assessment  0-10 Pain Scale: 0      Objective:   /74   Pulse 68   Wt 58.2 kg (128 lb 4.8 oz)   LMP 2014   BMI 22.01 kg/m    Gen:  in no acute distress, more alert and less fatigued than last week  Skin: No erythema    Labs:  CBC RESULTS: Recent Labs   Lab Test 23  1149   WBC 7.5   RBC 3.78*   HGB 11.3*   HCT 34.1*   MCV 90   MCH 29.9   MCHC 33.1   RDW 21.3*        ELECTROLYTES:  Recent Labs   Lab Test 23  0727   *   POTASSIUM 4.4   CHLORIDE 101   VEE 8.4*   CO2 23   BUN 27.9*   CR 0.78   *       Assessment:    Tolerating radiation therapy well.  All questions and concerns addressed.    Toxicities:  Fatigue: Grade 0: No toxicity  Headache: Grade 0: No toxicity  Nausea: Grade 0: No toxicity  Dermatitis: Grade 0: No toxicity    Plan:   1. Mary completed whole brain radiation therapy. We  discussed possible radiation-induced alopecia  2. She will follow up with Dr. Meza.   3. She can follow up with our clinic as needed.       Mosaiq chart and setup information reviewed  Ports checked    Medication Review  Med list reviewed with patient?: Yes  Med list printed and given: Yes             Bill Chauhan MD/PhD  [phone] clinic  Pager [phone]    Please do not send letter to referring physician.

## 2023-01-24 NOTE — NURSING NOTE
Rapid Response Epic Documentation     Situation:  Patient found reclined in an exam room accompanied by family and staff.  Staff reports the patient has been dizzy and hypotensive with an initial pressure of 75/46.  Staff reports the patient has been treated for cancer and will be going on hospice.  Staff requesting fluids for the patient.         Assessment:    Patient found to be alert and answering questions appropriately.  Patient was administered 1 liter normal saline via pump through port access.  Patient reports feeling better than when she arrived after fluids.  Patient's blood pressure remained stable after fluids.  Patient left by wheelchair with family for transport to hospice facility.          BP:  108/69    Pulse: 84  Respiration: 18  SPO2: 97 %  Mental Status: Alert  CMS: Intact  Stroke Scale: Not Applicable  EKG: Not Performed      Treatment:    Medication: N.S.  1 Liter      Location:          Disposition:      Stable (D/C home)    Protocol Used:     Hypotension

## 2023-01-24 NOTE — NURSING NOTE
Port flushed and needle removed. Patient sent back home with family per providers recommendation.    Perez Hollingsworth RN

## 2023-01-24 NOTE — NURSING NOTE
"Chief Complaint   Patient presents with     Port Draw     Labs drawn via port by RN in lab.  VS taken        Port accessed with 20 gauge 3/4\" Power needle by RN, labs collected, line flushed with saline and heparin.  Vitals taken. Pt checked in for appointment(s).    BP low- see flow sheet.  C/o headache 8/10.  Port left accessed for possible IVF.  Message sent to provider.    Deana Jarrell RN    "

## 2023-01-24 NOTE — LETTER
1/24/2023         RE: Josefina Bennett  [address]  [facility] [address]        Dear Colleague,    Thank you for referring your patient, Josefina Bennett, to the [facility]. Please see a copy of my visit note below.    REASON FOR VISIT: Follow-up breast cancer     HISTORY OF PRESENT ILLNESS:  Josefina Bennett was self referred to our clinic in 2014 for clinical trial recommendations for newly diagnosed stage II breast cancer. She had her last mammogram approximately a year prior. She did notice in early January 2015 that she was having some distortion of the right breast, and she went to see her gynecologist and had a mammogram performed. She had a screening mammogram performed on 01/15/2015. Breast tissue was heterogeneously dense, which might compromise the mammography. There was architectural distortion in the right breast approximately 11-12 o'clock position, zone 2, posterior depth, and a CC MLO spot compression was performed and an ultrasound was planned. The diagnostic mammogram from 01/28/2015 showed right breast architectural distortion centered at the 12 o'clock position, zone 2, suspicious for neoplasm. Breast tomosynthesis was used in the interpretation. Ultrasound findings included targeted ultrasound of the right upper breast demonstrating a band-like area of abnormal echogenicity between 11 and 12 o'clock position in the right breast at zone 2. This measures 4 cm transversely x 1.1 cm AP, and there was only a 1.5 cm measurement in the radial direction. There was blood flow suspicious for neoplasm at the 11 o'clock position in zone 2. There is a 1.4 cm hypoechoic nodule slightly  from the larger area of altered echogenicity that is also suspicious. A biopsy was performed. The biopsy showed infiltrating lobular carcinoma, grade 2, and a clip was placed at specimen [id number]. There were a few signet cells present. The tumor was ER-positive, OK low positive, and HER2 was negative  by immunohistochemistry. She subsequently underwent an MRI showing a tumor that was 4.8 x 1.7 x 3.2 cm in size. Overall, her clinical stage was T2 NX MX.       She was enrolled in the I-SPY-2 clinical trial, and qualified with high-risk MammaPrint score. She was randomized to ganetespib and paclitaxel, and she was treated with 12 cycles of treatment, and then started on AC on 05/26/2015. She developed intractable nausea and vomiting after the first cycle, which required hospitalization on the second cycle. She then developed Pneumocystis pneumonia, which resulted in intubation and a 2-week hospitalization during the course of AC. She was discharged on 07/02/2015, and decided she did not want to pursue any further chemotherapy. She had a right lumpectomy and sentinel lymph node procedure 08/12/2015. She had a positive margin, underwent a re-excision, and had a positive margin. Ultimately, she underwent a right mastectomy with clear margins. She began radiation treatment with Dr. Bill Chauhan, and completed radiation therapy on 12/04/2015. Pathologic stage was III-A, ypT3 N1a MX. Of concern, she had what could be considered an RCB 3 tumor.  She started adjuvant letrozole on 1/28/16.     TREATMENT HISTORY:  A.  Neoadjuvant therapy on I SPY-2 with ganetespib and paclitaxel.  AC x 2 complicated by PCP pneumonia.  She had intractable nausea and vomiting the first cycle of AC, and the second cycle of AC was complicated by Pneumocystis pneumonia requiring intubation and a 2-week hospitalization.  She did not complete the AC treatment.  She did undergo a right lumpectomy and sentinel node procedure.  She had a positive margin and underwent resection.  She still had a positive margin.  She underwent a right mastecomy with clear margins.  Pathologic stage was IIIA  [id number] and of concern, she had an RCB3 histology meaning significant risk of recurrence of her breast cancer during the 5-year period following primary therapy.    She completed radiation in 12/2015.      B.  Right lumpectomy, followed by right margin resections, followed by rigth mastectomy.   B.  Oophorectomy 10-16-15.   C.  Tamoxifen after 10-6-15.   D.  Radiation therapy. 5,040 cGy in 180 cGy/fraction to the chest wall and regional lymphatics followed by 720 cGy axillary lymph node boost and 1000 cGy mastectomy scar boost. Completed on 12/3/2015.  E.   Adjuvant letrozole begun 1-28-16.  Plan was to continue through 2026.  F.  Diagnosis with recurrent/metastatic breast cancer with a sacral mass that was biopsied showing pleiomorphic lobular breast cancer that is ER negative, DC negative, HER2 positive by FISH in 20% of the cells and negative in 80% of cells.  A mix of TNBC and ER- HER2+.  Clinic conference consensus was for the modified Vandana with paclitaxel weekly to treat both clones. T-DXd could be a next regimen.  G.  Metastatic carcinoma is HER2 negative (score 1+) by immunohistochemistry.  She is also a candidate for T-DXd.  H.  Recurrent tumor with 22C3 antibody for PD-L1 and hold this in reserve for a Keynote 355 metastatic TNBC approach if the Vandana regimen does not control her metastatic disease.  Keynote 355 could also be an active regimen.   I.  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  J. IT methotrexate begun 12-20-22 and continued 12-23, 12-27, 12-30, 1-3-23, 1-6-23, 1-13-23.  Discontinued after 1-13-23.  J.  Capecitabine 1500 mg twice daily begun 12-23-22 14 days on followed by 7 days off.   K. Tucatinib begun 12-29-22.  L.  Trastuzumab begun 12-20-22 and continued every 3 weeks.   M.  Dr. Chauhan visit 1-17-23.  Josefina is back to day to discuss whole brain radiation therapy.   Her IT methotrexate unfortunately has not been able to clear her CSF. Repeat brain MRI on 1/9/202 progression of both the leptomeningeal and intraparenchymal disease. WBR begun 1--23 and continue through 1-23-23.  2000 cGy  in 5 fractions because of poor PS.   N. Resume capecitabine and tucatinib 1-24-23.     Drug approval process for HER2 Climb regimen.   Note that tucatinib access requested 12-15-22, denial took 12 days and received 12-27-22.  Capecitabine allowed 12-20-22.  [employer] provision of free tucatinib approved 12-27-22.       INTERVAL HISTORY:    We saw Josefina Bennett in our clinic today in preparation for her to transfer care to hospice.  Josefina Verma has been thinking very carefully about her condition and she wishes to transfer her care to [facility].  I discussed with her that we strongly support her decision and want to do everything that we can to help her.  We also want to make her comfortable currently and we have given her oxycodone 5 mg orally for severe headache and we have also given IV fluids to help with dehydration.  She is able to take pills and she was able to eat a light breakfast today.    Josefina Verma last took oxycodone 2 days ago.  She has an allergy to morphine.  She has no problems with cough.    REVIEW OF SYSTEMS:  She has no problems with cough, chest pain, shortness of breath.  She has some mild nausea but no vomiting.  No other gastrointestinal symptoms right now but severe headache.  The remainder of a 10-point review of systems is negative.     PHYSICAL EXAMINATION:    VITAL SIGNS:  Remarkable for orthostatic vital signs.    GENERAL:  IV normal saline 1 liter is being given.  Josefina Verma was alert, oriented, able to open her eyes.  She is in great discomfort and was grimacing with pain.  LUNGS:  Clear to auscultation.  HEART:  There is a regular rate and rhythm.  S1, S2.  ABDOMEN:  Soft and nontender.  PSYCHIATRIC:  Mood was anxious and affect was appropriate.     BRAIN MRI 1-10-23  FINDINGS:  Stable right frontal approach ventricular shunt with tip in the third  ventricle. Small amount of air within the anterior horns of the  lateral ventricles. Decreased size of the ventricular system  compared  to 12/9/2022. Increased nodular leptomeningeal contrast enhancement  along the cerebellar folia. Unchanged leptomeningeal contrast  enhancement over bilateral cerebral sulci left greater than right.  Diffuse pachymeningeal contrast enhancement findings.     Increased nodular parenchymal enhancing lesions in supratentorial and  infratentorial brain. Representative lesions may include 1.3 cm right  cerebellar lesion (16, image 47), 5 mm lesion within the inferior  right parietal lobe (series 16, image 113) previously measuring 3 mm.  Unchanged DVA cavernoma complex within the jude.      There is no mass effect, midline shift, or intracranial hemorrhage.  The ventricles are proportionate to the cerebral sulci. Diffusion and  susceptibility weighted images are negative for acute/focal  abnormality. Major intracranial vascular structures are within normal  limits.     No suspicious abnormality of the skull marrow signal. Scattered  paranasal sinus mucosal thickening. Bilateral mastoid effusions  similar to previous exams. No focal abnormality of the pituitary  gland, sella, skull base and upper cervical spinal structures on  sagittal images. The orbits are normal.                                                                      IMPRESSION:  1. Progression of leptomeningeal and intraparenchymal metastatic  disease compared to 12/7/2022.  2. Stable right frontal approach ventricular shunt with decreased  caliber of the ventricular system compared to 12/9/2022. New   pachymeningeal contrast enhancement, question over shunting.     I have personally reviewed the examination and initial interpretation  and I agree with the findings.     SMITHA RODRIGUEZ MD         ASSESSMENT AND PLAN:   1.  Mary Bennett is a 57-year-old woman with a history of a clinical stage II right breast cancer, ER positive, FL positive, HER2 negative by immunohistochemistry.  She had a lobular histology.  She was on the I-SPY 2 clinical  trial and was randomized to ganetespib and paclitaxel, then went on to AC. Pathologic stage was IIIA  [id number] and of concern, she had an RCB3 histology meaning significant risk of recurrence of her breast cancer during the 5-year period following primary therapy.  There were 2 lymph nodes involved with mammary carcinoma with extracapsular extension.  She went on to have radiation therapy and then began hormonal therapy with tamoxifen in October 2015.  She had oophorectomy and started letrozole in January 2016.   2.  After recurrence was diagnosed with a sacral mass, breast conference recommended starting with HER2 directed therapy.  Biphenotypic cancer with majority TNBC component and a HER2 component.  I discussed the 22C3 PD-L1 CPS of 40 while 20% of the tumor is HER2+ by FISH and the overall ASCO CAP assignment is negative for the bulk tumor with the qualifier that there is a significant HER2 clonal component that would be best treated with HER2 directed therapy.  The Tumor Board consensus was to continue with HER2 directed therapy to treat the new and HER2+ clone till best response.    3. Viry  has metastatic, biphenotypic mixed, triple-negative, ER-negative, HER-2 positive breast cancer, which is a 5% component based on the sacral biopsy.  She did receive intrathecal shunt placement for normal pressure hydrocephalus, which was relieved nonetheless with twice-weekly intrathecal methotrexate.  There was no improvement in terms of cell counts and followup MRI showing progression of brain lesions, both leptomeningeal and intraparenchymal. She went on to start whole brain radiation, and today is day 3. She has tolerated the whole brain radiation reasonably well with improvement of mental processing, but at the expense of having some headaches, which are responsive to Tylenol.   4.  Brain metastases which are intraparenchymal and leptomeningeal.  Shunt placed for metastatic breast CA and communicating  hydrocephalus s/p VPS placement 12/09/2022.  IT methotrexate begun 12-20-22 and continued 12-23, 12-27, 12-30, 1-3-23, 1-6-23, 1-13-23.  Discontinued after 1-13-23.  HER2 Climb regimen recommended and [employer] apparently approved the capecitabine and trastuzumab but denied the tucatinib, which [employer] provided with a drug request.  Capecitabine 1500 mg twice daily begun 12-23-22 14 days on followed by 7 days off. Tucatinib begun 12-29-22. Trastuzumab begun 12-20-22 and continued every 3 weeks.   5.  Patient requests hospice care.  Josefina Bennett has metastatic mixed lineage triple-negative breast cancer and HER2-positive breast cancer.  The HER2 component is about 5%.  She most recently has received palliative cranial radiation 2000 cGy for a total of 5 days.  She continues to have a severe headache for which we gave oxycodone 5 mg at 2 PM and IV hydration, resulting in some improvement of symptmos.  She has mild nausea.  No other complaints at this time.  She emphasized to her desire to go to hospice, to be DNR/DNI and have hospice care with control of symptoms but no treatment of her metastatic breast cancer.  All of her questions were answered.  Her prognosis is measured in days.  If there are any questions, do not hesitate to call me.  My phone number [phone].  6.  Followup.  Mary wishes no further treatment and does want initiation of hospice care within the next day or so.  She is interested in [facility].      Thank you for allowing us to continue to participate in Josefina Bennett' care.      Sincerely,       Evangelista Meza MD  Professor  [facility]  [phone]       I spent 40 minutes with the patient more than 50% of which was in counseling and coordination of care.

## 2023-01-24 NOTE — ORAL ONC MGMT
[facility] Oral Chemotherapy Monitoring Program    Thank you for the opportunity to be a part in the care of this patient's oral chemotherapy. The oncology pharmacy will no longer be following this patient for oral chemotherapy. If there are any questions or the plan changes, feel free to contact us.    Tierra Edgar,PharmD, [facility]  Oral Chemotherapy Monitoring Program  [facility]  [phone]  January 24, 2023

## 2023-01-24 NOTE — PROGRESS NOTES
"Patient's Mother called with patient c/o headache pain rating an \"8\" and feeling \"tippy\" that started last night. Patient got up to the bathroom and fell against her Mother's bed, but did not get injured and did not hit her head. Patient was able to eat and drink this morning, but has been sleeping until now. Patient states she is not \"thinking clearly\" and she is asking to be admitted into hospice at [facility]. Patient's Mother stated if she is doing home hospice per her insurance needs to reside at her own home not at her Mother's in [place]. Patient does not wish to go back home with her Mother caring for her. Patient's Mother feels she is unable to give medications required for pain in hospice with not being able to properly care for her  when he was in hospice. Writer contacted Vita Romero to reach out to establish a bed at [facility]. Spoke to Dr Meza and will move up lab appointment to 12:30 today and see Dr Meza in person in clinic at 1:00 today. If patient is unable to safely take the shuttle from [facility] will have an ambulance take them to the [facility] ED to be evaluated.  Answered all patient's Mother's questions and verbalized understanding. Treesa Ashton RN, BSN.   Dr Meza recommended discontinuing her Tucatinib and Xeloda with hospice admission immanent. Message sent to the Oral Chemotherapy Pharmacist notified and writer contacted patient's Mother with stopping the medications. Teresa Ashton RN, BSN Breast Center Nurse Coordinator   "

## 2023-01-24 NOTE — PROGRESS NOTES
REASON FOR VISIT: Follow-up breast cancer     HISTORY OF PRESENT ILLNESS:  Josefina Bennett was self referred to our clinic in 2014 for clinical trial recommendations for newly diagnosed stage II breast cancer. She had her last mammogram approximately a year prior. She did notice in early January 2015 that she was having some distortion of the right breast, and she went to see her gynecologist and had a mammogram performed. She had a screening mammogram performed on 01/15/2015. Breast tissue was heterogeneously dense, which might compromise the mammography. There was architectural distortion in the right breast approximately 11-12 o'clock position, zone 2, posterior depth, and a CC MLO spot compression was performed and an ultrasound was planned. The diagnostic mammogram from 01/28/2015 showed right breast architectural distortion centered at the 12 o'clock position, zone 2, suspicious for neoplasm. Breast tomosynthesis was used in the interpretation. Ultrasound findings included targeted ultrasound of the right upper breast demonstrating a band-like area of abnormal echogenicity between 11 and 12 o'clock position in the right breast at zone 2. This measures 4 cm transversely x 1.1 cm AP, and there was only a 1.5 cm measurement in the radial direction. There was blood flow suspicious for neoplasm at the 11 o'clock position in zone 2. There is a 1.4 cm hypoechoic nodule slightly  from the larger area of altered echogenicity that is also suspicious. A biopsy was performed. The biopsy showed infiltrating lobular carcinoma, grade 2, and a clip was placed at specimen [id number]. There were a few signet cells present. The tumor was ER-positive, MD low positive, and HER2 was negative by immunohistochemistry. She subsequently underwent an MRI showing a tumor that was 4.8 x 1.7 x 3.2 cm in size. Overall, her clinical stage was T2 NX MX.       She was enrolled in the I-SPY-2 clinical trial, and qualified with high-risk  MammaPrint score. She was randomized to ganetespib and paclitaxel, and she was treated with 12 cycles of treatment, and then started on AC on 05/26/2015. She developed intractable nausea and vomiting after the first cycle, which required hospitalization on the second cycle. She then developed Pneumocystis pneumonia, which resulted in intubation and a 2-week hospitalization during the course of AC. She was discharged on 07/02/2015, and decided she did not want to pursue any further chemotherapy. She had a right lumpectomy and sentinel lymph node procedure 08/12/2015. She had a positive margin, underwent a re-excision, and had a positive margin. Ultimately, she underwent a right mastectomy with clear margins. She began radiation treatment with Dr. Bill Chauhan, and completed radiation therapy on 12/04/2015. Pathologic stage was III-A, ypT3 N1a MX. Of concern, she had what could be considered an RCB 3 tumor.  She started adjuvant letrozole on 1/28/16.     TREATMENT HISTORY:  A.  Neoadjuvant therapy on I SPY-2 with ganetespib and paclitaxel.  AC x 2 complicated by PCP pneumonia.  She had intractable nausea and vomiting the first cycle of AC, and the second cycle of AC was complicated by Pneumocystis pneumonia requiring intubation and a 2-week hospitalization.  She did not complete the AC treatment.  She did undergo a right lumpectomy and sentinel node procedure.  She had a positive margin and underwent resection.  She still had a positive margin.  She underwent a right mastecomy with clear margins.  Pathologic stage was IIIA  [id number] and of concern, she had an RCB3 histology meaning significant risk of recurrence of her breast cancer during the 5-year period following primary therapy.   She completed radiation in 12/2015.      B.  Right lumpectomy, followed by right margin resections, followed by rigth mastectomy.   B.  Oophorectomy 10-16-15.   C.  Tamoxifen after 10-6-15.   D.  Radiation therapy. 5,040 cGy in 180  cGy/fraction to the chest wall and regional lymphatics followed by 720 cGy axillary lymph node boost and 1000 cGy mastectomy scar boost. Completed on 12/3/2015.  E.   Adjuvant letrozole begun 1-28-16.  Plan was to continue through 2026.  F.  Diagnosis with recurrent/metastatic breast cancer with a sacral mass that was biopsied showing pleiomorphic lobular breast cancer that is ER negative, OR negative, HER2 positive by FISH in 20% of the cells and negative in 80% of cells.  A mix of TNBC and ER- HER2+.  Clinic conference consensus was for the modified Vandana with paclitaxel weekly to treat both clones. T-DXd could be a next regimen.  G.  Metastatic carcinoma is HER2 negative (score 1+) by immunohistochemistry.  She is also a candidate for T-DXd.  H.  Recurrent tumor with 22C3 antibody for PD-L1 and hold this in reserve for a Keynote 355 metastatic TNBC approach if the Vandana regimen does not control her metastatic disease.  Keynote 355 could also be an active regimen.   I.  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  J. IT methotrexate begun 12-20-22 and continued 12-23, 12-27, 12-30, 1-3-23, 1-6-23, 1-13-23.  Discontinued after 1-13-23.  J.  Capecitabine 1500 mg twice daily begun 12-23-22 14 days on followed by 7 days off.   K. Tucatinib begun 12-29-22.  L.  Trastuzumab begun 12-20-22 and continued every 3 weeks.   M.  Dr. Chauhan visit 1-17-23.  Josefina is back to day to discuss whole brain radiation therapy.   Her IT methotrexate unfortunately has not been able to clear her CSF. Repeat brain MRI on 1/9/202 progression of both the leptomeningeal and intraparenchymal disease. WBR begun 1--23 and continue through 1-23-23.  2000 cGy in 5 fractions because of poor PS.   N. Resume capecitabine and tucatinib 1-24-23.     Drug approval process for HER2 Climb regimen.   Note that tucatinib access requested 12-15-22, denial took 12 days and received 12-27-22.   Capecitabine allowed 12-20-22.  [facility] provision of free tucatinib approved 12-27-22.       INTERVAL HISTORY:    We saw Josefina Bennett in our clinic today in preparation for her to transfer care to hospice.  Josefina Verma has been thinking very carefully about her condition and she wishes to transfer her care to [facility].  I discussed with her that we strongly support her decision and want to do everything that we can to help her.  We also want to make her comfortable currently and we have given her oxycodone 5 mg orally for severe headache and we have also given IV fluids to help with dehydration.  She is able to take pills and she was able to eat a light breakfast today.    Josefina Verma last took oxycodone 2 days ago.  She has an allergy to morphine.  She has no problems with cough.    REVIEW OF SYSTEMS:  She has no problems with cough, chest pain, shortness of breath.  She has some mild nausea but no vomiting.  No other gastrointestinal symptoms right now but severe headache.  The remainder of a 10-point review of systems is negative.     PHYSICAL EXAMINATION:    VITAL SIGNS:  Remarkable for orthostatic vital signs.    GENERAL:  IV normal saline 1 liter is being given.  Josefina Verma was alert, oriented, able to open her eyes.  She is in great discomfort and was grimacing with pain.  LUNGS:  Clear to auscultation.  HEART:  There is a regular rate and rhythm.  S1, S2.  ABDOMEN:  Soft and nontender.  PSYCHIATRIC:  Mood was anxious and affect was appropriate.     BRAIN MRI 1-10-23  FINDINGS:  Stable right frontal approach ventricular shunt with tip in the third  ventricle. Small amount of air within the anterior horns of the  lateral ventricles. Decreased size of the ventricular system compared  to 12/9/2022. Increased nodular leptomeningeal contrast enhancement  along the cerebellar folia. Unchanged leptomeningeal contrast  enhancement over bilateral cerebral sulci left greater than right.  Diffuse  pachymeningeal contrast enhancement findings.     Increased nodular parenchymal enhancing lesions in supratentorial and  infratentorial brain. Representative lesions may include 1.3 cm right  cerebellar lesion (16, image 47), 5 mm lesion within the inferior  right parietal lobe (series 16, image 113) previously measuring 3 mm.  Unchanged DVA cavernoma complex within the jude.      There is no mass effect, midline shift, or intracranial hemorrhage.  The ventricles are proportionate to the cerebral sulci. Diffusion and  susceptibility weighted images are negative for acute/focal  abnormality. Major intracranial vascular structures are within normal  limits.     No suspicious abnormality of the skull marrow signal. Scattered  paranasal sinus mucosal thickening. Bilateral mastoid effusions  similar to previous exams. No focal abnormality of the pituitary  gland, sella, skull base and upper cervical spinal structures on  sagittal images. The orbits are normal.                                                                      IMPRESSION:  1. Progression of leptomeningeal and intraparenchymal metastatic  disease compared to 12/7/2022.  2. Stable right frontal approach ventricular shunt with decreased  caliber of the ventricular system compared to 12/9/2022. New   pachymeningeal contrast enhancement, question over shunting.     I have personally reviewed the examination and initial interpretation  and I agree with the findings.     SMITHA RODRIGUEZ MD         ASSESSMENT AND PLAN:   1.  Mary Bennett is a 57-year-old woman with a history of a clinical stage II right breast cancer, ER positive, PA positive, HER2 negative by immunohistochemistry.  She had a lobular histology.  She was on the I-SPY 2 clinical trial and was randomized to ganetespib and paclitaxel, then went on to AC. Pathologic stage was IIIA  [id number] and of concern, she had an RCB3 histology meaning significant risk of recurrence of her breast cancer  during the 5-year period following primary therapy.  There were 2 lymph nodes involved with mammary carcinoma with extracapsular extension.  She went on to have radiation therapy and then began hormonal therapy with tamoxifen in October 2015.  She had oophorectomy and started letrozole in January 2016.   2.  After recurrence was diagnosed with a sacral mass, breast conference recommended starting with HER2 directed therapy.  Biphenotypic cancer with majority TNBC component and a HER2 component.  I discussed the 22C3 PD-L1 CPS of 40 while 20% of the tumor is HER2+ by FISH and the overall ASCO CAP assignment is negative for the bulk tumor with the qualifier that there is a significant HER2 clonal component that would be best treated with HER2 directed therapy.  The Tumor Board consensus was to continue with HER2 directed therapy to treat the new and HER2+ clone till best response.    3. Viry  has metastatic, biphenotypic mixed, triple-negative, ER-negative, HER-2 positive breast cancer, which is a 5% component based on the sacral biopsy.  She did receive intrathecal shunt placement for normal pressure hydrocephalus, which was relieved nonetheless with twice-weekly intrathecal methotrexate.  There was no improvement in terms of cell counts and followup MRI showing progression of brain lesions, both leptomeningeal and intraparenchymal. She went on to start whole brain radiation, and today is day 3. She has tolerated the whole brain radiation reasonably well with improvement of mental processing, but at the expense of having some headaches, which are responsive to Tylenol.   4.  Brain metastases which are intraparenchymal and leptomeningeal.  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  IT methotrexate begun 12-20-22 and continued 12-23, 12-27, 12-30, 1-3-23, 1-6-23, 1-13-23.  Discontinued after 1-13-23.  HER2 Climb regimen recommended and Karina apparently  approved the capecitabine and trastuzumab but denied the tucatinib, which [facility] provided with a drug request.  Capecitabine 1500 mg twice daily begun 12-23-22 14 days on followed by 7 days off. Tucatinib begun 12-29-22. Trastuzumab begun 12-20-22 and continued every 3 weeks.   5.  Patient requests hospice care.  Josefina Bennett has metastatic mixed lineage triple-negative breast cancer and HER2-positive breast cancer.  The HER2 component is about 5%.  She most recently has received palliative cranial radiation 2000 cGy for a total of 5 days.  She continues to have a severe headache for which we gave oxycodone 5 mg at 2 PM and IV hydration, resulting in some improvement of symptmos.  She has mild nausea.  No other complaints at this time.  She emphasized to her desire to go to hospice, to be DNR/DNI and have hospice care with control of symptoms but no treatment of her metastatic breast cancer.  All of her questions were answered.  Her prognosis is measured in days.  If there are any questions, do not hesitate to call me.  My phone number [phone].  6.  Followup.  Mary wishes no further treatment and does want initiation of hospice care within the next day or so.  She is interested in [facility].      Thank you for allowing us to continue to participate in Josefina Bennett' care.      Sincerely,       Evangelista Meza MD  Professor  [facility]  [phone]       I spent 40 minutes with the patient more than 50% of which was in counseling and coordination of care.

## 2023-01-24 NOTE — NURSING NOTE
"Oncology Rooming Note    2023 12:47 PM   Josefina Bennett is a 57 year old female who presents for:    Chief Complaint   Patient presents with     Port Draw     Labs drawn via port by RN in lab.  VS taken      Oncology Clinic Visit     Malignant neoplasm of upper-outer quadrant of right breast in female, estrogen receptor negative     Initial Vitals: BP 92/59 (BP Location: Left arm, Patient Position: Sitting, Cuff Size: Adult Small)   Pulse 92   Temp 98.4  F (36.9  C) (Oral)   Resp 16   LMP 2014   SpO2 97%  Estimated body mass index is 22.01 kg/m  as calculated from the following:    Height as of 6/10/22: 1.626 m (5' 4.02\").    Weight as of 23: 58.2 kg (128 lb 4.8 oz). There is no height or weight on file to calculate BSA.  Extreme Pain (8) Comment: Data Unavailable   Patient's last menstrual period was 2014.  Allergies reviewed: Yes  Medications reviewed: No- BP low, unable to check medication. Pt states everything was UPD.     Medications: Medication refills not needed today.  Pharmacy name entered into Jybe:    [facility] - [place], [place] - [address] [facility]  [facility]  [place] [facility] [place] [facility] [place], [place] - [address] [facility] [place], [place] - [address]  [place] [facility] - [place], [place] - [address] [place] [address] RX [facility], [facility] [place] - [address]  [place] [facility] [place], [place] - [address] [facility] [id number]    Clinical concerns: pt states she has been feeling dizzy and lightheaded and a headache which she rates a 8 on a scale of 1 to 10 that started last night. Current BP upon arriving to room is 92/59 @12:46pm . Has also been feeling fatigued. Current HR is 93 BPM. Has been keeping hydrated.   BP recheck: 93/60  BP standin/46     Dr. Meza was notified, requested patient to receive IV fluids.     Audrey Lira, " CMA on 1/24/2023 at 12:56 PM

## 2023-01-24 NOTE — NURSING NOTE
1000 ML NS bolus given by rn in clinic per providers orders. Port previously accessed. Oxycodone 5mg oral given per providers orders. Patient has a  and family with them.    Perez Hollingsworth RN

## 2023-01-25 PROBLEM — C50.919 METASTATIC BREAST CANCER: Status: ACTIVE | Noted: 2022-01-01

## 2023-01-25 NOTE — H&P
[facility]    History and Physical  Hospitalist       Date of Admission:  1/25/2023    Assessment & Plan   Josefina Bennett is a 57 year old female with PMH significant for clinical stage II right breast cancer, ER positive, MD positive, HER2 negative by immunohistochemistry (diagnosed in 2014) who was initiated on tamoxifen in October 2015 with oophorectomy and initiation of letrozole in January 2016, with recurrence diagnosed with sacral mass, s/p shunt placement (12/9/2022) for normal pressure hydrocephalus secondary to brain metastases which are intraparenchymal and leptomeningeal who began IT methotrexate on 12/20/22 that was discontinued on 1/13/23. In addition, she had received cranial radiation for a total of 5-days and has been following closely with Dr. Meza, Oncology outpatient. She is now requesting her desire to be a DNR/DNI and transition to hospice care for control of her symptoms, including nausea, severe headache and generalized pain. Per note from Dr. Meza on 1/24/23, her prognosis is measured in days. She is being admitted to [facility] for comfort measures only and Palliative Care/Hospice consultation, as she does not have a safe disposition plan at this time. Palliative Care and Hospice consulted to assist with safe disposition and planning.     Metastatic Breast Cancer  Communicating Hydrocephalus s/p shunt (12/2022) 2/2 above  Dehydration  -Admit to IP for evaluation of hospice  -Comfort measures only   -No labs or diagnostics   -DNR/DNI status   -Vital signs per orders  -PRN pain/anxiety med's   -Will resume oral PTA oxycodone at 10 mg every 6 hours, PRN   -IV dilaudid, PRN--patient has tolerated this in the past  -PRN Ativan and Zyprexa for anxiety  -PRN anti-emetics  -Pt OK with accessing port for IV med's, as needed   -Palliative Care/Hospice consult   -Patient has requested to transition to [facility], if able  -Regular diet, as tolerated; will hold on IVF at  this time  -Holding all PTA med's, except pain and anti-emetic medications   -Decadron on hold, patient is OK with not restarting   -Discussed holding all unnecessary PTA med's with patient who is in   Agreement of this  -Due to death being imminent, family may stay at bedside at all times; nursing care order is written      Diabetes Mellitus Type 2  -Comfort measures; will hold PTA insulin      Insomnia  -Will resume PTA Lorazepam and Doxepin, as needed       Single Subsegmental PE  -PTA regimen: Eliquis  -Holding as now on comfort cares; pt OK with holding    Clinically Significant Risk Factors Present on Admission                      # DMII: A1C = 7.0 % (Ref range: <5.7 %) within past 3 months              DVT Prophylaxis: Pneumatic Compression Devices  Code Status: DNR / DNI--admitting for comfort care    This patient was discussed with Dr. Oakes of the Hospitalist Service who agrees with current plans as outlined above.    Disposition: Anticipate at least 2 midnight stay for discharge planning for hospice care.     Gail Dominguez NP  [facility]  Securely message with the Vocera Web Console (learn more here)  Text page via [facility] Paging/Directory       Primary Care Physician   Camille Araujo    Chief Complaint   Admit for comfort care d/t lack of safe disposition plan for Metastatic Breast Cancer    History is obtained from the patient and review of the EMR.    History of Present Illness   Josefina Bennett is a 57 year old female with PMH significant for clinical stage II right breast cancer, ER positive, AZ positive, HER2 negative by immunohistochemistry (diagnosed in 2014) who was initiated on tamoxifen in October 2015 with oophorectomy and initiation of letrozole in January 2016, with recurrence diagnosed with sacral mass, s/p shunt placement (12/9/2022) for normal pressure hydrocephalus secondary to brain metastases which are intraparenchymal and leptomeningeal who began IT  methotrexate on 12/20/22 that was discontinued on 1/13/23. In addition, she had received cranial radiation for a total of 5-days and has been following closely with Dr. Meza, Oncology outpatient. She is now requesting her desire to be a DNR/DNI and transition to hospice care for control of her symptoms, including nausea, severe headache and generalized pain. Per note from Dr. Meza on 1/24/23, her prognosis is measured in days. She is being admitted to [facility] for comfort measures only and Palliative Care/Hospice consultation, as she does not have a safe disposition plan at this time. Palliative Care and Hospice consulted to assist with safe disposition and planning.     Patient is resting in bed with family at her bedside. She is agreeable to not restarting back unnecessary home medications that are not focused on her comfort. She states that she is a DRN/DNI. She is agreeable to Palliative and Hospice consults in AM with her goal of transitioning to [facility]. She currently has a headache that is a 8/10. She took oxycodone at 1426 today. She solely wants focus to be on her comfort and she does not want to be in pain. Discussed PRN pain and anti-emetic plan with patient. She is OK with accessing her port for use of IV PRN's. Family requested to be able to stay at the bedside at this time and discussed with them that this is reasonable. All questions answered.     Per note from Dr. Meza, Oncology from 1/24/2023:  ASSESSMENT AND PLAN:   1.  Mary Bennett is a 57-year-old woman with a history of a clinical stage II right breast cancer, ER positive, AZ positive, HER2 negative by immunohistochemistry.  She had a lobular histology.  She was on the I-SPY 2 clinical trial and was randomized to ganetespib and paclitaxel, then went on to AC. Pathologic stage was IIIA  [id number] and of concern, she had an RCB3 histology meaning significant risk of recurrence of her breast cancer during the 5-year period  following primary therapy.  There were 2 lymph nodes involved with mammary carcinoma with extracapsular extension.  She went on to have radiation therapy and then began hormonal therapy with tamoxifen in October 2015.  She had oophorectomy and started letrozole in January 2016.   2.  After recurrence was diagnosed with a sacral mass, breast conference recommended starting with HER2 directed therapy.  Biphenotypic cancer with majority TNBC component and a HER2 component.  I discussed the 22C3 PD-L1 CPS of 40 while 20% of the tumor is HER2+ by FISH and the overall ASCO CAP assignment is negative for the bulk tumor with the qualifier that there is a significant HER2 clonal component that would be best treated with HER2 directed therapy.  The Tumor Board consensus was to continue with HER2 directed therapy to treat the new and HER2+ clone till best response.    3. Viry  has metastatic, biphenotypic mixed, triple-negative, ER-negative, HER-2 positive breast cancer, which is a 5% component based on the sacral biopsy.  She did receive intrathecal shunt placement for normal pressure hydrocephalus, which was relieved nonetheless with twice-weekly intrathecal methotrexate.  There was no improvement in terms of cell counts and followup MRI showing progression of brain lesions, both leptomeningeal and intraparenchymal. She went on to start whole brain radiation, and today is day 3. She has tolerated the whole brain radiation reasonably well with improvement of mental processing, but at the expense of having some headaches, which are responsive to Tylenol.   4.  Brain metastases which are intraparenchymal and leptomeningeal.  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  IT methotrexate begun 12-20-22 and continued 12-23, 12-27, 12-30, 1-3-23, 1-6-23, 1-13-23.  Discontinued after 1-13-23.  HER2 Climb regimen recommended and [employer] apparently approved the capecitabine  and trastuzumab but denied the tucatinib, which [employer] provided with a drug request.  Capecitabine 1500 mg twice daily begun 12-23-22 14 days on followed by 7 days off. Tucatinib begun 12-29-22. Trastuzumab begun 12-20-22 and continued every 3 weeks.   5.  Patient requests hospice care.  Josefina Bennett has metastatic mixed lineage triple-negative breast cancer and HER2-positive breast cancer.  The HER2 component is about 5%.  She most recently has received palliative cranial radiation 2000 cGy for a total of 5 days.  She continues to have a severe headache for which we gave oxycodone 5 mg at 2 PM and IV hydration, resulting in some improvement of symptmos.  She has mild nausea.  No other complaints at this time.  She emphasized to her desire to go to hospice, to be DNR/DNI and have hospice care with control of symptoms but no treatment of her metastatic breast cancer.  All of her questions were answered.  Her prognosis is measured in days.  If there are any questions, do not hesitate to call me.  My phone number [phone].  6.  Followup.  Mary wishes no further treatment and does want initiation of hospice care within the next day or so.  She is interested in [facility].       TREATMENT HISTORY:  A.  Neoadjuvant therapy on I SPY-2 with ganetespib and paclitaxel.  AC x 2 complicated by PCP pneumonia.  She had intractable nausea and vomiting the first cycle of AC, and the second cycle of AC was complicated by Pneumocystis pneumonia requiring intubation and a 2-week hospitalization.  She did not complete the AC treatment.  She did undergo a right lumpectomy and sentinel node procedure.  She had a positive margin and underwent resection.  She still had a positive margin.  She underwent a right mastecomy with clear margins.  Pathologic stage was IIIA  [id number] and of concern, she had an RCB3 histology meaning significant risk of recurrence of her breast cancer during the 5-year period following primary  therapy.   She completed radiation in 12/2015.      B.  Right lumpectomy, followed by right margin resections, followed by rigth mastectomy.   B.  Oophorectomy 10-16-15.   C.  Tamoxifen after 10-6-15.   D.  Radiation therapy. 5,040 cGy in 180 cGy/fraction to the chest wall and regional lymphatics followed by 720 cGy axillary lymph node boost and 1000 cGy mastectomy scar boost. Completed on 12/3/2015.  E.   Adjuvant letrozole begun 1-28-16.  Plan was to continue through 2026.  F.  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  A mix of TNBC and ER- HER2+.  Clinic conference consensus was for the modified Vandana with paclitaxel weekly to treat both clones. T-DXd could be a next regimen.  G.  Metastatic carcinoma is HER2 negative (score 1+) by immunohistochemistry.  She is also a candidate for T-DXd.  H.  Recurrent tumor with 22C3 antibody for PD-L1 and hold this in reserve for a Keynote 355 metastatic TNBC approach if the Vandana regimen does not control her metastatic disease.  Keynote 355 could also be an active regimen.   I.  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  J. IT methotrexate begun 12-20-22 and continued 12-23, 12-27, 12-30, 1-3-23, 1-6-23, 1-13-23.  Discontinued after 1-13-23.  J.  Capecitabine 1500 mg twice daily begun 12-23-22 14 days on followed by 7 days off.   K. Tucatinib begun 12-29-22.  L.  Trastuzumab begun 12-20-22 and continued every 3 weeks.   M.  Dr. Chauhan visit 1-17-23.  Josefina is back to day to discuss whole brain radiation therapy.   Her IT methotrexate unfortunately has not been able to clear her CSF. Repeat brain MRI on 1/9/202 progression of both the leptomeningeal and intraparenchymal disease. WBR begun 1--23 and continue through 1-23-23.   2000 cGy in 5 fractions because of poor PS.   N. Resume capecitabine and tucatinib 1-24-23.     Drug approval process for HER2 Climb regimen.   Note that tucatinib access requested 12-15-22, denial took 12 days and received 12-27-22.  Capecitabine allowed 12-20-22.  [employer] provision of free tucatinib approved 12-27-22.     PAST MEDICAL HISTORY  Past Medical History:   Diagnosis Date     Breast cancer (H) 01/01/2015     depression      Foot fracture      History of blood transfusion 06/01/2015     Insomnia      Malignant neoplasm of breast (female), unspecified site 02/05/2015     Papanicolaou smear of cervix with low grade squamous intraepithelial lesion (LGSIL) 06/01/2010    8/10-colp-benign     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) 06/01/2015     PONV (postoperative nausea and vomiting)      PTSD (post-traumatic stress disorder)        PAST SURGICAL HISTORY  Past Surgical History:   Procedure Laterality Date     APPENDECTOMY  1997     CYSTOSCOPY N/A 10/16/2015    Procedure: CYSTOSCOPY;  Surgeon: Aleksandr Palafox MD;  Location: [facility] OR     HC REMOVAL OF OVARIAN CYST(S)      x 3     IMPLANT SHUNT VENTRICULOPERITONEAL Right 12/9/2022    Procedure: INSERTION, SHUNT, VENTRICULOPERITONEAL, right side;  Surgeon: Evangelista Lopez MD;  Location: [facility] OR     INSERT PORT VASCULAR ACCESS Left 5/4/2022    Procedure: INSERTION, VASCULAR ACCESS PORT;  Surgeon: Darren Yates MD;  Location: [facility] OR     IR CHEST PORT PLACEMENT > 5 YRS OF AGE  5/4/2022     IR FLUORO 0-1 HOUR  4/12/2022     LAPAROSCOPIC HYSTERECTOMY TOTAL, BILATERAL SALPINGO-OOPHORECTOMY, COMBINED N/A 10/16/2015    Procedure: COMBINED LAPAROSCOPIC HYSTERECTOMY TOTAL, SALPINGO-OOPHORECTOMY;  Surgeon: Aleksandr Palafox MD;  Location: [facility] OR     LUMPECTOMY BREAST Right 9/2/2015    Procedure: LUMPECTOMY BREAST;  Surgeon: Philip Franco MD;  Location: [facility] OR     LUMPECTOMY BREAST WITH SENTINEL NODE, COMBINED Right 8/12/2015    Procedure: COMBINED  LUMPECTOMY BREAST WITH SENTINEL NODE;  Surgeon: Philip Franco MD;  Location: [facility] OR     MASTECTOMY SIMPLE Right 9/21/2015    Procedure: MASTECTOMY SIMPLE;  Surgeon: Philip Franco MD;  Location: [facility] OR     REMOVE PORT VASCULAR ACCESS N/A 8/12/2015    Procedure: REMOVE PORT VASCULAR ACCESS;  Surgeon: Philip Franco MD;  Location: [facility] OR     SURGICAL HISTORY OF -       wisdom teeth     TONSILLECTOMY      at age 30 for tonsillitis       HOME MEDICATIONS  Prior to Admission medications    Medication Sig Last Dose Taking? Auth Provider Long Term End Date   acetaminophen (TYLENOL) 325 MG tablet Take 2 tablets (650 mg) by mouth every 4 hours as needed for other (For optimal non-opioid multimodal pain management to improve pain control.)   Yoly Velasquez PA-C     alcohol swab prep pads Use to swab area of injection/maya as directed.   Germain Farah MD     apixaban ANTICOAGULANT (ELIQUIS) 2.5 MG tablet Take 1 tablet (2.5 mg) by mouth 2 times daily Hold this medication and resume on 12/16/22   Evangelista Meza MD     blood glucose (NO BRAND SPECIFIED) lancets standard Use to test blood sugar 2 timeses daily or as directed.   Evangelista Meza MD     blood glucose (NO BRAND SPECIFIED) test strip Use to test blood sugar 2 times daily or as directed.   Evangelista Meza MD     blood glucose monitoring (NO BRAND SPECIFIED) meter device kit Use to test blood sugar 1 times daily or as directed.   Sandra Coughlin PA-C     calcium carbonate (OS- MG [place]. [place]) 500 MG tablet Take 500 mg by mouth At Bedtime    Reported, Patient     capecitabine (XELODA) 500 MG tablet Take 3 tablets (1,500 mg) by mouth 2 times daily Days 1 through 14, then off for 7 days.Take with water within 30 minutes after a meal.   Evangelista Meza MD Yes    cholecalciferol (VITAMIN D3) 1000 UNIT tablet Take 1 tablet (1,000 Units) by mouth daily   Yaneli Brumfield PA-C     Continuous Blood Gluc Sensor  (FREESTYLE MARQUES 3 SENSOR) [facility] 1 each every 14 days   Germain Farah MD     dexamethasone (DECADRON) 4 MG tablet Take 1 tablet (4 mg) by mouth daily (with breakfast)   Sandra Coughlin PA-C Yes    doxepin (SINEQUAN) 10 MG/ML (HIGH CONC) solution Take 0.3-0.6 mLs (3-6 mg) by mouth nightly as needed for sleep   Dharmesh Sanchez MD Yes    Ginger (Zingiber officinalis) (GINGER ROOT) 550 MG CAPS capsule Take 550 mg by mouth daily   Reported, Patient     insulin glargine (LANTUS PEN) 100 UNIT/ML pen Inject 10 Units Subcutaneous At Bedtime   Sandra Coughlin PA-C Yes    insulin  UNIT/ML injection 20 units before breakfast and 10 units at bedtime  Patient taking differently: 10 units at bedtime   Germain Farah MD Yes    loperamide (IMODIUM) 2 MG capsule Start with 2 caps (4 mg), then take one cap (2 mg) after each diarrheal stool as needed. Do not use more than 8 caps (16 mg) per day.   Evangelista Meza MD     LORazepam (ATIVAN) 0.5 MG tablet Take 1 tablet (0.5 mg) by mouth every 6 hours as needed for anxiety   Sandra Coughlin PA-C     metoclopramide (REGLAN) 10 MG tablet Take 1 tablet (10 mg) by mouth every 6 hours as needed (nausea/vomiting)   Jorge Meek MD     Multiple Vitamins-Minerals (MULTIVITAMIN ADULTS PO) Take 1 tablet by mouth daily   Reported, Patient     naloxone (NARCAN) 4 MG/0.1ML nasal spray Spray 1 spray (4 mg) into one nostril alternating nostrils as needed for opioid reversal every 2-3 minutes until assistance arrives   Sanrda Coughlin PA-C Yes    nystatin (MYCOSTATIN) 248381 UNIT/ML suspension Swish and spit 5mL 4 times daily for one week   Sandra Coughlin PA-C     OLANZapine (ZYPREXA) 5 MG tablet Take 1 tablet (5 mg) by mouth At Bedtime   Sandra Coughlin PA-C Yes    ondansetron (ZOFRAN) 8 MG tablet Take 1 tablet (8 mg) by mouth every 8 hours as needed for nausea   Sandra Coughlin PA-C     oxyCODONE  (ROXICODONE) 5 MG tablet Take 2 tablets (10 mg) by mouth every 4 hours as needed for moderate pain (4-6)   Sandra Coughlin PA-C     prochlorperazine (COMPAZINE) 10 MG tablet Take 1 tablet (10 mg) by mouth every 6 hours as needed for nausea or vomiting   Evangelista Meza MD     sulfamethoxazole-trimethoprim (BACTRIM DS) 800-160 MG tablet Take 1 tablet by mouth three times a week   Evangelista Meza MD     tucatinib (TUKYSA) 150 MG tablet Take 300 mg by mouth 2 times daily   Evangelista Meza MD     venlafaxine (EFFEXOR) 75 MG tablet Take 1 tablet (75 mg) by mouth daily   Sandra Coughlin PA-C Yes        ALLERGIES  Allergies   Allergen Reactions     Morphine      Rash       SOCIAL HISTORY   reports that she quit smoking about 7 years ago. Her smoking use included cigarettes. She smoked an average of .5 packs per day. She has never used smokeless tobacco. She reports that she does not drink alcohol and does not use drugs.    FAMILY HISTORY  family history includes Alcohol/Drug in her father and mother; Breast Cancer in her paternal aunt and another family member; Cancer in an other family member; Depression in her father, maternal grandfather, mother, paternal grandfather, and sister; Diabetes in her maternal grandfather and mother; Respiratory in her sister.    REVIEW OF SYSTEMS  A 10 point ROS was negative other than the symptoms noted above in the HPI.    Physical Exam   Nursing Notes Reviewed.  LMP 12/18/2014    General:  Appears stated age in no acute distress. Frail and cachectic appearing.  Skin:  Warm, dry. No rashes or lesions on exposed skin.  HEENT:  Normocephalic, atraumatic; EOMs grossly intact.   Neck:  Supple.  Chest:  Breath sounds CTA and no increased work of breathing.  Cardiovascular:  RRR, no rub or murmur. No peripheral edema.  Abdomen:  Soft, non-tender, non-distended.  Musculoskeletal:  Moves all four extremities.  Neurological:  CN 2-12 grossly  intact.    Data   Data reviewed today:  I personally reviewed   Recent Labs   Lab 01/24/23  1229 01/20/23  1149   WBC 6.9 7.5   HGB 11.2* 11.3*   MCV 91 90   * 169       Imaging:  No results found for this or any previous visit (from the past 24 hour(s)).     BRAIN MRI 1-10-23  FINDINGS:  Stable right frontal approach ventricular shunt with tip in the third  ventricle. Small amount of air within the anterior horns of the  lateral ventricles. Decreased size of the ventricular system compared  to 12/9/2022. Increased nodular leptomeningeal contrast enhancement  along the cerebellar folia. Unchanged leptomeningeal contrast  enhancement over bilateral cerebral sulci left greater than right.  Diffuse pachymeningeal contrast enhancement findings.     Increased nodular parenchymal enhancing lesions in supratentorial and  infratentorial brain. Representative lesions may include 1.3 cm right  cerebellar lesion (16, image 47), 5 mm lesion within the inferior  right parietal lobe (series 16, image 113) previously measuring 3 mm.  Unchanged DVA cavernoma complex within the jude.      There is no mass effect, midline shift, or intracranial hemorrhage.  The ventricles are proportionate to the cerebral sulci. Diffusion and  susceptibility weighted images are negative for acute/focal  abnormality. Major intracranial vascular structures are within normal  limits.     No suspicious abnormality of the skull marrow signal. Scattered  paranasal sinus mucosal thickening. Bilateral mastoid effusions  similar to previous exams. No focal abnormality of the pituitary  gland, sella, skull base and upper cervical spinal structures on  sagittal images. The orbits are normal.                                                                      IMPRESSION:  1. Progression of leptomeningeal and intraparenchymal metastatic  disease compared to 12/7/2022.  2. Stable right frontal approach ventricular shunt with decreased  caliber of the  ventricular system compared to 12/9/2022. New   pachymeningeal contrast enhancement, question over shunting.     I have personally reviewed the examination and initial interpretation  and I agree with the findings.     SMITHA RODRIGUEZ MD

## 2023-01-25 NOTE — TELEPHONE ENCOUNTER
3-Hospitalist Huddle Documentation    Acuity/Preferred Bed Type: inpatient  Infection Concerns: none  Additional Specialist Needed Or Specialist Already Contacted:   Palliative Care  Timely Treatments Needed: No  Important things to know/address during hospitalization: sitter not needed    57 yr old female with metastatic breast ca with leptomeningeal disease. Wants to enroll in hospice, unable to do as an outpatient for 3-4 days, uncontrolled pain, likely only has days left.   Admit for inpatient hospice, total comfort care and pain control.     Renetta Delcid PA-C

## 2023-01-25 NOTE — PROGRESS NOTES
Writer called report to admitting Oncology Nurse at [facility]. Spoke to patient's Mother Brandi to bring patient to the Welcome Desk for a direct admit to [facility] station 88 room 826.  Answered all patient's Mother's questions and verbalized understanding. Teresa Ashton RN, BSN.

## 2023-01-26 NOTE — PROGRESS NOTES
"SPIRITUAL HEALTH SERVICES Progress Note  [facility] [address]    Referral Source: SH consult for emotional support    Introduced self and SHS to Josefina, her mother (Brandi), and her mother's significant other at bedside and assessed for SH needs after Pt and family met with [facility] hospice team. Josefina reported, \"I'm groggy right now... I'm doing okay.\" Josefina expressed appreciation for the visit - no SH needs at this time.     Plan: Informed Josefina and family how to request additional SH support should they need it at anytime. Please re-consult if needs arise. SHS remains available.    Yadira Addison MDiv  Chaplain Resident  Pager: [phone]     SHS available 24/7 for emergent requests/referrals, either by having the on-call  paged or by entering an ASAP/STAT consult in Epic (this will also page the on-call ).  "

## 2023-01-26 NOTE — CONSULTS
"[facility]    Consult Note - [facility]    ______________________________________________________________________    [facility] 24/7 Contact Number: [phone]    - Providers: Please contact [facility] with changes in orders or clinical plan of care   - Nursing: Please contact [facility] with significant changes in patient condition    Hospice will notify the care team (including the hospitalist) to confirm date of inpatient hospice (GIP) admission.    New Epic encounter will not be created until hospice completes admission.   ______________________________________________________________________        Hospice Diagnosis: Metastatic breast cancer     Indication for Inpatient Hospice:  Anxiety, pain, nausea   Goals for Hospital Discharge:   Manage EOL symptoms with eventual transfer to [facility] hospice once bed becomes available.   Care Team:   - Nurse: Audrey   - Charge Nurse: Lili   - Hospitalist/Rounding Provider:   Dr. Alves  - Josefina's Family/Preferred Contact:  momBrandi  - Hospice Provider: Tyshawn Kohli     Summary of Visit (includes assessment, medications and any new orders):     Writer met with Mary,  mother Brandi, and Brandi's significant other for hospice consultation.  Mary stated \"I'm not scared about what comes next, but I am nervous about how long dying will take\" Josefina stated many times that she does not want to feel pain, and when she thinks about the possibility, she gets anxious which intensifies her pain.  We discussed end of life care in detail, with focus on symptom management.  Mary expressed feeling great relief after we discussed the ability of the care team to monitor and treat any nonverbal signs of pain or discomfort.  Writer provided Josefina and Brandi with a copy of [facility]. Hospice consent forms have been signed, and family is in agreement with enrollment and eventual discharge to [facility] once bed becomes available.  "     Medication recommendations:  Continue to utilize PRN medications to treat pain, anxiety and nausea over the next 24 hours. Goal will be to implement a medication schedule for optimal comfort once PRN need has been identified.     Greatly appreciate care being provided by bedside staff for this patient.     Margi Blevins RN   [facility]

## 2023-01-26 NOTE — PROGRESS NOTES
Palliative care provider spoke with Beny Michele CNP regarding need for palliative consult as patient is now on comfort care and symptoms are managed with current medications. Consult will be canceled.

## 2023-01-26 NOTE — PROGRESS NOTES
Referral Received - [facility]       [facility] would like to thank you for the [facility] referral.    Referral received and initial insurance information sent to  Hospice intake for review.    We are determining your patient's eligibility with a medical director at this time.    Our plan is to visit your patient as soon as possible. We will connect with the primary care team shortly to collect more information on the patient's progression. Thank you for your patience.      Margi Blevins RN  [facility]  Contact information available via [facility] Paging/Directory     Listed as [facility] Care in [facility]

## 2023-01-26 NOTE — PLAN OF CARE
Pt is comfort cares, formal assessment deferred. Alert and oriented . Denies pain at this time. Port is hep locked. Up ind. C/o nausea early this am, relieved with zofran. Plan for discharge to [facility] when bed available.

## 2023-01-26 NOTE — PROGRESS NOTES
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 In addition, she had received cranial radiation for a total of 5-days and has been following closely with Dr. Meza, Oncology outpatient. She is now requesting her desire to be a DNR/DNI and transition to hospice care for control of her symptoms, including nausea, severe headache and generalized pain. Per note from Dr. Meza on 1/24/23, her prognosis is measured in days. She is being admitted to [facility] for comfort measures only and Palliative Care/Hospice consultation, as she does not have a safe disposition plan at this time. Palliative Care and Hospice consulted to assist with safe disposition and planning.     Metastatic Breast Cancer  Communicating Hydrocephalus s/p shunt (12/2022) 2/2 above  Dehydration  -Admit to IP for evaluation of hospice  -Comfort measures only  -No labs or diagnostics  -DNR/DNI status  -Vital signs per orders  -PRN pain/anxiety med's  -Will resume oral PTA oxycodone at 10 mg every 6 hours, PRN  -IV dilaudid, PRN--patient has tolerated this in the past  -PRN Ativan and Zyprexa for anxiety  -PRN anti-emetics  -Pt OK with accessing port for IV med's, as needed   -CM/SW consulted for hospice placement  -Patient has requested to transition to [facility], if able  -Regular diet, as tolerated; will hold on  IVF at this time  -Holding all PTA med's, except pain and anti-emetic medications  -Decadron on hold, patient is OK with not restarting  -Discussed holding all unnecessary PTA med's with patient who is in Agreement of this  -Due to death being imminent, family may stay at bedside at all times; nursing care order is written      Diabetes Mellitus Type 2  -Comfort measures; will hold PTA insulin      Insomnia  -Will resume PTA Lorazepam and Doxepin, as needed       Single Subsegmental PE  -PTA regimen: Eliquis  -Holding as now on comfort cares; pt OK with holding         Diet: Regular Diet Adult    DVT Prophylaxis: Ambulate every shift  Berger Catheter: Not present  Lines: PRESENT      Port A Cath Single 05/04/22 Left Chest wall-Site Assessment: WDL      Cardiac Monitoring: None  Code Status: No CPR- Do NOT Intubate      Clinically Significant Risk Factors Present on Admission               # Drug Induced Coagulation Defect: home medication list includes an anticoagulant medication                 Disposition Plan      Expected Discharge Date: 01/27/2023                The patient's care was discussed with the Attending Physician, Dr. Alves.    Osvaldo Michele CNP  Hospitalist Service  [facility]  Securely message with LicenseMetricsmore info)  Text page via [employer] Paging/Directory   ______________________________________________________________________    Interval History   -awaiting hospice placement     Physical Exam   Vital Signs:           Resp: 16        Weight: 0 lbs 0 oz  General:  Appears stated age in no acute distress. Frail and cachectic appearing.  Skin:  Warm, dry. No rashes or lesions on exposed skin.  HEENT:  Normocephalic, atraumatic; EOMs grossly intact.   Neck:  Supple.  Chest:  Breath sounds CTA and no increased work of breathing.  Cardiovascular:  RRR, no rub or murmur. No peripheral edema.  Abdomen:  Soft, non-tender, non-distended.  Musculoskeletal:  Moves all four  extremities.  Neurological:  CN 2-12 grossly intact.    Medical Decision Making       25 MINUTES SPENT BY ME on the date of service doing chart review, history, exam, documentation & further activities per the note.  MANAGEMENT DISCUSSED with the following over the past 24 hours: patient and Dr. Alves        Data         Imaging results reviewed over the past 24 hrs:   No results found for this or any previous visit (from the past 24 hour(s)).

## 2023-01-26 NOTE — PLAN OF CARE
Goal Outcome Evaluation:    [id number]    Pt on comfort cares, full head-to-toe assessment and VS deferred. Generalized back pain and headache controlled with PRN tylenol, oxy, and heat packs. PRN ativan given once for anxiety. A+Ox4, intermittently forgetful. Resting comfortably between cares. Ambulating SBA in room/hallways. Port heparin locked. Tolerating regular diet w/ intermittent nausea managed with PRN zofran and compazine. No emesis noted. Continent of B/B. Voiding spontaneously. No BM noted this shift. SW following for discharge needs. Plan for discharge to [facility] when bed available. Will continue with plan of care.

## 2023-01-26 NOTE — PLAN OF CARE
Comfort cares. A&Ox 4. Denies pain. Port accessed. Up assist x1. Tolerating regular diet.

## 2023-01-26 NOTE — CONSULTS
SW: Patient will be enrolling in [facility] and likely will discharge to [facility].     If SW services are needed please reconsult    ARLETH Robertson

## 2023-01-27 NOTE — PROGRESS NOTES
"[facility]    Progress Note - [facility]    ______________________________________________________________________    [facility] 24/7 Contact Number: [phone]    - Providers: Please contact [facility] with changes in orders or clinical plan of care   - Nursing: Please contact [facility] with significant changes in patient condition  ______________________________________________________________________      Care Team:  - Nurse: Lizzy   - Hospitalist/Rounding Provider:   Dr. Long Rocha's Family/Preferred Contact: MotherBrandi    Hospice Provider: Tyshawn Kohli     Summary of Visit (includes assessment, medications and any new orders):     Writer visited patient for daily GIP assessment.  Josefina slept throughout the majority of my visit, Brandi reports Mary did wake upon her arrival but fell back to sleep shortly after.  Mary reports feeling anxious while awake and requests ativan PRN.  Patient endorses intermittent pain related to malignant headache and states \"I just don't want to feel anything when I die\"  Writer Spoke with [facility] at [facility] hospice home, and confirmed referral received. [facility] estimates an bed opening on Tuesday Feb 1st and will update care team with any sooner availability.      Hospice Medication recommendations:    -Oxycodone 10 mg PO every 4 hours for pain control    - PRN hydromorphone available IV for breakthrough pain    Ativan 1 mg PO/SL every 4 hours for anxiety / agitation, continue current PRN regimen       Margi Blevins RN  "

## 2023-01-27 NOTE — PLAN OF CARE
Goal Outcome Evaluation:    Comfort care pt, vitals and full assessment deferred. AO x4. Slept most of shift. C/o of mild headache, PRN tylenol given. Endorsed minor anxiety relieved by PRN lorazepam. Very unsteady on feet overnight, up A1 GB+W. Cont b/b. SW following for discharge needs. Plan for discharge to [facility] when bed available.

## 2023-01-27 NOTE — PHARMACY-ADMISSION MEDICATION HISTORY
Admission medication history not completed at [facility] - on prior admission 1/25/23, NP predetermined which PTA medications to continue and discussed with pharmacist. Pharmacy may complete medication history if consulted/desired at this point.     Татьяна Aggarwal, PharmD

## 2023-01-27 NOTE — LETTER
1/27/2023         RE: [place] [facility]  [address] [facility] [address]        Dear Colleague,    Thank you for referring your patient, Josefina Bennett, to the [facility]. Please see a copy of my visit note below.    No notes on file    Again, thank you for allowing me to participate in the care of your patient.        Sincerely,        Bill Chauhan MD

## 2023-01-27 NOTE — PLAN OF CARE
Goal Outcome Evaluation:         Pt on comfort cares, assessment  deferred. Generalized back pain and headache controlled with PRN tylenol, oxy, and heat packs. . A+Ox4, intermittently forgetful. Resting comfortably between cares. Ambulating SBA in room/hallways. Port heparin locked. Tolerating regular diet w/ intermittent nausea managed with PRN zofran and compazine.  Continent of B/B. Voiding spontaneously. No BM noted this shift. SW following for discharge needs. Plan for discharge to [facility] when bed available. Will continue with plan of care.

## 2023-01-27 NOTE — LETTER
Date:February 3, 2023      Patient was self referred, no letter generated. Do not send.        [facility] Health Information

## 2023-01-27 NOTE — PROGRESS NOTES
Comfort focused care maintained; IP Hospice. Slept through morning/ early afternoon. Visitors at bedside later on. Denied acute pain ex headache- Tylenol given x1. Tearful at times, Ativan given per pt request. Cooperative and calm, rested most of shift. Discharge pending bed availability at [facility]; pending.

## 2023-01-27 NOTE — H&P
{MEDICINE AN    [facility]    History and Physical - Hospitalist Service       Date of Admission:  1/27/2023    Assessment & Plan   Josefina Bennett is a 57 year old female who is being transitioned to inpatient hospice on 1/27/2023. Please see the discharge summary from the same date for more details of her hospital course.    Hospice care  -admitted inpatient hospice  -pain control  -supportive care orders   -regular diet         Diet: Regular Diet Adult    Berger Catheter: Not present  Lines: PRESENT             Code Status: No CPR- Do NOT Intubate    Expected Discharge: working on discharge with hospice    Osvaldo Michele, CNP  Hospitalist Service  [facility]  Securely message with [employer] (more info)  Text page via [employer] Paging/Directory     ______________________________________________________________________    Chief Complaint   Transitioning to inpatient hospice    History of Present Illness   Josefina Bennett is a 57 year old female who is being transitioned to inpatient hospice.  Please see the discharge summary from the same date for more details; a brief summary of her current symptoms is for control of her symptoms, including nausea, severe headache and generalized pain.        Physical Exam   Vital Signs:                    Weight: 0 lbs 0 oz    Physical exam deferred given current goals of care    Medical Decision Making       45 MINUTES SPENT BY ME on the date of service doing chart review, history, exam, documentation & further activities per the note.  MANAGEMENT DISCUSSED with the following over the past 24 hours: patient and Dr. Alves

## 2023-01-27 NOTE — DISCHARGE SUMMARY
[facility]  Hospitalist Discharge Summary      Date of Admission:  1/25/2023  Date of Discharge:  1/27/2023  Discharging Provider: Osvaldo Michele CNP    Discharge Diagnoses   Metastatic Breast Cancer  Communicating Hydrocephalus s/p shunt (12/2022) 2/2 above  Dehydration    Follow-ups Needed After Discharge     Josefina Bennett is being readmitted to inpatient hospice    Discharge Disposition   Discharged to inpatient hospice  Condition at discharge: Guarded    Hospital Course   Josefina Bennett is a 57 year old female who was admitted on 1/25/2023.  She  is being discharged from the current hospital encounter and will be readmitted to inpatient hospice.    Josefina Bennett is a 57 year old female with PMH significant for clinical stage II right breast cancer, ER positive, NC positive, HER2 negative by immunohistochemistry (diagnosed in 2014) who was initiated on tamoxifen in October 2015 with oophorectomy and initiation of letrozole in January 2016, with recurrence diagnosed with sacral mass, s/p shunt placement (12/9/2022) for normal pressure hydrocephalus secondary to brain metastases which are intraparenchymal and leptomeningeal who began IT methotrexate on 12/20/22 that was discontinued on 1/13/23. In addition, she had received cranial radiation for a total of 5-days and has been following closely with Dr. Meza, Oncology outpatient. She is now requesting her desire to be a DNR/DNI and transition to hospice care for control of her symptoms, including nausea, severe headache and generalized pain. Per note from Dr. Meza on 1/24/23, her prognosis is measured in days. She is being admitted to [facility] for comfort measures only and Palliative Care/Hospice consultation, as she does not have a safe disposition plan at this time. Palliative Care and Hospice consulted to assist with safe disposition and planning.     Metastatic Breast Cancer  Communicating Hydrocephalus s/p shunt (12/2022) 2/2  above  Dehydration  -Admit to IP for evaluation of hospice  -Comfort measures only  -No labs or diagnostics  -DNR/DNI status  -Vital signs per orders  -PRN pain/anxiety med's  -Will resume oral PTA oxycodone at 10 mg every 6 hours, PRN  -IV dilaudid, PRN--patient has tolerated this in the past  -PRN Ativan and Zyprexa for anxiety  -PRN anti-emetics  -Pt OK with accessing port for IV med's, as needed   -CM/SW consulted for hospice placement  -Patient has requested to transition to [facility], if able  -Regular diet, as tolerated; will hold on IVF at this time  -Holding all PTA med's, except pain and anti-emetic medications  -Decadron on hold, patient is OK with not restarting  -Discussed holding all unnecessary PTA med's with patient who is in Agreement of this  -Due to death being imminent, family may stay at bedside at all times; nursing care order is written      Diabetes Mellitus Type 2  -Comfort measures; will hold PTA insulin      Insomnia  -Will resume PTA Lorazepam and Doxepin, as needed       Single Subsegmental PE  -PTA regimen: Eliquis  -Holding as now on comfort cares; pt OK with holding        Consultations This Hospital Stay   SPIRITUAL HEALTH SERVICES IP CONSULT  PALLIATIVE CARE ADULT IP CONSULT  CARE MANAGEMENT / SOCIAL WORK IP CONSULT  [facility] INPATIENT HOSPICE ADULT CONSULT  CARE MANAGEMENT / SOCIAL WORK IP CONSULT  CARE MANAGEMENT / SOCIAL WORK IP CONSULT  [facility] INPATIENT HOSPICE ADULT CONSULT  PHARMACY IP CONSULT    Code Status   No CPR- Do NOT Intubate    Time Spent on this Encounter   I, Osvaldo Michele CNP, personally saw the patient today and spent greater than 30 minutes discharging this patient.       Osvaldo Michele CNP  [place] [address] ONCOLOGY  [facility] [address]  [facility] [address]  Phone: [phone]  ______________________________________________________________________    Physical Exam   Vital Signs: Temp: 99.1  F (37.3  C) Temp src: Oral BP: 115/73 Pulse:  105   Resp: 16 SpO2: 94 % O2 Device: None (Room air)    Weight: 0 lbs 0 oz    Please see physical exam from my PROGRESS NOTE on the same date       Primary Care Physician   Camille Araujo    Discharge Orders   No discharge procedures on file.    Significant Results and Procedures   Most Recent 3 CBC's:Recent Labs   Lab Test 01/24/23  1229 01/20/23  1149 01/17/23  1632   WBC 6.9 7.5 11.5*   HGB 11.2* 11.3* 12.0   MCV 91 90 89   * 169 256     Most Recent 3 BMP's:Recent Labs   Lab Test 01/13/23  0727 01/10/23  0729 12/27/22  0731   * 136 135*   POTASSIUM 4.4 4.3 4.3   CHLORIDE 101 101 98   CO2 23 26 26   BUN 27.9* 23.8* 18.9   CR 0.78 0.93 0.99*   ANIONGAP 10 9 11   VEE 8.4* 8.7 8.9   * 285* 306*     Most Recent 2 LFT's:Recent Labs   Lab Test 01/13/23  0727 01/10/23  0729   AST 21 42*   ALT 77* 131*   ALKPHOS 160* 190*   BILITOTAL 0.6 0.6     Most Recent 3 INR's:Recent Labs   Lab Test 12/07/22  1830 11/01/22 2021 04/12/22  1024   INR 1.10 0.98 1.07     Most Recent INR's and Anticoagulation Dosing History:  Anticoagulation Dose History     Recent Dosing and Labs Latest Ref Rng & Units 2/5/2015 6/21/2015 4/12/2022 11/1/2022 12/7/2022    INR 0.85 - 1.15 1.02 1.21(H) 1.07 0.98 1.10        Most Recent 3 Creatinines:Recent Labs   Lab Test 01/13/23  0727 01/10/23  0729 12/27/22  0731   CR 0.78 0.93 0.99*     Most Recent 3 Hemoglobins:Recent Labs   Lab Test 01/24/23  1229 01/20/23  1149 01/17/23  1632   HGB 11.2* 11.3* 12.0     Most Recent 3 Troponin's:No lab results found.  Most Recent 3 BNP's:Recent Labs   Lab Test 11/01/22 2021 04/06/22  1015   NTBNPI 47 308     Most Recent D-dimer:Recent Labs   Lab Test 04/06/22  1015   DD 2.54*     Most Recent Cholesterol Panel:Recent Labs   Lab Test 12/27/22  0731   CHOL 242*   *   HDL 45*   TRIG 275*     7-Day Micro Results     No results found for the last 168 hours.      ,   Results for orders placed or performed in visit on 01/10/23   MRI Brain w/o & w  Contrast    Narrative    EXAM: MR BRAIN W/O & W CONTRAST  1/10/2023 11:17 AM     HISTORY:  shunt in place; Malignant neoplasm of upper-outer quadrant  of right breast in female, estrogen receptor negative (H); Malignant  neoplasm of upper-outer quadrant of right breast in female, estrogen  receptor negative (H)       COMPARISON:  Head CT 12/9/2022, MR brain 12/7/2022    TECHNIQUE: Sagittal and axial T1-weighted, axial diffusion,  multiplanar T2 FLAIR with fat saturation images were obtained without  intravenous contrast. Following intravenous gadolinium-based contrast  administration, axial T2-weighted, axial susceptibility, and  T1-weighted images (in multiple planes) were obtained.    CONTRAST: 6 ML Gadavist.    FINDINGS:  Stable right frontal approach ventricular shunt with tip in the third  ventricle. Small amount of air within the anterior horns of the  lateral ventricles. Decreased size of the ventricular system compared  to 12/9/2022. Increased nodular leptomeningeal contrast enhancement  along the cerebellar folia. Unchanged leptomeningeal contrast  enhancement over bilateral cerebral sulci left greater than right.  Diffuse pachymeningeal contrast enhancement findings.    Increased nodular parenchymal enhancing lesions in supratentorial and  infratentorial brain. Representative lesions may include 1.3 cm right  cerebellar lesion (16, image 47), 5 mm lesion within the inferior  right parietal lobe (series 16, image 113) previously measuring 3 mm.  Unchanged DVA cavernoma complex within the jude.     There is no mass effect, midline shift, or intracranial hemorrhage.  The ventricles are proportionate to the cerebral sulci. Diffusion and  susceptibility weighted images are negative for acute/focal  abnormality. Major intracranial vascular structures are within normal  limits.    No suspicious abnormality of the skull marrow signal. Scattered  paranasal sinus mucosal thickening. Bilateral mastoid  effusions  similar to previous exams. No focal abnormality of the pituitary  gland, sella, skull base and upper cervical spinal structures on  sagittal images. The orbits are normal.      Impression    IMPRESSION:  1. Progression of leptomeningeal and intraparenchymal metastatic  disease compared to 12/7/2022.  2. Stable right frontal approach ventricular shunt with decreased  caliber of the ventricular system compared to 12/9/2022. New   pachymeningeal contrast enhancement, question over shunting.    I have personally reviewed the examination and initial interpretation  and I agree with the findings.    SMITHA RODRIGUEZ MD         SYSTEM ID:  [id number]     *Note: Due to a large number of results and/or encounters for the requested time period, some results have not been displayed. A complete set of results can be found in Results Review.       Discharge Medications     Current Facility-Administered Medications:      acetaminophen (TYLENOL) tablet 975 mg, 975 mg, Oral, Q6H PRN, Dominguez, Gail, NP, 975 mg at 01/27/23 0409     carboxymethylcellulose PF (REFRESH PLUS) 0.5 % ophthalmic solution 1-2 drop, 1-2 drop, Both Eyes, Q1H PRN, Dominguez, Gail, NP     doxepin (SINEquan) 10 MG/ML (HIGH CONC) solution 3-6 mg, 3-6 mg, Oral, At Bedtime PRN, Dominguez, Gail, NP, 6 mg at 01/25/23 2108     glycopyrrolate (ROBINUL) injection 0.2 mg, 0.2 mg, Intravenous, Q4H PRN, DominguezGail, NP     heparin 100 UNIT/ML injection 5-10 mL, 5-10 mL, Intracatheter, Q28 Days, Shoaib Oakes MD     heparin lock flush 10 UNIT/ML injection 5-10 mL, 5-10 mL, Intracatheter, Q1H PRN, Shoaib Oakes MD     heparin lock flush 10 UNIT/ML injection 5-10 mL, 5-10 mL, Intracatheter, Q24H, Shoaib Oakes MD, 5 mL at 01/26/23 1819     HYDROmorphone (PF) (DILAUDID) injection 0.3 mg, 0.3 mg, Intravenous, Q15 Min PRN, DominguezElizabethy, NP     HYDROmorphone (PF) (DILAUDID) injection 0.5 mg, 0.5 mg, Intravenous, Q15 Min PRN, Gail Dominguez NP     lidocaine  (LMX4) cream, , Topical, Q1H PRN, Dominguez, Gail, NP     lidocaine 1 % 0.1-1 mL, 0.1-1 mL, Other, Q1H PRN, Dominguez, Gail, NP     loperamide (IMODIUM) capsule 2 mg, 2 mg, Oral, 4x Daily PRN, Dominguez, Gail, NP     LORazepam (ATIVAN) injection 1 mg, 1 mg, Intravenous, Q3H PRN **OR** LORazepam (ATIVAN) tablet 1 mg, 1 mg, Sublingual, Q3H PRN, Dominguez, Gail, NP, 1 mg at 01/26/23 2036     melatonin tablet 1 mg, 1 mg, Oral, At Bedtime PRN, Dominguez, Gail, NP     metoclopramide (REGLAN) tablet 10 mg, 10 mg, Oral, Q6H PRN, Dominguez, Gail, NP     naloxone (NARCAN) injection 0.1 mg, 0.1 mg, Intravenous, Q2 Min PRN, Dominguez, Agil, NP     naloxone (NARCAN) injection 0.2 mg, 0.2 mg, Intravenous, Q2 Min PRN, Dominguez, Gail, NP     OLANZapine (zyPREXA) tablet 5 mg, 5 mg, Oral, At Bedtime, Dominguez, Gail, NP, 5 mg at 01/26/23 2201     OLANZapine zydis (zyPREXA) ODT tab 5 mg, 5 mg, Oral, Q6H PRN, Dominguez, Gail, NP     ondansetron (ZOFRAN ODT) ODT tab 4 mg, 4 mg, Oral, Q6H PRN, 4 mg at 01/26/23 0517 **OR** ondansetron (ZOFRAN) injection 4 mg, 4 mg, Intravenous, Q6H PRN, Dominguez, Gail, NP     oxyCODONE (ROXICODONE) tablet 10 mg, 10 mg, Oral, Q4H PRN, Dominguez, Gail, NP, 10 mg at 01/26/23 1029     prochlorperazine (COMPAZINE) injection 10 mg, 10 mg, Intravenous, Q6H PRN **OR** prochlorperazine (COMPAZINE) tablet 10 mg, 10 mg, Oral, Q6H PRN, 10 mg at 01/26/23 0912 **OR** prochlorperazine (COMPAZINE) suppository 25 mg, 25 mg, Rectal, Q12H PRN, Gail Dominguez, MIKA     senna-docusate (SENOKOT-S/PERICOLACE) 8.6-50 MG per tablet 1 tablet, 1 tablet, Oral, BID PRN, Gail Dominguez, NP     sodium chloride (PF) 0.9% PF flush 10-20 mL, 10-20 mL, Intracatheter, q1 min prn, Gail Dominguez, NP     sodium chloride (PF) 0.9% PF flush 10-20 mL, 10-20 mL, Intracatheter, Q28 Days, Dominguez, Gail, NP, 20 mL at 01/25/23 1806     sodium chloride (PF) 0.9% PF flush 10-20 mL, 10-20 mL, Intracatheter, Q1H PRN, Shoaib Oakes MD     sodium chloride (PF) 0.9% PF flush 3 mL, 3 mL,  Intracatheter, q1 min prn, Dominguez, Gail, NP    Allergies   Allergies   Allergen Reactions     Morphine      Rash

## 2023-01-28 NOTE — PROCEDURES
Radiotherapy Treatment Summary          Date of Report: 2023     PATIENT: MOISÉS DELGADO  MEDICAL RECORD NO: [id number]  : 1965     DIAGNOSIS: C71.9 Malignant neoplasm of brain, unspecified  INTENT OF RADIOTHERAPY: Local control  PATHOLOGY:    Invasive lobular carcinoma                               STAGE: recurrent, stage IV  CONCURRENT SYSTEMIC THERAPY:    None                Details of the treatments summarized below are found in records kept in the Department of Radiation Oncology at [facility].     Treatment Summary:  Radiation Oncology - Course: 2 Protocol:   Treatment Site Current Dose Modality From To Elapsed Days Fx.  2 Whole Brain  2,000 cGy 06 X  1/17/2023  2023   6  5             Dose per Fraction: 400 cGy  Total Dose:      2000 cGy        COMMENTS:    Ms. Delgado is a 57 yo female with a history of an invasive lobular carcinoma of the right   breast, initially treated with a curative intent who later developed metastatic disease, and most recently found to   have leptomeningeal disease. Briefly, she was initially diagnosed in  and received neoadjuvant therapy   followed by surgery with pathology of [id number](sn), MDA RCB class III.  She subsequently received 5040 cGy   in 28 fractions to the chest wall and regional lymphatics followed by 720 cGy boost to the axilla and 1000 cGy   boost to the mastectomy scar.      She then elected to undergo bilateral oophorectomy and was started on Letrozole with the plan to continue this   until .        Ms. Delgado did well until spring this year when she was found to have widely metastatic bony metastases as   well as lesions in the liver. Biopsy of a sacral metastasis was consistent with metastatic pleomorphic lobular   carcinoma, ER-/OR-, with 20% cells HER2 amplified and 80% not. Given this, Dr. Meza started her on weekly   Palitaxel with every 3 week Pertuzumab and Trastuzumab. In 2022, she was switched to Enhertu.   Unfortunately,  staging PET/CT on 11/21/2022 showed progression in the liver.      Early December, Ms. Bennett developed mental status change. MRI on 12/7/2022 showed leptomeningeal   disease as well as intraparenchymal metastases. She had a  shunt placed and began IT chemotherapy.   However, she was unable to clear CSF. As such, she was referred to us for whole brain radiation therapy.      Given her poor performance status, we elected to treat her whole brain with a shorter course of 2000 cGy in 5   fractions. She tolerated the treatment well without acute toxicities. At the end of the treatment, she did feel she   had improved alertness.         ED visits/hospitalizations: None     Missed treatments: None     Acute Toxicity Profile by CTC v5.0:   None     PAIN MANAGEMENT:   not required                           FOLLOW UP PLAN:       Follow up with Dr. Meza - continued treatment vs. hospice  Follow up with our department as needed.                        Staff Physician: Bill Chauhan M.D.     CC: , MD Evangelista Meza MD                 Radiation Oncology:  [facility] [address] [place] [address]

## 2023-01-28 NOTE — PROGRESS NOTES
[facility]    Internal Medicine Hospitalist Progress Note  01/28/2023  I evaluated patient on the above date.    Elijah Razo Jr., MD  [phone] (p)  Text Page        Assessment & Plan New actions/orders today (01/28/2023) are underlined.    Josefina Bennett is a 57 year old female with PMH significant for metastatic breast cancer who was directly admitted to [facility] on 1/25/2023 for palliative cares.      1. Metastatic breast cancer, now on comfort cares.  2. Communicating hydrocephalus s/p shunt (12/2022) secondary to above.  * Diagnosed in 2014. Follows with Dr. Meza. Clinical stage II right breast cancer, ER positive, CA positive, HER2 negative. Initiated on tamoxifen in 10/2015 with oophorectomy and initiation of letrozole in 1/2016. Subsequently recurrence 2022. S/p shunt placement 12/2022 for normal pressure hydrocephalus secondary to brain metastases. Began IT methotrexate on 12/20/22 that was discontinued on 1/13/23. In addition, she had received cranial radiation for a total of 5-days. Per note from Dr. Meza on 1/24/2023, her prognosis was measured in days.   * On 1/25, admitted to [facility] after requesting her desire to be a DNR/DNI and transition to hospice care for control of her symptoms, including nausea, severe headache and generalized pain. PTA capecitabine, tucatinib and dexamethasone stopped on admit. Palliative care consulted on admit. Admitted [facility] hosp[facility] 1/27/2023.  - Continue PRN acetaminophen, PRN oxycodone, PRN IV hydromorphone.  - Continue PRN lorazepam, PRN olanzapine.  - Continue other PRN measures/meds.  - SW following for hospice facility placement; pt wanted to [facility].    OTHER CHRONIC ISSUES:  1. Diabetes mellitus, type 2.   * PTA glargine and NPH not continued on admit.    2. Single subsegmental PE, previously on apixaban.  * PTA apixaban not continued on admit.    3. Depression/anxiety.  4. Insomnia.  * PTA venlafaxine not  continued on admit.  - Continue PRN meds as above.      COVID-19 testing.  COVID-19 PCR Results    COVID-19 PCR Results 4/6/22 4/11/22 4/30/22 9/12/22 9/19/22 10/4/22 11/1/22 12/7/22   SARS CoV2 PCR Negative Negative Negative Negative Negative Negative Negative Negative      Comments are available for some flowsheets but are not being displayed.         COVID-19 Antibody Results, Testing for Immunity    COVID-19 Antibody Results, Testing for Immunity   No data to display.             Diet: Regular Diet Adult    Prophylaxis: PCD's, ambulation.   Berger Catheter: Not present  Code Status: No CPR- Do NOT Intubate    Disposition Plan     Expected Discharge Date: 01/29/2023             Entered: Elijah Razo MD 01/28/2023, 7:29 AM         Interval History   Pt sleeping, did not wake her.    -Data reviewed today: I reviewed all new labs and imaging over the last 24 hours. I personally reviewed no images or EKG's today.    Physical Exam    , Last menstrual period 12/18/2014, not currently breastfeeding.  There were no vitals filed for this visit.  Vital Signs with Ranges     No data found.  I/O's Last 24 hours  No intake/output data recorded.    Constitutional: Sleeping; appears comfortable.  Respiratory:   Cardiovascular:   GI:   Skin/Integumen:   Other:        Data   Recent Labs   Lab 01/24/23  1229   WBC 6.9   HGB 11.2*   MCV 91   *     Recent Labs   Lab Test 01/13/23  0727 01/10/23  0729 12/27/22  0731 12/23/22  0959 12/21/22  1343 09/28/15  1548 09/22/15  0617 07/02/15  0548 07/01/15  0826 07/01/15  0648 07/01/15  0221 06/30/15  2029 06/30/15  1640   * 285* 306* 361* 198*   < >  --    < >  --    < >  --   --   --    BGM  --   --   --   --   --   --  112*  --  119*  --  86 104* 182*    < > = values in this interval not displayed.         No results found for this or any previous visit (from the past 24 hour(s)).    Medications   All medications were reviewed.      [START ON 2/22/2023] heparin  5-10  mL Intracatheter Q28 Days     heparin lock flush  5-10 mL Intracatheter Q24H     OLANZapine  5 mg Oral At Bedtime     [START ON 2/22/2023] sodium chloride (PF)  10-20 mL Intracatheter Q28 Days

## 2023-01-28 NOTE — PROGRESS NOTES
Comfort cares. Family/ visitors at bedside most of the day. Pt reported headache pain, Dilaudid IV & Tylenol given x1; effective. Requested Ativan late morning for anxiety, pt tearful. Tolerating regular diet. Port hep locked. Discharge pending bed availability at [facility] hospice.

## 2023-01-28 NOTE — PLAN OF CARE
Goal Outcome Evaluation:       Patient remains stable with comfort measures. Alert and oriented. Reported headache. Relieved with 10 mg of oxycodone. Ambulated to the bath room with assist of one, a walker and gait belt. Unsteady on feet at times. Using call light appropriately.

## 2023-01-28 NOTE — PROGRESS NOTES
[facility]    Progress Note - [facility]    ______________________________________________________________________    [facility] 24/7 Contact Number: [phone]    - Providers: Please contact [facility] with changes in orders or clinical plan of care   - Nursing: Please contact [facility] with significant changes in patient condition  ______________________________________________________________________        Plan of Care Discussed with the Following:   - Nurse: NU Ball  -Charge Nurse: NU Mobley  - Josefina's Family/Preferred Contact: Brandi (NOK/mother)  - Hospice Provider: Dr. Tyshawn Kohli    Summary of Visit (includes assessment, medications and any new orders):     Patient resting on left side when this writer entered the room. When asked about pain, she endorsed headache. Bedside nurse made aware and will medicate. Port on left chest heparin locked, CDI upon assessment. Pulse 104, oxygen 94% on RA. Patient alert and oriented, able to make needs known. Encouraged patient to report s/sx of pain, anxiety, and discomfort. Educated patient on hospice philosophy and encouraged use of call light to have needs known. Patient verbalized understanding and expressed gratitude for taking the time to reiterate our plan of care. Placement is still pending to [facility] and we will update care team as available.     Recommendations:  Continue with curren regimen listed in MAR, as well as   PRN dilaudid for breakthrough pain  PRN ativan for anxiety  PRN Tylenol     Thank you for excellent care and support you are providing this patient and family.       Racquel Ivey RN  [facility] Clinical Nurse Liaison  [facility]  Cell: [phone]  Contact information available via [facility] Paging/Directory

## 2023-01-28 NOTE — PLAN OF CARE
On comfort cares; IP hospice. Pt was sleeping most of shift, awoke with med passes. Visitors at bedside. Denied any pain. PRN zofran ODT given due to pt c/o nausea, stated relief. Pending discharge to [facility] when bed is open.

## 2023-01-29 NOTE — PROGRESS NOTES
Pt A&Ox4, drowsy this morning.  PO Oxy/ Tylenol given for pain; minimally effective. Oxy discontinued, PO Dilaudid added. Slept during early afternoon, awoke w/ more pain. PO Dilaudid and Ativan given for headache/ back pain/ anxiety. Reported minimally effective so IV Dilaudid given w/ positive results- pt able to rest/ sleep for some time. Later awoke again and requested additional dose of IV Dilaudid for pain 8/10. Senna for constipation. Up SBA. Voiding in BR. Tolerating diet. Mother and friend at bedside.

## 2023-01-29 NOTE — PLAN OF CARE
Goal Outcome Evaluation:       Patient remains on comfort cares. Alert and oriented. Denies pain. Up with assist of one. Voiding without difficulty. Unsteady on feet. Bed alarm in place.

## 2023-01-29 NOTE — PLAN OF CARE
Pt on comfort cares, IP hospice. C/o nausea, prn zofran given with no relief. IV compazine given after and pt stated relief. Prn dilaudid given for pain, pt stated relief. She also requested senna, had 1 small stool after senna given. Port is heparin locked. Had visitors at bedside. Needed assist of 1 with transfers. Had some [employer] that family brought. Placement pending to [facility].

## 2023-01-29 NOTE — PROGRESS NOTES
[facility]    Internal Medicine Hospitalist Progress Note  01/29/2023  I evaluated patient on the above date.    Elijah Razo Jr., MD  [phone] (p)  Text Page        Assessment & Plan New actions/orders today (01/29/2023) are underlined.    Josefina Bennett is a 57 year old female with PMH significant for metastatic breast cancer who was directly admitted to [facility] on 1/25/2023 for palliative cares.      1. Metastatic breast cancer, now on comfort cares.  2. Communicating hydrocephalus s/p shunt (12/2022) secondary to above.  * Diagnosed in 2014. Follows with Dr. Meza. Clinical stage II right breast cancer, ER positive, MD positive, HER2 negative. Resected 9/2015. Initiated on tamoxifen in 10/2015 with oophorectomy and initiation of letrozole in 1/2016. Subsequently recurrence 2022. S/p shunt placement 12/2022 for normal pressure hydrocephalus secondary to brain metastases. Began IT methotrexate on 12/20/22 that was discontinued on 1/13/23. In addition, she had received cranial radiation for a total of 5-days. Per note from Dr. Meza on 1/24/2023, her prognosis was measured in days.   * On 1/25, admitted to [facility] after requesting her desire to be a DNR/DNI and transition to hospice care for control of her symptoms, including nausea, severe headache and generalized pain. PTA capecitabine, tucatinib and dexamethasone stopped on admit. Palliative care consulted on admit. Admitted [facility] 1/27/2023.  * On 1/29, pt with headache and back pain, not completely relieved with oxycodone.  - Discontinue PRN oxycodone.  - Start PRN PO hydromorphone.  - Continue PRN acetaminophen, PRN IV hydromorphone.  - Continue PRN lorazepam, PRN olanzapine.  - Continue other PRN measures/meds.  - SW following for hospice facility placement; pt wanted to pursue [facility] hospice.  - Appreciate Hospice team help.    OTHER CHRONIC ISSUES:  1. Diabetes mellitus, type 2.   * PTA glargine and NPH not  continued on admit.    2. Single subsegmental PE, previously on apixaban.  * PTA apixaban not continued on admit.    3. Depression/anxiety.  4. Insomnia.  * PTA venlafaxine not continued on admit.  - Continue PRN meds as above.      COVID-19 testing.  COVID-19 PCR Results    COVID-19 PCR Results 4/6/22 4/11/22 4/30/22 9/12/22 9/19/22 10/4/22 11/1/22 12/7/22   SARS CoV2 PCR Negative Negative Negative Negative Negative Negative Negative Negative      Comments are available for some flowsheets but are not being displayed.         COVID-19 Antibody Results, Testing for Immunity    COVID-19 Antibody Results, Testing for Immunity   No data to display.             Diet: Regular Diet Adult    Prophylaxis: PCD's, ambulation.   Berger Catheter: Not present  Code Status: No CPR- Do NOT Intubate    Disposition Plan     Expected Discharge Date: 01/29/2023             Entered: Elijah Razo MD 01/29/2023, 10:29 AM         Interval History   Headache better with oxycodone, but not resolved.   Has back pain.  Some nausea.    -Data reviewed today: I reviewed all new labs and imaging over the last 24 hours. I personally reviewed no images or EKG's today.    Physical Exam    , Last menstrual period 12/18/2014, not currently breastfeeding.  There were no vitals filed for this visit.  Vital Signs with Ranges     No data found.  I/O's Last 24 hours  No intake/output data recorded.    Constitutional: Fatigued, uncomfortable at times.  Respiratory:   Cardiovascular:   GI:   Skin/Integumen:   Other:        Data   Recent Labs   Lab 01/24/23  1229   WBC 6.9   HGB 11.2*   MCV 91   *     Recent Labs   Lab Test 01/13/23  0727 01/10/23  0729 12/27/22  0731 12/23/22  0959 12/21/22  1343 09/28/15  1548 09/22/15  0617 07/02/15  0548 07/01/15  0826 07/01/15  0648 07/01/15  0221 06/30/15  2029 06/30/15  1640   * 285* 306* 361* 198*   < >  --    < >  --    < >  --   --   --    BGM  --   --   --   --   --   --  112*  --  119*  --  86  104* 182*    < > = values in this interval not displayed.         No results found for this or any previous visit (from the past 24 hour(s)).    Medications   All medications were reviewed.      [START ON 2/22/2023] heparin  5-10 mL Intracatheter Q28 Days     heparin lock flush  5-10 mL Intracatheter Q24H     OLANZapine  5 mg Oral At Bedtime     [START ON 2/22/2023] sodium chloride (PF)  10-20 mL Intracatheter Q28 Days

## 2023-01-30 NOTE — PROGRESS NOTES
[facility]    Social Work Progress Note - [facility]    ______________________________________________________________________    [facility] Contact Number: [phone]    - Providers: Please contact [facility] with changes in orders or clinical plan of care   - Nursing: Please contact [facility] with significant changes in patient condition  - Social Work: Please contact [facility] for discharge phanning/updates  ______________________________________________________________________      [facility] can take pt tomorrow at 1000. Writer scheduled stretcher transport at 0900 1/31. Pt and family aware. Please order covid test ASAP.    Furthermore - pt received one dose IV dilaudid this morning at 0823. Please ensure pt is fully transferred to oral meds prior to discharge.    Plan of Care Discussed with the Following:   - Nurse: Miranda   - Hospitalist/Rounding Provider: Dr Razo   - Josefina's Family/Preferred Contact: Mother Brandi  - Hospice Provider: Dr Tyshawn Santiago, [place] [phone]

## 2023-01-30 NOTE — PROGRESS NOTES
[facility]    Progress Note - [facility]    ______________________________________________________________________    [facility] Contact Number: [phone]    - Providers: Please contact [facility] with changes in orders or clinical plan of care   - Nursing: Please contact [facility] with significant changes in patient condition  ______________________________________________________________________        Plan of Care Discussed with the Following:   - Nurse: NU Jean  - Hospitalist/Rounding Provider: Dr. Danni Rocha's Family/Preferred Contact: Mother Brandi  - Hospice Provider: Dr. Tyshawn Kohli    Summary of Visit (includes assessment, medications and any new orders):   [facility] daily visit assessment.  Mother Brandi, Friends Carolina and Ambrosio were present at bedside. Patient was sitting up alert and oriented x 4, stated she was tired, , O2 sats 97 percent on RA, poor appetite. Patient denies pain/SOB. Prior to visit, patient received 1 PRN dose of Dilaudid at 0823 hours with effective results. Discharge plan is for patient to discharge to [facility], tomorrow 1/31/2022. Stretcher transport has been arranged for 9 am tomorrow. Family is aware of this. COVID test has been completed and will be faxed to [facility] by Hospice SW. Please transition patient to oral medications at least 24 hours prior to discharge.  [facility] will assess patient tomorrow, prior to discharge.    Thank you for the excellent care provided by the nursing team and providers.    Malena Stoner RN

## 2023-01-30 NOTE — PROGRESS NOTES
[facility]    Internal Medicine Hospitalist Progress Note  01/30/2023  I evaluated patient on the above date.    Elijah Razo Jr., MD  [phone] (p)  Text Page        Assessment & Plan New actions/orders today (01/30/2023) are underlined.    Josefina Bennett is a 57 year old female with PMH significant for metastatic breast cancer who was directly admitted to [facility] on 1/25/2023 for palliative cares. Discharged and re-admitted to [facility] hospice 1/27/2023.    1. Metastatic breast cancer, now on comfort cares.  2. Communicating hydrocephalus s/p shunt (12/2022) secondary to above.  * Diagnosed in 2014. Follows with Dr. Meza. Clinical stage II right breast cancer, ER positive, MN positive, HER2 negative. Resected 9/2015. Initiated on tamoxifen in 10/2015 with oophorectomy and initiation of letrozole in 1/2016. Subsequently recurrence 2022. S/p shunt placement 12/2022 for normal pressure hydrocephalus secondary to brain metastases. Began IT methotrexate on 12/20/22 that was discontinued on 1/13/23. In addition, she had received cranial radiation for a total of 5-days. Per note from Dr. Meza on 1/24/2023, her prognosis was measured in days.   * On 1/25, admitted to [facility] after requesting her desire to be a DNR/DNI and transition to hospice care for control of her symptoms, including nausea, severe headache and generalized pain. PTA capecitabine, tucatinib and dexamethasone stopped on admit. Palliative care consulted on admit. Admitted [facility] hospice 1/27/2023.  * On 1/29, pt with headache and back pain, not completely relieved with oxycodone. PRN oxycodone changed to PRN PO hydromorphone.  * On 1/30, headache worse with PO hydromorphone.  - Discontinue PRN PO hydromorphone as could be contributing to worsened headache.  - Restart oxycodone 10 mg q4h; consider starting oxycodone ER based on pain med requirements.  - Increase PRN IV hydromorphone from 0.3-0.5 --> 0.5-1 mg.  - Continue PRN  acetaminophen.  - Continue PRN lorazepam, PRN olanzapine.  - Continue other PRN measures/meds.  - SW following for hospice facility placement; pt wanted to pursue [facility] hosp[facility].  - Appreciate Hospice team help.    OTHER CHRONIC ISSUES:  1. Diabetes mellitus, type 2.   * PTA glargine and NPH not continued on admit.    2. Single subsegmental PE, previously on apixaban.  * PTA apixaban not continued on admit.    3. Depression/anxiety.  4. Insomnia.  * PTA venlafaxine not continued on admit.  - Continue PRN meds as above.      COVID-19 testing.  COVID-19 PCR Results    COVID-19 PCR Results 4/6/22 4/11/22 4/30/22 9/12/22 9/19/22 10/4/22 11/1/22 12/7/22   SARS CoV2 PCR Negative Negative Negative Negative Negative Negative Negative Negative      Comments are available for some flowsheets but are not being displayed.         COVID-19 Antibody Results, Testing for Immunity    COVID-19 Antibody Results, Testing for Immunity   No data to display.             Diet: Regular Diet Adult    Prophylaxis: PCD's, ambulation.   Berger Catheter: Not present  Code Status: No CPR- Do NOT Intubate    Disposition Plan Plan discharge to hospice facility once symptoms under better control, hospice facility found.     Expected Discharge Date: 01/30/2023             Entered: Elijah Razo MD 01/30/2023, 8:23 AM         Interval History   Worsened headache since changing to PO hydromorphone.  Nausea better.    -Data reviewed today: I reviewed all new labs and imaging over the last 24 hours. I personally reviewed no images or EKG's today.    Physical Exam    , Last menstrual period 12/18/2014, not currently breastfeeding.  There were no vitals filed for this visit.  Vital Signs with Ranges     No data found.  I/O's Last 24 hours  No intake/output data recorded.    Constitutional: Awake, alert, fatigued.  Respiratory:   Cardiovascular:   GI:   Skin/Integumen:   Other:        Data   Recent Labs   Lab 01/24/23  1229   WBC 6.9   HGB  11.2*   MCV 91   *     Recent Labs   Lab Test 01/13/23  0727 01/10/23  0729 12/27/22  0731 12/23/22  0959 12/21/22  1343 09/28/15  1548 09/22/15  0617 07/02/15  0548 07/01/15  0826 07/01/15  0648 07/01/15  0221 06/30/15  2029 06/30/15  1640   * 285* 306* 361* 198*   < >  --    < >  --    < >  --   --   --    BGM  --   --   --   --   --   --  112*  --  119*  --  86 104* 182*    < > = values in this interval not displayed.         No results found for this or any previous visit (from the past 24 hour(s)).    Medications   All medications were reviewed.      [START ON 2/22/2023] heparin  5-10 mL Intracatheter Q28 Days     heparin lock flush  5-10 mL Intracatheter Q24H     OLANZapine  5 mg Oral At Bedtime     [START ON 2/22/2023] sodium chloride (PF)  10-20 mL Intracatheter Q28 Days

## 2023-01-30 NOTE — PLAN OF CARE
[id number]    A/Ox4. Comfort cares. A1 GB/W. Pain managed with IV dilaudid, claims other methods of pain control are not effective. PRN dilaudid given x 3. If pt is nauseous, use compazine and not zofran as zofran is not effective for pt. Regular Diet, poor appetite. Cont B/B. Hospice consulted. Discharge to [facility] when bed becomes available.

## 2023-01-30 NOTE — PROGRESS NOTES
[facility]    Progress Note - [facility]    ______________________________________________________________________    [facility] Contact Number: [phone]    - Providers: Please contact [facility] with changes in orders or clinical plan of care   - Nursing: Please contact [facility] with significant changes in patient condition  ______________________________________________________________________        Plan of Care Discussed with the Following:   - Nurse: NU Ball   - Hospitalist/Rounding Provider: Dr. Elijah Razo   - Josefina's Family/Preferred Contact: MotherBrandi  - Hospice Provider: Dr. Tyshawn Kohli    Summary of Visit (includes assessment, medications and any new orders):   [facility] daily visit assessment. Patient was alert and oriented x 4, sitting on the side of bed. Mother Brandi and friends, Carolina and Ambrosio at bedside. At time of visit, patient c/o of HA,  She received PRN Dilaudid and Ativan with effective results. Patient reports appetite is fair, denies nausea and SOB.  RR 20, HR 72, O2 99 percent on RA. Patient gets up to the bathroom with walker and assist x 1. Referral has been sent to [facility], waiting for bed availability, possibly 2/1.  [facility] hospice will continue to assess patient daily and support family.    Thank you for the excellent care provided to this patient        Malena Stoner, RN

## 2023-01-30 NOTE — DISCHARGE SUMMARY
[facility]  Discharge Summary        Josefina Bennett MRN# [id number]   YOB: 1965 Age: 57 year old     Date of Admission: 1/27/2023  Date of Discharge: 1/30/2023  Admitting Physician: Bessie Alves MD  Discharge Physician: Elijah Razo MD     Primary Provider: Camille Araujo  Primary Care Physician Phone Number: [phone]         Discharge Diagnoses:   1. Metastatic breast cancer, now on comfort cares.  2. Communicating hydrocephalus s/p shunt (12/2022) secondary to above.        Other Chronic Medical Problems:      1. Diabetes mellitus, type 2.   2. Single subsegmental PE, previously on apixaban.  3. Depression/anxiety.  4. Insomnia.       Allergies:         Allergies   Allergen Reactions     Morphine      Rash           Discharge Medications:        Current Discharge Medication List      START taking these medications    Details   atropine 1 % ophthalmic solution Take 1 drop by mouth, place under tongue or place inside cheek every 4 hours as needed for secretions  Qty: 5 mL, Refills: 0    Associated Diagnoses: Metastatic breast cancer (H)      bisacodyl (DULCOLAX) 10 MG suppository Place 1 suppository (10 mg) rectally daily as needed for constipation  Qty: 2 suppository, Refills: 0    Associated Diagnoses: Metastatic breast cancer (H)      diphenhydrAMINE (BENADRYL) 25 MG capsule Take 1 capsule (25 mg) by mouth every 6 hours as needed for itching  Qty: 30 capsule, Refills: 0    Associated Diagnoses: Itching      OLANZapine zydis (ZYPREXA) 5 MG ODT Take 1 tablet (5 mg) by mouth every 6 hours as needed for agitation (delirium)  Qty: 15 tablet, Refills: 0    Associated Diagnoses: Metastatic breast cancer (H)      ondansetron (ZOFRAN ODT) 4 MG ODT tab Take 1 tablet (4 mg) by mouth every 6 hours as needed for nausea or vomiting  Qty: 15 tablet, Refills: 0    Associated Diagnoses: Metastatic breast cancer (H)      senna-docusate (SENOKOT-S/PERICOLACE) 8.6-50 MG  tablet Take 1 tablet by mouth 2 times daily as needed for constipation  Qty: 15 tablet, Refills: 0    Associated Diagnoses: Metastatic breast cancer (H)         CONTINUE these medications which have CHANGED    Details   acetaminophen (TYLENOL) 500 MG tablet Take 2 tablets (1,000 mg) by mouth every 6 hours as needed for mild pain or fever (temperature over 100  F )  Qty: 30 tablet, Refills: 0    Associated Diagnoses: Metastatic breast cancer (H)      doxepin (SINEQUAN) 10 MG/ML (HIGH CONC) solution Take 0.3-0.6 mLs (3-6 mg) by mouth nightly as needed for sleep  Qty: 118 mL, Refills: 0    Associated Diagnoses: Insomnia due to medical condition; Malignant neoplasm of upper-outer quadrant of right breast in female, estrogen receptor positive (H)      LORazepam (ATIVAN) 0.5 MG tablet Take 1 tablet (0.5 mg) by mouth or place under tongue every 4 hours as needed for anxiety (restlessness)  Qty: 15 tablet, Refills: 0    Associated Diagnoses: Metastatic breast cancer (H)      oxyCODONE (ROXICODONE) 10 MG tablet Take 1 tablet (10 mg) by mouth every 4 hours as needed for moderate pain (4-6)  Qty: 30 tablet, Refills: 0    Associated Diagnoses: Bone metastasis (H)         CONTINUE these medications which have NOT CHANGED    Details   alcohol swab prep pads Use to swab area of injection/maya as directed.  Qty: 100 each, Refills: 3    Associated Diagnoses: Type 2 diabetes mellitus with hyperglycemia, with long-term current use of insulin (H)      blood glucose (NO BRAND SPECIFIED) lancets standard Use to test blood sugar 2 timeses daily or as directed.  Qty: 100 lancet, Refills: 3    Associated Diagnoses: Hyperglycemia      blood glucose (NO BRAND SPECIFIED) test strip Use to test blood sugar 2 times daily or as directed.  Qty: 200 strip, Refills: 3    Associated Diagnoses: Hyperglycemia      blood glucose monitoring (NO BRAND SPECIFIED) meter device kit Use to test blood sugar 1 times daily or as directed.  Qty: 1 kit, Refills:  0    Associated Diagnoses: Hyperglycemia      Continuous Blood Gluc Sensor (FREESTYLE MARQUES 3 SENSOR) MISC 1 each every 14 days  Qty: 8 each, Refills: 11    Comments: shred  Associated Diagnoses: Type 2 diabetes mellitus with hyperglycemia, with long-term current use of insulin (H)      Alexandra (Zingiber officinalis) (ALEXANDRA ROOT) 550 MG CAPS capsule Take 550 mg by mouth daily      venlafaxine (EFFEXOR) 75 MG tablet Take 1 tablet (75 mg) by mouth daily  Qty: 30 tablet, Refills: 3    Associated Diagnoses: Adjustment disorder with depressed mood         STOP taking these medications       apixaban ANTICOAGULANT (ELIQUIS) 2.5 MG tablet Comments:   Reason for Stopping:         calcium carbonate (OS- MG [place]. [place]) 500 MG tablet Comments:   Reason for Stopping:         capecitabine (XELODA) 500 MG tablet Comments:   Reason for Stopping:         cholecalciferol (VITAMIN D3) 1000 UNIT tablet Comments:   Reason for Stopping:         dexamethasone (DECADRON) 4 MG tablet Comments:   Reason for Stopping:         insulin glargine (LANTUS PEN) 100 UNIT/ML pen Comments:   Reason for Stopping:         insulin  UNIT/ML injection Comments:   Reason for Stopping:         loperamide (IMODIUM) 2 MG capsule Comments:   Reason for Stopping:         metoclopramide (REGLAN) 10 MG tablet Comments:   Reason for Stopping:         Multiple Vitamins-Minerals (MULTIVITAMIN ADULTS PO) Comments:   Reason for Stopping:         naloxone (NARCAN) 4 MG/0.1ML nasal spray Comments:   Reason for Stopping:         nystatin (MYCOSTATIN) 223715 UNIT/ML suspension Comments:   Reason for Stopping:         OLANZapine (ZYPREXA) 5 MG tablet Comments:   Reason for Stopping:         ondansetron (ZOFRAN) 8 MG tablet Comments:   Reason for Stopping:         prochlorperazine (COMPAZINE) 10 MG tablet Comments:   Reason for Stopping:         sulfamethoxazole-trimethoprim (BACTRIM DS) 800-160 MG tablet Comments:   Reason for Stopping:         tucatinib  (TUKYSA) 150 MG tablet Comments:   Reason for Stopping:                   Discharge Instructions and Follow-Up:      Discharge Orders      General info for SNF    Length of Stay Estimate: Short Term Care: Estimated # of Days <30  Condition at Discharge: Declining  Level of care:skilled   Rehabilitation Potential: N/A  Admission H&P remains valid and up-to-date: Yes  Recent Chemotherapy: N/A  Use Nursing Home Standing Orders: Yes     Mantoux instructions    Give two-step Mantoux (PPD) Per Facility Policy Yes     Follow Up and recommended labs and tests    Follow-up with hospice care team.     Activity - Up with nursing assistance     Reason for your hospital stay    Palliative care initiation.     Diet    Follow this diet upon discharge: Orders Placed This Encounter      Regular Diet Adult             Consultations This Hospital Stay:      [facility] INPATIENT HOSPICE ADULT CONSULT  PHARMACY IP CONSULT        Admission History:      Please see the H&P by Bessie Alves MD on 1/27/2023 for complete details. Briefly, Josefina Bennett is a 57 year old female with PMH significant for metastatic breast cancer who was directly admitted to [facility] on 1/25/2023 for palliative cares. Discharged and re-admitted to [facility] hospice 1/27/2023.        Problem Oriented Hospital Course:        1. Metastatic breast cancer, now on comfort cares.  2. Communicating hydrocephalus s/p shunt (12/2022) secondary to above.  * Diagnosed in 2014. Follows with Dr. Meza. Clinical stage II right breast cancer, ER positive, [place] positive, HER2 negative. Resected 9/2015. Initiated on tamoxifen in 10/2015 with oophorectomy and initiation of letrozole in 1/2016. Subsequently recurrence 2022. S/p shunt placement 12/2022 for normal pressure hydrocephalus secondary to brain metastases. Began IT methotrexate on 12/20/22 that was discontinued on 1/13/23. In addition, she had received cranial radiation for a total of 5-days. Per note from Dr. Meza on 1/24/2023, her  prognosis was measured in days.   * On 1/25, admitted to [facility] after requesting her desire to be a DNR/DNI and transition to hospice care for control of her symptoms, including nausea, severe headache and generalized pain. PTA capecitabine, tucatinib and dexamethasone stopped on admit. Palliative care consulted on admit. Admitted [facility] hosp[facility] 1/27/2023.  * On 1/29, pt with headache and back pain, not completely relieved with oxycodone. PRN oxycodone changed to PRN PO hydromorphone.  * On 1/30, headache worse with PO hydromorphone, subsequently discontinued and changed back to oxycodone.  * On 1/31, pain improved with oxycodone.  - Continue PRN oxycodone 10 mg q4h.  - Continue PRN acetaminophen.  - Continue PRN lorazepam, PRN olanzapine.  - Continue other PRN measures/meds.  - Plan discharge to [facility].    OTHER CHRONIC ISSUES:  1. Diabetes mellitus, type 2.   * PTA glargine and NPH not continued on admit.    2. Single subsegmental PE, previously on apixaban.  * PTA apixaban not continued on admit.    3. Depression/anxiety.  4. Insomnia.  * PTA venlafaxine not continued on admit.  - Continue PRN meds as above.      COVID-19 testing.  COVID-19 PCR Results    COVID-19 PCR Results 4/6/22 4/11/22 4/30/22 9/12/22 9/19/22 10/4/22 11/1/22 12/7/22   SARS CoV2 PCR Negative Negative Negative Negative Negative Negative Negative Negative      Comments are available for some flowsheets but are not being displayed.         COVID-19 Antibody Results, Testing for Immunity    COVID-19 Antibody Results, Testing for Immunity   No data to display.                     Pending Results:        Unresulted Labs Ordered in the Past 30 Days of this Admission     No orders found from 12/28/2022 to 1/28/2023.                Discharge Disposition:      Discharged to hospice facility.        Discharge Time:      Less than 30 minutes.          Condition and Physical on Discharge:    See progress note on the same date as this  discharge summary.          Key Imaging Studies, Lab Findings and Procedures/Surgeries:        Results for orders placed or performed in visit on 01/10/23   MRI Brain w/o & w Contrast    Narrative    EXAM: MR BRAIN W/O & W CONTRAST  1/10/2023 11:17 AM     HISTORY:  shunt in place; Malignant neoplasm of upper-outer quadrant  of right breast in female, estrogen receptor negative (H); Malignant  neoplasm of upper-outer quadrant of right breast in female, estrogen  receptor negative (H)       COMPARISON:  Head CT 12/9/2022, MR brain 12/7/2022    TECHNIQUE: Sagittal and axial T1-weighted, axial diffusion,  multiplanar T2 FLAIR with fat saturation images were obtained without  intravenous contrast. Following intravenous gadolinium-based contrast  administration, axial T2-weighted, axial susceptibility, and  T1-weighted images (in multiple planes) were obtained.    CONTRAST: 6 ML Gadavist.    FINDINGS:  Stable right frontal approach ventricular shunt with tip in the third  ventricle. Small amount of air within the anterior horns of the  lateral ventricles. Decreased size of the ventricular system compared  to 12/9/2022. Increased nodular leptomeningeal contrast enhancement  along the cerebellar folia. Unchanged leptomeningeal contrast  enhancement over bilateral cerebral sulci left greater than right.  Diffuse pachymeningeal contrast enhancement findings.    Increased nodular parenchymal enhancing lesions in supratentorial and  infratentorial brain. Representative lesions may include 1.3 cm right  cerebellar lesion (16, image 47), 5 mm lesion within the inferior  right parietal lobe (series 16, image 113) previously measuring 3 mm.  Unchanged DVA cavernoma complex within the jude.     There is no mass effect, midline shift, or intracranial hemorrhage.  The ventricles are proportionate to the cerebral sulci. Diffusion and  susceptibility weighted images are negative for acute/focal  abnormality. Major intracranial vascular  structures are within normal  limits.    No suspicious abnormality of the skull marrow signal. Scattered  paranasal sinus mucosal thickening. Bilateral mastoid effusions  similar to previous exams. No focal abnormality of the pituitary  gland, sella, skull base and upper cervical spinal structures on  sagittal images. The orbits are normal.      Impression    IMPRESSION:  1. Progression of leptomeningeal and intraparenchymal metastatic  disease compared to 12/7/2022.  2. Stable right frontal approach ventricular shunt with decreased  caliber of the ventricular system compared to 12/9/2022. New   pachymeningeal contrast enhancement, question over shunting.    I have personally reviewed the examination and initial interpretation  and I agree with the findings.    SMITHA RODRIGUEZ MD         SYSTEM ID:  [id number]     *Note: Due to a large number of results and/or encounters for the requested time period, some results have not been displayed. A complete set of results can be found in Results Review.

## 2023-01-30 NOTE — PROGRESS NOTES
[id number]    Patient A&O x 4, drowsy.   Sleeps most of the time, but awakens suddenly in intense pain.   IV dilaudid 0.5 mg is  required every 90 minutes to 2 hours to control 8/10 back and head pain.  Please use IV compazine for nausea.   Zofran is not effective.  IV ativan is available for anxiety.  Patient was up one time with an assist of 1 and GB/W to use the BR.   She is unsteady and requires supervision.  She voided but no BM.  Family requests that we check on her comfort level  every 45 minutes or so.  She becomes disoriented in the night and is unable to use the call button.  Port is HL.  Discharge to [facility] hospice when bed is available.

## 2023-01-31 NOTE — DISCHARGE SUMMARY
Discharge Note    Patient discharged to Nursing Home via EMS/BLS accompanied by friend.  IV and Port : Discontinued and Heparinized and De-accessed   Prescriptions sent with patient to discharge facility .   Belongings reviewed and sent with patient.   Home medications returned to patient: NA  Equipment sent with: patient, N/A.   family verbalizes understanding of discharge instructions. AVS given to patient and family.

## 2023-01-31 NOTE — PLAN OF CARE
[id number]  Comfort care pt, all assessments deferred. Pt lethargic, denied SOB. PRN oxycodone (PO) given Q4H x3 to stay on top of pain. Benadryl given x1 for itchiness and zofran x1 for nausea. Continent of B/B, up Ax1 GB+W to BM. Regular diet w/ poor appetite. Chest port HL'd. Plan to discharge to [facility] at 0900 by mile.

## 2023-01-31 NOTE — PROGRESS NOTES
[facility]    Internal Medicine Hospitalist Progress Note  01/31/2023  I evaluated patient on the above date.    Elijah Razo Jr., MD  [phone] (p)  Text Page        Assessment & Plan New actions/orders today (01/31/2023) are underlined.    Josefina Bennett is a 57 year old female with PMH significant for metastatic breast cancer who was directly admitted to [facility] on 1/25/2023 for palliative cares. Discharged and re-admitted to [facility] hospice 1/27/2023.    1. Metastatic breast cancer, now on comfort cares.  2. Communicating hydrocephalus s/p shunt (12/2022) secondary to above.  * Diagnosed in 2014. Follows with Dr. Meza. Clinical stage II right breast cancer, ER positive, [place] positive, HER2 negative. Resected 9/2015. Initiated on tamoxifen in 10/2015 with oophorectomy and initiation of letrozole in 1/2016. Subsequently recurrence 2022. S/p shunt placement 12/2022 for normal pressure hydrocephalus secondary to brain metastases. Began IT methotrexate on 12/20/22 that was discontinued on 1/13/23. In addition, she had received cranial radiation for a total of 5-days. Per note from Dr. Meza on 1/24/2023, her prognosis was measured in days.   * On 1/25, admitted to [facility] after requesting her desire to be a DNR/DNI and transition to hospice care for control of her symptoms, including nausea, severe headache and generalized pain. PTA capecitabine, tucatinib and dexamethasone stopped on admit. Palliative care consulted on admit. Admitted [facility] hospice 1/27/2023.  * On 1/29, pt with headache and back pain, not completely relieved with oxycodone. PRN oxycodone changed to PRN PO hydromorphone.  * On 1/30, headache worse with PO hydromorphone, changed back to oxycodone, PRN IV hydromorphone increased.  * On 1/31, pain improved with oxycodone.  - Continue PRN acetaminophen, PRN oxycodone 10 mg q4h, PRN IV hydromorphone 0.5-1 mg q15m.  - Continue PRN lorazepam, PRN olanzapine.  - Continue other PRN  measures/meds.  - SW following for hospice facility placement; pt wanted to pursue [facility].  - Appreciate Hospice team help.    OTHER CHRONIC ISSUES:  1. Diabetes mellitus, type 2.   * PTA glargine and NPH not continued on admit.    2. Single subsegmental PE, previously on apixaban.  * PTA apixaban not continued on admit.    3. Depression/anxiety.  4. Insomnia.  * PTA venlafaxine not continued on admit.  - Continue PRN meds as above.      COVID-19 testing.  COVID-19 PCR Results    COVID-19 PCR Results 4/6/22 4/11/22 4/30/22 9/12/22 9/19/22 10/4/22 11/1/22 12/7/22 1/30/23   SARS CoV2 PCR Negative Negative Negative Negative Negative Negative Negative Negative Negative      Comments are available for some flowsheets but are not being displayed.         COVID-19 Antibody Results, Testing for Immunity    COVID-19 Antibody Results, Testing for Immunity   No data to display.             Diet: Regular Diet Adult  Diet    Prophylaxis: PCD's, ambulation.   Berger Catheter: Not present  Code Status: No CPR- Do NOT Intubate    Disposition Plan  Discharge to [facility] today 1/31.    Expected Discharge Date: 01/31/2023             Entered: Elijah Razo MD 01/31/2023, 7:15 AM         Interval History    Headache better with oxycodone.    -Data reviewed today: I reviewed all new labs and imaging over the last 24 hours. I personally reviewed no images or EKG's today.    Physical Exam    , Last menstrual period 12/18/2014, not currently breastfeeding.  There were no vitals filed for this visit.  Vital Signs with Ranges     No data found.  I/O's Last 24 hours  I/O last 3 completed shifts:  In: 600 [P.O.:600]  Out: -     Constitutional: Awake, alert, more comfortable.  Respiratory:   Cardiovascular:   GI:   Skin/Integumen:   Other:        Data   Recent Labs   Lab 01/24/23  1229   WBC 6.9   HGB 11.2*   MCV 91   *     Recent Labs   Lab Test 01/13/23  0727 01/10/23  0729 12/27/22  0731  12/23/22  0959 12/21/22  1343 09/28/15  1548 09/22/15  0617 07/02/15  0548 07/01/15  0826 07/01/15  0648 07/01/15  0221 06/30/15  2029 06/30/15  1640   * 285* 306* 361* 198*   < >  --    < >  --    < >  --   --   --    BGM  --   --   --   --   --   --  112*  --  119*  --  86 104* 182*    < > = values in this interval not displayed.         No results found for this or any previous visit (from the past 24 hour(s)).    Medications   All medications were reviewed.    sodium chloride 10 mL/hr at 01/30/23 0948       [START ON 2/22/2023] heparin  5-10 mL Intracatheter Q28 Days     heparin lock flush  5-10 mL Intracatheter Q24H     OLANZapine  5 mg Oral At Bedtime     [START ON 2/22/2023] sodium chloride (PF)  10-20 mL Intracatheter Q28 Days

## 2023-01-31 NOTE — PLAN OF CARE
Goal Outcome Evaluation:         Pt A&Ox2-3, Pain medication makes her confused. Vitals and full assessment deferred due to hospice/comfort care status. A1 w/GB+W to bathroom, very unsteady. Port HL with intermittent IV pain medications. PRN oral oxycodone given q4hrs. Pt discharging to [facility] at 0900 via stretcher transport. Port HL and deaccessed.

## 2023-01-31 NOTE — PLAN OF CARE
Goal Outcome Evaluation:         Pt A&Ox3-4 at start of shift. Vitals and full assessment deferred due to comfort care status. R port started on TKO fluids due to increase in IV medications. Pain not controlled this AM, IV dilaudid increased to 1mg, given x2. PRN oral oxycodone given q4hrs x2, encouraging oral pain management in anticipation of discharge tomorrow AM. Pt A1 w/GB+W, very unsteady after pain medications. Full bed bath given this afternoon. Pt complains of feeling itchy. PRN Benadryl ordered and given x1. Regular diet, poor appetite. GIP patient. Plan to discharge to [facility] tomorrow at 0900 via stretcher.

## 2023-02-13 NOTE — PROGRESS NOTES
Social Work Follow-Up   Oncology Clinic    Data/Intervention:  Patient Name:  Josefina Bennett  /Age:  1965 (57 year old)    Reason for Follow-Up:  Hospice Services    Collaborated With:    -[facility]  -Patient's mother Brandi  -Teresa Ashton RN  -[facility]      Assessment:  SW received a call from [facility] that there was a room available and patient could be admitted for hospice services if they were still interested. SW checked with patient's mother this was still patient's intent. Patient's mother communicated it was still patient's desire to go to [facility] for hospice services. [facility] stated patient could be admitted tomorrow  at 10am, patient's primary care giver would need to be with patient during admission and pateitn would need to present a negative COVID test upon admission.. SW followed up with patient's mother and answered all questions. SW updated Teresa Ashton RN with this information. SW called and communicated availability and admission plans for [facility] with [facility] who is currently providing hospice services at [facility]. [facility] hospice nurse agreed to place order for Covid test and communicate hospice admission plans with [facility]  who would assist with transportation and admission paperwork.       Plan:  Previously provided patient/family with writer's contact information and availability.   SW complete handoff to [facility] for [facility] hospice admission.   SW will remain available until patient is admitted into hospice facility. SW will no longer follow patient once admitted for hospice services.     Vita HORNER, [facility]  - Oncology  Phone : [phone]  Pager: [phone]

## 2023-02-13 NOTE — PROGRESS NOTES
Social Work Follow-Up  Oncology Clinic    Data/Intervention:  Patient Name:  Josefina Bennett  /Age:  1965 (57 year old)    Reason for Follow-Up:  [employer] alvina    Collaborated With:    -Patient  -Patient's mother Brandi  -[facility]  -    Assessment:  EMILY spoke with patient's mother updating them regarding award approval for [employer] alvina. SW informed patient's mother that the letter had been mailed to patient's home address. SW discussed the approval amount and what the alvina could be sued for. SW offered to communicate patient's choices for alvina funds to [facility] so that funds could be sent to patient's mother [facility]. Patient and patient's mother discussed use of alvina and asked alvina be used for gas and cub food gift cards. SW communicated this with [facility] who mailed this to [facility].      Plan:  Previously provided patient/family with writer's contact information and availability.   SW will continue to remain available as needed for ongoing supports.     [facility], [facility]  - [facility]  Phone : [phone]  Pager: [phone]

## 2023-02-13 NOTE — PROGRESS NOTES
Social Work Follow-Up   Oncology Clinic    Data/Intervention:  Patient Name:  Josefina Bennett  /Age:  1965 (57 year old)    Reason for Follow-Up:  [facility]    Collaborated With:    -Patient's mom Brandi  -[facility]  -[facility]      Assessment:  EMILY called and spoke with [facility] about expediting an [facility] application for a patient. [facility] agreed to check on the possibility of this and follow up with SW if this was possible. [facility] followed back up later in the day stating expectation was possible and delivery would begining the following week. SW called patient's mother Brandi and updated her with this information.  SW checked in with [facility] about [facility] delivering meals for a patient and patient's mother. [facility] reported they already receive [facility] meal deliveries,and they are available to any patient and patient's caregivers staying at [facility]. SW followed up with patient's mother regarding this information. Patient's mother remembered trying these meals and reported they were not aware this was the same meal program as SW was helping to set up. Patient's mother stated she and patient did not like the meals. Patient mother stated they would continue to buy groceries and cook while at the [facility]. patient's mother stated they do not have any interest in receiving [facility] meal delivers in the future. SW discussed gift cards for buying groceries. Patient's mother stated this would be helpful to them. SW arranged for patient to  grocery gift cards at patient's next appointment.     Plan:  Previously provided patient/family with writer's contact information and availability.   SW will remain available as needed for on going supports.     Vita HORNER, [facility]  - Oncology  Phone : [phone]  Pager: [phone]

## 2023-02-13 NOTE — PROGRESS NOTES
Social Work Follow-Up Encounter Visit  Oncology Clinic    Data/Intervention:  Patient Name:  Josefina Bennett  /Age:  1965 (57 year old)    Reason for Follow-Up: hospice services    Collaborated With:    -patient  -patient's mother  -patient's care team        Resources Provided:  [facility]    Assessment:  SW received an update from patient's care team regarding patient's progression in their disease and patient's interest to begin hospice services. SW spoke with patient and patient's mother about hospice services. SW provided information about facility hospice vs home hospice. SW provided space and validation of patient and patient's mother to discuss their concerns regarding hospice services and how they would like to receive hospice services. Patient and patient's mother agreed they would like to move forward with enrolling in hospice care at [facility]. SW answered all questions and agreed to place a referral at [facility] hospice facility.     SW completed and fax[facility] at [facility], possible room availability next week. [facility] will contact SW to confirm patient's approval into their facility and will update SW when room becomes available for admission.     SW updated patient's mother and patient's care team with this news. SW discussed other hospice service options with patient's mother and care team. Patient's mother expressed patient and mother not wanting to enrolling home hospice and preference for facility hospice. Teresa Ashton RN stated they would look into avability of hospice services at [facility] hospice and other inpatient hospital hospice services.       23  Patient's mother and patient's care team updated that patient has been approved for hospice services at [facility] and still awaiting for bed availability.        Teresa Ashton RN reported they were  able [facility] get [facility]. Patient and Patient's mother were agreeable to this plan. Patient and patient's mother would like to discharge to [facility] when available.     Plan:  Previously provided patient/family with writer's contact information and availability.   SW will remain available as needed for ongoing supports.     Vita HORNER, [facility]  - Oncology  Phone : [phone]  Pager: [phone]

## 2023-02-23 NOTE — PROGRESS NOTES
Writer received a call from [facility] that Mary passed away this morning at 4:10 AM. Patient's Mother also called from [facility] where they've been able to stay while her daughter has been in hospice with living in [place]. Teresa Ashton RN, BSN  [facility] Center Nurse Coordinator

## 2023-02-24 LAB
CEA SERPL-MCNC: 1928 NG/ML
CEA SERPL-MCNC: 2479 NG/ML
CEA SERPL-MCNC: 2516 NG/ML
CEA SERPL-MCNC: >1000 NG/ML
CEA SERPL-MCNC: >1000 NG/ML

## 2023-02-26 ENCOUNTER — TELEPHONE (OUTPATIENT)
Dept: ONCOLOGY | Facility: CLINIC | Age: 58
End: 2023-02-26
Payer: COMMERCIAL

## 2023-02-27 NOTE — TELEPHONE ENCOUNTER
I called Brandi and expressed our sincerest condolences on her loss of Mary.     Evangelista Meza MD

## 2023-03-02 ENCOUNTER — PATIENT OUTREACH (OUTPATIENT)
Dept: ONCOLOGY | Facility: CLINIC | Age: 58
End: 2023-03-02
Payer: COMMERCIAL

## 2023-03-02 NOTE — TELEPHONE ENCOUNTER
Date of death State file number  02/23/2023 [id number]      Verified on [email or web address]

## 2023-04-03 PROBLEM — C79.51 MALIGNANT NEOPLASM METASTATIC TO BONE (H): Status: ACTIVE | Noted: 2022-01-01

## 2023-12-27 NOTE — LETTER
4/11/2017       RE: Josefina Bennett  [address]  [place] [address]     Dear Colleague,    Thank you for referring your patient, Josefina Bennett, to the [facility]. Please see a copy of my visit note below.    Josefina Bennett is self referred to our clinic for clinical trials recommendations for her newly diagnosed stage II breast cancer. She had her last mammogram approximately a year ago. She did notice in early January that she was having some distortion of the right breast, and she went to see her gynecologist and had a mammogram performed. She had a screening mammogram performed on 01/15/2015. Breast tissue was heterogeneously dense, which might compromise the mammography. There was architectural distortion in the right breast approximately 11-12 o'clock position, zone 2, posterior depth, and a CC MLO spot compression was performed and an ultrasound was planned. The diagnostic mammogram from 01/28/2015 showed right breast architectural distortion centered at the 12 o'clock position, zone 2, suspicious for neoplasm. Breast tomosynthesis was used in the interpretation. Ultrasound findings included targeted ultrasound of the right upper breast demonstrating a band-like area of abnormal echogenicity between 11 and 12 o'clock position in the right breast at zone 2. This measures 4 cm transversely x 1.1 cm AP, and there was only a 1.5 cm measurement in the radial direction. There was blood flow suspicious for neoplasm at the 11 o'clock position in zone 2. There is a 1.4 cm hypoechoic nodule slightly  from the larger area of altered echogenicity that is also suspicious. A biopsy was performed. The biopsy showed infiltrating lobular carcinoma, grade 2, and a clip was placed at specimen [id number]. There were a few signet cells present. The tumor was ER-positive, OR low positive, and HER2 was negative by immunohistochemistry. She subsequently underwent an MRI showing a tumor that was 4.8 x 1.7 x  How Severe Is This Condition?: mild 3.2 cm in size. Overall, her clinical stage was T2 NX MX.    She is enrolled in the I-SPY-2 clinical trial, and qualified with high-risk MammaPrint score. She was randomized to ganetespib and paclitaxel, and she was treated with 12 cycles of treatment, and then started on AC on 05/26/2015. She developed intractable nausea and vomiting after the first cycle, which required hospitalization on the second cycle. She then developed Pneumocystis pneumonia, which resulted in intubation and a 2-week hospitalization during the course of AC. She was discharged on 07/02/2015, and decided she did not want to pursue any further chemotherapy. She had a right lumpectomy and sentinel lymph node procedure 08/12/2015. She had a positive margin, underwent a re-excision, and had a positive margin. Ultimately, she underwent a right mastectomy with clear margins. She began radiation treatment with Dr. Bill Chauhan, and completed radiation therapy on 12/04/2015. Pathologic stage was III-A, ypT3 N1a MX. Of concern, she had what could be considered an RCB3 tumor.         INTERVAL HISTORY:  Josefina Verma returns to clinic.  She is feeling entirely well.  She has no pain, no fatigue, no depression and no anxiety.      REVIEW OF SYSTEMS:  A 10-point review of systems is entirely negative.  She has a dog and has been running with her dog.  She exercises roughly more than 150 minutes a week.  She also does dance as well.      She has had some nail problems after chemotherapy and would like a referral to Dermatology.      PHYSICAL EXAMINATION:   VITAL SIGNS:  Blood pressure 136/87, temperature 98.4, pulse 99, respirations 16, O2 sat 100% on room air, weight 63.3 kg.   GENERAL:  Josefina Verma appeared generally well.     HEENT:  She has no alopecia.  Examination of the oropharynx is without lesions.   LYMPH:  There is no palpable cervical, supraclavicular, subclavicular or axillary lymphadenopathy.   BREASTS:  Examination of the right anterior chest wall  reveals a well-healed mastectomy incision without erythema or masses.  There are no masses in the anterior chest wall bilaterally.  There is an area of slight erythema which appears to be a scratch located 1 cm below the mastectomy incision, and which is approximately 1 cm in length.  She says that this is due to a bra which was irritating her chest wall.  There are no palpable masses or nodularity in the area of the scratch.  Left breast is without masses.   LUNGS:  Clear to percussion and auscultation.   HEART:  There is a regular rate and rhythm, S1, S2.   ABDOMEN:  Soft and nontender, without hepatosplenomegaly.   EXTREMITIES:  Without edema.   PSYCHIATRIC:  Mood and affect were normal.      LABORATORY DATA:  The CBC and CMP were within normal limits.      IMAGING:  Mammogram performed of the left breast today shows benign findings.      ASSESSMENT AND PLAN:     1.  Josefina Verma is a 51-year-old woman with a history of a clinical stage II right breast cancer, ER positive, ME positive, HER-2 negative by immunohistochemistry.  She had a lobular histology.  She was on the I-SPY-2 clinical trial and randomized to ganetespib and paclitaxel and then went on to AC.  She developed intractable nausea and vomiting on the first cycle of AC and then the second cycle of AC was complicated by Pneumocystis pneumonia requiring intubation and a 2-week hospitalization.  She did not complete the last 2 cycles of Adriamycin and cyclophosphamide treatment.  She did undergo a right lumpectomy and sentinel node procedure on 08/12/2015.  She had a positive margin and then underwent a re-excision, and still had a positive margin.  She then underwent a right mastectomy with clear margins.  She completed radiation therapy in 12/2015.  Pathologic stage was stage IIIA, ypT3 N1a MX.  Of concern, she was reported as having an RCB3 response of her tumor, meaning significant risk of recurrence in a 5-year period.  There were 2 lymph nodes involved  with mammary carcinoma with extracapsular extension.  The largest lymph node mass was 2 cm in size.   2.  Josefina Verma was offered the YASMINE-B trial, but she did not want to go on this trial.  She did have bilateral oophorectomy.  She was on tamoxifen and then transitioned to letrozole.  The oophorectomy was performed on 10/16/2015 and she began on letrozole approximately 1 month following that, so she had a total of 2 months of tamoxifen followed by a year and a half of letrozole to date.  Our plan is to continue the letrozole for a total of 10 years.   3.  Discussion of exercise.  Josefina Verma has been exercising at least 150 minutes a week.  I discussed with her that this is associated with reduction of breast cancer recurrence risk from the Pathways and LACE trials published in Cancer Research in 2016.  This was a prospective study of 6000 patients.   4.  Diet.  Josefina Verma continues to have a healthful diet lower in saturated fat and higher in olive oil and lower in red meat.   5.  Discussion of calcium and vitamin D.  She is taking these supplements.   6.  Bone health.  DEXA scan from 06/02/2016 showed a most valid negative T-score of -1.7.  We will repeat the DEXA scan in 2018.  We will continue with calcium and vitamin D.   7.  Followup imaging will be a left breast MRI to be performed in 6 months.  Mammogram today showed benign findings.   8. Dermatology referral for nails.   9.  Followup.  We will see Josefina Verma in followup in our clinic in 3 months with Mera Marte and in 6 months with me. Dermatology referral with Dr. Kojo Salcedo for nails.  Follow up with Mera Marte July 11 and with me October 10 with breast MRI October 9.  CBC, CMP with follow up visits.      Thank you for allowing us to continue to participate in Josefina Verma's care.     Sincerely,    Evangelista Meza M.D.      Division of Hematology, Oncology, Transplant   Department of Medicine   [facility]   [phone]       I spent 20 minutes with the patient more than 50% of which was in counseling and coordination of care.     Evangelista Meza MD

## 2024-02-14 NOTE — NURSING NOTE
"Oncology Rooming Note    December 28, 2022 9:42 AM   Josefina Bennett is a 57 year old female who presents for:    Chief Complaint   Patient presents with     Oncology Clinic Visit     RETURN - BREAST CANCER     Initial Vitals: /78 (BP Location: Left arm, Patient Position: Sitting, Cuff Size: Adult Regular)   Pulse 107   Temp 97.8  F (36.6  C) (Oral)   Resp 18   Wt 59.8 kg (131 lb 12.8 oz)   LMP 12/18/2014   SpO2 98%   BMI 22.61 kg/m   Estimated body mass index is 22.61 kg/m  as calculated from the following:    Height as of 6/10/22: 1.626 m (5' 4.02\").    Weight as of this encounter: 59.8 kg (131 lb 12.8 oz). Body surface area is 1.64 meters squared.  Severe Pain (7) Comment: Data Unavailable   Patient's last menstrual period was 12/18/2014.  Allergies reviewed: Yes  Medications reviewed: Yes    Medications: MEDICATION REFILLS NEEDED TODAY. Provider was notified.  Pharmacy name entered into [employer]:    [facility] [place] [place] - [address] [place] [place] [facility] - [place], [place] - [address] [facility] - [place], [place] - [address]  [facility] - [place], [place] - [address]    Clinical concerns: No new clinical concerns other than reason for visit today.        Donna Gilmore, [facility]              "
Statement Selected

## 2025-07-17 NOTE — PLAN OF CARE
[id number]  Status: POD #3   for  shunt on 12/9/22. History of metastatic breast CA; mets to bone, liver, brain  Vitals: VSS.  Continuous pulse ox on.  Neuros: A&Ox4.  Strength 4/5 throughout  IV: PIV SL.  L chest port HL  Labs/Electrolytes: WNL. Redraw ordered for am  Resp/trach: WNL, on RA  Diet: regular diet, good PO  Bowel status: LBM 12/12.  Bowel meds given  : voiding spontaneously  Skin: head incision approximated, HALLIE. Abdomen incision approximated and closed with liquid bandage.   Pain: incisional pain managed with schedule meds  Activity: up ad hermelindo.  Ambulating freq in halls  Social: family visited this evening  Plan: continue with POC         1.48

## (undated) DEVICE — TRACKER PATIENT NON-INVASIVE AXIEM 9734887

## (undated) DEVICE — STRAP UNIVERSAL POSITIONING 2-PIECE 4X47X76" 91-287

## (undated) DEVICE — SHUNT TROCAR SPLIT 82-4095

## (undated) DEVICE — GLOVE PROTEXIS BLUE W/NEU-THERA 8.0  2D73EB80

## (undated) DEVICE — SU MONOCRYL 4-0 PS-2 27" UND Y426H

## (undated) DEVICE — PACK NEURO MINOR UMMC SNE32MNMU4

## (undated) DEVICE — PREP POVIDONE-IODINE 7.5% SCRUB 4OZ BOTTLE MDS093945

## (undated) DEVICE — DECANTER BAG 2002S

## (undated) DEVICE — KIT INTRODUCER FLUENT MICRO 5FRX10CM ECHO TIP KIT-038-04

## (undated) DEVICE — Device

## (undated) DEVICE — PREP SKIN SCRUB TRAY 4461A

## (undated) DEVICE — GLOVE PROTEXIS POWDER FREE SMT 7.5  2D72PT75X

## (undated) DEVICE — SU VICRYL 3-0 SH 27" J316H

## (undated) DEVICE — DRAPE SHEET REV FOLD 3/4 9349

## (undated) DEVICE — SHUNT TUNNELER SHEATH 70CM NT9MD170

## (undated) DEVICE — DRSG GAUZE 4X4" TRAY 6939

## (undated) DEVICE — ESU GROUND PAD ADULT W/CORD E7507

## (undated) DEVICE — SYR 10ML LL W/O NDL 302995

## (undated) DEVICE — DRSG KERLIX 4 1/2"X4YDS ROLL 6715

## (undated) DEVICE — GLOVE PROTEXIS MICRO 7.5  2D73PM75

## (undated) DEVICE — CONNECTOR MALE TO MALE LL

## (undated) DEVICE — COVER ULTRASOUND PROBE W/GEL FLEXI-FEEL 6"X58" LF  25-FF658

## (undated) DEVICE — BLADE KNIFE SURG 10 371110

## (undated) DEVICE — ADH SKIN CLOSURE PREMIERPRO EXOFIN 1.0ML 3470

## (undated) DEVICE — SU MONOCRYL 4-0 P-3 18" UND Y494G

## (undated) DEVICE — LINEN TOWEL PACK X6 WHITE 5487

## (undated) DEVICE — SPONGE SURGIFOAM 12 1972

## (undated) DEVICE — GOWN XLG DISP 9545

## (undated) DEVICE — SPONGE COTTONOID 1/2X1/2" 20-04S

## (undated) DEVICE — TUBING SUCTION 10'X3/16" N510

## (undated) DEVICE — TUBING SMOKE EVAC PNEUMOCLEAR HIGH FLOW 0620050250

## (undated) DEVICE — BLADE CLIPPER SGL USE 9680

## (undated) DEVICE — SU VICRYL 2-0 CT-2 27" UND J269H

## (undated) DEVICE — SUTURE BOOTS 051003PBX

## (undated) DEVICE — PAD CHUX UNDERPAD 23X24" 7136

## (undated) DEVICE — SUCTION MANIFOLD NEPTUNE 2 SYS 4 PORT 0702-020-000

## (undated) DEVICE — LINEN GOWN XLG 5407

## (undated) DEVICE — LINEN TOWEL PACK X5 5464

## (undated) DEVICE — SU SILK 2-0 TIE 12X30" A305H

## (undated) DEVICE — DRAPE POUCH IRR 1016

## (undated) DEVICE — NDL BLUNT 17GA 1.5" 8881202330

## (undated) DEVICE — PACK CENTRAL LINE INSERTION SAN32CLFCG

## (undated) DEVICE — BUR STRK CARBIDE ROUND 5.0MM 5820-110-050C

## (undated) DEVICE — PREP DURAPREP 26ML APL 8630

## (undated) DEVICE — KNIFE HANDLE W/15 BLADE 371615

## (undated) DEVICE — SU VICRYL 2-0 CT-2 CR 8X18" J726D

## (undated) DEVICE — TUBING SMOKE EVAC PNEUMOCLEAR HEATED 0620050350

## (undated) DEVICE — PREP CHLORAPREP CLEAR 3ML 930400

## (undated) DEVICE — CATH VA INTR 10FRX14CM KIT

## (undated) RX ORDER — LABETALOL HYDROCHLORIDE 5 MG/ML
INJECTION, SOLUTION INTRAVENOUS
Status: DISPENSED
Start: 2022-01-01

## (undated) RX ORDER — LIDOCAINE HYDROCHLORIDE 10 MG/ML
INJECTION, SOLUTION EPIDURAL; INFILTRATION; INTRACAUDAL; PERINEURAL
Status: DISPENSED
Start: 2022-01-01

## (undated) RX ORDER — HEPARIN SODIUM (PORCINE) LOCK FLUSH IV SOLN 100 UNIT/ML 100 UNIT/ML
SOLUTION INTRAVENOUS
Status: DISPENSED
Start: 2022-01-01

## (undated) RX ORDER — GENTAMICIN 40 MG/ML
INJECTION, SOLUTION INTRAMUSCULAR; INTRAVENOUS
Status: DISPENSED
Start: 2022-01-01

## (undated) RX ORDER — LIDOCAINE HYDROCHLORIDE AND EPINEPHRINE 10; 10 MG/ML; UG/ML
INJECTION, SOLUTION INFILTRATION; PERINEURAL
Status: DISPENSED
Start: 2022-01-01

## (undated) RX ORDER — DEXAMETHASONE SODIUM PHOSPHATE 4 MG/ML
INJECTION, SOLUTION INTRA-ARTICULAR; INTRALESIONAL; INTRAMUSCULAR; INTRAVENOUS; SOFT TISSUE
Status: DISPENSED
Start: 2022-01-01

## (undated) RX ORDER — ONDANSETRON 2 MG/ML
INJECTION INTRAMUSCULAR; INTRAVENOUS
Status: DISPENSED
Start: 2022-01-01

## (undated) RX ORDER — FENTANYL CITRATE 50 UG/ML
INJECTION, SOLUTION INTRAMUSCULAR; INTRAVENOUS
Status: DISPENSED
Start: 2022-01-01

## (undated) RX ORDER — ACETAMINOPHEN 325 MG/1
TABLET ORAL
Status: DISPENSED
Start: 2022-01-01

## (undated) RX ORDER — CEFAZOLIN SODIUM/WATER 2 G/20 ML
SYRINGE (ML) INTRAVENOUS
Status: DISPENSED
Start: 2022-01-01

## (undated) RX ORDER — SODIUM CHLORIDE 9 MG/ML
INJECTION, SOLUTION INTRAVENOUS
Status: DISPENSED
Start: 2022-01-01

## (undated) RX ORDER — SODIUM CHLORIDE, SODIUM LACTATE, POTASSIUM CHLORIDE, CALCIUM CHLORIDE 600; 310; 30; 20 MG/100ML; MG/100ML; MG/100ML; MG/100ML
INJECTION, SOLUTION INTRAVENOUS
Status: DISPENSED
Start: 2022-01-01

## (undated) RX ORDER — CEFAZOLIN SODIUM 1 G/3ML
INJECTION, POWDER, FOR SOLUTION INTRAMUSCULAR; INTRAVENOUS
Status: DISPENSED
Start: 2022-01-01

## (undated) RX ORDER — FENTANYL CITRATE-0.9 % NACL/PF 10 MCG/ML
PLASTIC BAG, INJECTION (ML) INTRAVENOUS
Status: DISPENSED
Start: 2022-01-01

## (undated) RX ORDER — HYDRALAZINE HYDROCHLORIDE 20 MG/ML
INJECTION INTRAMUSCULAR; INTRAVENOUS
Status: DISPENSED
Start: 2022-01-01

## (undated) RX ORDER — PROPOFOL 10 MG/ML
INJECTION, EMULSION INTRAVENOUS
Status: DISPENSED
Start: 2022-01-01